# Patient Record
Sex: FEMALE | Race: WHITE | NOT HISPANIC OR LATINO | Employment: OTHER | URBAN - METROPOLITAN AREA
[De-identification: names, ages, dates, MRNs, and addresses within clinical notes are randomized per-mention and may not be internally consistent; named-entity substitution may affect disease eponyms.]

---

## 2018-10-05 ENCOUNTER — HOSPITAL ENCOUNTER (OUTPATIENT)
Dept: RADIOLOGY | Facility: HOSPITAL | Age: 48
Discharge: HOME | End: 2018-10-05
Attending: PSYCHIATRY & NEUROLOGY
Payer: COMMERCIAL

## 2018-10-05 ENCOUNTER — OFFICE VISIT (OUTPATIENT)
Dept: NEUROLOGY | Facility: CLINIC | Age: 48
End: 2018-10-05
Payer: COMMERCIAL

## 2018-10-05 VITALS
BODY MASS INDEX: 30.91 KG/M2 | HEIGHT: 68 IN | HEART RATE: 112 BPM | SYSTOLIC BLOOD PRESSURE: 148 MMHG | DIASTOLIC BLOOD PRESSURE: 86 MMHG | RESPIRATION RATE: 18 BRPM | WEIGHT: 203.94 LBS

## 2018-10-05 DIAGNOSIS — R51.9 CHRONIC INTRACTABLE HEADACHE, UNSPECIFIED HEADACHE TYPE: ICD-10-CM

## 2018-10-05 DIAGNOSIS — M95.2 SKULL DEFECT: ICD-10-CM

## 2018-10-05 DIAGNOSIS — H53.9 VISUAL CHANGES: ICD-10-CM

## 2018-10-05 DIAGNOSIS — G89.29 CHRONIC INTRACTABLE HEADACHE, UNSPECIFIED HEADACHE TYPE: ICD-10-CM

## 2018-10-05 DIAGNOSIS — G89.29 CHRONIC INTRACTABLE HEADACHE, UNSPECIFIED HEADACHE TYPE: Primary | ICD-10-CM

## 2018-10-05 DIAGNOSIS — R51.9 CHRONIC INTRACTABLE HEADACHE, UNSPECIFIED HEADACHE TYPE: Primary | ICD-10-CM

## 2018-10-05 PROCEDURE — 3008F BODY MASS INDEX DOCD: CPT | Mod: CPTII,S$GLB,, | Performed by: PSYCHIATRY & NEUROLOGY

## 2018-10-05 PROCEDURE — 70260 X-RAY EXAM OF SKULL: CPT | Mod: 26,,, | Performed by: RADIOLOGY

## 2018-10-05 PROCEDURE — 99999 PR PBB SHADOW E&M-NEW PATIENT-LVL IV: CPT | Mod: PBBFAC,,, | Performed by: PSYCHIATRY & NEUROLOGY

## 2018-10-05 PROCEDURE — 99204 OFFICE O/P NEW MOD 45 MIN: CPT | Mod: S$GLB,,, | Performed by: PSYCHIATRY & NEUROLOGY

## 2018-10-05 PROCEDURE — 70260 X-RAY EXAM OF SKULL: CPT | Mod: TC

## 2018-10-05 RX ORDER — CHOLECALCIFEROL (VITAMIN D3) 125 MCG
2 CAPSULE ORAL 2 TIMES DAILY
COMMUNITY
End: 2020-09-10

## 2018-10-05 RX ORDER — BUPRENORPHINE 15 UG/H
1 PATCH TRANSDERMAL
COMMUNITY
End: 2020-09-10

## 2018-10-05 RX ORDER — HYDROCODONE BITARTRATE AND ACETAMINOPHEN 10; 325 MG/1; MG/1
TABLET ORAL
Refills: 0 | COMMUNITY
Start: 2018-09-17 | End: 2019-12-26

## 2018-10-05 NOTE — LETTER
October 5, 2018      Shaan Langston, OD  [address] [place] [address]           [facility]  [address] [facility] [address]  Phone: [phone]  Fax: [phone]          Patient: Tee Aranda   MR Number: [id number]   YOB: 1970   Date of Visit: 10/5/2018       Dear Dr. Shaan Langston:    Thank you for referring Tee Aranda to me for evaluation. Attached you will find relevant portions of my assessment and plan of care.    If you have questions, please do not hesitate to call me. I look forward to following Tee Aranda along with you.    Sincerely,    Fernando Gray MD    Enclosure  CC:  No Recipients    If you would like to receive this communication electronically, please contact [email or web address] or [phone] to request more information on [employer] Link access.    For providers and/or their staff who would like to refer a patient to [facility], please contact us through our one-stop-shop provider referral line, [facility], at [phone].    If you feel you have received this communication in error or would no longer like to receive these types of communications, please e-mail [email or web address]

## 2018-10-05 NOTE — PROGRESS NOTES
"Consult from Dr Langston    HPI: Tee Aranda is a 48 y.o. female referred for visual changes from her OD.  She had noted visual changes and saw OD. She states she was told her vision in her right eye was worse since last year and she was given corrective lenses for this. She was advised to see a neurologist about this.   She reports she had seen this doc for years and he noted   She states she feels a "Crack" in her skull for the past year (felt by her an her beautician) and has had headaches in the bilateral frontal region for 6-7 months.   The patients headaches last hours and are frontal and are severe at times. She has no aura and has a history of migraine headaches many years ago.   No trauma history     Note she saw me in 2/2015 for 2 weeks of cervical radicular pain and EMG then showed Suggestion of Right C4 Radiculopathy and she was advised on seeing pain management at that time who stopped her symptoms with injections. States she saw Dr Rubi at [facility]  She takes Xanax, Hydrocodone, and Butrans per her PCP for hip pain chronically        Review of Systems   Constitutional: Negative for fever.   HENT: Negative for nosebleeds.    Eyes: Negative for blurred vision.   Respiratory: Negative for hemoptysis.    Gastrointestinal: Negative for blood in stool.   Genitourinary: Negative for hematuria.   Musculoskeletal: Negative for falls.   Skin: Negative for rash.   Neurological: Positive for headaches.   Endo/Heme/Allergies: Does not bruise/bleed easily.   Psychiatric/Behavioral: The patient has insomnia.          I have reviewed all of this patient's past medical and surgical histories as well as family and social histories and active allergies and medications as documented in the electronic medical record.        Exam:  Gen Appearance, well developed/nourished in no apparent distress  CV: 2+ distal pulses with no edema or swelling  Neuro:  MS: Awake, alert, oriented to place, person, time, situation. " "Sustains attention. Recent/remote memory intact, Language is full to spontaneous speech/repetition/naming/comprehension. Fund of Knowledge is full  CN: Optic discs are not well viewed due to miosis. There is NO APD,  PERRL, Extraoccular movements and visual fields are full. Normal facial sensation and strength, Hearing symmetric, Tongue and Palate are midline and strong. Shoulder Shrug symmetric and strong.  Motor: Normal bulk, tone, no abnormal movements. 5/5 strength bilateral upper/lower extremities with 2+ reflexes and bilateral plantar response  Sensory: symmetric to light touch, pain, temp, and vibration/proprioception. Romberg negative  Cerebellar: Finger-nose,Heal-shin, Rapid alternating movements intact  Gait: Normal stance, no ataxia  Right cranial depression noted in the skull which is parietal lesion which may follow a suture line.     Imaging: EMG 2015: Suggestion of Right C4 Radiculopathy     Assessment/Plan: Tee Aranda is a 48 y.o. female with several months of headaches, visual changes,  and sensation that she has a "crack in the skull" who was found to have decreased acuity in the right eye over the past year. She does have a right cranial depression noted in the skull on palpation today which is parietal in location  which may follow a suture line.   I recommend:   1. Skull xray  2. MRI brain given concern over changes in visual acuity with headaches increasing this year. 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 She has a card which does not state she can't have MRI. Will notify radiology  3. Note: She has a personal history of remote migraine without aura.   4. Obtain optometry notes about her exam findings which prompted this consult. Neuro-opathology consult ordered.   5. Note she saw me in 2/2015 for 2 weeks of cervical radicular pain and EMG then showed Suggestion of Right C4 Radiculopathy and she was advised on seeing pain " management to consider intervention at that time- she states this is resolved after interventions with pain management.   6. She uses chronic narcotics via PCP for chronic hip pain  RTC 6 weeks. She was asked to call for MRI results   CC: Dr Langston

## 2018-10-12 ENCOUNTER — HOSPITAL ENCOUNTER (OUTPATIENT)
Dept: RADIOLOGY | Facility: HOSPITAL | Age: 48
Discharge: HOME | End: 2018-10-12
Attending: PSYCHIATRY & NEUROLOGY
Payer: COMMERCIAL

## 2018-10-12 DIAGNOSIS — G89.29 CHRONIC INTRACTABLE HEADACHE, UNSPECIFIED HEADACHE TYPE: ICD-10-CM

## 2018-10-12 DIAGNOSIS — M95.2 SKULL DEFECT: ICD-10-CM

## 2018-10-12 DIAGNOSIS — H53.9 VISUAL CHANGES: ICD-10-CM

## 2018-10-12 DIAGNOSIS — R51.9 CHRONIC INTRACTABLE HEADACHE, UNSPECIFIED HEADACHE TYPE: ICD-10-CM

## 2018-10-12 DIAGNOSIS — M89.9 SKULL LESION: Primary | ICD-10-CM

## 2018-10-12 PROCEDURE — 70551 MRI BRAIN STEM W/O DYE: CPT | Mod: 26,,, | Performed by: RADIOLOGY

## 2018-10-12 PROCEDURE — 70551 MRI BRAIN STEM W/O DYE: CPT | Mod: TC

## 2018-10-19 ENCOUNTER — HOSPITAL ENCOUNTER (OUTPATIENT)
Dept: RADIOLOGY | Facility: HOSPITAL | Age: 48
Discharge: HOME | End: 2018-10-19
Attending: PSYCHIATRY & NEUROLOGY
Payer: COMMERCIAL

## 2018-10-19 ENCOUNTER — INITIAL CONSULT (OUTPATIENT)
Dept: OPHTHALMOLOGY | Facility: CLINIC | Age: 48
End: 2018-10-19
Payer: COMMERCIAL

## 2018-10-19 DIAGNOSIS — M89.9 SKULL LESION: ICD-10-CM

## 2018-10-19 DIAGNOSIS — H25.811 COMBINED FORMS OF AGE-RELATED CATARACT OF RIGHT EYE: ICD-10-CM

## 2018-10-19 PROCEDURE — 70450 CT HEAD/BRAIN W/O DYE: CPT | Mod: TC

## 2018-10-19 PROCEDURE — 99999 PR PBB SHADOW E&M-EST. PATIENT-LVL II: CPT | Mod: PBBFAC,,, | Performed by: OPHTHALMOLOGY

## 2018-10-19 PROCEDURE — 92004 COMPRE OPH EXAM NEW PT 1/>: CPT | Mod: S$GLB,,, | Performed by: OPHTHALMOLOGY

## 2018-10-19 PROCEDURE — 70450 CT HEAD/BRAIN W/O DYE: CPT | Mod: 26,,, | Performed by: RADIOLOGY

## 2018-10-19 RX ORDER — HYDROCODONE BITARTRATE AND ACETAMINOPHEN 7.5; 325 MG/1; MG/1
TABLET ORAL
COMMUNITY
End: 2018-10-19 | Stop reason: SDUPTHER

## 2018-10-19 NOTE — LETTER
October 19, 2018      Ludivina Lara MD  [address]  [facility] [address]           [facility] - Ophthalmology  [address]  [facility] [address]  Phone: [phone]  Fax: [phone]          Patient: Tee Aranda   MR Number: [id number]   YOB: 1970   Date of Visit: 10/19/2018       Dear Dr. Fernando Gray:    Thank you for referring Tee Aranda to me for evaluation. Attached you will find relevant portions of my assessment and plan of care.    If you have questions, please do not hesitate to call me. I look forward to following Tee Aranda along with you.    Sincerely,    Sameer Ruiz MD    Enclosure  CC:  MD James Dotson MD    If you would like to receive this communication electronically, please contact [email or web address] or [phone] to request more information on [employer] Link access.    For providers and/or their staff who would like to refer a patient to [facility], please contact us through our one-stop-shop provider referral line, [facility], at [phone].    If you feel you have received this communication in error or would no longer like to receive these types of communications, please e-mail [email or web address]

## 2018-10-19 NOTE — PROGRESS NOTES
"HPI     Referral by Dr. Gray     She had noted visual changes about a yr ago OD. She states she was told   her vision in her right eye was worse since last year and she was given   corrective lenses 3 weeks ago. She was advised to see a neurologist about   this. She states she feels a "Crack" in her skull for the past year.   Denies any pain.    I have personally interviewed the patient, reviewed the history and   examined the patient and agree with the technician's exam.    Last edited by Sameer Ruiz MD on 10/19/2018  8:29 AM. (History)            Assessment /Plan     For exam results, see Encounter Report.    Combined forms of age-related cataract of right eye      Ms. Aranda reported trauma to her right eye at age 3 years that could explain why her lens changes are asymmetrical. I found no other pathology that would contribute to her vision decrease in her right eye. We discussed the possibility of cataract surgery and she indicated that she has trouble seeing to drive, particularly at night. I will arrange an appointment with Dr. Lara for evaluation for cataract surgery and to follow her IOP. She will return to me as needed.                 "

## 2018-11-30 ENCOUNTER — INITIAL CONSULT (OUTPATIENT)
Dept: OPHTHALMOLOGY | Facility: CLINIC | Age: 48
End: 2018-11-30
Payer: COMMERCIAL

## 2018-11-30 DIAGNOSIS — H25.13 NUCLEAR SCLEROTIC CATARACT OF BOTH EYES: ICD-10-CM

## 2018-11-30 DIAGNOSIS — H53.71 GLARE SENSITIVITY: ICD-10-CM

## 2018-11-30 DIAGNOSIS — H25.811 COMBINED FORMS OF AGE-RELATED CATARACT OF RIGHT EYE: Primary | ICD-10-CM

## 2018-11-30 DIAGNOSIS — H47.333 CROWDED OPTIC DISC, BILATERAL: ICD-10-CM

## 2018-11-30 DIAGNOSIS — H53.451 PERIPHERAL VISUAL FIELD DEFECT OF RIGHT EYE: ICD-10-CM

## 2018-11-30 PROCEDURE — 92014 COMPRE OPH EXAM EST PT 1/>: CPT | Mod: S$GLB,,, | Performed by: OPHTHALMOLOGY

## 2018-11-30 PROCEDURE — 99999 PR PBB SHADOW E&M-EST. PATIENT-LVL II: CPT | Mod: PBBFAC,,, | Performed by: OPHTHALMOLOGY

## 2018-11-30 PROCEDURE — 92250 FUNDUS PHOTOGRAPHY W/I&R: CPT | Mod: S$GLB,,, | Performed by: OPHTHALMOLOGY

## 2018-11-30 NOTE — PROGRESS NOTES
HPI     DLS: 10/19/18 with Dr. Ruiz    Pt here for Cataract Consult per Dr. Ruiz;    Meds: Allergy prn ou    1. Combined forms of age-related cataract of right eye     Last edited by Linsey Ashley on 11/30/2018  1:30 PM. (History)            Assessment /Plan     For exam results, see Encounter Report.    Combined forms of age-related cataract of right eye    Nuclear sclerotic cataract of both eyes    Peripheral visual field defect of right eye  -     Color Fundus Photography - OU - Both Eyes  -     Posterior Segment OCT Optic Nerve- Both eyes; Future  -     Phelps Visual Field - OU - Extended - Both Eyes; Future    Glare sensitivity    Crowded optic disc, bilateral      48 year old with mild decrease in vision od   Likely associated with mild cataract od>os   Pt also feels her side vision is poor in the right eye   C/O glare - justin when driving motorcycle at night - this is one of her activities she does with her     S/P MRI - brain and CT head 10/2018 - no abnormality seen      Pt C/O intermittent eye pain od   Pt C/O int tearing od     PLAN  Monitor the cataract od - remove prn   Baseline HVF ou - as pt feels her side vision is decreased  Disc at risk - crowded ou   rec AT's prn for pain / tearing od     Photos today   F/U with HVF / OCT / AR/MR/BAT

## 2018-11-30 NOTE — LETTER
December 3, 2018      Sameer Ruiz MD  [address]  [facility] [address]           [facility] - Ophthalmology  [address]  [facility] [address]  Phone: [phone]  Fax: [phone]          Patient: Tee Aranda   MR Number: [id number]   YOB: 1970   Date of Visit: 11/30/2018       Dear Dr. Sameer Ruiz:    Thank you for referring Tee Aranda to me for evaluation. Attached you will find relevant portions of my assessment and plan of care.    If you have questions, please do not hesitate to call me. I look forward to following Tee Aranda along with you.    Sincerely,    Ludivina Lara MD    Enclosure  CC:  No Recipients    If you would like to receive this communication electronically, please contact [email or web address] or [phone] to request more information on [employer] Link access.    For providers and/or their staff who would like to refer a patient to [facility], please contact us through our one-stop-shop provider referral line, [facility], at [phone].    If you feel you have received this communication in error or would no longer like to receive these types of communications, please e-mail [email or web address]

## 2019-12-26 ENCOUNTER — HOSPITAL ENCOUNTER (EMERGENCY)
Facility: HOSPITAL | Age: 49
Discharge: HOME | End: 2019-12-26
Attending: SURGERY
Payer: COMMERCIAL

## 2019-12-26 VITALS
TEMPERATURE: 98 F | OXYGEN SATURATION: 99 % | HEART RATE: 98 BPM | RESPIRATION RATE: 18 BRPM | DIASTOLIC BLOOD PRESSURE: 88 MMHG | WEIGHT: 198.31 LBS | SYSTOLIC BLOOD PRESSURE: 180 MMHG | BODY MASS INDEX: 30.15 KG/M2

## 2019-12-26 DIAGNOSIS — M79.675 GREAT TOE PAIN, LEFT: ICD-10-CM

## 2019-12-26 DIAGNOSIS — M79.675 TOE PAIN, LEFT: Primary | ICD-10-CM

## 2019-12-26 LAB
ALBUMIN SERPL BCP-MCNC: 3.8 G/DL (ref 3.5–5.2)
ALP SERPL-CCNC: 189 U/L (ref 55–135)
ALT SERPL W/O P-5'-P-CCNC: 26 U/L (ref 10–44)
ANION GAP SERPL CALC-SCNC: 10 MMOL/L (ref 8–16)
AST SERPL-CCNC: 21 U/L (ref 10–40)
BASOPHILS # BLD AUTO: 0.02 K/UL (ref 0–0.2)
BASOPHILS NFR BLD: 0.4 % (ref 0–1.9)
BILIRUB SERPL-MCNC: 0.7 MG/DL (ref 0.1–1)
BUN SERPL-MCNC: 11 MG/DL (ref 6–20)
CALCIUM SERPL-MCNC: 8.9 MG/DL (ref 8.7–10.5)
CHLORIDE SERPL-SCNC: 108 MMOL/L (ref 95–110)
CO2 SERPL-SCNC: 24 MMOL/L (ref 23–29)
CREAT SERPL-MCNC: 0.9 MG/DL (ref 0.5–1.4)
DIFFERENTIAL METHOD: ABNORMAL
EOSINOPHIL # BLD AUTO: 0.1 K/UL (ref 0–0.5)
EOSINOPHIL NFR BLD: 2 % (ref 0–8)
ERYTHROCYTE [DISTWIDTH] IN BLOOD BY AUTOMATED COUNT: 13.1 % (ref 11.5–14.5)
EST. GFR  (AFRICAN AMERICAN): >60 ML/MIN/1.73 M^2
EST. GFR  (NON AFRICAN AMERICAN): >60 ML/MIN/1.73 M^2
GLUCOSE SERPL-MCNC: 172 MG/DL (ref 70–110)
HCT VFR BLD AUTO: 39.5 % (ref 37–48.5)
HGB BLD-MCNC: 12.9 G/DL (ref 12–16)
IMM GRANULOCYTES # BLD AUTO: 0.02 K/UL (ref 0–0.04)
IMM GRANULOCYTES NFR BLD AUTO: 0.4 % (ref 0–0.5)
LYMPHOCYTES # BLD AUTO: 1.6 K/UL (ref 1–4.8)
LYMPHOCYTES NFR BLD: 33.8 % (ref 18–48)
MCH RBC QN AUTO: 29.8 PG (ref 27–31)
MCHC RBC AUTO-ENTMCNC: 32.7 G/DL (ref 32–36)
MCV RBC AUTO: 91 FL (ref 82–98)
MONOCYTES # BLD AUTO: 0.4 K/UL (ref 0.3–1)
MONOCYTES NFR BLD: 8.7 % (ref 4–15)
NEUTROPHILS # BLD AUTO: 2.5 K/UL (ref 1.8–7.7)
NEUTROPHILS NFR BLD: 54.7 % (ref 38–73)
NRBC BLD-RTO: 0 /100 WBC
PLATELET # BLD AUTO: 91 K/UL (ref 150–350)
PMV BLD AUTO: 10.8 FL (ref 9.2–12.9)
POTASSIUM SERPL-SCNC: 3.4 MMOL/L (ref 3.5–5.1)
PROT SERPL-MCNC: 7 G/DL (ref 6–8.4)
RBC # BLD AUTO: 4.33 M/UL (ref 4–5.4)
SODIUM SERPL-SCNC: 142 MMOL/L (ref 136–145)
URATE SERPL-MCNC: 3.3 MG/DL (ref 2.4–5.7)
WBC # BLD AUTO: 4.59 K/UL (ref 3.9–12.7)

## 2019-12-26 PROCEDURE — 84550 ASSAY OF BLOOD/URIC ACID: CPT

## 2019-12-26 PROCEDURE — 63600175 PHARM REV CODE 636 W HCPCS: Performed by: SURGERY

## 2019-12-26 PROCEDURE — 99284 EMERGENCY DEPT VISIT MOD MDM: CPT | Mod: 25

## 2019-12-26 PROCEDURE — 85025 COMPLETE CBC W/AUTO DIFF WBC: CPT

## 2019-12-26 PROCEDURE — 96372 THER/PROPH/DIAG INJ SC/IM: CPT

## 2019-12-26 PROCEDURE — 36415 COLL VENOUS BLD VENIPUNCTURE: CPT

## 2019-12-26 PROCEDURE — 80053 COMPREHEN METABOLIC PANEL: CPT

## 2019-12-26 RX ORDER — CLINDAMYCIN HYDROCHLORIDE 300 MG/1
300 CAPSULE ORAL 4 TIMES DAILY
Qty: 28 CAPSULE | Refills: 0 | Status: SHIPPED | OUTPATIENT
Start: 2019-12-26 | End: 2020-01-02

## 2019-12-26 RX ORDER — MUPIROCIN 20 MG/G
OINTMENT TOPICAL 3 TIMES DAILY
Qty: 15 G | Refills: 0 | Status: SHIPPED | OUTPATIENT
Start: 2019-12-26 | End: 2020-01-05

## 2019-12-26 RX ORDER — MORPHINE SULFATE 2 MG/ML
2 INJECTION, SOLUTION INTRAMUSCULAR; INTRAVENOUS
Status: COMPLETED | OUTPATIENT
Start: 2019-12-26 | End: 2019-12-26

## 2019-12-26 RX ORDER — IBUPROFEN 800 MG/1
800 TABLET ORAL EVERY 6 HOURS PRN
Qty: 20 TABLET | Refills: 0 | Status: SHIPPED | OUTPATIENT
Start: 2019-12-26 | End: 2020-11-09

## 2019-12-26 RX ORDER — ONDANSETRON 2 MG/ML
4 INJECTION INTRAMUSCULAR; INTRAVENOUS
Status: COMPLETED | OUTPATIENT
Start: 2019-12-26 | End: 2019-12-26

## 2019-12-26 RX ADMIN — MORPHINE SULFATE 2 MG: 2 INJECTION, SOLUTION INTRAMUSCULAR; INTRAVENOUS at 02:12

## 2019-12-26 RX ADMIN — ONDANSETRON 4 MG: 2 INJECTION INTRAMUSCULAR; INTRAVENOUS at 02:12

## 2019-12-26 NOTE — ED TRIAGE NOTES
Patient reports left great toe pain and right hip pain. Patient reports she injured left great toe 3 months ago and 3 days ago discoloration occurred in this toe.

## 2019-12-26 NOTE — ED PROVIDER NOTES
[facility]                                                 Chief Complaint  49 y.o. female with Toe Pain (left great toe pain. )    History of Present Illness  Tee Aranda presents to the emergency room with left great toe pain for months  Patient injured her left great toe 2 months ago with chronic dull pain since that injury  Patient states the pain in the left great toe has escalated in the last 2 days at home  Patient on exam has a subungual hematoma, redness to the tip of the left great toe  Patient denies any previous history of left great toe injury, denies any diabetes history  Patient states any movement activity exacerbates the left great toe pain for last 2 days    The history is provided by the patient   device was not used during this ER visit  Medical history:  Anxiety, AVN, arthritis, kidney stone  Surgeries: Knee, lithotripsy, rhinoplasty, tonsillectomy, hip surgery  Allergies: Sulfa    I have reviewed all of this patient's past medical, surgical, family, and social   histories as well as active allergies and medications documented in the  electronic medical record    Review of Systems and Physical Exam      Review of Systems  -- Constitution - no fever, denies fatigue, no weakness, no chills  -- Eyes - no tearing or redness, no visual disturbance  -- Ear, Nose - no tinnitus or earache, no nasal congestion or discharge  -- Mouth,Throat - no sore throat, no toothache, normal voice, normal swallowing  -- Respiratory - denies cough and congestion, no shortness of breath, no BATES  -- Cardiovascular - denies chest pain, no palpitations, denies claudication  -- Gastrointestinal - denies abdominal pain, nausea, vomiting, or diarrhea  -- Genitourinary - no dysuria, denies flank pain, no hematuria, no STD risk  -- Musculoskeletal - left great toe pain for 2 months, worse last 2 days  -- Neurological - no headache, denies weakness or seizure; no LOC  -- Skin - denies  pallor, rash, or changes in skin. no hives or welts noted     Vital Signs  Her tympanic temperature is 97.8 °F (36.6 °C).   Her blood pressure is 180/88 (abnormal) and her pulse is 98.   Her respiration is 18 and oxygen saturation is 99%.     Physical Exam  -- Nursing note and vitals reviewed  -- Constitutional: Appears well-developed and well-nourished  -- Head: Atraumatic. Normocephalic. No obvious abnormality  -- Eyes: Pupils are equal and reactive to light. Normal conjunctiva and lids  -- Cardiac: Normal rate, regular rhythm and normal heart sounds  -- Pulmonary: Normal respiratory effort, breath sounds clear to auscultation  -- Abdominal: Soft, no tenderness. Normal bowel sounds. Normal liver edge  -- Musculoskeletal: Normal range of motion, no effusions. Joints stable   -- Neurological: No focal deficits. Showed good interaction with staff  -- Vascular: Posterior tibial, dorsalis pedis and radial pulses 2+ bilaterally    -- Lymphatics: No cervical or peripheral lymphadenopathy. No edema noted  -- Skin: Redness to the tip of the left great toe with subungual hematoma    Emergency Room Course          Treatment and Evaluation  -- The electrolytes drawn in the ER today were within normal limits  -- The CBC drawn in the ER today was within normal limits  -- uric acid was within normal limits  -- Preliminary ER x-ray readings showed no evidence of fracture or dislocation  -- All x-rays are reviewed with a final disposition given by the radiologist   -- IM 2 mg Morphine given in the ER  -- IM 4 mg Zofran given today in the ER     ED Physician Management  -- Diagnosis management comments: 49 y.o. female with left great toe pain  -- patient with redness and a subungual hematoma on ER evaluation today  -- no fever, normal white count, low risk for osteomyelitis on ER clinical exam  -- patient we placed on antibiotics tonight as well as nonnarcotic pain meds  -- patient was told that she needs to follow up with a  podiatrist and orthopedic  -- patient voiced understanding will do appropriate follow-up as outpatient  -- return to the ER with any concerning signs or symptoms after discharge    Diagnosis  -- The primary encounter diagnosis was Toe pain, left.   -- A diagnosis of Great toe pain, left was also pertinent to this visit.    Disposition and Plan  -- Disposition: home  -- Condition: stable  -- Follow-up: Patient to follow up with MD in 1-2 days.  -- I advised the patient that we have found no life threatening condition today  -- At this time, I believe the patient is clinically stable for discharge.   -- The patient acknowledges that close follow up with a MD is required   -- Patient agrees to comply with all instruction and direction given in the ER    This note is dictated on M*Modal word recognition program.  There are word recognition mistakes that are occasionally missed on review.         Alex Soliman MD  12/26/19 07

## 2019-12-26 NOTE — ED NOTES
Patient reports she was monitor her blood pressure at home. Normally her bp is in WNL. She reports her BP is elevated when she is in pain. Patient stable for discharge. Verbalized understanding of discharge instructions.

## 2020-05-07 ENCOUNTER — CLINICAL SUPPORT (OUTPATIENT)
Dept: OPHTHALMOLOGY | Facility: CLINIC | Age: 50
End: 2020-05-07
Payer: COMMERCIAL

## 2020-05-07 ENCOUNTER — OFFICE VISIT (OUTPATIENT)
Dept: OPHTHALMOLOGY | Facility: CLINIC | Age: 50
End: 2020-05-07
Payer: COMMERCIAL

## 2020-05-07 DIAGNOSIS — H25.811 COMBINED FORMS OF AGE-RELATED CATARACT OF RIGHT EYE: Primary | ICD-10-CM

## 2020-05-07 DIAGNOSIS — H25.13 NUCLEAR SCLEROTIC CATARACT OF BOTH EYES: ICD-10-CM

## 2020-05-07 DIAGNOSIS — H53.451 PERIPHERAL VISUAL FIELD DEFECT OF RIGHT EYE: ICD-10-CM

## 2020-05-07 DIAGNOSIS — H40.011 OPEN ANGLE WITH BORDERLINE FINDINGS AND LOW GLAUCOMA RISK IN RIGHT EYE: ICD-10-CM

## 2020-05-07 DIAGNOSIS — H25.013 CORTICAL AGE-RELATED CATARACT, BILATERAL: ICD-10-CM

## 2020-05-07 DIAGNOSIS — H53.71 GLARE SENSITIVITY: ICD-10-CM

## 2020-05-07 DIAGNOSIS — H47.333 CROWDED OPTIC DISC, BILATERAL: ICD-10-CM

## 2020-05-07 PROCEDURE — 92083 HUMPHREY VISUAL FIELD - OU - BOTH EYES: ICD-10-PCS | Mod: S$GLB,,, | Performed by: OPHTHALMOLOGY

## 2020-05-07 PROCEDURE — 92083 EXTENDED VISUAL FIELD XM: CPT | Mod: S$GLB,,, | Performed by: OPHTHALMOLOGY

## 2020-05-07 PROCEDURE — 92133 CPTRZD OPH DX IMG PST SGM ON: CPT | Mod: S$GLB,,, | Performed by: OPHTHALMOLOGY

## 2020-05-07 PROCEDURE — 92014 COMPRE OPH EXAM EST PT 1/>: CPT | Mod: S$GLB,,, | Performed by: OPHTHALMOLOGY

## 2020-05-07 PROCEDURE — 99999 PR PBB SHADOW E&M-EST. PATIENT-LVL II: ICD-10-PCS | Mod: PBBFAC,,, | Performed by: OPHTHALMOLOGY

## 2020-05-07 PROCEDURE — 99999 PR PBB SHADOW E&M-EST. PATIENT-LVL II: CPT | Mod: PBBFAC,,, | Performed by: OPHTHALMOLOGY

## 2020-05-07 PROCEDURE — 92133 POSTERIOR SEGMENT OCT OPTIC NERVE(OCULAR COHERENCE TOMOGRAPHY) - OU - BOTH EYES: ICD-10-PCS | Mod: S$GLB,,, | Performed by: OPHTHALMOLOGY

## 2020-05-07 PROCEDURE — 92014 PR EYE EXAM, EST PATIENT,COMPREHESV: ICD-10-PCS | Mod: S$GLB,,, | Performed by: OPHTHALMOLOGY

## 2020-05-07 NOTE — PROGRESS NOTES
HPI     DLS: 11/30/18    Pt here for HVF review;  Pt states she always had floaters but does notice gold flashes in her   peripheral vision.     Meds: Systane prn ou    1. Combined forms of age-related cataract of right eye   2. Crowded disc - disc at risk       Last edited by Ludivina Lara MD on 5/7/2020  3:52 PM. (History)            Assessment /Plan     For exam results, see Encounter Report.    Combined forms of age-related cataract of right eye    Nuclear sclerotic cataract of both eyes    Cortical age-related cataract, bilateral    Glare sensitivity    Peripheral visual field defect of right eye  -     Phelps Visual Field - OU - Extended - Both Eyes  -     Posterior Segment OCT Optic Nerve- Both eyes    Crowded optic disc, bilateral  -     Healy Retina Tomography (HRT) - OU - Both Eyes; Future    Open angle with borderline findings and low glaucoma risk in right eye  -     Healy Retina Tomography (HRT) - OU - Both Eyes; Future        48 year old with mild decrease in vision od   Likely associated with mild cataract od>os   Pt also feels her side vision is poor in the right eye   C/O glare - justin when driving motorcycle at night - this is one of her activities she does with her     S/P MRI - brain and CT head 10/2018 - no abnormality seen         Glaucoma (type and duration)    Suspect - low risk - pt C/O decrease in side vision od    First HVF   4/2020 - near full od // full os    First photos   12/2018    Treatment / Drops started   none           Family history    neg        Glaucoma meds    none        H/O adverse rxn to glaucoma drops    none        LASERS    none        GLAUCOMA SURGERIES    none        OTHER EYE SURGERIES    none        CDR    0/1 / 0.1 - disk at risk         Tbase    15-23 od // 15-23 os           Tmax    23/23            Ttarget    ?             HVF    1 test 2020 to  2020  - near full  od // near full  os        Gonio    ?        CCT    ?        OCT    1  test 2020 to 2020 - RNFL - nl od // nl os        HRT    ? test 20/ to 20/ - MR - ? od // ? os        Disc photos    12/2018     - Ttoday    15/15  - Test done today    HVF / DFE / OCT / AR/MR/BAT - cataract check       Pt C/O intermittent eye pain od   Pt C/O int tearing od     PLAN  Monitor the cataract od - remove prn     Monitor as a glaucomas suspect - low risk     Disc at risk - crowded ou     rec AT's prn for pain / tearing od     F/U 4 months with HRT / gonio and CCT    Cataract surgery od - prn pts wishes / needs

## 2020-07-22 ENCOUNTER — TELEPHONE (OUTPATIENT)
Dept: HEPATOLOGY | Facility: CLINIC | Age: 50
End: 2020-07-22

## 2020-07-22 ENCOUNTER — TELEPHONE (OUTPATIENT)
Dept: TRANSPLANT | Facility: CLINIC | Age: 50
End: 2020-07-22

## 2020-07-22 NOTE — TELEPHONE ENCOUNTER
Pt records reviewed.  Pt will be referred to Hepatology due to Cirrhosis with low MELD   Initial referral received  from Dr Kvng Pearce  Referral letter sent to provider and patient.      RECORDS SCANNED IN MEDIA UNDER HEPATOLOGY REFERRAL .

## 2020-07-22 NOTE — TELEPHONE ENCOUNTER
----- Message from Yaa Bangura sent at 7/21/2020  3:11 PM CDT -----  Regarding: Ext Referral  Good afternoon,    Current pt is being referred from Dr Kvng Pearce for cirrhosis. I have scanned the referral and records in to media mgr. Please contact pt to schedule and let me know if I can help any further.    Thank you,  Yaa Bangura  [facility]  Ext [phone]

## 2020-07-22 NOTE — LETTER
July 22, 2020    Tee Aranda  [address]  [place] [address]      Dear Tee Aranda:    Your doctor has referred you to the [facility]. We are sending this letter to remind you to make an appointment with us to complete the referral process.     Please call us at [phone] to schedule an appointment. We look forward to seeing you soon.     If you received a call and have been scheduled, please disregard this letter.       Sincerely,        [facility]   [facility] [place], [place] [id number] [phone]

## 2020-08-31 ENCOUNTER — LAB VISIT (OUTPATIENT)
Dept: LAB | Facility: HOSPITAL | Age: 50
End: 2020-08-31
Attending: INTERNAL MEDICINE
Payer: COMMERCIAL

## 2020-08-31 ENCOUNTER — OFFICE VISIT (OUTPATIENT)
Dept: HEPATOLOGY | Facility: CLINIC | Age: 50
End: 2020-08-31
Attending: INTERNAL MEDICINE
Payer: COMMERCIAL

## 2020-08-31 VITALS
HEART RATE: 86 BPM | SYSTOLIC BLOOD PRESSURE: 166 MMHG | RESPIRATION RATE: 16 BRPM | HEIGHT: 68 IN | WEIGHT: 175.69 LBS | BODY MASS INDEX: 26.63 KG/M2 | DIASTOLIC BLOOD PRESSURE: 73 MMHG

## 2020-08-31 DIAGNOSIS — K75.81 LIVER CIRRHOSIS SECONDARY TO NASH: Chronic | ICD-10-CM

## 2020-08-31 DIAGNOSIS — K74.60 LIVER CIRRHOSIS SECONDARY TO NASH: Chronic | ICD-10-CM

## 2020-08-31 DIAGNOSIS — I85.10 SECONDARY ESOPHAGEAL VARICES WITHOUT BLEEDING: Chronic | ICD-10-CM

## 2020-08-31 LAB
AFP SERPL-MCNC: 4.8 NG/ML (ref 0–8.4)
ALBUMIN SERPL BCP-MCNC: 4.4 G/DL (ref 3.5–5.2)
ALP SERPL-CCNC: 113 U/L (ref 55–135)
ALT SERPL W/O P-5'-P-CCNC: 47 U/L (ref 10–44)
ANION GAP SERPL CALC-SCNC: 6 MMOL/L (ref 8–16)
AST SERPL-CCNC: 35 U/L (ref 10–40)
BASOPHILS # BLD AUTO: 0.01 K/UL (ref 0–0.2)
BASOPHILS NFR BLD: 0.3 % (ref 0–1.9)
BILIRUB SERPL-MCNC: 0.7 MG/DL (ref 0.1–1)
BUN SERPL-MCNC: 13 MG/DL (ref 6–20)
CALCIUM SERPL-MCNC: 9.3 MG/DL (ref 8.7–10.5)
CHLORIDE SERPL-SCNC: 112 MMOL/L (ref 95–110)
CO2 SERPL-SCNC: 24 MMOL/L (ref 23–29)
CREAT SERPL-MCNC: 0.7 MG/DL (ref 0.5–1.4)
DIFFERENTIAL METHOD: ABNORMAL
EOSINOPHIL # BLD AUTO: 0.1 K/UL (ref 0–0.5)
EOSINOPHIL NFR BLD: 1.6 % (ref 0–8)
ERYTHROCYTE [DISTWIDTH] IN BLOOD BY AUTOMATED COUNT: 13.2 % (ref 11.5–14.5)
EST. GFR  (AFRICAN AMERICAN): >60 ML/MIN/1.73 M^2
EST. GFR  (NON AFRICAN AMERICAN): >60 ML/MIN/1.73 M^2
GLUCOSE SERPL-MCNC: 108 MG/DL (ref 70–110)
HBV SURFACE AB SER-ACNC: NEGATIVE M[IU]/ML
HBV SURFACE AG SERPL QL IA: NEGATIVE
HCT VFR BLD AUTO: 40.2 % (ref 37–48.5)
HCV AB SERPL QL IA: NEGATIVE
HEPATITIS A ANTIBODY, IGG: NEGATIVE
HGB BLD-MCNC: 12.9 G/DL (ref 12–16)
IMM GRANULOCYTES # BLD AUTO: 0.01 K/UL (ref 0–0.04)
IMM GRANULOCYTES NFR BLD AUTO: 0.3 % (ref 0–0.5)
INR PPP: 1 (ref 0.8–1.2)
LYMPHOCYTES # BLD AUTO: 1 K/UL (ref 1–4.8)
LYMPHOCYTES NFR BLD: 30.6 % (ref 18–48)
MCH RBC QN AUTO: 29.9 PG (ref 27–31)
MCHC RBC AUTO-ENTMCNC: 32.1 G/DL (ref 32–36)
MCV RBC AUTO: 93 FL (ref 82–98)
MONOCYTES # BLD AUTO: 0.2 K/UL (ref 0.3–1)
MONOCYTES NFR BLD: 6.5 % (ref 4–15)
NEUTROPHILS # BLD AUTO: 1.9 K/UL (ref 1.8–7.7)
NEUTROPHILS NFR BLD: 60.7 % (ref 38–73)
NRBC BLD-RTO: 0 /100 WBC
PLATELET # BLD AUTO: 78 K/UL (ref 150–350)
PMV BLD AUTO: 11.8 FL (ref 9.2–12.9)
POTASSIUM SERPL-SCNC: 4 MMOL/L (ref 3.5–5.1)
PROT SERPL-MCNC: 7.3 G/DL (ref 6–8.4)
PROTHROMBIN TIME: 11.4 SEC (ref 9–12.5)
RBC # BLD AUTO: 4.31 M/UL (ref 4–5.4)
SODIUM SERPL-SCNC: 142 MMOL/L (ref 136–145)
WBC # BLD AUTO: 3.1 K/UL (ref 3.9–12.7)

## 2020-08-31 PROCEDURE — 85610 PROTHROMBIN TIME: CPT

## 2020-08-31 PROCEDURE — 85025 COMPLETE CBC W/AUTO DIFF WBC: CPT

## 2020-08-31 PROCEDURE — 3008F BODY MASS INDEX DOCD: CPT | Mod: CPTII,S$GLB,, | Performed by: INTERNAL MEDICINE

## 2020-08-31 PROCEDURE — 3008F PR BODY MASS INDEX (BMI) DOCUMENTED: ICD-10-PCS | Mod: CPTII,S$GLB,, | Performed by: INTERNAL MEDICINE

## 2020-08-31 PROCEDURE — 99203 PR OFFICE/OUTPT VISIT, NEW, LEVL III, 30-44 MIN: ICD-10-PCS | Mod: S$GLB,,, | Performed by: INTERNAL MEDICINE

## 2020-08-31 PROCEDURE — 99999 PR PBB SHADOW E&M-EST. PATIENT-LVL III: ICD-10-PCS | Mod: PBBFAC,,, | Performed by: INTERNAL MEDICINE

## 2020-08-31 PROCEDURE — 86706 HEP B SURFACE ANTIBODY: CPT

## 2020-08-31 PROCEDURE — 99999 PR PBB SHADOW E&M-EST. PATIENT-LVL III: CPT | Mod: PBBFAC,,, | Performed by: INTERNAL MEDICINE

## 2020-08-31 PROCEDURE — 99203 OFFICE O/P NEW LOW 30 MIN: CPT | Mod: S$GLB,,, | Performed by: INTERNAL MEDICINE

## 2020-08-31 PROCEDURE — 36415 COLL VENOUS BLD VENIPUNCTURE: CPT

## 2020-08-31 PROCEDURE — 82105 ALPHA-FETOPROTEIN SERUM: CPT

## 2020-08-31 PROCEDURE — 87340 HEPATITIS B SURFACE AG IA: CPT

## 2020-08-31 PROCEDURE — 86790 VIRUS ANTIBODY NOS: CPT

## 2020-08-31 PROCEDURE — 86803 HEPATITIS C AB TEST: CPT

## 2020-08-31 PROCEDURE — 80053 COMPREHEN METABOLIC PANEL: CPT

## 2020-08-31 NOTE — PROGRESS NOTES
HEPATOLOGY CONSULTATION    Referring Physician: Dr. MARI Pearce  Current Corresponding Physician: Dr. MARI Pearce    Reason for Consultation: Consultation for evaluation of Cirrhosis and Fatty Liver    History of Present Illness: Tee Aranda is a 50 y.o. femalewho presents for evaluation of   Chief Complaint   Patient presents with    Cirrhosis    Fatty Liver    This 50-year-old woman was referred with a recent diagnosis of cirrhosis.  She noticed several months ago easy bruisability.  She was found have a low platelet count.  She was assessed by Hematology.  She had a bone marrow aspirate and biopsy which was normal.  She eventually had an upper endoscopy which showed features of portal hypertension and grade 1 varices.  A FibroScan confirmed significant fatty liver as well as advanced fibrosis and cirrhosis.  Her weight was as high as 199 lb.  She has lost 26 lb since diagnosis.  There is no family history of liver disease.  She says she drinks alcohol socially.  She has mild ankle edema.  She has no symptoms of hepatic encephalopathy.  She is has no symptoms of GI bleeding.    Past Medical History:   Diagnosis Date    Anxiety     Arthritis     AVN (avascular necrosis of bone)     Kidney stones      Outpatient Encounter Medications as of 8/31/2020   Medication Sig Dispense Refill    alprazolam (XANAX) 0.5 MG tablet Take 1 tablet by mouth 3 (three) times daily as needed.       ibuprofen (ADVIL,MOTRIN) 800 MG tablet Take 1 tablet (800 mg total) by mouth every 6 (six) hours as needed for Pain. 20 tablet 0    buprenorphine (BUTRANS) 15 mcg/hour PTWK Place 1 patch onto the skin every 7 days.      naproxen sodium (ALEVE) 220 mg Cap Take 2 tablets by mouth 2 (two) times daily.       No facility-administered encounter medications on file as of 8/31/2020.      Review of patient's allergies indicates:   Allergen Reactions    Sulfa (sulfonamide antibiotics) Hives     Family History   Problem Relation Age of Onset     Diabetes Mellitus Maternal Grandmother        Social History     Socioeconomic History    Marital status:      Spouse name: Not on file    Number of children: Not on file    Years of education: Not on file    Highest education level: Not on file   Occupational History    Not on file   Social Needs    Financial resource strain: Not on file    Food insecurity     Worry: Not on file     Inability: Not on file    Transportation needs     Medical: Not on file     Non-medical: Not on file   Tobacco Use    Smoking status: Former Smoker     Packs/day: 0.50     Types: Cigarettes     Quit date: 7/3/2019     Years since quittin.1    Smokeless tobacco: Never Used   Substance and Sexual Activity    Alcohol use: Yes     Comment: occasionally    Drug use: No    Sexual activity: Not on file   Lifestyle    Physical activity     Days per week: Not on file     Minutes per session: Not on file    Stress: Not on file   Relationships    Social connections     Talks on phone: Not on file     Gets together: Not on file     Attends Islam service: Not on file     Active member of club or organization: Not on file     Attends meetings of clubs or organizations: Not on file     Relationship status: Not on file   Other Topics Concern    Not on file   Social History Narrative    Not on file     Review of Systems   Constitutional: Negative for appetite change, fatigue and unexpected weight change.   HENT: Negative for ear pain, hearing loss, sore throat and trouble swallowing.    Eyes: Negative for visual disturbance.   Respiratory: Negative for cough and shortness of breath.    Cardiovascular: Positive for leg swelling. Negative for chest pain and palpitations.   Gastrointestinal: Positive for abdominal pain. Negative for nausea and vomiting.   Genitourinary: Negative for difficulty urinating and dysuria.   Musculoskeletal: Negative for arthralgias and back pain.   Skin: Negative for rash.   Neurological:  Negative for tremors, seizures and headaches.   Hematological: Bruises/bleeds easily.   Psychiatric/Behavioral: Negative for agitation and decreased concentration.     Vitals:    08/31/20 1003   BP: (!) 166/73   Pulse: 86   Resp: 16       Physical Exam  Constitutional:       Appearance: She is well-developed. She is not diaphoretic.   HENT:      Right Ear: External ear normal.      Left Ear: External ear normal.   Eyes:      General: No scleral icterus.  Cardiovascular:      Rate and Rhythm: Normal rate and regular rhythm.      Heart sounds: Normal heart sounds. No murmur. No friction rub. No gallop.    Pulmonary:      Effort: Pulmonary effort is normal. No respiratory distress.      Breath sounds: Normal breath sounds. No wheezing.   Abdominal:      General: Bowel sounds are normal. There is no distension.      Palpations: Abdomen is soft. There is no mass.      Tenderness: There is no abdominal tenderness.   Musculoskeletal: Normal range of motion.   Lymphadenopathy:      Cervical: No cervical adenopathy.   Skin:     General: Skin is warm and dry.      Coloration: Skin is not pale.   Neurological:      Mental Status: She is alert and oriented to person, place, and time.         Computed MELD-Na score unavailable. Necessary lab results were not found in the last year.  Computed MELD score unavailable. Necessary lab results were not found in the last year.    Lab Results   Component Value Date     (H) 08/12/2020    BUN 14 08/12/2020    CREATININE 0.70 08/12/2020    CALCIUM 9.6 08/12/2020     08/12/2020    K 3.7 08/12/2020     08/12/2020    PROT 7.4 08/12/2020    CO2 24 08/12/2020    ANIONGAP 10 12/26/2019    WBC 3.00 (L) 08/12/2020    RBC 4.34 08/12/2020    HGB 13.1 08/12/2020    HCT 38.6 08/12/2020    MCV 89 08/12/2020    MCH 30.1 08/12/2020    MCHC 33.8 08/12/2020     Lab Results   Component Value Date    RDW 13.6 08/12/2020    PLT 85 (L) 08/12/2020    MPV 9.5 08/12/2020    GRAN 1.7 (L)  08/12/2020    GRAN 56.3 08/12/2020    LYMPH 1.0 08/12/2020    LYMPH 33.6 08/12/2020    MONO 0.2 (L) 08/12/2020    MONO 7.8 08/12/2020    EOSINOPHIL 1.8 08/12/2020    BASOPHIL 0.5 08/12/2020    EOS 0.1 08/12/2020    BASO 0.00 08/12/2020    ALBUMIN 4.6 08/12/2020    AST 41 (H) 08/12/2020    ALT 41 08/12/2020    ALKPHOS 93 08/12/2020       Assessment and Plan:  Patient Active Problem List   Diagnosis    Combined forms of age-related cataract of right eye    Liver cirrhosis secondary to CARTAGENA    Secondary esophageal varices without bleeding     Tee Aranda is a 50 y.o. female withCirrhosis and Fatty Liver    I explained to the patient that the only treatment we have for fatty liver disease is weight loss and I recommend diet modification and exercise.  I have asked her to minimize alcohol consumption.  I will be entering her into a hepatoma surveillance program.  I have asked her to restrict salt in her diet.  I will be checking her blood work today.  She will return to clinic in 6 months time with continued clinical, biochemical and ultrasound surveillance.

## 2020-08-31 NOTE — LETTER
August 31, 2020      Kvng Pearce Jr., MD  [address] [facility]  [address] [place] [id number]           [address] [facility] Trans[facility] [address]  [address]  [facility] [address]  Phone: [phone]  Fax: [phone]          Patient: Tee Aranda   MR Number: [id number]   YOB: 1970   Date of Visit: 8/31/2020       Dear Dr. Kvng Pearce Jr.:    Thank you for referring Tee Aranda to me for evaluation. Attached you will find relevant portions of my assessment and plan of care.    If you have questions, please do not hesitate to call me. I look forward to following Tee Aranda along with you.    Sincerely,    Fritz White MD    Enclosure  CC:  No Recipients    If you would like to receive this communication electronically, please contact [email or web address] or [phone] to request more information on [employer] access.    For providers and/or their staff who would like to refer a patient to [facility], please contact us through our one-stop-shop provider referral line, [facility] [place], at [phone].    If you feel you have received this communication in error or would no longer like to receive these types of communications, please e-mail [email or web address]

## 2020-09-03 NOTE — PROGRESS NOTES
HPI     Glaucoma      Additional comments: HRT review today              Blurred Vision      Additional comments: Pt feels vision is getting worse              Comments     DLS: 5/7/20    1. Glaucoma Suspect  2. NS OU  3. Crowded Disc OU    MEDS:  Systane PRN OU          Last edited by Esperanza Martinez MA on 9/10/2020  1:25 PM. (History)            Assessment /Plan     For exam results, see Encounter Report.    Open angle with borderline findings and low glaucoma risk in right eye    Crowded optic disc, bilateral    Combined forms of age-related cataract of right eye    Nuclear sclerotic cataract of both eyes    Cortical age-related cataract, bilateral    Glare sensitivity    Peripheral visual field defect of right eye          48 year old with mild decrease in vision od   Likely associated with mild cataract od>os   Pt also feels her side vision is poor in the right eye   C/O glare - justin when driving motorcycle at night - this is one of her activities she does with her     S/P MRI - brain and CT head 10/2018 - no abnormality seen         Glaucoma (type and duration)    Suspect - low risk - pt C/O decrease in side vision od    First HVF   4/2020 - near full od // full os    First photos   12/2018    Treatment / Drops started   none           Family history    neg        Glaucoma meds    none        H/O adverse rxn to glaucoma drops    none        LASERS    none        GLAUCOMA SURGERIES    none        OTHER EYE SURGERIES    none        CDR    0/1 / 0.1 - disk at risk         Tbase    15-23 od // 15-23 os           Tmax    23/23            Ttarget    ?             HVF    1 test 2020 to 2020  - near full  od // near full  os        Gonio    +2-3 // +2 (slightly narrow - doubt occludable - monitor)         CCT    576/584        OCT    1 test 2020 to 2020 - RNFL - nl od // nl os        HRT    1 test 2020 to 2020  - MR -  nl od // nl os /// CDR 0.06 od // 0.18 os        Disc photos    12/2018     - Ttoday     16/18  - Test done today    HRT / CCT / gonio         Pt C/O intermittent eye pain od   Pt C/O int tearing od     PLAN  Vis sign cat od - remove prn - pt is NOT interested in CE at this time - no problems with activities of daily living - just needs glasses to read small print     Monitor as a glaucomas suspect - low risk     Slightly narrow angles - warned of signs of acute angle closure - pt told to call if has blurry vision / non reactive pupil / pain in and around the eye and nausea or vomiting    Disc at risk - crowded ou     rec AT's prn for pain / tearing od     F/U 6 months with  AR/MR/BAT ou - cataract check and repeat gonio - may benefit from PI in future

## 2020-09-10 ENCOUNTER — OFFICE VISIT (OUTPATIENT)
Dept: OPHTHALMOLOGY | Facility: CLINIC | Age: 50
End: 2020-09-10
Payer: COMMERCIAL

## 2020-09-10 ENCOUNTER — HOSPITAL ENCOUNTER (OUTPATIENT)
Dept: RADIOLOGY | Facility: HOSPITAL | Age: 50
Discharge: HOME | End: 2020-09-10
Attending: INTERNAL MEDICINE
Payer: COMMERCIAL

## 2020-09-10 DIAGNOSIS — H25.811 COMBINED FORMS OF AGE-RELATED CATARACT OF RIGHT EYE: ICD-10-CM

## 2020-09-10 DIAGNOSIS — H25.13 NUCLEAR SCLEROTIC CATARACT OF BOTH EYES: ICD-10-CM

## 2020-09-10 DIAGNOSIS — H53.451 PERIPHERAL VISUAL FIELD DEFECT OF RIGHT EYE: ICD-10-CM

## 2020-09-10 DIAGNOSIS — H25.013 CORTICAL AGE-RELATED CATARACT, BILATERAL: ICD-10-CM

## 2020-09-10 DIAGNOSIS — H47.333 CROWDED OPTIC DISC, BILATERAL: ICD-10-CM

## 2020-09-10 DIAGNOSIS — H53.71 GLARE SENSITIVITY: ICD-10-CM

## 2020-09-10 DIAGNOSIS — K74.60 LIVER CIRRHOSIS SECONDARY TO NASH: ICD-10-CM

## 2020-09-10 DIAGNOSIS — K75.81 LIVER CIRRHOSIS SECONDARY TO NASH: ICD-10-CM

## 2020-09-10 DIAGNOSIS — H40.011 OPEN ANGLE WITH BORDERLINE FINDINGS AND LOW GLAUCOMA RISK IN RIGHT EYE: Primary | ICD-10-CM

## 2020-09-10 PROCEDURE — 76700 US EXAM ABDOM COMPLETE: CPT | Mod: TC

## 2020-09-10 PROCEDURE — 76700 US EXAM ABDOM COMPLETE: CPT | Mod: 26,,, | Performed by: RADIOLOGY

## 2020-09-10 PROCEDURE — 92012 INTRM OPH EXAM EST PATIENT: CPT | Mod: S$GLB,,, | Performed by: OPHTHALMOLOGY

## 2020-09-10 PROCEDURE — 76700 US ABDOMEN COMPLETE: ICD-10-PCS | Mod: 26,,, | Performed by: RADIOLOGY

## 2020-09-10 PROCEDURE — 92012 PR EYE EXAM, EST PATIENT,INTERMED: ICD-10-PCS | Mod: S$GLB,,, | Performed by: OPHTHALMOLOGY

## 2020-09-10 PROCEDURE — 92020 GONIOSCOPY: CPT | Mod: S$GLB,,, | Performed by: OPHTHALMOLOGY

## 2020-09-10 PROCEDURE — 92020 PR SPECIAL EYE EVAL,GONIOSCOPY: ICD-10-PCS | Mod: S$GLB,,, | Performed by: OPHTHALMOLOGY

## 2020-09-10 PROCEDURE — 92133 HEIDELBERG RETINA TOMOGRAPHY (HRT) - OU - BOTH EYES: ICD-10-PCS | Mod: S$GLB,,, | Performed by: OPHTHALMOLOGY

## 2020-09-10 PROCEDURE — 99999 PR PBB SHADOW E&M-EST. PATIENT-LVL II: CPT | Mod: PBBFAC,,, | Performed by: OPHTHALMOLOGY

## 2020-09-10 PROCEDURE — 99999 PR PBB SHADOW E&M-EST. PATIENT-LVL II: ICD-10-PCS | Mod: PBBFAC,,, | Performed by: OPHTHALMOLOGY

## 2020-09-10 PROCEDURE — 92133 CPTRZD OPH DX IMG PST SGM ON: CPT | Mod: S$GLB,,, | Performed by: OPHTHALMOLOGY

## 2020-11-09 PROBLEM — K92.2 GI BLEED: Status: ACTIVE | Noted: 2020-11-09

## 2020-11-09 PROBLEM — K92.1 GASTROINTESTINAL HEMORRHAGE WITH MELENA: Status: ACTIVE | Noted: 2020-11-09

## 2020-11-10 PROBLEM — D64.9 ANEMIA: Status: ACTIVE | Noted: 2020-11-10

## 2020-11-12 ENCOUNTER — TELEPHONE (OUTPATIENT)
Dept: HEPATOLOGY | Facility: CLINIC | Age: 50
End: 2020-11-12

## 2020-11-12 NOTE — TELEPHONE ENCOUNTER
----- Message from Fritz White MD sent at 11/12/2020  8:19 AM CST -----  Regarding: RE: EGD Results  I did. And I spoke with the gastroenterologist who performed the procedure  ----- Message -----  From: Lima Nails MA  Sent: 11/11/2020   9:41 AM CST  To: Fritz White MD  Subject: EGD Results                                      Pt is requesting that yo review her EGD

## 2020-11-17 ENCOUNTER — TELEPHONE (OUTPATIENT)
Dept: HEPATOLOGY | Facility: CLINIC | Age: 50
End: 2020-11-17

## 2020-11-17 NOTE — TELEPHONE ENCOUNTER
I spoke with the pt. She said she's starting to feel sick again and is having black stools again. She mentioned having a blood transfusion recently so she really wanted to get in sooner. I scheduled her with a Hospital f/u with Dr. Bowers, but I will check to see if Dr. White can possibly squeeze her in sooner than the 30th so she can be seen by him instead.    ----- Message from Gaye Mccray sent at 11/17/2020 12:27 PM CST -----  Regarding: Sooner Appointment  Contact: Ms Pineda/  Dr Samano Office [phone]  Type:  Sooner Apoointment Request      Name of Caller:Ms Pineda  states Dr Joseph would like patient to be seen sooner than 11/30/2020 to address patient Esophageal Varices sooner  When is the first available appointment?  Symptoms:  Would the patient rather a call back or a response via [facility]? call  Best Call Back Number:[phone]  Additional Information:

## 2020-11-21 NOTE — PROGRESS NOTES
HEPATOLOGY FOLLOW UP    Referring Physician: James Samano MD  Current Corresponding Physician: James Samano MD, Gerard Salinas MD    Tee Aranda is here for follow up of decompensated cirrhosis    HPI   Tee Aranda was seen in consultation by my colleague Dr White . He noted:  This 50-year-old woman was referred with a recent diagnosis of cirrhosis.  She noticed several months ago easy bruisability.  She was found have a low platelet count.  She was assessed by Hematology.  She had a bone marrow aspirate and biopsy which was normal.  She eventually had an upper endoscopy which showed features of portal hypertension and grade 1 varices.  A FibroScan confirmed significant fatty liver as well as advanced fibrosis and cirrhosis.  Her weight was as high as 199 lb.  She has lost 26 lb since diagnosis.  There is no family history of liver disease.  She says she drinks alcohol socially.  She has mild ankle edema.  She has no symptoms of hepatic encephalopathy.  She is has no symptoms of GI bleeding.    Recently 20 she was admitted with melena and weakenss. Hemoglobin fell to 5.9 and she was transfused 2 units of PRBC. EGD showed PHG and small varices. The finding was similar to an EGD done earlier this year (). Small varices werenoted at that time as well. Coreg and protonix were prescribed but never started.    HE: She has not been diagnosed with HE but is c/o of some fatigue and fogginess.  Ascites/edema, ongoing: on spironolactone 25 mg daily. Recent imaging show mod fluid.  HCC screenin.8 cm cyst on abdomen US     She has now lost 37 lbs from dietary change    Outpatient Encounter Medications as of 2020   Medication Sig Dispense Refill    alprazolam (XANAX) 0.5 MG tablet Take 1 tablet by mouth 3 (three) times daily as needed.       carvediloL (COREG) 3.125 MG tablet Take 1 tablet (3.125 mg total) by mouth 2 (two) times daily. 60 tablet 11    pantoprazole (PROTONIX) 40 MG  tablet Take 1 tablet (40 mg total) by mouth once daily. 30 tablet 11     No facility-administered encounter medications on file as of 11/23/2020.      Review of patient's allergies indicates:   Allergen Reactions    Sulfa (sulfonamide antibiotics) Hives     Past Medical History:   Diagnosis Date    Anxiety     Arthritis     AVN (avascular necrosis of bone)     Cataract     Esophageal varices     Glaucoma     Hx of cirrhosis     non-alcoholic    Kidney stones        Review of Systems   Constitutional: Negative.    HENT: Negative.    Eyes: Negative.    Respiratory: Negative.    Cardiovascular: Negative.    Gastrointestinal: Negative.    Genitourinary: Negative.    Musculoskeletal: Negative.    Skin: Negative.    Neurological: Negative.    Psychiatric/Behavioral: Negative.      Vitals:    11/23/20 1000   BP: (!) 151/72   Pulse: 89       Physical Exam  Vitals signs reviewed.   Constitutional:       Appearance: She is well-developed.   HENT:      Head: Normocephalic and atraumatic.   Eyes:      General: No scleral icterus.     Conjunctiva/sclera: Conjunctivae normal.      Pupils: Pupils are equal, round, and reactive to light.   Neck:      Musculoskeletal: Normal range of motion and neck supple.      Thyroid: No thyromegaly.   Cardiovascular:      Rate and Rhythm: Normal rate and regular rhythm.      Heart sounds: Normal heart sounds.   Pulmonary:      Effort: Pulmonary effort is normal.      Breath sounds: Normal breath sounds. No rales.   Abdominal:      General: Bowel sounds are normal. There is no distension.      Palpations: Abdomen is soft. There is no mass.      Tenderness: There is no abdominal tenderness.   Musculoskeletal: Normal range of motion.   Skin:     General: Skin is warm and dry.      Findings: No rash.   Neurological:      Mental Status: She is alert and oriented to person, place, and time.         MELD-Na score: 6 at 8/31/2020 10:44 AM  MELD score: 6 at 8/31/2020 10:44 AM  Calculated  from:  Serum Creatinine: 0.7 mg/dL (Rounded to 1 mg/dL) at 8/31/2020 10:44 AM  Serum Sodium: 142 mmol/L (Rounded to 137 mmol/L) at 8/31/2020 10:44 AM  Total Bilirubin: 0.7 mg/dL (Rounded to 1 mg/dL) at 8/31/2020 10:44 AM  INR(ratio): 1.0 at 8/31/2020 10:44 AM  Age: 50 years 1 month    Lab Results   Component Value Date     (H) 11/10/2020    BUN 11 11/10/2020    CREATININE 0.90 11/10/2020    CALCIUM 7.6 (L) 11/10/2020     11/10/2020    K 3.9 11/10/2020     11/10/2020    PROT 6.0 (L) 11/09/2020    CO2 24 11/10/2020    ANIONGAP 6 (L) 08/31/2020    WBC 3.00 (L) 11/11/2020    RBC 2.80 (L) 11/11/2020    HGB 8.4 (L) 11/11/2020    HCT 24.7 (L) 11/11/2020    MCV 88 11/11/2020    MCH 29.9 11/11/2020    MCHC 33.9 11/11/2020     Lab Results   Component Value Date    RDW 15.1 (H) 11/11/2020    PLT 92 (L) 11/11/2020    MPV 10.4 11/11/2020    GRAN 2.9 11/10/2020    GRAN 66.8 11/10/2020    LYMPH 1.1 11/10/2020    LYMPH 24.5 11/10/2020    MONO 0.3 11/10/2020    MONO 7.3 11/10/2020    EOSINOPHIL 1.2 11/10/2020    BASOPHIL 0.2 11/10/2020    EOS 0.1 11/10/2020    BASO 0.00 11/10/2020    GROUPTRH O POS 11/10/2020    ALBUMIN 3.7 11/09/2020    AST 31 11/09/2020    ALT 26 11/09/2020    ALKPHOS 84 11/09/2020    LABPROT 11.4 08/31/2020    INR 1.0 08/31/2020       Assessment and Plan:  Patient Active Problem List   Diagnosis    Combined forms of age-related cataract of right eye    Liver cirrhosis secondary to CARTAGENA    Secondary esophageal varices without bleeding    GI bleed    Gastrointestinal hemorrhage with melena    Anemia     Tee Aranda is a 50 y.o. female with decompensated cirrhosis. Current recommendations:  1. Decompensated cirrhosis: presumed to be from CARTAGENA; full serologic w/u; check meld labs monthly; refer to liver tx when meld reaches 15; HCC screening every 6 months  2. GI bleeding: She has had a recent bleed that she is under the impression was variceal. I think is more likely that she bled from  PHG since varices were small on both EGDs and she had melena rather than spontaneous hematemasis; recommend CBC every 2 weeks for now to monitor for recurrent anemia; start ferrous sulfate 325 mg tid; start coreg and protonix  3. Ascites/edema, not well controlled: increase spironolactone to 50 mg daily and add lasix 20 mg daily; monitor weight  4. HE: I suspect some of her symptoms are due to HE. Since she has several BMs per day will prescribe rifaxinim 550 mg bid rather than lactulose    Return 2 months

## 2020-11-23 ENCOUNTER — LAB VISIT (OUTPATIENT)
Dept: LAB | Facility: HOSPITAL | Age: 50
End: 2020-11-23
Attending: INTERNAL MEDICINE
Payer: COMMERCIAL

## 2020-11-23 ENCOUNTER — OFFICE VISIT (OUTPATIENT)
Dept: HEPATOLOGY | Facility: CLINIC | Age: 50
End: 2020-11-23
Attending: INTERNAL MEDICINE
Payer: COMMERCIAL

## 2020-11-23 VITALS
WEIGHT: 171.5 LBS | DIASTOLIC BLOOD PRESSURE: 72 MMHG | SYSTOLIC BLOOD PRESSURE: 151 MMHG | HEIGHT: 68 IN | OXYGEN SATURATION: 100 % | HEART RATE: 89 BPM | BODY MASS INDEX: 25.99 KG/M2

## 2020-11-23 DIAGNOSIS — R60.9 EDEMA, UNSPECIFIED TYPE: ICD-10-CM

## 2020-11-23 DIAGNOSIS — K75.81 LIVER CIRRHOSIS SECONDARY TO NASH: Chronic | ICD-10-CM

## 2020-11-23 DIAGNOSIS — K74.60 LIVER CIRRHOSIS SECONDARY TO NASH: Primary | Chronic | ICD-10-CM

## 2020-11-23 DIAGNOSIS — K92.1 GASTROINTESTINAL HEMORRHAGE WITH MELENA: ICD-10-CM

## 2020-11-23 DIAGNOSIS — R18.8 OTHER ASCITES: ICD-10-CM

## 2020-11-23 DIAGNOSIS — K75.81 LIVER CIRRHOSIS SECONDARY TO NASH: Primary | Chronic | ICD-10-CM

## 2020-11-23 DIAGNOSIS — K92.2 GASTROINTESTINAL HEMORRHAGE, UNSPECIFIED GASTROINTESTINAL HEMORRHAGE TYPE: ICD-10-CM

## 2020-11-23 DIAGNOSIS — K76.82 HEPATIC ENCEPHALOPATHY: ICD-10-CM

## 2020-11-23 DIAGNOSIS — I85.10 SECONDARY ESOPHAGEAL VARICES WITHOUT BLEEDING: Chronic | ICD-10-CM

## 2020-11-23 DIAGNOSIS — K74.60 LIVER CIRRHOSIS SECONDARY TO NASH: Chronic | ICD-10-CM

## 2020-11-23 LAB
A1AT SERPL-MCNC: 189 MG/DL (ref 100–190)
ALBUMIN SERPL BCP-MCNC: 3.6 G/DL (ref 3.5–5.2)
ALP SERPL-CCNC: 104 U/L (ref 55–135)
ALT SERPL W/O P-5'-P-CCNC: 30 U/L (ref 10–44)
ANION GAP SERPL CALC-SCNC: 6 MMOL/L (ref 8–16)
AST SERPL-CCNC: 24 U/L (ref 10–40)
BASOPHILS # BLD AUTO: 0.01 K/UL (ref 0–0.2)
BASOPHILS NFR BLD: 0.3 % (ref 0–1.9)
BILIRUB DIRECT SERPL-MCNC: 0.3 MG/DL (ref 0.1–0.3)
BILIRUB SERPL-MCNC: 0.5 MG/DL (ref 0.1–1)
BUN SERPL-MCNC: 11 MG/DL (ref 6–20)
CALCIUM SERPL-MCNC: 8.7 MG/DL (ref 8.7–10.5)
CERULOPLASMIN SERPL-MCNC: 33 MG/DL (ref 15–45)
CHLORIDE SERPL-SCNC: 110 MMOL/L (ref 95–110)
CO2 SERPL-SCNC: 24 MMOL/L (ref 23–29)
CREAT SERPL-MCNC: 0.8 MG/DL (ref 0.5–1.4)
DIFFERENTIAL METHOD: ABNORMAL
EOSINOPHIL # BLD AUTO: 0.1 K/UL (ref 0–0.5)
EOSINOPHIL NFR BLD: 1.5 % (ref 0–8)
ERYTHROCYTE [DISTWIDTH] IN BLOOD BY AUTOMATED COUNT: 14.1 % (ref 11.5–14.5)
EST. GFR  (AFRICAN AMERICAN): >60 ML/MIN/1.73 M^2
EST. GFR  (NON AFRICAN AMERICAN): >60 ML/MIN/1.73 M^2
GLUCOSE SERPL-MCNC: 98 MG/DL (ref 70–110)
HCT VFR BLD AUTO: 30.1 % (ref 37–48.5)
HGB BLD-MCNC: 9.3 G/DL (ref 12–16)
IGG SERPL-MCNC: 860 MG/DL (ref 650–1600)
IMM GRANULOCYTES # BLD AUTO: 0.01 K/UL (ref 0–0.04)
IMM GRANULOCYTES NFR BLD AUTO: 0.3 % (ref 0–0.5)
INR PPP: 1.1 (ref 0.8–1.2)
LYMPHOCYTES # BLD AUTO: 0.8 K/UL (ref 1–4.8)
LYMPHOCYTES NFR BLD: 24.1 % (ref 18–48)
MCH RBC QN AUTO: 27.3 PG (ref 27–31)
MCHC RBC AUTO-ENTMCNC: 30.9 G/DL (ref 32–36)
MCV RBC AUTO: 88 FL (ref 82–98)
MONOCYTES # BLD AUTO: 0.3 K/UL (ref 0.3–1)
MONOCYTES NFR BLD: 9.6 % (ref 4–15)
NEUTROPHILS # BLD AUTO: 2.1 K/UL (ref 1.8–7.7)
NEUTROPHILS NFR BLD: 64.2 % (ref 38–73)
NRBC BLD-RTO: 0 /100 WBC
PLATELET # BLD AUTO: 113 K/UL (ref 150–350)
PMV BLD AUTO: 12 FL (ref 9.2–12.9)
POTASSIUM SERPL-SCNC: 3.8 MMOL/L (ref 3.5–5.1)
PROT SERPL-MCNC: 6.5 G/DL (ref 6–8.4)
PROTHROMBIN TIME: 11.6 SEC (ref 9–12.5)
RBC # BLD AUTO: 3.41 M/UL (ref 4–5.4)
SODIUM SERPL-SCNC: 140 MMOL/L (ref 136–145)
WBC # BLD AUTO: 3.32 K/UL (ref 3.9–12.7)

## 2020-11-23 PROCEDURE — 82103 ALPHA-1-ANTITRYPSIN TOTAL: CPT | Mod: 91

## 2020-11-23 PROCEDURE — 99215 OFFICE O/P EST HI 40 MIN: CPT | Mod: S$GLB,,, | Performed by: INTERNAL MEDICINE

## 2020-11-23 PROCEDURE — 86256 FLUORESCENT ANTIBODY TITER: CPT

## 2020-11-23 PROCEDURE — 86790 VIRUS ANTIBODY NOS: CPT

## 2020-11-23 PROCEDURE — 99999 PR PBB SHADOW E&M-EST. PATIENT-LVL III: CPT | Mod: PBBFAC,,, | Performed by: INTERNAL MEDICINE

## 2020-11-23 PROCEDURE — 82390 ASSAY OF CERULOPLASMIN: CPT

## 2020-11-23 PROCEDURE — 3008F PR BODY MASS INDEX (BMI) DOCUMENTED: ICD-10-PCS | Mod: CPTII,S$GLB,, | Performed by: INTERNAL MEDICINE

## 2020-11-23 PROCEDURE — 1126F AMNT PAIN NOTED NONE PRSNT: CPT | Mod: S$GLB,,, | Performed by: INTERNAL MEDICINE

## 2020-11-23 PROCEDURE — 82248 BILIRUBIN DIRECT: CPT

## 2020-11-23 PROCEDURE — 82103 ALPHA-1-ANTITRYPSIN TOTAL: CPT

## 2020-11-23 PROCEDURE — 99215 PR OFFICE/OUTPT VISIT, EST, LEVL V, 40-54 MIN: ICD-10-PCS | Mod: S$GLB,,, | Performed by: INTERNAL MEDICINE

## 2020-11-23 PROCEDURE — 80321 ALCOHOLS BIOMARKERS 1OR 2: CPT

## 2020-11-23 PROCEDURE — 3008F BODY MASS INDEX DOCD: CPT | Mod: CPTII,S$GLB,, | Performed by: INTERNAL MEDICINE

## 2020-11-23 PROCEDURE — 1126F PR PAIN SEVERITY QUANTIFIED, NO PAIN PRESENT: ICD-10-PCS | Mod: S$GLB,,, | Performed by: INTERNAL MEDICINE

## 2020-11-23 PROCEDURE — 86038 ANTINUCLEAR ANTIBODIES: CPT

## 2020-11-23 PROCEDURE — 85610 PROTHROMBIN TIME: CPT

## 2020-11-23 PROCEDURE — 85025 COMPLETE CBC W/AUTO DIFF WBC: CPT

## 2020-11-23 PROCEDURE — 86706 HEP B SURFACE ANTIBODY: CPT

## 2020-11-23 PROCEDURE — 80053 COMPREHEN METABOLIC PANEL: CPT

## 2020-11-23 PROCEDURE — 82784 ASSAY IGA/IGD/IGG/IGM EACH: CPT

## 2020-11-23 PROCEDURE — 99999 PR PBB SHADOW E&M-EST. PATIENT-LVL III: ICD-10-PCS | Mod: PBBFAC,,, | Performed by: INTERNAL MEDICINE

## 2020-11-23 PROCEDURE — 86235 NUCLEAR ANTIGEN ANTIBODY: CPT | Mod: 91

## 2020-11-23 RX ORDER — FUROSEMIDE 20 MG/1
20 TABLET ORAL DAILY
Qty: 30 TABLET | Refills: 11 | Status: ON HOLD | OUTPATIENT
Start: 2020-11-23 | End: 2022-09-04 | Stop reason: ALTCHOICE

## 2020-11-23 RX ORDER — SPIRONOLACTONE 25 MG/1
50 TABLET ORAL DAILY
Qty: 60 TABLET | Refills: 11 | Status: ON HOLD | OUTPATIENT
Start: 2020-11-23 | End: 2022-09-04 | Stop reason: ALTCHOICE

## 2020-11-23 RX ORDER — FERROUS SULFATE 325(65) MG
325 TABLET ORAL 2 TIMES DAILY
Qty: 60 TABLET | Refills: 11 | Status: SHIPPED | OUTPATIENT
Start: 2020-11-23 | End: 2022-02-15

## 2020-11-23 RX ORDER — SPIRONOLACTONE 25 MG/1
25 TABLET ORAL DAILY
COMMUNITY
Start: 2020-11-19 | End: 2020-11-23

## 2020-11-23 NOTE — PATIENT INSTRUCTIONS
1. Meld labs every 4 weeks  2. Continue water pill- increase to spironolactone 50 mg daily and add lasix 20 mg daily  3. F/u with local GI  4. Start coreg and protonix  5. Max 1000 mg daily of tylenol  6. Start iron supplement  7. Cbc every 2 weeks  8. Rifaximin twice daily for high ammonia level  Return 2 months

## 2020-11-24 LAB
ANA SER QL IF: NORMAL
HBV SURFACE AB SER-ACNC: NEGATIVE M[IU]/ML
HEPATITIS A ANTIBODY, IGG: NEGATIVE
MITOCHONDRIA AB TITR SER IF: NORMAL {TITER}
SMOOTH MUSCLE AB TITR SER IF: NORMAL {TITER}

## 2020-11-25 LAB
A1AT PHENOTYP SERPL-IMP: NORMAL BANDS
A1AT SERPL NEPH-MCNC: 188 MG/DL (ref 100–190)

## 2020-11-30 LAB — PHOSPHATIDYLETHANOL (PETH): NEGATIVE NG/ML

## 2020-12-07 ENCOUNTER — LAB VISIT (OUTPATIENT)
Dept: LAB | Facility: HOSPITAL | Age: 50
End: 2020-12-07
Attending: INTERNAL MEDICINE
Payer: COMMERCIAL

## 2020-12-07 DIAGNOSIS — K74.60 LIVER CIRRHOSIS SECONDARY TO NASH: Chronic | ICD-10-CM

## 2020-12-07 DIAGNOSIS — K75.81 LIVER CIRRHOSIS SECONDARY TO NASH: Chronic | ICD-10-CM

## 2020-12-07 LAB
BASOPHILS # BLD AUTO: 0.02 K/UL (ref 0–0.2)
BASOPHILS NFR BLD: 0.3 % (ref 0–1.9)
DIFFERENTIAL METHOD: ABNORMAL
EOSINOPHIL # BLD AUTO: 0.1 K/UL (ref 0–0.5)
EOSINOPHIL NFR BLD: 2.2 % (ref 0–8)
ERYTHROCYTE [DISTWIDTH] IN BLOOD BY AUTOMATED COUNT: 15 % (ref 11.5–14.5)
HCT VFR BLD AUTO: 36.3 % (ref 37–48.5)
HGB BLD-MCNC: 11.1 G/DL (ref 12–16)
IMM GRANULOCYTES # BLD AUTO: 0.03 K/UL (ref 0–0.04)
IMM GRANULOCYTES NFR BLD AUTO: 0.5 % (ref 0–0.5)
LYMPHOCYTES # BLD AUTO: 1 K/UL (ref 1–4.8)
LYMPHOCYTES NFR BLD: 17.4 % (ref 18–48)
MCH RBC QN AUTO: 26 PG (ref 27–31)
MCHC RBC AUTO-ENTMCNC: 30.6 G/DL (ref 32–36)
MCV RBC AUTO: 85 FL (ref 82–98)
MONOCYTES # BLD AUTO: 0.5 K/UL (ref 0.3–1)
MONOCYTES NFR BLD: 8.5 % (ref 4–15)
NEUTROPHILS # BLD AUTO: 4.2 K/UL (ref 1.8–7.7)
NEUTROPHILS NFR BLD: 71.1 % (ref 38–73)
NRBC BLD-RTO: 0 /100 WBC
PLATELET # BLD AUTO: 121 K/UL (ref 150–350)
PMV BLD AUTO: 12 FL (ref 9.2–12.9)
RBC # BLD AUTO: 4.27 M/UL (ref 4–5.4)
WBC # BLD AUTO: 5.87 K/UL (ref 3.9–12.7)

## 2020-12-07 PROCEDURE — 85025 COMPLETE CBC W/AUTO DIFF WBC: CPT

## 2020-12-07 PROCEDURE — 36415 COLL VENOUS BLD VENIPUNCTURE: CPT

## 2020-12-11 ENCOUNTER — TELEPHONE (OUTPATIENT)
Dept: HEPATOLOGY | Facility: CLINIC | Age: 50
End: 2020-12-11

## 2020-12-11 NOTE — TELEPHONE ENCOUNTER
Message from Dr Bowers;  I prescribed rifaximin. She can hold it. But I think she needs to go to the ER. She should not drive. If she is alone she needs to call [phone].      Patient called and message relayed from Dr Bowers,  Patient stated she has already taken all of her morning meds. Will take the rifximin/Xifaxin out of the evening medicines.  Have you gone to the ER yet? Dr Bowers does not want you to drive.  No, I am waiting on my son to come and pick me up.  Please keep us updated on how you are doing.

## 2020-12-11 NOTE — TELEPHONE ENCOUNTER
Call returned to the patient h/m [phone] to inquire how she was feeling?    The patient stated I have been extremely dizzy since about Tues or Wed of this week, and it seems a little worse today.    I think I am having an Adverse Reaction to the medication. Which medication do you feel you are having the reaction too? I take so many.   It is all of the medicine Dr Bowers put me on.    Tee you need to go to an Urgent Care or Emergency Room to get Examined and have Labs done. Your symptoms have gotten worse.  Just tell them the sx's you are having just like you told us. Which location will you be going too?  [facility] in [place].  Please keep us updated on how you are doing.    I will let Dr Bowers know how you are doing.

## 2020-12-11 NOTE — TELEPHONE ENCOUNTER
----- Message from Rafat Casillas sent at 12/11/2020  9:33 AM CST -----  Regarding: Speak to   Contact: Pt  Pt states she may be having an Adverse Event with medication.  Pt states she is very dizzy she states this has been ongoing for the past 3 days but today she is unable to walk     Pt # [phone]

## 2020-12-16 ENCOUNTER — TELEPHONE (OUTPATIENT)
Dept: HEPATOLOGY | Facility: CLINIC | Age: 50
End: 2020-12-16

## 2020-12-17 ENCOUNTER — TELEPHONE (OUTPATIENT)
Dept: HEPATOLOGY | Facility: CLINIC | Age: 50
End: 2020-12-17

## 2020-12-17 NOTE — TELEPHONE ENCOUNTER
----- Message from Jen Booker MA sent at 12/17/2020  4:09 PM CST -----  Regarding: FW: PT  Contact: PT's     ----- Message -----  From: Amber Waller  Sent: 12/17/2020   4:03 PM CST  To: [facility] Staff  Subject: PT                                               PT's  called to speak to someone about the rifAXIMin and coreg prescriptions. He's a bit confused bc they told her to get off of them and he just has some questions. Please call back     Callback: [phone]

## 2020-12-17 NOTE — TELEPHONE ENCOUNTER
Call placed to the  of the patient, Lavelle [phone].  Lavelle stated I pour up all of her medicines each week.  The medicine R something rifaximin (Xifaxan) we never got.  We don't even have a bottle for it here.    When she went to the ER at [facility] in [place] they took her off of the Coreg. They said that was probably making her dizzy.    Is your wife present so I can talk to her? The Rx for the Xifaxan was from Nov 2020.    Tee said, I am hear I can hear everything. I never got the medicine. What pill did you describe and told me about? I thought it was a rifaximin. The Dr told me it was a Coreg and not rifaximin.    I haven't been taking the Coreg and I was still dizzy today.  They did labs and told me they were ok but not up to normal.    Lavelle stated they never got the rifaximin and should she be on it.    I will notify the Provider.

## 2020-12-17 NOTE — TELEPHONE ENCOUNTER
Call returned to Lavelle  of the patient.  You went to the emergency with Tee. The Dr examined her and instructed her to stop the Coreg. If she continues with the dizziness she needs to f/u with her PCP or return to the ER.   given the telephone # [phone] to [facility]. They have made multiple attempts to reach the patient unsuccessfully to fill the Rx for rifaximin.  Lavelle voiced understanding.

## 2020-12-17 NOTE — TELEPHONE ENCOUNTER
Received message from Dr Bowers to contact the patient and have them to contact [facility].    Call placed to Lavelle,  of the patient. Informed the Pharmacist has made multiple attempts to contact Tee about the rifaximin. Unable to reach her.  Here is the telephone # to contact the Pharmacy [phone].  Please reach out to us if you have any problems.

## 2020-12-17 NOTE — TELEPHONE ENCOUNTER
Received message from the patient that she never received the Xifaxan prescription from November.  Dr Bowers messaged to contact [facility].

## 2020-12-17 NOTE — TELEPHONE ENCOUNTER
----- Message from Suki Zamora MA sent at 12/16/2020  4:18 PM CST -----  Contact: Lavelle Aranda    ----- Message -----  From: Suzi Thomas  Sent: 12/16/2020   3:40 PM CST  To: [facility] Staff    Calling to speak with nurse regarding medication           Call Back :  [phone]

## 2020-12-21 ENCOUNTER — LAB VISIT (OUTPATIENT)
Dept: LAB | Facility: HOSPITAL | Age: 50
End: 2020-12-21
Attending: INTERNAL MEDICINE
Payer: COMMERCIAL

## 2020-12-21 DIAGNOSIS — K74.60 LIVER CIRRHOSIS SECONDARY TO NASH: Chronic | ICD-10-CM

## 2020-12-21 DIAGNOSIS — K75.81 LIVER CIRRHOSIS SECONDARY TO NASH: Chronic | ICD-10-CM

## 2020-12-21 LAB
AFP SERPL-MCNC: 4.4 NG/ML (ref 0–8.4)
ALBUMIN SERPL BCP-MCNC: 4 G/DL (ref 3.5–5.2)
ALP SERPL-CCNC: 98 U/L (ref 55–135)
ALT SERPL W/O P-5'-P-CCNC: 26 U/L (ref 10–44)
ANION GAP SERPL CALC-SCNC: 10 MMOL/L (ref 8–16)
AST SERPL-CCNC: 23 U/L (ref 10–40)
BASOPHILS # BLD AUTO: 0.01 K/UL (ref 0–0.2)
BASOPHILS NFR BLD: 0.3 % (ref 0–1.9)
BILIRUB SERPL-MCNC: 0.5 MG/DL (ref 0.1–1)
BUN SERPL-MCNC: 15 MG/DL (ref 6–20)
CALCIUM SERPL-MCNC: 8.9 MG/DL (ref 8.7–10.5)
CHLORIDE SERPL-SCNC: 107 MMOL/L (ref 95–110)
CO2 SERPL-SCNC: 24 MMOL/L (ref 23–29)
CREAT SERPL-MCNC: 0.8 MG/DL (ref 0.5–1.4)
DIFFERENTIAL METHOD: ABNORMAL
EOSINOPHIL # BLD AUTO: 0.1 K/UL (ref 0–0.5)
EOSINOPHIL NFR BLD: 2.6 % (ref 0–8)
ERYTHROCYTE [DISTWIDTH] IN BLOOD BY AUTOMATED COUNT: 15.5 % (ref 11.5–14.5)
EST. GFR  (AFRICAN AMERICAN): >60 ML/MIN/1.73 M^2
EST. GFR  (NON AFRICAN AMERICAN): >60 ML/MIN/1.73 M^2
GLUCOSE SERPL-MCNC: 84 MG/DL (ref 70–110)
HCT VFR BLD AUTO: 34.5 % (ref 37–48.5)
HGB BLD-MCNC: 10.4 G/DL (ref 12–16)
IMM GRANULOCYTES # BLD AUTO: 0.01 K/UL (ref 0–0.04)
IMM GRANULOCYTES NFR BLD AUTO: 0.3 % (ref 0–0.5)
LYMPHOCYTES # BLD AUTO: 0.9 K/UL (ref 1–4.8)
LYMPHOCYTES NFR BLD: 29.4 % (ref 18–48)
MCH RBC QN AUTO: 25.4 PG (ref 27–31)
MCHC RBC AUTO-ENTMCNC: 30.1 G/DL (ref 32–36)
MCV RBC AUTO: 84 FL (ref 82–98)
MONOCYTES # BLD AUTO: 0.3 K/UL (ref 0.3–1)
MONOCYTES NFR BLD: 9.9 % (ref 4–15)
NEUTROPHILS # BLD AUTO: 1.7 K/UL (ref 1.8–7.7)
NEUTROPHILS NFR BLD: 57.5 % (ref 38–73)
NRBC BLD-RTO: 0 /100 WBC
PLATELET # BLD AUTO: 88 K/UL (ref 150–350)
PMV BLD AUTO: 11.9 FL (ref 9.2–12.9)
POTASSIUM SERPL-SCNC: 3.9 MMOL/L (ref 3.5–5.1)
PROT SERPL-MCNC: 7 G/DL (ref 6–8.4)
RBC # BLD AUTO: 4.1 M/UL (ref 4–5.4)
SODIUM SERPL-SCNC: 141 MMOL/L (ref 136–145)
WBC # BLD AUTO: 3.03 K/UL (ref 3.9–12.7)

## 2020-12-21 PROCEDURE — 85025 COMPLETE CBC W/AUTO DIFF WBC: CPT

## 2020-12-21 PROCEDURE — 80053 COMPREHEN METABOLIC PANEL: CPT

## 2020-12-21 PROCEDURE — 36415 COLL VENOUS BLD VENIPUNCTURE: CPT

## 2020-12-21 PROCEDURE — 82105 ALPHA-FETOPROTEIN SERUM: CPT

## 2020-12-27 ENCOUNTER — HOSPITAL ENCOUNTER (EMERGENCY)
Facility: HOSPITAL | Age: 50
Discharge: ANOTHER HOSPITAL | End: 2020-12-28
Attending: SURGERY
Payer: COMMERCIAL

## 2020-12-27 DIAGNOSIS — K92.2 GI BLEED: ICD-10-CM

## 2020-12-27 DIAGNOSIS — R11.10 EMESIS: ICD-10-CM

## 2020-12-27 LAB
ABO GROUP BLD: NORMAL
ALBUMIN SERPL BCP-MCNC: 3.4 G/DL (ref 3.5–5.2)
ALP SERPL-CCNC: 80 U/L (ref 55–135)
ALT SERPL W/O P-5'-P-CCNC: 22 U/L (ref 10–44)
AMMONIA PLAS-SCNC: 111 UMOL/L (ref 10–50)
ANION GAP SERPL CALC-SCNC: 12 MMOL/L (ref 8–16)
ANISOCYTOSIS BLD QL SMEAR: SLIGHT
AST SERPL-CCNC: 21 U/L (ref 10–40)
BASO STIPL BLD QL SMEAR: ABNORMAL
BASOPHILS NFR BLD: 0 % (ref 0–1.9)
BILIRUB SERPL-MCNC: 0.4 MG/DL (ref 0.1–1)
BLD GP AB SCN CELLS X3 SERPL QL: NORMAL
BNP SERPL-MCNC: 12 PG/ML (ref 0–99)
BUN SERPL-MCNC: 24 MG/DL (ref 6–20)
CALCIUM SERPL-MCNC: 8.6 MG/DL (ref 8.7–10.5)
CHLORIDE SERPL-SCNC: 107 MMOL/L (ref 95–110)
CK MB SERPL-MCNC: 0.8 NG/ML (ref 0.1–6.5)
CK MB SERPL-RTO: 1.1 % (ref 0–5)
CK SERPL-CCNC: 74 U/L (ref 20–180)
CK SERPL-CCNC: 74 U/L (ref 20–180)
CO2 SERPL-SCNC: 23 MMOL/L (ref 23–29)
CREAT SERPL-MCNC: 0.8 MG/DL (ref 0.5–1.4)
DACRYOCYTES BLD QL SMEAR: ABNORMAL
DIFFERENTIAL METHOD: ABNORMAL
EOSINOPHIL NFR BLD: 2 % (ref 0–8)
ERYTHROCYTE [DISTWIDTH] IN BLOOD BY AUTOMATED COUNT: 15.9 % (ref 11.5–14.5)
EST. GFR  (AFRICAN AMERICAN): >60 ML/MIN/1.73 M^2
EST. GFR  (NON AFRICAN AMERICAN): >60 ML/MIN/1.73 M^2
GLUCOSE SERPL-MCNC: 142 MG/DL (ref 70–110)
HCT VFR BLD AUTO: 27.7 % (ref 37–48.5)
HGB BLD-MCNC: 8.4 G/DL (ref 12–16)
IMM GRANULOCYTES # BLD AUTO: ABNORMAL K/UL (ref 0–0.04)
IMM GRANULOCYTES NFR BLD AUTO: ABNORMAL % (ref 0–0.5)
LIPASE SERPL-CCNC: 41 U/L (ref 4–60)
LYMPHOCYTES NFR BLD: 31 % (ref 18–48)
MAGNESIUM SERPL-MCNC: 1.8 MG/DL (ref 1.6–2.6)
MCH RBC QN AUTO: 25.5 PG (ref 27–31)
MCHC RBC AUTO-ENTMCNC: 30.3 G/DL (ref 32–36)
MCV RBC AUTO: 84 FL (ref 82–98)
MONOCYTES NFR BLD: 8 % (ref 4–15)
NEUTROPHILS NFR BLD: 59 % (ref 38–73)
NRBC BLD-RTO: 0 /100 WBC
PHOSPHATE SERPL-MCNC: 2.5 MG/DL (ref 2.7–4.5)
PLATELET # BLD AUTO: 111 K/UL (ref 150–350)
PLATELET BLD QL SMEAR: ABNORMAL
PMV BLD AUTO: 12.1 FL (ref 9.2–12.9)
POLYCHROMASIA BLD QL SMEAR: ABNORMAL
POTASSIUM SERPL-SCNC: 3.4 MMOL/L (ref 3.5–5.1)
PROCALCITONIN SERPL IA-MCNC: 0.04 NG/ML
PROT SERPL-MCNC: 6 G/DL (ref 6–8.4)
RBC # BLD AUTO: 3.29 M/UL (ref 4–5.4)
RETICS/RBC NFR AUTO: 1.9 % (ref 0.5–2.5)
RH BLD: NORMAL
SARS-COV-2 RDRP RESP QL NAA+PROBE: NEGATIVE
SCHISTOCYTES BLD QL SMEAR: ABNORMAL
SODIUM SERPL-SCNC: 142 MMOL/L (ref 136–145)
STOMATOCYTES BLD QL SMEAR: PRESENT
TROPONIN I SERPL DL<=0.01 NG/ML-MCNC: <0.006 NG/ML (ref 0–0.03)
TSH SERPL DL<=0.005 MIU/L-ACNC: 2.21 UIU/ML (ref 0.4–4)
WBC # BLD AUTO: 6.28 K/UL (ref 3.9–12.7)

## 2020-12-27 PROCEDURE — 83690 ASSAY OF LIPASE: CPT

## 2020-12-27 PROCEDURE — 82550 ASSAY OF CK (CPK): CPT

## 2020-12-27 PROCEDURE — 82553 CREATINE MB FRACTION: CPT

## 2020-12-27 PROCEDURE — 86920 COMPATIBILITY TEST SPIN: CPT

## 2020-12-27 PROCEDURE — 85007 BL SMEAR W/DIFF WBC COUNT: CPT

## 2020-12-27 PROCEDURE — 96376 TX/PRO/DX INJ SAME DRUG ADON: CPT

## 2020-12-27 PROCEDURE — 85027 COMPLETE CBC AUTOMATED: CPT

## 2020-12-27 PROCEDURE — 84443 ASSAY THYROID STIM HORMONE: CPT

## 2020-12-27 PROCEDURE — 80053 COMPREHEN METABOLIC PANEL: CPT

## 2020-12-27 PROCEDURE — 84145 PROCALCITONIN (PCT): CPT

## 2020-12-27 PROCEDURE — 99291 CRITICAL CARE FIRST HOUR: CPT | Mod: 25

## 2020-12-27 PROCEDURE — 96361 HYDRATE IV INFUSION ADD-ON: CPT

## 2020-12-27 PROCEDURE — C9113 INJ PANTOPRAZOLE SODIUM, VIA: HCPCS | Performed by: SURGERY

## 2020-12-27 PROCEDURE — 93010 EKG 12-LEAD: ICD-10-PCS | Mod: ,,, | Performed by: INTERNAL MEDICINE

## 2020-12-27 PROCEDURE — 81000 URINALYSIS NONAUTO W/SCOPE: CPT

## 2020-12-27 PROCEDURE — U0002 COVID-19 LAB TEST NON-CDC: HCPCS

## 2020-12-27 PROCEDURE — 84484 ASSAY OF TROPONIN QUANT: CPT

## 2020-12-27 PROCEDURE — 82140 ASSAY OF AMMONIA: CPT

## 2020-12-27 PROCEDURE — 93005 ELECTROCARDIOGRAM TRACING: CPT

## 2020-12-27 PROCEDURE — 85045 AUTOMATED RETICULOCYTE COUNT: CPT

## 2020-12-27 PROCEDURE — 36415 COLL VENOUS BLD VENIPUNCTURE: CPT

## 2020-12-27 PROCEDURE — 86901 BLOOD TYPING SEROLOGIC RH(D): CPT

## 2020-12-27 PROCEDURE — 93010 ELECTROCARDIOGRAM REPORT: CPT | Mod: ,,, | Performed by: INTERNAL MEDICINE

## 2020-12-27 PROCEDURE — 83880 ASSAY OF NATRIURETIC PEPTIDE: CPT

## 2020-12-27 PROCEDURE — 25000003 PHARM REV CODE 250: Performed by: SURGERY

## 2020-12-27 PROCEDURE — 83735 ASSAY OF MAGNESIUM: CPT

## 2020-12-27 PROCEDURE — 84100 ASSAY OF PHOSPHORUS: CPT

## 2020-12-27 PROCEDURE — 96365 THER/PROPH/DIAG IV INF INIT: CPT

## 2020-12-27 PROCEDURE — P9021 RED BLOOD CELLS UNIT: HCPCS

## 2020-12-27 PROCEDURE — 63600175 PHARM REV CODE 636 W HCPCS: Performed by: SURGERY

## 2020-12-27 RX ORDER — PANTOPRAZOLE SODIUM 40 MG/10ML
80 INJECTION, POWDER, LYOPHILIZED, FOR SOLUTION INTRAVENOUS
Status: COMPLETED | OUTPATIENT
Start: 2020-12-27 | End: 2020-12-27

## 2020-12-27 RX ORDER — HYDROCODONE BITARTRATE AND ACETAMINOPHEN 500; 5 MG/1; MG/1
TABLET ORAL
Status: DISCONTINUED | OUTPATIENT
Start: 2020-12-27 | End: 2020-12-28 | Stop reason: HOSPADM

## 2020-12-27 RX ADMIN — PANTOPRAZOLE SODIUM 80 MG: 40 INJECTION, POWDER, LYOPHILIZED, FOR SOLUTION INTRAVENOUS at 10:12

## 2020-12-27 RX ADMIN — SODIUM CHLORIDE 1000 ML: 0.9 INJECTION, SOLUTION INTRAVENOUS at 09:12

## 2020-12-27 RX ADMIN — SODIUM CHLORIDE 1000 ML: 0.9 INJECTION, SOLUTION INTRAVENOUS at 10:12

## 2020-12-27 RX ADMIN — DEXTROSE 8 MG/HR: 50 INJECTION, SOLUTION INTRAVENOUS at 10:12

## 2020-12-28 ENCOUNTER — ANESTHESIA EVENT (OUTPATIENT)
Dept: ENDOSCOPY | Facility: HOSPITAL | Age: 50
DRG: 441 | End: 2020-12-28
Payer: COMMERCIAL

## 2020-12-28 ENCOUNTER — HOSPITAL ENCOUNTER (INPATIENT)
Facility: HOSPITAL | Age: 50
LOS: 3 days | Discharge: HOME | DRG: 441 | End: 2020-12-31
Attending: HOSPITALIST | Admitting: INTERNAL MEDICINE
Payer: COMMERCIAL

## 2020-12-28 ENCOUNTER — ANESTHESIA (OUTPATIENT)
Dept: ENDOSCOPY | Facility: HOSPITAL | Age: 50
DRG: 441 | End: 2020-12-28
Payer: COMMERCIAL

## 2020-12-28 ENCOUNTER — TELEPHONE (OUTPATIENT)
Dept: HEPATOLOGY | Facility: CLINIC | Age: 50
End: 2020-12-28

## 2020-12-28 VITALS
DIASTOLIC BLOOD PRESSURE: 58 MMHG | BODY MASS INDEX: 25.54 KG/M2 | OXYGEN SATURATION: 100 % | WEIGHT: 168 LBS | TEMPERATURE: 98 F | HEART RATE: 91 BPM | RESPIRATION RATE: 18 BRPM | SYSTOLIC BLOOD PRESSURE: 119 MMHG

## 2020-12-28 DIAGNOSIS — K92.2 GI BLEED: ICD-10-CM

## 2020-12-28 DIAGNOSIS — K74.60 LIVER CIRRHOSIS SECONDARY TO NASH: Primary | Chronic | ICD-10-CM

## 2020-12-28 DIAGNOSIS — K75.81 LIVER CIRRHOSIS SECONDARY TO NASH: Primary | Chronic | ICD-10-CM

## 2020-12-28 LAB
ABO + RH BLD: NORMAL
ANION GAP SERPL CALC-SCNC: 8 MMOL/L (ref 8–16)
ANISOCYTOSIS BLD QL SMEAR: SLIGHT
APTT BLDCRRT: 22.8 SEC (ref 21–32)
BACTERIA #/AREA URNS HPF: ABNORMAL /HPF
BASOPHILS # BLD AUTO: 0.01 K/UL (ref 0–0.2)
BASOPHILS # BLD AUTO: 0.01 K/UL (ref 0–0.2)
BASOPHILS # BLD AUTO: 0.02 K/UL (ref 0–0.2)
BASOPHILS NFR BLD: 0.3 % (ref 0–1.9)
BILIRUB UR QL STRIP: NEGATIVE
BLD GP AB SCN CELLS X3 SERPL QL: NORMAL
BLOOD GROUP ANTIBODIES SERPL: NORMAL
BUN SERPL-MCNC: 24 MG/DL (ref 6–20)
BURR CELLS BLD QL SMEAR: ABNORMAL
CALCIUM SERPL-MCNC: 7.7 MG/DL (ref 8.7–10.5)
CHLORIDE SERPL-SCNC: 113 MMOL/L (ref 95–110)
CLARITY UR: CLEAR
CO2 SERPL-SCNC: 19 MMOL/L (ref 23–29)
COLOR UR: YELLOW
CREAT SERPL-MCNC: 0.6 MG/DL (ref 0.5–1.4)
DACRYOCYTES BLD QL SMEAR: ABNORMAL
DACRYOCYTES BLD QL SMEAR: ABNORMAL
DIFFERENTIAL METHOD: ABNORMAL
EOSINOPHIL # BLD AUTO: 0 K/UL (ref 0–0.5)
EOSINOPHIL # BLD AUTO: 0 K/UL (ref 0–0.5)
EOSINOPHIL # BLD AUTO: 0.1 K/UL (ref 0–0.5)
EOSINOPHIL NFR BLD: 0.5 % (ref 0–8)
EOSINOPHIL NFR BLD: 1 % (ref 0–8)
EOSINOPHIL NFR BLD: 1.5 % (ref 0–8)
ERYTHROCYTE [DISTWIDTH] IN BLOOD BY AUTOMATED COUNT: 15.6 % (ref 11.5–14.5)
ERYTHROCYTE [DISTWIDTH] IN BLOOD BY AUTOMATED COUNT: 15.7 % (ref 11.5–14.5)
ERYTHROCYTE [DISTWIDTH] IN BLOOD BY AUTOMATED COUNT: 17.7 % (ref 11.5–14.5)
EST. GFR  (AFRICAN AMERICAN): >60 ML/MIN/1.73 M^2
EST. GFR  (NON AFRICAN AMERICAN): >60 ML/MIN/1.73 M^2
GLUCOSE SERPL-MCNC: 125 MG/DL (ref 70–110)
GLUCOSE UR QL STRIP: NEGATIVE
HCT VFR BLD AUTO: 26.9 % (ref 37–48.5)
HCT VFR BLD AUTO: 27.7 % (ref 37–48.5)
HCT VFR BLD AUTO: 28.4 % (ref 37–48.5)
HGB BLD-MCNC: 7.9 G/DL (ref 12–16)
HGB BLD-MCNC: 8 G/DL (ref 12–16)
HGB BLD-MCNC: 8.2 G/DL (ref 12–16)
HGB UR QL STRIP: NEGATIVE
HYALINE CASTS #/AREA URNS LPF: 1 /LPF
HYPOCHROMIA BLD QL SMEAR: ABNORMAL
HYPOCHROMIA BLD QL SMEAR: ABNORMAL
IMM GRANULOCYTES # BLD AUTO: 0.01 K/UL (ref 0–0.04)
IMM GRANULOCYTES # BLD AUTO: 0.02 K/UL (ref 0–0.04)
IMM GRANULOCYTES # BLD AUTO: 0.03 K/UL (ref 0–0.04)
IMM GRANULOCYTES NFR BLD AUTO: 0.3 % (ref 0–0.5)
IMM GRANULOCYTES NFR BLD AUTO: 0.5 % (ref 0–0.5)
IMM GRANULOCYTES NFR BLD AUTO: 0.6 % (ref 0–0.5)
INR PPP: 1.2 (ref 0.8–1.2)
KETONES UR QL STRIP: NEGATIVE
LEUKOCYTE ESTERASE UR QL STRIP: ABNORMAL
LYMPHOCYTES # BLD AUTO: 0.8 K/UL (ref 1–4.8)
LYMPHOCYTES # BLD AUTO: 0.9 K/UL (ref 1–4.8)
LYMPHOCYTES # BLD AUTO: 1 K/UL (ref 1–4.8)
LYMPHOCYTES NFR BLD: 17.9 % (ref 18–48)
LYMPHOCYTES NFR BLD: 26.1 % (ref 18–48)
LYMPHOCYTES NFR BLD: 26.3 % (ref 18–48)
MAGNESIUM SERPL-MCNC: 1.7 MG/DL (ref 1.6–2.6)
MCH RBC QN AUTO: 25.9 PG (ref 27–31)
MCH RBC QN AUTO: 25.9 PG (ref 27–31)
MCH RBC QN AUTO: 26.2 PG (ref 27–31)
MCHC RBC AUTO-ENTMCNC: 28.9 G/DL (ref 32–36)
MCHC RBC AUTO-ENTMCNC: 28.9 G/DL (ref 32–36)
MCHC RBC AUTO-ENTMCNC: 29.4 G/DL (ref 32–36)
MCV RBC AUTO: 89 FL (ref 82–98)
MCV RBC AUTO: 90 FL (ref 82–98)
MCV RBC AUTO: 90 FL (ref 82–98)
MICROSCOPIC COMMENT: ABNORMAL
MONOCYTES # BLD AUTO: 0.3 K/UL (ref 0.3–1)
MONOCYTES # BLD AUTO: 0.3 K/UL (ref 0.3–1)
MONOCYTES # BLD AUTO: 0.5 K/UL (ref 0.3–1)
MONOCYTES NFR BLD: 8 % (ref 4–15)
MONOCYTES NFR BLD: 8 % (ref 4–15)
MONOCYTES NFR BLD: 9.4 % (ref 4–15)
NEUTROPHILS # BLD AUTO: 1.9 K/UL (ref 1.8–7.7)
NEUTROPHILS # BLD AUTO: 2.1 K/UL (ref 1.8–7.7)
NEUTROPHILS # BLD AUTO: 4.2 K/UL (ref 1.8–7.7)
NEUTROPHILS NFR BLD: 62.9 % (ref 38–73)
NEUTROPHILS NFR BLD: 63.3 % (ref 38–73)
NEUTROPHILS NFR BLD: 72.8 % (ref 38–73)
NITRITE UR QL STRIP: NEGATIVE
NRBC BLD-RTO: 0 /100 WBC
OVALOCYTES BLD QL SMEAR: ABNORMAL
PH UR STRIP: 7 [PH] (ref 5–8)
PHOSPHATE SERPL-MCNC: 2.8 MG/DL (ref 2.7–4.5)
PLATELET # BLD AUTO: 71 K/UL (ref 150–350)
PLATELET # BLD AUTO: 73 K/UL (ref 150–350)
PLATELET # BLD AUTO: 81 K/UL (ref 150–350)
PLATELET BLD QL SMEAR: ABNORMAL
PMV BLD AUTO: 12.8 FL (ref 9.2–12.9)
PMV BLD AUTO: 12.9 FL (ref 9.2–12.9)
PMV BLD AUTO: ABNORMAL FL (ref 9.2–12.9)
POIKILOCYTOSIS BLD QL SMEAR: SLIGHT
POLYCHROMASIA BLD QL SMEAR: ABNORMAL
POLYCHROMASIA BLD QL SMEAR: ABNORMAL
POTASSIUM SERPL-SCNC: 4.5 MMOL/L (ref 3.5–5.1)
PROT UR QL STRIP: NEGATIVE
PROTHROMBIN TIME: 12.5 SEC (ref 9–12.5)
RBC # BLD AUTO: 3.02 M/UL (ref 4–5.4)
RBC # BLD AUTO: 3.09 M/UL (ref 4–5.4)
RBC # BLD AUTO: 3.17 M/UL (ref 4–5.4)
RBC #/AREA URNS HPF: 2 /HPF (ref 0–4)
SODIUM SERPL-SCNC: 140 MMOL/L (ref 136–145)
SP GR UR STRIP: 1.02 (ref 1–1.03)
SQUAMOUS #/AREA URNS HPF: 1 /HPF
URN SPEC COLLECT METH UR: ABNORMAL
UROBILINOGEN UR STRIP-ACNC: 1 EU/DL
WBC # BLD AUTO: 3.07 K/UL (ref 3.9–12.7)
WBC # BLD AUTO: 3.27 K/UL (ref 3.9–12.7)
WBC # BLD AUTO: 5.75 K/UL (ref 3.9–12.7)
WBC #/AREA URNS HPF: 8 /HPF (ref 0–5)

## 2020-12-28 PROCEDURE — D9220A PRA ANESTHESIA: ICD-10-PCS | Mod: ,,, | Performed by: ANESTHESIOLOGY

## 2020-12-28 PROCEDURE — D9220A PRA ANESTHESIA: Mod: ,,, | Performed by: STUDENT IN AN ORGANIZED HEALTH CARE EDUCATION/TRAINING PROGRAM

## 2020-12-28 PROCEDURE — 25000003 PHARM REV CODE 250: Performed by: STUDENT IN AN ORGANIZED HEALTH CARE EDUCATION/TRAINING PROGRAM

## 2020-12-28 PROCEDURE — 86870 RBC ANTIBODY IDENTIFICATION: CPT

## 2020-12-28 PROCEDURE — 20600001 HC STEP DOWN PRIVATE ROOM

## 2020-12-28 PROCEDURE — 43244 PR EGD, FLEX, W/BAND LIGATION, ESOPH/GASTR VARICES: ICD-10-PCS | Mod: ,,, | Performed by: INTERNAL MEDICINE

## 2020-12-28 PROCEDURE — C9113 INJ PANTOPRAZOLE SODIUM, VIA: HCPCS | Performed by: INTERNAL MEDICINE

## 2020-12-28 PROCEDURE — 80048 BASIC METABOLIC PNL TOTAL CA: CPT

## 2020-12-28 PROCEDURE — 63600175 PHARM REV CODE 636 W HCPCS: Performed by: STUDENT IN AN ORGANIZED HEALTH CARE EDUCATION/TRAINING PROGRAM

## 2020-12-28 PROCEDURE — 83735 ASSAY OF MAGNESIUM: CPT

## 2020-12-28 PROCEDURE — 94761 N-INVAS EAR/PLS OXIMETRY MLT: CPT

## 2020-12-28 PROCEDURE — 43244 EGD VARICES LIGATION: CPT | Mod: ,,, | Performed by: INTERNAL MEDICINE

## 2020-12-28 PROCEDURE — 85730 THROMBOPLASTIN TIME PARTIAL: CPT

## 2020-12-28 PROCEDURE — 43244 EGD VARICES LIGATION: CPT | Performed by: INTERNAL MEDICINE

## 2020-12-28 PROCEDURE — 27201022: Performed by: INTERNAL MEDICINE

## 2020-12-28 PROCEDURE — 85025 COMPLETE CBC W/AUTO DIFF WBC: CPT | Mod: 91

## 2020-12-28 PROCEDURE — 99291 CRITICAL CARE FIRST HOUR: CPT | Mod: ,,, | Performed by: INTERNAL MEDICINE

## 2020-12-28 PROCEDURE — 37000008 HC ANESTHESIA 1ST 15 MINUTES: Performed by: INTERNAL MEDICINE

## 2020-12-28 PROCEDURE — 86900 BLOOD TYPING SEROLOGIC ABO: CPT

## 2020-12-28 PROCEDURE — 63600175 PHARM REV CODE 636 W HCPCS: Performed by: INTERNAL MEDICINE

## 2020-12-28 PROCEDURE — 99291 PR CRITICAL CARE, E/M 30-74 MINUTES: ICD-10-PCS | Mod: ,,, | Performed by: INTERNAL MEDICINE

## 2020-12-28 PROCEDURE — 36415 COLL VENOUS BLD VENIPUNCTURE: CPT

## 2020-12-28 PROCEDURE — 84100 ASSAY OF PHOSPHORUS: CPT

## 2020-12-28 PROCEDURE — 37000009 HC ANESTHESIA EA ADD 15 MINS: Performed by: INTERNAL MEDICINE

## 2020-12-28 PROCEDURE — D9220A PRA ANESTHESIA: ICD-10-PCS | Mod: ,,, | Performed by: STUDENT IN AN ORGANIZED HEALTH CARE EDUCATION/TRAINING PROGRAM

## 2020-12-28 PROCEDURE — 85610 PROTHROMBIN TIME: CPT

## 2020-12-28 PROCEDURE — D9220A PRA ANESTHESIA: Mod: ,,, | Performed by: ANESTHESIOLOGY

## 2020-12-28 RX ORDER — ACETAMINOPHEN 325 MG/1
650 TABLET ORAL EVERY 6 HOURS PRN
Status: DISCONTINUED | OUTPATIENT
Start: 2020-12-28 | End: 2020-12-29

## 2020-12-28 RX ORDER — LIDOCAINE HYDROCHLORIDE 20 MG/ML
INJECTION, SOLUTION EPIDURAL; INFILTRATION; INTRACAUDAL; PERINEURAL
Status: DISCONTINUED | OUTPATIENT
Start: 2020-12-28 | End: 2020-12-28

## 2020-12-28 RX ORDER — ONDANSETRON 4 MG/1
4 TABLET, FILM COATED ORAL EVERY 6 HOURS PRN
Status: DISCONTINUED | OUTPATIENT
Start: 2020-12-28 | End: 2020-12-31 | Stop reason: HOSPADM

## 2020-12-28 RX ORDER — PANTOPRAZOLE SODIUM 40 MG/10ML
40 INJECTION, POWDER, LYOPHILIZED, FOR SOLUTION INTRAVENOUS 2 TIMES DAILY
Status: DISCONTINUED | OUTPATIENT
Start: 2020-12-28 | End: 2020-12-29

## 2020-12-28 RX ORDER — ONDANSETRON 4 MG/1
4 TABLET, FILM COATED ORAL ONCE
Status: DISCONTINUED | OUTPATIENT
Start: 2020-12-28 | End: 2020-12-28

## 2020-12-28 RX ORDER — OXYCODONE HYDROCHLORIDE 5 MG/1
5 TABLET ORAL EVERY 8 HOURS PRN
Status: DISCONTINUED | OUTPATIENT
Start: 2020-12-28 | End: 2020-12-31 | Stop reason: HOSPADM

## 2020-12-28 RX ORDER — PROPOFOL 10 MG/ML
VIAL (ML) INTRAVENOUS
Status: DISCONTINUED | OUTPATIENT
Start: 2020-12-28 | End: 2020-12-28

## 2020-12-28 RX ORDER — SODIUM CHLORIDE 0.9 % (FLUSH) 0.9 %
10 SYRINGE (ML) INJECTION
Status: DISCONTINUED | OUTPATIENT
Start: 2020-12-28 | End: 2020-12-31 | Stop reason: HOSPADM

## 2020-12-28 RX ORDER — CEFTRIAXONE 1 G/1
1 INJECTION, POWDER, FOR SOLUTION INTRAMUSCULAR; INTRAVENOUS
Status: DISCONTINUED | OUTPATIENT
Start: 2020-12-28 | End: 2020-12-31 | Stop reason: HOSPADM

## 2020-12-28 RX ORDER — PANTOPRAZOLE SODIUM 40 MG/1
40 TABLET, DELAYED RELEASE ORAL
Status: DISCONTINUED | OUTPATIENT
Start: 2020-12-28 | End: 2020-12-28

## 2020-12-28 RX ORDER — PANTOPRAZOLE SODIUM 40 MG/1
80 TABLET, DELAYED RELEASE ORAL DAILY
Status: DISCONTINUED | OUTPATIENT
Start: 2020-12-28 | End: 2020-12-28

## 2020-12-28 RX ADMIN — PROPOFOL 40 MG: 10 INJECTION, EMULSION INTRAVENOUS at 10:12

## 2020-12-28 RX ADMIN — ACETAMINOPHEN 650 MG: 325 TABLET ORAL at 08:12

## 2020-12-28 RX ADMIN — PROPOFOL 60 MG: 10 INJECTION, EMULSION INTRAVENOUS at 10:12

## 2020-12-28 RX ADMIN — CEFTRIAXONE SODIUM 1 G: 1 INJECTION, POWDER, FOR SOLUTION INTRAMUSCULAR; INTRAVENOUS at 05:12

## 2020-12-28 RX ADMIN — OCTREOTIDE ACETATE 50 MCG/HR: 1000 INJECTION, SOLUTION INTRAVENOUS; SUBCUTANEOUS at 05:12

## 2020-12-28 RX ADMIN — PANTOPRAZOLE SODIUM 40 MG: 40 TABLET, DELAYED RELEASE ORAL at 07:12

## 2020-12-28 RX ADMIN — PANTOPRAZOLE SODIUM 40 MG: 40 INJECTION, POWDER, FOR SOLUTION INTRAVENOUS at 08:12

## 2020-12-28 RX ADMIN — RIFAXIMIN 550 MG: 550 TABLET ORAL at 08:12

## 2020-12-28 RX ADMIN — RIFAXIMIN 550 MG: 550 TABLET ORAL at 12:12

## 2020-12-28 RX ADMIN — SODIUM CHLORIDE 100 ML/HR: 9 INJECTION, SOLUTION INTRAVENOUS at 10:12

## 2020-12-28 RX ADMIN — LIDOCAINE HYDROCHLORIDE 80 MG: 20 INJECTION, SOLUTION EPIDURAL; INFILTRATION; INTRACAUDAL at 10:12

## 2020-12-28 RX ADMIN — PANTOPRAZOLE SODIUM 40 MG: 40 INJECTION, POWDER, FOR SOLUTION INTRAVENOUS at 12:12

## 2020-12-28 RX ADMIN — OCTREOTIDE ACETATE 50 MCG/HR: 1000 INJECTION, SOLUTION INTRAVENOUS; SUBCUTANEOUS at 12:12

## 2020-12-28 RX ADMIN — OXYCODONE HYDROCHLORIDE 5 MG: 5 TABLET ORAL at 11:12

## 2020-12-28 RX ADMIN — OCTREOTIDE ACETATE 50 MCG/HR: 1000 INJECTION, SOLUTION INTRAVENOUS; SUBCUTANEOUS at 11:12

## 2020-12-28 RX ADMIN — PROPOFOL 30 MG: 10 INJECTION, EMULSION INTRAVENOUS at 10:12

## 2020-12-28 NOTE — ANESTHESIA PREPROCEDURE EVALUATION
[facility]  Anesthesia Pre-Operative Evaluation         Patient Name: Tee Aranda  YOB: 1970  MRN: [id number]    SUBJECTIVE:     Pre-operative evaluation for Procedure(s) (LRB):  EGD (ESOPHAGOGASTRODUODENOSCOPY) (N/A)     12/28/2020    Tee Aranda is a 50 y.o. female w/ a significant PMHx of CARTAGENA cirrhosis, previous GI bleed in November (sp banding of esophageal varices) who presents as a transfer from an OSH for melanotic stools.  Patient admitted for acute blood loss anemia. GI plans for EGD.  Patient is HD stable and on RA.     Patient now presents for the above procedure(s).      LDA:   Site Assessment Clean;Dry;Intact 12/28/20 0720   Line Status Saline locked;Flushed 12/28/20 0720   Dressing Status Clean;Dry;Intact 12/28/20 0720   Dressing Intervention Integrity maintained 12/28/20 0720   Dressing Change Due 01/01/21 12/28/20 0720   Site Change Due 01/01/21 12/28/20 0720   Reason Not Rotated Not due 12/28/20 0720   Number of days: 0            Peripheral IV - Single Lumen 12/27/20 2301 18 G Right Forearm (Active)   Site Assessment Clean;Dry;Intact 12/28/20 0720   Line Status Flushed;Saline locked 12/28/20 0720   Dressing Status Clean;Dry;Intact 12/28/20 0720   Dressing Intervention Integrity maintained 12/28/20 0720   Dressing Change Due 01/01/21 12/28/20 0720   Site Change Due 01/01/21 12/28/20 0720   Reason Not Rotated Not due 12/28/20 0720   Number of days: 0       Prev airway: None documented.    Drips:    octreotide (SANDOSTATIN) infusion 50 mcg/hr (12/28/20 0800)       Patient Active Problem List   Diagnosis    Combined forms of age-related cataract of right eye    Liver cirrhosis secondary to CARTAGENA    Secondary esophageal varices without bleeding    GI bleed    Gastrointestinal hemorrhage with melena    Anemia    Edema    Ascites    Hepatic encephalopathy       Review of patient's allergies indicates:   Allergen Reactions    Sulfa (sulfonamide antibiotics)  Hives       Current Inpatient Medications:   cefTRIAXone (ROCEPHIN) IVPB  1 g Intravenous Q24H    pantoprazole  40 mg Intravenous BID    rifAXIMin  550 mg Oral BID       No current facility-administered medications on file prior to encounter.      Current Outpatient Medications on File Prior to Encounter   Medication Sig Dispense Refill    alprazolam (XANAX) 0.5 MG tablet Take 1 tablet by mouth 3 (three) times daily as needed.       carvediloL (COREG) 3.125 MG tablet Take 1 tablet (3.125 mg total) by mouth 2 (two) times daily. (Patient not taking: Reported on 11/23/2020) 60 tablet 11    ferrous sulfate (FEOSOL) 325 mg (65 mg iron) Tab tablet Take 1 tablet (325 mg total) by mouth 2 (two) times daily. 60 tablet 11    furosemide (LASIX) 20 MG tablet Take 1 tablet (20 mg total) by mouth once daily. Take in morning 30 tablet 11    pantoprazole (PROTONIX) 40 MG tablet Take 1 tablet (40 mg total) by mouth once daily. (Patient not taking: Reported on 11/23/2020) 30 tablet 11    rifAXIMin (XIFAXAN) 550 mg Tab Take 1 tablet (550 mg total) by mouth 2 (two) times daily. 60 tablet 11    spironolactone (ALDACTONE) 25 MG tablet Take 2 tablets (50 mg total) by mouth once daily. 60 tablet 11       Past Surgical History:   Procedure Laterality Date    ESOPHAGOGASTRODUODENOSCOPY N/A 11/10/2020    Procedure: EGD (ESOPHAGOGASTRODUODENOSCOPY);  Surgeon: Gerard Salinas MD;  Location: [facility];  Service: Endoscopy;  Laterality: N/A;    HYSTERECTOMY      KNEE SURGERY      LITHOTRIPSY      RHINOPLASTY TIP      TONSILLECTOMY      TOTAL HIP ARTHROPLASTY         Social History     Socioeconomic History    Marital status:      Spouse name: Not on file    Number of children: Not on file    Years of education: Not on file    Highest education level: Not on file   Occupational History    Not on file   Social Needs    Financial resource strain: Not on file    Food insecurity     Worry: Not on file     Inability:  Not on file    Transportation needs     Medical: Not on file     Non-medical: Not on file   Tobacco Use    Smoking status: Former Smoker     Packs/day: 0.50     Types: Cigarettes     Quit date: 7/3/2019     Years since quittin.4    Smokeless tobacco: Never Used   Substance and Sexual Activity    Alcohol use: Yes     Comment: occasionally    Drug use: No    Sexual activity: Not on file   Lifestyle    Physical activity     Days per week: Not on file     Minutes per session: Not on file    Stress: Not on file   Relationships    Social connections     Talks on phone: Not on file     Gets together: Not on file     Attends Judaism service: Not on file     Active member of club or organization: Not on file     Attends meetings of clubs or organizations: Not on file     Relationship status: Not on file   Other Topics Concern    Not on file   Social History Narrative    Not on file       OBJECTIVE:     Vital Signs Range (Last 24H):  Temp:  [35.7 °C (96.3 °F)-37 °C (98.6 °F)]   Pulse:  []   Resp:  [12-28]   BP: ()/(50-62)   SpO2:  [97 %-100 %]       Significant Labs:  Lab Results   Component Value Date    WBC 5.75 2020    HGB 8.2 (L) 2020    HCT 28.4 (L) 2020    PLT 73 (L) 2020    ALT 22 2020    AST 21 2020     2020    K 4.5 2020     (H) 2020    CREATININE 0.6 2020    BUN 24 (H) 2020    CO2 19 (L) 2020    TSH 2.212 2020    INR 1.2 2020       Diagnostic Studies: No relevant studies.    EKG: No results found for this or any previous visit.    ECHOCARDIOGRAM:  TTE:  No results found for this or any previous visit.      ASSESSMENT/PLAN:         Anesthesia Evaluation    I have reviewed the Patient Summary Reports.    I have reviewed the Nursing Notes. I have reviewed the NPO Status.   I have reviewed the Medications.     Review of Systems  Anesthesia Hx:  No problems with previous Anesthesia  History of  prior surgery of interest to airway management or planning:  Denies Personal Hx of Anesthesia complications.   Social:  Non-Smoker    Hematology/Oncology:         -- Anemia:   Cardiovascular:  Cardiovascular Normal     Pulmonary:  Pulmonary Normal    Renal/:   renal calculi  Kidney Function/Disease    Hepatic/GI:   Liver Disease, Hepatitis  Liver Disease, Hepatitis    Musculoskeletal:  Musculoskeletal Normal    Neurological:  Neurology Normal    Endocrine:  Endocrine Normal    Psych:  Psychiatric Normal           Physical Exam  General:  Well nourished    Airway/Jaw/Neck:  Airway Findings: Mouth Opening: Normal Tongue: Normal  General Airway Assessment: Adult  Mallampati: III  TM Distance: Normal, at least 6 cm        Eyes/Ears/Nose:  EYES/EARS/NOSE FINDINGS: Normal   Dental:  Dental Findings: In tact   Chest/Lungs:  Chest/Lungs Clear    Heart/Vascular:  Heart Findings: Normal Heart murmur: negative       Mental Status:  Mental Status Findings: Normal        Anesthesia Plan  Type of Anesthesia, risks & benefits discussed:  Anesthesia Type:  general, MAC  Patient's Preference:   Intra-op Monitoring Plan: standard ASA monitors  Intra-op Monitoring Plan Comments:   Post Op Pain Control Plan: per primary service following discharge from PACU, IV/PO Opioids PRN and multimodal analgesia  Post Op Pain Control Plan Comments:   Induction:   IV  Beta Blocker:  Patient is not currently on a Beta-Blocker (No further documentation required).       Informed Consent: Patient understands risks and agrees with Anesthesia plan.  Questions answered. Anesthesia consent signed with patient.  ASA Score: 3     Day of Surgery Review of History & Physical: I have interviewed and examined the patient. I have reviewed the patient's H&P dated:    H&P update referred to the provider.         Ready For Surgery From Anesthesia Perspective.

## 2020-12-28 NOTE — ED PROVIDER NOTES
[facility]                                                 I reviewed the ER triage nurse's note before evaluating the patient    Chief Complaint  50 y.o. female with Emesis   -- pt reports she went fishing today and felt ok but this evening   -- her stomach started hurt and she vomited up blood x 1    History of Present Illness  Tee Aranda presents to the emergency room with black emesis  Patient with black emesis today, patient with history of GI bleed noted  Patient with hypotension on arrival, black emesis covered clothes now  Patient was admitted for a GI bleed in November 2020, unknown source  Patient has no abdominal pain at this time but feels weak and nauseated    The history is provided by the patient   device was not used during this ER visit    Past Medical History   -- Anxiety    -- Arthritis    -- Ascites    -- AVN (avascular necrosis of bone)    -- Cataract    -- Edema    -- Esophageal varices    -- Glaucoma    -- Hepatic encephalopathy    -- Hx of cirrhosis    -- Kidney stones      Past Surgical History   -- ESOPHAGOGASTRODUODENOSCOPY     -- HYSTERECTOMY     -- KNEE SURGERY     -- LITHOTRIPSY     -- RHINOPLASTY TIP     -- TONSILLECTOMY     -- TOTAL HIP ARTHROPLASTY        Allergic to sulfa    I have reviewed all of this patient's past medical, surgical, family, and social   histories as well as active allergies and medications documented in the  electronic medical record    Review of Systems and Physical Exam      Review of Systems  -- Constitution - no fever, denies fatigue, no weakness, no chills  -- Eyes - no tearing or redness, no visual disturbance  -- Ear, Nose - no tinnitus or earache, no nasal congestion or discharge  -- Mouth,Throat - no sore throat, no toothache, normal voice, normal swallowing  -- Respiratory - denies cough and congestion, no shortness of breath, no BATES  -- Cardiovascular - denies chest pain, no palpitations, denies  claudication  -- Gastrointestinal - black emesis and black bowel movements today  -- Genitourinary - no dysuria, denies flank pain, no hematuria, no STD risk  -- Musculoskeletal - denies back pain, negative for trauma or injury  -- Neurological - no headache, denies weakness or seizure; no LOC  -- Skin - denies pallor, rash, or changes in skin. no hives or welts noted    Vital Signs  Her oral temperature is 96.3 °F (35.7 °C).   Her blood pressure is 92/53 and her pulse is 81.   Her respiration is 14 and oxygen saturation is 100%.     Physical Exam  -- Nursing note and vitals reviewed  -- Constitutional: Appears well-developed and well-nourished  -- Head: Atraumatic. Normocephalic. No obvious abnormality  -- Eyes: Pupils are equal and reactive to light. Normal conjunctiva and lids  -- Cardiac: Normal rate, regular rhythm and normal heart sounds  -- Pulmonary: Normal respiratory effort, breath sounds clear to auscultation  -- Abdominal: Soft, no tenderness. Normal bowel sounds. Normal liver edge  -- Musculoskeletal: Normal range of motion, no effusions. Joints stable   -- Neurological: No focal deficits. Showed good interaction with staff  -- Vascular: Posterior tibial, dorsalis pedis and radial pulses 2+ bilaterally      Emergency Room Course      Lab Results     K 3.4 (L)      CO2 23   BUN 24 (H)   CREATININE 0.8    (H)   ALKPHOS 80   AST 21   ALT 22   BILITOT 0.4   ALBUMIN 3.4 (L)   PROT 6.0   WBC 6.28   HGB 8.4 (L)   HCT 27.7 (L)    (L)   CPK 74   CPK 74   CPKMB 0.8   TROPONINI <0.006   INR 1.1   BNP 12   MG 1.8   TSH 2.212     EKG  -- The EKG findings today were without concerning findings from baseline     Radiology  -- Chest x-ray showed no infiltrate and showed no acute pathology   -- KUB x-ray performed in the ER today was within normal limits     Additional Work up  -- rapid Coronavirus PCR was negative  -- ammonia is 111  -- reticulocyte count pending    Medications Given  sodium  chloride 0.9% bolus 1,000 mL (has no administration in time range)   sodium chloride 0.9% bolus 1,000 mL (has no administration in time range)   sodium chloride 0.9% bolus 1,000 mL (has no administration in time range)   pantoprazole injection 80 mg (has no administration in time range)   pantoprazole 40 mg in dextrose 5 % 100 mL infusion (ready to mix system)    octreotide (SANDOSTATIN) 500 mcg in sodium chloride 0.9% 100 mL infusion    0.9%  NaCl infusion (for blood administration) (has no administration in time range)   sodium chloride 0.9% bolus 1,000 mL (1,000 mLs Intravenous New Bag 12/27/20 7068)     Critical Care ED Physician Time (minutes):  -- Performed by: Alex Soliman M.D.  -- Date/Time: 10:21 PM 12/27/2020   -- Direct Patient Care (Face Time): 5  -- Additional History from Records or Taking Additional History: 5  -- Ordering, Reviewing, and Interpreting Diagnostic Studies: 5  -- Total Time in Documentation: 11  -- Consultation with Other Physicians: 5  -- Consultation with Family Related to Condition: 0  -- Total Critical Care Time: 31     ED Physician Management  -- Diagnosis management comments: 50 y.o. female with GI bleed  -- black emesis with black bowel movements, cirrhotic disease HX  -- patient states she has never had a variceal bleed, HX of GI bleed  -- Protonix initiated, IV fluids, IV octreotide, transfusion ordered now  -- transfer to higher level care, patient sees [facility] hepatology    Diagnosis  [R11.10] Emesis  [K92.2] GI bleed    Disposition and Plan  -- Disposition: transfer  -- Condition: stable    This note is dictated on M*Modal word recognition program.  There are word recognition mistakes that are occasionally missed on review.         Alex Soliman MD  12/27/20 0879

## 2020-12-28 NOTE — TRANSFER OF CARE
"Anesthesia Transfer of Care Note    Patient: Tee Aranda    Procedure(s) Performed: Procedure(s) (LRB):  EGD (ESOPHAGOGASTRODUODENOSCOPY) (N/A)    Patient location: ICU    Anesthesia Type: general    Transport from OR: Continuous ECG monitoring in transport. Continuous SpO2 monitoring in transport. Transported from OR on 2-3 L/min O2 by NC with adequate spontaneous ventilation    Post pain: adequate analgesia    Post assessment: no apparent anesthetic complications and tolerated procedure well    Post vital signs: stable    Level of consciousness: awake    Nausea/Vomiting: no nausea/vomiting    Complications: none    Transfer of care protocol was followed      Last vitals:   Visit Vitals  BP (!) 124/59 (BP Location: Left arm, Patient Position: Lying)   Pulse 81   Temp 36.9 °C (98.5 °F) (Oral)   Resp 20   Ht 5' 8" (1.727 m)   Wt 76.2 kg (167 lb 15.9 oz)   SpO2 99%   BMI 25.54 kg/m²     "

## 2020-12-29 ENCOUNTER — TELEPHONE (OUTPATIENT)
Dept: GASTROENTEROLOGY | Facility: HOSPITAL | Age: 50
End: 2020-12-29

## 2020-12-29 DIAGNOSIS — K92.2 GASTROINTESTINAL HEMORRHAGE, UNSPECIFIED GASTROINTESTINAL HEMORRHAGE TYPE: Primary | ICD-10-CM

## 2020-12-29 PROBLEM — K76.6 PORTAL HYPERTENSIVE GASTROPATHY: Status: ACTIVE | Noted: 2020-12-29

## 2020-12-29 PROBLEM — I85.11 SECONDARY ESOPHAGEAL VARICES WITH BLEEDING: Status: ACTIVE | Noted: 2020-08-31

## 2020-12-29 PROBLEM — D62 ACUTE BLOOD LOSS ANEMIA: Status: ACTIVE | Noted: 2020-12-29

## 2020-12-29 PROBLEM — K31.89 PORTAL HYPERTENSIVE GASTROPATHY: Status: ACTIVE | Noted: 2020-12-29

## 2020-12-29 LAB
ALBUMIN SERPL BCP-MCNC: 3.2 G/DL (ref 3.5–5.2)
ALP SERPL-CCNC: 64 U/L (ref 55–135)
ALT SERPL W/O P-5'-P-CCNC: 20 U/L (ref 10–44)
ANION GAP SERPL CALC-SCNC: 8 MMOL/L (ref 8–16)
ANISOCYTOSIS BLD QL SMEAR: SLIGHT
AST SERPL-CCNC: 19 U/L (ref 10–40)
BASOPHILS # BLD AUTO: 0.01 K/UL (ref 0–0.2)
BASOPHILS # BLD AUTO: 0.01 K/UL (ref 0–0.2)
BASOPHILS NFR BLD: 0.3 % (ref 0–1.9)
BASOPHILS NFR BLD: 0.4 % (ref 0–1.9)
BILIRUB SERPL-MCNC: 0.6 MG/DL (ref 0.1–1)
BUN SERPL-MCNC: 16 MG/DL (ref 6–20)
CALCIUM SERPL-MCNC: 8 MG/DL (ref 8.7–10.5)
CHLORIDE SERPL-SCNC: 109 MMOL/L (ref 95–110)
CO2 SERPL-SCNC: 24 MMOL/L (ref 23–29)
CREAT SERPL-MCNC: 0.8 MG/DL (ref 0.5–1.4)
DIFFERENTIAL METHOD: ABNORMAL
DIFFERENTIAL METHOD: ABNORMAL
EOSINOPHIL # BLD AUTO: 0.1 K/UL (ref 0–0.5)
EOSINOPHIL # BLD AUTO: 0.1 K/UL (ref 0–0.5)
EOSINOPHIL NFR BLD: 2.1 % (ref 0–8)
EOSINOPHIL NFR BLD: 2.1 % (ref 0–8)
ERYTHROCYTE [DISTWIDTH] IN BLOOD BY AUTOMATED COUNT: 15.9 % (ref 11.5–14.5)
ERYTHROCYTE [DISTWIDTH] IN BLOOD BY AUTOMATED COUNT: 16.2 % (ref 11.5–14.5)
EST. GFR  (AFRICAN AMERICAN): >60 ML/MIN/1.73 M^2
EST. GFR  (NON AFRICAN AMERICAN): >60 ML/MIN/1.73 M^2
GLUCOSE SERPL-MCNC: 114 MG/DL (ref 70–110)
HCT VFR BLD AUTO: 26.6 % (ref 37–48.5)
HCT VFR BLD AUTO: 27.4 % (ref 37–48.5)
HGB BLD-MCNC: 8.1 G/DL (ref 12–16)
HGB BLD-MCNC: 8.1 G/DL (ref 12–16)
HYPOCHROMIA BLD QL SMEAR: ABNORMAL
IMM GRANULOCYTES # BLD AUTO: 0.02 K/UL (ref 0–0.04)
IMM GRANULOCYTES # BLD AUTO: 0.02 K/UL (ref 0–0.04)
IMM GRANULOCYTES NFR BLD AUTO: 0.6 % (ref 0–0.5)
IMM GRANULOCYTES NFR BLD AUTO: 0.7 % (ref 0–0.5)
LYMPHOCYTES # BLD AUTO: 0.9 K/UL (ref 1–4.8)
LYMPHOCYTES # BLD AUTO: 1.1 K/UL (ref 1–4.8)
LYMPHOCYTES NFR BLD: 31.6 % (ref 18–48)
LYMPHOCYTES NFR BLD: 33.7 % (ref 18–48)
MAGNESIUM SERPL-MCNC: 1.8 MG/DL (ref 1.6–2.6)
MCH RBC QN AUTO: 26 PG (ref 27–31)
MCH RBC QN AUTO: 26.4 PG (ref 27–31)
MCHC RBC AUTO-ENTMCNC: 29.6 G/DL (ref 32–36)
MCHC RBC AUTO-ENTMCNC: 30.5 G/DL (ref 32–36)
MCV RBC AUTO: 87 FL (ref 82–98)
MCV RBC AUTO: 88 FL (ref 82–98)
MONOCYTES # BLD AUTO: 0.2 K/UL (ref 0.3–1)
MONOCYTES # BLD AUTO: 0.3 K/UL (ref 0.3–1)
MONOCYTES NFR BLD: 7.1 % (ref 4–15)
MONOCYTES NFR BLD: 9.6 % (ref 4–15)
NEUTROPHILS # BLD AUTO: 1.6 K/UL (ref 1.8–7.7)
NEUTROPHILS # BLD AUTO: 1.8 K/UL (ref 1.8–7.7)
NEUTROPHILS NFR BLD: 53.7 % (ref 38–73)
NEUTROPHILS NFR BLD: 58.1 % (ref 38–73)
NRBC BLD-RTO: 0 /100 WBC
NRBC BLD-RTO: 0 /100 WBC
PHOSPHATE SERPL-MCNC: 3.5 MG/DL (ref 2.7–4.5)
PLATELET # BLD AUTO: 77 K/UL (ref 150–350)
PLATELET # BLD AUTO: 82 K/UL (ref 150–350)
PLATELET BLD QL SMEAR: ABNORMAL
PMV BLD AUTO: 12.1 FL (ref 9.2–12.9)
PMV BLD AUTO: 12.6 FL (ref 9.2–12.9)
POTASSIUM SERPL-SCNC: 3.6 MMOL/L (ref 3.5–5.1)
PROT SERPL-MCNC: 5.7 G/DL (ref 6–8.4)
RBC # BLD AUTO: 3.07 M/UL (ref 4–5.4)
RBC # BLD AUTO: 3.11 M/UL (ref 4–5.4)
SODIUM SERPL-SCNC: 141 MMOL/L (ref 136–145)
WBC # BLD AUTO: 2.82 K/UL (ref 3.9–12.7)
WBC # BLD AUTO: 3.35 K/UL (ref 3.9–12.7)

## 2020-12-29 PROCEDURE — 99232 SBSQ HOSP IP/OBS MODERATE 35: CPT | Mod: ,,, | Performed by: HOSPITALIST

## 2020-12-29 PROCEDURE — 80053 COMPREHEN METABOLIC PANEL: CPT

## 2020-12-29 PROCEDURE — 63600175 PHARM REV CODE 636 W HCPCS: Performed by: STUDENT IN AN ORGANIZED HEALTH CARE EDUCATION/TRAINING PROGRAM

## 2020-12-29 PROCEDURE — 83735 ASSAY OF MAGNESIUM: CPT

## 2020-12-29 PROCEDURE — 25000003 PHARM REV CODE 250: Performed by: HOSPITALIST

## 2020-12-29 PROCEDURE — 85025 COMPLETE CBC W/AUTO DIFF WBC: CPT

## 2020-12-29 PROCEDURE — 20600001 HC STEP DOWN PRIVATE ROOM

## 2020-12-29 PROCEDURE — 25000003 PHARM REV CODE 250: Performed by: STUDENT IN AN ORGANIZED HEALTH CARE EDUCATION/TRAINING PROGRAM

## 2020-12-29 PROCEDURE — 99232 PR SUBSEQUENT HOSPITAL CARE,LEVL II: ICD-10-PCS | Mod: ,,, | Performed by: HOSPITALIST

## 2020-12-29 PROCEDURE — 36415 COLL VENOUS BLD VENIPUNCTURE: CPT

## 2020-12-29 PROCEDURE — 84100 ASSAY OF PHOSPHORUS: CPT

## 2020-12-29 RX ORDER — ACETAMINOPHEN 500 MG
500 TABLET ORAL EVERY 6 HOURS PRN
Status: DISCONTINUED | OUTPATIENT
Start: 2020-12-29 | End: 2020-12-31 | Stop reason: HOSPADM

## 2020-12-29 RX ORDER — FUROSEMIDE 20 MG/1
20 TABLET ORAL DAILY
Status: DISCONTINUED | OUTPATIENT
Start: 2020-12-29 | End: 2020-12-31 | Stop reason: HOSPADM

## 2020-12-29 RX ORDER — SPIRONOLACTONE 25 MG/1
50 TABLET ORAL DAILY
Status: DISCONTINUED | OUTPATIENT
Start: 2020-12-29 | End: 2020-12-31 | Stop reason: HOSPADM

## 2020-12-29 RX ORDER — CARVEDILOL 3.12 MG/1
3.12 TABLET ORAL 2 TIMES DAILY
Status: DISCONTINUED | OUTPATIENT
Start: 2020-12-30 | End: 2020-12-31 | Stop reason: HOSPADM

## 2020-12-29 RX ORDER — PANTOPRAZOLE SODIUM 40 MG/1
40 TABLET, DELAYED RELEASE ORAL
Status: DISCONTINUED | OUTPATIENT
Start: 2020-12-29 | End: 2020-12-31 | Stop reason: HOSPADM

## 2020-12-29 RX ADMIN — RIFAXIMIN 550 MG: 550 TABLET ORAL at 09:12

## 2020-12-29 RX ADMIN — PANTOPRAZOLE SODIUM 40 MG: 40 TABLET, DELAYED RELEASE ORAL at 08:12

## 2020-12-29 RX ADMIN — OXYCODONE HYDROCHLORIDE 5 MG: 5 TABLET ORAL at 03:12

## 2020-12-29 RX ADMIN — PANTOPRAZOLE SODIUM 40 MG: 40 TABLET, DELAYED RELEASE ORAL at 03:12

## 2020-12-29 RX ADMIN — CEFTRIAXONE SODIUM 1 G: 1 INJECTION, POWDER, FOR SOLUTION INTRAMUSCULAR; INTRAVENOUS at 05:12

## 2020-12-29 RX ADMIN — FUROSEMIDE 20 MG: 20 TABLET ORAL at 08:12

## 2020-12-29 RX ADMIN — SPIRONOLACTONE 50 MG: 25 TABLET, FILM COATED ORAL at 08:12

## 2020-12-29 RX ADMIN — ACETAMINOPHEN 500 MG: 500 TABLET ORAL at 10:12

## 2020-12-30 LAB
ANION GAP SERPL CALC-SCNC: 7 MMOL/L (ref 8–16)
BASOPHILS # BLD AUTO: 0.01 K/UL (ref 0–0.2)
BASOPHILS NFR BLD: 0.3 % (ref 0–1.9)
BUN SERPL-MCNC: 11 MG/DL (ref 6–20)
CALCIUM SERPL-MCNC: 7.9 MG/DL (ref 8.7–10.5)
CHLORIDE SERPL-SCNC: 104 MMOL/L (ref 95–110)
CO2 SERPL-SCNC: 27 MMOL/L (ref 23–29)
CREAT SERPL-MCNC: 0.8 MG/DL (ref 0.5–1.4)
DIFFERENTIAL METHOD: ABNORMAL
EOSINOPHIL # BLD AUTO: 0.1 K/UL (ref 0–0.5)
EOSINOPHIL NFR BLD: 2.2 % (ref 0–8)
ERYTHROCYTE [DISTWIDTH] IN BLOOD BY AUTOMATED COUNT: 16.1 % (ref 11.5–14.5)
EST. GFR  (AFRICAN AMERICAN): >60 ML/MIN/1.73 M^2
EST. GFR  (NON AFRICAN AMERICAN): >60 ML/MIN/1.73 M^2
GLUCOSE SERPL-MCNC: 121 MG/DL (ref 70–110)
HCT VFR BLD AUTO: 26.8 % (ref 37–48.5)
HGB BLD-MCNC: 8.1 G/DL (ref 12–16)
IMM GRANULOCYTES # BLD AUTO: 0.01 K/UL (ref 0–0.04)
IMM GRANULOCYTES NFR BLD AUTO: 0.3 % (ref 0–0.5)
LYMPHOCYTES # BLD AUTO: 0.9 K/UL (ref 1–4.8)
LYMPHOCYTES NFR BLD: 27.6 % (ref 18–48)
MCH RBC QN AUTO: 26.2 PG (ref 27–31)
MCHC RBC AUTO-ENTMCNC: 30.2 G/DL (ref 32–36)
MCV RBC AUTO: 87 FL (ref 82–98)
MONOCYTES # BLD AUTO: 0.3 K/UL (ref 0.3–1)
MONOCYTES NFR BLD: 9.9 % (ref 4–15)
NEUTROPHILS # BLD AUTO: 1.9 K/UL (ref 1.8–7.7)
NEUTROPHILS NFR BLD: 59.7 % (ref 38–73)
NRBC BLD-RTO: 0 /100 WBC
PLATELET # BLD AUTO: 77 K/UL (ref 150–350)
PMV BLD AUTO: 11.9 FL (ref 9.2–12.9)
POTASSIUM SERPL-SCNC: 3.3 MMOL/L (ref 3.5–5.1)
RBC # BLD AUTO: 3.09 M/UL (ref 4–5.4)
SODIUM SERPL-SCNC: 138 MMOL/L (ref 136–145)
WBC # BLD AUTO: 3.12 K/UL (ref 3.9–12.7)

## 2020-12-30 PROCEDURE — 25000003 PHARM REV CODE 250: Performed by: HOSPITALIST

## 2020-12-30 PROCEDURE — 36415 COLL VENOUS BLD VENIPUNCTURE: CPT

## 2020-12-30 PROCEDURE — 80048 BASIC METABOLIC PNL TOTAL CA: CPT

## 2020-12-30 PROCEDURE — 99232 PR SUBSEQUENT HOSPITAL CARE,LEVL II: ICD-10-PCS | Mod: ,,, | Performed by: HOSPITALIST

## 2020-12-30 PROCEDURE — 25000003 PHARM REV CODE 250: Performed by: STUDENT IN AN ORGANIZED HEALTH CARE EDUCATION/TRAINING PROGRAM

## 2020-12-30 PROCEDURE — 63600175 PHARM REV CODE 636 W HCPCS: Performed by: HOSPITALIST

## 2020-12-30 PROCEDURE — 63600175 PHARM REV CODE 636 W HCPCS: Performed by: STUDENT IN AN ORGANIZED HEALTH CARE EDUCATION/TRAINING PROGRAM

## 2020-12-30 PROCEDURE — 25500020 PHARM REV CODE 255: Performed by: STUDENT IN AN ORGANIZED HEALTH CARE EDUCATION/TRAINING PROGRAM

## 2020-12-30 PROCEDURE — 99232 SBSQ HOSP IP/OBS MODERATE 35: CPT | Mod: ,,, | Performed by: HOSPITALIST

## 2020-12-30 PROCEDURE — 85025 COMPLETE CBC W/AUTO DIFF WBC: CPT

## 2020-12-30 PROCEDURE — 20600001 HC STEP DOWN PRIVATE ROOM

## 2020-12-30 PROCEDURE — 25500020 PHARM REV CODE 255: Performed by: HOSPITALIST

## 2020-12-30 RX ORDER — POTASSIUM CHLORIDE 20 MEQ/1
40 TABLET, EXTENDED RELEASE ORAL ONCE
Status: COMPLETED | OUTPATIENT
Start: 2020-12-30 | End: 2020-12-30

## 2020-12-30 RX ORDER — MORPHINE SULFATE 2 MG/ML
2 INJECTION, SOLUTION INTRAMUSCULAR; INTRAVENOUS EVERY 4 HOURS PRN
Status: DISCONTINUED | OUTPATIENT
Start: 2020-12-30 | End: 2020-12-31 | Stop reason: HOSPADM

## 2020-12-30 RX ADMIN — OCTREOTIDE ACETATE 50 MCG/HR: 1000 INJECTION, SOLUTION INTRAVENOUS; SUBCUTANEOUS at 06:12

## 2020-12-30 RX ADMIN — PANTOPRAZOLE SODIUM 40 MG: 40 TABLET, DELAYED RELEASE ORAL at 04:12

## 2020-12-30 RX ADMIN — MORPHINE SULFATE 2 MG: 2 INJECTION, SOLUTION INTRAMUSCULAR; INTRAVENOUS at 06:12

## 2020-12-30 RX ADMIN — PANTOPRAZOLE SODIUM 40 MG: 40 TABLET, DELAYED RELEASE ORAL at 06:12

## 2020-12-30 RX ADMIN — OXYCODONE HYDROCHLORIDE 5 MG: 5 TABLET ORAL at 08:12

## 2020-12-30 RX ADMIN — CARVEDILOL 3.12 MG: 3.12 TABLET, FILM COATED ORAL at 08:12

## 2020-12-30 RX ADMIN — OXYCODONE HYDROCHLORIDE 5 MG: 5 TABLET ORAL at 12:12

## 2020-12-30 RX ADMIN — IOHEXOL 75 ML: 350 INJECTION, SOLUTION INTRAVENOUS at 04:12

## 2020-12-30 RX ADMIN — SPIRONOLACTONE 50 MG: 25 TABLET, FILM COATED ORAL at 08:12

## 2020-12-30 RX ADMIN — FUROSEMIDE 20 MG: 20 TABLET ORAL at 08:12

## 2020-12-30 RX ADMIN — OCTREOTIDE ACETATE 50 MCG/HR: 1000 INJECTION, SOLUTION INTRAVENOUS; SUBCUTANEOUS at 07:12

## 2020-12-30 RX ADMIN — IOHEXOL 15 ML: 350 INJECTION, SOLUTION INTRAVENOUS at 01:12

## 2020-12-30 RX ADMIN — POTASSIUM CHLORIDE 40 MEQ: 1500 TABLET, EXTENDED RELEASE ORAL at 12:12

## 2020-12-30 RX ADMIN — CEFTRIAXONE SODIUM 1 G: 1 INJECTION, POWDER, FOR SOLUTION INTRAMUSCULAR; INTRAVENOUS at 04:12

## 2020-12-31 VITALS
BODY MASS INDEX: 25.83 KG/M2 | WEIGHT: 170.44 LBS | HEART RATE: 63 BPM | SYSTOLIC BLOOD PRESSURE: 111 MMHG | RESPIRATION RATE: 16 BRPM | TEMPERATURE: 98 F | DIASTOLIC BLOOD PRESSURE: 61 MMHG | HEIGHT: 68 IN | OXYGEN SATURATION: 99 %

## 2020-12-31 LAB
ANION GAP SERPL CALC-SCNC: 9 MMOL/L (ref 8–16)
BASOPHILS # BLD AUTO: 0.01 K/UL (ref 0–0.2)
BASOPHILS NFR BLD: 0.3 % (ref 0–1.9)
BLD PROD TYP BPU: NORMAL
BLD PROD TYP BPU: NORMAL
BLOOD UNIT EXPIRATION DATE: NORMAL
BLOOD UNIT EXPIRATION DATE: NORMAL
BLOOD UNIT TYPE CODE: 5100
BLOOD UNIT TYPE CODE: 5100
BLOOD UNIT TYPE: NORMAL
BLOOD UNIT TYPE: NORMAL
BUN SERPL-MCNC: 10 MG/DL (ref 6–20)
CALCIUM SERPL-MCNC: 8.3 MG/DL (ref 8.7–10.5)
CHLORIDE SERPL-SCNC: 105 MMOL/L (ref 95–110)
CO2 SERPL-SCNC: 26 MMOL/L (ref 23–29)
CODING SYSTEM: NORMAL
CODING SYSTEM: NORMAL
CREAT SERPL-MCNC: 0.8 MG/DL (ref 0.5–1.4)
DIFFERENTIAL METHOD: ABNORMAL
DISPENSE STATUS: NORMAL
DISPENSE STATUS: NORMAL
EOSINOPHIL # BLD AUTO: 0.1 K/UL (ref 0–0.5)
EOSINOPHIL NFR BLD: 2.1 % (ref 0–8)
ERYTHROCYTE [DISTWIDTH] IN BLOOD BY AUTOMATED COUNT: 16.6 % (ref 11.5–14.5)
EST. GFR  (AFRICAN AMERICAN): >60 ML/MIN/1.73 M^2
EST. GFR  (NON AFRICAN AMERICAN): >60 ML/MIN/1.73 M^2
GLUCOSE SERPL-MCNC: 134 MG/DL (ref 70–110)
HCT VFR BLD AUTO: 28.1 % (ref 37–48.5)
HGB BLD-MCNC: 8.4 G/DL (ref 12–16)
IMM GRANULOCYTES # BLD AUTO: 0.01 K/UL (ref 0–0.04)
IMM GRANULOCYTES NFR BLD AUTO: 0.3 % (ref 0–0.5)
LYMPHOCYTES # BLD AUTO: 1.1 K/UL (ref 1–4.8)
LYMPHOCYTES NFR BLD: 31.5 % (ref 18–48)
MCH RBC QN AUTO: 26.4 PG (ref 27–31)
MCHC RBC AUTO-ENTMCNC: 29.9 G/DL (ref 32–36)
MCV RBC AUTO: 88 FL (ref 82–98)
MONOCYTES # BLD AUTO: 0.3 K/UL (ref 0.3–1)
MONOCYTES NFR BLD: 8.9 % (ref 4–15)
NEUTROPHILS # BLD AUTO: 1.9 K/UL (ref 1.8–7.7)
NEUTROPHILS NFR BLD: 56.9 % (ref 38–73)
NRBC BLD-RTO: 0 /100 WBC
PLATELET # BLD AUTO: 80 K/UL (ref 150–350)
PMV BLD AUTO: 12.3 FL (ref 9.2–12.9)
POTASSIUM SERPL-SCNC: 3.8 MMOL/L (ref 3.5–5.1)
RBC # BLD AUTO: 3.18 M/UL (ref 4–5.4)
SODIUM SERPL-SCNC: 140 MMOL/L (ref 136–145)
TRANS ERYTHROCYTES VOL PATIENT: NORMAL ML
TRANS ERYTHROCYTES VOL PATIENT: NORMAL ML
WBC # BLD AUTO: 3.37 K/UL (ref 3.9–12.7)

## 2020-12-31 PROCEDURE — 25000003 PHARM REV CODE 250: Performed by: HOSPITALIST

## 2020-12-31 PROCEDURE — 80048 BASIC METABOLIC PNL TOTAL CA: CPT

## 2020-12-31 PROCEDURE — 63600175 PHARM REV CODE 636 W HCPCS: Performed by: HOSPITALIST

## 2020-12-31 PROCEDURE — 36415 COLL VENOUS BLD VENIPUNCTURE: CPT

## 2020-12-31 PROCEDURE — 85025 COMPLETE CBC W/AUTO DIFF WBC: CPT

## 2020-12-31 PROCEDURE — 99239 HOSP IP/OBS DSCHRG MGMT >30: CPT | Mod: ,,, | Performed by: HOSPITALIST

## 2020-12-31 PROCEDURE — 99239 PR HOSPITAL DISCHARGE DAY,>30 MIN: ICD-10-PCS | Mod: ,,, | Performed by: HOSPITALIST

## 2020-12-31 RX ORDER — CIPROFLOXACIN 500 MG/1
500 TABLET ORAL EVERY 12 HOURS
Qty: 2 TABLET | Refills: 0 | Status: SHIPPED | OUTPATIENT
Start: 2020-12-31 | End: 2021-11-09

## 2020-12-31 RX ORDER — LACTULOSE 10 G/15ML
5 SOLUTION ORAL; RECTAL DAILY
Qty: 250 ML | Refills: 0 | Status: SHIPPED | OUTPATIENT
Start: 2020-12-31 | End: 2021-01-30

## 2020-12-31 RX ORDER — ALPRAZOLAM 0.5 MG/1
0.5 TABLET ORAL NIGHTLY PRN
Start: 2020-12-31 | End: 2021-04-21

## 2020-12-31 RX ADMIN — FUROSEMIDE 20 MG: 20 TABLET ORAL at 08:12

## 2020-12-31 RX ADMIN — PANTOPRAZOLE SODIUM 40 MG: 40 TABLET, DELAYED RELEASE ORAL at 06:12

## 2020-12-31 RX ADMIN — SPIRONOLACTONE 50 MG: 25 TABLET, FILM COATED ORAL at 08:12

## 2020-12-31 RX ADMIN — CARVEDILOL 3.12 MG: 3.12 TABLET, FILM COATED ORAL at 08:12

## 2020-12-31 RX ADMIN — CEFTRIAXONE SODIUM 1 G: 1 INJECTION, POWDER, FOR SOLUTION INTRAMUSCULAR; INTRAVENOUS at 06:12

## 2020-12-31 RX ADMIN — MORPHINE SULFATE 2 MG: 2 INJECTION, SOLUTION INTRAMUSCULAR; INTRAVENOUS at 08:12

## 2021-01-04 ENCOUNTER — LAB VISIT (OUTPATIENT)
Dept: LAB | Facility: HOSPITAL | Age: 51
End: 2021-01-04
Attending: INTERNAL MEDICINE
Payer: COMMERCIAL

## 2021-01-04 DIAGNOSIS — K74.60 LIVER CIRRHOSIS SECONDARY TO NASH: Chronic | ICD-10-CM

## 2021-01-04 DIAGNOSIS — K75.81 LIVER CIRRHOSIS SECONDARY TO NASH: Chronic | ICD-10-CM

## 2021-01-04 LAB
BASOPHILS # BLD AUTO: 0.02 K/UL (ref 0–0.2)
BASOPHILS NFR BLD: 0.5 % (ref 0–1.9)
DIFFERENTIAL METHOD: ABNORMAL
EOSINOPHIL # BLD AUTO: 0.1 K/UL (ref 0–0.5)
EOSINOPHIL NFR BLD: 1.7 % (ref 0–8)
ERYTHROCYTE [DISTWIDTH] IN BLOOD BY AUTOMATED COUNT: 16.4 % (ref 11.5–14.5)
HCT VFR BLD AUTO: 33.4 % (ref 37–48.5)
HGB BLD-MCNC: 10.2 G/DL (ref 12–16)
IMM GRANULOCYTES # BLD AUTO: 0.04 K/UL (ref 0–0.04)
IMM GRANULOCYTES NFR BLD AUTO: 1 % (ref 0–0.5)
LYMPHOCYTES # BLD AUTO: 1.2 K/UL (ref 1–4.8)
LYMPHOCYTES NFR BLD: 27.8 % (ref 18–48)
MCH RBC QN AUTO: 26 PG (ref 27–31)
MCHC RBC AUTO-ENTMCNC: 30.5 G/DL (ref 32–36)
MCV RBC AUTO: 85 FL (ref 82–98)
MONOCYTES # BLD AUTO: 0.4 K/UL (ref 0.3–1)
MONOCYTES NFR BLD: 9.4 % (ref 4–15)
NEUTROPHILS # BLD AUTO: 2.5 K/UL (ref 1.8–7.7)
NEUTROPHILS NFR BLD: 59.6 % (ref 38–73)
NRBC BLD-RTO: 0 /100 WBC
PLATELET # BLD AUTO: 123 K/UL (ref 150–350)
PMV BLD AUTO: 12.3 FL (ref 9.2–12.9)
RBC # BLD AUTO: 3.93 M/UL (ref 4–5.4)
WBC # BLD AUTO: 4.17 K/UL (ref 3.9–12.7)

## 2021-01-04 PROCEDURE — 36415 COLL VENOUS BLD VENIPUNCTURE: CPT

## 2021-01-04 PROCEDURE — 85025 COMPLETE CBC W/AUTO DIFF WBC: CPT

## 2021-01-14 ENCOUNTER — LAB VISIT (OUTPATIENT)
Dept: LAB | Facility: HOSPITAL | Age: 51
End: 2021-01-14
Attending: INTERNAL MEDICINE
Payer: COMMERCIAL

## 2021-01-14 DIAGNOSIS — K74.60 LIVER CIRRHOSIS SECONDARY TO NASH: Chronic | ICD-10-CM

## 2021-01-14 DIAGNOSIS — K75.81 LIVER CIRRHOSIS SECONDARY TO NASH: Chronic | ICD-10-CM

## 2021-01-14 LAB
ALBUMIN SERPL BCP-MCNC: 4.2 G/DL (ref 3.5–5.2)
ALP SERPL-CCNC: 106 U/L (ref 55–135)
ALT SERPL W/O P-5'-P-CCNC: 26 U/L (ref 10–44)
ANION GAP SERPL CALC-SCNC: 10 MMOL/L (ref 8–16)
AST SERPL-CCNC: 24 U/L (ref 10–40)
BASOPHILS # BLD AUTO: 0.01 K/UL (ref 0–0.2)
BASOPHILS NFR BLD: 0.2 % (ref 0–1.9)
BILIRUB SERPL-MCNC: 0.9 MG/DL (ref 0.1–1)
BUN SERPL-MCNC: 9 MG/DL (ref 6–20)
CALCIUM SERPL-MCNC: 9.1 MG/DL (ref 8.7–10.5)
CHLORIDE SERPL-SCNC: 101 MMOL/L (ref 95–110)
CO2 SERPL-SCNC: 26 MMOL/L (ref 23–29)
CREAT SERPL-MCNC: 1 MG/DL (ref 0.5–1.4)
DIFFERENTIAL METHOD: ABNORMAL
EOSINOPHIL # BLD AUTO: 0.1 K/UL (ref 0–0.5)
EOSINOPHIL NFR BLD: 2.5 % (ref 0–8)
ERYTHROCYTE [DISTWIDTH] IN BLOOD BY AUTOMATED COUNT: 17.2 % (ref 11.5–14.5)
EST. GFR  (AFRICAN AMERICAN): >60 ML/MIN/1.73 M^2
EST. GFR  (NON AFRICAN AMERICAN): >60 ML/MIN/1.73 M^2
GLUCOSE SERPL-MCNC: 149 MG/DL (ref 70–110)
HCT VFR BLD AUTO: 38.3 % (ref 37–48.5)
HGB BLD-MCNC: 11.5 G/DL (ref 12–16)
IMM GRANULOCYTES # BLD AUTO: 0.02 K/UL (ref 0–0.04)
IMM GRANULOCYTES NFR BLD AUTO: 0.5 % (ref 0–0.5)
INR PPP: 1.1 (ref 0.8–1.2)
LYMPHOCYTES # BLD AUTO: 0.8 K/UL (ref 1–4.8)
LYMPHOCYTES NFR BLD: 17 % (ref 18–48)
MCH RBC QN AUTO: 26.2 PG (ref 27–31)
MCHC RBC AUTO-ENTMCNC: 30 G/DL (ref 32–36)
MCV RBC AUTO: 87 FL (ref 82–98)
MONOCYTES # BLD AUTO: 0.3 K/UL (ref 0.3–1)
MONOCYTES NFR BLD: 6.6 % (ref 4–15)
NEUTROPHILS # BLD AUTO: 3.2 K/UL (ref 1.8–7.7)
NEUTROPHILS NFR BLD: 73.2 % (ref 38–73)
NRBC BLD-RTO: 0 /100 WBC
PLATELET # BLD AUTO: 113 K/UL (ref 150–350)
PMV BLD AUTO: 11.2 FL (ref 9.2–12.9)
POTASSIUM SERPL-SCNC: 4 MMOL/L (ref 3.5–5.1)
PROT SERPL-MCNC: 7.6 G/DL (ref 6–8.4)
PROTHROMBIN TIME: 11.4 SEC (ref 9–12.5)
RBC # BLD AUTO: 4.39 M/UL (ref 4–5.4)
SODIUM SERPL-SCNC: 137 MMOL/L (ref 136–145)
WBC # BLD AUTO: 4.4 K/UL (ref 3.9–12.7)

## 2021-01-14 PROCEDURE — 85025 COMPLETE CBC W/AUTO DIFF WBC: CPT

## 2021-01-14 PROCEDURE — 36415 COLL VENOUS BLD VENIPUNCTURE: CPT

## 2021-01-14 PROCEDURE — 85610 PROTHROMBIN TIME: CPT

## 2021-01-14 PROCEDURE — 80053 COMPREHEN METABOLIC PANEL: CPT

## 2021-02-01 ENCOUNTER — PATIENT MESSAGE (OUTPATIENT)
Dept: HEPATOLOGY | Facility: CLINIC | Age: 51
End: 2021-02-01

## 2021-02-01 DIAGNOSIS — I85.00 ESOPHAGEAL VARICES WITHOUT BLEEDING, UNSPECIFIED ESOPHAGEAL VARICES TYPE: Primary | ICD-10-CM

## 2021-02-01 DIAGNOSIS — Z01.818 PRE-OP TESTING: ICD-10-CM

## 2021-02-12 ENCOUNTER — HOSPITAL ENCOUNTER (OUTPATIENT)
Dept: PREADMISSION TESTING | Facility: HOSPITAL | Age: 51
Discharge: HOME | End: 2021-02-12
Attending: INTERNAL MEDICINE
Payer: COMMERCIAL

## 2021-02-12 DIAGNOSIS — Z01.818 PRE-OP TESTING: ICD-10-CM

## 2021-02-12 LAB — SARS-COV-2 RNA RESP QL NAA+PROBE: NOT DETECTED

## 2021-02-12 PROCEDURE — U0005 INFEC AGEN DETEC AMPLI PROBE: HCPCS

## 2021-02-12 PROCEDURE — U0003 INFECTIOUS AGENT DETECTION BY NUCLEIC ACID (DNA OR RNA); SEVERE ACUTE RESPIRATORY SYNDROME CORONAVIRUS 2 (SARS-COV-2) (CORONAVIRUS DISEASE [COVID-19]), AMPLIFIED PROBE TECHNIQUE, MAKING USE OF HIGH THROUGHPUT TECHNOLOGIES AS DESCRIBED BY CMS-2020-01-R: HCPCS

## 2021-02-15 ENCOUNTER — ANESTHESIA EVENT (OUTPATIENT)
Dept: ENDOSCOPY | Facility: HOSPITAL | Age: 51
End: 2021-02-15
Payer: COMMERCIAL

## 2021-02-15 ENCOUNTER — ANESTHESIA (OUTPATIENT)
Dept: ENDOSCOPY | Facility: HOSPITAL | Age: 51
End: 2021-02-15
Payer: COMMERCIAL

## 2021-02-15 ENCOUNTER — HOSPITAL ENCOUNTER (OUTPATIENT)
Facility: HOSPITAL | Age: 51
Discharge: HOME | End: 2021-02-15
Attending: INTERNAL MEDICINE | Admitting: INTERNAL MEDICINE
Payer: COMMERCIAL

## 2021-02-15 VITALS
SYSTOLIC BLOOD PRESSURE: 121 MMHG | HEIGHT: 68 IN | RESPIRATION RATE: 16 BRPM | BODY MASS INDEX: 24.86 KG/M2 | TEMPERATURE: 98 F | DIASTOLIC BLOOD PRESSURE: 62 MMHG | OXYGEN SATURATION: 99 % | HEART RATE: 72 BPM | WEIGHT: 164 LBS

## 2021-02-15 DIAGNOSIS — I85.00 ESOPHAGEAL VARICES: ICD-10-CM

## 2021-02-15 PROCEDURE — E9220 PRA ENDO ANESTHESIA: ICD-10-PCS | Mod: ,,, | Performed by: NURSE ANESTHETIST, CERTIFIED REGISTERED

## 2021-02-15 PROCEDURE — E9220 PRA ENDO ANESTHESIA: HCPCS | Mod: ,,, | Performed by: NURSE ANESTHETIST, CERTIFIED REGISTERED

## 2021-02-15 PROCEDURE — 43235 EGD DIAGNOSTIC BRUSH WASH: CPT | Performed by: INTERNAL MEDICINE

## 2021-02-15 PROCEDURE — 43235 PR EGD, FLEX, DIAGNOSTIC: ICD-10-PCS | Mod: ,,, | Performed by: INTERNAL MEDICINE

## 2021-02-15 PROCEDURE — 63600175 PHARM REV CODE 636 W HCPCS: Performed by: NURSE ANESTHETIST, CERTIFIED REGISTERED

## 2021-02-15 PROCEDURE — 43235 EGD DIAGNOSTIC BRUSH WASH: CPT | Mod: ,,, | Performed by: INTERNAL MEDICINE

## 2021-02-15 PROCEDURE — 25000003 PHARM REV CODE 250: Performed by: NURSE ANESTHETIST, CERTIFIED REGISTERED

## 2021-02-15 PROCEDURE — 37000009 HC ANESTHESIA EA ADD 15 MINS: Performed by: INTERNAL MEDICINE

## 2021-02-15 PROCEDURE — 37000008 HC ANESTHESIA 1ST 15 MINUTES: Performed by: INTERNAL MEDICINE

## 2021-02-15 RX ORDER — PROPOFOL 10 MG/ML
VIAL (ML) INTRAVENOUS
Status: DISCONTINUED | OUTPATIENT
Start: 2021-02-15 | End: 2021-02-15

## 2021-02-15 RX ORDER — SODIUM CHLORIDE 9 MG/ML
INJECTION, SOLUTION INTRAVENOUS CONTINUOUS
Status: DISCONTINUED | OUTPATIENT
Start: 2021-02-15 | End: 2021-02-15 | Stop reason: HOSPADM

## 2021-02-15 RX ORDER — PROPOFOL 10 MG/ML
VIAL (ML) INTRAVENOUS CONTINUOUS PRN
Status: DISCONTINUED | OUTPATIENT
Start: 2021-02-15 | End: 2021-02-15

## 2021-02-15 RX ORDER — LIDOCAINE HYDROCHLORIDE 20 MG/ML
INJECTION INTRAVENOUS
Status: DISCONTINUED | OUTPATIENT
Start: 2021-02-15 | End: 2021-02-15

## 2021-02-15 RX ADMIN — GLYCOPYRROLATE 0.2 MG: 0.2 INJECTION, SOLUTION INTRAMUSCULAR; INTRAVITREAL at 12:02

## 2021-02-15 RX ADMIN — LIDOCAINE HYDROCHLORIDE 50 MG: 20 INJECTION, SOLUTION INTRAVENOUS at 12:02

## 2021-02-15 RX ADMIN — PROPOFOL 100 MG: 10 INJECTION, EMULSION INTRAVENOUS at 12:02

## 2021-02-15 RX ADMIN — PROPOFOL 200 MCG/KG/MIN: 10 INJECTION, EMULSION INTRAVENOUS at 12:02

## 2021-02-15 RX ADMIN — SODIUM CHLORIDE: 0.9 INJECTION, SOLUTION INTRAVENOUS at 12:02

## 2021-03-05 ENCOUNTER — PATIENT MESSAGE (OUTPATIENT)
Dept: HEPATOLOGY | Facility: CLINIC | Age: 51
End: 2021-03-05

## 2021-03-07 ENCOUNTER — HOSPITAL ENCOUNTER (OUTPATIENT)
Dept: RADIOLOGY | Facility: HOSPITAL | Age: 51
Discharge: HOME | End: 2021-03-07
Attending: FAMILY MEDICINE
Payer: COMMERCIAL

## 2021-03-07 DIAGNOSIS — M25.519 PAIN IN JOINT, SHOULDER REGION: ICD-10-CM

## 2021-03-07 DIAGNOSIS — R60.9 EDEMA: ICD-10-CM

## 2021-03-07 DIAGNOSIS — M53.3 DISORDER OF SACRUM: ICD-10-CM

## 2021-03-07 DIAGNOSIS — M25.551 RIGHT HIP PAIN: ICD-10-CM

## 2021-03-07 PROCEDURE — 73610 X-RAY EXAM OF ANKLE: CPT | Mod: 26,RT,, | Performed by: RADIOLOGY

## 2021-03-07 PROCEDURE — 73610 X-RAY EXAM OF ANKLE: CPT | Mod: TC,RT

## 2021-03-07 PROCEDURE — 73030 X-RAY EXAM OF SHOULDER: CPT | Mod: 26,RT,, | Performed by: RADIOLOGY

## 2021-03-07 PROCEDURE — 73502 X-RAY EXAM HIP UNI 2-3 VIEWS: CPT | Mod: TC,RT

## 2021-03-07 PROCEDURE — 73610 XR ANKLE COMPLETE 3 VIEW RIGHT: ICD-10-PCS | Mod: 26,RT,, | Performed by: RADIOLOGY

## 2021-03-07 PROCEDURE — 73030 XR SHOULDER COMPLETE 2 OR MORE VIEWS RIGHT: ICD-10-PCS | Mod: 26,RT,, | Performed by: RADIOLOGY

## 2021-03-07 PROCEDURE — 73030 X-RAY EXAM OF SHOULDER: CPT | Mod: TC,RT

## 2021-03-07 PROCEDURE — 72200 X-RAY EXAM SI JOINTS: CPT | Mod: 26,,, | Performed by: RADIOLOGY

## 2021-03-07 PROCEDURE — 73502 XR HIP 2 VIEW RIGHT: ICD-10-PCS | Mod: 26,RT,, | Performed by: RADIOLOGY

## 2021-03-07 PROCEDURE — 72200 X-RAY EXAM SI JOINTS: CPT | Mod: TC

## 2021-03-07 PROCEDURE — 72200 XR SACROILIAC JOINTS 3 VIEWS: ICD-10-PCS | Mod: 26,,, | Performed by: RADIOLOGY

## 2021-03-07 PROCEDURE — 73502 X-RAY EXAM HIP UNI 2-3 VIEWS: CPT | Mod: 26,RT,, | Performed by: RADIOLOGY

## 2021-03-12 ENCOUNTER — LAB VISIT (OUTPATIENT)
Dept: LAB | Facility: HOSPITAL | Age: 51
End: 2021-03-12
Attending: FAMILY MEDICINE
Payer: COMMERCIAL

## 2021-03-12 DIAGNOSIS — M25.50 MULTIPLE JOINT PAIN: Primary | ICD-10-CM

## 2021-03-12 LAB — ERYTHROCYTE [SEDIMENTATION RATE] IN BLOOD BY WESTERGREN METHOD: 7 MM/HR (ref 0–20)

## 2021-03-12 PROCEDURE — 86431 RHEUMATOID FACTOR QUANT: CPT | Performed by: FAMILY MEDICINE

## 2021-03-12 PROCEDURE — 36415 COLL VENOUS BLD VENIPUNCTURE: CPT | Performed by: FAMILY MEDICINE

## 2021-03-12 PROCEDURE — 86140 C-REACTIVE PROTEIN: CPT | Performed by: FAMILY MEDICINE

## 2021-03-12 PROCEDURE — 86038 ANTINUCLEAR ANTIBODIES: CPT | Performed by: FAMILY MEDICINE

## 2021-03-12 PROCEDURE — 86200 CCP ANTIBODY: CPT | Performed by: FAMILY MEDICINE

## 2021-03-12 PROCEDURE — 85651 RBC SED RATE NONAUTOMATED: CPT | Performed by: FAMILY MEDICINE

## 2021-03-13 LAB
CCP AB SER IA-ACNC: <0.5 U/ML
CRP SERPL-MCNC: 0.7 MG/L (ref 0–8.2)
RHEUMATOID FACT SERPL-ACNC: <10 IU/ML (ref 0–15)

## 2021-03-16 ENCOUNTER — TELEPHONE (OUTPATIENT)
Dept: HEPATOLOGY | Facility: CLINIC | Age: 51
End: 2021-03-16

## 2021-03-16 ENCOUNTER — PATIENT MESSAGE (OUTPATIENT)
Dept: HEPATOLOGY | Facility: CLINIC | Age: 51
End: 2021-03-16

## 2021-03-16 LAB — ANA SER QL IF: NORMAL

## 2021-03-19 ENCOUNTER — LAB VISIT (OUTPATIENT)
Dept: LAB | Facility: HOSPITAL | Age: 51
End: 2021-03-19
Attending: INTERNAL MEDICINE
Payer: COMMERCIAL

## 2021-03-19 DIAGNOSIS — K74.60 LIVER CIRRHOSIS SECONDARY TO NASH: Chronic | ICD-10-CM

## 2021-03-19 DIAGNOSIS — K75.81 LIVER CIRRHOSIS SECONDARY TO NASH: Chronic | ICD-10-CM

## 2021-03-19 LAB
ALBUMIN SERPL BCP-MCNC: 4.5 G/DL (ref 3.5–5.2)
ALP SERPL-CCNC: 102 U/L (ref 55–135)
ALT SERPL W/O P-5'-P-CCNC: 22 U/L (ref 10–44)
ANION GAP SERPL CALC-SCNC: 7 MMOL/L (ref 8–16)
AST SERPL-CCNC: 23 U/L (ref 10–40)
BASOPHILS # BLD AUTO: 0.02 K/UL (ref 0–0.2)
BASOPHILS NFR BLD: 0.4 % (ref 0–1.9)
BILIRUB SERPL-MCNC: 0.6 MG/DL (ref 0.1–1)
BUN SERPL-MCNC: 16 MG/DL (ref 6–20)
CALCIUM SERPL-MCNC: 9.4 MG/DL (ref 8.7–10.5)
CHLORIDE SERPL-SCNC: 105 MMOL/L (ref 95–110)
CO2 SERPL-SCNC: 28 MMOL/L (ref 23–29)
CREAT SERPL-MCNC: 0.9 MG/DL (ref 0.5–1.4)
DIFFERENTIAL METHOD: ABNORMAL
EOSINOPHIL # BLD AUTO: 0.1 K/UL (ref 0–0.5)
EOSINOPHIL NFR BLD: 1.6 % (ref 0–8)
ERYTHROCYTE [DISTWIDTH] IN BLOOD BY AUTOMATED COUNT: 14.2 % (ref 11.5–14.5)
EST. GFR  (AFRICAN AMERICAN): >60 ML/MIN/1.73 M^2
EST. GFR  (NON AFRICAN AMERICAN): >60 ML/MIN/1.73 M^2
GLUCOSE SERPL-MCNC: 89 MG/DL (ref 70–110)
HCT VFR BLD AUTO: 44.3 % (ref 37–48.5)
HGB BLD-MCNC: 14.1 G/DL (ref 12–16)
IMM GRANULOCYTES # BLD AUTO: 0.01 K/UL (ref 0–0.04)
IMM GRANULOCYTES NFR BLD AUTO: 0.2 % (ref 0–0.5)
INR PPP: 1.1 (ref 0.8–1.2)
LYMPHOCYTES # BLD AUTO: 1.5 K/UL (ref 1–4.8)
LYMPHOCYTES NFR BLD: 30.3 % (ref 18–48)
MCH RBC QN AUTO: 28.8 PG (ref 27–31)
MCHC RBC AUTO-ENTMCNC: 31.8 G/DL (ref 32–36)
MCV RBC AUTO: 91 FL (ref 82–98)
MONOCYTES # BLD AUTO: 0.4 K/UL (ref 0.3–1)
MONOCYTES NFR BLD: 8.1 % (ref 4–15)
NEUTROPHILS # BLD AUTO: 2.9 K/UL (ref 1.8–7.7)
NEUTROPHILS NFR BLD: 59.4 % (ref 38–73)
NRBC BLD-RTO: 0 /100 WBC
PLATELET # BLD AUTO: 102 K/UL (ref 150–350)
PMV BLD AUTO: 11.8 FL (ref 9.2–12.9)
POTASSIUM SERPL-SCNC: 3.9 MMOL/L (ref 3.5–5.1)
PROT SERPL-MCNC: 7.7 G/DL (ref 6–8.4)
PROTHROMBIN TIME: 11.9 SEC (ref 9–12.5)
RBC # BLD AUTO: 4.89 M/UL (ref 4–5.4)
SODIUM SERPL-SCNC: 140 MMOL/L (ref 136–145)
WBC # BLD AUTO: 4.92 K/UL (ref 3.9–12.7)

## 2021-03-19 PROCEDURE — 80053 COMPREHEN METABOLIC PANEL: CPT | Performed by: INTERNAL MEDICINE

## 2021-03-19 PROCEDURE — 80321 ALCOHOLS BIOMARKERS 1OR 2: CPT | Performed by: INTERNAL MEDICINE

## 2021-03-19 PROCEDURE — 85610 PROTHROMBIN TIME: CPT | Performed by: INTERNAL MEDICINE

## 2021-03-19 PROCEDURE — 85025 COMPLETE CBC W/AUTO DIFF WBC: CPT | Performed by: INTERNAL MEDICINE

## 2021-03-19 PROCEDURE — 36415 COLL VENOUS BLD VENIPUNCTURE: CPT | Performed by: INTERNAL MEDICINE

## 2021-03-22 ENCOUNTER — OFFICE VISIT (OUTPATIENT)
Dept: HEPATOLOGY | Facility: CLINIC | Age: 51
End: 2021-03-22
Payer: COMMERCIAL

## 2021-03-22 VITALS
HEART RATE: 69 BPM | OXYGEN SATURATION: 100 % | WEIGHT: 166 LBS | BODY MASS INDEX: 25.16 KG/M2 | RESPIRATION RATE: 17 BRPM | SYSTOLIC BLOOD PRESSURE: 127 MMHG | HEIGHT: 68 IN | TEMPERATURE: 98 F | DIASTOLIC BLOOD PRESSURE: 75 MMHG

## 2021-03-22 DIAGNOSIS — K76.82 HEPATIC ENCEPHALOPATHY: ICD-10-CM

## 2021-03-22 DIAGNOSIS — R60.9 EDEMA, UNSPECIFIED TYPE: ICD-10-CM

## 2021-03-22 DIAGNOSIS — K74.60 LIVER CIRRHOSIS SECONDARY TO NASH: Chronic | ICD-10-CM

## 2021-03-22 DIAGNOSIS — I85.00 ESOPHAGEAL VARICES WITHOUT BLEEDING, UNSPECIFIED ESOPHAGEAL VARICES TYPE: ICD-10-CM

## 2021-03-22 DIAGNOSIS — R18.8 OTHER ASCITES: Primary | ICD-10-CM

## 2021-03-22 DIAGNOSIS — K75.81 LIVER CIRRHOSIS SECONDARY TO NASH: Chronic | ICD-10-CM

## 2021-03-22 DIAGNOSIS — M25.519 SHOULDER PAIN, UNSPECIFIED CHRONICITY, UNSPECIFIED LATERALITY: ICD-10-CM

## 2021-03-22 DIAGNOSIS — K92.1 GASTROINTESTINAL HEMORRHAGE WITH MELENA: ICD-10-CM

## 2021-03-22 PROCEDURE — 3008F PR BODY MASS INDEX (BMI) DOCUMENTED: ICD-10-PCS | Mod: CPTII,S$GLB,, | Performed by: INTERNAL MEDICINE

## 2021-03-22 PROCEDURE — 1125F PR PAIN SEVERITY QUANTIFIED, PAIN PRESENT: ICD-10-PCS | Mod: S$GLB,,, | Performed by: INTERNAL MEDICINE

## 2021-03-22 PROCEDURE — 1125F AMNT PAIN NOTED PAIN PRSNT: CPT | Mod: S$GLB,,, | Performed by: INTERNAL MEDICINE

## 2021-03-22 PROCEDURE — 99999 PR PBB SHADOW E&M-EST. PATIENT-LVL V: ICD-10-PCS | Mod: PBBFAC,,, | Performed by: INTERNAL MEDICINE

## 2021-03-22 PROCEDURE — 3008F BODY MASS INDEX DOCD: CPT | Mod: CPTII,S$GLB,, | Performed by: INTERNAL MEDICINE

## 2021-03-22 PROCEDURE — 99215 PR OFFICE/OUTPT VISIT, EST, LEVL V, 40-54 MIN: ICD-10-PCS | Mod: S$GLB,,, | Performed by: INTERNAL MEDICINE

## 2021-03-22 PROCEDURE — 99999 PR PBB SHADOW E&M-EST. PATIENT-LVL V: CPT | Mod: PBBFAC,,, | Performed by: INTERNAL MEDICINE

## 2021-03-22 PROCEDURE — 99215 OFFICE O/P EST HI 40 MIN: CPT | Mod: S$GLB,,, | Performed by: INTERNAL MEDICINE

## 2021-03-22 RX ORDER — LACTULOSE 10 G/10G
10 SOLUTION ORAL 3 TIMES DAILY PRN
Status: ON HOLD | COMMUNITY
End: 2022-09-04 | Stop reason: ALTCHOICE

## 2021-03-22 RX ORDER — PANTOPRAZOLE SODIUM 40 MG/1
40 TABLET, DELAYED RELEASE ORAL 2 TIMES DAILY
Qty: 60 TABLET | Refills: 11 | Status: SHIPPED | OUTPATIENT
Start: 2021-03-22 | End: 2021-05-11

## 2021-03-22 RX ORDER — TRAZODONE HYDROCHLORIDE 50 MG/1
50 TABLET ORAL NIGHTLY PRN
Qty: 60 TABLET | Refills: 11 | Status: SHIPPED | OUTPATIENT
Start: 2021-03-22 | End: 2021-04-21

## 2021-03-29 LAB — PHOSPHATIDYLETHANOL (PETH): NEGATIVE NG/ML

## 2021-04-07 ENCOUNTER — TELEPHONE (OUTPATIENT)
Dept: SPORTS MEDICINE | Facility: CLINIC | Age: 51
End: 2021-04-07

## 2021-04-07 ENCOUNTER — OFFICE VISIT (OUTPATIENT)
Dept: SPORTS MEDICINE | Facility: CLINIC | Age: 51
End: 2021-04-07
Attending: INTERNAL MEDICINE
Payer: COMMERCIAL

## 2021-04-07 VITALS
SYSTOLIC BLOOD PRESSURE: 134 MMHG | WEIGHT: 166 LBS | DIASTOLIC BLOOD PRESSURE: 84 MMHG | BODY MASS INDEX: 25.16 KG/M2 | HEIGHT: 68 IN

## 2021-04-07 DIAGNOSIS — M99.07 SOMATIC DYSFUNCTION OF UPPER EXTREMITY: ICD-10-CM

## 2021-04-07 DIAGNOSIS — M99.08 SOMATIC DYSFUNCTION OF RIB CAGE REGION: ICD-10-CM

## 2021-04-07 DIAGNOSIS — M99.02 SOMATIC DYSFUNCTION OF THORACIC REGION: ICD-10-CM

## 2021-04-07 DIAGNOSIS — G89.29 CHRONIC RIGHT SHOULDER PAIN: Primary | ICD-10-CM

## 2021-04-07 DIAGNOSIS — M99.01 SOMATIC DYSFUNCTION OF CERVICAL REGION: ICD-10-CM

## 2021-04-07 DIAGNOSIS — M25.511 CHRONIC RIGHT SHOULDER PAIN: Primary | ICD-10-CM

## 2021-04-07 PROCEDURE — 98926 PR OSTEOPATHIC MANIP,3-4 BODY REGN: ICD-10-PCS | Mod: S$GLB,,, | Performed by: ORTHOPAEDIC SURGERY

## 2021-04-07 PROCEDURE — 3008F PR BODY MASS INDEX (BMI) DOCUMENTED: ICD-10-PCS | Mod: CPTII,S$GLB,, | Performed by: ORTHOPAEDIC SURGERY

## 2021-04-07 PROCEDURE — 98926 OSTEOPATH MANJ 3-4 REGIONS: CPT | Mod: S$GLB,,, | Performed by: ORTHOPAEDIC SURGERY

## 2021-04-07 PROCEDURE — 99999 PR PBB SHADOW E&M-EST. PATIENT-LVL III: ICD-10-PCS | Mod: PBBFAC,,, | Performed by: ORTHOPAEDIC SURGERY

## 2021-04-07 PROCEDURE — 99204 PR OFFICE/OUTPT VISIT, NEW, LEVL IV, 45-59 MIN: ICD-10-PCS | Mod: 25,S$GLB,, | Performed by: ORTHOPAEDIC SURGERY

## 2021-04-07 PROCEDURE — 1125F PR PAIN SEVERITY QUANTIFIED, PAIN PRESENT: ICD-10-PCS | Mod: S$GLB,,, | Performed by: ORTHOPAEDIC SURGERY

## 2021-04-07 PROCEDURE — 1125F AMNT PAIN NOTED PAIN PRSNT: CPT | Mod: S$GLB,,, | Performed by: ORTHOPAEDIC SURGERY

## 2021-04-07 PROCEDURE — 99204 OFFICE O/P NEW MOD 45 MIN: CPT | Mod: 25,S$GLB,, | Performed by: ORTHOPAEDIC SURGERY

## 2021-04-07 PROCEDURE — 99999 PR PBB SHADOW E&M-EST. PATIENT-LVL III: CPT | Mod: PBBFAC,,, | Performed by: ORTHOPAEDIC SURGERY

## 2021-04-07 PROCEDURE — 3008F BODY MASS INDEX DOCD: CPT | Mod: CPTII,S$GLB,, | Performed by: ORTHOPAEDIC SURGERY

## 2021-04-08 ENCOUNTER — IMMUNIZATION (OUTPATIENT)
Dept: FAMILY MEDICINE | Facility: CLINIC | Age: 51
End: 2021-04-08
Payer: MEDICARE

## 2021-04-08 DIAGNOSIS — Z23 NEED FOR VACCINATION: Primary | ICD-10-CM

## 2021-04-08 PROCEDURE — 91300 COVID-19, MRNA, LNP-S, PF, 30 MCG/0.3 ML DOSE VACCINE: CPT | Mod: PBBFAC | Performed by: FAMILY MEDICINE

## 2021-04-12 ENCOUNTER — HOSPITAL ENCOUNTER (OUTPATIENT)
Dept: RADIOLOGY | Facility: HOSPITAL | Age: 51
Discharge: HOME | End: 2021-04-12
Attending: ORTHOPAEDIC SURGERY
Payer: COMMERCIAL

## 2021-04-12 DIAGNOSIS — M25.511 CHRONIC RIGHT SHOULDER PAIN: ICD-10-CM

## 2021-04-12 DIAGNOSIS — G89.29 CHRONIC RIGHT SHOULDER PAIN: ICD-10-CM

## 2021-04-12 PROCEDURE — 73221 MRI SHOULDER WITHOUT CONTRAST RIGHT: ICD-10-PCS | Mod: 26,RT,, | Performed by: RADIOLOGY

## 2021-04-12 PROCEDURE — 73221 MRI JOINT UPR EXTREM W/O DYE: CPT | Mod: TC,RT

## 2021-04-12 PROCEDURE — 73221 MRI JOINT UPR EXTREM W/O DYE: CPT | Mod: 26,RT,, | Performed by: RADIOLOGY

## 2021-04-14 ENCOUNTER — PATIENT MESSAGE (OUTPATIENT)
Dept: SPORTS MEDICINE | Facility: CLINIC | Age: 51
End: 2021-04-14

## 2021-04-20 ENCOUNTER — TELEPHONE (OUTPATIENT)
Dept: ORTHOPEDICS | Facility: CLINIC | Age: 51
End: 2021-04-20

## 2021-04-20 DIAGNOSIS — M25.511 ACUTE PAIN OF RIGHT SHOULDER: Primary | ICD-10-CM

## 2021-04-21 ENCOUNTER — HOSPITAL ENCOUNTER (OUTPATIENT)
Dept: RADIOLOGY | Facility: HOSPITAL | Age: 51
Discharge: HOME | End: 2021-04-21
Attending: ORTHOPAEDIC SURGERY
Payer: COMMERCIAL

## 2021-04-21 ENCOUNTER — OFFICE VISIT (OUTPATIENT)
Dept: ORTHOPEDICS | Facility: CLINIC | Age: 51
End: 2021-04-21
Payer: COMMERCIAL

## 2021-04-21 ENCOUNTER — PATIENT MESSAGE (OUTPATIENT)
Dept: ORTHOPEDICS | Facility: CLINIC | Age: 51
End: 2021-04-21

## 2021-04-21 VITALS
BODY MASS INDEX: 24.17 KG/M2 | HEART RATE: 87 BPM | HEIGHT: 68 IN | DIASTOLIC BLOOD PRESSURE: 85 MMHG | SYSTOLIC BLOOD PRESSURE: 153 MMHG | WEIGHT: 159.5 LBS

## 2021-04-21 DIAGNOSIS — M19.011 ARTHRITIS OF RIGHT ACROMIOCLAVICULAR JOINT: Primary | ICD-10-CM

## 2021-04-21 DIAGNOSIS — S43.431A SUPERIOR GLENOID LABRUM LESION OF RIGHT SHOULDER, INITIAL ENCOUNTER: ICD-10-CM

## 2021-04-21 DIAGNOSIS — M25.511 ACUTE PAIN OF RIGHT SHOULDER: ICD-10-CM

## 2021-04-21 PROCEDURE — 99204 OFFICE O/P NEW MOD 45 MIN: CPT | Mod: 25,S$GLB,, | Performed by: ORTHOPAEDIC SURGERY

## 2021-04-21 PROCEDURE — 99999 PR PBB SHADOW E&M-EST. PATIENT-LVL III: CPT | Mod: PBBFAC,,, | Performed by: ORTHOPAEDIC SURGERY

## 2021-04-21 PROCEDURE — 1125F AMNT PAIN NOTED PAIN PRSNT: CPT | Mod: S$GLB,,, | Performed by: ORTHOPAEDIC SURGERY

## 2021-04-21 PROCEDURE — 3008F PR BODY MASS INDEX (BMI) DOCUMENTED: ICD-10-PCS | Mod: CPTII,S$GLB,, | Performed by: ORTHOPAEDIC SURGERY

## 2021-04-21 PROCEDURE — 73030 X-RAY EXAM OF SHOULDER: CPT | Mod: TC,FY,RT

## 2021-04-21 PROCEDURE — 73030 X-RAY EXAM OF SHOULDER: CPT | Mod: 26,RT,, | Performed by: RADIOLOGY

## 2021-04-21 PROCEDURE — 1125F PR PAIN SEVERITY QUANTIFIED, PAIN PRESENT: ICD-10-PCS | Mod: S$GLB,,, | Performed by: ORTHOPAEDIC SURGERY

## 2021-04-21 PROCEDURE — 99999 PR PBB SHADOW E&M-EST. PATIENT-LVL III: ICD-10-PCS | Mod: PBBFAC,,, | Performed by: ORTHOPAEDIC SURGERY

## 2021-04-21 PROCEDURE — 20605 DRAIN/INJ JOINT/BURSA W/O US: CPT | Mod: RT,S$GLB,, | Performed by: ORTHOPAEDIC SURGERY

## 2021-04-21 PROCEDURE — 73030 XR SHOULDER COMPLETE 2 OR MORE VIEWS RIGHT: ICD-10-PCS | Mod: 26,RT,, | Performed by: RADIOLOGY

## 2021-04-21 PROCEDURE — 20605 INTERMEDIATE JOINT ASPIRATION/INJECTION: R ACROMIOCLAVICULAR: ICD-10-PCS | Mod: RT,S$GLB,, | Performed by: ORTHOPAEDIC SURGERY

## 2021-04-21 PROCEDURE — 3008F BODY MASS INDEX DOCD: CPT | Mod: CPTII,S$GLB,, | Performed by: ORTHOPAEDIC SURGERY

## 2021-04-21 PROCEDURE — 99204 PR OFFICE/OUTPT VISIT, NEW, LEVL IV, 45-59 MIN: ICD-10-PCS | Mod: 25,S$GLB,, | Performed by: ORTHOPAEDIC SURGERY

## 2021-04-21 RX ORDER — TRIAMCINOLONE ACETONIDE 40 MG/ML
40 INJECTION, SUSPENSION INTRA-ARTICULAR; INTRAMUSCULAR
Status: DISCONTINUED | OUTPATIENT
Start: 2021-04-21 | End: 2021-04-21 | Stop reason: HOSPADM

## 2021-04-21 RX ADMIN — TRIAMCINOLONE ACETONIDE 40 MG: 40 INJECTION, SUSPENSION INTRA-ARTICULAR; INTRAMUSCULAR at 02:04

## 2021-04-23 ENCOUNTER — PATIENT MESSAGE (OUTPATIENT)
Dept: HEPATOLOGY | Facility: CLINIC | Age: 51
End: 2021-04-23

## 2021-04-27 ENCOUNTER — PATIENT MESSAGE (OUTPATIENT)
Dept: ORTHOPEDICS | Facility: CLINIC | Age: 51
End: 2021-04-27

## 2021-04-30 ENCOUNTER — IMMUNIZATION (OUTPATIENT)
Dept: FAMILY MEDICINE | Facility: CLINIC | Age: 51
End: 2021-04-30
Payer: COMMERCIAL

## 2021-04-30 DIAGNOSIS — Z23 NEED FOR VACCINATION: Primary | ICD-10-CM

## 2021-04-30 PROCEDURE — 91300 COVID-19, MRNA, LNP-S, PF, 30 MCG/0.3 ML DOSE VACCINE: CPT | Mod: PBBFAC | Performed by: FAMILY MEDICINE

## 2021-04-30 PROCEDURE — 0002A COVID-19, MRNA, LNP-S, PF, 30 MCG/0.3 ML DOSE VACCINE: CPT | Mod: PBBFAC | Performed by: FAMILY MEDICINE

## 2021-05-05 ENCOUNTER — HOSPITAL ENCOUNTER (EMERGENCY)
Facility: HOSPITAL | Age: 51
Discharge: HOME | End: 2021-05-05
Attending: EMERGENCY MEDICINE
Payer: COMMERCIAL

## 2021-05-05 VITALS
WEIGHT: 165.56 LBS | HEART RATE: 81 BPM | OXYGEN SATURATION: 100 % | DIASTOLIC BLOOD PRESSURE: 87 MMHG | RESPIRATION RATE: 18 BRPM | TEMPERATURE: 98 F | BODY MASS INDEX: 25.17 KG/M2 | SYSTOLIC BLOOD PRESSURE: 133 MMHG

## 2021-05-05 DIAGNOSIS — S23.41XA SPRAIN OF COSTAL CARTILAGE, INITIAL ENCOUNTER: Primary | ICD-10-CM

## 2021-05-05 PROCEDURE — 99284 EMERGENCY DEPT VISIT MOD MDM: CPT | Mod: 25

## 2021-05-05 PROCEDURE — 96372 THER/PROPH/DIAG INJ SC/IM: CPT | Mod: 59

## 2021-05-05 PROCEDURE — 63600175 PHARM REV CODE 636 W HCPCS: Performed by: NURSE PRACTITIONER

## 2021-05-05 RX ORDER — KETOROLAC TROMETHAMINE 30 MG/ML
60 INJECTION, SOLUTION INTRAMUSCULAR; INTRAVENOUS
Status: COMPLETED | OUTPATIENT
Start: 2021-05-05 | End: 2021-05-05

## 2021-05-05 RX ORDER — ORPHENADRINE CITRATE 30 MG/ML
60 INJECTION INTRAMUSCULAR; INTRAVENOUS
Status: COMPLETED | OUTPATIENT
Start: 2021-05-05 | End: 2021-05-05

## 2021-05-05 RX ORDER — CYCLOBENZAPRINE HCL 10 MG
10 TABLET ORAL EVERY 8 HOURS PRN
Qty: 12 TABLET | Refills: 0 | Status: SHIPPED | OUTPATIENT
Start: 2021-05-05 | End: 2021-11-09

## 2021-05-05 RX ORDER — HYDROCODONE BITARTRATE AND ACETAMINOPHEN 5; 325 MG/1; MG/1
1 TABLET ORAL EVERY 6 HOURS PRN
Qty: 8 TABLET | Refills: 0 | Status: SHIPPED | OUTPATIENT
Start: 2021-05-05 | End: 2021-11-01

## 2021-05-05 RX ADMIN — ORPHENADRINE CITRATE 60 MG: 60 INJECTION INTRAMUSCULAR; INTRAVENOUS at 12:05

## 2021-05-05 RX ADMIN — KETOROLAC TROMETHAMINE 60 MG: 30 INJECTION, SOLUTION INTRAMUSCULAR at 12:05

## 2021-05-16 ENCOUNTER — PATIENT MESSAGE (OUTPATIENT)
Dept: HEPATOLOGY | Facility: CLINIC | Age: 51
End: 2021-05-16

## 2021-05-20 ENCOUNTER — LAB VISIT (OUTPATIENT)
Dept: LAB | Facility: HOSPITAL | Age: 51
End: 2021-05-20
Attending: INTERNAL MEDICINE
Payer: COMMERCIAL

## 2021-05-20 DIAGNOSIS — K75.81 LIVER CIRRHOSIS SECONDARY TO NASH: Chronic | ICD-10-CM

## 2021-05-20 DIAGNOSIS — K74.60 LIVER CIRRHOSIS SECONDARY TO NASH: Chronic | ICD-10-CM

## 2021-05-20 LAB
ALBUMIN SERPL BCP-MCNC: 4 G/DL (ref 3.5–5.2)
ALP SERPL-CCNC: 115 U/L (ref 55–135)
ALT SERPL W/O P-5'-P-CCNC: 30 U/L (ref 10–44)
ANION GAP SERPL CALC-SCNC: 10 MMOL/L (ref 8–16)
AST SERPL-CCNC: 20 U/L (ref 10–40)
BASOPHILS # BLD AUTO: 0.01 K/UL (ref 0–0.2)
BASOPHILS NFR BLD: 0.3 % (ref 0–1.9)
BILIRUB SERPL-MCNC: 1.3 MG/DL (ref 0.1–1)
BUN SERPL-MCNC: 13 MG/DL (ref 6–20)
CALCIUM SERPL-MCNC: 9 MG/DL (ref 8.7–10.5)
CHLORIDE SERPL-SCNC: 106 MMOL/L (ref 95–110)
CO2 SERPL-SCNC: 22 MMOL/L (ref 23–29)
CREAT SERPL-MCNC: 0.9 MG/DL (ref 0.5–1.4)
DIFFERENTIAL METHOD: ABNORMAL
EOSINOPHIL # BLD AUTO: 0.1 K/UL (ref 0–0.5)
EOSINOPHIL NFR BLD: 1.8 % (ref 0–8)
ERYTHROCYTE [DISTWIDTH] IN BLOOD BY AUTOMATED COUNT: 12.8 % (ref 11.5–14.5)
EST. GFR  (AFRICAN AMERICAN): >60 ML/MIN/1.73 M^2
EST. GFR  (NON AFRICAN AMERICAN): >60 ML/MIN/1.73 M^2
GLUCOSE SERPL-MCNC: 106 MG/DL (ref 70–110)
HCT VFR BLD AUTO: 39.4 % (ref 37–48.5)
HGB BLD-MCNC: 13.2 G/DL (ref 12–16)
IMM GRANULOCYTES # BLD AUTO: 0.01 K/UL (ref 0–0.04)
IMM GRANULOCYTES NFR BLD AUTO: 0.3 % (ref 0–0.5)
INR PPP: 1.1 (ref 0.8–1.2)
LYMPHOCYTES # BLD AUTO: 0.8 K/UL (ref 1–4.8)
LYMPHOCYTES NFR BLD: 23.1 % (ref 18–48)
MCH RBC QN AUTO: 30.8 PG (ref 27–31)
MCHC RBC AUTO-ENTMCNC: 33.5 G/DL (ref 32–36)
MCV RBC AUTO: 92 FL (ref 82–98)
MONOCYTES # BLD AUTO: 0.3 K/UL (ref 0.3–1)
MONOCYTES NFR BLD: 8 % (ref 4–15)
NEUTROPHILS # BLD AUTO: 2.2 K/UL (ref 1.8–7.7)
NEUTROPHILS NFR BLD: 66.5 % (ref 38–73)
NRBC BLD-RTO: 0 /100 WBC
PLATELET # BLD AUTO: 71 K/UL (ref 150–450)
PMV BLD AUTO: 11.3 FL (ref 9.2–12.9)
POTASSIUM SERPL-SCNC: 3.9 MMOL/L (ref 3.5–5.1)
PROT SERPL-MCNC: 6.8 G/DL (ref 6–8.4)
PROTHROMBIN TIME: 12.1 SEC (ref 9–12.5)
RBC # BLD AUTO: 4.28 M/UL (ref 4–5.4)
SODIUM SERPL-SCNC: 138 MMOL/L (ref 136–145)
WBC # BLD AUTO: 3.25 K/UL (ref 3.9–12.7)

## 2021-05-20 PROCEDURE — 85025 COMPLETE CBC W/AUTO DIFF WBC: CPT | Performed by: INTERNAL MEDICINE

## 2021-05-20 PROCEDURE — 85610 PROTHROMBIN TIME: CPT | Performed by: INTERNAL MEDICINE

## 2021-05-20 PROCEDURE — 80053 COMPREHEN METABOLIC PANEL: CPT | Performed by: INTERNAL MEDICINE

## 2021-05-20 PROCEDURE — 36415 COLL VENOUS BLD VENIPUNCTURE: CPT | Performed by: INTERNAL MEDICINE

## 2021-05-21 ENCOUNTER — TELEPHONE (OUTPATIENT)
Dept: HEPATOLOGY | Facility: CLINIC | Age: 51
End: 2021-05-21

## 2021-05-21 ENCOUNTER — LAB VISIT (OUTPATIENT)
Dept: LAB | Facility: HOSPITAL | Age: 51
End: 2021-05-21
Attending: INTERNAL MEDICINE
Payer: COMMERCIAL

## 2021-05-21 ENCOUNTER — PATIENT MESSAGE (OUTPATIENT)
Dept: HEPATOLOGY | Facility: CLINIC | Age: 51
End: 2021-05-21

## 2021-05-21 DIAGNOSIS — E80.6 HYPERBILIRUBINEMIA: Primary | ICD-10-CM

## 2021-05-21 DIAGNOSIS — E80.6 HYPERBILIRUBINEMIA: ICD-10-CM

## 2021-05-21 LAB
BILIRUB DIRECT SERPL-MCNC: 0.5 MG/DL (ref 0.1–0.3)
BILIRUB SERPL-MCNC: 1.5 MG/DL (ref 0.1–1)

## 2021-05-21 PROCEDURE — 82247 BILIRUBIN TOTAL: CPT | Performed by: INTERNAL MEDICINE

## 2021-05-21 PROCEDURE — 36415 COLL VENOUS BLD VENIPUNCTURE: CPT | Performed by: INTERNAL MEDICINE

## 2021-05-21 PROCEDURE — 82248 BILIRUBIN DIRECT: CPT | Performed by: INTERNAL MEDICINE

## 2021-06-18 ENCOUNTER — PATIENT MESSAGE (OUTPATIENT)
Dept: HEPATOLOGY | Facility: CLINIC | Age: 51
End: 2021-06-18

## 2021-06-21 ENCOUNTER — LAB VISIT (OUTPATIENT)
Dept: LAB | Facility: HOSPITAL | Age: 51
End: 2021-06-21
Attending: INTERNAL MEDICINE
Payer: COMMERCIAL

## 2021-06-21 DIAGNOSIS — K74.60 LIVER CIRRHOSIS SECONDARY TO NASH: Chronic | ICD-10-CM

## 2021-06-21 DIAGNOSIS — K75.81 LIVER CIRRHOSIS SECONDARY TO NASH: Chronic | ICD-10-CM

## 2021-06-21 LAB
ALBUMIN SERPL BCP-MCNC: 4.1 G/DL (ref 3.5–5.2)
ALP SERPL-CCNC: 96 U/L (ref 55–135)
ALT SERPL W/O P-5'-P-CCNC: 31 U/L (ref 10–44)
ANION GAP SERPL CALC-SCNC: 9 MMOL/L (ref 8–16)
AST SERPL-CCNC: 21 U/L (ref 10–40)
BASOPHILS # BLD AUTO: 0.02 K/UL (ref 0–0.2)
BASOPHILS NFR BLD: 0.5 % (ref 0–1.9)
BILIRUB SERPL-MCNC: 1.4 MG/DL (ref 0.1–1)
BUN SERPL-MCNC: 13 MG/DL (ref 6–20)
CALCIUM SERPL-MCNC: 9.6 MG/DL (ref 8.7–10.5)
CHLORIDE SERPL-SCNC: 106 MMOL/L (ref 95–110)
CO2 SERPL-SCNC: 24 MMOL/L (ref 23–29)
CREAT SERPL-MCNC: 0.9 MG/DL (ref 0.5–1.4)
DIFFERENTIAL METHOD: ABNORMAL
EOSINOPHIL # BLD AUTO: 0.1 K/UL (ref 0–0.5)
EOSINOPHIL NFR BLD: 1.6 % (ref 0–8)
ERYTHROCYTE [DISTWIDTH] IN BLOOD BY AUTOMATED COUNT: 12.8 % (ref 11.5–14.5)
EST. GFR  (AFRICAN AMERICAN): >60 ML/MIN/1.73 M^2
EST. GFR  (NON AFRICAN AMERICAN): >60 ML/MIN/1.73 M^2
GLUCOSE SERPL-MCNC: 161 MG/DL (ref 70–110)
HCT VFR BLD AUTO: 41.7 % (ref 37–48.5)
HGB BLD-MCNC: 13.9 G/DL (ref 12–16)
IMM GRANULOCYTES # BLD AUTO: 0.02 K/UL (ref 0–0.04)
IMM GRANULOCYTES NFR BLD AUTO: 0.5 % (ref 0–0.5)
INR PPP: 1.1 (ref 0.8–1.2)
LYMPHOCYTES # BLD AUTO: 1 K/UL (ref 1–4.8)
LYMPHOCYTES NFR BLD: 26.8 % (ref 18–48)
MCH RBC QN AUTO: 31 PG (ref 27–31)
MCHC RBC AUTO-ENTMCNC: 33.3 G/DL (ref 32–36)
MCV RBC AUTO: 93 FL (ref 82–98)
MONOCYTES # BLD AUTO: 0.2 K/UL (ref 0.3–1)
MONOCYTES NFR BLD: 4.6 % (ref 4–15)
NEUTROPHILS # BLD AUTO: 2.4 K/UL (ref 1.8–7.7)
NEUTROPHILS NFR BLD: 66 % (ref 38–73)
NRBC BLD-RTO: 0 /100 WBC
PLATELET # BLD AUTO: 81 K/UL (ref 150–450)
PMV BLD AUTO: 11.3 FL (ref 9.2–12.9)
POTASSIUM SERPL-SCNC: 3.7 MMOL/L (ref 3.5–5.1)
PROT SERPL-MCNC: 7.2 G/DL (ref 6–8.4)
PROTHROMBIN TIME: 12.1 SEC (ref 9–12.5)
RBC # BLD AUTO: 4.48 M/UL (ref 4–5.4)
SODIUM SERPL-SCNC: 139 MMOL/L (ref 136–145)
WBC # BLD AUTO: 3.66 K/UL (ref 3.9–12.7)

## 2021-06-21 PROCEDURE — 85025 COMPLETE CBC W/AUTO DIFF WBC: CPT | Performed by: INTERNAL MEDICINE

## 2021-06-21 PROCEDURE — 36415 COLL VENOUS BLD VENIPUNCTURE: CPT | Performed by: INTERNAL MEDICINE

## 2021-06-21 PROCEDURE — 85610 PROTHROMBIN TIME: CPT | Performed by: INTERNAL MEDICINE

## 2021-06-21 PROCEDURE — 80053 COMPREHEN METABOLIC PANEL: CPT | Performed by: INTERNAL MEDICINE

## 2021-07-19 ENCOUNTER — HOSPITAL ENCOUNTER (EMERGENCY)
Facility: HOSPITAL | Age: 51
Discharge: LEFT BEFORE DISCHARGE | End: 2021-07-20
Attending: SURGERY
Payer: COMMERCIAL

## 2021-07-19 DIAGNOSIS — K92.0 HEMATEMESIS, PRESENCE OF NAUSEA NOT SPECIFIED: Primary | ICD-10-CM

## 2021-07-19 DIAGNOSIS — Z87.19 HISTORY OF ESOPHAGEAL VARICES: ICD-10-CM

## 2021-07-19 DIAGNOSIS — Z53.29 LEFT AGAINST MEDICAL ADVICE: ICD-10-CM

## 2021-07-19 DIAGNOSIS — R53.83 FATIGUE: ICD-10-CM

## 2021-07-19 DIAGNOSIS — Z87.19 HISTORY OF CIRRHOSIS: ICD-10-CM

## 2021-07-19 LAB
ALBUMIN SERPL BCP-MCNC: 4 G/DL (ref 3.5–5.2)
ALP SERPL-CCNC: 101 U/L (ref 55–135)
ALT SERPL W/O P-5'-P-CCNC: 23 U/L (ref 10–44)
AMMONIA PLAS-SCNC: 39 UMOL/L (ref 10–50)
ANION GAP SERPL CALC-SCNC: 10 MMOL/L (ref 8–16)
APTT BLDCRRT: 26.1 SEC (ref 21–32)
AST SERPL-CCNC: 23 U/L (ref 10–40)
BASOPHILS # BLD AUTO: 0.01 K/UL (ref 0–0.2)
BASOPHILS NFR BLD: 0.3 % (ref 0–1.9)
BILIRUB SERPL-MCNC: 1 MG/DL (ref 0.1–1)
BILIRUB UR QL STRIP: NEGATIVE
BNP SERPL-MCNC: 49 PG/ML (ref 0–99)
BUN SERPL-MCNC: 11 MG/DL (ref 6–20)
CALCIUM SERPL-MCNC: 9.6 MG/DL (ref 8.7–10.5)
CHLORIDE SERPL-SCNC: 107 MMOL/L (ref 95–110)
CK MB SERPL-MCNC: 0.9 NG/ML (ref 0.1–6.5)
CK MB SERPL-RTO: 1.4 % (ref 0–5)
CK SERPL-CCNC: 64 U/L (ref 20–180)
CK SERPL-CCNC: 64 U/L (ref 20–180)
CLARITY UR: CLEAR
CO2 SERPL-SCNC: 23 MMOL/L (ref 23–29)
COLOR UR: YELLOW
CREAT SERPL-MCNC: 0.9 MG/DL (ref 0.5–1.4)
DIFFERENTIAL METHOD: ABNORMAL
EOSINOPHIL # BLD AUTO: 0.1 K/UL (ref 0–0.5)
EOSINOPHIL NFR BLD: 1.3 % (ref 0–8)
ERYTHROCYTE [DISTWIDTH] IN BLOOD BY AUTOMATED COUNT: 12.8 % (ref 11.5–14.5)
EST. GFR  (AFRICAN AMERICAN): >60 ML/MIN/1.73 M^2
EST. GFR  (NON AFRICAN AMERICAN): >60 ML/MIN/1.73 M^2
GLUCOSE SERPL-MCNC: 103 MG/DL (ref 70–110)
GLUCOSE UR QL STRIP: NEGATIVE
HCT VFR BLD AUTO: 38 % (ref 37–48.5)
HGB BLD-MCNC: 13.2 G/DL (ref 12–16)
HGB UR QL STRIP: NEGATIVE
IMM GRANULOCYTES # BLD AUTO: 0.02 K/UL (ref 0–0.04)
IMM GRANULOCYTES NFR BLD AUTO: 0.5 % (ref 0–0.5)
INR PPP: 1.1 (ref 0.8–1.2)
KETONES UR QL STRIP: NEGATIVE
LEUKOCYTE ESTERASE UR QL STRIP: ABNORMAL
LYMPHOCYTES # BLD AUTO: 0.9 K/UL (ref 1–4.8)
LYMPHOCYTES NFR BLD: 21.8 % (ref 18–48)
MCH RBC QN AUTO: 31.5 PG (ref 27–31)
MCHC RBC AUTO-ENTMCNC: 34.7 G/DL (ref 32–36)
MCV RBC AUTO: 91 FL (ref 82–98)
MICROSCOPIC COMMENT: NORMAL
MONOCYTES # BLD AUTO: 0.3 K/UL (ref 0.3–1)
MONOCYTES NFR BLD: 7 % (ref 4–15)
NEUTROPHILS # BLD AUTO: 2.8 K/UL (ref 1.8–7.7)
NEUTROPHILS NFR BLD: 69.1 % (ref 38–73)
NITRITE UR QL STRIP: NEGATIVE
NRBC BLD-RTO: 0 /100 WBC
PH UR STRIP: 7 [PH] (ref 5–8)
PLATELET # BLD AUTO: 68 K/UL (ref 150–450)
PMV BLD AUTO: 11.7 FL (ref 9.2–12.9)
POTASSIUM SERPL-SCNC: 3.8 MMOL/L (ref 3.5–5.1)
PROT SERPL-MCNC: 6.9 G/DL (ref 6–8.4)
PROT UR QL STRIP: NEGATIVE
PROTHROMBIN TIME: 11.9 SEC (ref 9–12.5)
RBC # BLD AUTO: 4.19 M/UL (ref 4–5.4)
SARS-COV-2 RDRP RESP QL NAA+PROBE: NEGATIVE
SODIUM SERPL-SCNC: 140 MMOL/L (ref 136–145)
SP GR UR STRIP: 1.01 (ref 1–1.03)
TROPONIN I SERPL DL<=0.01 NG/ML-MCNC: <0.006 NG/ML (ref 0–0.03)
URN SPEC COLLECT METH UR: ABNORMAL
UROBILINOGEN UR STRIP-ACNC: NEGATIVE EU/DL
WBC # BLD AUTO: 3.99 K/UL (ref 3.9–12.7)
WBC #/AREA URNS HPF: 5 /HPF (ref 0–5)

## 2021-07-19 PROCEDURE — 93010 ELECTROCARDIOGRAM REPORT: CPT | Mod: ,,, | Performed by: INTERNAL MEDICINE

## 2021-07-19 PROCEDURE — U0002 COVID-19 LAB TEST NON-CDC: HCPCS | Performed by: SURGERY

## 2021-07-19 PROCEDURE — 85730 THROMBOPLASTIN TIME PARTIAL: CPT | Performed by: SURGERY

## 2021-07-19 PROCEDURE — 93010 EKG 12-LEAD: ICD-10-PCS | Mod: ,,, | Performed by: INTERNAL MEDICINE

## 2021-07-19 PROCEDURE — 99291 CRITICAL CARE FIRST HOUR: CPT | Mod: 25

## 2021-07-19 PROCEDURE — 82140 ASSAY OF AMMONIA: CPT | Performed by: SURGERY

## 2021-07-19 PROCEDURE — 81000 URINALYSIS NONAUTO W/SCOPE: CPT | Performed by: SURGERY

## 2021-07-19 PROCEDURE — 25000003 PHARM REV CODE 250: Performed by: SURGERY

## 2021-07-19 PROCEDURE — 85025 COMPLETE CBC W/AUTO DIFF WBC: CPT | Performed by: SURGERY

## 2021-07-19 PROCEDURE — 80053 COMPREHEN METABOLIC PANEL: CPT | Performed by: SURGERY

## 2021-07-19 PROCEDURE — 96375 TX/PRO/DX INJ NEW DRUG ADDON: CPT

## 2021-07-19 PROCEDURE — 93005 ELECTROCARDIOGRAM TRACING: CPT

## 2021-07-19 PROCEDURE — 63600175 PHARM REV CODE 636 W HCPCS: Mod: JA | Performed by: SURGERY

## 2021-07-19 PROCEDURE — 96367 TX/PROPH/DG ADDL SEQ IV INF: CPT

## 2021-07-19 PROCEDURE — 84484 ASSAY OF TROPONIN QUANT: CPT | Performed by: SURGERY

## 2021-07-19 PROCEDURE — 96361 HYDRATE IV INFUSION ADD-ON: CPT

## 2021-07-19 PROCEDURE — 85610 PROTHROMBIN TIME: CPT | Performed by: SURGERY

## 2021-07-19 PROCEDURE — 83880 ASSAY OF NATRIURETIC PEPTIDE: CPT | Performed by: SURGERY

## 2021-07-19 PROCEDURE — 82550 ASSAY OF CK (CPK): CPT | Performed by: SURGERY

## 2021-07-19 PROCEDURE — 96366 THER/PROPH/DIAG IV INF ADDON: CPT

## 2021-07-19 PROCEDURE — C9113 INJ PANTOPRAZOLE SODIUM, VIA: HCPCS | Performed by: SURGERY

## 2021-07-19 PROCEDURE — 96365 THER/PROPH/DIAG IV INF INIT: CPT

## 2021-07-19 RX ORDER — PANTOPRAZOLE SODIUM 40 MG/10ML
80 INJECTION, POWDER, LYOPHILIZED, FOR SOLUTION INTRAVENOUS
Status: COMPLETED | OUTPATIENT
Start: 2021-07-19 | End: 2021-07-19

## 2021-07-19 RX ORDER — SODIUM CHLORIDE 9 MG/ML
INJECTION, SOLUTION INTRAVENOUS
Status: COMPLETED | OUTPATIENT
Start: 2021-07-19 | End: 2021-07-19

## 2021-07-19 RX ADMIN — CEFTRIAXONE 2 G: 2 INJECTION, SOLUTION INTRAVENOUS at 10:07

## 2021-07-19 RX ADMIN — SODIUM CHLORIDE: 0.9 INJECTION, SOLUTION INTRAVENOUS at 07:07

## 2021-07-19 RX ADMIN — PANTOPRAZOLE SODIUM 80 MG: 40 INJECTION, POWDER, LYOPHILIZED, FOR SOLUTION INTRAVENOUS at 10:07

## 2021-07-19 RX ADMIN — OCTREOTIDE ACETATE 50 MCG/HR: 500 INJECTION, SOLUTION INTRAVENOUS; SUBCUTANEOUS at 09:07

## 2021-07-20 ENCOUNTER — TELEPHONE (OUTPATIENT)
Dept: HEPATOLOGY | Facility: CLINIC | Age: 51
End: 2021-07-20

## 2021-07-20 VITALS
SYSTOLIC BLOOD PRESSURE: 115 MMHG | RESPIRATION RATE: 17 BRPM | BODY MASS INDEX: 24.17 KG/M2 | OXYGEN SATURATION: 99 % | WEIGHT: 158.94 LBS | DIASTOLIC BLOOD PRESSURE: 53 MMHG | TEMPERATURE: 99 F | HEART RATE: 57 BPM

## 2021-07-20 LAB
ALBUMIN SERPL BCP-MCNC: 3.5 G/DL (ref 3.5–5.2)
ALP SERPL-CCNC: 80 U/L (ref 55–135)
ALT SERPL W/O P-5'-P-CCNC: 21 U/L (ref 10–44)
ANION GAP SERPL CALC-SCNC: 8 MMOL/L (ref 8–16)
AST SERPL-CCNC: 18 U/L (ref 10–40)
BASOPHILS # BLD AUTO: 0.01 K/UL (ref 0–0.2)
BASOPHILS NFR BLD: 0.4 % (ref 0–1.9)
BILIRUB SERPL-MCNC: 1.2 MG/DL (ref 0.1–1)
BUN SERPL-MCNC: 11 MG/DL (ref 6–20)
CALCIUM SERPL-MCNC: 9.1 MG/DL (ref 8.7–10.5)
CHLORIDE SERPL-SCNC: 108 MMOL/L (ref 95–110)
CO2 SERPL-SCNC: 25 MMOL/L (ref 23–29)
CREAT SERPL-MCNC: 0.8 MG/DL (ref 0.5–1.4)
DIFFERENTIAL METHOD: ABNORMAL
EOSINOPHIL # BLD AUTO: 0 K/UL (ref 0–0.5)
EOSINOPHIL NFR BLD: 1.1 % (ref 0–8)
ERYTHROCYTE [DISTWIDTH] IN BLOOD BY AUTOMATED COUNT: 12.7 % (ref 11.5–14.5)
EST. GFR  (AFRICAN AMERICAN): >60 ML/MIN/1.73 M^2
EST. GFR  (NON AFRICAN AMERICAN): >60 ML/MIN/1.73 M^2
GLUCOSE SERPL-MCNC: 146 MG/DL (ref 70–110)
HCT VFR BLD AUTO: 37 % (ref 37–48.5)
HGB BLD-MCNC: 12.5 G/DL (ref 12–16)
IMM GRANULOCYTES # BLD AUTO: 0.01 K/UL (ref 0–0.04)
IMM GRANULOCYTES NFR BLD AUTO: 0.4 % (ref 0–0.5)
LYMPHOCYTES # BLD AUTO: 0.7 K/UL (ref 1–4.8)
LYMPHOCYTES NFR BLD: 25.5 % (ref 18–48)
MCH RBC QN AUTO: 30.9 PG (ref 27–31)
MCHC RBC AUTO-ENTMCNC: 33.8 G/DL (ref 32–36)
MCV RBC AUTO: 91 FL (ref 82–98)
MONOCYTES # BLD AUTO: 0.2 K/UL (ref 0.3–1)
MONOCYTES NFR BLD: 8 % (ref 4–15)
NEUTROPHILS # BLD AUTO: 1.7 K/UL (ref 1.8–7.7)
NEUTROPHILS NFR BLD: 64.6 % (ref 38–73)
NRBC BLD-RTO: 0 /100 WBC
PLATELET # BLD AUTO: 60 K/UL (ref 150–450)
PMV BLD AUTO: 11.5 FL (ref 9.2–12.9)
POTASSIUM SERPL-SCNC: 3.8 MMOL/L (ref 3.5–5.1)
PROT SERPL-MCNC: 6.1 G/DL (ref 6–8.4)
RBC # BLD AUTO: 4.05 M/UL (ref 4–5.4)
SODIUM SERPL-SCNC: 141 MMOL/L (ref 136–145)
WBC # BLD AUTO: 2.63 K/UL (ref 3.9–12.7)

## 2021-07-20 PROCEDURE — 25000003 PHARM REV CODE 250: Performed by: SURGERY

## 2021-07-20 PROCEDURE — 85025 COMPLETE CBC W/AUTO DIFF WBC: CPT | Performed by: SURGERY

## 2021-07-20 PROCEDURE — 96366 THER/PROPH/DIAG IV INF ADDON: CPT

## 2021-07-20 PROCEDURE — 36415 COLL VENOUS BLD VENIPUNCTURE: CPT | Performed by: SURGERY

## 2021-07-20 PROCEDURE — 63600175 PHARM REV CODE 636 W HCPCS: Performed by: SURGERY

## 2021-07-20 PROCEDURE — 80053 COMPREHEN METABOLIC PANEL: CPT | Performed by: SURGERY

## 2021-07-20 PROCEDURE — 96361 HYDRATE IV INFUSION ADD-ON: CPT | Mod: 59

## 2021-07-20 PROCEDURE — 96365 THER/PROPH/DIAG IV INF INIT: CPT | Mod: 59

## 2021-07-20 RX ORDER — SODIUM CHLORIDE 9 MG/ML
INJECTION, SOLUTION INTRAVENOUS CONTINUOUS
Status: DISCONTINUED | OUTPATIENT
Start: 2021-07-20 | End: 2021-07-20 | Stop reason: HOSPADM

## 2021-07-20 RX ADMIN — SODIUM CHLORIDE: 0.9 INJECTION, SOLUTION INTRAVENOUS at 07:07

## 2021-07-20 RX ADMIN — OCTREOTIDE ACETATE 50 MCG/HR: 500 INJECTION, SOLUTION INTRAVENOUS; SUBCUTANEOUS at 07:07

## 2021-07-20 RX ADMIN — PROMETHAZINE HYDROCHLORIDE 25 MG: 25 INJECTION INTRAMUSCULAR; INTRAVENOUS at 07:07

## 2021-07-22 ENCOUNTER — TELEPHONE (OUTPATIENT)
Dept: HEPATOLOGY | Facility: CLINIC | Age: 51
End: 2021-07-22

## 2021-07-22 ENCOUNTER — PATIENT MESSAGE (OUTPATIENT)
Dept: HEPATOLOGY | Facility: CLINIC | Age: 51
End: 2021-07-22

## 2021-07-26 ENCOUNTER — OFFICE VISIT (OUTPATIENT)
Dept: HEPATOLOGY | Facility: CLINIC | Age: 51
End: 2021-07-26
Payer: COMMERCIAL

## 2021-07-26 ENCOUNTER — PATIENT MESSAGE (OUTPATIENT)
Dept: ENDOSCOPY | Facility: HOSPITAL | Age: 51
End: 2021-07-26

## 2021-07-26 ENCOUNTER — TELEPHONE (OUTPATIENT)
Dept: ENDOSCOPY | Facility: HOSPITAL | Age: 51
End: 2021-07-26

## 2021-07-26 VITALS
BODY MASS INDEX: 24.35 KG/M2 | TEMPERATURE: 98 F | DIASTOLIC BLOOD PRESSURE: 65 MMHG | HEART RATE: 74 BPM | WEIGHT: 160.69 LBS | HEIGHT: 68 IN | RESPIRATION RATE: 17 BRPM | SYSTOLIC BLOOD PRESSURE: 133 MMHG | OXYGEN SATURATION: 99 %

## 2021-07-26 DIAGNOSIS — K74.60 LIVER CIRRHOSIS SECONDARY TO NASH: Chronic | ICD-10-CM

## 2021-07-26 DIAGNOSIS — I85.00 ESOPHAGEAL VARICES WITHOUT BLEEDING, UNSPECIFIED ESOPHAGEAL VARICES TYPE: ICD-10-CM

## 2021-07-26 DIAGNOSIS — K74.60 HEPATIC CIRRHOSIS, UNSPECIFIED HEPATIC CIRRHOSIS TYPE, UNSPECIFIED WHETHER ASCITES PRESENT: Primary | ICD-10-CM

## 2021-07-26 DIAGNOSIS — K76.82 HEPATIC ENCEPHALOPATHY: ICD-10-CM

## 2021-07-26 DIAGNOSIS — K75.81 LIVER CIRRHOSIS SECONDARY TO NASH: Chronic | ICD-10-CM

## 2021-07-26 DIAGNOSIS — R19.7 DIARRHEA, UNSPECIFIED TYPE: ICD-10-CM

## 2021-07-26 DIAGNOSIS — R18.8 OTHER ASCITES: ICD-10-CM

## 2021-07-26 DIAGNOSIS — K92.2 GASTROINTESTINAL HEMORRHAGE, UNSPECIFIED GASTROINTESTINAL HEMORRHAGE TYPE: ICD-10-CM

## 2021-07-26 DIAGNOSIS — I85.11 SECONDARY ESOPHAGEAL VARICES WITH BLEEDING: ICD-10-CM

## 2021-07-26 PROCEDURE — 99999 PR PBB SHADOW E&M-EST. PATIENT-LVL IV: CPT | Mod: PBBFAC,,, | Performed by: INTERNAL MEDICINE

## 2021-07-26 PROCEDURE — 99215 OFFICE O/P EST HI 40 MIN: CPT | Mod: S$GLB,,, | Performed by: INTERNAL MEDICINE

## 2021-07-26 PROCEDURE — 1159F MED LIST DOCD IN RCRD: CPT | Mod: CPTII,S$GLB,, | Performed by: INTERNAL MEDICINE

## 2021-07-26 PROCEDURE — 1159F PR MEDICATION LIST DOCUMENTED IN MEDICAL RECORD: ICD-10-PCS | Mod: CPTII,S$GLB,, | Performed by: INTERNAL MEDICINE

## 2021-07-26 PROCEDURE — 1126F AMNT PAIN NOTED NONE PRSNT: CPT | Mod: CPTII,S$GLB,, | Performed by: INTERNAL MEDICINE

## 2021-07-26 PROCEDURE — 1160F RVW MEDS BY RX/DR IN RCRD: CPT | Mod: CPTII,S$GLB,, | Performed by: INTERNAL MEDICINE

## 2021-07-26 PROCEDURE — 1160F PR REVIEW ALL MEDS BY PRESCRIBER/CLIN PHARMACIST DOCUMENTED: ICD-10-PCS | Mod: CPTII,S$GLB,, | Performed by: INTERNAL MEDICINE

## 2021-07-26 PROCEDURE — 3008F PR BODY MASS INDEX (BMI) DOCUMENTED: ICD-10-PCS | Mod: CPTII,S$GLB,, | Performed by: INTERNAL MEDICINE

## 2021-07-26 PROCEDURE — 1126F PR PAIN SEVERITY QUANTIFIED, NO PAIN PRESENT: ICD-10-PCS | Mod: CPTII,S$GLB,, | Performed by: INTERNAL MEDICINE

## 2021-07-26 PROCEDURE — 99215 PR OFFICE/OUTPT VISIT, EST, LEVL V, 40-54 MIN: ICD-10-PCS | Mod: S$GLB,,, | Performed by: INTERNAL MEDICINE

## 2021-07-26 PROCEDURE — 3008F BODY MASS INDEX DOCD: CPT | Mod: CPTII,S$GLB,, | Performed by: INTERNAL MEDICINE

## 2021-07-26 PROCEDURE — 99999 PR PBB SHADOW E&M-EST. PATIENT-LVL IV: ICD-10-PCS | Mod: PBBFAC,,, | Performed by: INTERNAL MEDICINE

## 2021-07-26 RX ORDER — PANTOPRAZOLE SODIUM 40 MG/1
40 TABLET, DELAYED RELEASE ORAL DAILY
Qty: 180 TABLET | Refills: 4 | Status: SHIPPED | OUTPATIENT
Start: 2021-07-26 | End: 2022-11-29

## 2021-07-30 ENCOUNTER — HOSPITAL ENCOUNTER (OUTPATIENT)
Dept: RADIOLOGY | Facility: HOSPITAL | Age: 51
Discharge: HOME | End: 2021-07-30
Attending: INTERNAL MEDICINE
Payer: COMMERCIAL

## 2021-07-30 DIAGNOSIS — K74.60 LIVER CIRRHOSIS SECONDARY TO NASH: ICD-10-CM

## 2021-07-30 DIAGNOSIS — K75.81 LIVER CIRRHOSIS SECONDARY TO NASH: ICD-10-CM

## 2021-07-30 DIAGNOSIS — Z01.818 PRE-OP TESTING: Primary | ICD-10-CM

## 2021-07-30 DIAGNOSIS — R19.7 DIARRHEA, UNSPECIFIED TYPE: Primary | ICD-10-CM

## 2021-07-30 PROCEDURE — 76705 ECHO EXAM OF ABDOMEN: CPT | Mod: 26,,, | Performed by: RADIOLOGY

## 2021-07-30 PROCEDURE — 76705 US ABDOMEN LIMITED: ICD-10-PCS | Mod: 26,,, | Performed by: RADIOLOGY

## 2021-07-30 PROCEDURE — 76705 ECHO EXAM OF ABDOMEN: CPT | Mod: TC

## 2021-07-31 ENCOUNTER — HOSPITAL ENCOUNTER (OUTPATIENT)
Dept: PREADMISSION TESTING | Facility: HOSPITAL | Age: 51
Discharge: HOME | End: 2021-07-31
Attending: INTERNAL MEDICINE
Payer: COMMERCIAL

## 2021-07-31 DIAGNOSIS — Z01.818 PRE-OP TESTING: ICD-10-CM

## 2021-07-31 PROCEDURE — U0003 INFECTIOUS AGENT DETECTION BY NUCLEIC ACID (DNA OR RNA); SEVERE ACUTE RESPIRATORY SYNDROME CORONAVIRUS 2 (SARS-COV-2) (CORONAVIRUS DISEASE [COVID-19]), AMPLIFIED PROBE TECHNIQUE, MAKING USE OF HIGH THROUGHPUT TECHNOLOGIES AS DESCRIBED BY CMS-2020-01-R: HCPCS | Performed by: FAMILY MEDICINE

## 2021-07-31 PROCEDURE — U0005 INFEC AGEN DETEC AMPLI PROBE: HCPCS | Performed by: FAMILY MEDICINE

## 2021-08-02 LAB
SARS-COV-2 RNA RESP QL NAA+PROBE: NOT DETECTED
SARS-COV-2- CYCLE NUMBER: -1

## 2021-08-03 ENCOUNTER — ANESTHESIA (OUTPATIENT)
Dept: ENDOSCOPY | Facility: HOSPITAL | Age: 51
End: 2021-08-03
Payer: COMMERCIAL

## 2021-08-03 ENCOUNTER — HOSPITAL ENCOUNTER (OUTPATIENT)
Facility: HOSPITAL | Age: 51
Discharge: HOME | End: 2021-08-03
Attending: INTERNAL MEDICINE | Admitting: INTERNAL MEDICINE
Payer: COMMERCIAL

## 2021-08-03 ENCOUNTER — ANESTHESIA EVENT (OUTPATIENT)
Dept: ENDOSCOPY | Facility: HOSPITAL | Age: 51
End: 2021-08-03
Payer: COMMERCIAL

## 2021-08-03 VITALS
TEMPERATURE: 98 F | WEIGHT: 158 LBS | OXYGEN SATURATION: 100 % | DIASTOLIC BLOOD PRESSURE: 71 MMHG | SYSTOLIC BLOOD PRESSURE: 128 MMHG | HEART RATE: 72 BPM | BODY MASS INDEX: 23.95 KG/M2 | RESPIRATION RATE: 17 BRPM | HEIGHT: 68 IN

## 2021-08-03 DIAGNOSIS — I85.00 ESOPHAGEAL VARICES WITHOUT BLEEDING, UNSPECIFIED ESOPHAGEAL VARICES TYPE: Primary | ICD-10-CM

## 2021-08-03 PROCEDURE — 25000003 PHARM REV CODE 250: Performed by: NURSE ANESTHETIST, CERTIFIED REGISTERED

## 2021-08-03 PROCEDURE — 63600175 PHARM REV CODE 636 W HCPCS: Performed by: NURSE ANESTHETIST, CERTIFIED REGISTERED

## 2021-08-03 PROCEDURE — 43235 PR EGD, FLEX, DIAGNOSTIC: ICD-10-PCS | Mod: ,,, | Performed by: INTERNAL MEDICINE

## 2021-08-03 PROCEDURE — 43235 EGD DIAGNOSTIC BRUSH WASH: CPT | Mod: ,,, | Performed by: INTERNAL MEDICINE

## 2021-08-03 PROCEDURE — 43235 EGD DIAGNOSTIC BRUSH WASH: CPT | Performed by: INTERNAL MEDICINE

## 2021-08-03 PROCEDURE — 25000003 PHARM REV CODE 250: Performed by: INTERNAL MEDICINE

## 2021-08-03 PROCEDURE — 37000008 HC ANESTHESIA 1ST 15 MINUTES: Performed by: INTERNAL MEDICINE

## 2021-08-03 PROCEDURE — E9220 PRA ENDO ANESTHESIA: ICD-10-PCS | Mod: ,,, | Performed by: NURSE ANESTHETIST, CERTIFIED REGISTERED

## 2021-08-03 PROCEDURE — E9220 PRA ENDO ANESTHESIA: HCPCS | Mod: ,,, | Performed by: NURSE ANESTHETIST, CERTIFIED REGISTERED

## 2021-08-03 RX ORDER — LIDOCAINE HYDROCHLORIDE 20 MG/ML
INJECTION INTRAVENOUS
Status: DISCONTINUED | OUTPATIENT
Start: 2021-08-03 | End: 2021-08-03

## 2021-08-03 RX ORDER — SODIUM CHLORIDE 9 MG/ML
INJECTION, SOLUTION INTRAVENOUS CONTINUOUS PRN
Status: DISCONTINUED | OUTPATIENT
Start: 2021-08-03 | End: 2021-08-03

## 2021-08-03 RX ORDER — PROPOFOL 10 MG/ML
VIAL (ML) INTRAVENOUS CONTINUOUS PRN
Status: DISCONTINUED | OUTPATIENT
Start: 2021-08-03 | End: 2021-08-03

## 2021-08-03 RX ORDER — PROPOFOL 10 MG/ML
VIAL (ML) INTRAVENOUS
Status: DISCONTINUED | OUTPATIENT
Start: 2021-08-03 | End: 2021-08-03

## 2021-08-03 RX ORDER — SODIUM CHLORIDE 9 MG/ML
INJECTION, SOLUTION INTRAVENOUS CONTINUOUS
Status: DISCONTINUED | OUTPATIENT
Start: 2021-08-03 | End: 2021-08-03 | Stop reason: HOSPADM

## 2021-08-03 RX ADMIN — SODIUM CHLORIDE: 0.9 INJECTION, SOLUTION INTRAVENOUS at 04:08

## 2021-08-03 RX ADMIN — PROPOFOL 60 MG: 10 INJECTION, EMULSION INTRAVENOUS at 04:08

## 2021-08-03 RX ADMIN — PROPOFOL 150 MCG/KG/MIN: 10 INJECTION, EMULSION INTRAVENOUS at 04:08

## 2021-08-03 RX ADMIN — SODIUM CHLORIDE: 0.9 INJECTION, SOLUTION INTRAVENOUS at 02:08

## 2021-08-03 RX ADMIN — GLYCOPYRROLATE 0.2 MG: 0.2 INJECTION, SOLUTION INTRAMUSCULAR; INTRAVITREAL at 04:08

## 2021-08-03 RX ADMIN — LIDOCAINE HYDROCHLORIDE 100 MG: 20 INJECTION, SOLUTION INTRAVENOUS at 04:08

## 2021-08-16 ENCOUNTER — PATIENT MESSAGE (OUTPATIENT)
Dept: HEPATOLOGY | Facility: CLINIC | Age: 51
End: 2021-08-16

## 2021-10-21 ENCOUNTER — PATIENT MESSAGE (OUTPATIENT)
Dept: ORTHOPEDICS | Facility: CLINIC | Age: 51
End: 2021-10-21
Payer: COMMERCIAL

## 2021-10-25 ENCOUNTER — TELEPHONE (OUTPATIENT)
Dept: HEPATOLOGY | Facility: CLINIC | Age: 51
End: 2021-10-25
Payer: COMMERCIAL

## 2021-10-26 ENCOUNTER — PATIENT MESSAGE (OUTPATIENT)
Dept: HEPATOLOGY | Facility: CLINIC | Age: 51
End: 2021-10-26
Payer: COMMERCIAL

## 2021-10-28 ENCOUNTER — PATIENT MESSAGE (OUTPATIENT)
Dept: ORTHOPEDICS | Facility: CLINIC | Age: 51
End: 2021-10-28
Payer: COMMERCIAL

## 2021-10-28 RX ORDER — TRAMADOL HYDROCHLORIDE 50 MG/1
50 TABLET ORAL EVERY 6 HOURS PRN
Qty: 28 TABLET | Refills: 0 | Status: CANCELLED | OUTPATIENT
Start: 2021-10-28

## 2021-11-01 ENCOUNTER — TELEPHONE (OUTPATIENT)
Dept: ORTHOPEDICS | Facility: CLINIC | Age: 51
End: 2021-11-01
Payer: COMMERCIAL

## 2021-11-01 RX ORDER — TRAMADOL HYDROCHLORIDE 50 MG/1
50 TABLET ORAL EVERY 6 HOURS PRN
Qty: 45 TABLET | Refills: 0 | OUTPATIENT
Start: 2021-11-01 | End: 2021-11-18

## 2021-11-09 ENCOUNTER — OFFICE VISIT (OUTPATIENT)
Dept: ORTHOPEDICS | Facility: CLINIC | Age: 51
End: 2021-11-09
Payer: COMMERCIAL

## 2021-11-09 ENCOUNTER — LAB VISIT (OUTPATIENT)
Dept: LAB | Facility: HOSPITAL | Age: 51
End: 2021-11-09
Attending: INTERNAL MEDICINE
Payer: COMMERCIAL

## 2021-11-09 ENCOUNTER — PATIENT MESSAGE (OUTPATIENT)
Dept: GASTROENTEROLOGY | Facility: CLINIC | Age: 51
End: 2021-11-09

## 2021-11-09 ENCOUNTER — OFFICE VISIT (OUTPATIENT)
Dept: GASTROENTEROLOGY | Facility: CLINIC | Age: 51
End: 2021-11-09
Payer: COMMERCIAL

## 2021-11-09 ENCOUNTER — CLINICAL SUPPORT (OUTPATIENT)
Dept: LAB | Facility: HOSPITAL | Age: 51
End: 2021-11-09
Attending: ORTHOPAEDIC SURGERY
Payer: COMMERCIAL

## 2021-11-09 VITALS
HEART RATE: 69 BPM | HEIGHT: 68 IN | WEIGHT: 148 LBS | DIASTOLIC BLOOD PRESSURE: 78 MMHG | BODY MASS INDEX: 22.43 KG/M2 | SYSTOLIC BLOOD PRESSURE: 126 MMHG

## 2021-11-09 VITALS — OXYGEN SATURATION: 98 % | HEART RATE: 76 BPM | HEIGHT: 68 IN | BODY MASS INDEX: 23.01 KG/M2 | WEIGHT: 151.81 LBS

## 2021-11-09 DIAGNOSIS — Z01.818 PRE-OP TESTING: ICD-10-CM

## 2021-11-09 DIAGNOSIS — K52.9 CHRONIC DIARRHEA: Primary | ICD-10-CM

## 2021-11-09 DIAGNOSIS — M19.011 ARTHRITIS OF RIGHT ACROMIOCLAVICULAR JOINT: ICD-10-CM

## 2021-11-09 DIAGNOSIS — K52.9 CHRONIC DIARRHEA: ICD-10-CM

## 2021-11-09 DIAGNOSIS — M25.511 ARTHRALGIA OF RIGHT ACROMIOCLAVICULAR JOINT: ICD-10-CM

## 2021-11-09 DIAGNOSIS — M19.011 ARTHRITIS OF RIGHT ACROMIOCLAVICULAR JOINT: Primary | ICD-10-CM

## 2021-11-09 LAB — IGA SERPL-MCNC: 96 MG/DL (ref 40–350)

## 2021-11-09 PROCEDURE — 99999 PR PBB SHADOW E&M-EST. PATIENT-LVL IV: CPT | Mod: PBBFAC,,, | Performed by: ORTHOPAEDIC SURGERY

## 2021-11-09 PROCEDURE — 1159F MED LIST DOCD IN RCRD: CPT | Mod: CPTII,S$GLB,, | Performed by: ORTHOPAEDIC SURGERY

## 2021-11-09 PROCEDURE — 93005 ELECTROCARDIOGRAM TRACING: CPT

## 2021-11-09 PROCEDURE — 3074F PR MOST RECENT SYSTOLIC BLOOD PRESSURE < 130 MM HG: ICD-10-PCS | Mod: CPTII,S$GLB,, | Performed by: ORTHOPAEDIC SURGERY

## 2021-11-09 PROCEDURE — 1160F RVW MEDS BY RX/DR IN RCRD: CPT | Mod: CPTII,S$GLB,, | Performed by: INTERNAL MEDICINE

## 2021-11-09 PROCEDURE — 99999 PR PBB SHADOW E&M-EST. PATIENT-LVL III: CPT | Mod: PBBFAC,,, | Performed by: INTERNAL MEDICINE

## 2021-11-09 PROCEDURE — 99214 OFFICE O/P EST MOD 30 MIN: CPT | Mod: S$GLB,,, | Performed by: ORTHOPAEDIC SURGERY

## 2021-11-09 PROCEDURE — 1159F MED LIST DOCD IN RCRD: CPT | Mod: CPTII,S$GLB,, | Performed by: INTERNAL MEDICINE

## 2021-11-09 PROCEDURE — 83516 IMMUNOASSAY NONANTIBODY: CPT | Performed by: INTERNAL MEDICINE

## 2021-11-09 PROCEDURE — 82784 ASSAY IGA/IGD/IGG/IGM EACH: CPT | Performed by: INTERNAL MEDICINE

## 2021-11-09 PROCEDURE — 99999 PR PBB SHADOW E&M-EST. PATIENT-LVL III: ICD-10-PCS | Mod: PBBFAC,,, | Performed by: INTERNAL MEDICINE

## 2021-11-09 PROCEDURE — 3008F PR BODY MASS INDEX (BMI) DOCUMENTED: ICD-10-PCS | Mod: CPTII,S$GLB,, | Performed by: INTERNAL MEDICINE

## 2021-11-09 PROCEDURE — 1159F PR MEDICATION LIST DOCUMENTED IN MEDICAL RECORD: ICD-10-PCS | Mod: CPTII,S$GLB,, | Performed by: INTERNAL MEDICINE

## 2021-11-09 PROCEDURE — 3008F BODY MASS INDEX DOCD: CPT | Mod: CPTII,S$GLB,, | Performed by: ORTHOPAEDIC SURGERY

## 2021-11-09 PROCEDURE — 99999 PR PBB SHADOW E&M-EST. PATIENT-LVL IV: ICD-10-PCS | Mod: PBBFAC,,, | Performed by: ORTHOPAEDIC SURGERY

## 2021-11-09 PROCEDURE — 3008F BODY MASS INDEX DOCD: CPT | Mod: CPTII,S$GLB,, | Performed by: INTERNAL MEDICINE

## 2021-11-09 PROCEDURE — 99214 OFFICE O/P EST MOD 30 MIN: CPT | Mod: S$GLB,,, | Performed by: INTERNAL MEDICINE

## 2021-11-09 PROCEDURE — 99214 PR OFFICE/OUTPT VISIT, EST, LEVL IV, 30-39 MIN: ICD-10-PCS | Mod: S$GLB,,, | Performed by: INTERNAL MEDICINE

## 2021-11-09 PROCEDURE — 3078F PR MOST RECENT DIASTOLIC BLOOD PRESSURE < 80 MM HG: ICD-10-PCS | Mod: CPTII,S$GLB,, | Performed by: ORTHOPAEDIC SURGERY

## 2021-11-09 PROCEDURE — 3074F SYST BP LT 130 MM HG: CPT | Mod: CPTII,S$GLB,, | Performed by: ORTHOPAEDIC SURGERY

## 2021-11-09 PROCEDURE — 1160F RVW MEDS BY RX/DR IN RCRD: CPT | Mod: CPTII,S$GLB,, | Performed by: ORTHOPAEDIC SURGERY

## 2021-11-09 PROCEDURE — 1160F PR REVIEW ALL MEDS BY PRESCRIBER/CLIN PHARMACIST DOCUMENTED: ICD-10-PCS | Mod: CPTII,S$GLB,, | Performed by: ORTHOPAEDIC SURGERY

## 2021-11-09 PROCEDURE — 1159F PR MEDICATION LIST DOCUMENTED IN MEDICAL RECORD: ICD-10-PCS | Mod: CPTII,S$GLB,, | Performed by: ORTHOPAEDIC SURGERY

## 2021-11-09 PROCEDURE — 99214 PR OFFICE/OUTPT VISIT, EST, LEVL IV, 30-39 MIN: ICD-10-PCS | Mod: S$GLB,,, | Performed by: ORTHOPAEDIC SURGERY

## 2021-11-09 PROCEDURE — 3078F DIAST BP <80 MM HG: CPT | Mod: CPTII,S$GLB,, | Performed by: ORTHOPAEDIC SURGERY

## 2021-11-09 PROCEDURE — 93010 ELECTROCARDIOGRAM REPORT: CPT | Mod: ,,, | Performed by: INTERNAL MEDICINE

## 2021-11-09 PROCEDURE — 93010 EKG 12-LEAD: ICD-10-PCS | Mod: ,,, | Performed by: INTERNAL MEDICINE

## 2021-11-09 PROCEDURE — 3008F PR BODY MASS INDEX (BMI) DOCUMENTED: ICD-10-PCS | Mod: CPTII,S$GLB,, | Performed by: ORTHOPAEDIC SURGERY

## 2021-11-09 PROCEDURE — 1160F PR REVIEW ALL MEDS BY PRESCRIBER/CLIN PHARMACIST DOCUMENTED: ICD-10-PCS | Mod: CPTII,S$GLB,, | Performed by: INTERNAL MEDICINE

## 2021-11-09 RX ORDER — SODIUM CHLORIDE 9 MG/ML
INJECTION, SOLUTION INTRAVENOUS CONTINUOUS
Status: CANCELLED | OUTPATIENT
Start: 2021-11-09

## 2021-11-09 RX ORDER — MUPIROCIN 20 MG/G
OINTMENT TOPICAL
Status: CANCELLED | OUTPATIENT
Start: 2021-11-09

## 2021-11-11 ENCOUNTER — PATIENT MESSAGE (OUTPATIENT)
Dept: GASTROENTEROLOGY | Facility: CLINIC | Age: 51
End: 2021-11-11
Payer: COMMERCIAL

## 2021-11-11 LAB — TTG IGA SER-ACNC: 5 UNITS

## 2021-11-17 ENCOUNTER — ANESTHESIA EVENT (OUTPATIENT)
Dept: SURGERY | Facility: HOSPITAL | Age: 51
End: 2021-11-17
Payer: COMMERCIAL

## 2021-11-18 ENCOUNTER — ANESTHESIA (OUTPATIENT)
Dept: SURGERY | Facility: HOSPITAL | Age: 51
End: 2021-11-18
Payer: COMMERCIAL

## 2021-11-18 ENCOUNTER — HOSPITAL ENCOUNTER (OUTPATIENT)
Facility: HOSPITAL | Age: 51
Discharge: HOME | End: 2021-11-18
Attending: ORTHOPAEDIC SURGERY | Admitting: ORTHOPAEDIC SURGERY
Payer: COMMERCIAL

## 2021-11-18 VITALS
HEIGHT: 68 IN | TEMPERATURE: 98 F | RESPIRATION RATE: 18 BRPM | HEART RATE: 74 BPM | OXYGEN SATURATION: 98 % | DIASTOLIC BLOOD PRESSURE: 62 MMHG | SYSTOLIC BLOOD PRESSURE: 124 MMHG | BODY MASS INDEX: 22.58 KG/M2 | WEIGHT: 149 LBS

## 2021-11-18 DIAGNOSIS — M25.511 ARTHRALGIA OF RIGHT ACROMIOCLAVICULAR JOINT: ICD-10-CM

## 2021-11-18 DIAGNOSIS — M75.111 NONTRAUMATIC INCOMPLETE TEAR OF RIGHT ROTATOR CUFF: ICD-10-CM

## 2021-11-18 DIAGNOSIS — M19.011 ARTHRITIS OF RIGHT ACROMIOCLAVICULAR JOINT: ICD-10-CM

## 2021-11-18 DIAGNOSIS — S43.431A TEAR OF RIGHT GLENOID LABRUM, INITIAL ENCOUNTER: ICD-10-CM

## 2021-11-18 LAB
BASOPHILS # BLD AUTO: 0.01 K/UL (ref 0–0.2)
BASOPHILS NFR BLD: 0.2 % (ref 0–1.9)
DIFFERENTIAL METHOD: ABNORMAL
EOSINOPHIL # BLD AUTO: 0.1 K/UL (ref 0–0.5)
EOSINOPHIL NFR BLD: 1.6 % (ref 0–8)
ERYTHROCYTE [DISTWIDTH] IN BLOOD BY AUTOMATED COUNT: 12.7 % (ref 11.5–14.5)
HCT VFR BLD AUTO: 38.6 % (ref 37–48.5)
HGB BLD-MCNC: 13.2 G/DL (ref 12–16)
IMM GRANULOCYTES # BLD AUTO: 0.01 K/UL (ref 0–0.04)
IMM GRANULOCYTES NFR BLD AUTO: 0.2 % (ref 0–0.5)
LYMPHOCYTES # BLD AUTO: 0.7 K/UL (ref 1–4.8)
LYMPHOCYTES NFR BLD: 15.5 % (ref 18–48)
MCH RBC QN AUTO: 31 PG (ref 27–31)
MCHC RBC AUTO-ENTMCNC: 34.2 G/DL (ref 32–36)
MCV RBC AUTO: 91 FL (ref 82–98)
MONOCYTES # BLD AUTO: 0.4 K/UL (ref 0.3–1)
MONOCYTES NFR BLD: 8.2 % (ref 4–15)
NEUTROPHILS # BLD AUTO: 3.3 K/UL (ref 1.8–7.7)
NEUTROPHILS NFR BLD: 74.3 % (ref 38–73)
NRBC BLD-RTO: 0 /100 WBC
PLATELET # BLD AUTO: 61 K/UL (ref 150–450)
PMV BLD AUTO: 12 FL (ref 9.2–12.9)
RBC # BLD AUTO: 4.26 M/UL (ref 4–5.4)
WBC # BLD AUTO: 4.39 K/UL (ref 3.9–12.7)

## 2021-11-18 PROCEDURE — 25000003 PHARM REV CODE 250: Performed by: ANESTHESIOLOGY

## 2021-11-18 PROCEDURE — 71000039 HC RECOVERY, EACH ADD'L HOUR: Performed by: ORTHOPAEDIC SURGERY

## 2021-11-18 PROCEDURE — 63600175 PHARM REV CODE 636 W HCPCS: Performed by: ORTHOPAEDIC SURGERY

## 2021-11-18 PROCEDURE — 23120 PR PARTIAL REMOVAL, CLAVICLE: ICD-10-PCS | Mod: 51,RT,, | Performed by: ORTHOPAEDIC SURGERY

## 2021-11-18 PROCEDURE — 71000016 HC POSTOP RECOV ADDL HR: Performed by: ORTHOPAEDIC SURGERY

## 2021-11-18 PROCEDURE — 76942 ECHO GUIDE FOR BIOPSY: CPT | Performed by: STUDENT IN AN ORGANIZED HEALTH CARE EDUCATION/TRAINING PROGRAM

## 2021-11-18 PROCEDURE — 37000008 HC ANESTHESIA 1ST 15 MINUTES: Performed by: ORTHOPAEDIC SURGERY

## 2021-11-18 PROCEDURE — 36000707: Performed by: ORTHOPAEDIC SURGERY

## 2021-11-18 PROCEDURE — 27201423 OPTIME MED/SURG SUP & DEVICES STERILE SUPPLY: Performed by: ORTHOPAEDIC SURGERY

## 2021-11-18 PROCEDURE — 63600175 PHARM REV CODE 636 W HCPCS: Performed by: STUDENT IN AN ORGANIZED HEALTH CARE EDUCATION/TRAINING PROGRAM

## 2021-11-18 PROCEDURE — 36415 COLL VENOUS BLD VENIPUNCTURE: CPT | Performed by: ORTHOPAEDIC SURGERY

## 2021-11-18 PROCEDURE — 27200665 HC NERVE BLOCK NEEDLE/ CATHETER: Performed by: STUDENT IN AN ORGANIZED HEALTH CARE EDUCATION/TRAINING PROGRAM

## 2021-11-18 PROCEDURE — 25000003 PHARM REV CODE 250: Performed by: STUDENT IN AN ORGANIZED HEALTH CARE EDUCATION/TRAINING PROGRAM

## 2021-11-18 PROCEDURE — 25000003 PHARM REV CODE 250: Performed by: ORTHOPAEDIC SURGERY

## 2021-11-18 PROCEDURE — 71000033 HC RECOVERY, INTIAL HOUR: Performed by: ORTHOPAEDIC SURGERY

## 2021-11-18 PROCEDURE — 29823 SHO ARTHRS SRG XTNSV DBRDMT: CPT | Mod: RT,,, | Performed by: ORTHOPAEDIC SURGERY

## 2021-11-18 PROCEDURE — 37000009 HC ANESTHESIA EA ADD 15 MINS: Performed by: ORTHOPAEDIC SURGERY

## 2021-11-18 PROCEDURE — 71000015 HC POSTOP RECOV 1ST HR: Performed by: ORTHOPAEDIC SURGERY

## 2021-11-18 PROCEDURE — 85025 COMPLETE CBC W/AUTO DIFF WBC: CPT | Performed by: ORTHOPAEDIC SURGERY

## 2021-11-18 PROCEDURE — 36000706: Performed by: ORTHOPAEDIC SURGERY

## 2021-11-18 PROCEDURE — 23120 CLAVICULECTOMY PARTIAL: CPT | Mod: 51,RT,, | Performed by: ORTHOPAEDIC SURGERY

## 2021-11-18 PROCEDURE — 29823 PR SHLDR ARTHROSCOP,EXTEN DEBRIDE: ICD-10-PCS | Mod: RT,,, | Performed by: ORTHOPAEDIC SURGERY

## 2021-11-18 RX ORDER — SODIUM CHLORIDE 0.9 % (FLUSH) 0.9 %
10 SYRINGE (ML) INJECTION
Status: DISCONTINUED | OUTPATIENT
Start: 2021-11-18 | End: 2021-11-18 | Stop reason: HOSPADM

## 2021-11-18 RX ORDER — NEOSTIGMINE METHYLSULFATE 1 MG/ML
INJECTION, SOLUTION INTRAVENOUS
Status: DISCONTINUED | OUTPATIENT
Start: 2021-11-18 | End: 2021-11-18

## 2021-11-18 RX ORDER — ROCURONIUM BROMIDE 10 MG/ML
INJECTION, SOLUTION INTRAVENOUS
Status: DISCONTINUED | OUTPATIENT
Start: 2021-11-18 | End: 2021-11-18

## 2021-11-18 RX ORDER — FENTANYL CITRATE 50 UG/ML
INJECTION, SOLUTION INTRAMUSCULAR; INTRAVENOUS
Status: DISCONTINUED | OUTPATIENT
Start: 2021-11-18 | End: 2021-11-18

## 2021-11-18 RX ORDER — BUPIVACAINE HYDROCHLORIDE 2.5 MG/ML
INJECTION, SOLUTION EPIDURAL; INFILTRATION; INTRACAUDAL
Status: COMPLETED | OUTPATIENT
Start: 2021-11-18 | End: 2021-11-18

## 2021-11-18 RX ORDER — OXYCODONE HYDROCHLORIDE 5 MG/1
5 TABLET ORAL EVERY 6 HOURS PRN
Qty: 28 TABLET | Refills: 0 | Status: SHIPPED | OUTPATIENT
Start: 2021-11-18 | End: 2021-11-25

## 2021-11-18 RX ORDER — MUPIROCIN 20 MG/G
OINTMENT TOPICAL
Status: DISCONTINUED | OUTPATIENT
Start: 2021-11-18 | End: 2021-11-18 | Stop reason: HOSPADM

## 2021-11-18 RX ORDER — SODIUM CHLORIDE 9 MG/ML
INJECTION, SOLUTION INTRAVENOUS CONTINUOUS
Status: DISCONTINUED | OUTPATIENT
Start: 2021-11-18 | End: 2021-11-18 | Stop reason: HOSPADM

## 2021-11-18 RX ORDER — ONDANSETRON 4 MG/1
4 TABLET, FILM COATED ORAL EVERY 6 HOURS PRN
Qty: 20 TABLET | Refills: 0 | Status: SHIPPED | OUTPATIENT
Start: 2021-11-18 | End: 2021-11-23

## 2021-11-18 RX ORDER — ACETAMINOPHEN 325 MG/1
650 TABLET ORAL EVERY 4 HOURS PRN
Status: DISCONTINUED | OUTPATIENT
Start: 2021-11-18 | End: 2021-11-18 | Stop reason: HOSPADM

## 2021-11-18 RX ORDER — ONDANSETRON 2 MG/ML
INJECTION INTRAMUSCULAR; INTRAVENOUS
Status: DISCONTINUED | OUTPATIENT
Start: 2021-11-18 | End: 2021-11-18

## 2021-11-18 RX ORDER — DEXAMETHASONE SODIUM PHOSPHATE 4 MG/ML
INJECTION, SOLUTION INTRA-ARTICULAR; INTRALESIONAL; INTRAMUSCULAR; INTRAVENOUS; SOFT TISSUE
Status: DISCONTINUED | OUTPATIENT
Start: 2021-11-18 | End: 2021-11-18

## 2021-11-18 RX ORDER — CEFAZOLIN SODIUM 2 G/50ML
2 SOLUTION INTRAVENOUS
Status: COMPLETED | OUTPATIENT
Start: 2021-11-18 | End: 2021-11-18

## 2021-11-18 RX ORDER — LIDOCAINE HYDROCHLORIDE 20 MG/ML
INJECTION, SOLUTION EPIDURAL; INFILTRATION; INTRACAUDAL; PERINEURAL
Status: DISCONTINUED | OUTPATIENT
Start: 2021-11-18 | End: 2021-11-18

## 2021-11-18 RX ORDER — SUCCINYLCHOLINE CHLORIDE 20 MG/ML
INJECTION INTRAMUSCULAR; INTRAVENOUS
Status: DISCONTINUED | OUTPATIENT
Start: 2021-11-18 | End: 2021-11-18

## 2021-11-18 RX ORDER — OXYCODONE HYDROCHLORIDE 5 MG/1
10 TABLET ORAL EVERY 4 HOURS PRN
Status: DISCONTINUED | OUTPATIENT
Start: 2021-11-18 | End: 2021-11-18 | Stop reason: HOSPADM

## 2021-11-18 RX ORDER — ONDANSETRON 8 MG/1
8 TABLET, ORALLY DISINTEGRATING ORAL EVERY 8 HOURS PRN
Status: DISCONTINUED | OUTPATIENT
Start: 2021-11-18 | End: 2021-11-18 | Stop reason: HOSPADM

## 2021-11-18 RX ORDER — OXYCODONE HYDROCHLORIDE 5 MG/1
5 TABLET ORAL
Status: DISCONTINUED | OUTPATIENT
Start: 2021-11-18 | End: 2021-11-18 | Stop reason: HOSPADM

## 2021-11-18 RX ORDER — MIDAZOLAM HYDROCHLORIDE 1 MG/ML
INJECTION, SOLUTION INTRAMUSCULAR; INTRAVENOUS
Status: DISCONTINUED | OUTPATIENT
Start: 2021-11-18 | End: 2021-11-18

## 2021-11-18 RX ORDER — HYDROMORPHONE HYDROCHLORIDE 2 MG/ML
0.5 INJECTION, SOLUTION INTRAMUSCULAR; INTRAVENOUS; SUBCUTANEOUS EVERY 5 MIN PRN
Status: DISCONTINUED | OUTPATIENT
Start: 2021-11-18 | End: 2021-11-18 | Stop reason: HOSPADM

## 2021-11-18 RX ORDER — PROPOFOL 10 MG/ML
VIAL (ML) INTRAVENOUS
Status: DISCONTINUED | OUTPATIENT
Start: 2021-11-18 | End: 2021-11-18

## 2021-11-18 RX ORDER — OXYCODONE HYDROCHLORIDE 5 MG/1
5 TABLET ORAL EVERY 4 HOURS PRN
Status: DISCONTINUED | OUTPATIENT
Start: 2021-11-18 | End: 2021-11-18 | Stop reason: HOSPADM

## 2021-11-18 RX ORDER — EPINEPHRINE 1 MG/ML
INJECTION INTRAMUSCULAR; INTRAVENOUS; SUBCUTANEOUS
Status: DISCONTINUED | OUTPATIENT
Start: 2021-11-18 | End: 2021-11-18 | Stop reason: HOSPADM

## 2021-11-18 RX ORDER — PROCHLORPERAZINE EDISYLATE 5 MG/ML
5 INJECTION INTRAMUSCULAR; INTRAVENOUS EVERY 30 MIN PRN
Status: DISCONTINUED | OUTPATIENT
Start: 2021-11-18 | End: 2021-11-18 | Stop reason: HOSPADM

## 2021-11-18 RX ADMIN — SODIUM CHLORIDE, SODIUM LACTATE, POTASSIUM CHLORIDE, AND CALCIUM CHLORIDE: .6; .31; .03; .02 INJECTION, SOLUTION INTRAVENOUS at 11:11

## 2021-11-18 RX ADMIN — MUPIROCIN: 20 OINTMENT TOPICAL at 09:11

## 2021-11-18 RX ADMIN — ROCURONIUM BROMIDE 5 MG: 10 INJECTION, SOLUTION INTRAVENOUS at 11:11

## 2021-11-18 RX ADMIN — HYDROMORPHONE HYDROCHLORIDE 0.5 MG: 2 INJECTION, SOLUTION INTRAMUSCULAR; INTRAVENOUS; SUBCUTANEOUS at 02:11

## 2021-11-18 RX ADMIN — OXYCODONE 10 MG: 5 TABLET ORAL at 03:11

## 2021-11-18 RX ADMIN — PROPOFOL 200 MG: 10 INJECTION, EMULSION INTRAVENOUS at 11:11

## 2021-11-18 RX ADMIN — PROPOFOL 50 MG: 10 INJECTION, EMULSION INTRAVENOUS at 12:11

## 2021-11-18 RX ADMIN — ROCURONIUM BROMIDE 10 MG: 10 INJECTION, SOLUTION INTRAVENOUS at 12:11

## 2021-11-18 RX ADMIN — FENTANYL CITRATE 25 MCG: 50 INJECTION, SOLUTION INTRAMUSCULAR; INTRAVENOUS at 01:11

## 2021-11-18 RX ADMIN — LIDOCAINE HYDROCHLORIDE 100 MG: 20 INJECTION, SOLUTION EPIDURAL; INFILTRATION; INTRACAUDAL; PERINEURAL at 11:11

## 2021-11-18 RX ADMIN — FENTANYL CITRATE 100 MCG: 50 INJECTION, SOLUTION INTRAMUSCULAR; INTRAVENOUS at 11:11

## 2021-11-18 RX ADMIN — CEFAZOLIN SODIUM 2 G: 2 SOLUTION INTRAVENOUS at 11:11

## 2021-11-18 RX ADMIN — ONDANSETRON 4 MG: 2 INJECTION, SOLUTION INTRAMUSCULAR; INTRAVENOUS at 01:11

## 2021-11-18 RX ADMIN — SUCCINYLCHOLINE CHLORIDE 160 MG: 20 INJECTION, SOLUTION INTRAMUSCULAR; INTRAVENOUS at 11:11

## 2021-11-18 RX ADMIN — DEXAMETHASONE SODIUM PHOSPHATE 8 MG: 4 INJECTION, SOLUTION INTRA-ARTICULAR; INTRALESIONAL; INTRAMUSCULAR; INTRAVENOUS; SOFT TISSUE at 11:11

## 2021-11-18 RX ADMIN — MIDAZOLAM 3 MG: 1 INJECTION INTRAMUSCULAR; INTRAVENOUS at 10:11

## 2021-11-18 RX ADMIN — FENTANYL CITRATE 25 MCG: 50 INJECTION, SOLUTION INTRAMUSCULAR; INTRAVENOUS at 12:11

## 2021-11-18 RX ADMIN — GLYCOPYRROLATE 0.8 MG: 0.2 INJECTION, SOLUTION INTRAMUSCULAR; INTRAVITREAL at 01:11

## 2021-11-18 RX ADMIN — NEOSTIGMINE METHYLSULFATE 4.5 MG: 1 INJECTION INTRAVENOUS at 01:11

## 2021-11-18 RX ADMIN — BUPIVACAINE HYDROCHLORIDE 20 ML: 2.5 INJECTION, SOLUTION EPIDURAL; INFILTRATION; INTRACAUDAL; PERINEURAL at 10:11

## 2021-12-03 ENCOUNTER — OFFICE VISIT (OUTPATIENT)
Dept: ORTHOPEDICS | Facility: CLINIC | Age: 51
End: 2021-12-03
Payer: COMMERCIAL

## 2021-12-03 VITALS
WEIGHT: 153.75 LBS | HEIGHT: 68 IN | HEART RATE: 65 BPM | DIASTOLIC BLOOD PRESSURE: 81 MMHG | BODY MASS INDEX: 23.3 KG/M2 | SYSTOLIC BLOOD PRESSURE: 124 MMHG

## 2021-12-03 DIAGNOSIS — M19.011 ARTHRITIS OF RIGHT ACROMIOCLAVICULAR JOINT: Primary | ICD-10-CM

## 2021-12-03 DIAGNOSIS — S43.431A SUPERIOR GLENOID LABRUM LESION OF RIGHT SHOULDER, INITIAL ENCOUNTER: ICD-10-CM

## 2021-12-03 PROCEDURE — 99999 PR PBB SHADOW E&M-EST. PATIENT-LVL III: CPT | Mod: PBBFAC,,, | Performed by: ORTHOPAEDIC SURGERY

## 2021-12-03 PROCEDURE — 99999 PR PBB SHADOW E&M-EST. PATIENT-LVL III: ICD-10-PCS | Mod: PBBFAC,,, | Performed by: ORTHOPAEDIC SURGERY

## 2021-12-03 PROCEDURE — 99024 PR POST-OP FOLLOW-UP VISIT: ICD-10-PCS | Mod: S$GLB,,, | Performed by: ORTHOPAEDIC SURGERY

## 2021-12-03 PROCEDURE — 99024 POSTOP FOLLOW-UP VISIT: CPT | Mod: S$GLB,,, | Performed by: ORTHOPAEDIC SURGERY

## 2021-12-03 RX ORDER — ONDANSETRON HYDROCHLORIDE 8 MG/1
8 TABLET, FILM COATED ORAL EVERY 8 HOURS PRN
Qty: 15 TABLET | Refills: 0 | Status: SHIPPED | OUTPATIENT
Start: 2021-12-03 | End: 2023-03-29

## 2021-12-06 ENCOUNTER — PATIENT MESSAGE (OUTPATIENT)
Dept: ORTHOPEDICS | Facility: CLINIC | Age: 51
End: 2021-12-06
Payer: COMMERCIAL

## 2021-12-12 DIAGNOSIS — K75.81 LIVER CIRRHOSIS SECONDARY TO NASH: Chronic | ICD-10-CM

## 2021-12-12 DIAGNOSIS — K74.60 LIVER CIRRHOSIS SECONDARY TO NASH: Chronic | ICD-10-CM

## 2022-01-04 ENCOUNTER — OFFICE VISIT (OUTPATIENT)
Dept: ORTHOPEDICS | Facility: CLINIC | Age: 52
End: 2022-01-04
Payer: COMMERCIAL

## 2022-01-04 VITALS
HEART RATE: 70 BPM | SYSTOLIC BLOOD PRESSURE: 141 MMHG | HEIGHT: 68 IN | DIASTOLIC BLOOD PRESSURE: 85 MMHG | BODY MASS INDEX: 22.6 KG/M2 | WEIGHT: 149.13 LBS

## 2022-01-04 DIAGNOSIS — M19.011 ARTHRITIS OF RIGHT ACROMIOCLAVICULAR JOINT: Primary | ICD-10-CM

## 2022-01-04 PROCEDURE — 3077F SYST BP >= 140 MM HG: CPT | Mod: CPTII,S$GLB,, | Performed by: ORTHOPAEDIC SURGERY

## 2022-01-04 PROCEDURE — 3077F PR MOST RECENT SYSTOLIC BLOOD PRESSURE >= 140 MM HG: ICD-10-PCS | Mod: CPTII,S$GLB,, | Performed by: ORTHOPAEDIC SURGERY

## 2022-01-04 PROCEDURE — 1160F PR REVIEW ALL MEDS BY PRESCRIBER/CLIN PHARMACIST DOCUMENTED: ICD-10-PCS | Mod: CPTII,S$GLB,, | Performed by: ORTHOPAEDIC SURGERY

## 2022-01-04 PROCEDURE — 3008F PR BODY MASS INDEX (BMI) DOCUMENTED: ICD-10-PCS | Mod: CPTII,S$GLB,, | Performed by: ORTHOPAEDIC SURGERY

## 2022-01-04 PROCEDURE — 1159F PR MEDICATION LIST DOCUMENTED IN MEDICAL RECORD: ICD-10-PCS | Mod: CPTII,S$GLB,, | Performed by: ORTHOPAEDIC SURGERY

## 2022-01-04 PROCEDURE — 99999 PR PBB SHADOW E&M-EST. PATIENT-LVL III: ICD-10-PCS | Mod: PBBFAC,,, | Performed by: ORTHOPAEDIC SURGERY

## 2022-01-04 PROCEDURE — 3079F PR MOST RECENT DIASTOLIC BLOOD PRESSURE 80-89 MM HG: ICD-10-PCS | Mod: CPTII,S$GLB,, | Performed by: ORTHOPAEDIC SURGERY

## 2022-01-04 PROCEDURE — 99999 PR PBB SHADOW E&M-EST. PATIENT-LVL III: CPT | Mod: PBBFAC,,, | Performed by: ORTHOPAEDIC SURGERY

## 2022-01-04 PROCEDURE — 3079F DIAST BP 80-89 MM HG: CPT | Mod: CPTII,S$GLB,, | Performed by: ORTHOPAEDIC SURGERY

## 2022-01-04 PROCEDURE — 3008F BODY MASS INDEX DOCD: CPT | Mod: CPTII,S$GLB,, | Performed by: ORTHOPAEDIC SURGERY

## 2022-01-04 PROCEDURE — 1159F MED LIST DOCD IN RCRD: CPT | Mod: CPTII,S$GLB,, | Performed by: ORTHOPAEDIC SURGERY

## 2022-01-04 PROCEDURE — 1160F RVW MEDS BY RX/DR IN RCRD: CPT | Mod: CPTII,S$GLB,, | Performed by: ORTHOPAEDIC SURGERY

## 2022-01-04 PROCEDURE — 99024 POSTOP FOLLOW-UP VISIT: CPT | Mod: S$GLB,,, | Performed by: ORTHOPAEDIC SURGERY

## 2022-01-04 PROCEDURE — 99024 PR POST-OP FOLLOW-UP VISIT: ICD-10-PCS | Mod: S$GLB,,, | Performed by: ORTHOPAEDIC SURGERY

## 2022-01-04 NOTE — PROGRESS NOTES
"Patient ID:   Tee Aranda is a 51 y.o. female.    Chief Complaint:   Six weeks status post right shoulder arthroscopy and open distal clavicle excision    HPI:   Patient is returning today for evaluation of her right shoulder.  She denies any pain. She is anxious to start resuming her personal training.    Medications:    Current Outpatient Medications:     ferrous sulfate (FEOSOL) 325 mg (65 mg iron) Tab tablet, Take 1 tablet (325 mg total) by mouth 2 (two) times daily., Disp: 60 tablet, Rfl: 11    furosemide (LASIX) 20 MG tablet, Take 1 tablet (20 mg total) by mouth once daily. Take in morning, Disp: 30 tablet, Rfl: 11    lactulose (CEPHULAC) 10 gram packet, Take 10 g by mouth 3 (three) times daily., Disp: , Rfl:     ondansetron (ZOFRAN) 8 MG tablet, Take 1 tablet (8 mg total) by mouth every 8 (eight) hours as needed for Nausea., Disp: 15 tablet, Rfl: 0    pantoprazole (PROTONIX) 40 MG tablet, Take 1 tablet (40 mg total) by mouth once daily., Disp: 180 tablet, Rfl: 4    rifAXIMin (XIFAXAN) 550 mg Tab, Take 1 tablet (550 mg total) by mouth 2 (two) times daily., Disp: 60 tablet, Rfl: 11    spironolactone (ALDACTONE) 25 MG tablet, Take 2 tablets (50 mg total) by mouth once daily., Disp: 60 tablet, Rfl: 11    carvediloL (COREG) 3.125 MG tablet, Take 1 tablet (3.125 mg total) by mouth 2 (two) times daily., Disp: 60 tablet, Rfl: 11    Allergies:  Review of patient's allergies indicates:   Allergen Reactions    Sulfa (sulfonamide antibiotics) Hives       Vitals:  BP (!) 141/85   Pulse 70   Ht 5' 8" (1.727 m)   Wt 67.7 kg (149 lb 2.3 oz)   BMI 22.68 kg/m²     Physical Examination:  Ortho Exam   Right shoulder exam:  Negative cross body adduction test  Full ROM  5/5 cuff strength    Assessment:  1. Arthritis of right acromioclavicular joint s/p DCE      Plan:  The patient may resume personal training. She will return as needed.        No follow-ups on file.         "

## 2022-01-07 ENCOUNTER — PATIENT MESSAGE (OUTPATIENT)
Dept: HEPATOLOGY | Facility: CLINIC | Age: 52
End: 2022-01-07
Payer: COMMERCIAL

## 2022-01-26 ENCOUNTER — LAB VISIT (OUTPATIENT)
Dept: LAB | Facility: HOSPITAL | Age: 52
End: 2022-01-26
Attending: FAMILY MEDICINE
Payer: COMMERCIAL

## 2022-01-26 DIAGNOSIS — K75.81 NONALCOHOLIC STEATOHEPATITIS: Primary | ICD-10-CM

## 2022-01-26 LAB
BASOPHILS # BLD AUTO: 0.01 K/UL (ref 0–0.2)
BASOPHILS NFR BLD: 0.3 % (ref 0–1.9)
DIFFERENTIAL METHOD: ABNORMAL
EOSINOPHIL # BLD AUTO: 0 K/UL (ref 0–0.5)
EOSINOPHIL NFR BLD: 1.1 % (ref 0–8)
ERYTHROCYTE [DISTWIDTH] IN BLOOD BY AUTOMATED COUNT: 13.1 % (ref 11.5–14.5)
HCT VFR BLD AUTO: 41.3 % (ref 37–48.5)
HGB BLD-MCNC: 14 G/DL (ref 12–16)
IMM GRANULOCYTES # BLD AUTO: 0.01 K/UL (ref 0–0.04)
IMM GRANULOCYTES NFR BLD AUTO: 0.3 % (ref 0–0.5)
INR PPP: 1.1 (ref 0.8–1.2)
LYMPHOCYTES # BLD AUTO: 0.7 K/UL (ref 1–4.8)
LYMPHOCYTES NFR BLD: 18.1 % (ref 18–48)
MCH RBC QN AUTO: 31.4 PG (ref 27–31)
MCHC RBC AUTO-ENTMCNC: 33.9 G/DL (ref 32–36)
MCV RBC AUTO: 93 FL (ref 82–98)
MONOCYTES # BLD AUTO: 0.3 K/UL (ref 0.3–1)
MONOCYTES NFR BLD: 7.6 % (ref 4–15)
NEUTROPHILS # BLD AUTO: 2.7 K/UL (ref 1.8–7.7)
NEUTROPHILS NFR BLD: 72.6 % (ref 38–73)
NRBC BLD-RTO: 0 /100 WBC
PLATELET # BLD AUTO: 47 K/UL (ref 150–450)
PMV BLD AUTO: 11.9 FL (ref 9.2–12.9)
PROTHROMBIN TIME: 11.8 SEC (ref 9–12.5)
RBC # BLD AUTO: 4.46 M/UL (ref 4–5.4)
WBC # BLD AUTO: 3.7 K/UL (ref 3.9–12.7)

## 2022-01-26 PROCEDURE — 36415 COLL VENOUS BLD VENIPUNCTURE: CPT | Performed by: FAMILY MEDICINE

## 2022-01-26 PROCEDURE — 85610 PROTHROMBIN TIME: CPT | Performed by: FAMILY MEDICINE

## 2022-01-26 PROCEDURE — 85025 COMPLETE CBC W/AUTO DIFF WBC: CPT | Performed by: FAMILY MEDICINE

## 2022-01-27 ENCOUNTER — PATIENT MESSAGE (OUTPATIENT)
Dept: HEPATOLOGY | Facility: CLINIC | Age: 52
End: 2022-01-27
Payer: COMMERCIAL

## 2022-02-11 ENCOUNTER — HOSPITAL ENCOUNTER (OUTPATIENT)
Dept: RADIOLOGY | Facility: HOSPITAL | Age: 52
Discharge: HOME | End: 2022-02-11
Attending: FAMILY MEDICINE
Payer: COMMERCIAL

## 2022-02-11 DIAGNOSIS — M21.371 FOOT DROP, RIGHT: ICD-10-CM

## 2022-02-11 PROCEDURE — 72148 MRI LUMBAR SPINE W/O DYE: CPT | Mod: 26,,, | Performed by: RADIOLOGY

## 2022-02-11 PROCEDURE — 72148 MRI LUMBAR SPINE WITHOUT CONTRAST: ICD-10-PCS | Mod: 26,,, | Performed by: RADIOLOGY

## 2022-02-11 PROCEDURE — 72148 MRI LUMBAR SPINE W/O DYE: CPT | Mod: TC

## 2022-02-15 ENCOUNTER — HOSPITAL ENCOUNTER (OUTPATIENT)
Dept: PULMONOLOGY | Facility: HOSPITAL | Age: 52
Discharge: HOME | End: 2022-02-15
Attending: PSYCHIATRY & NEUROLOGY
Payer: COMMERCIAL

## 2022-02-15 ENCOUNTER — OFFICE VISIT (OUTPATIENT)
Dept: NEUROLOGY | Facility: CLINIC | Age: 52
End: 2022-02-15
Payer: COMMERCIAL

## 2022-02-15 VITALS
RESPIRATION RATE: 16 BRPM | HEART RATE: 90 BPM | WEIGHT: 142.19 LBS | DIASTOLIC BLOOD PRESSURE: 80 MMHG | SYSTOLIC BLOOD PRESSURE: 112 MMHG | HEIGHT: 68 IN | BODY MASS INDEX: 21.55 KG/M2

## 2022-02-15 DIAGNOSIS — M21.371 RIGHT FOOT DROP: Primary | ICD-10-CM

## 2022-02-15 DIAGNOSIS — R20.9 COLD RIGHT FOOT: ICD-10-CM

## 2022-02-15 DIAGNOSIS — F17.200 SMOKER: ICD-10-CM

## 2022-02-15 LAB
LEFT ABI: 1.15
LEFT ARM BP: 117 MMHG
LEFT DORSALIS PEDIS: 142 MMHG
LEFT POSTERIOR TIBIAL: 131 MMHG
LEFT TBI: 0.39
LEFT TOE PRESSURE: 48 MMHG
RIGHT ABI: 1.1
RIGHT ARM BP: 124 MMHG
RIGHT DORSALIS PEDIS: 136 MMHG
RIGHT POSTERIOR TIBIAL: 124 MMHG
RIGHT TBI: 0.5
RIGHT TOE PRESSURE: 62 MMHG

## 2022-02-15 PROCEDURE — 93922 UPR/L XTREMITY ART 2 LEVELS: CPT

## 2022-02-15 PROCEDURE — 99204 PR OFFICE/OUTPT VISIT, NEW, LEVL IV, 45-59 MIN: ICD-10-PCS | Mod: S$GLB,,, | Performed by: PSYCHIATRY & NEUROLOGY

## 2022-02-15 PROCEDURE — 3079F DIAST BP 80-89 MM HG: CPT | Mod: CPTII,S$GLB,, | Performed by: PSYCHIATRY & NEUROLOGY

## 2022-02-15 PROCEDURE — 99999 PR PBB SHADOW E&M-EST. PATIENT-LVL IV: ICD-10-PCS | Mod: PBBFAC,,, | Performed by: PSYCHIATRY & NEUROLOGY

## 2022-02-15 PROCEDURE — 3074F SYST BP LT 130 MM HG: CPT | Mod: CPTII,S$GLB,, | Performed by: PSYCHIATRY & NEUROLOGY

## 2022-02-15 PROCEDURE — 3008F BODY MASS INDEX DOCD: CPT | Mod: CPTII,S$GLB,, | Performed by: PSYCHIATRY & NEUROLOGY

## 2022-02-15 PROCEDURE — 3074F PR MOST RECENT SYSTOLIC BLOOD PRESSURE < 130 MM HG: ICD-10-PCS | Mod: CPTII,S$GLB,, | Performed by: PSYCHIATRY & NEUROLOGY

## 2022-02-15 PROCEDURE — 3079F PR MOST RECENT DIASTOLIC BLOOD PRESSURE 80-89 MM HG: ICD-10-PCS | Mod: CPTII,S$GLB,, | Performed by: PSYCHIATRY & NEUROLOGY

## 2022-02-15 PROCEDURE — 99204 OFFICE O/P NEW MOD 45 MIN: CPT | Mod: S$GLB,,, | Performed by: PSYCHIATRY & NEUROLOGY

## 2022-02-15 PROCEDURE — 1159F MED LIST DOCD IN RCRD: CPT | Mod: CPTII,S$GLB,, | Performed by: PSYCHIATRY & NEUROLOGY

## 2022-02-15 PROCEDURE — 99999 PR PBB SHADOW E&M-EST. PATIENT-LVL IV: CPT | Mod: PBBFAC,,, | Performed by: PSYCHIATRY & NEUROLOGY

## 2022-02-15 PROCEDURE — 3008F PR BODY MASS INDEX (BMI) DOCUMENTED: ICD-10-PCS | Mod: CPTII,S$GLB,, | Performed by: PSYCHIATRY & NEUROLOGY

## 2022-02-15 PROCEDURE — 1159F PR MEDICATION LIST DOCUMENTED IN MEDICAL RECORD: ICD-10-PCS | Mod: CPTII,S$GLB,, | Performed by: PSYCHIATRY & NEUROLOGY

## 2022-02-15 RX ORDER — AMOXICILLIN 500 MG
1 CAPSULE ORAL DAILY
COMMUNITY

## 2022-02-15 RX ORDER — MULTIVITAMIN
1 TABLET ORAL DAILY
COMMUNITY

## 2022-02-15 NOTE — PROGRESS NOTES
HPI: Tee Aranda is a 51 y.o. female referred for visual changes from her OD prior      She has not seen me in follow up since 10/2018    Represents today to re-establish care for Foot drop    2 weeks ago, she was just walking in the gym (for exercising with walking for 0.5miles- she usually walks 2 miles) and noticed her right foot pulled left and gradually daily over the next few days this worsened to dragging of the right foot. She also has numbness on the top of the feet  She can't dorsi flex the foot but can plantar flex  No history of prolonged sitting or laying prior to this. Does cross her legs.  She is not diabetic.   PCP ordered MRI  L spine (see below)  No weakness or numbness in the arm or face.     Right eye visual changes persist  Not seeing pain management     + smoker. Right foot has been cold with this    Lost weight over the past year intentionally. Through diet and exercise.     No longer on opiates- treated for nonalcoholic steatohepatoitis    Review of Systems   Constitutional: Negative for fever.   HENT: Negative for nosebleeds.    Eyes: Negative for blurred vision.   Respiratory: Negative for hemoptysis.    Gastrointestinal: Negative for blood in stool.   Genitourinary: Negative for hematuria.   Musculoskeletal: Negative for back pain and falls.   Skin: Negative for rash.   Neurological: Negative for headaches.   Endo/Heme/Allergies: Does not bruise/bleed easily.         I have reviewed all of this patient's past medical and surgical histories as well as family and social histories and active allergies and medications as documented in the electronic medical record.        Exam:  Gen Appearance, well developed/nourished in no apparent distress  CV: 1+ distal pulses with no edema or swelling  Neuro:  MS: Awake, alert, oriented to place, person, time, situation. Sustains attention. Recent/remote memory intact, Language is full to spontaneous speech/repetition/naming/comprehension. Fund of  Knowledge is full  CN: Optic discs are not well viewed due to miosis. There is NO APD,  PERRL, Extraoccular movements and visual fields are full. Normal facial sensation and strength, Hearing symmetric, Tongue and Palate are midline and strong. Shoulder Shrug symmetric and strong.  Motor: Normal bulk, tone, no abnormal movements. 5/5 strength bilateral upper/lower extremities except 3/5 right foot dorsiflexion and 4-/5 right foot eversion with 2+ reflexes and no clonus  Sensory: symmetric to light touch, pain, temp, and vibration except reduced to temp, vibration and pin on the right dorsal foot/ lower ankle anteriorly.  Romberg negative  Cerebellar: Finger-nose,Heal-shin, Rapid alternating movements intact  Gait: Normal stance, no ataxia but mild right foot drop noted.     Imaging: EMG 2015: Suggestion of Right C4 Radiculopathy     2/2022 MRI L spine:  T12-L1: No significant spinal canal stenosis or neural foraminal narrowing.     L1-L2:  Focal central disc protrusion.  No central canal stenosis or neural foraminal narrowing.     L2-L3: Broad-based posterior disc bulge.  No central canal stenosis or neural foraminal narrowing.     L3-L4: Schmorl's node of the superior endplate of L3. Minimal broad-based posterior disc bulge.  No central canal stenosis or neural foraminal narrowing.     L4-L5: Broad-based posterior disc bulge extending into both neural foramina with facet arthropathy contributing to mild bilateral neural foraminal narrowing.     L5-S1: Broad-based posterior disc bulge with facet arthropathy contributing to mild left neural foraminal narrowing.    10/2018 Xray Skull: No abnormal lytic or sclerotic lesion is identified.  No calvarial fracture is detected.  If there is high clinical concern for underlying lesion, consider further evaluation with a CT scan    10/2018 MRI brain: No MRI evidence of an acute intracranial abnormality.    10/2018 CT head: Unremarkable noncontrast CT head specifically without  "evidence for acute intracranial hemorrhage.  Clinical correlation and further evaluation as warranted.     No skull lesion is identified.       Assessment/Plan: Tee Aranda is a 51 y.o. female with several months of headaches, visual changes,  and sensation that she has a "crack in the skull" who was found to have decreased acuity in the right eye over the past year. She does have a right cranial depression noted in the skull on palpation today which is parietal in location  which may follow a suture line.     I recommend:   1. Skull xray, MRI brain and CT head in 2018 was normal- no explanation for change in visual acuity by those exams  -Followed up with neuro-ophthalmology who noted "cataract of right eye and trauma to her right eye at age 3 years that could explain why her lens changes are asymmetrical. I found no other pathology that would contribute to her vision decrease in her right eye." She was told to remove cataract PRN  - Note: She has a personal history of remote migraine without aura.     2. Note she saw me in 2/2015 for 2 weeks of cervical radicular pain and EMG then showed Suggestion of Right C4 Radiculopathy and she was advised on seeing pain management to consider intervention at that time- she stated this is resolved after interventions with pain management.       3. Returns today with complaint of right foot drop for 2 weeks. Does walk regularly but no clear precipitating  factors otherwise  -MRI L spine does some lumbar disc disease and NF narrowing  - Exam is most consistently with Right Peroneal neuropathy  -Nothing to suggest CVA on exam/ by  History. This was of gradual onset and may have been related to ?overuse  -Note she is a smoker and has cold sensation in the foot: Check HALLIE  -Smoker Urged to quit  -EMG/NCS of the legs  -Lower platelets noted (related liver disease)  -Refer to PT for weakness and consider AFO if worse/ currently appears well compensated    4. Note: No longer on " opiates- treated for nonalcoholic liver disease    RTC for EMG/NC

## 2022-02-17 ENCOUNTER — TELEPHONE (OUTPATIENT)
Dept: NEUROLOGY | Facility: CLINIC | Age: 52
End: 2022-02-17
Payer: COMMERCIAL

## 2022-02-17 DIAGNOSIS — I73.9 PAD (PERIPHERAL ARTERY DISEASE): Primary | ICD-10-CM

## 2022-02-27 ENCOUNTER — PATIENT MESSAGE (OUTPATIENT)
Dept: NEUROLOGY | Facility: CLINIC | Age: 52
End: 2022-02-27
Payer: COMMERCIAL

## 2022-03-17 ENCOUNTER — PROCEDURE VISIT (OUTPATIENT)
Dept: NEUROLOGY | Facility: CLINIC | Age: 52
End: 2022-03-17
Payer: COMMERCIAL

## 2022-03-17 DIAGNOSIS — M21.371 RIGHT FOOT DROP: ICD-10-CM

## 2022-03-17 DIAGNOSIS — G57.31 RIGHT PERONEAL NERVE PALSY: Primary | ICD-10-CM

## 2022-03-17 PROCEDURE — 95886 MUSC TEST DONE W/N TEST COMP: CPT | Mod: S$GLB,,, | Performed by: PSYCHIATRY & NEUROLOGY

## 2022-03-17 PROCEDURE — 95911 NRV CNDJ TEST 9-10 STUDIES: CPT | Mod: S$GLB,,, | Performed by: PSYCHIATRY & NEUROLOGY

## 2022-03-17 PROCEDURE — 95886 PR EMG COMPLETE, W/ NERVE CONDUCTION STUDIES, 5+ MUSCLES: ICD-10-PCS | Mod: S$GLB,,, | Performed by: PSYCHIATRY & NEUROLOGY

## 2022-03-17 PROCEDURE — 95911 PR NERVE CONDUCTION STUDY; 9-10 STUDIES: ICD-10-PCS | Mod: S$GLB,,, | Performed by: PSYCHIATRY & NEUROLOGY

## 2022-03-17 NOTE — PROCEDURES
EMG W/ ULTRASOUND AND NERVE CONDUCTION TEST 2 Extremities    Date/Time: 3/17/2022 2:00 PM  Performed by: Fernando Gray MD  Authorized by: Fernando Gray MD       REPORT OF EMG and NERVE CONDUCTION STUDY    Name: Tee Aranda  Date of Study:  3/17/2022  Referring Physician:  Isaac  Test Performed by:  MD Isaac  Full Values Attached  Informed Consent Scanned.   No anesthesia used.   Amount of Blood Loss: none. The patient tolerated this procedure well.       Informed consent was obtained prior to performing this study. Two patient identifiers were confirmed with the patient prior to performing this study. A time out to determine correct patient and and agreement on procedure performed was conducted prior to the concentric needle examination.    Reason for the study:  Right foot drop      Findings:   Nerve conduction studies of the  bilateral peroneal motor, bilateral tibial motor, bilateral sural nerves, right peroneal sensory nerve,  and bilateral H-reflexes were performed. For the peroneal nerve: There was decreased amplitude of the peroneal nerve at the fibular head and absent CMAP above the fibular head. Right superficial peroneal sensory nerve was absent  Otherwise, amplitudes, distal latencies, conduction velocities, and F-waves were normal. H reflexes were symetric  EMG of selected muscles of the bilateral arms, bilateral legs, bilateral cervical paraspinal muscles and bilateral lumbar and sacral paraspinal muscles were performed as indicated on the attached sheets. There was decreased recruitment in the right Posterior tibial muscle.   Insertional activity was normal without fasciculation or fibrillation, normal sized and phasia of motor units.      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 [facility].     Recommendations (Discussed at this visit):  -she reports symptoms are  50% better with PT and she will continue PT hopefully for further improvement.  -See cardiology urged re: abnormal findings on ABIs    RTC given

## 2022-03-23 ENCOUNTER — LAB VISIT (OUTPATIENT)
Dept: LAB | Facility: HOSPITAL | Age: 52
End: 2022-03-23
Attending: FAMILY MEDICINE
Payer: COMMERCIAL

## 2022-03-23 DIAGNOSIS — R53.83 OTHER FATIGUE: Primary | ICD-10-CM

## 2022-03-23 LAB
ALBUMIN SERPL BCP-MCNC: 4.2 G/DL (ref 3.5–5.2)
ALP SERPL-CCNC: 92 U/L (ref 55–135)
ALT SERPL W/O P-5'-P-CCNC: 29 U/L (ref 10–44)
ANION GAP SERPL CALC-SCNC: 11 MMOL/L (ref 8–16)
AST SERPL-CCNC: 24 U/L (ref 10–40)
BASOPHILS # BLD AUTO: 0.01 K/UL (ref 0–0.2)
BASOPHILS NFR BLD: 0.2 % (ref 0–1.9)
BILIRUB SERPL-MCNC: 1.1 MG/DL (ref 0.1–1)
BUN SERPL-MCNC: 10 MG/DL (ref 6–20)
CALCIUM SERPL-MCNC: 9.6 MG/DL (ref 8.7–10.5)
CHLORIDE SERPL-SCNC: 104 MMOL/L (ref 95–110)
CO2 SERPL-SCNC: 25 MMOL/L (ref 23–29)
CREAT SERPL-MCNC: 0.8 MG/DL (ref 0.5–1.4)
DIFFERENTIAL METHOD: ABNORMAL
EOSINOPHIL # BLD AUTO: 0 K/UL (ref 0–0.5)
EOSINOPHIL NFR BLD: 1 % (ref 0–8)
ERYTHROCYTE [DISTWIDTH] IN BLOOD BY AUTOMATED COUNT: 12.4 % (ref 11.5–14.5)
EST. GFR  (AFRICAN AMERICAN): >60 ML/MIN/1.73 M^2
EST. GFR  (NON AFRICAN AMERICAN): >60 ML/MIN/1.73 M^2
GLUCOSE SERPL-MCNC: 116 MG/DL (ref 70–110)
HCT VFR BLD AUTO: 42.2 % (ref 37–48.5)
HGB BLD-MCNC: 13.9 G/DL (ref 12–16)
IMM GRANULOCYTES # BLD AUTO: 0.02 K/UL (ref 0–0.04)
IMM GRANULOCYTES NFR BLD AUTO: 0.5 % (ref 0–0.5)
LYMPHOCYTES # BLD AUTO: 0.7 K/UL (ref 1–4.8)
LYMPHOCYTES NFR BLD: 16.7 % (ref 18–48)
MCH RBC QN AUTO: 30.4 PG (ref 27–31)
MCHC RBC AUTO-ENTMCNC: 32.9 G/DL (ref 32–36)
MCV RBC AUTO: 92 FL (ref 82–98)
MONOCYTES # BLD AUTO: 0.3 K/UL (ref 0.3–1)
MONOCYTES NFR BLD: 6.9 % (ref 4–15)
NEUTROPHILS # BLD AUTO: 3.1 K/UL (ref 1.8–7.7)
NEUTROPHILS NFR BLD: 74.7 % (ref 38–73)
NRBC BLD-RTO: 0 /100 WBC
PLATELET # BLD AUTO: 71 K/UL (ref 150–450)
PMV BLD AUTO: 11.2 FL (ref 9.2–12.9)
POTASSIUM SERPL-SCNC: 3.7 MMOL/L (ref 3.5–5.1)
PROT SERPL-MCNC: 7.1 G/DL (ref 6–8.4)
RBC # BLD AUTO: 4.57 M/UL (ref 4–5.4)
SODIUM SERPL-SCNC: 140 MMOL/L (ref 136–145)
WBC # BLD AUTO: 4.19 K/UL (ref 3.9–12.7)

## 2022-03-23 PROCEDURE — 85025 COMPLETE CBC W/AUTO DIFF WBC: CPT | Performed by: FAMILY MEDICINE

## 2022-03-23 PROCEDURE — 80053 COMPREHEN METABOLIC PANEL: CPT | Performed by: FAMILY MEDICINE

## 2022-03-23 PROCEDURE — 36415 COLL VENOUS BLD VENIPUNCTURE: CPT | Performed by: FAMILY MEDICINE

## 2022-05-20 ENCOUNTER — TELEPHONE (OUTPATIENT)
Dept: HEPATOLOGY | Facility: CLINIC | Age: 52
End: 2022-05-20
Payer: COMMERCIAL

## 2022-06-27 ENCOUNTER — OFFICE VISIT (OUTPATIENT)
Dept: HEPATOLOGY | Facility: CLINIC | Age: 52
End: 2022-06-27
Payer: COMMERCIAL

## 2022-06-27 ENCOUNTER — LAB VISIT (OUTPATIENT)
Dept: LAB | Facility: HOSPITAL | Age: 52
End: 2022-06-27
Attending: INTERNAL MEDICINE
Payer: COMMERCIAL

## 2022-06-27 VITALS
BODY MASS INDEX: 22.29 KG/M2 | SYSTOLIC BLOOD PRESSURE: 115 MMHG | OXYGEN SATURATION: 99 % | TEMPERATURE: 98 F | WEIGHT: 147.06 LBS | HEIGHT: 68 IN | DIASTOLIC BLOOD PRESSURE: 56 MMHG | RESPIRATION RATE: 17 BRPM | HEART RATE: 60 BPM

## 2022-06-27 DIAGNOSIS — K75.81 LIVER CIRRHOSIS SECONDARY TO NASH: Chronic | ICD-10-CM

## 2022-06-27 DIAGNOSIS — I85.00 ESOPHAGEAL VARICES WITHOUT BLEEDING, UNSPECIFIED ESOPHAGEAL VARICES TYPE: ICD-10-CM

## 2022-06-27 DIAGNOSIS — K74.60 HEPATIC CIRRHOSIS, UNSPECIFIED HEPATIC CIRRHOSIS TYPE, UNSPECIFIED WHETHER ASCITES PRESENT: Primary | ICD-10-CM

## 2022-06-27 DIAGNOSIS — K74.60 LIVER CIRRHOSIS SECONDARY TO NASH: Chronic | ICD-10-CM

## 2022-06-27 DIAGNOSIS — I85.11 SECONDARY ESOPHAGEAL VARICES WITH BLEEDING: ICD-10-CM

## 2022-06-27 DIAGNOSIS — K76.82 HEPATIC ENCEPHALOPATHY: ICD-10-CM

## 2022-06-27 DIAGNOSIS — R18.8 OTHER ASCITES: Primary | ICD-10-CM

## 2022-06-27 DIAGNOSIS — R60.9 EDEMA, UNSPECIFIED TYPE: ICD-10-CM

## 2022-06-27 LAB
AFP SERPL-MCNC: 3.4 NG/ML (ref 0–8.4)
ALBUMIN SERPL BCP-MCNC: 3.9 G/DL (ref 3.5–5.2)
ALP SERPL-CCNC: 123 U/L (ref 55–135)
ALT SERPL W/O P-5'-P-CCNC: 26 U/L (ref 10–44)
ANION GAP SERPL CALC-SCNC: 8 MMOL/L (ref 8–16)
AST SERPL-CCNC: 30 U/L (ref 10–40)
BASOPHILS # BLD AUTO: 0.02 K/UL (ref 0–0.2)
BASOPHILS NFR BLD: 0.4 % (ref 0–1.9)
BILIRUB SERPL-MCNC: 0.5 MG/DL (ref 0.1–1)
BUN SERPL-MCNC: 10 MG/DL (ref 6–20)
CALCIUM SERPL-MCNC: 9.6 MG/DL (ref 8.7–10.5)
CHLORIDE SERPL-SCNC: 109 MMOL/L (ref 95–110)
CO2 SERPL-SCNC: 24 MMOL/L (ref 23–29)
CREAT SERPL-MCNC: 0.7 MG/DL (ref 0.5–1.4)
DIFFERENTIAL METHOD: ABNORMAL
EOSINOPHIL # BLD AUTO: 0.1 K/UL (ref 0–0.5)
EOSINOPHIL NFR BLD: 1.5 % (ref 0–8)
ERYTHROCYTE [DISTWIDTH] IN BLOOD BY AUTOMATED COUNT: 12.5 % (ref 11.5–14.5)
EST. GFR  (AFRICAN AMERICAN): >60 ML/MIN/1.73 M^2
EST. GFR  (NON AFRICAN AMERICAN): >60 ML/MIN/1.73 M^2
GLUCOSE SERPL-MCNC: 113 MG/DL (ref 70–110)
HCT VFR BLD AUTO: 39.2 % (ref 37–48.5)
HGB BLD-MCNC: 12.8 G/DL (ref 12–16)
IMM GRANULOCYTES # BLD AUTO: 0.02 K/UL (ref 0–0.04)
IMM GRANULOCYTES NFR BLD AUTO: 0.4 % (ref 0–0.5)
INR PPP: 1.1 (ref 0.8–1.2)
LYMPHOCYTES # BLD AUTO: 0.7 K/UL (ref 1–4.8)
LYMPHOCYTES NFR BLD: 15 % (ref 18–48)
MCH RBC QN AUTO: 30.6 PG (ref 27–31)
MCHC RBC AUTO-ENTMCNC: 32.7 G/DL (ref 32–36)
MCV RBC AUTO: 94 FL (ref 82–98)
MONOCYTES # BLD AUTO: 0.3 K/UL (ref 0.3–1)
MONOCYTES NFR BLD: 6.2 % (ref 4–15)
NEUTROPHILS # BLD AUTO: 3.6 K/UL (ref 1.8–7.7)
NEUTROPHILS NFR BLD: 76.5 % (ref 38–73)
NRBC BLD-RTO: 0 /100 WBC
PLATELET # BLD AUTO: 66 K/UL (ref 150–450)
PMV BLD AUTO: 12 FL (ref 9.2–12.9)
POTASSIUM SERPL-SCNC: 4 MMOL/L (ref 3.5–5.1)
PROT SERPL-MCNC: 6.9 G/DL (ref 6–8.4)
PROTHROMBIN TIME: 11.5 SEC (ref 9–12.5)
RBC # BLD AUTO: 4.18 M/UL (ref 4–5.4)
SODIUM SERPL-SCNC: 141 MMOL/L (ref 136–145)
WBC # BLD AUTO: 4.67 K/UL (ref 3.9–12.7)

## 2022-06-27 PROCEDURE — 3074F SYST BP LT 130 MM HG: CPT | Mod: CPTII,S$GLB,, | Performed by: INTERNAL MEDICINE

## 2022-06-27 PROCEDURE — 82105 ALPHA-FETOPROTEIN SERUM: CPT | Performed by: INTERNAL MEDICINE

## 2022-06-27 PROCEDURE — 1159F PR MEDICATION LIST DOCUMENTED IN MEDICAL RECORD: ICD-10-PCS | Mod: CPTII,S$GLB,, | Performed by: INTERNAL MEDICINE

## 2022-06-27 PROCEDURE — 3078F PR MOST RECENT DIASTOLIC BLOOD PRESSURE < 80 MM HG: ICD-10-PCS | Mod: CPTII,S$GLB,, | Performed by: INTERNAL MEDICINE

## 2022-06-27 PROCEDURE — 99999 PR PBB SHADOW E&M-EST. PATIENT-LVL V: ICD-10-PCS | Mod: PBBFAC,,, | Performed by: INTERNAL MEDICINE

## 2022-06-27 PROCEDURE — 3008F PR BODY MASS INDEX (BMI) DOCUMENTED: ICD-10-PCS | Mod: CPTII,S$GLB,, | Performed by: INTERNAL MEDICINE

## 2022-06-27 PROCEDURE — 1160F RVW MEDS BY RX/DR IN RCRD: CPT | Mod: CPTII,S$GLB,, | Performed by: INTERNAL MEDICINE

## 2022-06-27 PROCEDURE — 85025 COMPLETE CBC W/AUTO DIFF WBC: CPT | Performed by: INTERNAL MEDICINE

## 2022-06-27 PROCEDURE — 1159F MED LIST DOCD IN RCRD: CPT | Mod: CPTII,S$GLB,, | Performed by: INTERNAL MEDICINE

## 2022-06-27 PROCEDURE — 80053 COMPREHEN METABOLIC PANEL: CPT | Performed by: INTERNAL MEDICINE

## 2022-06-27 PROCEDURE — 3078F DIAST BP <80 MM HG: CPT | Mod: CPTII,S$GLB,, | Performed by: INTERNAL MEDICINE

## 2022-06-27 PROCEDURE — 99999 PR PBB SHADOW E&M-EST. PATIENT-LVL V: CPT | Mod: PBBFAC,,, | Performed by: INTERNAL MEDICINE

## 2022-06-27 PROCEDURE — 1160F PR REVIEW ALL MEDS BY PRESCRIBER/CLIN PHARMACIST DOCUMENTED: ICD-10-PCS | Mod: CPTII,S$GLB,, | Performed by: INTERNAL MEDICINE

## 2022-06-27 PROCEDURE — 85610 PROTHROMBIN TIME: CPT | Performed by: INTERNAL MEDICINE

## 2022-06-27 PROCEDURE — 3074F PR MOST RECENT SYSTOLIC BLOOD PRESSURE < 130 MM HG: ICD-10-PCS | Mod: CPTII,S$GLB,, | Performed by: INTERNAL MEDICINE

## 2022-06-27 PROCEDURE — 99214 OFFICE O/P EST MOD 30 MIN: CPT | Mod: S$GLB,,, | Performed by: INTERNAL MEDICINE

## 2022-06-27 PROCEDURE — 80321 ALCOHOLS BIOMARKERS 1OR 2: CPT | Performed by: INTERNAL MEDICINE

## 2022-06-27 PROCEDURE — 99214 PR OFFICE/OUTPT VISIT, EST, LEVL IV, 30-39 MIN: ICD-10-PCS | Mod: S$GLB,,, | Performed by: INTERNAL MEDICINE

## 2022-06-27 PROCEDURE — 36415 COLL VENOUS BLD VENIPUNCTURE: CPT | Mod: PN | Performed by: INTERNAL MEDICINE

## 2022-06-27 PROCEDURE — 3008F BODY MASS INDEX DOCD: CPT | Mod: CPTII,S$GLB,, | Performed by: INTERNAL MEDICINE

## 2022-06-27 NOTE — PROGRESS NOTES
HEPATOLOGY FOLLOW UP    Referring Physician: James Samano MD  Current Corresponding Physician: James Samano MD, Gerard Salinas MD    Tee Aranda is here for follow up of decompensated cirrhosis    HPI   Tee Aranda was seen in consultation by my colleague Dr White 08/20. He noted:  This 51-year-old woman was referred for evaluation of cirrhosis.  She noticed easy bruisability.  She was found have a low platelet count.  She was assessed by Hematology.  She had a bone marrow aspirate and biopsy which was normal.  She eventually had an upper endoscopy which showed features of portal hypertension and grade 1 varices.  A FibroScan confirmed significant fatty liver as well as advanced fibrosis and cirrhosis.  Her weight was as high as 199 lb.  She has lost 26 lb after the diagnosis.  There is no family history of liver disease. She drinks alcohol socially.  She has mild ankle edema.  She has no symptoms of hepatic encephalopathy.  She initially had no symptoms of GI bleeding.    Admission 11/9/20: melena and weakenss. Hemoglobin fell to 5.9 and she was transfused 2 units of PRBC. EGD showed PHG and small varices. The finding was similar to an EGD done earlier this year (03/20). Small varices werenoted at that time as well. Coreg and protonix were prescribed but never started.  Admission 12/28/20: gastrintestinal hemorrhage and melena; EGD: esophageal varices banded  EGD 2/15/21: EV not banded; repeat 6 months recommended  ER visit 7/19/21 for hematemasis; hemoglobin stable at 12.5; LEFT AGAINST MEDICAL ADVICE    Interval History  Has not been seen since 07/21. Since her last visit, she has been able to stop lactulose and her diuretics. She also has had no bleeding issues. She is feeling well with no N/V, abdominal pain or diarrhea. She did have right shoulder surgery and it went uncomplicated.    HE: no longer taking lactulose  Ascites/edema, resolved- off lasix and aldactone  HCC screening: overdue for HCC  screening    Her BMI is now 22. Serologic w/u for CLD-negative.     Labs 3/23/22: Tbil 1.1, ALT 29, AST 24, ALKP 92  MELD-Na score: 7 at 7/26/2021 12:12 PM  MELD score: 7 at 7/26/2021 12:12 PM  Calculated from:  Serum Creatinine: 0.9 mg/dL (Using min of 1 mg/dL) at 7/26/2021 12:12 PM  Serum Sodium: 141 mmol/L (Using max of 137 mmol/L) at 7/26/2021 12:12 PM  Total Bilirubin: 0.7 mg/dL (Using min of 1 mg/dL) at 7/26/2021 12:12 PM  INR(ratio): 1.1 at 7/26/2021 12:12 PM  Age: 51 years    Outpatient Encounter Medications as of 6/27/2022   Medication Sig Dispense Refill    carvediloL (COREG) 3.125 MG tablet Take 1 tablet (3.125 mg total) by mouth 2 (two) times daily. 60 tablet 11    multivitamin (THERAGRAN) per tablet Take 1 tablet by mouth once daily.      omega-3 fatty acids/fish oil (FISH OIL-OMEGA-3 FATTY ACIDS) 300-1,000 mg capsule Take 1 capsule by mouth once daily.      pantoprazole (PROTONIX) 40 MG tablet Take 1 tablet (40 mg total) by mouth once daily. 180 tablet 4    rifAXIMin (XIFAXAN) 550 mg Tab Take 1 tablet (550 mg total) by mouth 2 (two) times daily. 60 tablet 11    furosemide (LASIX) 20 MG tablet Take 1 tablet (20 mg total) by mouth once daily. Take in morning (Patient taking differently: Take 20 mg by mouth daily as needed.) 30 tablet 11    lactulose (CEPHULAC) 10 gram packet Take 10 g by mouth 3 (three) times daily as needed.      ondansetron (ZOFRAN) 8 MG tablet Take 1 tablet (8 mg total) by mouth every 8 (eight) hours as needed for Nausea. 15 tablet 0    spironolactone (ALDACTONE) 25 MG tablet Take 2 tablets (50 mg total) by mouth once daily. (Patient taking differently: Take 50 mg by mouth daily as needed.) 60 tablet 11     No facility-administered encounter medications on file as of 6/27/2022.     Review of patient's allergies indicates:   Allergen Reactions    Sulfa (sulfonamide antibiotics) Hives     Past Medical History:   Diagnosis Date    Anxiety     Arthritis     Ascites 11/23/2020     AVN (avascular necrosis of bone)     Cataract     Edema 11/23/2020    Esophageal varices     Glaucoma     Hepatic encephalopathy 11/23/2020    Hx of cirrhosis     non-alcoholic    Kidney stones        Review of Systems   Constitutional: Negative.    HENT: Negative.    Eyes: Negative.    Respiratory: Negative.    Cardiovascular: Negative.    Gastrointestinal: Negative.    Genitourinary: Negative.    Musculoskeletal: Negative.    Skin: Negative.    Neurological: Negative.    Psychiatric/Behavioral: Negative.      Vitals:    06/27/22 0930   BP: (!) 115/56   Pulse: 60   Resp: 17   Temp: 97.8 °F (36.6 °C)       Physical Exam  Vitals reviewed.   Constitutional:       Appearance: She is well-developed.   HENT:      Head: Normocephalic and atraumatic.   Eyes:      General: No scleral icterus.     Conjunctiva/sclera: Conjunctivae normal.      Pupils: Pupils are equal, round, and reactive to light.   Neck:      Thyroid: No thyromegaly.   Cardiovascular:      Rate and Rhythm: Normal rate and regular rhythm.      Heart sounds: Normal heart sounds.   Pulmonary:      Effort: Pulmonary effort is normal.      Breath sounds: Normal breath sounds. No rales.   Abdominal:      General: Bowel sounds are normal. There is no distension.      Palpations: Abdomen is soft. There is no mass.      Tenderness: There is no abdominal tenderness.   Musculoskeletal:         General: Normal range of motion.      Cervical back: Normal range of motion and neck supple.   Skin:     General: Skin is warm and dry.      Findings: No rash.   Neurological:      Mental Status: She is alert and oriented to person, place, and time.         MELD-Na score: 7 at 7/26/2021 12:12 PM  MELD score: 7 at 7/26/2021 12:12 PM  Calculated from:  Serum Creatinine: 0.9 mg/dL (Using min of 1 mg/dL) at 7/26/2021 12:12 PM  Serum Sodium: 141 mmol/L (Using max of 137 mmol/L) at 7/26/2021 12:12 PM  Total Bilirubin: 0.7 mg/dL (Using min of 1 mg/dL) at 7/26/2021 12:12  PM  INR(ratio): 1.1 at 7/26/2021 12:12 PM  Age: 51 years    Lab Results   Component Value Date     (H) 03/23/2022    BUN 10 03/23/2022    CREATININE 0.8 03/23/2022    CALCIUM 9.6 03/23/2022     03/23/2022    K 3.7 03/23/2022     03/23/2022    PROT 7.1 03/23/2022    CO2 25 03/23/2022    ANIONGAP 11 03/23/2022    WBC 4.19 03/23/2022    RBC 4.57 03/23/2022    HGB 13.9 03/23/2022    HCT 42.2 03/23/2022    MCV 92 03/23/2022    MCH 30.4 03/23/2022    MCHC 32.9 03/23/2022     Lab Results   Component Value Date    RDW 12.4 03/23/2022    PLT 71 (L) 03/23/2022    MPV 11.2 03/23/2022    GRAN 3.1 03/23/2022    GRAN 74.7 (H) 03/23/2022    LYMPH 0.7 (L) 03/23/2022    LYMPH 16.7 (L) 03/23/2022    MONO 0.3 03/23/2022    MONO 6.9 03/23/2022    EOSINOPHIL 1.0 03/23/2022    BASOPHIL 0.2 03/23/2022    EOS 0.0 03/23/2022    BASO 0.01 03/23/2022    APTT 26.1 07/19/2021    GROUPTRH O POS 12/28/2020    BNP 49 07/19/2021    ALBUMIN 4.2 03/23/2022    BILIDIR 0.5 (H) 05/21/2021    AST 24 03/23/2022    ALT 29 03/23/2022    ALKPHOS 92 03/23/2022    MG 1.8 12/29/2020    LABPROT 11.8 01/26/2022    INR 1.1 01/26/2022       Assessment and Plan:  Tee Aranda is a 51 y.o. female with decompensated cirrhosis. Current recommendations:  1. Decompensated cirrhosis-has now improved- no longer decompensated: presumed to be from CARTAGENA; full serologic w/u negative; check meld labs every 6 months; HCC screening every 6 months (overdue)  2. Hx GI bleeding: repeat EGD 08/22  3. Ascites/edema, resolved: continue off diuretics  4. HE: resolved: continue off rifaximin and lactulose   5. Insomnia: trazadone for sleep    Return 6 months

## 2022-06-29 ENCOUNTER — HOSPITAL ENCOUNTER (OUTPATIENT)
Dept: RADIOLOGY | Facility: HOSPITAL | Age: 52
Discharge: HOME | End: 2022-06-29
Attending: INTERNAL MEDICINE
Payer: COMMERCIAL

## 2022-06-29 DIAGNOSIS — K75.81 LIVER CIRRHOSIS SECONDARY TO NASH: ICD-10-CM

## 2022-06-29 DIAGNOSIS — K74.60 LIVER CIRRHOSIS SECONDARY TO NASH: ICD-10-CM

## 2022-06-29 LAB
PETH 16:0/18.1 (POPETH): <10 NG/ML
PETH 16:0/18.2 (PLPETH): <10 NG/ML

## 2022-06-29 PROCEDURE — 76700 US ABDOMEN COMPLETE: ICD-10-PCS | Mod: 26,,, | Performed by: RADIOLOGY

## 2022-06-29 PROCEDURE — 76700 US EXAM ABDOM COMPLETE: CPT | Mod: 26,,, | Performed by: RADIOLOGY

## 2022-06-29 PROCEDURE — 76700 US EXAM ABDOM COMPLETE: CPT | Mod: TC

## 2022-07-11 ENCOUNTER — TELEPHONE (OUTPATIENT)
Dept: ENDOSCOPY | Facility: HOSPITAL | Age: 52
End: 2022-07-11
Payer: COMMERCIAL

## 2022-07-11 ENCOUNTER — PATIENT MESSAGE (OUTPATIENT)
Dept: HEPATOLOGY | Facility: CLINIC | Age: 52
End: 2022-07-11
Payer: COMMERCIAL

## 2022-07-11 DIAGNOSIS — I85.00 ESOPHAGEAL VARICES WITHOUT BLEEDING, UNSPECIFIED ESOPHAGEAL VARICES TYPE: Primary | ICD-10-CM

## 2022-07-14 ENCOUNTER — PATIENT MESSAGE (OUTPATIENT)
Dept: HEPATOLOGY | Facility: CLINIC | Age: 52
End: 2022-07-14
Payer: COMMERCIAL

## 2022-07-15 ENCOUNTER — TELEPHONE (OUTPATIENT)
Dept: HEPATOLOGY | Facility: CLINIC | Age: 52
End: 2022-07-15
Payer: COMMERCIAL

## 2022-07-15 ENCOUNTER — PATIENT MESSAGE (OUTPATIENT)
Dept: HEPATOLOGY | Facility: CLINIC | Age: 52
End: 2022-07-15
Payer: COMMERCIAL

## 2022-07-15 RX ORDER — CARVEDILOL 3.12 MG/1
3.12 TABLET ORAL 2 TIMES DAILY
Qty: 60 TABLET | Refills: 11 | OUTPATIENT
Start: 2022-07-15 | End: 2022-07-15 | Stop reason: SDUPTHER

## 2022-07-19 RX ORDER — CARVEDILOL 3.12 MG/1
3.12 TABLET ORAL 2 TIMES DAILY
Qty: 60 TABLET | Refills: 11 | Status: SHIPPED | OUTPATIENT
Start: 2022-07-19 | End: 2022-07-20 | Stop reason: SDUPTHER

## 2022-07-20 DIAGNOSIS — K70.30 ALCOHOLIC CIRRHOSIS OF LIVER WITHOUT ASCITES: Primary | ICD-10-CM

## 2022-07-20 RX ORDER — CARVEDILOL 3.12 MG/1
3.12 TABLET ORAL 2 TIMES DAILY
Qty: 60 TABLET | Refills: 11 | Status: SHIPPED | OUTPATIENT
Start: 2022-07-20 | End: 2023-07-26

## 2022-07-26 ENCOUNTER — HOSPITAL ENCOUNTER (EMERGENCY)
Facility: HOSPITAL | Age: 52
Discharge: ANOTHER HOSPITAL | End: 2022-07-26
Attending: SURGERY
Payer: COMMERCIAL

## 2022-07-26 ENCOUNTER — HOSPITAL ENCOUNTER (INPATIENT)
Facility: HOSPITAL | Age: 52
LOS: 1 days | Discharge: HOME | DRG: 442 | End: 2022-07-27
Attending: FAMILY MEDICINE | Admitting: FAMILY MEDICINE
Payer: COMMERCIAL

## 2022-07-26 VITALS
HEART RATE: 64 BPM | WEIGHT: 144.63 LBS | OXYGEN SATURATION: 100 % | DIASTOLIC BLOOD PRESSURE: 67 MMHG | SYSTOLIC BLOOD PRESSURE: 145 MMHG | BODY MASS INDEX: 21.99 KG/M2 | RESPIRATION RATE: 18 BRPM | TEMPERATURE: 97 F

## 2022-07-26 DIAGNOSIS — K92.2 GI BLEED: ICD-10-CM

## 2022-07-26 DIAGNOSIS — I85.00 ESOPHAGEAL VARICES WITHOUT BLEEDING, UNSPECIFIED ESOPHAGEAL VARICES TYPE: ICD-10-CM

## 2022-07-26 DIAGNOSIS — K75.81 LIVER CIRRHOSIS SECONDARY TO NASH: Chronic | ICD-10-CM

## 2022-07-26 DIAGNOSIS — K74.60 LIVER CIRRHOSIS SECONDARY TO NASH: Chronic | ICD-10-CM

## 2022-07-26 DIAGNOSIS — K92.1 GASTROINTESTINAL HEMORRHAGE WITH MELENA: Primary | ICD-10-CM

## 2022-07-26 LAB
ABO + RH BLD: NORMAL
ALBUMIN SERPL BCP-MCNC: 4.2 G/DL (ref 3.5–5.2)
ALP SERPL-CCNC: 98 U/L (ref 55–135)
ALT SERPL W/O P-5'-P-CCNC: 34 U/L (ref 10–44)
ANION GAP SERPL CALC-SCNC: 10 MMOL/L (ref 8–16)
APTT BLDCRRT: 27.3 SEC (ref 21–32)
AST SERPL-CCNC: 28 U/L (ref 10–40)
BACTERIA #/AREA URNS HPF: ABNORMAL /HPF
BASOPHILS # BLD AUTO: 0.01 K/UL (ref 0–0.2)
BASOPHILS # BLD AUTO: 0.02 K/UL (ref 0–0.2)
BASOPHILS NFR BLD: 0.3 % (ref 0–1.9)
BASOPHILS NFR BLD: 0.7 % (ref 0–1.9)
BILIRUB SERPL-MCNC: 0.8 MG/DL (ref 0.1–1)
BILIRUB UR QL STRIP: NEGATIVE
BLD GP AB SCN CELLS X3 SERPL QL: NORMAL
BUN SERPL-MCNC: 17 MG/DL (ref 6–20)
CALCIUM SERPL-MCNC: 9.3 MG/DL (ref 8.7–10.5)
CHLORIDE SERPL-SCNC: 108 MMOL/L (ref 95–110)
CK MB SERPL-MCNC: 1.4 NG/ML (ref 0.1–6.5)
CK MB SERPL-RTO: 1.6 % (ref 0–5)
CK SERPL-CCNC: 88 U/L (ref 20–180)
CK SERPL-CCNC: 88 U/L (ref 20–180)
CLARITY UR: CLEAR
CO2 SERPL-SCNC: 24 MMOL/L (ref 23–29)
COLOR UR: YELLOW
CREAT SERPL-MCNC: 0.8 MG/DL (ref 0.5–1.4)
DIFFERENTIAL METHOD: ABNORMAL
DIFFERENTIAL METHOD: ABNORMAL
EOSINOPHIL # BLD AUTO: 0.1 K/UL (ref 0–0.5)
EOSINOPHIL # BLD AUTO: 0.1 K/UL (ref 0–0.5)
EOSINOPHIL NFR BLD: 2.3 % (ref 0–8)
EOSINOPHIL NFR BLD: 3.6 % (ref 0–8)
ERYTHROCYTE [DISTWIDTH] IN BLOOD BY AUTOMATED COUNT: 13 % (ref 11.5–14.5)
ERYTHROCYTE [DISTWIDTH] IN BLOOD BY AUTOMATED COUNT: 13 % (ref 11.5–14.5)
EST. GFR  (AFRICAN AMERICAN): >60 ML/MIN/1.73 M^2
EST. GFR  (NON AFRICAN AMERICAN): >60 ML/MIN/1.73 M^2
ESTIMATED AVG GLUCOSE: 111 MG/DL (ref 68–131)
GLUCOSE SERPL-MCNC: 142 MG/DL (ref 70–110)
GLUCOSE UR QL STRIP: NEGATIVE
HBA1C MFR BLD: 5.5 % (ref 4–5.6)
HCT VFR BLD AUTO: 37.2 % (ref 37–48.5)
HCT VFR BLD AUTO: 39.8 % (ref 37–48.5)
HGB BLD-MCNC: 12.6 G/DL (ref 12–16)
HGB BLD-MCNC: 13.2 G/DL (ref 12–16)
HGB UR QL STRIP: ABNORMAL
IMM GRANULOCYTES # BLD AUTO: 0.01 K/UL (ref 0–0.04)
IMM GRANULOCYTES # BLD AUTO: 0.01 K/UL (ref 0–0.04)
IMM GRANULOCYTES NFR BLD AUTO: 0.3 % (ref 0–0.5)
IMM GRANULOCYTES NFR BLD AUTO: 0.3 % (ref 0–0.5)
INR PPP: 1.2 (ref 0.8–1.2)
KETONES UR QL STRIP: NEGATIVE
LEUKOCYTE ESTERASE UR QL STRIP: NEGATIVE
LYMPHOCYTES # BLD AUTO: 0.6 K/UL (ref 1–4.8)
LYMPHOCYTES # BLD AUTO: 0.7 K/UL (ref 1–4.8)
LYMPHOCYTES NFR BLD: 19.2 % (ref 18–48)
LYMPHOCYTES NFR BLD: 19.4 % (ref 18–48)
MCH RBC QN AUTO: 30.1 PG (ref 27–31)
MCH RBC QN AUTO: 30.5 PG (ref 27–31)
MCHC RBC AUTO-ENTMCNC: 33.2 G/DL (ref 32–36)
MCHC RBC AUTO-ENTMCNC: 33.9 G/DL (ref 32–36)
MCV RBC AUTO: 90 FL (ref 82–98)
MCV RBC AUTO: 91 FL (ref 82–98)
MICROSCOPIC COMMENT: ABNORMAL
MONOCYTES # BLD AUTO: 0.2 K/UL (ref 0.3–1)
MONOCYTES # BLD AUTO: 0.3 K/UL (ref 0.3–1)
MONOCYTES NFR BLD: 7.2 % (ref 4–15)
MONOCYTES NFR BLD: 8.8 % (ref 4–15)
NEUTROPHILS # BLD AUTO: 2.1 K/UL (ref 1.8–7.7)
NEUTROPHILS # BLD AUTO: 2.5 K/UL (ref 1.8–7.7)
NEUTROPHILS NFR BLD: 68.8 % (ref 38–73)
NEUTROPHILS NFR BLD: 69.1 % (ref 38–73)
NITRITE UR QL STRIP: NEGATIVE
NRBC BLD-RTO: 0 /100 WBC
NRBC BLD-RTO: 0 /100 WBC
PH UR STRIP: 7 [PH] (ref 5–8)
PLATELET # BLD AUTO: 67 K/UL (ref 150–450)
PLATELET # BLD AUTO: 68 K/UL (ref 150–450)
PMV BLD AUTO: 11.2 FL (ref 9.2–12.9)
PMV BLD AUTO: 11.6 FL (ref 9.2–12.9)
POCT GLUCOSE: 145 MG/DL (ref 70–110)
POTASSIUM SERPL-SCNC: 3.7 MMOL/L (ref 3.5–5.1)
PROT SERPL-MCNC: 7.1 G/DL (ref 6–8.4)
PROT UR QL STRIP: NEGATIVE
PROTHROMBIN TIME: 12.3 SEC (ref 9–12.5)
RBC # BLD AUTO: 4.13 M/UL (ref 4–5.4)
RBC # BLD AUTO: 4.39 M/UL (ref 4–5.4)
RBC #/AREA URNS HPF: 20 /HPF (ref 0–4)
SARS-COV-2 RDRP RESP QL NAA+PROBE: NEGATIVE
SODIUM SERPL-SCNC: 142 MMOL/L (ref 136–145)
SP GR UR STRIP: 1.01 (ref 1–1.03)
TROPONIN I SERPL DL<=0.01 NG/ML-MCNC: <0.006 NG/ML (ref 0–0.03)
URN SPEC COLLECT METH UR: ABNORMAL
UROBILINOGEN UR STRIP-ACNC: NEGATIVE EU/DL
WBC # BLD AUTO: 3.04 K/UL (ref 3.9–12.7)
WBC # BLD AUTO: 3.54 K/UL (ref 3.9–12.7)
WBC #/AREA URNS HPF: 2 /HPF (ref 0–5)

## 2022-07-26 PROCEDURE — 99285 EMERGENCY DEPT VISIT HI MDM: CPT | Mod: 25

## 2022-07-26 PROCEDURE — 25000003 PHARM REV CODE 250: Performed by: SURGERY

## 2022-07-26 PROCEDURE — 85610 PROTHROMBIN TIME: CPT | Performed by: SURGERY

## 2022-07-26 PROCEDURE — 99291 CRITICAL CARE FIRST HOUR: CPT | Mod: 25

## 2022-07-26 PROCEDURE — 96365 THER/PROPH/DIAG IV INF INIT: CPT | Mod: 59

## 2022-07-26 PROCEDURE — 25000003 PHARM REV CODE 250: Performed by: STUDENT IN AN ORGANIZED HEALTH CARE EDUCATION/TRAINING PROGRAM

## 2022-07-26 PROCEDURE — 96374 THER/PROPH/DIAG INJ IV PUSH: CPT

## 2022-07-26 PROCEDURE — 85025 COMPLETE CBC W/AUTO DIFF WBC: CPT | Mod: 91 | Performed by: STUDENT IN AN ORGANIZED HEALTH CARE EDUCATION/TRAINING PROGRAM

## 2022-07-26 PROCEDURE — 96361 HYDRATE IV INFUSION ADD-ON: CPT

## 2022-07-26 PROCEDURE — 85730 THROMBOPLASTIN TIME PARTIAL: CPT | Performed by: SURGERY

## 2022-07-26 PROCEDURE — C9113 INJ PANTOPRAZOLE SODIUM, VIA: HCPCS

## 2022-07-26 PROCEDURE — 63600175 PHARM REV CODE 636 W HCPCS: Mod: JA | Performed by: STUDENT IN AN ORGANIZED HEALTH CARE EDUCATION/TRAINING PROGRAM

## 2022-07-26 PROCEDURE — 83036 HEMOGLOBIN GLYCOSYLATED A1C: CPT

## 2022-07-26 PROCEDURE — 81000 URINALYSIS NONAUTO W/SCOPE: CPT | Performed by: SURGERY

## 2022-07-26 PROCEDURE — 93005 ELECTROCARDIOGRAM TRACING: CPT

## 2022-07-26 PROCEDURE — U0002 COVID-19 LAB TEST NON-CDC: HCPCS | Performed by: SURGERY

## 2022-07-26 PROCEDURE — 96376 TX/PRO/DX INJ SAME DRUG ADON: CPT

## 2022-07-26 PROCEDURE — 93010 ELECTROCARDIOGRAM REPORT: CPT | Mod: ,,, | Performed by: INTERNAL MEDICINE

## 2022-07-26 PROCEDURE — 86901 BLOOD TYPING SEROLOGIC RH(D): CPT | Performed by: STUDENT IN AN ORGANIZED HEALTH CARE EDUCATION/TRAINING PROGRAM

## 2022-07-26 PROCEDURE — 80053 COMPREHEN METABOLIC PANEL: CPT | Performed by: SURGERY

## 2022-07-26 PROCEDURE — 82553 CREATINE MB FRACTION: CPT | Performed by: SURGERY

## 2022-07-26 PROCEDURE — 96375 TX/PRO/DX INJ NEW DRUG ADDON: CPT

## 2022-07-26 PROCEDURE — 63600175 PHARM REV CODE 636 W HCPCS: Mod: JA | Performed by: SURGERY

## 2022-07-26 PROCEDURE — 11000001 HC ACUTE MED/SURG PRIVATE ROOM

## 2022-07-26 PROCEDURE — 84484 ASSAY OF TROPONIN QUANT: CPT | Performed by: SURGERY

## 2022-07-26 PROCEDURE — 36415 COLL VENOUS BLD VENIPUNCTURE: CPT | Performed by: SURGERY

## 2022-07-26 PROCEDURE — C9113 INJ PANTOPRAZOLE SODIUM, VIA: HCPCS | Performed by: SURGERY

## 2022-07-26 PROCEDURE — 25000003 PHARM REV CODE 250: Performed by: FAMILY MEDICINE

## 2022-07-26 PROCEDURE — 93010 EKG 12-LEAD: ICD-10-PCS | Mod: ,,, | Performed by: INTERNAL MEDICINE

## 2022-07-26 PROCEDURE — 63600175 PHARM REV CODE 636 W HCPCS

## 2022-07-26 PROCEDURE — 25000003 PHARM REV CODE 250

## 2022-07-26 PROCEDURE — 85025 COMPLETE CBC W/AUTO DIFF WBC: CPT | Performed by: SURGERY

## 2022-07-26 PROCEDURE — 36415 COLL VENOUS BLD VENIPUNCTURE: CPT

## 2022-07-26 RX ORDER — TALC
9 POWDER (GRAM) TOPICAL NIGHTLY PRN
Status: DISCONTINUED | OUTPATIENT
Start: 2022-07-26 | End: 2022-07-27 | Stop reason: HOSPADM

## 2022-07-26 RX ORDER — IBUPROFEN 400 MG/1
400 TABLET ORAL EVERY 6 HOURS PRN
Status: DISCONTINUED | OUTPATIENT
Start: 2022-07-26 | End: 2022-07-27 | Stop reason: HOSPADM

## 2022-07-26 RX ORDER — INSULIN ASPART 100 [IU]/ML
1-10 INJECTION, SOLUTION INTRAVENOUS; SUBCUTANEOUS
Status: DISCONTINUED | OUTPATIENT
Start: 2022-07-26 | End: 2022-07-27 | Stop reason: HOSPADM

## 2022-07-26 RX ORDER — NALOXONE HCL 0.4 MG/ML
0.02 VIAL (ML) INJECTION
Status: DISCONTINUED | OUTPATIENT
Start: 2022-07-26 | End: 2022-07-27 | Stop reason: HOSPADM

## 2022-07-26 RX ORDER — SODIUM CHLORIDE 0.9 % (FLUSH) 0.9 %
5 SYRINGE (ML) INJECTION
Status: DISCONTINUED | OUTPATIENT
Start: 2022-07-26 | End: 2022-07-27 | Stop reason: HOSPADM

## 2022-07-26 RX ORDER — GLUCAGON 1 MG
1 KIT INJECTION
Status: DISCONTINUED | OUTPATIENT
Start: 2022-07-26 | End: 2022-07-27 | Stop reason: HOSPADM

## 2022-07-26 RX ORDER — IBUPROFEN 200 MG
16 TABLET ORAL
Status: DISCONTINUED | OUTPATIENT
Start: 2022-07-26 | End: 2022-07-27 | Stop reason: HOSPADM

## 2022-07-26 RX ORDER — MORPHINE SULFATE 4 MG/ML
4 INJECTION, SOLUTION INTRAMUSCULAR; INTRAVENOUS EVERY 4 HOURS PRN
Status: DISCONTINUED | OUTPATIENT
Start: 2022-07-26 | End: 2022-07-26

## 2022-07-26 RX ORDER — AMOXICILLIN 250 MG
1 CAPSULE ORAL 2 TIMES DAILY
Status: DISCONTINUED | OUTPATIENT
Start: 2022-07-26 | End: 2022-07-26

## 2022-07-26 RX ORDER — PANTOPRAZOLE SODIUM 40 MG/10ML
40 INJECTION, POWDER, LYOPHILIZED, FOR SOLUTION INTRAVENOUS 2 TIMES DAILY
Status: DISCONTINUED | OUTPATIENT
Start: 2022-07-26 | End: 2022-07-27 | Stop reason: HOSPADM

## 2022-07-26 RX ORDER — CARVEDILOL 3.12 MG/1
3.12 TABLET ORAL 2 TIMES DAILY
Status: DISCONTINUED | OUTPATIENT
Start: 2022-07-26 | End: 2022-07-27 | Stop reason: HOSPADM

## 2022-07-26 RX ORDER — SODIUM CHLORIDE 9 MG/ML
INJECTION, SOLUTION INTRAVENOUS
Status: COMPLETED | OUTPATIENT
Start: 2022-07-26 | End: 2022-07-26

## 2022-07-26 RX ORDER — ACETAMINOPHEN 325 MG/1
650 TABLET ORAL EVERY 6 HOURS PRN
Status: DISCONTINUED | OUTPATIENT
Start: 2022-07-26 | End: 2022-07-27 | Stop reason: HOSPADM

## 2022-07-26 RX ORDER — PANTOPRAZOLE SODIUM 40 MG/10ML
80 INJECTION, POWDER, LYOPHILIZED, FOR SOLUTION INTRAVENOUS
Status: COMPLETED | OUTPATIENT
Start: 2022-07-26 | End: 2022-07-26

## 2022-07-26 RX ORDER — IBUPROFEN 200 MG
24 TABLET ORAL
Status: DISCONTINUED | OUTPATIENT
Start: 2022-07-26 | End: 2022-07-27 | Stop reason: HOSPADM

## 2022-07-26 RX ADMIN — ACETAMINOPHEN 650 MG: 325 TABLET ORAL at 09:07

## 2022-07-26 RX ADMIN — OCTREOTIDE ACETATE 50 MCG/HR: 500 INJECTION, SOLUTION INTRAVENOUS; SUBCUTANEOUS at 08:07

## 2022-07-26 RX ADMIN — CEFTRIAXONE 1 G: 1 INJECTION, SOLUTION INTRAVENOUS at 09:07

## 2022-07-26 RX ADMIN — PANTOPRAZOLE SODIUM 80 MG: 40 INJECTION, POWDER, FOR SOLUTION INTRAVENOUS at 08:07

## 2022-07-26 RX ADMIN — PANTOPRAZOLE SODIUM 8 MG/HR: 40 INJECTION, POWDER, FOR SOLUTION INTRAVENOUS at 08:07

## 2022-07-26 RX ADMIN — SODIUM CHLORIDE: 0.9 INJECTION, SOLUTION INTRAVENOUS at 08:07

## 2022-07-26 RX ADMIN — CARVEDILOL 3.12 MG: 3.12 TABLET, FILM COATED ORAL at 08:07

## 2022-07-26 RX ADMIN — Medication 9 MG: at 08:07

## 2022-07-26 RX ADMIN — CEFTRIAXONE 2 G: 2 INJECTION, SOLUTION INTRAVENOUS at 10:07

## 2022-07-26 RX ADMIN — PANTOPRAZOLE SODIUM 40 MG: 40 INJECTION, POWDER, LYOPHILIZED, FOR SOLUTION INTRAVENOUS at 08:07

## 2022-07-26 RX ADMIN — TRAZODONE HYDROCHLORIDE 25 MG: 50 TABLET ORAL at 08:07

## 2022-07-26 NOTE — ASSESSMENT & PLAN NOTE
Black tarry stool on 7/26 AM, positive occult blood  R-sided and epigastric abdominal pain  H/H was 13/39    Plan:  - GI consult; appreciate recs  - NPO at midnight for EGD on 7/27  - IV Protonix BID  - Octreotide  - Rocephin  - Trend H&H q6h  - Anticoagulation / Lovenox withheld

## 2022-07-26 NOTE — PLAN OF CARE
Problem: Adult Inpatient Plan of Care  Goal: Plan of Care Review  Outcome: Ongoing, Progressing      07/26/22 1729   Admission   Initial VN Admission Questions Complete  (Patient arrived to unit, AAOx4 from [facility].  at bedside. Admission questions completed. Allowed time for questions. Instructed pt to call for assistance.)   Communication Issues? None   Shift   Virtual Nurse - Rounding Complete   Safety/Activity   Safety Promotion/Fall Prevention instructed to call staff for mobility;family to remain at bedside   Positioning   Body Position position changed independently   Pain/Comfort/Sleep   Sleep/Rest/Relaxation awake

## 2022-07-26 NOTE — PROVIDER TRANSFER
Outside Transfer Acceptance Note / Regional Referral Center    Referring facility: [facility]  Referring provider: FRANCISCO J ACUNA  Accepting facility: [facility]  Accepting provider: ESAU AKHTAR  Admitting provider: OMAR CEDILLO  Reason for transfer:  Need Gastroenterology  Transfer diagnosis: GI bleed  Transfer specialty requested: Gastroenterology  Transfer specialty notified: yes  Transfer level: NUMBER 1-5: 2  Bed type requested: tele  Isolation status: No active isolations   Admission class or status: IP- Inpatient    Narrative     52-year-old female with history of thrombocytopenia, cirrhosis with esophageal varices (prior banding), and ascites presented to the [facility] on July 26 with black stools and black emesis.  No bright red blood noted.  No hematemesis thus far in the emergency department.  Hemoglobin is stable compared with previous values. Hemodynamically stable. In the emergency department she received normal saline and was started on Protonix/octreotide infusions.  They will give a dose of Rocephin in the emergency department.  Transfer center contacted Gastroenterology at [facility].  Requesting transfer to Hospital Medicine at [facility] for further treatment of GI bleed.    COVID negative  Urinalysis had 2+ blood, 20 RBC, 2 WBC  Troponin less than 0.006, CPK 88, sodium 142, potassium 3.7, chloride 108, CO2 24, BUN 17, creatinine 0.8, glucose 142, AST 28, ALT 34, INR 1.2, white blood cells 3.04, hemoglobin 13.2, hematocrit 39.8, platelets 68    Abdominal x-ray noted no evidence of bowel obstruction or perforation.    EKG showed sinus bradycardia (ventricular rate 59), otherwise normal EKG    Objective     Vitals: Temp: 96.7 °F (35.9 °C) (07/26/22 0732)  Pulse: 62 (07/26/22 0732)  Resp: 20 (07/26/22 0732)  BP: 132/82 (07/26/22 0732)  SpO2: 99 % (07/26/22 0732)  Recent Labs:   CBC:   Recent Labs   Lab 07/26/22 0748   WBC 3.04*    HGB 13.2   HCT 39.8   PLT 68*     CMP:   Recent Labs   Lab 07/26/22  0747      K 3.7      CO2 24   *   BUN 17   CREATININE 0.8   CALCIUM 9.3   PROT 7.1   ALBUMIN 4.2   BILITOT 0.8   ALKPHOS 98   AST 28   ALT 34   ANIONGAP 10   EGFRNONAA >60       IV access:        Peripheral IV - Single Lumen 07/26/22 0801 20 G Right Hand (Active)   Site Assessment Clean;Dry;Intact;No redness;No swelling 07/26/22 0801   Line Status Blood return noted;Flushed 07/26/22 0801     Infusions: Octreotide, protonix  Allergies:   Review of patient's allergies indicates:   Allergen Reactions    Sulfa (sulfonamide antibiotics) Hives      Anticoagulation:   Anticoagulants     None           Instructions    1. Admit to Hospital Medicine  2. Consult Gastroenterology    Upon patient arrival to floor, please contact Hospital Medicine on call.     BAUDILIO Iverson MD  Hospital Medicine Staff  Cell: [phone]

## 2022-07-26 NOTE — PLAN OF CARE
Pt. AAOx4. Oriented to call light and room. Family at bedside. Regular diet placed. Gastro to bedside to speak with Pt. Rafy infusing per MAR. Bed alarm on and call light in reach. PT. Instructed to call for assistance.  Problem: Adult Inpatient Plan of Care  Goal: Plan of Care Review  Outcome: Ongoing, Progressing     Problem: Adult Inpatient Plan of Care  Goal: Patient-Specific Goal (Individualized)  Outcome: Ongoing, Progressing     Problem: Adult Inpatient Plan of Care  Goal: Absence of Hospital-Acquired Illness or Injury  Outcome: Ongoing, Progressing     Problem: Adult Inpatient Plan of Care  Goal: Optimal Comfort and Wellbeing  Outcome: Ongoing, Progressing     Problem: Adult Inpatient Plan of Care  Goal: Readiness for Transition of Care  Outcome: Ongoing, Progressing

## 2022-07-26 NOTE — CONSULTS
"[facility]    CC: Melena    HPI 52 y.o. female with CARTAGENA cirrhosis with history of grade II varices and thrombocytopenia presenting from outside hospital due to 1 episode of melena associated with generalized weakness, fatigue, and mild mid-epigastric pain. She reports that for the past week she has not "felt herself" and felt as if she was out of energy. Yesterday she had one dark, sticky bowel movement followed by a brown movement that was brown in color. She tested the brown BM with a hemoccult test that read positive and this prompted her to go to the ED given her history of variceal bleeding. She denies any hematemesis or nausea. She reports one time use of Aleeve for shoulder pain but otherwise denies any NSAID use. She is not on any blood thinners. She does not drink alcohol or smoke cigarettes.     Of note, patients last EGD was 8/2021 and it showed small varices without bleeding in the lower third of the esophagus. She had a patent Schatzki ring at the GE junction and a 3 cm sliding hiatal hernia. Additionally mild portal hypertensive gastropathy was noted.     Chart reviewed and summarized here.    Past Medical History  CARTAGENA cirrhosis with Varices  Glaucoma  Anxiety    Medications  Coreg 3.125 BID  Rifaximin  Fish Oil  Protonix    Past Surgical History  Total Hip Arthroplasty  Tonsillectomy  Rhinoplasty  Hysterectomy  Knee Surgery    Social History  Former smoker  Denies use of ETOH or drugs    Family History  No family history of colon cancer or liver disease    Review of Systems  General ROS: negative for chills, fever or weight loss; + fatigue  Psychological ROS: negative for hallucination, depression or suicidal ideation  Ophthalmic ROS: negative for blurry vision, photophobia or eye pain  ENT ROS: negative for epistaxis, sore throat or rhinorrhea  Respiratory ROS: no cough, shortness of breath, or wheezing  Cardiovascular ROS: no chest pain or dyspnea on exertion  Gastrointestinal ROS: + " epigastric abdominal pain, change in bowel habits, & black stools; denies emesis, nausea, or hematemesis  Genito-Urinary ROS: no dysuria, trouble voiding, or hematuria  Musculoskeletal ROS: negative for gait disturbance or muscular weakness  Neurological ROS: no syncope or seizures; no ataxia  Dermatological ROS: negative for pruritis, rash and jaundice; + bruising    Physical Examination  /60   Pulse 60   Resp 18   SpO2 96%   General appearance: alert, cooperative, no distress  HENT: Normocephalic, atraumatic, neck symmetrical, no nasal discharge   Eyes: conjunctivae/corneas clear,  EOM's intact  Lungs: clear to auscultation bilaterally, on room air without difficulty breathing  Heart: regular rate and rhythm without rub  Abdomen: soft, non-tender; bowel sounds normoactive; no organomegaly  Extremities: extremities symmetric; no clubbing, cyanosis, or edema  Integument: Bruising noted on abdomen; no jaundice appreciated  Neurologic: Alert and oriented X 3, normal strength  Psychiatric: no pressured speech; normal affect; no evidence of impaired cognition     Labs:  Hb = 12.6 I MCV = 90  Platelets = 67    Imaging:  Abdominal X -Ray: no free air under the diaphragm  I have personally reviewed and interpreted these images.    Assessment:   Mrs. Tee Aranda is a 52 y.o. female with CARTAGENA cirrhosis with history of grade II varices and thrombocytopenia presenting from outside hospital due to 1 episode of melena associated with generalized weakness, fatigue, and mild mid-epigastric pain. MELD = 8. Etiology of bleed includes variceal bleeding, portal gastropathy, gastric ulcer, PUD, or malignancy    Plan:   - NPO at MN for EGD tomorrow  - Discussed CLD until midnight with patient  - IV PPI BID  - Continue octreotide  - Start SBP prophylaxis given known cirrhosis with potential for variceal bleed  - Maintain active Type and Screen, ensure up to date coags  - Two large bore Ivs  - Trend Hgb, transfuse for goal  Hgb > 7 (needed for safe endoscopy), or > 8 if indicated by comorbidities  - Check iron studies, ferritin if not recently obtained  - Hold AC, SCDs for DVT ppx only    Contact on call GI with further questions or concerns    Jie Aguirre MD  [facility]

## 2022-07-26 NOTE — HPI
52 y.o. female with PMHx of thrombocytopenia, cirrhosis with esophageal varices (prior banding), previous GI bleeds and ascites presented to the [facility] ED on 7/26/22 with black stools first noticed this AM. No bright red blood or hematemesis. Hemodynamically stable.    In the ED, pt received IV normal saline, Protonix and octreotide infusions, and Rocephin. Pt COVID-19 negative. UA showed 2+ blood, 20 RBC, 2 WBC. Troponin negative, CPK 88. Sodium 142, potassium 3.7, chloride 108, CO2 24, BUN 17, creatinine 0.8, glucose 142. AST 28, ALT 34. INR 1.2, WBC 3.04, hemoglobin 13.2, hematocrit 39.8, platelets 68. Abdominal x-ray noted no evidence of bowel obstruction or perforation. EKG showed sinus bradycardia (ventricular rate 59), otherwise normal EKG. Transfer center contacted Gastroenterology at [facility] for further tx of GI bleed. [facility] consulted for hospital admission and management of GI bleed.

## 2022-07-26 NOTE — ED PROVIDER NOTES
Encounter Date: 7/26/2022       History     Chief Complaint   Patient presents with    Rectal Bleeding     52-year-old female presents with black tarry stools on ER evaluation today  Patient with no signs of acute abdomen or peritonitis on ER evaluation  Patient has had longstanding issues with non alcoholic cirrhosis for years  Patient did a home Hemoccult that is positive, she brought the results  Pt has had variceal banding twice before with scheduled scope in 7 days        Review of patient's allergies indicates:   Allergen Reactions    Sulfa (sulfonamide antibiotics) Hives     Past Medical History:   Diagnosis Date    Anxiety     Arthritis     Ascites 11/23/2020    AVN (avascular necrosis of bone)     Cataract     Edema 11/23/2020    Esophageal varices     Glaucoma     Hepatic encephalopathy 11/23/2020    Hx of cirrhosis     non-alcoholic    Kidney stones      Past Surgical History:   Procedure Laterality Date    DISTAL CLAVICLE EXCISION Right 11/18/2021    Procedure: RIGHT SHOULDER ARTHROSCOPY, EXCISION, CLAVICLE, DISTAL; EXTENSIVE DEBRIDEMENT;  Surgeon: Isaiah Joyner MD;  Location: [facility];  Service: Orthopedics;  Laterality: Right;    ESOPHAGOGASTRODUODENOSCOPY N/A 11/10/2020    Procedure: EGD (ESOPHAGOGASTRODUODENOSCOPY);  Surgeon: Gerard Salinas MD;  Location: [facility];  Service: Endoscopy;  Laterality: N/A;    ESOPHAGOGASTRODUODENOSCOPY N/A 12/28/2020    Procedure: EGD (ESOPHAGOGASTRODUODENOSCOPY);  Surgeon: Adryan Park MD;  Location: [facility] (2ND FLR);  Service: Endoscopy;  Laterality: N/A;    ESOPHAGOGASTRODUODENOSCOPY N/A 2/15/2021    Procedure: EGD (ESOPHAGOGASTRODUODENOSCOPY);  Surgeon: Hari Greene MD;  Location: [facility] (4TH FLR);  Service: Endoscopy;  Laterality: N/A;  6 week follow-up variceal banding  Hx varices - Labs - ERW  prep ins. emialed - COVID screening on 2/12/21 [place] - ERW    ESOPHAGOGASTRODUODENOSCOPY Left 8/3/2021    Procedure: EGD  (ESOPHAGOGASTRODUODENOSCOPY);  Surgeon: Fabricio Corado MD;  Location: [facility] ([address] [place] [address]);  Service: Gastroenterology;  Laterality: Left;  recent hematemasis. varices-labs on 7/26    COVID test at [facility] on 7/31-GT  requesting sooner    HYSTERECTOMY      KNEE SURGERY      LITHOTRIPSY      RHINOPLASTY TIP      TONSILLECTOMY      TOTAL HIP ARTHROPLASTY       Family History   Problem Relation Age of Onset    Diabetes Mellitus Maternal Grandmother     Amblyopia Neg Hx     Blindness Neg Hx     Cataracts Neg Hx     Glaucoma Neg Hx     Macular degeneration Neg Hx     Retinal detachment Neg Hx     Strabismus Neg Hx     Colon cancer Neg Hx     Esophageal cancer Neg Hx      Social History     Tobacco Use    Smoking status: Current Some Day Smoker     Packs/day: 0.25     Types: Cigarettes     Last attempt to quit: 7/3/2019     Years since quitting: 3.0    Smokeless tobacco: Never Used   Substance Use Topics    Alcohol use: Not Currently    Drug use: No     Review of Systems   Constitutional: Negative.    HENT: Negative.    Eyes: Negative.    Respiratory: Negative.    Cardiovascular: Negative.    Gastrointestinal:        Black tarry stools, upper GI bleed symptoms   Genitourinary: Negative.    Musculoskeletal: Negative.    Skin: Negative.    Neurological: Negative.    Psychiatric/Behavioral: Negative.        Physical Exam     Initial Vitals [07/26/22 0732]   BP Pulse Resp Temp SpO2   132/82 62 20 96.7 °F (35.9 °C) 99 %      MAP       --         Physical Exam    Constitutional: Vital signs are normal. She appears well-developed and well-nourished. She is cooperative.   HENT:   Head: Normocephalic and atraumatic.   Right Ear: External ear normal.   Left Ear: External ear normal.   Nose: Nose normal.   Mouth/Throat: Oropharynx is clear and moist.   Eyes: Conjunctivae, EOM and lids are normal. Pupils are equal, round, and reactive to light.   Neck: Trachea normal and phonation normal. Neck supple. No JVD  present.   Normal range of motion.   Full passive range of motion without pain.     Cardiovascular: Normal rate, regular rhythm, S1 normal, S2 normal, normal heart sounds, intact distal pulses and normal pulses.   Pulmonary/Chest: Effort normal and breath sounds normal.   Abdominal: Abdomen is soft and flat. Bowel sounds are normal.   Musculoskeletal:         General: Normal range of motion.      Cervical back: Full passive range of motion without pain, normal range of motion and neck supple.     Neurological: She is alert and oriented to person, place, and time. She has normal strength.   Skin: Skin is warm, dry and intact. Capillary refill takes less than 2 seconds.         ED Course   Procedures  Labs Reviewed   CBC W/ AUTO DIFFERENTIAL - Abnormal; Notable for the following components:       Result Value    WBC 3.04 (*)     Platelets 68 (*)     Lymph # 0.6 (*)     Mono # 0.2 (*)     All other components within normal limits   COMPREHENSIVE METABOLIC PANEL - Abnormal; Notable for the following components:    Glucose 142 (*)     All other components within normal limits   URINALYSIS, REFLEX TO URINE CULTURE - Abnormal; Notable for the following components:    Occult Blood UA 2+ (*)     All other components within normal limits    Narrative:     Specimen Source->Urine   URINALYSIS MICROSCOPIC - Abnormal; Notable for the following components:    RBC, UA 20 (*)     All other components within normal limits    Narrative:     Specimen Source->Urine   PROTIME-INR   APTT   TROPONIN I   CK-MB   CK   SARS-COV-2 RNA AMPLIFICATION, QUAL          Imaging Results          X-Ray Abdomen Flat And Erect (Final result)  Result time 07/26/22 09:25:34    Final result by Berna Anna MD (07/26/22 09:25:34)                 Impression:      No evidence of bowel obstruction or perforation.      Electronically signed by: Berna Anna MD  Date:    07/26/2022  Time:    09:25             Narrative:    EXAMINATION:  XR ABDOMEN FLAT AND  ERECT    CLINICAL HISTORY:  Nausea and vomitting (787.01);    TECHNIQUE:  Flat and erect AP views of the abdomen were preformed.    COMPARISON:  12/27/2020    FINDINGS:  The bowel gas pattern is non-obstructive.  No findings to suggest free air.No abnormal soft tissue masses noted.    No airspace consolidation at the lung bases.    Right hip prosthesis.  Skeletal structures are otherwise intact.                                 Medications   octreotide (SANDOSTATIN) 500 mcg in sodium chloride 0.9% 100 mL infusion (50 mcg/hr Intravenous New Bag 7/26/22 0848)   cefTRIAXone (ROCEPHIN) 2 g/50 mL D5W IVPB (2 g Intravenous New Bag 7/26/22 1039)   0.9%  NaCl infusion ( Intravenous New Bag 7/26/22 0829)   pantoprazole injection 80 mg (80 mg Intravenous Given 7/26/22 0847)   pantoprazole 40 mg in  mL infusion (ready to mix system) (8 mg/hr Intravenous New Bag 7/26/22 0848)     Medical Decision Making:   Initial Assessment:   History of upper GI bleed with associated variceal bleeding  Black stools last couple days, did home Hemoccult test today  Hemodynamically stable with feels that she is having active bleeding     Differential Diagnosis:   GI bleed, gastritis, gastroenteritis, peptic ulcer disease, variceal bleeding, anxiety NOS    Clinical Tests:   Lab Tests: Ordered and Reviewed  Radiological Study: Ordered and Reviewed    ED Management:  History of variceal bleeding associated with non alcoholic cirrhosis reported today  Hemoccult-positive home with stable lab work in the emergency room today  Octreotide drip initiated, IV Protonix initiated with transfer for hepatology consult    Critical Care ED Physician Time (minutes):  -- Performed by: Alex Soliman M.D.  -- Date/Time: 11:16 AM 7/26/2022   -- Direct Patient Care (Face Time): 5  -- Additional History from Records or Taking Additional History: 5  -- Ordering, Reviewing, and Interpreting Diagnostic Studies: 5  -- Total Time in Documentation: 11  -- Consultation  with Other Physicians: 5  -- Consultation with Family Related to Condition: 0  -- Total Critical Care Time: 31  -- Critical care was necessary to treat GI bleed  -- Critical care was time spent personally by me on the following activities:   -- blood draw for specimens discussions with consultants,   -- development of treatment plan with patient or surrogate,   -- examination of patient, ordering and performing treatments   -- review of radiographic studies, re-evaluation of pt's condition  -- review of labs and evaluation of response to treatment                       Clinical Impression:   Final diagnoses:  [K92.2] GI bleed          ED Disposition Condition    Transfer to Another Facility Stable              Alex Soliman MD  07/26/22 7383

## 2022-07-27 ENCOUNTER — ANESTHESIA (OUTPATIENT)
Dept: ENDOSCOPY | Facility: HOSPITAL | Age: 52
DRG: 442 | End: 2022-07-27
Payer: COMMERCIAL

## 2022-07-27 ENCOUNTER — ANESTHESIA EVENT (OUTPATIENT)
Dept: ENDOSCOPY | Facility: HOSPITAL | Age: 52
DRG: 442 | End: 2022-07-27
Payer: COMMERCIAL

## 2022-07-27 VITALS
OXYGEN SATURATION: 98 % | RESPIRATION RATE: 20 BRPM | SYSTOLIC BLOOD PRESSURE: 143 MMHG | DIASTOLIC BLOOD PRESSURE: 68 MMHG | HEART RATE: 64 BPM | TEMPERATURE: 97 F

## 2022-07-27 LAB
ALBUMIN SERPL BCP-MCNC: 3.8 G/DL (ref 3.5–5.2)
ALP SERPL-CCNC: 77 U/L (ref 55–135)
ALT SERPL W/O P-5'-P-CCNC: 26 U/L (ref 10–44)
ANION GAP SERPL CALC-SCNC: 9 MMOL/L (ref 8–16)
AST SERPL-CCNC: 23 U/L (ref 10–40)
BASOPHILS # BLD AUTO: 0.02 K/UL (ref 0–0.2)
BASOPHILS NFR BLD: 0.7 % (ref 0–1.9)
BILIRUB SERPL-MCNC: 1.1 MG/DL (ref 0.1–1)
BUN SERPL-MCNC: 15 MG/DL (ref 6–20)
CALCIUM SERPL-MCNC: 9.3 MG/DL (ref 8.7–10.5)
CHLORIDE SERPL-SCNC: 106 MMOL/L (ref 95–110)
CO2 SERPL-SCNC: 28 MMOL/L (ref 23–29)
CREAT SERPL-MCNC: 0.8 MG/DL (ref 0.5–1.4)
DIFFERENTIAL METHOD: ABNORMAL
EOSINOPHIL # BLD AUTO: 0.1 K/UL (ref 0–0.5)
EOSINOPHIL NFR BLD: 3.3 % (ref 0–8)
ERYTHROCYTE [DISTWIDTH] IN BLOOD BY AUTOMATED COUNT: 12.9 % (ref 11.5–14.5)
EST. GFR  (AFRICAN AMERICAN): >60 ML/MIN/1.73 M^2
EST. GFR  (NON AFRICAN AMERICAN): >60 ML/MIN/1.73 M^2
FERRITIN SERPL-MCNC: 50 NG/ML (ref 20–300)
GLUCOSE SERPL-MCNC: 104 MG/DL (ref 70–110)
HCT VFR BLD AUTO: 33.7 % (ref 37–48.5)
HCT VFR BLD AUTO: 36.9 % (ref 37–48.5)
HCT VFR BLD AUTO: 36.9 % (ref 37–48.5)
HGB BLD-MCNC: 11.4 G/DL (ref 12–16)
HGB BLD-MCNC: 12.5 G/DL (ref 12–16)
HGB BLD-MCNC: 12.5 G/DL (ref 12–16)
IMM GRANULOCYTES # BLD AUTO: 0.01 K/UL (ref 0–0.04)
IMM GRANULOCYTES NFR BLD AUTO: 0.3 % (ref 0–0.5)
IRON SERPL-MCNC: 150 UG/DL (ref 30–160)
LYMPHOCYTES # BLD AUTO: 1 K/UL (ref 1–4.8)
LYMPHOCYTES NFR BLD: 31.3 % (ref 18–48)
MAGNESIUM SERPL-MCNC: 2 MG/DL (ref 1.6–2.6)
MCH RBC QN AUTO: 30.9 PG (ref 27–31)
MCHC RBC AUTO-ENTMCNC: 33.9 G/DL (ref 32–36)
MCV RBC AUTO: 91 FL (ref 82–98)
MONOCYTES # BLD AUTO: 0.2 K/UL (ref 0.3–1)
MONOCYTES NFR BLD: 7.8 % (ref 4–15)
NEUTROPHILS # BLD AUTO: 1.7 K/UL (ref 1.8–7.7)
NEUTROPHILS NFR BLD: 56.6 % (ref 38–73)
NRBC BLD-RTO: 0 /100 WBC
PHOSPHATE SERPL-MCNC: 4 MG/DL (ref 2.7–4.5)
PLATELET # BLD AUTO: 73 K/UL (ref 150–450)
PMV BLD AUTO: 12.1 FL (ref 9.2–12.9)
POCT GLUCOSE: 93 MG/DL (ref 70–110)
POTASSIUM SERPL-SCNC: 4.6 MMOL/L (ref 3.5–5.1)
PROT SERPL-MCNC: 6.7 G/DL (ref 6–8.4)
RBC # BLD AUTO: 4.04 M/UL (ref 4–5.4)
SATURATED IRON: 40 % (ref 20–50)
SODIUM SERPL-SCNC: 143 MMOL/L (ref 136–145)
TOTAL IRON BINDING CAPACITY: 371 UG/DL (ref 250–450)
TRANSFERRIN SERPL-MCNC: 251 MG/DL (ref 200–375)
WBC # BLD AUTO: 3.07 K/UL (ref 3.9–12.7)

## 2022-07-27 PROCEDURE — C9113 INJ PANTOPRAZOLE SODIUM, VIA: HCPCS

## 2022-07-27 PROCEDURE — 43244 EGD VARICES LIGATION: CPT | Performed by: INTERNAL MEDICINE

## 2022-07-27 PROCEDURE — 43239 PR EGD, FLEX, W/BIOPSY, SGL/MULTI: ICD-10-PCS | Mod: 51,,, | Performed by: INTERNAL MEDICINE

## 2022-07-27 PROCEDURE — 63600175 PHARM REV CODE 636 W HCPCS: Performed by: NURSE ANESTHETIST, CERTIFIED REGISTERED

## 2022-07-27 PROCEDURE — 84466 ASSAY OF TRANSFERRIN: CPT | Performed by: STUDENT IN AN ORGANIZED HEALTH CARE EDUCATION/TRAINING PROGRAM

## 2022-07-27 PROCEDURE — 63600175 PHARM REV CODE 636 W HCPCS: Mod: JA | Performed by: STUDENT IN AN ORGANIZED HEALTH CARE EDUCATION/TRAINING PROGRAM

## 2022-07-27 PROCEDURE — 43239 EGD BIOPSY SINGLE/MULTIPLE: CPT | Performed by: INTERNAL MEDICINE

## 2022-07-27 PROCEDURE — 37000008 HC ANESTHESIA 1ST 15 MINUTES: Performed by: INTERNAL MEDICINE

## 2022-07-27 PROCEDURE — 88305 TISSUE EXAM BY PATHOLOGIST: CPT | Performed by: PATHOLOGY

## 2022-07-27 PROCEDURE — 25000003 PHARM REV CODE 250: Performed by: STUDENT IN AN ORGANIZED HEALTH CARE EDUCATION/TRAINING PROGRAM

## 2022-07-27 PROCEDURE — 27201012 HC FORCEPS, HOT/COLD, DISP: Performed by: INTERNAL MEDICINE

## 2022-07-27 PROCEDURE — 43244 PR EGD, FLEX, W/BAND LIGATION, ESOPH/GASTR VARICES: ICD-10-PCS | Mod: ,,, | Performed by: INTERNAL MEDICINE

## 2022-07-27 PROCEDURE — 63600175 PHARM REV CODE 636 W HCPCS: Performed by: STUDENT IN AN ORGANIZED HEALTH CARE EDUCATION/TRAINING PROGRAM

## 2022-07-27 PROCEDURE — 43239 EGD BIOPSY SINGLE/MULTIPLE: CPT | Mod: 51,,, | Performed by: INTERNAL MEDICINE

## 2022-07-27 PROCEDURE — 84100 ASSAY OF PHOSPHORUS: CPT

## 2022-07-27 PROCEDURE — 80053 COMPREHEN METABOLIC PANEL: CPT

## 2022-07-27 PROCEDURE — 82728 ASSAY OF FERRITIN: CPT | Performed by: STUDENT IN AN ORGANIZED HEALTH CARE EDUCATION/TRAINING PROGRAM

## 2022-07-27 PROCEDURE — 25000003 PHARM REV CODE 250: Performed by: NURSE ANESTHETIST, CERTIFIED REGISTERED

## 2022-07-27 PROCEDURE — 43244 EGD VARICES LIGATION: CPT | Mod: ,,, | Performed by: INTERNAL MEDICINE

## 2022-07-27 PROCEDURE — 36415 COLL VENOUS BLD VENIPUNCTURE: CPT | Performed by: STUDENT IN AN ORGANIZED HEALTH CARE EDUCATION/TRAINING PROGRAM

## 2022-07-27 PROCEDURE — 85018 HEMOGLOBIN: CPT | Performed by: STUDENT IN AN ORGANIZED HEALTH CARE EDUCATION/TRAINING PROGRAM

## 2022-07-27 PROCEDURE — 27201022: Performed by: INTERNAL MEDICINE

## 2022-07-27 PROCEDURE — 83735 ASSAY OF MAGNESIUM: CPT

## 2022-07-27 PROCEDURE — 85014 HEMATOCRIT: CPT | Performed by: STUDENT IN AN ORGANIZED HEALTH CARE EDUCATION/TRAINING PROGRAM

## 2022-07-27 PROCEDURE — 88305 TISSUE EXAM BY PATHOLOGIST: ICD-10-PCS | Mod: 26,,, | Performed by: PATHOLOGY

## 2022-07-27 PROCEDURE — 85025 COMPLETE CBC W/AUTO DIFF WBC: CPT

## 2022-07-27 PROCEDURE — 63600175 PHARM REV CODE 636 W HCPCS

## 2022-07-27 PROCEDURE — 25000003 PHARM REV CODE 250

## 2022-07-27 PROCEDURE — 36415 COLL VENOUS BLD VENIPUNCTURE: CPT

## 2022-07-27 PROCEDURE — 37000009 HC ANESTHESIA EA ADD 15 MINS: Performed by: INTERNAL MEDICINE

## 2022-07-27 PROCEDURE — 88305 TISSUE EXAM BY PATHOLOGIST: CPT | Mod: 26,,, | Performed by: PATHOLOGY

## 2022-07-27 RX ORDER — HYDROMORPHONE HYDROCHLORIDE 2 MG/ML
0.5 INJECTION, SOLUTION INTRAMUSCULAR; INTRAVENOUS; SUBCUTANEOUS EVERY 5 MIN PRN
Status: DISCONTINUED | OUTPATIENT
Start: 2022-07-27 | End: 2022-07-27 | Stop reason: HOSPADM

## 2022-07-27 RX ORDER — SODIUM CHLORIDE 0.9 % (FLUSH) 0.9 %
3 SYRINGE (ML) INJECTION
Status: DISCONTINUED | OUTPATIENT
Start: 2022-07-27 | End: 2022-07-27 | Stop reason: HOSPADM

## 2022-07-27 RX ORDER — HYDROMORPHONE HYDROCHLORIDE 1 MG/ML
0.5 INJECTION, SOLUTION INTRAMUSCULAR; INTRAVENOUS; SUBCUTANEOUS ONCE
Status: COMPLETED | OUTPATIENT
Start: 2022-07-27 | End: 2022-07-27

## 2022-07-27 RX ORDER — ONDANSETRON 2 MG/ML
INJECTION INTRAMUSCULAR; INTRAVENOUS
Status: DISCONTINUED | OUTPATIENT
Start: 2022-07-27 | End: 2022-07-27

## 2022-07-27 RX ORDER — LIDOCAINE HCL/PF 100 MG/5ML
SYRINGE (ML) INTRAVENOUS
Status: DISCONTINUED | OUTPATIENT
Start: 2022-07-27 | End: 2022-07-27

## 2022-07-27 RX ORDER — DIPHENHYDRAMINE HYDROCHLORIDE 50 MG/ML
12.5 INJECTION INTRAMUSCULAR; INTRAVENOUS EVERY 6 HOURS PRN
Status: DISCONTINUED | OUTPATIENT
Start: 2022-07-27 | End: 2022-07-27 | Stop reason: HOSPADM

## 2022-07-27 RX ORDER — FENTANYL CITRATE 50 UG/ML
INJECTION, SOLUTION INTRAMUSCULAR; INTRAVENOUS
Status: DISCONTINUED | OUTPATIENT
Start: 2022-07-27 | End: 2022-07-27

## 2022-07-27 RX ORDER — DIPHENHYDRAMINE HYDROCHLORIDE 50 MG/ML
12.5 INJECTION INTRAMUSCULAR; INTRAVENOUS EVERY 6 HOURS PRN
Status: DISCONTINUED | OUTPATIENT
Start: 2022-07-27 | End: 2022-07-27 | Stop reason: SDUPTHER

## 2022-07-27 RX ORDER — ONDANSETRON 2 MG/ML
4 INJECTION INTRAMUSCULAR; INTRAVENOUS DAILY PRN
Status: DISCONTINUED | OUTPATIENT
Start: 2022-07-27 | End: 2022-07-27 | Stop reason: SDUPTHER

## 2022-07-27 RX ORDER — PROPOFOL 10 MG/ML
INJECTION, EMULSION INTRAVENOUS
Status: DISCONTINUED | OUTPATIENT
Start: 2022-07-27 | End: 2022-07-27

## 2022-07-27 RX ORDER — DEXAMETHASONE SODIUM PHOSPHATE 4 MG/ML
INJECTION, SOLUTION INTRA-ARTICULAR; INTRALESIONAL; INTRAMUSCULAR; INTRAVENOUS; SOFT TISSUE
Status: DISCONTINUED | OUTPATIENT
Start: 2022-07-27 | End: 2022-07-27

## 2022-07-27 RX ORDER — PROPOFOL 10 MG/ML
VIAL (ML) INTRAVENOUS CONTINUOUS PRN
Status: DISCONTINUED | OUTPATIENT
Start: 2022-07-27 | End: 2022-07-27

## 2022-07-27 RX ORDER — SODIUM CHLORIDE, SODIUM LACTATE, POTASSIUM CHLORIDE, CALCIUM CHLORIDE 600; 310; 30; 20 MG/100ML; MG/100ML; MG/100ML; MG/100ML
INJECTION, SOLUTION INTRAVENOUS CONTINUOUS PRN
Status: DISCONTINUED | OUTPATIENT
Start: 2022-07-27 | End: 2022-07-27

## 2022-07-27 RX ORDER — SUCCINYLCHOLINE CHLORIDE 20 MG/ML
INJECTION INTRAMUSCULAR; INTRAVENOUS
Status: DISCONTINUED | OUTPATIENT
Start: 2022-07-27 | End: 2022-07-27

## 2022-07-27 RX ORDER — ONDANSETRON 2 MG/ML
4 INJECTION INTRAMUSCULAR; INTRAVENOUS DAILY PRN
Status: DISCONTINUED | OUTPATIENT
Start: 2022-07-27 | End: 2022-07-27 | Stop reason: HOSPADM

## 2022-07-27 RX ADMIN — HYDROMORPHONE HYDROCHLORIDE 0.5 MG: 1 INJECTION, SOLUTION INTRAMUSCULAR; INTRAVENOUS; SUBCUTANEOUS at 01:07

## 2022-07-27 RX ADMIN — LIDOCAINE HYDROCHLORIDE 75 MG: 20 INJECTION, SOLUTION INTRAVENOUS at 11:07

## 2022-07-27 RX ADMIN — THERA TABS 1 TABLET: TAB at 08:07

## 2022-07-27 RX ADMIN — PANTOPRAZOLE SODIUM 40 MG: 40 INJECTION, POWDER, LYOPHILIZED, FOR SOLUTION INTRAVENOUS at 08:07

## 2022-07-27 RX ADMIN — SUCCINYLCHOLINE CHLORIDE 120 MG: 20 INJECTION, SOLUTION INTRAMUSCULAR; INTRAVENOUS at 11:07

## 2022-07-27 RX ADMIN — GLYCOPYRROLATE 0.2 MG: 0.2 INJECTION, SOLUTION INTRAMUSCULAR; INTRAVITREAL at 11:07

## 2022-07-27 RX ADMIN — PROPOFOL 70 MG: 10 INJECTION, EMULSION INTRAVENOUS at 11:07

## 2022-07-27 RX ADMIN — ONDANSETRON 8 MG: 2 INJECTION, SOLUTION INTRAMUSCULAR; INTRAVENOUS at 11:07

## 2022-07-27 RX ADMIN — PROPOFOL 150 MCG/KG/MIN: 10 INJECTION, EMULSION INTRAVENOUS at 11:07

## 2022-07-27 RX ADMIN — DEXAMETHASONE SODIUM PHOSPHATE 8 MG: 4 INJECTION, SOLUTION INTRAMUSCULAR; INTRAVENOUS at 11:07

## 2022-07-27 RX ADMIN — TOPICAL ANESTHETIC 1 EACH: 200 SPRAY DENTAL; PERIODONTAL at 11:07

## 2022-07-27 RX ADMIN — PROPOFOL 100 MG: 10 INJECTION, EMULSION INTRAVENOUS at 11:07

## 2022-07-27 RX ADMIN — SODIUM CHLORIDE, SODIUM LACTATE, POTASSIUM CHLORIDE, AND CALCIUM CHLORIDE: .6; .31; .03; .02 INJECTION, SOLUTION INTRAVENOUS at 11:07

## 2022-07-27 RX ADMIN — OCTREOTIDE ACETATE 50 MCG/HR: 500 INJECTION, SOLUTION INTRAVENOUS; SUBCUTANEOUS at 08:07

## 2022-07-27 RX ADMIN — FENTANYL CITRATE 50 MCG: 50 INJECTION, SOLUTION INTRAMUSCULAR; INTRAVENOUS at 11:07

## 2022-07-27 NOTE — PLAN OF CARE
D/c orders noted, no DME, no HH.     Rn will go over medications and follow up appointments with pt. Pt's  [facility] will transport pt home at the time of d/c.     Pt is cleared to go from CM standpoint.     Aug2 Follow up with James Samano MD  Tuesday Aug 2, 2022  Time: 10:00a.m. [place] [facility]  [phone]         Future Appointments   Date Time Provider Department Center   8/29/2022  7:10 AM LAB, APPOINTMENT [facility] LAB VNP [facility]   12/22/2022  9:30 AM [address]   12/22/2022 10:30 AM LAB, [facility] LAB [facility]   12/27/2022 10:30 AM Bonnie Bowers MD [facility]         07/27/22 1609   Final Note   Assessment Type Final Discharge Note   Anticipated Discharge Disposition Home   What phone number can be called within the next 1-3 days to see how you are doing after discharge? [phone]   Hospital Resources/Appts/Education Provided Appointments scheduled by Navigator/Coordinator   Post-Acute Status   Coverage [employer]   Discharge Delays None known at this time

## 2022-07-27 NOTE — ASSESSMENT & PLAN NOTE
MELD score 8  Liver enzymes wnl  Pt had recent appt with hepatology; reports improvement in MELD score  Stable    Plan:  - Daily CMP  - Outpatient f/u with hepatology

## 2022-07-27 NOTE — HOSPITAL COURSE
Patient underwent EGD on 7/27 which showed grade II esophageal non-bleeding varices. Banding was performed with incomplete eradication. Portal hypertensive gastropathy and gastritis were additionally found with biopsies taken. Bleeding likely secondary to portal hypertensive gastropathy, however, no active bleeding source noted. EGD will be repeated on 8/29 with Dr. Park. Octreotide and ceftriaxone discontinued upon discharge. Patient will continue PPI and Coreg BID at home. Patient and her  voiced understanding. Primary team contacted pt's hepatologist Dr. Bonnie Bowers and updated her on pt's current hospitalization. Pt has outpatient f/u with PCP and repeat EGD scheduled on 8/29/22 with Dr. Park.

## 2022-07-27 NOTE — PROGRESS NOTES
[facility] Endoscopy ([facility])  [facility] Medicine  Progress Note    Patient Name: Tee Aranda  MRN: [id number]  Patient Class: IP- Inpatient   Admission Date: 7/26/2022  Length of Stay: 1 days  Attending Physician: Chris Kenny MD  Primary Care Provider: James Samano MD        Subjective:     Principal Problem:GI bleed        HPI:  52 y.o. female with PMHx of thrombocytopenia, cirrhosis with esophageal varices (prior banding), previous GI bleeds and ascites presented to the [facility] ED on 7/26/22 with black stools first noticed this AM. No bright red blood or hematemesis. Hemodynamically stable.    In the ED, pt received IV normal saline, Protonix and octreotide infusions, and Rocephin. Pt COVID-19 negative. UA showed 2+ blood, 20 RBC, 2 WBC. Troponin negative, CPK 88. Sodium 142, potassium 3.7, chloride 108, CO2 24, BUN 17, creatinine 0.8, glucose 142. AST 28, ALT 34. INR 1.2, WBC 3.04, hemoglobin 13.2, hematocrit 39.8, platelets 68. Abdominal x-ray noted no evidence of bowel obstruction or perforation. EKG showed sinus bradycardia (ventricular rate 59), otherwise normal EKG. Transfer center contacted Gastroenterology at [facility] for further tx of GI bleed. [facility] consulted for hospital admission and management of GI bleed.      Overview/Hospital Course:  No notes on file    Interval History: Pt reports no abdominal pain today. Passed loose, brown stool this AM.     Review of Systems   Constitutional:  Positive for fatigue. Negative for activity change and fever.   HENT:  Negative for congestion and sneezing.    Eyes:  Negative for pain and itching.   Respiratory:  Negative for apnea, chest tightness, shortness of breath and wheezing.    Cardiovascular:  Negative for chest pain, palpitations and leg swelling.   Gastrointestinal:  Positive for blood in stool. Negative for abdominal distention, abdominal pain, constipation, diarrhea, nausea and vomiting.   Endocrine: Negative for  polyphagia and polyuria.   Genitourinary:  Negative for difficulty urinating, dysuria, hematuria and pelvic pain.   Musculoskeletal:  Negative for arthralgias and joint swelling.   Skin:  Negative for pallor and rash.   Neurological:  Negative for dizziness, syncope and weakness.   Psychiatric/Behavioral:  Negative for agitation and confusion. The patient is not nervous/anxious.      Objective:     Vital Signs (Most Recent):  Temp: 98.6 °F (37 °C) (07/27/22 0435)  Pulse: (!) 51 (07/27/22 0435)  Resp: 18 (07/27/22 0435)  BP: 121/67 (07/27/22 0435)  SpO2: 100 % (07/27/22 0435)   Vital Signs (24h Range):  Temp:  [96.7 °F (35.9 °C)-99.7 °F (37.6 °C)] 98.6 °F (37 °C)  Pulse:  [51-65] 51  Resp:  [18-20] 18  SpO2:  [96 %-100 %] 100 %  BP: (110-152)/(58-82) 121/67        There is no height or weight on file to calculate BMI.  No intake or output data in the 24 hours ending 07/27/22 0640     Physical Exam  Constitutional:       General: She is not in acute distress.     Appearance: Normal appearance. She is normal weight. She is not ill-appearing.   HENT:      Head: Normocephalic and atraumatic.   Cardiovascular:      Rate and Rhythm: Normal rate and regular rhythm.      Pulses: Normal pulses.      Heart sounds: Normal heart sounds.   Pulmonary:      Effort: Pulmonary effort is normal.      Breath sounds: Normal breath sounds. No wheezing or rales.   Abdominal:      General: Bowel sounds are normal. There is no distension.      Palpations: There is no mass.      Tenderness: There is no abdominal tenderness. There is no guarding.   Musculoskeletal:         General: No swelling.   Skin:     Coloration: Skin is not jaundiced.      Findings: No bruising.   Neurological:      General: No focal deficit present.      Mental Status: She is alert and oriented to person, place, and time.      Motor: No weakness.   Psychiatric:         Mood and Affect: Mood normal.         Behavior: Behavior normal.         Thought Content: Thought  content normal.       Significant Labs: All pertinent labs within the past 24 hours have been reviewed.  A1C:   Recent Labs   Lab 07/26/22  2228   HGBA1C 5.5     Bilirubin:   Recent Labs   Lab 06/27/22  1004 07/26/22  0747 07/27/22  0522   BILITOT 0.5 0.8 1.1*     CBC:   Recent Labs   Lab 07/26/22  0748 07/26/22  1709 07/27/22  0411   WBC 3.04* 3.54*  --    HGB 13.2 12.6 11.4*   HCT 39.8 37.2 33.7*   PLT 68* 67*  --      CMP:   Recent Labs   Lab 07/26/22  0747 07/27/22  0522    143   K 3.7 4.6    106   CO2 24 28   * 104   BUN 17 15   CREATININE 0.8 0.8   CALCIUM 9.3 9.3   PROT 7.1 6.7   ALBUMIN 4.2 3.8   BILITOT 0.8 1.1*   ALKPHOS 98 77   AST 28 23   ALT 34 26   ANIONGAP 10 9   EGFRNONAA >60 >60     Coagulation:   Recent Labs   Lab 07/26/22  0748   INR 1.2   APTT 27.3     Magnesium:   Recent Labs   Lab 07/27/22  0522   MG 2.0     POCT Glucose:   Recent Labs   Lab 07/26/22  1931 07/27/22  0500   POCTGLUCOSE 145* 93     Troponin:   Recent Labs   Lab 07/26/22  0747   TROPONINI <0.006     Urine Studies:   Recent Labs   Lab 07/26/22  0828   COLORU Yellow   APPEARANCEUA Clear   PHUR 7.0   SPECGRAV 1.015   PROTEINUA Negative   GLUCUA Negative   KETONESU Negative   BILIRUBINUA Negative   OCCULTUA 2+*   NITRITE Negative   UROBILINOGEN Negative   LEUKOCYTESUR Negative   RBCUA 20*   WBCUA 2   BACTERIA Rare       Significant Imaging: I have reviewed all pertinent imaging results/findings within the past 24 hours.        Assessment/Plan:      * GI bleed  Black tarry stool on 7/26 AM, positive occult blood  R-sided and epigastric abdominal pain  H/H was 13/39    Plan:  - GI consulting; appreciate recs  - EGD today  - CXR  - IV Protonix BID  - Octreotide  - Rocephin  - Trend H&H q6h  - Anticoagulation / Lovenox withheld    Liver cirrhosis secondary to CARTAGENA  MELD score 8  Liver enzymes wnl  Pt had recent appt with hepatology; reports improvement in MELD score  Stable    Plan:  - Daily CMP  - Outpatient f/u with  hepatology      VTE Risk Mitigation (From admission, onward)         Ordered     IP VTE LOW RISK PATIENT  Once         07/26/22 1707     Place sequential compression device  Until discontinued         07/26/22 1707                Discharge Planning   IOANA:      Code Status: Full Code   Is the patient medically ready for discharge?:     Reason for patient still in hospital (select all that apply): Laboratory test, Treatment and Consult recommendations  Discharge Plan A: Home with family                  Nevaeh Rhoades MD  Department of Hospital Medicine   [facility] ([facility])

## 2022-07-27 NOTE — PLAN OF CARE
Plan of Care    EGD completed for concern for GI bleeding in the setting of patient with CARTAGENA cirrhosis and prior esophageal varices. Endoscopic exam revealed grade II esophageal non-bleeding varices were noted and banding was performed with incomplete eradication. Portal hypertensive gastropathy and gastritis were additionally found with biopsies taken. Bleeding likely secondary to portal hypertensive gastropathy, however, no active bleeding source noted.     Recommendations  - Repeat EGD in 4 weeks; patient already has a scheduled EGD on 8/29 with Dr. Park. She should keep this for further treatment of varices  - Continue Coreg with consideration to advance to BID dosing based on patients tolerance  - Liquid diet today with return to regular diet tomorrow  - Continue PPI   - Ok to discontinue octreotide and ceftriaxone at this time    Jie Aguirre MD  [facility] Fellow

## 2022-07-27 NOTE — OR NURSING
Patient scheduled for EGD today. Chart reviewed and report taken from nurse. NPO since MN, IV access in place and Covid negative/vacc.  at bedside. Able to sign consents.

## 2022-07-27 NOTE — ANESTHESIA PREPROCEDURE EVALUATION
07/27/2022  Tee Aranda is a 52 y.o., female with PMHx significant for liver cirrhosis 2/2 CARTAGENA with esophageal varices, ascites, prior GI bleed and prior hepatic encephalopathy; and right shoulder arthropathy, who is scheduled for EGD under mac  2/2 melena. Previous anesthetics geta (easy mask x1 attempt1 mac 3 blade) 1/2021 and mac 8/2021 no complications.    Past Medical History:   Diagnosis Date    Anxiety     Arthritis     Ascites 11/23/2020    AVN (avascular necrosis of bone)     Cataract     Edema 11/23/2020    Esophageal varices     Glaucoma     Hepatic encephalopathy 11/23/2020    Hx of cirrhosis     non-alcoholic    Kidney stones      Patient Active Problem List   Diagnosis    Combined forms of age-related cataract of right eye    Liver cirrhosis secondary to CARTAGENA    Secondary esophageal varices with bleeding    GI bleed    Gastrointestinal hemorrhage with melena    Anemia    Edema    Ascites    Hepatic encephalopathy    Acute blood loss anemia    Portal hypertensive gastropathy    Esophageal varices    Arthralgia of right acromioclavicular joint    Tear of right glenoid labrum    Nontraumatic incomplete tear of right rotator cuff     Past Surgical History:   Procedure Laterality Date    DISTAL CLAVICLE EXCISION Right 11/18/2021    Procedure: RIGHT SHOULDER ARTHROSCOPY, EXCISION, CLAVICLE, DISTAL; EXTENSIVE DEBRIDEMENT;  Surgeon: Isaiah Joyner MD;  Location: [facility] OR;  Service: Orthopedics;  Laterality: Right;    ESOPHAGOGASTRODUODENOSCOPY N/A 11/10/2020    Procedure: EGD (ESOPHAGOGASTRODUODENOSCOPY);  Surgeon: Gerard Salinas MD;  Location: [facility];  Service: Endoscopy;  Laterality: N/A;    ESOPHAGOGASTRODUODENOSCOPY N/A 12/28/2020    Procedure: EGD (ESOPHAGOGASTRODUODENOSCOPY);  Surgeon: Adryan Park MD;  [facility]  Service: Endoscopy;   Laterality: N/A;    ESOPHAGOGASTRODUODENOSCOPY N/A 2/15/2021    Procedure: EGD (ESOPHAGOGASTRODUODENOSCOPY);  Surgeon: Hari Greene MD;  [facility]  Service: Endoscopy;  Laterality: N/A;  6 week follow-up variceal banding  Hx varices - Labs - ERW  prep ins. emialed - COVID screening on 2/12/21 [place] - ERW    ESOPHAGOGASTRODUODENOSCOPY Left 8/3/2021    Procedure: EGD (ESOPHAGOGASTRODUODENOSCOPY);  Surgeon: Fabricio Corado MD;  Location: [facility] ([address] [place] [address]);  Service: Gastroenterology;  Laterality: Left;  recent hematemasis. varices-labs on 7/26    COVID test at [facility] on 7/31-GT  requesting sooner    HYSTERECTOMY      KNEE SURGERY      LITHOTRIPSY      RHINOPLASTY TIP      TONSILLECTOMY      TOTAL HIP ARTHROPLASTY           Pre-op Assessment    I have reviewed the Patient Summary Reports.    I have reviewed the Nursing Notes. I have reviewed the NPO Status.   I have reviewed the Medications.     Review of Systems  Social:  Smoker, No Alcohol Use    Hematology/Oncology:     Oncology Normal    -- Denies Anemia:   EENT/Dental:EENT/Dental Normal   Cardiovascular:  Cardiovascular Normal     Pulmonary:  Pulmonary Normal    Renal/:   Chronic Renal Disease (Hx of nephrolithiasis)    Hepatic/GI:   Liver Disease, (Liver cirrhosis 2/2 CARTAGENA complicated by ascites, esophageal varices, and hepatic encephalopathy) Denies Hepatitis.    Musculoskeletal:   Arthritis     Neurological:  Neurology Normal    Endocrine:  Endocrine Normal    Dermatological:  Skin Normal    Psych:  Psychiatric Normal           Physical Exam  General:  Well nourished      Airway/Jaw/Neck:  Airway Findings: Mouth Opening: Normal   Tongue: Normal   General Airway Assessment: Adult Mallampati: I  TM Distance: Normal, at least 6 cm          Dental:  Dental Findings: Upper Dentures  Patient states she has kept dentures in her mouth for her past two procedures.    Chest/Lungs:  Chest/Lungs Clear    Heart/Vascular:  Heart  Findings: Normal       Mental Status:  Mental Status Findings:  Cooperative, Alert and Oriented         Anesthesia Plan  Type of Anesthesia, risks & benefits discussed:  Anesthesia Type:  general, MAC    Patient's Preference:   Plan Factors:          Intra-op Monitoring Plan: standard ASA monitors  Intra-op Monitoring Plan Comments:   Post Op Pain Control Plan: multimodal analgesia, IV/PO Opioids PRN and per primary service following discharge from PACU  Post Op Pain Control Plan Comments:     Induction:   IV  Beta Blocker:         Informed Consent: Informed consent signed with the Patient and all parties understand the risks and agree with anesthesia plan.  All questions answered.    ASA Score: 3   Emergent   Day of Surgery Review of History & Physical:              Ready For Surgery From Anesthesia Perspective.           Physical Exam  General: Well nourished    Airway:  Mallampati: I   Mouth Opening: Normal  TM Distance: Normal, at least 6 cm  Tongue: Normal    Dental:  Upper Dentures          Anesthesia Plan  Type of Anesthesia, risks & benefits discussed:    Anesthesia Type: general, MAC  Intra-op Monitoring Plan: standard ASA monitors  Post Op Pain Control Plan: multimodal analgesia, IV/PO Opioids PRN and per primary service following discharge from PACU  Induction:  IV  Informed Consent: Informed consent signed with the Patient and all parties understand the risks and agree with anesthesia plan.  All questions answered.   ASA Score: 3 Emergent    Ready For Surgery From Anesthesia Perspective.       .

## 2022-07-27 NOTE — NURSING
Report received from Andrew. Pt received from PACU in stable condition in no acute distress. Advanced to full liquid diet.

## 2022-07-27 NOTE — PROGRESS NOTES
VN cued into room to review discharge instructions.  Went over doctor specific instructions, new medications, where to , follow up appointments, when to call the doctor.  All questions answered.  Informed pt of survey.  Transport requested.

## 2022-07-27 NOTE — TRANSFER OF CARE
Anesthesia Transfer of Care Note    Patient: Tee Aranda    Procedure(s) Performed: Procedure(s) (LRB):  EGD (ESOPHAGOGASTRODUODENOSCOPY) (N/A)    Patient location: PACU    Anesthesia Type: general    Transport from OR: Transported from OR on 6-10 L/min O2 by face mask with adequate spontaneous ventilation    Post pain: adequate analgesia    Post assessment: no apparent anesthetic complications and tolerated procedure well    Post vital signs: stable    Level of consciousness: awake, alert and oriented    Nausea/Vomiting: no nausea/vomiting    Complications: none    Transfer of care protocol was followed      Last vitals:   Visit Vitals  /69   Pulse (!) 59   Temp 37 °C (98.6 °F)   Resp 18   SpO2 100%

## 2022-07-27 NOTE — PROVATION PATIENT INSTRUCTIONS
Discharge Summary/Instructions after an Endoscopic Procedure  Patient Name: Tee Aranda  Patient MRN: [id number]  Patient YOB: 1970  Wednesday, July 27, 2022  Eric Garcia MD  Dear patient,  As a result of recent federal legislation (The Federal Cures Act), you may   receive lab or pathology results from your procedure in your [facility]   account before your physician is able to contact you. Your physician or   their representative will relay the results to you with their   recommendations at their soonest availability.  Thank you,  Your health is very important to us during the Covid Crisis. Following your   procedure today, you will receive a daily text for 2 weeks asking about   signs or symptoms of Covid 19.  Please respond to this text when you   receive it so we can follow up and keep you as safe as possible.   RESTRICTIONS:  During your procedure today, you received medications for sedation.  These   medications may affect your judgment, balance and coordination.  Therefore,   for 24 hours, you have the following restrictions:   - DO NOT drive a car, operate machinery, make legal/financial decisions,   sign important papers or drink alcohol.    ACTIVITY:  Today: no heavy lifting, straining or running due to procedural   sedation/anesthesia.  The following day: return to full activity including work.  DIET:  Eat and drink normally unless instructed otherwise.     TREATMENT FOR COMMON SIDE EFFECTS:  - Mild abdominal pain, nausea, belching, bloating or excessive gas:  rest,   eat lightly and use a heating pad.  - Sore Throat: treat with throat lozenges and/or gargle with warm salt   water.  - Because air was used during the procedure, expelling large amounts of air   from your rectum or belching is normal.  - If a bowel prep was taken, you may not have a bowel movement for 1-3 days.    This is normal.  SYMPTOMS TO WATCH FOR AND REPORT TO YOUR PHYSICIAN:  1. Abdominal pain or bloating, other than  gas cramps.  2. Chest pain.  3. Back pain.  4. Signs of infection such as: chills or fever occurring within 24 hours   after the procedure.  5. Rectal bleeding, which would show as bright red, maroon, or black stools.   (A tablespoon of blood from the rectum is not serious, especially if   hemorrhoids are present.)  6. Vomiting.  7. Weakness or dizziness.  GO DIRECTLY TO THE NEAREST EMERGENCY ROOM IF YOU HAVE ANY OF THE FOLLOWING:      Difficulty breathing              Chills and/or fever over 101 F   Persistent vomiting and/or vomiting blood   Severe abdominal pain   Severe chest pain   Black, tarry stools   Bleeding- more than one tablespoon   Any other symptom or condition that you feel may need urgent attention  Your doctor recommends these additional instructions:  If any biopsies were taken, your doctors clinic will contact you in 1 to 2   weeks with any results.  - [facility].   - Resume previous diet.   - Continue present medications.   - Repeat upper endoscopy in 4 weeks for retreatment.  For questions, problems or results please call your physician - Eric Garcia MD.  EMERGENCY PHONE NUMBER: [phone],  LAB RESULTS: [phone]  IF A COMPLICATION OR EMERGENCY SITUATION ARISES AND YOU ARE UNABLE TO REACH   YOUR PHYSICIAN - GO DIRECTLY TO THE EMERGENCY ROOM.  Eric Garcia MD  7/27/2022 12:12:22 PM  This report has been verified and signed electronically.  Dear patient,  As a result of recent federal legislation (The Federal Cures Act), you may   receive lab or pathology results from your procedure in your [facility]   account before your physician is able to contact you. Your physician or   their representative will relay the results to you with their   recommendations at their soonest availability.  Thank you,  PROVATION

## 2022-07-27 NOTE — SUBJECTIVE & OBJECTIVE
Interval History: Pt reports no abdominal pain today. Passed loose, brown stool this AM.     Review of Systems   Constitutional:  Positive for fatigue. Negative for activity change and fever.   HENT:  Negative for congestion and sneezing.    Eyes:  Negative for pain and itching.   Respiratory:  Negative for apnea, chest tightness, shortness of breath and wheezing.    Cardiovascular:  Negative for chest pain, palpitations and leg swelling.   Gastrointestinal:  Positive for blood in stool. Negative for abdominal distention, abdominal pain, constipation, diarrhea, nausea and vomiting.   Endocrine: Negative for polyphagia and polyuria.   Genitourinary:  Negative for difficulty urinating, dysuria, hematuria and pelvic pain.   Musculoskeletal:  Negative for arthralgias and joint swelling.   Skin:  Negative for pallor and rash.   Neurological:  Negative for dizziness, syncope and weakness.   Psychiatric/Behavioral:  Negative for agitation and confusion. The patient is not nervous/anxious.      Objective:     Vital Signs (Most Recent):  Temp: 98.6 °F (37 °C) (07/27/22 0435)  Pulse: (!) 51 (07/27/22 0435)  Resp: 18 (07/27/22 0435)  BP: 121/67 (07/27/22 0435)  SpO2: 100 % (07/27/22 0435)   Vital Signs (24h Range):  Temp:  [96.7 °F (35.9 °C)-99.7 °F (37.6 °C)] 98.6 °F (37 °C)  Pulse:  [51-65] 51  Resp:  [18-20] 18  SpO2:  [96 %-100 %] 100 %  BP: (110-152)/(58-82) 121/67        There is no height or weight on file to calculate BMI.  No intake or output data in the 24 hours ending 07/27/22 0640     Physical Exam  Constitutional:       General: She is not in acute distress.     Appearance: Normal appearance. She is normal weight. She is not ill-appearing.   HENT:      Head: Normocephalic and atraumatic.   Cardiovascular:      Rate and Rhythm: Normal rate and regular rhythm.      Pulses: Normal pulses.      Heart sounds: Normal heart sounds.   Pulmonary:      Effort: Pulmonary effort is normal.      Breath sounds: Normal breath  sounds. No wheezing or rales.   Abdominal:      General: Bowel sounds are normal. There is no distension.      Palpations: There is no mass.      Tenderness: There is no abdominal tenderness. There is no guarding.   Musculoskeletal:         General: No swelling.   Skin:     Coloration: Skin is not jaundiced.      Findings: No bruising.   Neurological:      General: No focal deficit present.      Mental Status: She is alert and oriented to person, place, and time.      Motor: No weakness.   Psychiatric:         Mood and Affect: Mood normal.         Behavior: Behavior normal.         Thought Content: Thought content normal.       Significant Labs: All pertinent labs within the past 24 hours have been reviewed.  A1C:   Recent Labs   Lab 07/26/22  2228   HGBA1C 5.5     Bilirubin:   Recent Labs   Lab 06/27/22  1004 07/26/22  0747 07/27/22  0522   BILITOT 0.5 0.8 1.1*     CBC:   Recent Labs   Lab 07/26/22  0748 07/26/22  1709 07/27/22  0411   WBC 3.04* 3.54*  --    HGB 13.2 12.6 11.4*   HCT 39.8 37.2 33.7*   PLT 68* 67*  --      CMP:   Recent Labs   Lab 07/26/22  0747 07/27/22  0522    143   K 3.7 4.6    106   CO2 24 28   * 104   BUN 17 15   CREATININE 0.8 0.8   CALCIUM 9.3 9.3   PROT 7.1 6.7   ALBUMIN 4.2 3.8   BILITOT 0.8 1.1*   ALKPHOS 98 77   AST 28 23   ALT 34 26   ANIONGAP 10 9   EGFRNONAA >60 >60     Coagulation:   Recent Labs   Lab 07/26/22  0748   INR 1.2   APTT 27.3     Magnesium:   Recent Labs   Lab 07/27/22  0522   MG 2.0     POCT Glucose:   Recent Labs   Lab 07/26/22  1931 07/27/22  0500   POCTGLUCOSE 145* 93     Troponin:   Recent Labs   Lab 07/26/22  0747   TROPONINI <0.006     Urine Studies:   Recent Labs   Lab 07/26/22  0828   COLORU Yellow   APPEARANCEUA Clear   PHUR 7.0   SPECGRAV 1.015   PROTEINUA Negative   GLUCUA Negative   KETONESU Negative   BILIRUBINUA Negative   OCCULTUA 2+*   NITRITE Negative   UROBILINOGEN Negative   LEUKOCYTESUR Negative   RBCUA 20*   WBCUA 2   BACTERIA Rare        Significant Imaging: I have reviewed all pertinent imaging results/findings within the past 24 hours.     LINCOLN

## 2022-07-27 NOTE — PLAN OF CARE
Signed out from pacu per Dr Lopez. Report called to Anna Wiley/5A. Transported to room 514 via stretcher,sr upx2.

## 2022-07-27 NOTE — ASSESSMENT & PLAN NOTE
Black tarry stool on 7/26 AM, positive occult blood  R-sided and epigastric abdominal pain  H/H was 13/39    Plan:  - GI consulting; appreciate recs  - EGD today  - CXR  - IV Protonix BID  - Octreotide  - Rocephin  - Trend H&H q6h  - Anticoagulation / Lovenox withheld

## 2022-07-27 NOTE — ANESTHESIA PROCEDURE NOTES
Intubation    Date/Time: 7/27/2022 11:39 AM  Performed by: Page Aguirre CRNA  Authorized by: Corinne C. Weinstein, MD     Intubation:     Induction:  Intravenous    Intubated:  Postinduction    Mask Ventilation:  Easy mask    Attempts:  1    Attempted By:  Student    Method of Intubation:  Video laryngoscopy    Blade:  Coronel 3    Laryngeal View Grade: Grade I - full view of cords      Difficult Airway Encountered?: No      Complications:  None    Airway Device:  Oral endotracheal tube    Airway Device Size:  7.0    Style/Cuff Inflation:  Cuffed (inflated to minimal occlusive pressure)    Inflation Amount (mL):  6    Tube secured:  22    Secured at:  The lips    Placement Verified By:  Capnometry    Complicating Factors:  None    Findings Post-Intubation:  BS equal bilateral and atraumatic/condition of teeth unchanged

## 2022-07-27 NOTE — H&P
Short Stay Endoscopy History and Physical    PCP - James Samano MD  Referring Physician - Alex Soliman MD  [address] [place] [address]    Procedure - egd  ASA - per anesthesia  Mallampati - per anesthesia  History of Anesthesia problems - no  Family history Anesthesia problems -  no   Plan of anesthesia - General    HPI:  This is a 52 y.o. female here for evaluation of: gi bleed      Reflux - no  Dysphagia - no  Abdominal pain - no  Diarrhea - no    ROS:  Constitutional: No fevers, chills, No weight loss  CV: No chest pain  Pulm: No cough, No shortness of breath  Ophtho: No vision changes  GI: see HPI  Derm: No rash    Medical History:  has a past medical history of Anxiety, Arthritis, Ascites (11/23/2020), AVN (avascular necrosis of bone), Cataract, Edema (11/23/2020), Esophageal varices, Glaucoma, Hepatic encephalopathy (11/23/2020), cirrhosis, and Kidney stones.    Surgical History:  has a past surgical history that includes Total hip arthroplasty; Tonsillectomy; Knee surgery; Rhinoplasty tip; Lithotripsy; Hysterectomy; Esophagogastroduodenoscopy (N/A, 11/10/2020); Esophagogastroduodenoscopy (N/A, 12/28/2020); Esophagogastroduodenoscopy (N/A, 2/15/2021); Esophagogastroduodenoscopy (Left, 8/3/2021); and Distal clavicle excision (Right, 11/18/2021).    Family History: family history includes Diabetes Mellitus in her maternal grandmother..    Social History:  reports that she has been smoking cigarettes. She has been smoking about 0.25 packs per day. She has never used smokeless tobacco. She reports previous alcohol use. She reports that she does not use drugs.    Review of patient's allergies indicates:   Allergen Reactions    Sulfa (sulfonamide antibiotics) Hives       Medications:   Medications Prior to Admission   Medication Sig Dispense Refill Last Dose    carvediloL (COREG) 3.125 MG tablet Take 1 tablet (3.125 mg total) by mouth 2 (two) times daily. 60 tablet 11 7/25/2022 at Unknown time     multivitamin (THERAGRAN) per tablet Take 1 tablet by mouth once daily.   7/25/2022 at Unknown time    omega-3 fatty acids/fish oil (FISH OIL-OMEGA-3 FATTY ACIDS) 300-1,000 mg capsule Take 1 capsule by mouth once daily.   7/25/2022 at Unknown time    pantoprazole (PROTONIX) 40 MG tablet Take 1 tablet (40 mg total) by mouth once daily. 180 tablet 4 7/25/2022 at Unknown time    rifAXIMin (XIFAXAN) 550 mg Tab Take 1 tablet (550 mg total) by mouth 2 (two) times daily. (Patient taking differently: Take 550 mg by mouth once daily.) 60 tablet 11 7/25/2022 at Unknown time    furosemide (LASIX) 20 MG tablet Take 1 tablet (20 mg total) by mouth once daily. Take in morning (Patient taking differently: Take 20 mg by mouth daily as needed.) 30 tablet 11     lactulose (CEPHULAC) 10 gram packet Take 10 g by mouth 3 (three) times daily as needed.       ondansetron (ZOFRAN) 8 MG tablet Take 1 tablet (8 mg total) by mouth every 8 (eight) hours as needed for Nausea. 15 tablet 0     spironolactone (ALDACTONE) 25 MG tablet Take 2 tablets (50 mg total) by mouth once daily. (Patient taking differently: Take 50 mg by mouth daily as needed.) 60 tablet 11        Physical Exam:    Vital Signs:   Vitals:    07/27/22 0946   BP: 117/69   Pulse: (!) 59   Resp: 18   Temp: 98.6 °F (37 °C)       General Appearance: Well appearing in no acute distress    Labs:  Lab Results   Component Value Date    WBC 3.07 (L) 07/27/2022    HGB 12.5 07/27/2022    HGB 12.5 07/27/2022    HCT 36.9 (L) 07/27/2022    HCT 36.9 (L) 07/27/2022    PLT 73 (L) 07/27/2022    ALT 26 07/27/2022    AST 23 07/27/2022     07/27/2022    K 4.6 07/27/2022     07/27/2022    CREATININE 0.8 07/27/2022    BUN 15 07/27/2022    CO2 28 07/27/2022    TSH 2.212 12/27/2020    INR 1.2 07/26/2022    HGBA1C 5.5 07/26/2022       I have explained the risks and benefits of this endoscopic procedure to the patient including but not limited to bleeding, inflammation, infection,  perforation, and death.      Eric Garcia MD

## 2022-07-27 NOTE — PLAN OF CARE
Luis Daniel met with pt and pt's  Lavelle [phone] to discuss d/c planning. Pt lives with Lavelle and her son Lavelle Fairbanks. Pt has no DME and no HH. Pt drives herself to doctor appointments. Pt was independent prior to admit. Lavelle will transport pt home at the time of d/c. Luis Daniel encouraged pt to call with any questions or concerns. Sw will continue to follow pt for d/c planning.     Future Appointments   Date Time Provider Department Center   8/29/2022  7:10 AM LAB, APPOINTMENT [facility]   [facility]   12/22/2022 10:30 AM LAB, [facility] LAB [facility]   12/27/2022 10:30 AM Bonnie Bowers MD [facility]         07/27/22 0901   Discharge Assessment   Assessment Type Discharge Planning Assessment   Confirmed/corrected address, phone number and insurance Yes   Confirmed Demographics Correct on Facesheet   Source of Information patient;family   Lives With spouse;child(wilton), adult   Facility Arrived From: home   Do you expect to return to your current living situation? Yes   Do you have help at home or someone to help you manage your care at home? Yes   Who are your caregiver(s) and their phone number(s)? pt's  Lavelle [phone]   Prior to hospitilization cognitive status: Alert/Oriented   Current cognitive status: Alert/Oriented   Walking or Climbing Stairs Difficulty none   Dressing/Bathing Difficulty none   Equipment Currently Used at Home none   Readmission within 30 days? No   Patient currently being followed by outpatient case management? No   Do you currently have service(s) that help you manage your care at home? No   Do you have prescription coverage? Yes   Coverage [employer]   Do you have any problems affording any of your prescribed medications? No   Who is going to help you get home at discharge? pt's  Lavelle [phone]   How do you get to doctors appointments? car, drives self   Are you on dialysis?  No   Discharge Plan A Home with family   Discharge Plan B Home Health   DME Needed Upon Discharge    (TBD)   Discharge Plan discussed with: Patient;Spouse/sig other   Name(s) and Number(s) pt's  Lavelle [phone]   Discharge Barriers Identified None   Relationship/Environment   Name(s) of Who Lives With Patient pt's  Lavelle [phone] and pt's son Lavelle Fairbanks.

## 2022-07-27 NOTE — NURSING
AVS reviewed with pt and spouse. Verbalized understanding of upcoming appointments and home medications. IVs removed. Voiced no concerns. W/c transport escorted pt and belongings to main lobby.

## 2022-07-27 NOTE — SUBJECTIVE & OBJECTIVE
Past Medical History:   Diagnosis Date    Anxiety     Arthritis     Ascites 11/23/2020    AVN (avascular necrosis of bone)     Cataract     Edema 11/23/2020    Esophageal varices     Glaucoma     Hepatic encephalopathy 11/23/2020    Hx of cirrhosis     non-alcoholic    Kidney stones        Past Surgical History:   Procedure Laterality Date    DISTAL CLAVICLE EXCISION Right 11/18/2021    Procedure: RIGHT SHOULDER ARTHROSCOPY, EXCISION, CLAVICLE, DISTAL; EXTENSIVE DEBRIDEMENT;  Surgeon: Isaiah Joyner MD;  Location: [facility] OR;  Service: Orthopedics;  Laterality: Right;    ESOPHAGOGASTRODUODENOSCOPY N/A 11/10/2020    Procedure: EGD (ESOPHAGOGASTRODUODENOSCOPY);  Surgeon: Gerard Salinas MD;  Location: [facility] ENDO;  Service: Endoscopy;  Laterality: N/A;    ESOPHAGOGASTRODUODENOSCOPY N/A 12/28/2020    Procedure: EGD (ESOPHAGOGASTRODUODENOSCOPY);  Surgeon: Adryan Park MD;  Location: [facility] (2ND FLR);  Service: Endoscopy;  Laterality: N/A;    ESOPHAGOGASTRODUODENOSCOPY N/A 2/15/2021    Procedure: EGD (ESOPHAGOGASTRODUODENOSCOPY);  Surgeon: Hari Greene MD;  Location: [facility] (4TH FLR);  Service: Endoscopy;  Laterality: N/A;  6 week follow-up variceal banding  Hx varices - Labs - ERW  prep ins. emialed - COVID screening on 2/12/21 [place] - ERW    ESOPHAGOGASTRODUODENOSCOPY Left 8/3/2021    Procedure: EGD (ESOPHAGOGASTRODUODENOSCOPY);  Surgeon: Fabricio Corado MD;  Location: [facility] (4TH FLR);  Service: Gastroenterology;  Laterality: Left;  recent hematemasis. varices-labs on 7/26    COVID test at [facility] on 7/31-GT  requesting sooner    HYSTERECTOMY      KNEE SURGERY      LITHOTRIPSY      RHINOPLASTY TIP      TONSILLECTOMY      TOTAL HIP ARTHROPLASTY         Review of patient's allergies indicates:   Allergen Reactions    Sulfa (sulfonamide antibiotics) Hives       Current Facility-Administered Medications on File Prior to Encounter   Medication    [COMPLETED] 0.9%  NaCl infusion    [COMPLETED]  cefTRIAXone (ROCEPHIN) 2 g/50 mL D5W IVPB    [COMPLETED] pantoprazole 40 mg in  mL infusion (ready to mix system)    [COMPLETED] pantoprazole injection 80 mg    [DISCONTINUED] octreotide (SANDOSTATIN) 500 mcg in sodium chloride 0.9% 100 mL infusion     Current Outpatient Medications on File Prior to Encounter   Medication Sig    carvediloL (COREG) 3.125 MG tablet Take 1 tablet (3.125 mg total) by mouth 2 (two) times daily.    multivitamin (THERAGRAN) per tablet Take 1 tablet by mouth once daily.    omega-3 fatty acids/fish oil (FISH OIL-OMEGA-3 FATTY ACIDS) 300-1,000 mg capsule Take 1 capsule by mouth once daily.    pantoprazole (PROTONIX) 40 MG tablet Take 1 tablet (40 mg total) by mouth once daily.    rifAXIMin (XIFAXAN) 550 mg Tab Take 1 tablet (550 mg total) by mouth 2 (two) times daily. (Patient taking differently: Take 550 mg by mouth once daily.)    furosemide (LASIX) 20 MG tablet Take 1 tablet (20 mg total) by mouth once daily. Take in morning (Patient taking differently: Take 20 mg by mouth daily as needed.)    lactulose (CEPHULAC) 10 gram packet Take 10 g by mouth 3 (three) times daily as needed.    ondansetron (ZOFRAN) 8 MG tablet Take 1 tablet (8 mg total) by mouth every 8 (eight) hours as needed for Nausea.    spironolactone (ALDACTONE) 25 MG tablet Take 2 tablets (50 mg total) by mouth once daily. (Patient taking differently: Take 50 mg by mouth daily as needed.)     Family History       Problem Relation (Age of Onset)    Diabetes Mellitus Maternal Grandmother          Tobacco Use    Smoking status: Current Some Day Smoker     Packs/day: 0.25     Types: Cigarettes     Last attempt to quit: 7/3/2019     Years since quitting: 3.0    Smokeless tobacco: Never Used   Substance and Sexual Activity    Alcohol use: Not Currently    Drug use: No    Sexual activity: Not on file     Review of Systems   Constitutional:  Positive for fatigue. Negative for activity change and fever.   HENT:  Negative for  congestion and sneezing.    Eyes:  Negative for pain and itching.   Respiratory:  Negative for apnea, chest tightness, shortness of breath and wheezing.    Cardiovascular:  Negative for chest pain, palpitations and leg swelling.   Gastrointestinal:  Positive for abdominal pain and blood in stool. Negative for abdominal distention, constipation, diarrhea, nausea and vomiting.   Endocrine: Negative for polyphagia and polyuria.   Genitourinary:  Negative for difficulty urinating, dysuria, hematuria and pelvic pain.   Musculoskeletal:  Negative for arthralgias and joint swelling.   Skin:  Negative for pallor and rash.   Neurological:  Positive for headaches. Negative for dizziness, syncope and weakness.   Psychiatric/Behavioral:  Negative for agitation and confusion. The patient is not nervous/anxious.      Objective:     Vital Signs (Most Recent):  Pulse: 65 (07/26/22 1931)  Resp: 18 (07/26/22 1629)  BP: 131/63 (07/26/22 1931)  SpO2: 97 % (07/26/22 1931) Vital Signs (24h Range):  Temp:  [96.7 °F (35.9 °C)] 96.7 °F (35.9 °C)  Pulse:  [60-65] 65  Resp:  [18-20] 18  SpO2:  [96 %-100 %] 97 %  BP: (131-152)/(60-82) 131/63        There is no height or weight on file to calculate BMI.    Physical Exam  Constitutional:       General: She is not in acute distress.     Appearance: Normal appearance. She is normal weight. She is not ill-appearing.   HENT:      Head: Normocephalic and atraumatic.   Cardiovascular:      Rate and Rhythm: Normal rate and regular rhythm.      Pulses: Normal pulses.      Heart sounds: Normal heart sounds.   Pulmonary:      Effort: Pulmonary effort is normal.      Breath sounds: Normal breath sounds. No wheezing or rales.   Abdominal:      General: Bowel sounds are normal. There is no distension.      Palpations: There is no mass.      Tenderness: There is abdominal tenderness. There is no guarding.      Comments: Epigastric and RUQ tenderness to palpation   Musculoskeletal:         General: No  swelling.   Skin:     Coloration: Skin is not jaundiced.      Findings: No bruising.   Neurological:      General: No focal deficit present.      Mental Status: She is alert and oriented to person, place, and time.      Motor: No weakness.   Psychiatric:         Mood and Affect: Mood normal.         Behavior: Behavior normal.         Thought Content: Thought content normal.           Significant Labs: All pertinent labs within the past 24 hours have been reviewed.    Bilirubin:   Recent Labs   Lab 06/27/22  1004 07/26/22  0747   BILITOT 0.5 0.8     CBC:   Recent Labs   Lab 07/26/22  0748 07/26/22  1709   WBC 3.04* 3.54*   HGB 13.2 12.6   HCT 39.8 37.2   PLT 68* 67*     CMP:   Recent Labs   Lab 07/26/22  0747      K 3.7      CO2 24   *   BUN 17   CREATININE 0.8   CALCIUM 9.3   PROT 7.1   ALBUMIN 4.2   BILITOT 0.8   ALKPHOS 98   AST 28   ALT 34   ANIONGAP 10   EGFRNONAA >60     Coagulation:   Recent Labs   Lab 07/26/22  0748   INR 1.2   APTT 27.3     POCT Glucose:   Recent Labs   Lab 07/26/22  1931   POCTGLUCOSE 145*     Troponin:   Recent Labs   Lab 07/26/22  0747   TROPONINI <0.006     Urine Studies:   Recent Labs   Lab 07/26/22  0828   COLORU Yellow   APPEARANCEUA Clear   PHUR 7.0   SPECGRAV 1.015   PROTEINUA Negative   GLUCUA Negative   KETONESU Negative   BILIRUBINUA Negative   OCCULTUA 2+*   NITRITE Negative   UROBILINOGEN Negative   LEUKOCYTESUR Negative   RBCUA 20*   WBCUA 2   BACTERIA Rare       Significant Imaging: I have reviewed all pertinent imaging results/findings within the past 24 hours.

## 2022-07-28 ENCOUNTER — HOSPITAL ENCOUNTER (EMERGENCY)
Facility: HOSPITAL | Age: 52
Discharge: HOME | End: 2022-07-28
Attending: STUDENT IN AN ORGANIZED HEALTH CARE EDUCATION/TRAINING PROGRAM
Payer: COMMERCIAL

## 2022-07-28 VITALS
OXYGEN SATURATION: 98 % | BODY MASS INDEX: 21.76 KG/M2 | HEART RATE: 65 BPM | RESPIRATION RATE: 16 BRPM | DIASTOLIC BLOOD PRESSURE: 65 MMHG | TEMPERATURE: 97 F | SYSTOLIC BLOOD PRESSURE: 135 MMHG | WEIGHT: 143.06 LBS

## 2022-07-28 DIAGNOSIS — M79.10 MYALGIA: Primary | ICD-10-CM

## 2022-07-28 DIAGNOSIS — R10.31 RIGHT LOWER QUADRANT ABDOMINAL PAIN: ICD-10-CM

## 2022-07-28 DIAGNOSIS — R07.9 CHEST PAIN: ICD-10-CM

## 2022-07-28 DIAGNOSIS — K92.0 COFFEE GROUND EMESIS: Primary | ICD-10-CM

## 2022-07-28 LAB
ALBUMIN SERPL BCP-MCNC: 3.8 G/DL (ref 3.5–5.2)
ALP SERPL-CCNC: 78 U/L (ref 55–135)
ALT SERPL W/O P-5'-P-CCNC: 24 U/L (ref 10–44)
ANION GAP SERPL CALC-SCNC: 9 MMOL/L (ref 8–16)
AST SERPL-CCNC: 20 U/L (ref 10–40)
BASOPHILS # BLD AUTO: 0.01 K/UL (ref 0–0.2)
BASOPHILS NFR BLD: 0.2 % (ref 0–1.9)
BILIRUB SERPL-MCNC: 0.7 MG/DL (ref 0.1–1)
BUN SERPL-MCNC: 16 MG/DL (ref 6–20)
CALCIUM SERPL-MCNC: 9 MG/DL (ref 8.7–10.5)
CHLORIDE SERPL-SCNC: 109 MMOL/L (ref 95–110)
CK SERPL-CCNC: 61 U/L (ref 20–180)
CO2 SERPL-SCNC: 24 MMOL/L (ref 23–29)
CREAT SERPL-MCNC: 0.8 MG/DL (ref 0.5–1.4)
DIFFERENTIAL METHOD: ABNORMAL
EOSINOPHIL # BLD AUTO: 0 K/UL (ref 0–0.5)
EOSINOPHIL NFR BLD: 0.9 % (ref 0–8)
ERYTHROCYTE [DISTWIDTH] IN BLOOD BY AUTOMATED COUNT: 12.8 % (ref 11.5–14.5)
EST. GFR  (AFRICAN AMERICAN): >60 ML/MIN/1.73 M^2
EST. GFR  (NON AFRICAN AMERICAN): >60 ML/MIN/1.73 M^2
GLUCOSE SERPL-MCNC: 103 MG/DL (ref 70–110)
HCT VFR BLD AUTO: 36.3 % (ref 37–48.5)
HGB BLD-MCNC: 12.3 G/DL (ref 12–16)
IMM GRANULOCYTES # BLD AUTO: 0.01 K/UL (ref 0–0.04)
IMM GRANULOCYTES NFR BLD AUTO: 0.2 % (ref 0–0.5)
LYMPHOCYTES # BLD AUTO: 0.8 K/UL (ref 1–4.8)
LYMPHOCYTES NFR BLD: 17.5 % (ref 18–48)
MCH RBC QN AUTO: 30.4 PG (ref 27–31)
MCHC RBC AUTO-ENTMCNC: 33.9 G/DL (ref 32–36)
MCV RBC AUTO: 90 FL (ref 82–98)
MONOCYTES # BLD AUTO: 0.3 K/UL (ref 0.3–1)
MONOCYTES NFR BLD: 7.5 % (ref 4–15)
NEUTROPHILS # BLD AUTO: 3.2 K/UL (ref 1.8–7.7)
NEUTROPHILS NFR BLD: 73.7 % (ref 38–73)
NRBC BLD-RTO: 0 /100 WBC
PLATELET # BLD AUTO: 66 K/UL (ref 150–450)
PMV BLD AUTO: 11.4 FL (ref 9.2–12.9)
POTASSIUM SERPL-SCNC: 3.6 MMOL/L (ref 3.5–5.1)
PROT SERPL-MCNC: 6.3 G/DL (ref 6–8.4)
RBC # BLD AUTO: 4.04 M/UL (ref 4–5.4)
SODIUM SERPL-SCNC: 142 MMOL/L (ref 136–145)
TROPONIN I SERPL DL<=0.01 NG/ML-MCNC: <0.006 NG/ML (ref 0–0.03)
WBC # BLD AUTO: 4.4 K/UL (ref 3.9–12.7)

## 2022-07-28 PROCEDURE — 96361 HYDRATE IV INFUSION ADD-ON: CPT

## 2022-07-28 PROCEDURE — 96374 THER/PROPH/DIAG INJ IV PUSH: CPT

## 2022-07-28 PROCEDURE — 80053 COMPREHEN METABOLIC PANEL: CPT

## 2022-07-28 PROCEDURE — 99285 EMERGENCY DEPT VISIT HI MDM: CPT | Mod: 25

## 2022-07-28 PROCEDURE — 82550 ASSAY OF CK (CPK): CPT

## 2022-07-28 PROCEDURE — 36415 COLL VENOUS BLD VENIPUNCTURE: CPT

## 2022-07-28 PROCEDURE — 63600175 PHARM REV CODE 636 W HCPCS

## 2022-07-28 PROCEDURE — 25000003 PHARM REV CODE 250

## 2022-07-28 PROCEDURE — 96376 TX/PRO/DX INJ SAME DRUG ADON: CPT

## 2022-07-28 PROCEDURE — 85025 COMPLETE CBC W/AUTO DIFF WBC: CPT

## 2022-07-28 PROCEDURE — 84484 ASSAY OF TROPONIN QUANT: CPT

## 2022-07-28 RX ORDER — SODIUM CHLORIDE 9 MG/ML
1000 INJECTION, SOLUTION INTRAVENOUS
Status: COMPLETED | OUTPATIENT
Start: 2022-07-28 | End: 2022-07-28

## 2022-07-28 RX ORDER — CYCLOBENZAPRINE HCL 10 MG
10 TABLET ORAL
Status: COMPLETED | OUTPATIENT
Start: 2022-07-28 | End: 2022-07-28

## 2022-07-28 RX ORDER — MORPHINE SULFATE 2 MG/ML
2 INJECTION, SOLUTION INTRAMUSCULAR; INTRAVENOUS
Status: COMPLETED | OUTPATIENT
Start: 2022-07-28 | End: 2022-07-28

## 2022-07-28 RX ORDER — CYCLOBENZAPRINE HCL 10 MG
10 TABLET ORAL 3 TIMES DAILY PRN
Qty: 15 TABLET | Refills: 0 | Status: SHIPPED | OUTPATIENT
Start: 2022-07-28 | End: 2022-08-02

## 2022-07-28 RX ADMIN — MORPHINE SULFATE 2 MG: 2 INJECTION, SOLUTION INTRAMUSCULAR; INTRAVENOUS at 05:07

## 2022-07-28 RX ADMIN — CYCLOBENZAPRINE HYDROCHLORIDE 10 MG: 10 TABLET, FILM COATED ORAL at 06:07

## 2022-07-28 RX ADMIN — SODIUM CHLORIDE 1000 ML: 0.9 INJECTION, SOLUTION INTRAVENOUS at 04:07

## 2022-07-28 NOTE — ED PROVIDER NOTES
Encounter Date: 7/28/2022  This note is dictated on M*Modal word recognition program.  There are word recognition mistakes and grammatical errors that are occasionally missed on review.    [facility]                                                  Chief Complaint  52 y.o. female with Abdominal Pain (Patient to ER with right lower abd pain and back pain, she states that she has a surgical procedure done at [facility] yesterday)    History of Present Illness  Tee Aranda presents to the emergency room with right lower quadrant abdominal pain, back pain,  and torso pain.  Patient reports recent at the EGD done yesterday for a procedure to band her esophageal varices.  Patient reports she was discharged, and this morning she started to have body aches.  Especially in her back and torso.  Patient reports that hurts to take a deep breath and hurts to cough for lab.  Patient also reports right lower quadrant tenderness.  She rates the pain at 6/10 currently.  Patient reports palpation to right lower quadrant causes the pain to become worse.  Patient denies chest pain or shortness of breath    Past Medical History:   Diagnosis Date    Anxiety     Arthritis     Ascites 11/23/2020    AVN (avascular necrosis of bone)     Cataract     Edema 11/23/2020    Esophageal varices     Glaucoma     Hepatic encephalopathy 11/23/2020    Hx of cirrhosis     non-alcoholic    Kidney stones      Past Surgical History:   Procedure Laterality Date    DISTAL CLAVICLE EXCISION Right 11/18/2021    Procedure: RIGHT SHOULDER ARTHROSCOPY, EXCISION, CLAVICLE, DISTAL; EXTENSIVE DEBRIDEMENT;  Surgeon: Isaiah Joyner MD;  Location: [facility];  Service: Orthopedics;  Laterality: Right;    ESOPHAGOGASTRODUODENOSCOPY N/A 11/10/2020    Procedure: EGD (ESOPHAGOGASTRODUODENOSCOPY);  Surgeon: Gerard Salinas MD;  Location: [facility] ENDO;  Service: Endoscopy;  Laterality: N/A;    ESOPHAGOGASTRODUODENOSCOPY N/A  12/28/2020    Procedure: EGD (ESOPHAGOGASTRODUODENOSCOPY);  Surgeon: Adryan Park MD;  Location: [facility] (2ND FLR);  Service: Endoscopy;  Laterality: N/A;    ESOPHAGOGASTRODUODENOSCOPY N/A 2/15/2021    Procedure: EGD (ESOPHAGOGASTRODUODENOSCOPY);  Surgeon: Hari Greene MD;  Location: [facility] (4TH FLR);  Service: Endoscopy;  Laterality: N/A;  6 week follow-up variceal banding  Hx varices - Labs - ERW  prep ins. emialed - COVID screening on 2/12/21 [place] - ERW    ESOPHAGOGASTRODUODENOSCOPY Left 8/3/2021    Procedure: EGD (ESOPHAGOGASTRODUODENOSCOPY);  Surgeon: Fabricio Corado MD;  Location: [facility] (4TH FLR);  Service: Gastroenterology;  Laterality: Left;  recent hematemasis. varices-labs on 7/26    COVID test at [facility] on 7/31-GT  requesting sooner    ESOPHAGOGASTRODUODENOSCOPY N/A 7/27/2022    Procedure: EGD (ESOPHAGOGASTRODUODENOSCOPY);  Surgeon: Eric Garcia MD;  Location: [facility];  Service: Endoscopy;  Laterality: N/A;    HYSTERECTOMY      KNEE SURGERY      LITHOTRIPSY      RHINOPLASTY TIP      TONSILLECTOMY      TOTAL HIP ARTHROPLASTY        Review of patient's allergies indicates:   Allergen Reactions    Sulfa (sulfonamide antibiotics) Hives        Review of Systems and Physical Exam     Review of Systems  -- Constitution - no fever, no weight loss, no loss of consciousness  -- Eyes - no changes in vision, no redness, no swelling  -- Ear, Nose - no  earache, denies congestion  -- Mouth,Throat - no sore throat, no toothache, normal voice, normal swallowing  -- Respiratory - denies cough and congestion, no shortness of breath, no wheezing  -- Cardiovascular - denies chest pain, no palpitations,   -- Gastrointestinal - reports right lower quadrant pain denies nausea, vomiting, and diarrhea  -- Genitourinary - no dysuria, no denies flank pain, no hematuria or frequency   -- Musculoskeletal -patient reports myalgias and arthralgias   -- Neurological - no headache, no neurologic changes,  no loss of bladder or bowel function no seizure like activity, no changes in hearing or vision  -- Skin - denies skin changes, no rash, no hives, no suspected skin infection    Vital Signs   weight is 64.9 kg (143 lb 1.3 oz). Her oral temperature is 97.1 °F (36.2 °C). Her blood pressure is 135/65 and her pulse is 65. Her respiration is 16 and oxygen saturation is 98%.      Physical Exam  -- Nursing note and vitals reviewed  -- Constitutional:  Awake alert and oriented, GCS 15, no acute distress.  Appears well.  -- Head: Atraumatic. Normocephalic. No obvious abnormality  -- Eyes: Pupils are equal and reactive to light. Extraocular movements intact. No nystagmus.  No periorbital swelling. Normal conjunctiva.  -- Nose: Nose grossly normal in appearance, nares grossly normal. No rhinorrhea.  -- Throat: Mucous membranes moist, pharynx normal, normal tonsils.  Airway patent.  -- Ears: External ears and TM normal bilaterally. Normal hearing.   -- Neck: Normal range of motion. Neck supple. No meningismus. No adenopathy  -- Cardiac: Normal rate, regular rhythm and normal heart sounds. No carotid bruit. No lower extremity edema.  -- Pulmonary: Normal respiratory effort, breath sounds equal bilaterally. Adequate flow.  No wheezing.  No crackles.  -- Abdominal: Soft, patient has right lower quadrant point tenderness on examination.  Upon light palpation patient jumps up with increased pain, no guarding, no rebound. Normal bowel sounds.   -- Musculoskeletal: Normal range of motion, all 4 extremities 5/5 strength.  Neurovascularly intact. Atraumatic. No deformities.  -- Neurological:  Cranial nerves 2-12 grossly intact. No focal deficits.   -- Vascular: Posterior tibial, dorsalis pedis and radial pulses 2+ bilaterally    -- Lymphatics: No cervical or peripheral lymphadenopathy.   -- Skin: Warm and dry. No evidence of rash or cellulitis  -- Psychiatric: Normal mood and affect. Bedside behavior is appropriate.  Patient is  cooperative.  Denies suicidal homicidal ideation.    Emergency Room Course     Treatment Course, Evaluation, and Medical Decision Making    Abnormal lab values  Labs Reviewed   CBC W/ AUTO DIFFERENTIAL - Abnormal; Notable for the following components:       Result Value    Hematocrit 36.3 (*)     Platelets 66 (*)     Lymph # 0.8 (*)     Gran % 73.7 (*)     Lymph % 17.5 (*)     All other components within normal limits   COMPREHENSIVE METABOLIC PANEL   CK   TROPONIN I       Medications Given  Medications   0.9%  NaCl infusion (1,000 mLs Intravenous New Bag 7/28/22 1615)   morphine injection 2 mg (2 mg Intravenous Given 7/28/22 1701)   morphine injection 2 mg (2 mg Intravenous Given 7/28/22 1751)   cyclobenzaprine tablet 10 mg (10 mg Oral Given 7/28/22 1800)         Diagnosis  -- The primary encounter diagnosis was Myalgia. Diagnoses of Chest pain and Right lower quadrant abdominal pain were also pertinent to this visit.   Patient appears to be having a reaction to succinylcholine administration yesterday.  She is having myalgias to a diet body with the worse myalgias being in her back and torso.  Ruled out appendicitis with CT abdomen pelvis due to right lower quadrant pain and tenderness to palpation.  ER return precautions discussed with patient.  Patient stable at time of discharge    Disposition and Plan  -- Disposition: home  -- Condition: stable  -- Follow-up: Patient to follow up with James Samano MD in 1-2 days, and any specialists noted on discharge paperwork  -- I advised the patient that we have found no life threatening condition today  -- At this time, I believe the patient is clinically stable for discharge.   -- The patient acknowledges that close follow up with a MD is required   -- Patient agrees to comply with all instruction and direction given in the ER  -- Patient counseled on strict return precautions as discussed                        Clinical Impression:   Final diagnoses:  [R07.9] Chest  pain  [M79.10] Myalgia (Primary)  [R10.31] Right lower quadrant abdominal pain          ED Disposition Condition    Discharge Stable        ED Prescriptions     Medication Sig Dispense Start Date End Date Auth. Provider    cyclobenzaprine (FLEXERIL) 10 MG tablet Take 1 tablet (10 mg total) by mouth 3 (three) times daily as needed for Muscle spasms. 15 tablet 7/28/2022 8/2/2022 Alex Bowman NP        Follow-up Information     Follow up With Specialties Details Why Contact Info    James Samano MD Family Medicine Schedule an appointment as soon as possible for a visit in 3 days  [address]  [place]  [place] [address]  [phone]             Alex Bowman NP  07/28/22 9328

## 2022-07-28 NOTE — ANESTHESIA POSTPROCEDURE EVALUATION
Anesthesia Post Evaluation    Patient: Tee Aranda    Procedure(s) Performed: Procedure(s) (LRB):  EGD (ESOPHAGOGASTRODUODENOSCOPY) (N/A)    Final Anesthesia Type: general      Patient location during evaluation: PACU  Patient participation: Yes- Able to Participate  Level of consciousness: awake and alert  Post-procedure vital signs: reviewed and stable  Pain management: adequate  Airway patency: patent    PONV status at discharge: No PONV  Anesthetic complications: no      Cardiovascular status: blood pressure returned to baseline  Respiratory status: unassisted  Hydration status: euvolemic  Follow-up not needed.          Vitals Value Taken Time   /68 07/27/22 1424   Temp 36.3 °C (97.4 °F) 07/27/22 1424   Pulse 64 07/27/22 1424   Resp 20 07/27/22 1424   SpO2 98 % 07/27/22 1539         Event Time   Out of Recovery 07/27/2022 12:55:00         Pain/Dio Score: Pain Rating Prior to Med Admin: 10 (7/27/2022  1:00 AM)  Dio Score: 10 (7/27/2022 12:55 PM)

## 2022-08-01 LAB
FINAL PATHOLOGIC DIAGNOSIS: NORMAL
GROSS: NORMAL
Lab: NORMAL

## 2022-08-04 ENCOUNTER — PATIENT MESSAGE (OUTPATIENT)
Dept: ORTHOPEDICS | Facility: CLINIC | Age: 52
End: 2022-08-04
Payer: COMMERCIAL

## 2022-08-05 RX ORDER — MELOXICAM 7.5 MG/1
7.5 TABLET ORAL DAILY
Qty: 28 TABLET | Refills: 0 | Status: ON HOLD | OUTPATIENT
Start: 2022-08-05 | End: 2022-09-04 | Stop reason: ALTCHOICE

## 2022-08-24 ENCOUNTER — OFFICE VISIT (OUTPATIENT)
Dept: ORTHOPEDICS | Facility: CLINIC | Age: 52
End: 2022-08-24
Payer: COMMERCIAL

## 2022-08-24 VITALS
WEIGHT: 146.06 LBS | SYSTOLIC BLOOD PRESSURE: 145 MMHG | BODY MASS INDEX: 22.14 KG/M2 | DIASTOLIC BLOOD PRESSURE: 80 MMHG | HEIGHT: 68 IN | HEART RATE: 96 BPM

## 2022-08-24 DIAGNOSIS — M75.51 SUBACROMIAL BURSITIS OF RIGHT SHOULDER JOINT: Primary | ICD-10-CM

## 2022-08-24 PROCEDURE — 99214 PR OFFICE/OUTPT VISIT, EST, LEVL IV, 30-39 MIN: ICD-10-PCS | Mod: 25,S$GLB,, | Performed by: ORTHOPAEDIC SURGERY

## 2022-08-24 PROCEDURE — 3077F SYST BP >= 140 MM HG: CPT | Mod: CPTII,S$GLB,, | Performed by: ORTHOPAEDIC SURGERY

## 2022-08-24 PROCEDURE — 1159F PR MEDICATION LIST DOCUMENTED IN MEDICAL RECORD: ICD-10-PCS | Mod: CPTII,S$GLB,, | Performed by: ORTHOPAEDIC SURGERY

## 2022-08-24 PROCEDURE — 1111F PR DISCHARGE MEDS RECONCILED W/ CURRENT OUTPATIENT MED LIST: ICD-10-PCS | Mod: CPTII,S$GLB,, | Performed by: ORTHOPAEDIC SURGERY

## 2022-08-24 PROCEDURE — 3008F BODY MASS INDEX DOCD: CPT | Mod: CPTII,S$GLB,, | Performed by: ORTHOPAEDIC SURGERY

## 2022-08-24 PROCEDURE — 1160F RVW MEDS BY RX/DR IN RCRD: CPT | Mod: CPTII,S$GLB,, | Performed by: ORTHOPAEDIC SURGERY

## 2022-08-24 PROCEDURE — 20610 DRAIN/INJ JOINT/BURSA W/O US: CPT | Mod: RT,S$GLB,, | Performed by: ORTHOPAEDIC SURGERY

## 2022-08-24 PROCEDURE — 3079F PR MOST RECENT DIASTOLIC BLOOD PRESSURE 80-89 MM HG: ICD-10-PCS | Mod: CPTII,S$GLB,, | Performed by: ORTHOPAEDIC SURGERY

## 2022-08-24 PROCEDURE — 1160F PR REVIEW ALL MEDS BY PRESCRIBER/CLIN PHARMACIST DOCUMENTED: ICD-10-PCS | Mod: CPTII,S$GLB,, | Performed by: ORTHOPAEDIC SURGERY

## 2022-08-24 PROCEDURE — 1159F MED LIST DOCD IN RCRD: CPT | Mod: CPTII,S$GLB,, | Performed by: ORTHOPAEDIC SURGERY

## 2022-08-24 PROCEDURE — 3044F HG A1C LEVEL LT 7.0%: CPT | Mod: CPTII,S$GLB,, | Performed by: ORTHOPAEDIC SURGERY

## 2022-08-24 PROCEDURE — 99999 PR PBB SHADOW E&M-EST. PATIENT-LVL IV: CPT | Mod: PBBFAC,,, | Performed by: ORTHOPAEDIC SURGERY

## 2022-08-24 PROCEDURE — 1111F DSCHRG MED/CURRENT MED MERGE: CPT | Mod: CPTII,S$GLB,, | Performed by: ORTHOPAEDIC SURGERY

## 2022-08-24 PROCEDURE — 99999 PR PBB SHADOW E&M-EST. PATIENT-LVL IV: ICD-10-PCS | Mod: PBBFAC,,, | Performed by: ORTHOPAEDIC SURGERY

## 2022-08-24 PROCEDURE — 3079F DIAST BP 80-89 MM HG: CPT | Mod: CPTII,S$GLB,, | Performed by: ORTHOPAEDIC SURGERY

## 2022-08-24 PROCEDURE — 20610 LARGE JOINT ASPIRATION/INJECTION: R SUBACROMIAL BURSA: ICD-10-PCS | Mod: RT,S$GLB,, | Performed by: ORTHOPAEDIC SURGERY

## 2022-08-24 PROCEDURE — 3008F PR BODY MASS INDEX (BMI) DOCUMENTED: ICD-10-PCS | Mod: CPTII,S$GLB,, | Performed by: ORTHOPAEDIC SURGERY

## 2022-08-24 PROCEDURE — 3044F PR MOST RECENT HEMOGLOBIN A1C LEVEL <7.0%: ICD-10-PCS | Mod: CPTII,S$GLB,, | Performed by: ORTHOPAEDIC SURGERY

## 2022-08-24 PROCEDURE — 99214 OFFICE O/P EST MOD 30 MIN: CPT | Mod: 25,S$GLB,, | Performed by: ORTHOPAEDIC SURGERY

## 2022-08-24 PROCEDURE — 3077F PR MOST RECENT SYSTOLIC BLOOD PRESSURE >= 140 MM HG: ICD-10-PCS | Mod: CPTII,S$GLB,, | Performed by: ORTHOPAEDIC SURGERY

## 2022-08-24 RX ORDER — CYCLOBENZAPRINE HCL 10 MG
TABLET ORAL
Status: ON HOLD | COMMUNITY
End: 2022-09-04

## 2022-08-24 RX ORDER — TRAMADOL HYDROCHLORIDE 50 MG/1
TABLET ORAL
COMMUNITY
End: 2023-07-27

## 2022-08-24 RX ORDER — PROMETHAZINE HYDROCHLORIDE AND DEXTROMETHORPHAN HYDROBROMIDE 6.25; 15 MG/5ML; MG/5ML
SYRUP ORAL
Status: ON HOLD | COMMUNITY
End: 2022-09-04 | Stop reason: ALTCHOICE

## 2022-08-24 RX ORDER — DOXYCYCLINE 100 MG/1
CAPSULE ORAL
Status: ON HOLD | COMMUNITY
End: 2022-09-04

## 2022-08-24 RX ADMIN — TRIAMCINOLONE ACETONIDE 40 MG: 40 INJECTION, SUSPENSION INTRA-ARTICULAR; INTRAMUSCULAR at 10:08

## 2022-08-25 RX ORDER — TRIAMCINOLONE ACETONIDE 40 MG/ML
40 INJECTION, SUSPENSION INTRA-ARTICULAR; INTRAMUSCULAR
Status: DISCONTINUED | OUTPATIENT
Start: 2022-08-24 | End: 2022-08-25 | Stop reason: HOSPADM

## 2022-08-25 NOTE — PROCEDURES
Large Joint Aspiration/Injection: R subacromial bursa    Date/Time: 8/24/2022 10:15 AM  Performed by: Isaiah Joyner MD  Authorized by: Isaiah Joyner MD     Consent Done?:  Yes (Verbal)  Indications:  Pain  Site marked: the procedure site was marked    Timeout: prior to procedure the correct patient, procedure, and site was verified    Prep: patient was prepped and draped in usual sterile fashion      Local anesthesia used?: Yes    Local anesthetic:  Topical anesthetic and lidocaine 1% without epinephrine    Details:  Needle Size:  22 G  Ultrasonic Guidance for needle placement?: No    Approach:  Posterior  Location:  Shoulder  Site:  R subacromial bursa  Medications:  40 mg triamcinolone acetonide 40 mg/mL  Patient tolerance:  Patient tolerated the procedure well with no immediate complications

## 2022-08-25 NOTE — PROGRESS NOTES
Patient ID:   Tee Aranda is a 52 y.o. female.    Chief Complaint:   Right shoulder pain    HPI:   Mrs. Aranda is returning today with new onset of right shoulder pain. She underwent surgery in November 2021. She had an open distal clavicle excision due to symptomatic arthritis. She did really well after surgery with complete pain relief. At the time of arthroscopy, she had bursal fraying of the supraspinatus. She recently went fishing and reports having significant lateral shoulder pain. She denies pain at the AC joint. The pain is worse with abduction and elevation. She denies relieving factors. She denies any injury.     Medications:    Current Outpatient Medications:     carvediloL (COREG) 3.125 MG tablet, Take 1 tablet (3.125 mg total) by mouth 2 (two) times daily., Disp: 60 tablet, Rfl: 11    cyclobenzaprine (FLEXERIL) 10 MG tablet, cyclobenzaprine 10 mg tablet  TAKE 1 TABLET BY MOUTH THREE TIMES A DAY AS NEEDED FOR MUSCLE SPASMS, Disp: , Rfl:     doxycycline (VIBRAMYCIN) 100 MG Cap, doxycycline hyclate 100 mg capsule  TAKE 1 CAPSULE BY MOUTH TWICE A DAY FOR 10 DAYS, Disp: , Rfl:     furosemide (LASIX) 20 MG tablet, Take 1 tablet (20 mg total) by mouth once daily. Take in morning (Patient taking differently: Take 20 mg by mouth daily as needed.), Disp: 30 tablet, Rfl: 11    lactulose (CEPHULAC) 10 gram packet, Take 10 g by mouth 3 (three) times daily as needed., Disp: , Rfl:     meloxicam (MOBIC) 7.5 MG tablet, Take 1 tablet (7.5 mg total) by mouth once daily., Disp: 28 tablet, Rfl: 0    multivitamin (THERAGRAN) per tablet, Take 1 tablet by mouth once daily., Disp: , Rfl:     omega-3 fatty acids/fish oil (FISH OIL-OMEGA-3 FATTY ACIDS) 300-1,000 mg capsule, Take 1 capsule by mouth once daily., Disp: , Rfl:     ondansetron (ZOFRAN) 8 MG tablet, Take 1 tablet (8 mg total) by mouth every 8 (eight) hours as needed for Nausea., Disp: 15 tablet, Rfl: 0    pantoprazole (PROTONIX) 40 MG tablet, Take 1  tablet (40 mg total) by mouth once daily., Disp: 180 tablet, Rfl: 4    promethazine-dextromethorphan (PROMETHAZINE-DM) 6.25-15 mg/5 mL Syrp, promethazine-DM 6.25 mg-15 mg/5 mL oral syrup  TAKE 5ML BY MOUTH EVERY 4 HOURS AS NEEDED, Disp: , Rfl:     rifAXIMin (XIFAXAN) 550 mg Tab, Take 1 tablet (550 mg total) by mouth 2 (two) times daily. (Patient taking differently: Take 550 mg by mouth once daily.), Disp: 60 tablet, Rfl: 11    spironolactone (ALDACTONE) 25 MG tablet, Take 2 tablets (50 mg total) by mouth once daily. (Patient taking differently: Take 50 mg by mouth daily as needed.), Disp: 60 tablet, Rfl: 11    traMADoL (ULTRAM) 50 mg tablet, tramadol 50 mg tablet  TAKE 1 TO 2 TABLETS BY MOUTH 3 TIMES A DAY AS NEEDED FOR PAIN, Disp: , Rfl:     Allergies:  Review of patient's allergies indicates:   Allergen Reactions    Sulfa (sulfonamide antibiotics) Hives       Past Medical History:  Past Medical History:   Diagnosis Date    Anxiety     Arthritis     Ascites 11/23/2020    AVN (avascular necrosis of bone)     Cataract     Edema 11/23/2020    Esophageal varices     Glaucoma     Hepatic encephalopathy 11/23/2020    Hx of cirrhosis     non-alcoholic    Kidney stones         Past Surgical History:  Past Surgical History:   Procedure Laterality Date    DISTAL CLAVICLE EXCISION Right 11/18/2021    Procedure: RIGHT SHOULDER ARTHROSCOPY, EXCISION, CLAVICLE, DISTAL; EXTENSIVE DEBRIDEMENT;  Surgeon: Isaiah Joyner MD;  Location: [facility] OR;  Service: Orthopedics;  Laterality: Right;    ESOPHAGOGASTRODUODENOSCOPY N/A 11/10/2020    Procedure: EGD (ESOPHAGOGASTRODUODENOSCOPY);  Surgeon: Gerard Salinas MD;  Location: [facility] ENDO;  Service: Endoscopy;  Laterality: N/A;    ESOPHAGOGASTRODUODENOSCOPY N/A 12/28/2020    Procedure: EGD (ESOPHAGOGASTRODUODENOSCOPY);  Surgeon: Adryan Park MD;  Location: [facility] ([address] [place] [address]);  Service: Endoscopy;  Laterality: N/A;    ESOPHAGOGASTRODUODENOSCOPY N/A 2/15/2021     "Procedure: EGD (ESOPHAGOGASTRODUODENOSCOPY);  Surgeon: Hari Greene MD;  Location: [facility] (4TH FLR);  Service: Endoscopy;  Laterality: N/A;  6 week follow-up variceal banding  Hx varices - Labs - ERW  prep ins. emialed - COVID screening on 2/12/21 [place] - ERW    ESOPHAGOGASTRODUODENOSCOPY Left 8/3/2021    Procedure: EGD (ESOPHAGOGASTRODUODENOSCOPY);  Surgeon: Fabricio Corado MD;  Location: [facility] (4TH FLR);  Service: Gastroenterology;  Laterality: Left;  recent hematemasis. varices-labs on 7/26    COVID test at [facility] on 7/31-GT  requesting sooner    ESOPHAGOGASTRODUODENOSCOPY N/A 7/27/2022    Procedure: EGD (ESOPHAGOGASTRODUODENOSCOPY);  Surgeon: Eric Garcia MD;  Location: [facility];  Service: Endoscopy;  Laterality: N/A;    HYSTERECTOMY      KNEE SURGERY      LITHOTRIPSY      RHINOPLASTY TIP      TONSILLECTOMY      TOTAL HIP ARTHROPLASTY         Social History:  Social History     Occupational History    Not on file   Tobacco Use    Smoking status: Current Some Day Smoker     Packs/day: 0.25     Types: Cigarettes     Last attempt to quit: 7/3/2019     Years since quitting: 3.1    Smokeless tobacco: Never Used   Substance and Sexual Activity    Alcohol use: Not Currently    Drug use: No    Sexual activity: Not on file       Family History:  Family History   Problem Relation Age of Onset    Diabetes Mellitus Maternal Grandmother     Amblyopia Neg Hx     Blindness Neg Hx     Cataracts Neg Hx     Glaucoma Neg Hx     Macular degeneration Neg Hx     Retinal detachment Neg Hx     Strabismus Neg Hx     Colon cancer Neg Hx     Esophageal cancer Neg Hx       ROS:  Review of Systems   Musculoskeletal: Positive for joint pain and myalgias. Negative for neck pain.   Neurological: Negative for numbness and paresthesias.     Vitals:  BP (!) 145/80 (BP Location: Left arm, Patient Position: Sitting, BP Method: Small (Automatic))   Pulse 96   Ht 5' 8" (1.727 m)   Wt 66.3 kg (146 lb " 0.9 oz)   BMI 22.21 kg/m²     Physical Examination:  Comprehensive Orthopaedic Musculoskeletal Exam    General        Constitutional: appears stated age, well-developed and well-nourished    Scleral icterus: no    Labored breathing: no    Psychiatric: normal mood and affect and no acute distress    Neurological: alert and oriented x3    Skin: intact    Lymphadenopathy: none     Ortho Exam   Right shoulder exam:    Healed surgery scars. No cellulitis.   ROM: active elevation 170, ER 40, IR T10  RTC strength 5/5 in elevation, ER, IR  Positive Neer. Positive Jobes. Positive Mayo.   Negative cross body adduction test.   Negative Speeds.     Assessment:  1. Subacromial bursitis of right shoulder joint      Plan:  The patient's symptoms are consistent with subacromial bursitis. Without any injury, I don't that she has had any tear progression. We are going to provide her with a subacromial cortisone injection today and try to give her pan relief. If no improvement, she will return.      No follow-ups on file.

## 2022-08-29 ENCOUNTER — HOSPITAL ENCOUNTER (OUTPATIENT)
Facility: HOSPITAL | Age: 52
Discharge: HOME | End: 2022-08-29
Attending: INTERNAL MEDICINE | Admitting: INTERNAL MEDICINE
Payer: COMMERCIAL

## 2022-08-29 ENCOUNTER — ANESTHESIA EVENT (OUTPATIENT)
Dept: ENDOSCOPY | Facility: HOSPITAL | Age: 52
End: 2022-08-29
Payer: COMMERCIAL

## 2022-08-29 ENCOUNTER — ANESTHESIA (OUTPATIENT)
Dept: ENDOSCOPY | Facility: HOSPITAL | Age: 52
End: 2022-08-29
Payer: COMMERCIAL

## 2022-08-29 VITALS
SYSTOLIC BLOOD PRESSURE: 121 MMHG | WEIGHT: 142 LBS | OXYGEN SATURATION: 100 % | HEART RATE: 62 BPM | RESPIRATION RATE: 22 BRPM | DIASTOLIC BLOOD PRESSURE: 60 MMHG | HEIGHT: 68 IN | TEMPERATURE: 98 F | BODY MASS INDEX: 21.52 KG/M2

## 2022-08-29 DIAGNOSIS — K75.81 LIVER CIRRHOSIS SECONDARY TO NASH: Primary | Chronic | ICD-10-CM

## 2022-08-29 DIAGNOSIS — K74.60 LIVER CIRRHOSIS SECONDARY TO NASH: Primary | Chronic | ICD-10-CM

## 2022-08-29 DIAGNOSIS — I85.00 ESOPHAGEAL VARICES: ICD-10-CM

## 2022-08-29 PROCEDURE — 37000008 HC ANESTHESIA 1ST 15 MINUTES: Performed by: STUDENT IN AN ORGANIZED HEALTH CARE EDUCATION/TRAINING PROGRAM

## 2022-08-29 PROCEDURE — D9220A PRA ANESTHESIA: ICD-10-PCS | Mod: ANES,,, | Performed by: STUDENT IN AN ORGANIZED HEALTH CARE EDUCATION/TRAINING PROGRAM

## 2022-08-29 PROCEDURE — D9220A PRA ANESTHESIA: ICD-10-PCS | Mod: CRNA,,, | Performed by: NURSE ANESTHETIST, CERTIFIED REGISTERED

## 2022-08-29 PROCEDURE — 43244 EGD VARICES LIGATION: CPT | Mod: ,,, | Performed by: STUDENT IN AN ORGANIZED HEALTH CARE EDUCATION/TRAINING PROGRAM

## 2022-08-29 PROCEDURE — D9220A PRA ANESTHESIA: Mod: ANES,,, | Performed by: STUDENT IN AN ORGANIZED HEALTH CARE EDUCATION/TRAINING PROGRAM

## 2022-08-29 PROCEDURE — 43244 PR EGD, FLEX, W/BAND LIGATION, ESOPH/GASTR VARICES: ICD-10-PCS | Mod: ,,, | Performed by: STUDENT IN AN ORGANIZED HEALTH CARE EDUCATION/TRAINING PROGRAM

## 2022-08-29 PROCEDURE — 25000003 PHARM REV CODE 250: Performed by: NURSE ANESTHETIST, CERTIFIED REGISTERED

## 2022-08-29 PROCEDURE — D9220A PRA ANESTHESIA: Mod: CRNA,,, | Performed by: NURSE ANESTHETIST, CERTIFIED REGISTERED

## 2022-08-29 PROCEDURE — 63600175 PHARM REV CODE 636 W HCPCS: Performed by: NURSE ANESTHETIST, CERTIFIED REGISTERED

## 2022-08-29 PROCEDURE — 43244 EGD VARICES LIGATION: CPT | Performed by: STUDENT IN AN ORGANIZED HEALTH CARE EDUCATION/TRAINING PROGRAM

## 2022-08-29 PROCEDURE — 37000009 HC ANESTHESIA EA ADD 15 MINS: Performed by: STUDENT IN AN ORGANIZED HEALTH CARE EDUCATION/TRAINING PROGRAM

## 2022-08-29 PROCEDURE — 27201022: Performed by: STUDENT IN AN ORGANIZED HEALTH CARE EDUCATION/TRAINING PROGRAM

## 2022-08-29 RX ORDER — PROPOFOL 10 MG/ML
VIAL (ML) INTRAVENOUS
Status: DISCONTINUED | OUTPATIENT
Start: 2022-08-29 | End: 2022-08-29

## 2022-08-29 RX ORDER — SODIUM CHLORIDE 9 MG/ML
INJECTION, SOLUTION INTRAVENOUS CONTINUOUS
Status: DISCONTINUED | OUTPATIENT
Start: 2022-08-29 | End: 2022-08-29 | Stop reason: HOSPADM

## 2022-08-29 RX ORDER — FENTANYL CITRATE 50 UG/ML
INJECTION, SOLUTION INTRAMUSCULAR; INTRAVENOUS
Status: DISCONTINUED | OUTPATIENT
Start: 2022-08-29 | End: 2022-08-29

## 2022-08-29 RX ORDER — FENTANYL CITRATE 50 UG/ML
25 INJECTION, SOLUTION INTRAMUSCULAR; INTRAVENOUS EVERY 5 MIN PRN
Status: DISCONTINUED | OUTPATIENT
Start: 2022-08-29 | End: 2022-08-29 | Stop reason: HOSPADM

## 2022-08-29 RX ORDER — LIDOCAINE HYDROCHLORIDE 20 MG/ML
INJECTION INTRAVENOUS
Status: DISCONTINUED | OUTPATIENT
Start: 2022-08-29 | End: 2022-08-29

## 2022-08-29 RX ORDER — SODIUM CHLORIDE 0.9 % (FLUSH) 0.9 %
10 SYRINGE (ML) INJECTION
Status: DISCONTINUED | OUTPATIENT
Start: 2022-08-29 | End: 2022-08-29 | Stop reason: HOSPADM

## 2022-08-29 RX ADMIN — LIDOCAINE HYDROCHLORIDE 100 MG: 20 INJECTION, SOLUTION INTRAVENOUS at 08:08

## 2022-08-29 RX ADMIN — FENTANYL CITRATE 25 MCG: 50 INJECTION, SOLUTION INTRAMUSCULAR; INTRAVENOUS at 09:08

## 2022-08-29 RX ADMIN — FENTANYL CITRATE 25 MCG: 50 INJECTION, SOLUTION INTRAMUSCULAR; INTRAVENOUS at 08:08

## 2022-08-29 RX ADMIN — PROPOFOL 10 MG: 10 INJECTION, EMULSION INTRAVENOUS at 09:08

## 2022-08-29 RX ADMIN — GLYCOPYRROLATE 0.2 MG: 0.2 INJECTION, SOLUTION INTRAMUSCULAR; INTRAVITREAL at 08:08

## 2022-08-29 RX ADMIN — PROPOFOL 50 MG: 10 INJECTION, EMULSION INTRAVENOUS at 08:08

## 2022-08-29 RX ADMIN — SODIUM CHLORIDE: 9 INJECTION, SOLUTION INTRAVENOUS at 08:08

## 2022-08-29 RX ADMIN — PROPOFOL 30 MG: 10 INJECTION, EMULSION INTRAVENOUS at 09:08

## 2022-08-29 NOTE — ANESTHESIA PREPROCEDURE EVALUATION
08/29/2022  Tee Aranda is a 52 y.o., female.  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 female who  has a past medical history of Anxiety, Arthritis, Ascites (11/23/2020), AVN (avascular necrosis of bone), Cataract, Edema (11/23/2020), Esophageal varices, Glaucoma, Hepatic encephalopathy (11/23/2020), cirrhosis, and Kidney stones.     Hx of prior GI bleed and prior hepatic encephalopathy; and right shoulder arthropathy, who is scheduled for EGD under mac  2/2 melena. Previous anesthetics geta (easy mask x1 attempt1 mac 3 blade) 1/2021 and mac 8/2021,  GETA conversion on 7/27/22 after aspiration.      she has a current medication list which includes the following long-term medication(s): carvedilol, fish oil-omega-3 fatty acids, pantoprazole, furosemide, and spironolactone.   ALLERGIES:     Review of patient's allergies indicates:   Allergen Reactions    Sulfa (sulfonamide antibiotics) Hives     LDA:      Lines/Drains/Airways     Peripheral Intravenous Line  Duration                Peripheral IV - Single Lumen 08/29/22 0730 22 G Anterior;Right Forearm <1 day              MEDICATIONS:     Antibiotics (From admission, onward)    None        VTE Risk Mitigation (From admission, onward)    None        Current Facility-Administered Medications   Medication Dose Route Frequency  Provider Last Rate Last Admin    0.9%  NaCl infusion   Intravenous Continuous Adryan Park MD              History:   There are no hospital problems to display for this patient.    Surgical History:    has a past surgical history that includes Total hip arthroplasty; Tonsillectomy; Knee surgery; Rhinoplasty tip; Lithotripsy; Hysterectomy; Esophagogastroduodenoscopy (N/A, 11/10/2020); Esophagogastroduodenoscopy (N/A, 12/28/2020); Esophagogastroduodenoscopy (N/A, 2/15/2021); Esophagogastroduodenoscopy (Left, 8/3/2021); Distal clavicle excision (Right, 11/18/2021); and Esophagogastroduodenoscopy (N/A, 7/27/2022).   Social History:   .  reports that she has been smoking cigarettes. She has been smoking an average of .25 packs per day. She has never used smokeless tobacco. She reports that she does not currently use alcohol. She reports that she does not use drugs.     OBJECTIVE:     Vital Signs (Most Recent):  Temp: 36.5 °C (97.7 °F) (08/29/22 0728)  Pulse: 69 (08/29/22 0728)  Resp: 15 (08/29/22 0728)  BP: 128/65 (08/29/22 0728)  SpO2: 100 % (08/29/22 0728) Vital Signs Range (Last 24H):  Temp:  [36.5 °C (97.7 °F)]   Pulse:  [69]   Resp:  [15]   BP: (128)/(65)   SpO2:  [100 %]        Body mass index is 21.59 kg/m².   Wt Readings from Last 4 Encounters:   08/29/22 64.4 kg (142 lb)   08/24/22 66.3 kg (146 lb 0.9 oz)   07/28/22 64.9 kg (143 lb 1.3 oz)   07/26/22 65.6 kg (144 lb 10 oz)     Significant Labs:  Lab Results   Component Value Date    WBC 4.40 07/28/2022    HGB 12.3 07/28/2022    HCT 36.3 (L) 07/28/2022    PLT 66 (L) 07/28/2022     07/28/2022    K 3.6 07/28/2022     07/28/2022    CREATININE 0.8 07/28/2022    BUN 16 07/28/2022    CO2 24 07/28/2022     07/28/2022    CALCIUM 9.0 07/28/2022    MG 2.0 07/27/2022    PHOS 4.0 07/27/2022    ALKPHOS 78 07/28/2022    ALT 24 07/28/2022    AST 20 07/28/2022    ALBUMIN 3.8 07/28/2022    INR 1.2 07/26/2022    APTT 27.3 07/26/2022    HGBA1C 5.5 07/26/2022     CPK 61 07/28/2022    CPKMB 1.4 07/26/2022    TROPONINI <0.006 07/28/2022    MB 1.6 07/26/2022    BNP 49 07/19/2021    NTPROBNP 52.0 11/17/2017     No LMP recorded. Patient has had a hysterectomy.  No results found for this or any previous visit (from the past 72 hour(s)).    EKG:   Results for orders placed or performed during the hospital encounter of 07/26/22   EKG 12-lead    Collection Time: 07/26/22  7:57 AM    Narrative    Test Reason : K92.2,    Vent. Rate : 059 BPM     Atrial Rate : 059 BPM     P-R Int : 138 ms          QRS Dur : 074 ms      QT Int : 406 ms       P-R-T Axes : 064 084 074 degrees     QTc Int : 401 ms    Sinus bradycardia  Otherwise normal ECG  When compared with ECG of 09-NOV-2021 15:34,  No significant change was found  Confirmed by CATHY JOHN MD ([id number]) on 7/26/2022 2:14:58 PM    Referred By: [email or web address]   SELF           Confirmed By:CATHY JOHN MD       ASSESSMENT/PLAN:         Pre-op Assessment    I have reviewed the Patient Summary Reports.     I have reviewed the Nursing Notes. I have reviewed the NPO Status.   I have reviewed the Medications.     Review of Systems         Anesthesia Plan  Type of Anesthesia, risks & benefits discussed:    Anesthesia Type: Gen Natural Airway, MAC  Intra-op Monitoring Plan: Standard ASA Monitors  Post Op Pain Control Plan: IV/PO Opioids PRN  Induction:  IV  Informed Consent: Informed consent signed with the Patient and all parties understand the risks and agree with anesthesia plan.  All questions answered.   ASA Score: 3  Day of Surgery Review of History & Physical: H&P Update referred to the surgeon/provider.  Anesthesia Plan Notes: Patient with hx of myalgias after succinylcholine and intubation during conversion to GETA for last EGD.  Will move to 2nd floor GI given history of conversion to GETA on month ago.      Ready For Surgery From Anesthesia Perspective.     .

## 2022-08-29 NOTE — ANESTHESIA POSTPROCEDURE EVALUATION
Anesthesia Post Evaluation    Patient: Tee Aranda    Procedure(s) Performed: Procedure(s) (LRB):  EGD (ESOPHAGOGASTRODUODENOSCOPY) (Left)    Final Anesthesia Type: general      Patient location during evaluation: PACU  Patient participation: Yes- Able to Participate  Level of consciousness: awake and alert  Post-procedure vital signs: reviewed and stable  Pain management: adequate  Airway patency: patent    PONV status at discharge: No PONV  Anesthetic complications: no      Cardiovascular status: stable  Respiratory status: spontaneous ventilation and face mask  Hydration status: euvolemic  Follow-up not needed.          Vitals Value Taken Time   /60 08/29/22 0945   Temp 36.7 °C (98 °F) 08/29/22 0916   Pulse 62 08/29/22 0945   Resp 22 08/29/22 0945   SpO2 100 % 08/29/22 0945         No case tracking events are documented in the log.      Pain/Dio Score: Dio Score: 10 (8/29/2022  9:30 AM)

## 2022-08-29 NOTE — PROVATION PATIENT INSTRUCTIONS
Discharge Summary/Instructions after an Endoscopic Procedure  Patient Name: Tee Aranda  Patient MRN: [id number]  Patient YOB: 1970  Monday, August 29, 2022  Kacy Douglass MD  Dear patient,  As a result of recent federal legislation (The Federal Cures Act), you may   receive lab or pathology results from your procedure in your [facility]   account before your physician is able to contact you. Your physician or   their representative will relay the results to you with their   recommendations at their soonest availability.  Thank you,  RESTRICTIONS:  During your procedure today, you received medications for sedation.  These   medications may affect your judgment, balance and coordination.  Therefore,   for 24 hours, you have the following restrictions:   - DO NOT drive a car, operate machinery, make legal/financial decisions,   sign important papers or drink alcohol.    ACTIVITY:  Today: no heavy lifting, straining or running due to procedural   sedation/anesthesia.  The following day: return to full activity including work.  DIET:  Eat and drink normally unless instructed otherwise.     TREATMENT FOR COMMON SIDE EFFECTS:  - Mild abdominal pain, nausea, belching, bloating or excessive gas:  rest,   eat lightly and use a heating pad.  - Sore Throat: treat with throat lozenges and/or gargle with warm salt   water.  - Because air was used during the procedure, expelling large amounts of air   from your rectum or belching is normal.  - If a bowel prep was taken, you may not have a bowel movement for 1-3 days.    This is normal.  SYMPTOMS TO WATCH FOR AND REPORT TO YOUR PHYSICIAN:  1. Abdominal pain or bloating, other than gas cramps.  2. Chest pain.  3. Back pain.  4. Signs of infection such as: chills or fever occurring within 24 hours   after the procedure.  5. Rectal bleeding, which would show as bright red, maroon, or black stools.   (A tablespoon of blood from the rectum is not serious, especially if    hemorrhoids are present.)  6. Vomiting.  7. Weakness or dizziness.  GO DIRECTLY TO THE NEAREST EMERGENCY ROOM IF YOU HAVE ANY OF THE FOLLOWING:      Difficulty breathing              Chills and/or fever over 101 F   Persistent vomiting and/or vomiting blood   Severe abdominal pain   Severe chest pain   Black, tarry stools   Bleeding- more than one tablespoon   Any other symptom or condition that you feel may need urgent attention  Your doctor recommends these additional instructions:  If any biopsies were taken, your doctors clinic will contact you in 1 to 2   weeks with any results.  - Discharge patient to home (with escort).   - Full liquid diet today. Avoid very hot or very cold liquids today. Okay to   resume solid foods beginning tomorrow.   - Repeat upper endoscopy in 1 month for retreatment.  For questions, problems or results please call your physician - Kacy Douglass MD at Work:  [phone].  [facility], EMERGENCY ROOM PHONE NUMBER: [phone]  IF A COMPLICATION OR EMERGENCY SITUATION ARISES AND YOU ARE UNABLE TO REACH   YOUR PHYSICIAN - GO DIRECTLY TO THE EMERGENCY ROOM.  Kacy Douglass MD  8/29/2022 9:22:39 AM  This report has been verified and signed electronically.  Dear patient,  As a result of recent federal legislation (The Federal Cures Act), you may   receive lab or pathology results from your procedure in your [facility]   account before your physician is able to contact you. Your physician or   their representative will relay the results to you with their   recommendations at their soonest availability.  Thank you,  PROVATION

## 2022-08-29 NOTE — PLAN OF CARE
Discharge instructions given, patient and family member, verbalized understanding. Consents verified. Vitals back to baseline. Pt tolerating po liquids

## 2022-08-29 NOTE — TRANSFER OF CARE
"Anesthesia Transfer of Care Note    Patient: Tee Aranda    Procedure(s) Performed: Procedure(s) (LRB):  EGD (ESOPHAGOGASTRODUODENOSCOPY) (Left)    Patient location: [facility]    Anesthesia Type: general and MAC    Transport from OR: Transported from OR on room air with adequate spontaneous ventilation    Post pain: adequate analgesia    Post assessment: no apparent anesthetic complications and tolerated procedure well    Post vital signs: stable    Level of consciousness: awake and alert    Nausea/Vomiting: no nausea/vomiting    Complications: none    Transfer of care protocol was followed      Last vitals:   Visit Vitals  /75   Pulse 98   Temp 36.5 °C (97.7 °F)   Resp 18   Ht 5' 8" (1.727 m)   Wt 64.4 kg (142 lb)   SpO2 99%   Breastfeeding No   BMI 21.59 kg/m²     "

## 2022-08-29 NOTE — H&P
Short Stay Endoscopy History and Physical    PCP - James Samano MD     Procedure - EGD  ASA - per anesthesia  Mallampati - per anesthesia  History of Anesthesia problems - no  Family history Anesthesia problems -  no   Plan of anesthesia - General    HPI:  This is a 52 y.o. female here for evaluation of :     varices      ROS:  Constitutional: No fevers, chills, No weight loss  CV: No chest pain  Pulm: No cough, No shortness of breath  Ophtho: No vision changes  GI: see HPI  Derm: No rash    Medical History:  has a past medical history of Anxiety, Arthritis, Ascites (11/23/2020), AVN (avascular necrosis of bone), Cataract, Edema (11/23/2020), Esophageal varices, Glaucoma, Hepatic encephalopathy (11/23/2020), cirrhosis, and Kidney stones.    Surgical History:  has a past surgical history that includes Total hip arthroplasty; Tonsillectomy; Knee surgery; Rhinoplasty tip; Lithotripsy; Hysterectomy; Esophagogastroduodenoscopy (N/A, 11/10/2020); Esophagogastroduodenoscopy (N/A, 12/28/2020); Esophagogastroduodenoscopy (N/A, 2/15/2021); Esophagogastroduodenoscopy (Left, 8/3/2021); Distal clavicle excision (Right, 11/18/2021); and Esophagogastroduodenoscopy (N/A, 7/27/2022).    Family History: family history includes Diabetes Mellitus in her maternal grandmother.. Otherwise no colon cancer, inflammatory bowel disease, or GI malignancies.    Social History:  reports that she has been smoking cigarettes. She has been smoking an average of .25 packs per day. She has never used smokeless tobacco. She reports that she does not currently use alcohol. She reports that she does not use drugs.    Review of patient's allergies indicates:   Allergen Reactions    Sulfa (sulfonamide antibiotics) Hives       Medications:   Medications Prior to Admission   Medication Sig Dispense Refill Last Dose    carvediloL (COREG) 3.125 MG tablet Take 1 tablet (3.125 mg total) by mouth 2 (two) times daily. 60 tablet 11 8/28/2022    cyclobenzaprine  (FLEXERIL) 10 MG tablet cyclobenzaprine 10 mg tablet   TAKE 1 TABLET BY MOUTH THREE TIMES A DAY AS NEEDED FOR MUSCLE SPASMS   Past Week    doxycycline (VIBRAMYCIN) 100 MG Cap doxycycline hyclate 100 mg capsule   TAKE 1 CAPSULE BY MOUTH TWICE A DAY FOR 10 DAYS   8/28/2022    multivitamin (THERAGRAN) per tablet Take 1 tablet by mouth once daily.   8/28/2022    omega-3 fatty acids/fish oil (FISH OIL-OMEGA-3 FATTY ACIDS) 300-1,000 mg capsule Take 1 capsule by mouth once daily.   8/28/2022    ondansetron (ZOFRAN) 8 MG tablet Take 1 tablet (8 mg total) by mouth every 8 (eight) hours as needed for Nausea. 15 tablet 0 Past Week    pantoprazole (PROTONIX) 40 MG tablet Take 1 tablet (40 mg total) by mouth once daily. 180 tablet 4 8/28/2022    promethazine-dextromethorphan (PROMETHAZINE-DM) 6.25-15 mg/5 mL Syrp promethazine-DM 6.25 mg-15 mg/5 mL oral syrup   TAKE 5ML BY MOUTH EVERY 4 HOURS AS NEEDED   Past Week    traMADoL (ULTRAM) 50 mg tablet tramadol 50 mg tablet   TAKE 1 TO 2 TABLETS BY MOUTH 3 TIMES A DAY AS NEEDED FOR PAIN   8/28/2022    furosemide (LASIX) 20 MG tablet Take 1 tablet (20 mg total) by mouth once daily. Take in morning (Patient taking differently: Take 20 mg by mouth daily as needed.) 30 tablet 11 More than a month    lactulose (CEPHULAC) 10 gram packet Take 10 g by mouth 3 (three) times daily as needed.   More than a month    meloxicam (MOBIC) 7.5 MG tablet Take 1 tablet (7.5 mg total) by mouth once daily. 28 tablet 0     rifAXIMin (XIFAXAN) 550 mg Tab Take 1 tablet (550 mg total) by mouth 2 (two) times daily. (Patient taking differently: Take 550 mg by mouth once daily.) 60 tablet 11 More than a month    spironolactone (ALDACTONE) 25 MG tablet Take 2 tablets (50 mg total) by mouth once daily. (Patient taking differently: Take 50 mg by mouth daily as needed.) 60 tablet 11 More than a month       Physical Exam:    Vital Signs:   Vitals:    08/29/22 0728   BP: 128/65   Pulse: 69   Resp: 15   Temp: 97.7 °F  (36.5 °C)       General Appearance: Well appearing in no acute distress  Eyes:    No scleral icterus  ENT: Neck supple, Lips, mucosa, and tongue normal; teeth and gums normal  Abdomen: Soft, non tender, non distended with normal bowel sounds. No hepatosplenomegaly, ascites, or mass.  Extremities: No edema  Skin: No rash    Labs:  Lab Results   Component Value Date    WBC 4.50 08/29/2022    HGB 13.1 08/29/2022    HCT 38.8 08/29/2022    PLT 85 (L) 08/29/2022    ALT 24 07/28/2022    AST 20 07/28/2022     07/28/2022    K 3.6 07/28/2022     07/28/2022    CREATININE 0.8 07/28/2022    BUN 16 07/28/2022    CO2 24 07/28/2022    TSH 2.212 12/27/2020    INR 1.1 08/29/2022    HGBA1C 5.5 07/26/2022       I have explained the risks and benefits of endoscopy procedures to the patient including but not limited to bleeding, perforation, infection, and death.  The patient was asked if they understand and allowed to ask any further questions to their satisfaction.      Adryan Park MD

## 2022-08-31 ENCOUNTER — TELEPHONE (OUTPATIENT)
Dept: ENDOSCOPY | Facility: HOSPITAL | Age: 52
End: 2022-08-31
Payer: COMMERCIAL

## 2022-08-31 ENCOUNTER — PATIENT MESSAGE (OUTPATIENT)
Dept: ENDOSCOPY | Facility: HOSPITAL | Age: 52
End: 2022-08-31
Payer: COMMERCIAL

## 2022-08-31 DIAGNOSIS — I85.00 ESOPHAGEAL VARICES WITHOUT BLEEDING, UNSPECIFIED ESOPHAGEAL VARICES TYPE: Primary | ICD-10-CM

## 2022-09-02 NOTE — DISCHARGE SUMMARY
[facility] Medicine  Discharge Summary      Patient Name: Tee Aranda  MRN: [id number]  Patient Class: IP- Inpatient  Admission Date: 7/26/2022  Hospital Length of Stay: 1 days  Discharge Date and Time: 7/27/2022  5:18 PM  Attending Physician: No att. providers found   Discharging Provider: Nevaeh Rhoades MD  Primary Care Provider: James Samano MD      HPI:   52 y.o. female with PMHx of thrombocytopenia, cirrhosis with esophageal varices (prior banding), previous GI bleeds and ascites presented to the [facility] ED on 7/26/22 with black stools first noticed this AM. No bright red blood or hematemesis. Hemodynamically stable.    In the ED, pt received IV normal saline, Protonix and octreotide infusions, and Rocephin. Pt COVID-19 negative. UA showed 2+ blood, 20 RBC, 2 WBC. Troponin negative, CPK 88. Sodium 142, potassium 3.7, chloride 108, CO2 24, BUN 17, creatinine 0.8, glucose 142. AST 28, ALT 34. INR 1.2, WBC 3.04, hemoglobin 13.2, hematocrit 39.8, platelets 68. Abdominal x-ray noted no evidence of bowel obstruction or perforation. EKG showed sinus bradycardia (ventricular rate 59), otherwise normal EKG. Transfer center contacted Gastroenterology at [facility] for further tx of GI bleed. [facility] consulted for hospital admission and management of GI bleed.      Procedure(s) (LRB):  EGD (ESOPHAGOGASTRODUODENOSCOPY) (N/A)      Hospital Course:   Patient underwent EGD on 7/27 which showed grade II esophageal non-bleeding varices. Banding was performed with incomplete eradication. Portal hypertensive gastropathy and gastritis were additionally found with biopsies taken. Bleeding likely secondary to portal hypertensive gastropathy, however, no active bleeding source noted. EGD will be repeated on 8/29 with Dr. Park. Octreotide and ceftriaxone discontinued upon discharge. Patient will continue PPI and Coreg BID at home. Patient and her  voiced understanding. Primary team  contacted pt's hepatologist Dr. Bonnie Bowers and updated her on pt's current hospitalization. Pt has outpatient f/u with PCP and repeat EGD scheduled on 8/29/22 with Dr. Park.       Goals of Care Treatment Preferences:  Code Status: Full Code      Consults:   Consults (From admission, onward)        Status Ordering Provider     Inpatient consult to Gastroenterology-[facility]  Once        Provider:  (Not yet assigned)    Completed SINGH BREWSTER          No new Assessment & Plan notes have been filed under this hospital service since the last note was generated.  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    Specialty: Family Medicine  Why: Time: 10:00a.m.  Contact information:  [address]  [place]  [place] [address]  [phone]             Adryan Park MD Follow up on 8/29/2022.    Specialty: Gastroenterology  Why: Repeat EGD appt scheduled  Contact information:  [address] [address]  [phone]             [facility] Follow up.    Specialty: Emergency Medicine  Why: If symptoms worsen  Contact information:  [address] [facility] [address] [phone]                     Patient Instructions:      Notify your health care provider if you experience any of the following:  temperature >100.4     Notify your health care provider if you experience any of the following:  persistent nausea and vomiting or diarrhea     Notify your health care provider if you experience any of the following:  severe uncontrolled pain     Notify your health care provider if you experience any of the following:   difficulty breathing or increased cough     Notify your health care provider if you experience any of the following:  severe persistent headache     Notify your health care provider if you experience any of the following:  worsening rash     Notify your health care provider if you experience any of the following:  increased confusion or weakness     Notify your health care provider if you experience any of the following:  persistent dizziness, light-headedness, or visual disturbances       Significant Diagnostic Studies: Labs:     Lab Results   Component Value Date    INR 1.1 08/29/2022    INR 1.2 07/26/2022    INR 1.1 06/27/2022       Recent Labs   Lab 07/26/22 2228   HGBA1C 5.5       Pending Diagnostic Studies:     None         Medications:  Reconciled Home Medications:      Medication List      CONTINUE taking these medications    carvediloL 3.125 MG tablet  Commonly known as: COREG  Take 1 tablet (3.125 mg total) by mouth 2 (two) times daily.     pantoprazole 40 MG tablet  Commonly known as: PROTONIX  Take 1 tablet (40 mg total) by mouth once daily.        ASK your doctor about these medications    fish oil-omega-3 fatty acids 300-1,000 mg capsule  Take 1 capsule by mouth once daily.     furosemide 20 MG tablet  Commonly known as: LASIX  Take 1 tablet (20 mg total) by mouth once daily. Take in morning     lactulose 10 gram packet  Commonly known as: CEPHULAC  Take 10 g by mouth 3 (three) times daily as needed.     multivitamin per tablet  Commonly known as: THERAGRAN  Take 1 tablet by mouth once daily.     ondansetron 8 MG tablet  Commonly known as: ZOFRAN  Take 1 tablet (8 mg total) by mouth every 8 (eight) hours as needed for Nausea.     spironolactone 25 MG tablet  Commonly known as: ALDACTONE  Take 2 tablets (50 mg total) by mouth once daily.     XIFAXAN 550 mg Tab  Generic drug: rifAXIMin  Take 1 tablet (550 mg total) by mouth 2 (two) times daily.            Indwelling Lines/Drains at time of discharge:    Lines/Drains/Airways     None                 Time spent on the discharge of patient: 30 minutes         Nevaeh Rhoades MD  Department of Hospital Medicine  [facility]

## 2022-09-04 ENCOUNTER — HOSPITAL ENCOUNTER (EMERGENCY)
Facility: HOSPITAL | Age: 52
Discharge: ANOTHER HOSPITAL | End: 2022-09-04
Attending: STUDENT IN AN ORGANIZED HEALTH CARE EDUCATION/TRAINING PROGRAM
Payer: COMMERCIAL

## 2022-09-04 ENCOUNTER — HOSPITAL ENCOUNTER (OUTPATIENT)
Facility: HOSPITAL | Age: 52
Discharge: HOME | End: 2022-09-05
Attending: INTERNAL MEDICINE | Admitting: INTERNAL MEDICINE
Payer: COMMERCIAL

## 2022-09-04 VITALS
OXYGEN SATURATION: 99 % | WEIGHT: 143.63 LBS | RESPIRATION RATE: 21 BRPM | SYSTOLIC BLOOD PRESSURE: 110 MMHG | TEMPERATURE: 97 F | HEART RATE: 71 BPM | BODY MASS INDEX: 21.84 KG/M2 | DIASTOLIC BLOOD PRESSURE: 57 MMHG

## 2022-09-04 DIAGNOSIS — K92.2 GI BLEED: ICD-10-CM

## 2022-09-04 DIAGNOSIS — K92.2 GI (GASTROINTESTINAL BLEED): ICD-10-CM

## 2022-09-04 PROBLEM — R60.9 EDEMA: Status: RESOLVED | Noted: 2020-11-23 | Resolved: 2022-09-04

## 2022-09-04 PROBLEM — R18.8 ASCITES: Status: RESOLVED | Noted: 2020-11-23 | Resolved: 2022-09-04

## 2022-09-04 PROBLEM — D62 ACUTE BLOOD LOSS ANEMIA: Status: RESOLVED | Noted: 2020-12-29 | Resolved: 2022-09-04

## 2022-09-04 PROBLEM — K76.82 HEPATIC ENCEPHALOPATHY: Status: RESOLVED | Noted: 2020-11-23 | Resolved: 2022-09-04

## 2022-09-04 PROBLEM — D64.9 ANEMIA: Status: RESOLVED | Noted: 2020-11-10 | Resolved: 2022-09-04

## 2022-09-04 LAB
ABO + RH BLD: NORMAL
ABO + RH BLD: NORMAL
ALBUMIN SERPL BCP-MCNC: 4.1 G/DL (ref 3.5–5.2)
ALP SERPL-CCNC: 120 U/L (ref 55–135)
ALT SERPL W/O P-5'-P-CCNC: 49 U/L (ref 10–44)
ANION GAP SERPL CALC-SCNC: 9 MMOL/L (ref 8–16)
APTT BLDCRRT: 27.3 SEC (ref 21–32)
AST SERPL-CCNC: 41 U/L (ref 10–40)
BASOPHILS # BLD AUTO: 0.01 K/UL (ref 0–0.2)
BASOPHILS # BLD AUTO: 0.02 K/UL (ref 0–0.2)
BASOPHILS NFR BLD: 0.3 % (ref 0–1.9)
BASOPHILS NFR BLD: 0.4 % (ref 0–1.9)
BILIRUB SERPL-MCNC: 0.8 MG/DL (ref 0.1–1)
BLD GP AB SCN CELLS X3 SERPL QL: NORMAL
BLD GP AB SCN CELLS X3 SERPL QL: NORMAL
BUN SERPL-MCNC: 18 MG/DL (ref 6–20)
CALCIUM SERPL-MCNC: 9.5 MG/DL (ref 8.7–10.5)
CHLORIDE SERPL-SCNC: 106 MMOL/L (ref 95–110)
CO2 SERPL-SCNC: 23 MMOL/L (ref 23–29)
CREAT SERPL-MCNC: 0.8 MG/DL (ref 0.5–1.4)
DIFFERENTIAL METHOD: ABNORMAL
DIFFERENTIAL METHOD: ABNORMAL
EOSINOPHIL # BLD AUTO: 0.1 K/UL (ref 0–0.5)
EOSINOPHIL # BLD AUTO: 0.1 K/UL (ref 0–0.5)
EOSINOPHIL NFR BLD: 1.3 % (ref 0–8)
EOSINOPHIL NFR BLD: 1.8 % (ref 0–8)
ERYTHROCYTE [DISTWIDTH] IN BLOOD BY AUTOMATED COUNT: 12.8 % (ref 11.5–14.5)
ERYTHROCYTE [DISTWIDTH] IN BLOOD BY AUTOMATED COUNT: 13 % (ref 11.5–14.5)
EST. GFR  (NO RACE VARIABLE): >60 ML/MIN/1.73 M^2
GLUCOSE SERPL-MCNC: 130 MG/DL (ref 70–110)
HCT VFR BLD AUTO: 37.3 % (ref 37–48.5)
HCT VFR BLD AUTO: 40.5 % (ref 37–48.5)
HGB BLD-MCNC: 12.7 G/DL (ref 12–16)
HGB BLD-MCNC: 13.6 G/DL (ref 12–16)
IMM GRANULOCYTES # BLD AUTO: 0.01 K/UL (ref 0–0.04)
IMM GRANULOCYTES # BLD AUTO: 0.03 K/UL (ref 0–0.04)
IMM GRANULOCYTES NFR BLD AUTO: 0.3 % (ref 0–0.5)
IMM GRANULOCYTES NFR BLD AUTO: 0.7 % (ref 0–0.5)
INR PPP: 1.2 (ref 0.8–1.2)
LYMPHOCYTES # BLD AUTO: 0.8 K/UL (ref 1–4.8)
LYMPHOCYTES # BLD AUTO: 0.9 K/UL (ref 1–4.8)
LYMPHOCYTES NFR BLD: 17.3 % (ref 18–48)
LYMPHOCYTES NFR BLD: 24.4 % (ref 18–48)
MCH RBC QN AUTO: 30.4 PG (ref 27–31)
MCH RBC QN AUTO: 30.4 PG (ref 27–31)
MCHC RBC AUTO-ENTMCNC: 33.6 G/DL (ref 32–36)
MCHC RBC AUTO-ENTMCNC: 34 G/DL (ref 32–36)
MCV RBC AUTO: 89 FL (ref 82–98)
MCV RBC AUTO: 91 FL (ref 82–98)
MONOCYTES # BLD AUTO: 0.3 K/UL (ref 0.3–1)
MONOCYTES # BLD AUTO: 0.4 K/UL (ref 0.3–1)
MONOCYTES NFR BLD: 10.1 % (ref 4–15)
MONOCYTES NFR BLD: 6.6 % (ref 4–15)
NEUTROPHILS # BLD AUTO: 2.5 K/UL (ref 1.8–7.7)
NEUTROPHILS # BLD AUTO: 3.4 K/UL (ref 1.8–7.7)
NEUTROPHILS NFR BLD: 63.6 % (ref 38–73)
NEUTROPHILS NFR BLD: 73.2 % (ref 38–73)
NRBC BLD-RTO: 0 /100 WBC
NRBC BLD-RTO: 0 /100 WBC
PLATELET # BLD AUTO: 81 K/UL (ref 150–450)
PLATELET # BLD AUTO: 85 K/UL (ref 150–450)
PMV BLD AUTO: 11.1 FL (ref 9.2–12.9)
PMV BLD AUTO: 11.2 FL (ref 9.2–12.9)
POTASSIUM SERPL-SCNC: 4.5 MMOL/L (ref 3.5–5.1)
PROT SERPL-MCNC: 7 G/DL (ref 6–8.4)
PROTHROMBIN TIME: 12.1 SEC (ref 9–12.5)
RBC # BLD AUTO: 4.18 M/UL (ref 4–5.4)
RBC # BLD AUTO: 4.47 M/UL (ref 4–5.4)
SARS-COV-2 RDRP RESP QL NAA+PROBE: NEGATIVE
SODIUM SERPL-SCNC: 138 MMOL/L (ref 136–145)
WBC # BLD AUTO: 3.86 K/UL (ref 3.9–12.7)
WBC # BLD AUTO: 4.57 K/UL (ref 3.9–12.7)

## 2022-09-04 PROCEDURE — 93010 EKG 12-LEAD: ICD-10-PCS | Mod: ,,, | Performed by: INTERNAL MEDICINE

## 2022-09-04 PROCEDURE — 85025 COMPLETE CBC W/AUTO DIFF WBC: CPT | Mod: 91 | Performed by: HOSPITALIST

## 2022-09-04 PROCEDURE — 25000003 PHARM REV CODE 250: Performed by: STUDENT IN AN ORGANIZED HEALTH CARE EDUCATION/TRAINING PROGRAM

## 2022-09-04 PROCEDURE — 63600175 PHARM REV CODE 636 W HCPCS: Performed by: STUDENT IN AN ORGANIZED HEALTH CARE EDUCATION/TRAINING PROGRAM

## 2022-09-04 PROCEDURE — 63600175 PHARM REV CODE 636 W HCPCS: Mod: JA | Performed by: HOSPITALIST

## 2022-09-04 PROCEDURE — C9113 INJ PANTOPRAZOLE SODIUM, VIA: HCPCS | Performed by: STUDENT IN AN ORGANIZED HEALTH CARE EDUCATION/TRAINING PROGRAM

## 2022-09-04 PROCEDURE — 93005 ELECTROCARDIOGRAM TRACING: CPT

## 2022-09-04 PROCEDURE — 80053 COMPREHEN METABOLIC PANEL: CPT | Performed by: STUDENT IN AN ORGANIZED HEALTH CARE EDUCATION/TRAINING PROGRAM

## 2022-09-04 PROCEDURE — 25000003 PHARM REV CODE 250: Performed by: HOSPITALIST

## 2022-09-04 PROCEDURE — 99285 EMERGENCY DEPT VISIT HI MDM: CPT | Mod: 25

## 2022-09-04 PROCEDURE — 96366 THER/PROPH/DIAG IV INF ADDON: CPT

## 2022-09-04 PROCEDURE — 36415 COLL VENOUS BLD VENIPUNCTURE: CPT | Performed by: HOSPITALIST

## 2022-09-04 PROCEDURE — 96367 TX/PROPH/DG ADDL SEQ IV INF: CPT

## 2022-09-04 PROCEDURE — 85025 COMPLETE CBC W/AUTO DIFF WBC: CPT | Performed by: STUDENT IN AN ORGANIZED HEALTH CARE EDUCATION/TRAINING PROGRAM

## 2022-09-04 PROCEDURE — 86850 RBC ANTIBODY SCREEN: CPT | Mod: 91 | Performed by: HOSPITALIST

## 2022-09-04 PROCEDURE — 93010 ELECTROCARDIOGRAM REPORT: CPT | Mod: ,,, | Performed by: INTERNAL MEDICINE

## 2022-09-04 PROCEDURE — U0002 COVID-19 LAB TEST NON-CDC: HCPCS | Performed by: STUDENT IN AN ORGANIZED HEALTH CARE EDUCATION/TRAINING PROGRAM

## 2022-09-04 PROCEDURE — G0378 HOSPITAL OBSERVATION PER HR: HCPCS

## 2022-09-04 PROCEDURE — 86850 RBC ANTIBODY SCREEN: CPT | Performed by: STUDENT IN AN ORGANIZED HEALTH CARE EDUCATION/TRAINING PROGRAM

## 2022-09-04 PROCEDURE — 96365 THER/PROPH/DIAG IV INF INIT: CPT

## 2022-09-04 PROCEDURE — 85610 PROTHROMBIN TIME: CPT | Performed by: STUDENT IN AN ORGANIZED HEALTH CARE EDUCATION/TRAINING PROGRAM

## 2022-09-04 PROCEDURE — 85730 THROMBOPLASTIN TIME PARTIAL: CPT | Performed by: STUDENT IN AN ORGANIZED HEALTH CARE EDUCATION/TRAINING PROGRAM

## 2022-09-04 PROCEDURE — 96375 TX/PRO/DX INJ NEW DRUG ADDON: CPT

## 2022-09-04 PROCEDURE — G0379 DIRECT REFER HOSPITAL OBSERV: HCPCS

## 2022-09-04 RX ORDER — ONDANSETRON 2 MG/ML
4 INJECTION INTRAMUSCULAR; INTRAVENOUS
Status: COMPLETED | OUTPATIENT
Start: 2022-09-04 | End: 2022-09-04

## 2022-09-04 RX ORDER — LIDOCAINE 50 MG/G
1 PATCH TOPICAL
Status: DISCONTINUED | OUTPATIENT
Start: 2022-09-04 | End: 2022-09-05 | Stop reason: HOSPADM

## 2022-09-04 RX ORDER — TRAMADOL HYDROCHLORIDE 50 MG/1
100 TABLET ORAL 2 TIMES DAILY PRN
Status: DISCONTINUED | OUTPATIENT
Start: 2022-09-04 | End: 2022-09-05 | Stop reason: HOSPADM

## 2022-09-04 RX ORDER — OCTREOTIDE ACETATE 50 UG/ML
50 INJECTION, SOLUTION INTRAVENOUS; SUBCUTANEOUS ONCE
Status: DISCONTINUED | OUTPATIENT
Start: 2022-09-04 | End: 2022-09-05 | Stop reason: HOSPADM

## 2022-09-04 RX ORDER — TRAMADOL HYDROCHLORIDE 50 MG/1
50 TABLET ORAL ONCE
Status: COMPLETED | OUTPATIENT
Start: 2022-09-04 | End: 2022-09-04

## 2022-09-04 RX ORDER — ONDANSETRON 2 MG/ML
4 INJECTION INTRAMUSCULAR; INTRAVENOUS EVERY 6 HOURS PRN
Status: DISCONTINUED | OUTPATIENT
Start: 2022-09-04 | End: 2022-09-05 | Stop reason: HOSPADM

## 2022-09-04 RX ORDER — PANTOPRAZOLE SODIUM 40 MG/10ML
40 INJECTION, POWDER, LYOPHILIZED, FOR SOLUTION INTRAVENOUS 2 TIMES DAILY
Status: DISCONTINUED | OUTPATIENT
Start: 2022-09-04 | End: 2022-09-05 | Stop reason: HOSPADM

## 2022-09-04 RX ORDER — PANTOPRAZOLE SODIUM 40 MG/10ML
80 INJECTION, POWDER, LYOPHILIZED, FOR SOLUTION INTRAVENOUS
Status: DISCONTINUED | OUTPATIENT
Start: 2022-09-04 | End: 2022-09-04

## 2022-09-04 RX ORDER — TALC
6 POWDER (GRAM) TOPICAL NIGHTLY PRN
Status: DISCONTINUED | OUTPATIENT
Start: 2022-09-04 | End: 2022-09-05 | Stop reason: HOSPADM

## 2022-09-04 RX ORDER — TRAMADOL HYDROCHLORIDE 50 MG/1
50 TABLET ORAL 2 TIMES DAILY PRN
Status: DISCONTINUED | OUTPATIENT
Start: 2022-09-04 | End: 2022-09-04

## 2022-09-04 RX ORDER — ACETAMINOPHEN 325 MG/1
650 TABLET ORAL EVERY 6 HOURS PRN
Status: DISCONTINUED | OUTPATIENT
Start: 2022-09-04 | End: 2022-09-05 | Stop reason: HOSPADM

## 2022-09-04 RX ORDER — OCTREOTIDE ACETATE 100 UG/ML
50 INJECTION, SOLUTION INTRAVENOUS; SUBCUTANEOUS
Status: COMPLETED | OUTPATIENT
Start: 2022-09-04 | End: 2022-09-04

## 2022-09-04 RX ADMIN — OCTREOTIDE ACETATE 50 MCG/HR: 500 INJECTION, SOLUTION INTRAVENOUS; SUBCUTANEOUS at 09:09

## 2022-09-04 RX ADMIN — ONDANSETRON HYDROCHLORIDE 4 MG: 2 SOLUTION INTRAMUSCULAR; INTRAVENOUS at 09:09

## 2022-09-04 RX ADMIN — OCTREOTIDE ACETATE 50 MCG: 100 INJECTION, SOLUTION INTRAVENOUS; SUBCUTANEOUS at 09:09

## 2022-09-04 RX ADMIN — TRAMADOL HYDROCHLORIDE 50 MG: 50 TABLET, COATED ORAL at 11:09

## 2022-09-04 RX ADMIN — PANTOPRAZOLE SODIUM 8 MG/HR: 40 INJECTION, POWDER, FOR SOLUTION INTRAVENOUS at 01:09

## 2022-09-04 RX ADMIN — OCTREOTIDE ACETATE 50 MCG/HR: 500 INJECTION, SOLUTION INTRAVENOUS; SUBCUTANEOUS at 05:09

## 2022-09-04 RX ADMIN — TRAMADOL HYDROCHLORIDE 50 MG: 50 TABLET, COATED ORAL at 10:09

## 2022-09-04 RX ADMIN — LIDOCAINE 1 PATCH: 50 PATCH TOPICAL at 10:09

## 2022-09-04 RX ADMIN — CEFTRIAXONE 1 G: 1 INJECTION, SOLUTION INTRAVENOUS at 09:09

## 2022-09-04 RX ADMIN — PANTOPRAZOLE SODIUM 8 MG/HR: 40 INJECTION, POWDER, FOR SOLUTION INTRAVENOUS at 09:09

## 2022-09-04 NOTE — HPI
52 y.o. 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  Complains of black stool, acute onset, duration 2 days, associated with lightheadedness, no known exacerbating or alleviating factors.  She had an EGD 08/29/2022 which showed large esophageal varices which according to records were completely eradicated and banded.  Denies fever, chills, cough, SOB, chest pain, syncope, hematemesis, coffee-ground emesis, abdominal pain.  In the ED, H/H within normal limits.  Only minimal elevation of AST/ALT consistent with chronic baseline.  No evidence of acute abnormality.  She was transferred to [facility] from [facility] for Gastroenterology consultation.

## 2022-09-04 NOTE — SUBJECTIVE & OBJECTIVE
Past Medical History:   Diagnosis Date    Anxiety     Arthritis     Ascites 11/23/2020    AVN (avascular necrosis of bone)     Cataract     Edema 11/23/2020    Esophageal varices     Glaucoma     Hepatic encephalopathy 11/23/2020    Hx of cirrhosis     non-alcoholic    Kidney stones        Past Surgical History:   Procedure Laterality Date    DISTAL CLAVICLE EXCISION Right 11/18/2021    Procedure: RIGHT SHOULDER ARTHROSCOPY, EXCISION, CLAVICLE, DISTAL; EXTENSIVE DEBRIDEMENT;  Surgeon: Isaiah Joyner MD;  Location: [facility] OR;  Service: Orthopedics;  Laterality: Right;    ESOPHAGOGASTRODUODENOSCOPY N/A 11/10/2020    Procedure: EGD (ESOPHAGOGASTRODUODENOSCOPY);  Surgeon: Gerard Salinas MD;  Location: [facility] ENDO;  Service: Endoscopy;  Laterality: N/A;    ESOPHAGOGASTRODUODENOSCOPY N/A 12/28/2020    Procedure: EGD (ESOPHAGOGASTRODUODENOSCOPY);  Surgeon: Adryan Park MD;  Location: [facility] (2ND FLR);  Service: Endoscopy;  Laterality: N/A;    ESOPHAGOGASTRODUODENOSCOPY N/A 2/15/2021    Procedure: EGD (ESOPHAGOGASTRODUODENOSCOPY);  Surgeon: Hari Greene MD;  Location: [facility] (4TH FLR);  Service: Endoscopy;  Laterality: N/A;  6 week follow-up variceal banding  Hx varices - Labs - ERW  prep ins. emialed - COVID screening on 2/12/21 [employer] - [facility]W    ESOPHAGOGASTRODUODENOSCOPY Left 8/3/2021    Procedure: EGD (ESOPHAGOGASTRODUODENOSCOPY);  Surgeon: Fabricio Corado MD;  Location: [facility] (4TH FLR);  Service: Gastroenterology;  Laterality: Left;  recent hematemasis. varices-labs on 7/26    COVID test at [facility] on 7/31-GT  requesting sooner    ESOPHAGOGASTRODUODENOSCOPY N/A 7/27/2022    Procedure: EGD (ESOPHAGOGASTRODUODENOSCOPY);  Surgeon: Eric Garcia MD;  Location: [facility] ENDO;  Service: Endoscopy;  Laterality: N/A;    ESOPHAGOGASTRODUODENOSCOPY Left 8/29/2022    Procedure: EGD (ESOPHAGOGASTRODUODENOSCOPY);  Surgeon: Kacy Douglass MD;  Location: [facility] (2ND FLR);  Service: Endoscopy;  Laterality:  Left;  Fully vaccinated/ clear liquids up to 4 hrs prior-RB  varices labs ordered-RB    HYSTERECTOMY      KNEE SURGERY      LITHOTRIPSY      RHINOPLASTY TIP      TONSILLECTOMY      TOTAL HIP ARTHROPLASTY         Review of patient's allergies indicates:   Allergen Reactions    Sulfa (sulfonamide antibiotics) Hives       Current Facility-Administered Medications on File Prior to Encounter   Medication    [COMPLETED] cefTRIAXone (ROCEPHIN) 1 g/50 mL D5W IVPB    [COMPLETED] octreotide injection 50 mcg    [COMPLETED] ondansetron injection 4 mg    [DISCONTINUED] octreotide (SANDOSTATIN) 500 mcg in sodium chloride 0.9% 100 mL infusion    [DISCONTINUED] pantoprazole 40 mg in  mL infusion (ready to mix system)    [DISCONTINUED] pantoprazole injection 80 mg     Current Outpatient Medications on File Prior to Encounter   Medication Sig    carvediloL (COREG) 3.125 MG tablet Take 1 tablet (3.125 mg total) by mouth 2 (two) times daily.    doxycycline (VIBRAMYCIN) 100 MG Cap doxycycline hyclate 100 mg capsule   TAKE 1 CAPSULE BY MOUTH TWICE A DAY FOR 10 DAYS    furosemide (LASIX) 20 MG tablet Take 1 tablet (20 mg total) by mouth once daily. Take in morning (Patient not taking: Reported on 8/31/2022)    lactulose (CEPHULAC) 10 gram packet Take 10 g by mouth 3 (three) times daily as needed.    meloxicam (MOBIC) 7.5 MG tablet Take 1 tablet (7.5 mg total) by mouth once daily.    multivitamin (THERAGRAN) per tablet Take 1 tablet by mouth once daily.    omega-3 fatty acids/fish oil (FISH OIL-OMEGA-3 FATTY ACIDS) 300-1,000 mg capsule Take 1 capsule by mouth once daily.    ondansetron (ZOFRAN) 8 MG tablet Take 1 tablet (8 mg total) by mouth every 8 (eight) hours as needed for Nausea.    pantoprazole (PROTONIX) 40 MG tablet Take 1 tablet (40 mg total) by mouth once daily.    promethazine-dextromethorphan (PROMETHAZINE-DM) 6.25-15 mg/5 mL Syrp promethazine-DM 6.25 mg-15 mg/5 mL oral syrup   TAKE 5ML BY MOUTH EVERY 4 HOURS AS NEEDED     rifAXIMin (XIFAXAN) 550 mg Tab Take 1 tablet (550 mg total) by mouth 2 (two) times daily. (Patient taking differently: Take 550 mg by mouth once daily.)    spironolactone (ALDACTONE) 25 MG tablet Take 2 tablets (50 mg total) by mouth once daily. (Patient not taking: Reported on 8/31/2022)    traMADoL (ULTRAM) 50 mg tablet tramadol 50 mg tablet   TAKE 1 TO 2 TABLETS BY MOUTH 3 TIMES A DAY AS NEEDED FOR PAIN    [DISCONTINUED] cyclobenzaprine (FLEXERIL) 10 MG tablet cyclobenzaprine 10 mg tablet   TAKE 1 TABLET BY MOUTH THREE TIMES A DAY AS NEEDED FOR MUSCLE SPASMS     Family History       Problem Relation (Age of Onset)    Diabetes Mellitus Maternal Grandmother          Tobacco Use    Smoking status: Some Days     Packs/day: 0.25     Types: Cigarettes     Last attempt to quit: 7/3/2019     Years since quitting: 3.1    Smokeless tobacco: Never   Substance and Sexual Activity    Alcohol use: Not Currently    Drug use: No    Sexual activity: Not on file     Review of Systems   Constitutional:  Negative for chills, fatigue and fever.   Eyes:  Negative for photophobia and visual disturbance.   Respiratory:  Negative for cough and shortness of breath.    Cardiovascular:  Negative for chest pain, palpitations and leg swelling.   Gastrointestinal:  Positive for blood in stool. Negative for abdominal pain, diarrhea, nausea and vomiting.   Genitourinary:  Negative for dysuria, frequency and urgency.   Skin:  Negative for pallor, rash and wound.   Neurological:  Positive for light-headedness. Negative for headaches.   Psychiatric/Behavioral:  Negative for confusion and decreased concentration.    Objective:     Vital Signs (Most Recent):    Vital Signs (24h Range):  Temp:  [96.6 °F (35.9 °C)] 96.6 °F (35.9 °C)  Pulse:  [62-81] 71  Resp:  [13-21] 21  SpO2:  [98 %-100 %] 99 %  BP: (109-134)/(57-72) 110/57        There is no height or weight on file to calculate BMI.    Physical Exam  Vitals and nursing note reviewed.    Constitutional:       General: She is not in acute distress.     Appearance: She is well-developed.   HENT:      Head: Normocephalic and atraumatic.      Right Ear: External ear normal.      Left Ear: External ear normal.      Nose: Nose normal.   Eyes:      Conjunctiva/sclera: Conjunctivae normal.      Pupils: Pupils are equal, round, and reactive to light.   Cardiovascular:      Rate and Rhythm: Normal rate and regular rhythm.   Pulmonary:      Effort: Pulmonary effort is normal. No respiratory distress.      Breath sounds: Normal breath sounds. No wheezing.   Abdominal:      General: Bowel sounds are normal. There is no distension.      Palpations: Abdomen is soft.      Tenderness: There is no abdominal tenderness.      Comments: No palpable hepatomegaly or splenomegaly    Musculoskeletal:         General: No tenderness. Normal range of motion.      Cervical back: Normal range of motion and neck supple.   Skin:     General: Skin is warm and dry.   Neurological:      Mental Status: She is alert and oriented to person, place, and time.   Psychiatric:         Thought Content: Thought content normal.         CRANIAL NERVES     CN III, IV, VI   Pupils are equal, round, and reactive to light.     Significant Labs: All pertinent labs within the past 24 hours have been reviewed.    Significant Imaging: I have reviewed all pertinent imaging results/findings within the past 24 hours.

## 2022-09-04 NOTE — PROVIDER TRANSFER
Outside Transfer Acceptance Note / [facility]    Referring facility: [facility]  Referring provider: JAZMIN GOMEZ  Accepting facility: [facility]  Accepting provider: ESAU AKHTAR  Admitting provider: NEFTALI TOLLIVER  Reason for transfer:  Need Gastrotenterology  Transfer diagnosis: Gi bleed  Transfer specialty requested: Gastroenterology  Transfer specialty notified: yes  Transfer level: NUMBER 1-5: 2  Bed type requested: med-tele  Isolation status: No active isolations   Admission class or status: IOP- Observation    Narrative     52-year-old female with a history of thrombocytopenia, cirrhosis secondary to CARTAGENA, esophageal varices with banding (most recently August 28), GI bleed, and ascites presented [facility] with black stool and lightheadedness for 2 days.  No hematemesis noted.  No chest pain or dyspnea.  Nausea with no vomiting or diarrhea.  Mentation and neurologic status are at baseline. In the emergency department she received octreotide bolus and infusion, Protonix infusion, Zofran, and Rocephin.  Referring provider spoke with Gastroenterology.  Requesting transfer to Hospital Medicine at [facility] for observation to monitor trend in hemoglobin and to determine if further endoscopy is indicated.  Patient is hemodynamically stable.    COVID vaccinated  Sodium 138, potassium 4.5, chloride 106, CO2 23, BUN 18, creatinine 0.8, glucose 130, total bilirubin 0.8, AST 41, ALT 49, INR 1.2, white blood cells 4.57, hemoglobin 13.6, hematocrit 40.5, platelets 85    EKG showed normal sinus rhythm appeared to be a normal EKG.    August 29, 2022: EGD had large esophageal varices.  Completely eradicated.  Banded.  Portal hypertensive gastropathy.  Normal examined duodenum.    Objective     Vitals: Temp: 96.6 °F (35.9 °C) (09/04/22 0848)  Pulse: 64 (09/04/22 1047)  Resp: 16 (09/04/22 1047)  BP: 109/62 (09/04/22 1047)  SpO2: 99 %  (09/04/22 1047)  Recent Labs: CBC:   Recent Labs   Lab 09/04/22 0901   WBC 4.57   HGB 13.6   HCT 40.5   PLT 85*     CMP:   Recent Labs   Lab 09/04/22 0901      K 4.5      CO2 23   *   BUN 18   CREATININE 0.8   CALCIUM 9.5   PROT 7.0   ALBUMIN 4.1   BILITOT 0.8   ALKPHOS 120   AST 41*   ALT 49*   ANIONGAP 9     IV access:        Peripheral IV - Single Lumen 09/04/22 0906 20 G Posterior;Right Hand (Active)   Site Assessment Clean;Dry;Intact 09/04/22 0906   Line Status Blood return noted;Flushed 09/04/22 0906            Peripheral IV - Single Lumen 09/04/22 0906 20 G Anterior;Proximal;Right Antecubital (Active)   Site Assessment Clean;Dry;Intact 09/04/22 0907   Line Status Blood return noted;Flushed 09/04/22 0907     Infusions:  Protonix, octreotide  Allergies:   Review of patient's allergies indicates:   Allergen Reactions    Sulfa (sulfonamide antibiotics) Hives      NPO: No         Instructions    Admit to Hospital Medicine    Upon patient arrival to floor, please send SecureChat to [facility] or call extension [phone] (if no answer, do NOT leave a callback number after the beep, rather please send a SecureChat to [facility]), for Hospital Medicine admit team assignment and for additional admit orders for the patient.  Do not page the attending physician associated with the patient on arrival (this physician may not be on duty at the time of arrival).  Rather, always send a SecureChat to [facility] or call [phone] to reach the triage physician for orders and team assignment.    BAUDILIO Iverson MD  Hospital Medicine Staff  Cell: [phone]

## 2022-09-04 NOTE — ED NOTES
ER MD reports OK to D/C 80mg of protonix IVP dose due to pt taking her dose at home PTA.. OK to start Protonix infusion.

## 2022-09-04 NOTE — ED PROVIDER NOTES
Encounter Date: 9/4/2022       History     Chief Complaint   Patient presents with    Rectal Bleeding     52-year-old female with history of thrombocytopenia, cirrhosis 2/2 CARTAGENA with esophageal varices with recent banding (8/28), previous GI bleeds and ascites, presenting with black stool and lightheadedness for two days, and concern for GI bleed.  Denies any her a blood or hematemesis.  No LOC.  No shortness of breath or chest pain.  Reports nausea, but no vomiting or diarrhea.  No other complaints.  Reporting mild lower abdominal pain.    Review of patient's allergies indicates:   Allergen Reactions    Sulfa (sulfonamide antibiotics) Hives     Past Medical History:   Diagnosis Date    Anxiety     Arthritis     Ascites 11/23/2020    AVN (avascular necrosis of bone)     Cataract     Edema 11/23/2020    Esophageal varices     Glaucoma     Hepatic encephalopathy 11/23/2020    Hx of cirrhosis     non-alcoholic    Kidney stones      Past Surgical History:   Procedure Laterality Date    DISTAL CLAVICLE EXCISION Right 11/18/2021    Procedure: RIGHT SHOULDER ARTHROSCOPY, EXCISION, CLAVICLE, DISTAL; EXTENSIVE DEBRIDEMENT;  Surgeon: Isaiah Joyner MD;  Location: [facility] OR;  Service: Orthopedics;  Laterality: Right;    ESOPHAGOGASTRODUODENOSCOPY N/A 11/10/2020    Procedure: EGD (ESOPHAGOGASTRODUODENOSCOPY);  Surgeon: Gerard Salinas MD;  Location: [facility];  Service: Endoscopy;  Laterality: N/A;    ESOPHAGOGASTRODUODENOSCOPY N/A 12/28/2020    Procedure: EGD (ESOPHAGOGASTRODUODENOSCOPY);  Surgeon: Adryan Park MD;  [facility]  Service: Endoscopy;  Laterality: N/A;    ESOPHAGOGASTRODUODENOSCOPY N/A 2/15/2021    Procedure: EGD (ESOPHAGOGASTRODUODENOSCOPY);  Surgeon: Hari Greene MD;  [facility]);  Service: Endoscopy;  Laterality: N/A;  6 week follow-up variceal banding  Hx varices - Labs - ERW  prep ins. emialed - COVID screening on 2/12/21 [facility]     ESOPHAGOGASTRODUODENOSCOPY Left 8/3/2021    Procedure: EGD (ESOPHAGOGASTRODUODENOSCOPY);  Surgeon: Fabricio Corado MD;  Location: [facility] (4TH FLR);  Service: Gastroenterology;  Laterality: Left;  recent hematemasis. varices-labs on 7/26    COVID test at [facility] on 7/31-GT  requesting sooner    ESOPHAGOGASTRODUODENOSCOPY N/A 7/27/2022    Procedure: EGD (ESOPHAGOGASTRODUODENOSCOPY);  Surgeon: Eric Garcia MD;  Location: [facility];  Service: Endoscopy;  Laterality: N/A;    ESOPHAGOGASTRODUODENOSCOPY Left 8/29/2022    Procedure: EGD (ESOPHAGOGASTRODUODENOSCOPY);  Surgeon: Kacy Douglass MD;  Location: [facility] (2ND FLR);  Service: Endoscopy;  Laterality: Left;  Fully vaccinated/ clear liquids up to 4 hrs prior-RB  varices labs ordered-RB    HYSTERECTOMY      KNEE SURGERY      LITHOTRIPSY      RHINOPLASTY TIP      TONSILLECTOMY      TOTAL HIP ARTHROPLASTY       Family History   Problem Relation Age of Onset    Diabetes Mellitus Maternal Grandmother     Amblyopia Neg Hx     Blindness Neg Hx     Cataracts Neg Hx     Glaucoma Neg Hx     Macular degeneration Neg Hx     Retinal detachment Neg Hx     Strabismus Neg Hx     Colon cancer Neg Hx     Esophageal cancer Neg Hx      Social History     Tobacco Use    Smoking status: Some Days     Packs/day: 0.25     Types: Cigarettes     Last attempt to quit: 7/3/2019     Years since quitting: 3.1    Smokeless tobacco: Never   Substance Use Topics    Alcohol use: Not Currently    Drug use: No     Review of Systems   Constitutional:  Negative for fever.   HENT:  Negative for sore throat.    Respiratory:  Negative for shortness of breath.    Cardiovascular:  Negative for chest pain.   Gastrointestinal:  Positive for abdominal pain, blood in stool and nausea. Negative for diarrhea and vomiting.   Genitourinary:  Negative for dysuria.   Musculoskeletal:  Negative for back pain.   Skin:  Negative for rash.   Neurological:  Positive for light-headedness. Negative for weakness.    Hematological:  Does not bruise/bleed easily.     Physical Exam     Initial Vitals [09/04/22 0848]   BP Pulse Resp Temp SpO2   134/72 75 20 96.6 °F (35.9 °C) 99 %      MAP       --         Physical Exam    Nursing note and vitals reviewed.  Constitutional: She appears well-developed.   HENT:   Head: Normocephalic.   Eyes: Pupils are equal, round, and reactive to light.   Neck:   Normal range of motion.  Cardiovascular:            No murmur heard.  Pulmonary/Chest: No respiratory distress.   Abdominal: Abdomen is soft.   No TTP diffusely. No guarding, rebound, or masses. No CVAT bilaterally.     Genitourinary:    Genitourinary Comments: Patient refusing rectal exam.  Patient has a photo of a Hemoccult test that she did home that is positive.     Musculoskeletal:         General: No edema.      Cervical back: Normal range of motion.     Neurological: She is alert.   Skin: Skin is warm.   Psychiatric: She has a normal mood and affect.       ED Course   Procedures  Labs Reviewed   CBC W/ AUTO DIFFERENTIAL   COMPREHENSIVE METABOLIC PANEL   PROTIME-INR   APTT   TYPE & SCREEN     EKG Readings: (Independently Interpreted)   Initial Reading: No STEMI. Rhythm: Normal Sinus Rhythm. Heart Rate: 67. Ectopy: No Ectopy. Conduction: Normal.     Imaging Results    None          Medications   octreotide injection 50 mcg (has no administration in time range)   octreotide (SANDOSTATIN) 500 mcg in sodium chloride 0.9% 100 mL infusion (has no administration in time range)   pantoprazole injection 80 mg (has no administration in time range)   pantoprazole 40 mg in  mL infusion (ready to mix system) (has no administration in time range)   cefTRIAXone (ROCEPHIN) 1 g/50 mL D5W IVPB (has no administration in time range)     Medical Decision Making:   Differential Diagnosis:   DDX:  Significant history of GI bleed, liver failure and cirrhosis.  Patient refusing rectal exam, however given significant history will treat, check  hemoglobin.  Given cirrhosis, will treat with ceftriaxone also.  DX:  CBC, CMP, coags, type and screen  TX:  Protonix, octreotide, ceftriaxone, blood as needed  Dispo:  Transfer for GI                        Clinical Impression:   Final diagnoses:  [K92.2] GI (gastrointestinal bleed)               Andrea Poe MD  09/04/22 0855       Andrea Poe MD  09/04/22 0856       Andrea Poe MD  09/04/22 0912

## 2022-09-04 NOTE — ED TRIAGE NOTES
52 y.o. female presents to ER Room/bed info not found   Chief Complaint   Patient presents with    Rectal Bleeding   .   C/o abd pain, dark stools, and dizziness, had a banding done a week ago for GI bleeding

## 2022-09-04 NOTE — ASSESSMENT & PLAN NOTE
Complaint of melena with known esophageal varices though they were recently banded.  H/H within normal limits.  Hemodynamically stable.  Continue IV ppi, octreotide, and ceftriaxone.  NPO.  GI consult, appreciate recs.

## 2022-09-05 VITALS
OXYGEN SATURATION: 97 % | HEIGHT: 68 IN | DIASTOLIC BLOOD PRESSURE: 70 MMHG | WEIGHT: 143.75 LBS | TEMPERATURE: 98 F | BODY MASS INDEX: 21.78 KG/M2 | HEART RATE: 59 BPM | RESPIRATION RATE: 20 BRPM | SYSTOLIC BLOOD PRESSURE: 106 MMHG

## 2022-09-05 PROBLEM — K92.1 GASTROINTESTINAL HEMORRHAGE WITH MELENA: Status: RESOLVED | Noted: 2020-11-09 | Resolved: 2022-09-05

## 2022-09-05 LAB
BASOPHILS # BLD AUTO: 0.02 K/UL (ref 0–0.2)
BASOPHILS NFR BLD: 0.4 % (ref 0–1.9)
DIFFERENTIAL METHOD: ABNORMAL
EOSINOPHIL # BLD AUTO: 0.1 K/UL (ref 0–0.5)
EOSINOPHIL NFR BLD: 1.7 % (ref 0–8)
ERYTHROCYTE [DISTWIDTH] IN BLOOD BY AUTOMATED COUNT: 12.9 % (ref 11.5–14.5)
HCT VFR BLD AUTO: 34.3 % (ref 37–48.5)
HGB BLD-MCNC: 11.7 G/DL (ref 12–16)
IMM GRANULOCYTES # BLD AUTO: 0.02 K/UL (ref 0–0.04)
IMM GRANULOCYTES NFR BLD AUTO: 0.4 % (ref 0–0.5)
LYMPHOCYTES # BLD AUTO: 1.2 K/UL (ref 1–4.8)
LYMPHOCYTES NFR BLD: 24.1 % (ref 18–48)
MCH RBC QN AUTO: 30.6 PG (ref 27–31)
MCHC RBC AUTO-ENTMCNC: 34.1 G/DL (ref 32–36)
MCV RBC AUTO: 90 FL (ref 82–98)
MONOCYTES # BLD AUTO: 0.5 K/UL (ref 0.3–1)
MONOCYTES NFR BLD: 10.2 % (ref 4–15)
NEUTROPHILS # BLD AUTO: 3.1 K/UL (ref 1.8–7.7)
NEUTROPHILS NFR BLD: 63.2 % (ref 38–73)
NRBC BLD-RTO: 0 /100 WBC
PLATELET # BLD AUTO: 80 K/UL (ref 150–450)
PMV BLD AUTO: 11 FL (ref 9.2–12.9)
RBC # BLD AUTO: 3.82 M/UL (ref 4–5.4)
WBC # BLD AUTO: 4.82 K/UL (ref 3.9–12.7)

## 2022-09-05 PROCEDURE — 96365 THER/PROPH/DIAG IV INF INIT: CPT

## 2022-09-05 PROCEDURE — 99214 OFFICE O/P EST MOD 30 MIN: CPT | Mod: ,,, | Performed by: INTERNAL MEDICINE

## 2022-09-05 PROCEDURE — 25000003 PHARM REV CODE 250: Performed by: HOSPITALIST

## 2022-09-05 PROCEDURE — G0378 HOSPITAL OBSERVATION PER HR: HCPCS

## 2022-09-05 PROCEDURE — 85025 COMPLETE CBC W/AUTO DIFF WBC: CPT | Performed by: HOSPITALIST

## 2022-09-05 PROCEDURE — 99214 PR OFFICE/OUTPT VISIT, EST, LEVL IV, 30-39 MIN: ICD-10-PCS | Mod: ,,, | Performed by: INTERNAL MEDICINE

## 2022-09-05 PROCEDURE — C9113 INJ PANTOPRAZOLE SODIUM, VIA: HCPCS | Performed by: HOSPITALIST

## 2022-09-05 PROCEDURE — 96375 TX/PRO/DX INJ NEW DRUG ADDON: CPT

## 2022-09-05 PROCEDURE — 63600175 PHARM REV CODE 636 W HCPCS: Performed by: HOSPITALIST

## 2022-09-05 PROCEDURE — 96366 THER/PROPH/DIAG IV INF ADDON: CPT

## 2022-09-05 PROCEDURE — 36415 COLL VENOUS BLD VENIPUNCTURE: CPT | Performed by: HOSPITALIST

## 2022-09-05 RX ORDER — SUCRALFATE 1 G/10ML
1 SUSPENSION ORAL 4 TIMES DAILY
Qty: 560 ML | Refills: 0 | Status: SHIPPED | OUTPATIENT
Start: 2022-09-05 | End: 2022-09-06

## 2022-09-05 RX ADMIN — PANTOPRAZOLE SODIUM 40 MG: 40 INJECTION, POWDER, FOR SOLUTION INTRAVENOUS at 08:09

## 2022-09-05 RX ADMIN — ACETAMINOPHEN 650 MG: 325 TABLET ORAL at 08:09

## 2022-09-05 RX ADMIN — CEFTRIAXONE 1 G: 1 INJECTION, SOLUTION INTRAVENOUS at 08:09

## 2022-09-05 RX ADMIN — ONDANSETRON 4 MG: 2 INJECTION INTRAMUSCULAR; INTRAVENOUS at 08:09

## 2022-09-05 RX ADMIN — OCTREOTIDE ACETATE 50 MCG/HR: 500 INJECTION, SOLUTION INTRAVENOUS; SUBCUTANEOUS at 04:09

## 2022-09-05 RX ADMIN — TRAMADOL HYDROCHLORIDE 100 MG: 50 TABLET, COATED ORAL at 06:09

## 2022-09-05 NOTE — ASSESSMENT & PLAN NOTE
Complaint of melena with known esophageal varices though they were recently banded.  H/H within normal limits.  Hemodynamically stable.  Continue IV ppi, octreotide, and ceftriaxone.  NPO.  GI consult, appreciate recs.    H/H stable

## 2022-09-05 NOTE — PLAN OF CARE
Problem: Adult Inpatient Plan of Care  Goal: Plan of Care Review  Outcome: Ongoing, Progressing  Goal: Patient-Specific Goal (Individualized)  Outcome: Ongoing, Progressing  Goal: Absence of Hospital-Acquired Illness or Injury  Outcome: Ongoing, Progressing  Goal: Optimal Comfort and Wellbeing  Outcome: Ongoing, Progressing  Goal: Readiness for Transition of Care  Outcome: Ongoing, Progressing     Problem: Adjustment to Illness (Gastrointestinal Bleeding)  Goal: Optimal Coping with Acute Illness  Outcome: Ongoing, Progressing     Problem: Bleeding (Gastrointestinal Bleeding)  Goal: Hemostasis  Outcome: Ongoing, Progressing     Problem: Skin Injury Risk Increased  Goal: Skin Health and Integrity  Outcome: Ongoing, Progressing    Remain SR on telemetry monitor. PRN medication effective. Explained plan of care, verbalized understanding. Educated pt .on new medicine . No injury during shift, Side rails up x 2, call light by bedside.

## 2022-09-05 NOTE — CONSULTS
[facility]-  Gastroenterology  Consult Note    Patient Name: Tee Aranda  MRN: [id number]  Admission Date: 9/4/2022  Hospital Length of Stay: 0 days  Code Status: Full Code   Attending Provider: Luis Bonner MD   Consulting Provider: Buddy Ho MD  Primary Care Physician: James Samano MD  Principal Problem:Gastrointestinal hemorrhage with melena    Consults  Subjective:     HPI: Tee Aranda is a 52 y.o. female with history of thrombocytopenia, cirrhosis secondary to CARTAGENA, esophageal varices with banding (most recently August 28), h/o GI bleed, and ascites presented to [facility] yesterday with black stool and lightheadedness for 2 days.  No hematemesis noted.  No chest pain or dyspnea, nausea, vomiting or diarrhea.       In the emergency department she received octreotide bolus and infusion, Protonix infusion, Zofran, and Rocephin.  Admitted to Hospital Medicine at [facility] for observation to monitor trend in hemoglobin and to determine if further endoscopy is indicated.  Patient is currently hemodynamically stable.     Latest Reference Range & Units 08/29/22 07:00 09/04/22 09:01 09/04/22 17:44 09/05/22 01:26   Hemoglobin 12.0 - 16.0 g/dL 13.1 13.6 12.7 11.7 (L)   Hematocrit 37.0 - 48.5 % 38.8 40.5 37.3 34.3 (L)   MCV 82 - 98 fL 90 91 89 90   MCH 27.0 - 31.0 pg 30.3 30.4 30.4 30.6   MCHC 32.0 - 36.0 g/dL 33.8 33.6 34.0 34.1   RDW 11.5 - 14.5 % 12.9 12.8 13.0 12.9   Platelets 150 - 450 K/uL 85 (L) 85 (L) 81 (L) 80 (L)           Past Medical History:   Diagnosis Date    Anxiety     Arthritis     Ascites 11/23/2020    AVN (avascular necrosis of bone)     Cataract     Edema 11/23/2020    Esophageal varices     Glaucoma     Hepatic encephalopathy 11/23/2020    Hx of cirrhosis     non-alcoholic    Kidney stones        Past Surgical History:   Procedure Laterality Date    DISTAL CLAVICLE EXCISION Right 11/18/2021    Procedure: RIGHT SHOULDER ARTHROSCOPY,  EXCISION, CLAVICLE, DISTAL; EXTENSIVE DEBRIDEMENT;  Surgeon: Isaiah Joyner MD;  Location: [facility] OR;  Service: Orthopedics;  Laterality: Right;    ESOPHAGOGASTRODUODENOSCOPY N/A 11/10/2020    Procedure: EGD (ESOPHAGOGASTRODUODENOSCOPY);  Surgeon: Gerard Salinas MD;  Location: [facility] ENDO;  Service: Endoscopy;  Laterality: N/A;    ESOPHAGOGASTRODUODENOSCOPY N/A 12/28/2020    Procedure: EGD (ESOPHAGOGASTRODUODENOSCOPY);  Surgeon: Adryan Park MD;  Location: [facility] (2ND FLR);  Service: Endoscopy;  Laterality: N/A;    ESOPHAGOGASTRODUODENOSCOPY N/A 2/15/2021    Procedure: EGD (ESOPHAGOGASTRODUODENOSCOPY);  Surgeon: Hari Greene MD;  Location: [facility] (4TH FLR);  Service: Endoscopy;  Laterality: N/A;  6 week follow-up variceal banding  Hx varices - Labs - ERW  prep ins. emialed - COVID screening on 2/12/21 [place] - ERW    ESOPHAGOGASTRODUODENOSCOPY Left 8/3/2021    Procedure: EGD (ESOPHAGOGASTRODUODENOSCOPY);  Surgeon: Fabricio Corado MD;  Location: [facility] (4TH FLR);  Service: Gastroenterology;  Laterality: Left;  recent hematemasis. varices-labs on 7/26    COVID test at [facility] on 7/31-GT  requesting sooner    ESOPHAGOGASTRODUODENOSCOPY N/A 7/27/2022    Procedure: EGD (ESOPHAGOGASTRODUODENOSCOPY);  Surgeon: Eric Garcia MD;  Location: [facility] ENDO;  Service: Endoscopy;  Laterality: N/A;    ESOPHAGOGASTRODUODENOSCOPY Left 8/29/2022    Procedure: EGD (ESOPHAGOGASTRODUODENOSCOPY);  Surgeon: Kacy Douglass MD;  Location: [facility] (2ND FLR);  Service: Endoscopy;  Laterality: Left;  Fully vaccinated/ clear liquids up to 4 hrs prior-RB  varices labs ordered-RB    HYSTERECTOMY      KNEE SURGERY      LITHOTRIPSY      RHINOPLASTY TIP      TONSILLECTOMY      TOTAL HIP ARTHROPLASTY         Family History   Problem Relation Age of Onset    Diabetes Mellitus Maternal Grandmother     Amblyopia Neg Hx     Blindness Neg Hx     Cataracts Neg Hx     Glaucoma Neg Hx     Macular degeneration Neg Hx     Retinal  detachment Neg Hx     Strabismus Neg Hx     Colon cancer Neg Hx     Esophageal cancer Neg Hx        Social History     Socioeconomic History    Marital status:    Tobacco Use    Smoking status: Some Days     Packs/day: 0.50     Types: Cigarettes     Last attempt to quit: 7/3/2019     Years since quitting: 3.1    Smokeless tobacco: Never   Substance and Sexual Activity    Alcohol use: Not Currently    Drug use: No       Current Facility-Administered Medications on File Prior to Encounter   Medication Dose Route Frequency Provider Last Rate Last Admin    [COMPLETED] cefTRIAXone (ROCEPHIN) 1 g/50 mL D5W IVPB  1 g Intravenous ED 1 Time Andrea Poe MD   Stopped at 09/04/22 0939    [COMPLETED] octreotide injection 50 mcg  50 mcg Intravenous ED 1 Time Andrea Poe MD   50 mcg at 09/04/22 0920    [COMPLETED] ondansetron injection 4 mg  4 mg Intravenous ED 1 Time Andrea Poe MD   4 mg at 09/04/22 0920    [DISCONTINUED] octreotide (SANDOSTATIN) 500 mcg in sodium chloride 0.9% 100 mL infusion  50 mcg/hr Intravenous Continuous Andrea Poe MD 10 mL/hr at 09/04/22 0925 50 mcg/hr at 09/04/22 0925    [DISCONTINUED] pantoprazole 40 mg in  mL infusion (ready to mix system)  8 mg/hr Intravenous Continuous Andrea Poe MD 20 mL/hr at 09/04/22 1358 8 mg/hr at 09/04/22 1358    [DISCONTINUED] pantoprazole injection 80 mg  80 mg Intravenous ED 1 Time Andrea Poe MD         Current Outpatient Medications on File Prior to Encounter   Medication Sig Dispense Refill    carvediloL (COREG) 3.125 MG tablet Take 1 tablet (3.125 mg total) by mouth 2 (two) times daily. 60 tablet 11    multivitamin (THERAGRAN) per tablet Take 1 tablet by mouth once daily.      omega-3 fatty acids/fish oil (FISH OIL-OMEGA-3 FATTY ACIDS) 300-1,000 mg capsule Take 1 capsule by mouth once daily.      pantoprazole (PROTONIX) 40 MG tablet Take 1 tablet (40 mg total) by mouth once daily. 180 tablet 4    rifAXIMin  (XIFAXAN) 550 mg Tab Take 1 tablet (550 mg total) by mouth 2 (two) times daily. (Patient taking differently: Take 550 mg by mouth once daily.) 60 tablet 11    traMADoL (ULTRAM) 50 mg tablet tramadol 50 mg tablet   TAKE 1 TO 2 TABLETS BY MOUTH 3 TIMES A DAY AS NEEDED FOR PAIN      ondansetron (ZOFRAN) 8 MG tablet Take 1 tablet (8 mg total) by mouth every 8 (eight) hours as needed for Nausea. 15 tablet 0       Review of patient's allergies indicates:   Allergen Reactions    Sulfa (sulfonamide antibiotics) Hives       ROS   Constitutional:  Negative for chills, fatigue and fever.   Eyes:  Negative for photophobia and visual disturbance.   Respiratory:  Negative for cough and shortness of breath.    Cardiovascular:  Negative for chest pain, palpitations and leg swelling.   Gastrointestinal:  Positive for melena Negative for abdominal pain, diarrhea, nausea and vomiting.   Genitourinary:  Negative for dysuria, frequency and urgency.   Skin:  Negative for pallor, rash and wound.   Neurological:  No tremors, negative for headaches.   Psychiatric/Behavioral:  Negative for confusion and decreased concentration.    Objective:     Vitals:    09/05/22 0803   BP: 106/70   Pulse: (!) 59   Resp: 20   Temp: 97.5 °F (36.4 °C)         Physical Exam   Vitals and nursing note reviewed.   Constitutional:       General: She is not in acute distress.     Appearance: She is well-developed.   HENT:      Head: Normocephalic and atraumatic.      Eyes:      Conjunctiva/sclera: Conjunctivae normal.     Neurological:      Mental Status: She is alert and oriented to person, place, and time.   Psychiatric:         Thought Content: Thought content normal.         Significant Labs:  Recent Labs   Lab 09/04/22  0901 09/04/22  1744 09/05/22  0126   HGB 13.6 12.7 11.7*       Lab Results   Component Value Date    WBC 4.82 09/05/2022    HGB 11.7 (L) 09/05/2022    HCT 34.3 (L) 09/05/2022    MCV 90 09/05/2022    PLT 80 (L) 09/05/2022       Lab Results    Component Value Date     09/04/2022    K 4.5 09/04/2022     09/04/2022    CO2 23 09/04/2022    BUN 18 09/04/2022    CREATININE 0.8 09/04/2022    CALCIUM 9.5 09/04/2022    ANIONGAP 9 09/04/2022    ESTGFRAFRICA >60 07/28/2022    EGFRNONAA >60 07/28/2022       Lab Results   Component Value Date    ALT 49 (H) 09/04/2022    AST 41 (H) 09/04/2022    ALKPHOS 120 09/04/2022    BILITOT 0.8 09/04/2022       Lab Results   Component Value Date    INR 1.2 09/04/2022    INR 1.1 08/29/2022    INR 1.2 07/26/2022       Significant Imaging:  Reviewed pertinent radiology findings.     Previous endoscopy report and images reviewed personally.    Care plan discussed with the primary team.      Assessment/Plan:     Tee Aranda is a 52 y.o. female with recent bout of melena with no evidence of further GI bleed. Most likely band ulceration from recent esophageal variceal banding.    Plan:  Okay for discharge today.  Carafate 1 g suspension po QID for 2 weeks  PPI bid for 2 weeks.    Thank you for involving us in the care of Tee Aranda. Please call with any additional questions, concerns or changes in the patient's clinical status.    Buddy Ho MD  [facility]

## 2022-09-05 NOTE — PLAN OF CARE
09/05/22 1121   Discharge Planning   Assessment Type Discharge Planning Brief Assessment   Resource/Environmental Concerns none   Support Systems Spouse/significant other   Equipment Currently Used at Home none   Current Living Arrangements home/apartment/condo   Patient/Family Anticipates Transition to home with family   Patient/Family Anticipated Services at Transition none   DME Needed Upon Discharge  none   Discharge Plan A Home with family  (with instructions to follow up)     [facility]  [address]  [facility] [address]  Phone: [phone] Fax: [phone]    [id number] - [place] - [address]  [address]  [place] [address]  Phone: [phone] Fax: [phone]    [facility]  [address] [address]  Phone: [phone] Fax: [phone]

## 2022-09-05 NOTE — HOSPITAL COURSE
Patient placed in observation as an outside transfer for GI evaluation of dark stools. H/H remained stable. GI consulted and rec: discharge to home with close GI follow up. Hgb on discharge of 11.7 (patient requested blood and angry that she did not get it). Activity as tolerated. Diet- regular

## 2022-09-05 NOTE — NURSING
Low Na diet order rec'd from GI on call. Patient states will see GI tomorrow then home for OP GI scope.   CBC resulted with H&H at 12.7/37.3, WBC 3.86. Patient aware of results.  Recheck CBC with AM labs.

## 2022-09-05 NOTE — PLAN OF CARE
Problem: Adult Inpatient Plan of Care  Goal: Plan of Care Review  Outcome: Met  Goal: Patient-Specific Goal (Individualized)  Outcome: Met  Goal: Absence of Hospital-Acquired Illness or Injury  Outcome: Met  Goal: Optimal Comfort and Wellbeing  Outcome: Met  Goal: Readiness for Transition of Care  Outcome: Met     Problem: Adjustment to Illness (Gastrointestinal Bleeding)  Goal: Optimal Coping with Acute Illness  Outcome: Met     Problem: Adjustment to Illness (Gastrointestinal Bleeding)  Goal: Optimal Coping with Acute Illness  Outcome: Met     Problem: Bleeding (Gastrointestinal Bleeding)  Goal: Hemostasis  Outcome: Met     Problem: Skin Injury Risk Increased  Goal: Skin Health and Integrity  Outcome: Met

## 2022-09-05 NOTE — PLAN OF CARE
09/05/22 1123   Final Note   Assessment Type Final Discharge Note   Anticipated Discharge Disposition Home   Post-Acute Status   Post-Acute Authorization Other   Other Status No Post-Acute Service Needs

## 2022-09-05 NOTE — H&P
[facility] Medicine  History & Physical    Patient Name: Tee Aranda  MRN: [id number]  Patient Class: OP- Observation  Admission Date: 9/4/2022  Attending Physician: Luis Bonner MD   Primary Care Provider: James Samano MD         Patient information was obtained from patient, spouse/SO, past medical records and ER records.     Subjective:     Principal Problem:Gastrointestinal hemorrhage with melena    Chief Complaint:   Chief Complaint   Patient presents with    Rectal Bleeding        HPI: 52 y.o. 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  Complains of black stool, acute onset, duration 2 days, associated with lightheadedness, no known exacerbating or alleviating factors.  She had an EGD 08/29/2022 which showed large esophageal varices which according to records were completely eradicated and banded.  Denies fever, chills, cough, SOB, chest pain, syncope, hematemesis, coffee-ground emesis, abdominal pain.  In the ED, H/H within normal limits.  Only minimal elevation of AST/ALT consistent with chronic baseline.  No evidence of acute abnormality.  She was transferred to [facility] from [facility] for Gastroenterology consultation.      Past Medical History:   Diagnosis Date    Anxiety     Arthritis     Ascites 11/23/2020    AVN (avascular necrosis of bone)     Cataract     Edema 11/23/2020    Esophageal varices     Glaucoma     Hepatic encephalopathy 11/23/2020    Hx of cirrhosis     non-alcoholic    Kidney stones        Past Surgical History:   Procedure Laterality Date    DISTAL CLAVICLE EXCISION Right 11/18/2021    Procedure: RIGHT SHOULDER ARTHROSCOPY, EXCISION, CLAVICLE, DISTAL; EXTENSIVE DEBRIDEMENT;  Surgeon: Isaiah Joyner MD;  Location: [facility];  Service: Orthopedics;  Laterality: Right;    ESOPHAGOGASTRODUODENOSCOPY N/A 11/10/2020    Procedure: EGD  (ESOPHAGOGASTRODUODENOSCOPY);  Surgeon: Gerard Salinas MD;  Location: [facility];  Service: Endoscopy;  Laterality: N/A;    ESOPHAGOGASTRODUODENOSCOPY N/A 12/28/2020    Procedure: EGD (ESOPHAGOGASTRODUODENOSCOPY);  Surgeon: Adryan Park MD;  Location: [facility] (2ND FLR);  Service: Endoscopy;  Laterality: N/A;    ESOPHAGOGASTRODUODENOSCOPY N/A 2/15/2021    Procedure: EGD (ESOPHAGOGASTRODUODENOSCOPY);  Surgeon: Hari Greene MD;  Location: [facility] (4TH FLR);  Service: Endoscopy;  Laterality: N/A;  6 week follow-up variceal banding  Hx varices - Labs - ERW  prep ins. emialed - COVID screening on 2/12/21 [facility] - ERW    ESOPHAGOGASTRODUODENOSCOPY Left 8/3/2021    Procedure: EGD (ESOPHAGOGASTRODUODENOSCOPY);  Surgeon: Fabricio Corado MD;  Location: [facility] (4TH FLR);  Service: Gastroenterology;  Laterality: Left;  recent hematemasis. varices-labs on 7/26    COVID test at [facility] on 7/31-GT  requesting sooner    ESOPHAGOGASTRODUODENOSCOPY N/A 7/27/2022    Procedure: EGD (ESOPHAGOGASTRODUODENOSCOPY);  Surgeon: Eric Garcia MD;  Location: [facility];  Service: Endoscopy;  Laterality: N/A;    ESOPHAGOGASTRODUODENOSCOPY Left 8/29/2022    Procedure: EGD (ESOPHAGOGASTRODUODENOSCOPY);  Surgeon: Kacy Douglass MD;  Location: [facility] (2ND FLR);  Service: Endoscopy;  Laterality: Left;  Fully vaccinated/ clear liquids up to 4 hrs prior-RB  varices labs ordered-RB    HYSTERECTOMY      KNEE SURGERY      LITHOTRIPSY      RHINOPLASTY TIP      TONSILLECTOMY      TOTAL HIP ARTHROPLASTY         Review of patient's allergies indicates:   Allergen Reactions    Sulfa (sulfonamide antibiotics) Hives       Current Facility-Administered Medications on File Prior to Encounter   Medication    [COMPLETED] cefTRIAXone (ROCEPHIN) 1 g/50 mL D5W IVPB    [COMPLETED] octreotide injection 50 mcg    [COMPLETED] ondansetron injection 4 mg    [DISCONTINUED] octreotide (SANDOSTATIN) 500 mcg in sodium chloride 0.9% 100 mL  infusion    [DISCONTINUED] pantoprazole 40 mg in  mL infusion (ready to mix system)    [DISCONTINUED] pantoprazole injection 80 mg     Current Outpatient Medications on File Prior to Encounter   Medication Sig    carvediloL (COREG) 3.125 MG tablet Take 1 tablet (3.125 mg total) by mouth 2 (two) times daily.    doxycycline (VIBRAMYCIN) 100 MG Cap doxycycline hyclate 100 mg capsule   TAKE 1 CAPSULE BY MOUTH TWICE A DAY FOR 10 DAYS    furosemide (LASIX) 20 MG tablet Take 1 tablet (20 mg total) by mouth once daily. Take in morning (Patient not taking: Reported on 8/31/2022)    lactulose (CEPHULAC) 10 gram packet Take 10 g by mouth 3 (three) times daily as needed.    meloxicam (MOBIC) 7.5 MG tablet Take 1 tablet (7.5 mg total) by mouth once daily.    multivitamin (THERAGRAN) per tablet Take 1 tablet by mouth once daily.    omega-3 fatty acids/fish oil (FISH OIL-OMEGA-3 FATTY ACIDS) 300-1,000 mg capsule Take 1 capsule by mouth once daily.    ondansetron (ZOFRAN) 8 MG tablet Take 1 tablet (8 mg total) by mouth every 8 (eight) hours as needed for Nausea.    pantoprazole (PROTONIX) 40 MG tablet Take 1 tablet (40 mg total) by mouth once daily.    promethazine-dextromethorphan (PROMETHAZINE-DM) 6.25-15 mg/5 mL Syrp promethazine-DM 6.25 mg-15 mg/5 mL oral syrup   TAKE 5ML BY MOUTH EVERY 4 HOURS AS NEEDED    rifAXIMin (XIFAXAN) 550 mg Tab Take 1 tablet (550 mg total) by mouth 2 (two) times daily. (Patient taking differently: Take 550 mg by mouth once daily.)    spironolactone (ALDACTONE) 25 MG tablet Take 2 tablets (50 mg total) by mouth once daily. (Patient not taking: Reported on 8/31/2022)    traMADoL (ULTRAM) 50 mg tablet tramadol 50 mg tablet   TAKE 1 TO 2 TABLETS BY MOUTH 3 TIMES A DAY AS NEEDED FOR PAIN    [DISCONTINUED] cyclobenzaprine (FLEXERIL) 10 MG tablet cyclobenzaprine 10 mg tablet   TAKE 1 TABLET BY MOUTH THREE TIMES A DAY AS NEEDED FOR MUSCLE SPASMS     Family History       Problem Relation  (Age of Onset)    Diabetes Mellitus Maternal Grandmother          Tobacco Use    Smoking status: Some Days     Packs/day: 0.25     Types: Cigarettes     Last attempt to quit: 7/3/2019     Years since quitting: 3.1    Smokeless tobacco: Never   Substance and Sexual Activity    Alcohol use: Not Currently    Drug use: No    Sexual activity: Not on file     Review of Systems   Constitutional:  Negative for chills, fatigue and fever.   Eyes:  Negative for photophobia and visual disturbance.   Respiratory:  Negative for cough and shortness of breath.    Cardiovascular:  Negative for chest pain, palpitations and leg swelling.   Gastrointestinal:  Positive for blood in stool. Negative for abdominal pain, diarrhea, nausea and vomiting.   Genitourinary:  Negative for dysuria, frequency and urgency.   Skin:  Negative for pallor, rash and wound.   Neurological:  Positive for light-headedness. Negative for headaches.   Psychiatric/Behavioral:  Negative for confusion and decreased concentration.    Objective:     Vital Signs (Most Recent):    Vital Signs (24h Range):  Temp:  [96.6 °F (35.9 °C)] 96.6 °F (35.9 °C)  Pulse:  [62-81] 71  Resp:  [13-21] 21  SpO2:  [98 %-100 %] 99 %  BP: (109-134)/(57-72) 110/57        There is no height or weight on file to calculate BMI.    Physical Exam  Vitals and nursing note reviewed.   Constitutional:       General: She is not in acute distress.     Appearance: She is well-developed.   HENT:      Head: Normocephalic and atraumatic.      Right Ear: External ear normal.      Left Ear: External ear normal.      Nose: Nose normal.   Eyes:      Conjunctiva/sclera: Conjunctivae normal.      Pupils: Pupils are equal, round, and reactive to light.   Cardiovascular:      Rate and Rhythm: Normal rate and regular rhythm.   Pulmonary:      Effort: Pulmonary effort is normal. No respiratory distress.      Breath sounds: Normal breath sounds. No wheezing.   Abdominal:      General: Bowel sounds are  normal. There is no distension.      Palpations: Abdomen is soft.      Tenderness: There is no abdominal tenderness.      Comments: No palpable hepatomegaly or splenomegaly    Musculoskeletal:         General: No tenderness. Normal range of motion.      Cervical back: Normal range of motion and neck supple.   Skin:     General: Skin is warm and dry.   Neurological:      Mental Status: She is alert and oriented to person, place, and time.   Psychiatric:         Thought Content: Thought content normal.         CRANIAL NERVES     CN III, IV, VI   Pupils are equal, round, and reactive to light.     Significant Labs: All pertinent labs within the past 24 hours have been reviewed.    Significant Imaging: I have reviewed all pertinent imaging results/findings within the past 24 hours.    Assessment/Plan:     * Gastrointestinal hemorrhage with melena  Complaint of melena with known esophageal varices though they were recently banded.  H/H within normal limits.  Hemodynamically stable.  Continue IV ppi, octreotide, and ceftriaxone.  NPO.  GI consult, appreciate recs.    Secondary esophageal varices with bleeding  As above    Portal hypertensive gastropathy  As above    Liver cirrhosis secondary to CARTAGENA  Only minimal elevation of AST/ALT.  Continue home regimen.      VTE Risk Mitigation (From admission, onward)         Ordered     IP VTE HIGH RISK PATIENT  Once         09/04/22 1723     Place sequential compression device  Until discontinued         09/04/22 1723               As clarification, on 09/04/2022, patient should be placed in hospital observation services under my care in collaboration with MD Ravi Carroll Jr., APRN, AGACN-BC  Hospitalist - Department of Hospital Medicine  [facility] [address] [place] [id number]  Office #: [phone]; Pager #: [phone]

## 2022-09-05 NOTE — NURSING
Patient discharged to home today. Patient given discharge instructions and medication   Record educated on all contents. Ambulated off unit per choice discharged to home.

## 2022-09-05 NOTE — DISCHARGE SUMMARY
[facility] Medicine  Discharge Summary      Patient Name: Tee Aranda  MRN: [id number]  Patient Class: OP- Observation  Admission Date: 9/4/2022  Hospital Length of Stay: 0 days  Discharge Date and Time:  09/05/2022 12:59 PM  Attending Physician: No att. providers found   Discharging Provider: Luis Gruber MD  Primary Care Provider: James Samano MD      HPI:   52 y.o. 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  Complains of black stool, acute onset, duration 2 days, associated with lightheadedness, no known exacerbating or alleviating factors.  She had an EGD 08/29/2022 which showed large esophageal varices which according to records were completely eradicated and banded.  Denies fever, chills, cough, SOB, chest pain, syncope, hematemesis, coffee-ground emesis, abdominal pain.  In the ED, H/H within normal limits.  Only minimal elevation of AST/ALT consistent with chronic baseline.  No evidence of acute abnormality.  She was transferred to [facility] from [facility] for Gastroenterology consultation.      * No surgery found *      Hospital Course:   Patient placed in observation as an outside transfer for GI evaluation of dark stools. H/H remained stable. GI consulted and rec: discharge to home with close GI follow up. Hgb on discharge of 11.7 (patient requested blood and angry that she did not get it). Activity as tolerated. Diet- regular       Goals of Care Treatment Preferences:  Code Status: Full Code      Consults:   Consults (From admission, onward)        Status Ordering Provider     Inpatient consult to Gastroenterology  Once        Provider:  Buddy Ho MD    Acknowledged MARISSA REESE JR          No new Assessment & Plan notes have been filed under this hospital service since the last note was generated.  Service: Hospital Medicine    Final Active Diagnoses:    Diagnosis  Date Noted POA    Portal hypertensive gastropathy [K76.6, K31.89] 12/29/2020 Yes    Liver cirrhosis secondary to CARTAGENA [K75.81, K74.60] 08/31/2020 Yes     Chronic    Secondary esophageal varices with bleeding [I85.11] 08/31/2020 Yes      Problems Resolved During this Admission:    Diagnosis Date Noted Date Resolved POA    PRINCIPAL PROBLEM:  Gastrointestinal hemorrhage with melena [K92.1] 11/09/2020 09/05/2022 Yes       Discharged Condition: good    Disposition: Home or Self Care    Follow Up:   Follow-up Information     Buddy Ho MD Follow up.    Specialty: Gastroenterology  Contact information:  [address]  [facility] [address]  [phone]                       Patient Instructions:   No discharge procedures on file.        Pending Diagnostic Studies:     None         Medications:  Reconciled Home Medications:      Medication List      CHANGE how you take these medications    XIFAXAN 550 mg Tab  Generic drug: rifAXIMin  Take 1 tablet (550 mg total) by mouth 2 (two) times daily.  What changed: when to take this        CONTINUE taking these medications    carvediloL 3.125 MG tablet  Commonly known as: COREG  Take 1 tablet (3.125 mg total) by mouth 2 (two) times daily.     fish oil-omega-3 fatty acids 300-1,000 mg capsule  Take 1 capsule by mouth once daily.     multivitamin per tablet  Commonly known as: THERAGRAN  Take 1 tablet by mouth once daily.     ondansetron 8 MG tablet  Commonly known as: ZOFRAN  Take 1 tablet (8 mg total) by mouth every 8 (eight) hours as needed for Nausea.     pantoprazole 40 MG tablet  Commonly known as: PROTONIX  Take 1 tablet (40 mg total) by mouth once daily.     traMADoL 50 mg tablet  Commonly known as: ULTRAM  tramadol 50 mg tablet   TAKE 1 TO 2 TABLETS BY MOUTH 3 TIMES A DAY AS NEEDED FOR PAIN        ASK your doctor about these medications    sucralfate 100 mg/mL suspension  Commonly known as: CARAFATE  Take 10 mLs (1 g total) by mouth 4 (four) times daily. for  14 days            Indwelling Lines/Drains at time of discharge:   Lines/Drains/Airways     None                 Time spent on the discharge of patient: > 35  minutes         Luis Gruber MD  Department of Hospital Medicine  [facility]

## 2022-09-05 NOTE — NURSING
"Patient c/o "not getting her CBC every 8 hrs as any person would with a GI bleed". Her H&H from today was 13.6/40.5. Patient is on sandostatin and protonix. Patient threatening AMA. Nurse called lab and JEANETTE and was able to get an add-on order for CBC with her type and screen sample drawn earlier today, awaiting results.  Patient also refusing her diet orders, eating at this time.   Patient removed tele.  "

## 2022-09-05 NOTE — PROGRESS NOTES
[facility] Medicine  Progress Note    Patient Name: Tee Aranda  MRN: [id number]  Patient Class: OP- Observation   Admission Date: 9/4/2022  Length of Stay: 0 days  Attending Physician: Luis Bonner MD  Primary Care Provider: James Samano MD        Subjective:     Principal Problem:Gastrointestinal hemorrhage with melena        HPI:  52 y.o. 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  Complains of black stool, acute onset, duration 2 days, associated with lightheadedness, no known exacerbating or alleviating factors.  She had an EGD 08/29/2022 which showed large esophageal varices which according to records were completely eradicated and banded.  Denies fever, chills, cough, SOB, chest pain, syncope, hematemesis, coffee-ground emesis, abdominal pain.  In the ED, H/H within normal limits.  Only minimal elevation of AST/ALT consistent with chronic baseline.  No evidence of acute abnormality.  She was transferred to [facility] from [facility] for Gastroenterology consultation.      Overview/Hospital Course:  Patient placed in observation as an outside transfer for GI evaluation of dark stools. H/H remained stable       Interval History: No new issues     Review of Systems   Constitutional:  Negative for activity change, appetite change and chills.   HENT:  Negative for congestion and dental problem.    Eyes:  Negative for discharge and itching.   Respiratory:  Negative for apnea and chest tightness.    Cardiovascular:  Negative for chest pain and leg swelling.   Gastrointestinal:  Negative for abdominal distention and abdominal pain.   Endocrine: Negative for cold intolerance.   Genitourinary:  Negative for difficulty urinating.   Musculoskeletal:  Negative for arthralgias.   Neurological:  Negative for dizziness and facial asymmetry.   Psychiatric/Behavioral:  Negative for agitation.     Objective:     Vital Signs (Most Recent):  Temp: 97.5 °F (36.4 °C) (09/05/22 0803)  Pulse: (!) 59 (09/05/22 0803)  Resp: 20 (09/05/22 0803)  BP: 106/70 (09/05/22 0803)  SpO2: 97 % (09/05/22 0803)   Vital Signs (24h Range):  Temp:  [97.5 °F (36.4 °C)-98.9 °F (37.2 °C)] 97.5 °F (36.4 °C)  Pulse:  [56-81] 59  Resp:  [13-21] 20  SpO2:  [95 %-100 %] 97 %  BP: (106-123)/(54-75) 106/70     Weight: 65.2 kg (143 lb 11.8 oz)  Body mass index is 21.86 kg/m².    Intake/Output Summary (Last 24 hours) at 9/5/2022 1001  Last data filed at 9/5/2022 0948  Gross per 24 hour   Intake 360 ml   Output 1 ml   Net 359 ml      Physical Exam  Vitals and nursing note reviewed.   Constitutional:       General: She is not in acute distress.     Appearance: Normal appearance. She is normal weight. She is not ill-appearing, toxic-appearing or diaphoretic.   HENT:      Head: Normocephalic and atraumatic.   Eyes:      Conjunctiva/sclera: Conjunctivae normal.   Cardiovascular:      Rate and Rhythm: Normal rate and regular rhythm.   Pulmonary:      Effort: Pulmonary effort is normal. No respiratory distress.   Skin:     General: Skin is warm and dry.   Neurological:      Mental Status: She is alert and oriented to person, place, and time.   Psychiatric:         Mood and Affect: Mood normal.         Behavior: Behavior normal.         Thought Content: Thought content normal.       Significant Labs: All pertinent labs within the past 24 hours have been reviewed.  BMP:   Recent Labs   Lab 09/04/22  0901   *      K 4.5      CO2 23   BUN 18   CREATININE 0.8   CALCIUM 9.5     CBC:   Recent Labs   Lab 09/04/22  0901 09/04/22  1744 09/05/22  0126   WBC 4.57 3.86* 4.82   HGB 13.6 12.7 11.7*   HCT 40.5 37.3 34.3*   PLT 85* 81* 80*       Significant Imaging:       Assessment/Plan:      * Gastrointestinal hemorrhage with melena  Complaint of melena with known esophageal varices though they were recently banded.  H/H within normal limits.   Hemodynamically stable.  Continue IV ppi, octreotide, and ceftriaxone.  NPO.  GI consult, appreciate recs.    H/H stable    Portal hypertensive gastropathy  As above    Secondary esophageal varices with bleeding  As above    Liver cirrhosis secondary to CARTAGENA  Only minimal elevation of AST/ALT.  Continue home regimen.      VTE Risk Mitigation (From admission, onward)         Ordered     IP VTE HIGH RISK PATIENT  Once         09/04/22 1723     Place sequential compression device  Until discontinued         09/04/22 1723                Discharge Planning   IOANA:      Code Status: Full Code   Is the patient medically ready for discharge?:     Reason for patient still in hospital (select all that apply): Patient unstable                     Luis Gruber MD  Department of Hospital Medicine   [facility] [place] [place] [facility]

## 2022-09-05 NOTE — SUBJECTIVE & OBJECTIVE
Interval History: No new issues     Review of Systems   Constitutional:  Negative for activity change, appetite change and chills.   HENT:  Negative for congestion and dental problem.    Eyes:  Negative for discharge and itching.   Respiratory:  Negative for apnea and chest tightness.    Cardiovascular:  Negative for chest pain and leg swelling.   Gastrointestinal:  Negative for abdominal distention and abdominal pain.   Endocrine: Negative for cold intolerance.   Genitourinary:  Negative for difficulty urinating.   Musculoskeletal:  Negative for arthralgias.   Neurological:  Negative for dizziness and facial asymmetry.   Psychiatric/Behavioral:  Negative for agitation.    Objective:     Vital Signs (Most Recent):  Temp: 97.5 °F (36.4 °C) (09/05/22 0803)  Pulse: (!) 59 (09/05/22 0803)  Resp: 20 (09/05/22 0803)  BP: 106/70 (09/05/22 0803)  SpO2: 97 % (09/05/22 0803)   Vital Signs (24h Range):  Temp:  [97.5 °F (36.4 °C)-98.9 °F (37.2 °C)] 97.5 °F (36.4 °C)  Pulse:  [56-81] 59  Resp:  [13-21] 20  SpO2:  [95 %-100 %] 97 %  BP: (106-123)/(54-75) 106/70     Weight: 65.2 kg (143 lb 11.8 oz)  Body mass index is 21.86 kg/m².    Intake/Output Summary (Last 24 hours) at 9/5/2022 1001  Last data filed at 9/5/2022 0948  Gross per 24 hour   Intake 360 ml   Output 1 ml   Net 359 ml      Physical Exam  Vitals and nursing note reviewed.   Constitutional:       General: She is not in acute distress.     Appearance: Normal appearance. She is normal weight. She is not ill-appearing, toxic-appearing or diaphoretic.   HENT:      Head: Normocephalic and atraumatic.   Eyes:      Conjunctiva/sclera: Conjunctivae normal.   Cardiovascular:      Rate and Rhythm: Normal rate and regular rhythm.   Pulmonary:      Effort: Pulmonary effort is normal. No respiratory distress.   Skin:     General: Skin is warm and dry.   Neurological:      Mental Status: She is alert and oriented to person, place, and time.   Psychiatric:         Mood and Affect:  Mood normal.         Behavior: Behavior normal.         Thought Content: Thought content normal.       Significant Labs: All pertinent labs within the past 24 hours have been reviewed.  BMP:   Recent Labs   Lab 09/04/22  0901   *      K 4.5      CO2 23   BUN 18   CREATININE 0.8   CALCIUM 9.5     CBC:   Recent Labs   Lab 09/04/22  0901 09/04/22  1744 09/05/22  0126   WBC 4.57 3.86* 4.82   HGB 13.6 12.7 11.7*   HCT 40.5 37.3 34.3*   PLT 85* 81* 80*       Significant Imaging:

## 2022-09-06 ENCOUNTER — LAB VISIT (OUTPATIENT)
Dept: LAB | Facility: HOSPITAL | Age: 52
End: 2022-09-06
Attending: INTERNAL MEDICINE
Payer: COMMERCIAL

## 2022-09-06 ENCOUNTER — TELEPHONE (OUTPATIENT)
Dept: HEPATOLOGY | Facility: CLINIC | Age: 52
End: 2022-09-06
Payer: COMMERCIAL

## 2022-09-06 DIAGNOSIS — K75.81 LIVER CIRRHOSIS SECONDARY TO NASH: Chronic | ICD-10-CM

## 2022-09-06 DIAGNOSIS — K74.60 LIVER CIRRHOSIS SECONDARY TO NASH: Chronic | ICD-10-CM

## 2022-09-06 LAB
BASOPHILS # BLD AUTO: 0.01 K/UL (ref 0–0.2)
BASOPHILS NFR BLD: 0.2 % (ref 0–1.9)
DIFFERENTIAL METHOD: ABNORMAL
EOSINOPHIL # BLD AUTO: 0.1 K/UL (ref 0–0.5)
EOSINOPHIL NFR BLD: 1 % (ref 0–8)
ERYTHROCYTE [DISTWIDTH] IN BLOOD BY AUTOMATED COUNT: 12.8 % (ref 11.5–14.5)
HCT VFR BLD AUTO: 38.6 % (ref 37–48.5)
HGB BLD-MCNC: 13.1 G/DL (ref 12–16)
IMM GRANULOCYTES # BLD AUTO: 0.03 K/UL (ref 0–0.04)
IMM GRANULOCYTES NFR BLD AUTO: 0.5 % (ref 0–0.5)
LYMPHOCYTES # BLD AUTO: 1.1 K/UL (ref 1–4.8)
LYMPHOCYTES NFR BLD: 16.6 % (ref 18–48)
MCH RBC QN AUTO: 30.7 PG (ref 27–31)
MCHC RBC AUTO-ENTMCNC: 33.9 G/DL (ref 32–36)
MCV RBC AUTO: 90 FL (ref 82–98)
MONOCYTES # BLD AUTO: 0.5 K/UL (ref 0.3–1)
MONOCYTES NFR BLD: 7.4 % (ref 4–15)
NEUTROPHILS # BLD AUTO: 4.7 K/UL (ref 1.8–7.7)
NEUTROPHILS NFR BLD: 74.3 % (ref 38–73)
NRBC BLD-RTO: 0 /100 WBC
PLATELET # BLD AUTO: 82 K/UL (ref 150–450)
PMV BLD AUTO: 11.1 FL (ref 9.2–12.9)
RBC # BLD AUTO: 4.27 M/UL (ref 4–5.4)
WBC # BLD AUTO: 6.31 K/UL (ref 3.9–12.7)

## 2022-09-06 PROCEDURE — 85025 COMPLETE CBC W/AUTO DIFF WBC: CPT | Performed by: INTERNAL MEDICINE

## 2022-09-06 PROCEDURE — 36415 COLL VENOUS BLD VENIPUNCTURE: CPT | Performed by: INTERNAL MEDICINE

## 2022-09-06 RX ORDER — SUCRALFATE 1 G/1
1 TABLET ORAL 4 TIMES DAILY
Qty: 56 TABLET | Refills: 0 | Status: SHIPPED | OUTPATIENT
Start: 2022-09-06 | End: 2022-09-20

## 2022-09-06 NOTE — TELEPHONE ENCOUNTER
Billie Larry sent to [facility]  Caller: Unspecified (Today, 10:12 AM)  Name of Who is Calling:ARJUN RANDI [[id number]]     What is the request in detail: Patient is requesting a call back in reference to orders being sent out   Can the clinic reply by [facility]:yes   What [facility][phone]     Spoke with  and he sais she was probably calling due to bad hospital experiences. She went to [place] last week with blood in stool.  She had 1 varices banded last week    Scheduled for the next scope in October.  Was transferred to [place], it took a nurse 2 hours to come back into her room.   [facility]

## 2022-09-13 ENCOUNTER — PATIENT MESSAGE (OUTPATIENT)
Dept: ORTHOPEDICS | Facility: CLINIC | Age: 52
End: 2022-09-13
Payer: COMMERCIAL

## 2022-09-13 DIAGNOSIS — M25.511 CHRONIC RIGHT SHOULDER PAIN: Primary | ICD-10-CM

## 2022-09-13 DIAGNOSIS — G89.29 CHRONIC RIGHT SHOULDER PAIN: Primary | ICD-10-CM

## 2022-09-14 ENCOUNTER — PATIENT MESSAGE (OUTPATIENT)
Dept: ENDOSCOPY | Facility: HOSPITAL | Age: 52
End: 2022-09-14
Payer: COMMERCIAL

## 2022-12-05 PROBLEM — I85.11 SECONDARY ESOPHAGEAL VARICES WITH BLEEDING: Status: RESOLVED | Noted: 2020-08-31 | Resolved: 2022-12-05

## 2022-12-22 ENCOUNTER — HOSPITAL ENCOUNTER (OUTPATIENT)
Dept: RADIOLOGY | Facility: HOSPITAL | Age: 52
Discharge: HOME | End: 2022-12-22
Attending: INTERNAL MEDICINE
Payer: COMMERCIAL

## 2022-12-22 DIAGNOSIS — K74.60 HEPATIC CIRRHOSIS, UNSPECIFIED HEPATIC CIRRHOSIS TYPE, UNSPECIFIED WHETHER ASCITES PRESENT: ICD-10-CM

## 2022-12-22 PROCEDURE — 76700 US EXAM ABDOM COMPLETE: CPT | Mod: TC

## 2022-12-22 PROCEDURE — 76700 US EXAM ABDOM COMPLETE: CPT | Mod: 26,,, | Performed by: RADIOLOGY

## 2022-12-22 PROCEDURE — 76700 US ABDOMEN COMPLETE: ICD-10-PCS | Mod: 26,,, | Performed by: RADIOLOGY

## 2022-12-27 ENCOUNTER — OFFICE VISIT (OUTPATIENT)
Dept: HEPATOLOGY | Facility: CLINIC | Age: 52
End: 2022-12-27
Payer: COMMERCIAL

## 2022-12-27 VITALS
TEMPERATURE: 99 F | HEIGHT: 68 IN | DIASTOLIC BLOOD PRESSURE: 70 MMHG | RESPIRATION RATE: 18 BRPM | OXYGEN SATURATION: 99 % | BODY MASS INDEX: 22.79 KG/M2 | HEART RATE: 64 BPM | SYSTOLIC BLOOD PRESSURE: 112 MMHG | WEIGHT: 150.38 LBS

## 2022-12-27 DIAGNOSIS — K31.89 PORTAL HYPERTENSIVE GASTROPATHY: Primary | ICD-10-CM

## 2022-12-27 DIAGNOSIS — I85.10 SECONDARY ESOPHAGEAL VARICES WITHOUT BLEEDING: ICD-10-CM

## 2022-12-27 DIAGNOSIS — K76.6 PORTAL HYPERTENSIVE GASTROPATHY: Primary | ICD-10-CM

## 2022-12-27 DIAGNOSIS — K74.60 LIVER CIRRHOSIS SECONDARY TO NASH: Chronic | ICD-10-CM

## 2022-12-27 DIAGNOSIS — K75.81 LIVER CIRRHOSIS SECONDARY TO NASH: Chronic | ICD-10-CM

## 2022-12-27 PROCEDURE — 3044F PR MOST RECENT HEMOGLOBIN A1C LEVEL <7.0%: ICD-10-PCS | Mod: CPTII,S$GLB,, | Performed by: INTERNAL MEDICINE

## 2022-12-27 PROCEDURE — 3044F HG A1C LEVEL LT 7.0%: CPT | Mod: CPTII,S$GLB,, | Performed by: INTERNAL MEDICINE

## 2022-12-27 PROCEDURE — 3008F BODY MASS INDEX DOCD: CPT | Mod: CPTII,S$GLB,, | Performed by: INTERNAL MEDICINE

## 2022-12-27 PROCEDURE — 99214 OFFICE O/P EST MOD 30 MIN: CPT | Mod: S$GLB,,, | Performed by: INTERNAL MEDICINE

## 2022-12-27 PROCEDURE — 99999 PR PBB SHADOW E&M-EST. PATIENT-LVL V: ICD-10-PCS | Mod: PBBFAC,,, | Performed by: INTERNAL MEDICINE

## 2022-12-27 PROCEDURE — 1159F MED LIST DOCD IN RCRD: CPT | Mod: CPTII,S$GLB,, | Performed by: INTERNAL MEDICINE

## 2022-12-27 PROCEDURE — 1160F RVW MEDS BY RX/DR IN RCRD: CPT | Mod: CPTII,S$GLB,, | Performed by: INTERNAL MEDICINE

## 2022-12-27 PROCEDURE — 3074F SYST BP LT 130 MM HG: CPT | Mod: CPTII,S$GLB,, | Performed by: INTERNAL MEDICINE

## 2022-12-27 PROCEDURE — 3008F PR BODY MASS INDEX (BMI) DOCUMENTED: ICD-10-PCS | Mod: CPTII,S$GLB,, | Performed by: INTERNAL MEDICINE

## 2022-12-27 PROCEDURE — 99214 PR OFFICE/OUTPT VISIT, EST, LEVL IV, 30-39 MIN: ICD-10-PCS | Mod: S$GLB,,, | Performed by: INTERNAL MEDICINE

## 2022-12-27 PROCEDURE — 3074F PR MOST RECENT SYSTOLIC BLOOD PRESSURE < 130 MM HG: ICD-10-PCS | Mod: CPTII,S$GLB,, | Performed by: INTERNAL MEDICINE

## 2022-12-27 PROCEDURE — 3078F DIAST BP <80 MM HG: CPT | Mod: CPTII,S$GLB,, | Performed by: INTERNAL MEDICINE

## 2022-12-27 PROCEDURE — 99999 PR PBB SHADOW E&M-EST. PATIENT-LVL V: CPT | Mod: PBBFAC,,, | Performed by: INTERNAL MEDICINE

## 2022-12-27 PROCEDURE — 1160F PR REVIEW ALL MEDS BY PRESCRIBER/CLIN PHARMACIST DOCUMENTED: ICD-10-PCS | Mod: CPTII,S$GLB,, | Performed by: INTERNAL MEDICINE

## 2022-12-27 PROCEDURE — 3078F PR MOST RECENT DIASTOLIC BLOOD PRESSURE < 80 MM HG: ICD-10-PCS | Mod: CPTII,S$GLB,, | Performed by: INTERNAL MEDICINE

## 2022-12-27 PROCEDURE — 1159F PR MEDICATION LIST DOCUMENTED IN MEDICAL RECORD: ICD-10-PCS | Mod: CPTII,S$GLB,, | Performed by: INTERNAL MEDICINE

## 2022-12-27 NOTE — PROGRESS NOTES
HEPATOLOGY FOLLOW UP    Referring Physician: James Samano MD  Current Corresponding Physician: James Samano MD, Gerard Salinas MD    Tee Aranda is here for follow up of decompensated cirrhosis    HPI   Tee Aranda was seen in consultation by my colleague Dr White 08/20. He noted:  This 51-year-old woman was referred for evaluation of cirrhosis.  She noticed easy bruisability.  She was found have a low platelet count.  She was assessed by Hematology.  She had a bone marrow aspirate and biopsy which was normal.  She eventually had an upper endoscopy which showed features of portal hypertension and grade 1 varices.  A FibroScan confirmed significant fatty liver as well as advanced fibrosis and cirrhosis.  Her weight was as high as 199 lb.  She has lost 26 lb after the diagnosis.  There is no family history of liver disease. She drinks alcohol socially.  She has mild ankle edema.  She has no symptoms of hepatic encephalopathy.  She initially had no symptoms of GI bleeding.    Admission 11/9/20: melena and weakenss. Hemoglobin fell to 5.9 and she was transfused 2 units of PRBC. EGD showed PHG and small varices. The finding was similar to an EGD done earlier this year (03/20). Small varices werenoted at that time as well. Coreg and protonix were prescribed but never started.  Admission 12/28/20: gastrintestinal hemorrhage and melena; EGD: esophageal varices banded  EGD 2/15/21: EV not banded; repeat 6 months recommended  ER visit 7/19/21 for hematemasis; hemoglobin stable at 12.5; LEFT AGAINST MEDICAL ADVICE    Interval History  Last seen 6/22. She remains off lactulose and diuretics, but is back on rifaximin. She states she had become confused. She continues to have not no bleeding issues. She is feeling well with no N/V, abdominal pain or diarrhea.     HE: no longer taking lactulose but is on rifaximin  Ascites/edema, resolved- off lasix and aldactone  HCC screening abdo US 12/22/22: small liver cyst;  no solid lesions  EGD 10/5/22: small varices; repeat EGD 10/23  EGD 8/29/22: varices banded  EGD 7/27/22: EV banded    Her BMI is 22.9. Serologic w/u for CLD-negative.     Labs 12/22/22: Tbil 0.7, ALT 32, AST 28, ALKP 91  MELD-Na score: 7 at 12/22/2022  9:45 AM  MELD score: 7 at 12/22/2022  9:45 AM  Calculated from:  Serum Creatinine: 0.8 mg/dL (Using min of 1 mg/dL) at 12/22/2022  9:45 AM  Serum Sodium: 139 mmol/L (Using max of 137 mmol/L) at 12/22/2022  9:45 AM  Total Bilirubin: 0.7 mg/dL (Using min of 1 mg/dL) at 12/22/2022  9:45 AM  INR(ratio): 1.1 at 12/22/2022  9:45 AM  Age: 52 years    Outpatient Encounter Medications as of 12/27/2022   Medication Sig Dispense Refill    carvediloL (COREG) 3.125 MG tablet Take 1 tablet (3.125 mg total) by mouth 2 (two) times daily. 60 tablet 11    multivitamin (THERAGRAN) per tablet Take 1 tablet by mouth once daily.      omega-3 fatty acids/fish oil (FISH OIL-OMEGA-3 FATTY ACIDS) 300-1,000 mg capsule Take 1 capsule by mouth once daily.      pantoprazole (PROTONIX) 40 MG tablet TAKE 1 TABLET BY MOUTH TWICE A  tablet 4    rifAXIMin (XIFAXAN) 550 mg Tab Take 1 tablet (550 mg total) by mouth 2 (two) times daily. 60 tablet 11    ondansetron (ZOFRAN) 8 MG tablet Take 1 tablet (8 mg total) by mouth every 8 (eight) hours as needed for Nausea. (Patient not taking: Reported on 12/27/2022) 15 tablet 0    traMADoL (ULTRAM) 50 mg tablet tramadol 50 mg tablet   TAKE 1 TO 2 TABLETS BY MOUTH 3 TIMES A DAY AS NEEDED FOR PAIN       No facility-administered encounter medications on file as of 12/27/2022.     Review of patient's allergies indicates:   Allergen Reactions    Sulfa (sulfonamide antibiotics) Hives     Past Medical History:   Diagnosis Date    Anemia, unspecified     Anxiety     Arthritis     Ascites 11/23/2020    AVN (avascular necrosis of bone)     Cataract     Edema 11/23/2020    Esophageal varices     Glaucoma     Hepatic encephalopathy 11/23/2020    Hx of cirrhosis      non-alcoholic    Kidney stones     Portal hypertensive gastropathy     Unspecified cirrhosis of liver        Review of Systems   Constitutional: Negative.    HENT: Negative.     Eyes: Negative.    Respiratory: Negative.     Cardiovascular: Negative.    Gastrointestinal: Negative.    Genitourinary: Negative.    Musculoskeletal: Negative.    Skin: Negative.    Neurological: Negative.    Psychiatric/Behavioral: Negative.     Vitals:    12/27/22 1034   BP: 112/70   Pulse: 64   Resp: 18   Temp: 98.5 °F (36.9 °C)       Physical Exam  Vitals reviewed.   Constitutional:       Appearance: She is well-developed.   HENT:      Head: Normocephalic and atraumatic.   Eyes:      General: No scleral icterus.     Conjunctiva/sclera: Conjunctivae normal.      Pupils: Pupils are equal, round, and reactive to light.   Neck:      Thyroid: No thyromegaly.   Cardiovascular:      Rate and Rhythm: Normal rate and regular rhythm.      Heart sounds: Normal heart sounds.   Pulmonary:      Effort: Pulmonary effort is normal.      Breath sounds: Normal breath sounds. No rales.   Abdominal:      General: Bowel sounds are normal. There is no distension.      Palpations: Abdomen is soft. There is no mass.      Tenderness: There is no abdominal tenderness.   Musculoskeletal:         General: Normal range of motion.      Cervical back: Normal range of motion and neck supple.   Skin:     General: Skin is warm and dry.      Findings: No rash.   Neurological:      Mental Status: She is alert and oriented to person, place, and time.       MELD-Na score: 7 at 12/22/2022  9:45 AM  MELD score: 7 at 12/22/2022  9:45 AM  Calculated from:  Serum Creatinine: 0.8 mg/dL (Using min of 1 mg/dL) at 12/22/2022  9:45 AM  Serum Sodium: 139 mmol/L (Using max of 137 mmol/L) at 12/22/2022  9:45 AM  Total Bilirubin: 0.7 mg/dL (Using min of 1 mg/dL) at 12/22/2022  9:45 AM  INR(ratio): 1.1 at 12/22/2022  9:45 AM  Age: 52 years    Lab Results   Component Value Date      (H) 12/22/2022    BUN 18 12/22/2022    CREATININE 0.8 12/22/2022    CALCIUM 10.0 12/22/2022     12/22/2022    K 3.9 12/22/2022     12/22/2022    PROT 7.6 12/22/2022    CO2 23 12/22/2022    ANIONGAP 9 12/22/2022    WBC 3.01 (L) 12/22/2022    RBC 4.49 12/22/2022    HGB 12.9 12/22/2022    HCT 39.8 12/22/2022    MCV 89 12/22/2022    MCH 28.7 12/22/2022    MCHC 32.4 12/22/2022     Lab Results   Component Value Date    RDW 14.3 12/22/2022    PLT 76 (L) 12/22/2022    MPV 10.6 12/22/2022    GRAN 2.1 12/22/2022    GRAN 68.4 12/22/2022    LYMPH 0.7 (L) 12/22/2022    LYMPH 22.3 12/22/2022    MONO 0.2 (L) 12/22/2022    MONO 7.0 12/22/2022    EOSINOPHIL 1.7 12/22/2022    BASOPHIL 0.3 12/22/2022    EOS 0.1 12/22/2022    BASO 0.01 12/22/2022    APTT 27.3 09/04/2022    GROUPTRH O POS 09/04/2022    BNP 49 07/19/2021    ALBUMIN 4.3 12/22/2022    BILIDIR 0.5 (H) 05/21/2021    AST 28 12/22/2022    ALT 32 12/22/2022    ALKPHOS 91 12/22/2022    MG 2.0 07/27/2022    LABPROT 11.8 12/22/2022    INR 1.1 12/22/2022       Assessment and Plan:  Tee Aranda is a 52 y.o. female with decompensated cirrhosis. Current recommendations:  1. Decompensated cirrhosis-has now improved- no longer decompensated: presumed to be from CARTAGENA; full serologic w/u negative; check meld labs every 12 months; HCC screening every 6 months (due next 06/23)  2. Hx GI bleeding and EV: repeat EGD 10/23  3. Ascites/edema, resolved: continue off diuretics  4. HE: continue rifaximin and off lactulose     Return 6 months

## 2022-12-27 NOTE — PATIENT INSTRUCTIONS
Check meld labs every 12 weeks  Abdo US in 6 months (06/23)  EGD 10/23  Continue rifaximin  Return 6 months

## 2023-02-28 ENCOUNTER — PATIENT MESSAGE (OUTPATIENT)
Dept: HEPATOLOGY | Facility: CLINIC | Age: 53
End: 2023-02-28
Payer: COMMERCIAL

## 2023-03-02 DIAGNOSIS — K75.81 LIVER CIRRHOSIS SECONDARY TO NASH: Chronic | ICD-10-CM

## 2023-03-02 DIAGNOSIS — K74.60 LIVER CIRRHOSIS SECONDARY TO NASH: Chronic | ICD-10-CM

## 2023-03-27 ENCOUNTER — LAB VISIT (OUTPATIENT)
Dept: LAB | Facility: HOSPITAL | Age: 53
End: 2023-03-27
Attending: INTERNAL MEDICINE
Payer: COMMERCIAL

## 2023-03-27 DIAGNOSIS — K75.81 LIVER CIRRHOSIS SECONDARY TO NASH: Chronic | ICD-10-CM

## 2023-03-27 DIAGNOSIS — K74.60 LIVER CIRRHOSIS SECONDARY TO NASH: Chronic | ICD-10-CM

## 2023-03-27 LAB
AFP SERPL-MCNC: 3.6 NG/ML (ref 0–8.4)
ALBUMIN SERPL BCP-MCNC: 4.2 G/DL (ref 3.5–5.2)
ALP SERPL-CCNC: 93 U/L (ref 55–135)
ALT SERPL W/O P-5'-P-CCNC: 23 U/L (ref 10–44)
ANION GAP SERPL CALC-SCNC: 11 MMOL/L (ref 8–16)
AST SERPL-CCNC: 24 U/L (ref 10–40)
BASOPHILS # BLD AUTO: 0.02 K/UL (ref 0–0.2)
BASOPHILS NFR BLD: 0.4 % (ref 0–1.9)
BILIRUB DIRECT SERPL-MCNC: 0.5 MG/DL (ref 0.1–0.3)
BILIRUB SERPL-MCNC: 1.2 MG/DL (ref 0.1–1)
BUN SERPL-MCNC: 14 MG/DL (ref 6–20)
CALCIUM SERPL-MCNC: 9.5 MG/DL (ref 8.7–10.5)
CHLORIDE SERPL-SCNC: 103 MMOL/L (ref 95–110)
CO2 SERPL-SCNC: 24 MMOL/L (ref 23–29)
CREAT SERPL-MCNC: 0.8 MG/DL (ref 0.5–1.4)
DIFFERENTIAL METHOD: ABNORMAL
EOSINOPHIL # BLD AUTO: 0.1 K/UL (ref 0–0.5)
EOSINOPHIL NFR BLD: 1.3 % (ref 0–8)
ERYTHROCYTE [DISTWIDTH] IN BLOOD BY AUTOMATED COUNT: 13.3 % (ref 11.5–14.5)
EST. GFR  (NO RACE VARIABLE): >60 ML/MIN/1.73 M^2
GLUCOSE SERPL-MCNC: 119 MG/DL (ref 70–110)
HCT VFR BLD AUTO: 41 % (ref 37–48.5)
HGB BLD-MCNC: 13.6 G/DL (ref 12–16)
IMM GRANULOCYTES # BLD AUTO: 0.01 K/UL (ref 0–0.04)
IMM GRANULOCYTES NFR BLD AUTO: 0.2 % (ref 0–0.5)
INR PPP: 1.2 (ref 0.8–1.2)
LYMPHOCYTES # BLD AUTO: 1 K/UL (ref 1–4.8)
LYMPHOCYTES NFR BLD: 21.8 % (ref 18–48)
MCH RBC QN AUTO: 29.9 PG (ref 27–31)
MCHC RBC AUTO-ENTMCNC: 33.2 G/DL (ref 32–36)
MCV RBC AUTO: 90 FL (ref 82–98)
MONOCYTES # BLD AUTO: 0.3 K/UL (ref 0.3–1)
MONOCYTES NFR BLD: 7.3 % (ref 4–15)
NEUTROPHILS # BLD AUTO: 3.2 K/UL (ref 1.8–7.7)
NEUTROPHILS NFR BLD: 69 % (ref 38–73)
NRBC BLD-RTO: 0 /100 WBC
PLATELET # BLD AUTO: 87 K/UL (ref 150–450)
PMV BLD AUTO: 10.8 FL (ref 9.2–12.9)
POTASSIUM SERPL-SCNC: 3.8 MMOL/L (ref 3.5–5.1)
PROT SERPL-MCNC: 7.1 G/DL (ref 6–8.4)
PROTHROMBIN TIME: 12.2 SEC (ref 9–12.5)
RBC # BLD AUTO: 4.55 M/UL (ref 4–5.4)
SODIUM SERPL-SCNC: 138 MMOL/L (ref 136–145)
WBC # BLD AUTO: 4.68 K/UL (ref 3.9–12.7)

## 2023-03-27 PROCEDURE — 82105 ALPHA-FETOPROTEIN SERUM: CPT | Performed by: INTERNAL MEDICINE

## 2023-03-27 PROCEDURE — 85610 PROTHROMBIN TIME: CPT | Performed by: INTERNAL MEDICINE

## 2023-03-27 PROCEDURE — 82248 BILIRUBIN DIRECT: CPT | Performed by: INTERNAL MEDICINE

## 2023-03-27 PROCEDURE — 36415 COLL VENOUS BLD VENIPUNCTURE: CPT | Performed by: INTERNAL MEDICINE

## 2023-03-27 PROCEDURE — 85025 COMPLETE CBC W/AUTO DIFF WBC: CPT | Performed by: INTERNAL MEDICINE

## 2023-03-27 PROCEDURE — 80053 COMPREHEN METABOLIC PANEL: CPT | Performed by: INTERNAL MEDICINE

## 2023-03-27 PROCEDURE — 80321 ALCOHOLS BIOMARKERS 1OR 2: CPT | Performed by: INTERNAL MEDICINE

## 2023-03-29 LAB
CLINICAL BIOCHEMIST REVIEW: NORMAL
PLPETH BLD-MCNC: <10 NG/ML
POPETH BLD-MCNC: <10 NG/ML

## 2023-04-06 ENCOUNTER — PATIENT MESSAGE (OUTPATIENT)
Dept: HEPATOLOGY | Facility: CLINIC | Age: 53
End: 2023-04-06
Payer: COMMERCIAL

## 2023-04-24 ENCOUNTER — LAB VISIT (OUTPATIENT)
Dept: LAB | Facility: HOSPITAL | Age: 53
End: 2023-04-24
Attending: INTERNAL MEDICINE
Payer: COMMERCIAL

## 2023-04-24 DIAGNOSIS — K75.81 LIVER CIRRHOSIS SECONDARY TO NASH: Chronic | ICD-10-CM

## 2023-04-24 DIAGNOSIS — K74.60 LIVER CIRRHOSIS SECONDARY TO NASH: Chronic | ICD-10-CM

## 2023-04-24 LAB
AFP SERPL-MCNC: 3.4 NG/ML (ref 0–8.4)
ALBUMIN SERPL BCP-MCNC: 4.4 G/DL (ref 3.5–5.2)
ALP SERPL-CCNC: 90 U/L (ref 55–135)
ALT SERPL W/O P-5'-P-CCNC: 23 U/L (ref 10–44)
ANION GAP SERPL CALC-SCNC: 8 MMOL/L (ref 8–16)
AST SERPL-CCNC: 24 U/L (ref 10–40)
BASOPHILS # BLD AUTO: 0.02 K/UL (ref 0–0.2)
BASOPHILS NFR BLD: 0.5 % (ref 0–1.9)
BILIRUB DIRECT SERPL-MCNC: 0.5 MG/DL (ref 0.1–0.3)
BILIRUB SERPL-MCNC: 1.1 MG/DL (ref 0.1–1)
BUN SERPL-MCNC: 15 MG/DL (ref 6–20)
CALCIUM SERPL-MCNC: 9.9 MG/DL (ref 8.7–10.5)
CHLORIDE SERPL-SCNC: 107 MMOL/L (ref 95–110)
CO2 SERPL-SCNC: 27 MMOL/L (ref 23–29)
CREAT SERPL-MCNC: 0.9 MG/DL (ref 0.5–1.4)
DIFFERENTIAL METHOD: ABNORMAL
EOSINOPHIL # BLD AUTO: 0.1 K/UL (ref 0–0.5)
EOSINOPHIL NFR BLD: 2.1 % (ref 0–8)
ERYTHROCYTE [DISTWIDTH] IN BLOOD BY AUTOMATED COUNT: 13.1 % (ref 11.5–14.5)
EST. GFR  (NO RACE VARIABLE): >60 ML/MIN/1.73 M^2
GLUCOSE SERPL-MCNC: 89 MG/DL (ref 70–110)
HCT VFR BLD AUTO: 41.9 % (ref 37–48.5)
HGB BLD-MCNC: 14 G/DL (ref 12–16)
IMM GRANULOCYTES # BLD AUTO: 0.02 K/UL (ref 0–0.04)
IMM GRANULOCYTES NFR BLD AUTO: 0.5 % (ref 0–0.5)
INR PPP: 1.2 (ref 0.8–1.2)
LYMPHOCYTES # BLD AUTO: 1.3 K/UL (ref 1–4.8)
LYMPHOCYTES NFR BLD: 29.7 % (ref 18–48)
MCH RBC QN AUTO: 29.7 PG (ref 27–31)
MCHC RBC AUTO-ENTMCNC: 33.4 G/DL (ref 32–36)
MCV RBC AUTO: 89 FL (ref 82–98)
MONOCYTES # BLD AUTO: 0.3 K/UL (ref 0.3–1)
MONOCYTES NFR BLD: 7.8 % (ref 4–15)
NEUTROPHILS # BLD AUTO: 2.6 K/UL (ref 1.8–7.7)
NEUTROPHILS NFR BLD: 59.4 % (ref 38–73)
NRBC BLD-RTO: 0 /100 WBC
PLATELET # BLD AUTO: 101 K/UL (ref 150–450)
PMV BLD AUTO: 11.3 FL (ref 9.2–12.9)
POTASSIUM SERPL-SCNC: 3.7 MMOL/L (ref 3.5–5.1)
PROT SERPL-MCNC: 7.6 G/DL (ref 6–8.4)
PROTHROMBIN TIME: 12.2 SEC (ref 9–12.5)
RBC # BLD AUTO: 4.72 M/UL (ref 4–5.4)
SODIUM SERPL-SCNC: 142 MMOL/L (ref 136–145)
WBC # BLD AUTO: 4.34 K/UL (ref 3.9–12.7)

## 2023-04-24 PROCEDURE — 80321 ALCOHOLS BIOMARKERS 1OR 2: CPT | Performed by: INTERNAL MEDICINE

## 2023-04-24 PROCEDURE — 80053 COMPREHEN METABOLIC PANEL: CPT | Performed by: INTERNAL MEDICINE

## 2023-04-24 PROCEDURE — 85025 COMPLETE CBC W/AUTO DIFF WBC: CPT | Performed by: INTERNAL MEDICINE

## 2023-04-24 PROCEDURE — 36415 COLL VENOUS BLD VENIPUNCTURE: CPT | Performed by: INTERNAL MEDICINE

## 2023-04-24 PROCEDURE — 82105 ALPHA-FETOPROTEIN SERUM: CPT | Performed by: INTERNAL MEDICINE

## 2023-04-24 PROCEDURE — 85610 PROTHROMBIN TIME: CPT | Performed by: INTERNAL MEDICINE

## 2023-04-24 PROCEDURE — 82248 BILIRUBIN DIRECT: CPT | Performed by: INTERNAL MEDICINE

## 2023-04-28 LAB
CLINICAL BIOCHEMIST REVIEW: NORMAL
PLPETH BLD-MCNC: <10 NG/ML
POPETH BLD-MCNC: <10 NG/ML

## 2023-05-04 ENCOUNTER — PATIENT MESSAGE (OUTPATIENT)
Dept: HEPATOLOGY | Facility: CLINIC | Age: 53
End: 2023-05-04
Payer: COMMERCIAL

## 2023-05-04 ENCOUNTER — LAB VISIT (OUTPATIENT)
Dept: LAB | Facility: HOSPITAL | Age: 53
End: 2023-05-04
Attending: INTERNAL MEDICINE
Payer: COMMERCIAL

## 2023-05-04 DIAGNOSIS — K74.69 OTHER CIRRHOSIS OF LIVER: Primary | ICD-10-CM

## 2023-05-04 DIAGNOSIS — K74.69 OTHER CIRRHOSIS OF LIVER: ICD-10-CM

## 2023-05-04 LAB
ALBUMIN SERPL BCP-MCNC: 4.3 G/DL (ref 3.5–5.2)
ALP SERPL-CCNC: 88 U/L (ref 55–135)
ALT SERPL W/O P-5'-P-CCNC: 16 U/L (ref 10–44)
ANION GAP SERPL CALC-SCNC: 10 MMOL/L (ref 8–16)
AST SERPL-CCNC: 20 U/L (ref 10–40)
BASOPHILS # BLD AUTO: 0 K/UL (ref 0–0.2)
BASOPHILS NFR BLD: 0 % (ref 0–1.9)
BILIRUB DIRECT SERPL-MCNC: 0.4 MG/DL (ref 0.1–0.3)
BILIRUB SERPL-MCNC: 1.2 MG/DL (ref 0.1–1)
BUN SERPL-MCNC: 11 MG/DL (ref 6–20)
CALCIUM SERPL-MCNC: 9.5 MG/DL (ref 8.7–10.5)
CHLORIDE SERPL-SCNC: 105 MMOL/L (ref 95–110)
CO2 SERPL-SCNC: 26 MMOL/L (ref 23–29)
CREAT SERPL-MCNC: 0.8 MG/DL (ref 0.5–1.4)
DIFFERENTIAL METHOD: ABNORMAL
EOSINOPHIL # BLD AUTO: 0.1 K/UL (ref 0–0.5)
EOSINOPHIL NFR BLD: 2.2 % (ref 0–8)
ERYTHROCYTE [DISTWIDTH] IN BLOOD BY AUTOMATED COUNT: 13.3 % (ref 11.5–14.5)
EST. GFR  (NO RACE VARIABLE): >60 ML/MIN/1.73 M^2
GLUCOSE SERPL-MCNC: 94 MG/DL (ref 70–110)
HCT VFR BLD AUTO: 39 % (ref 37–48.5)
HGB BLD-MCNC: 13 G/DL (ref 12–16)
IMM GRANULOCYTES # BLD AUTO: 0.01 K/UL (ref 0–0.04)
IMM GRANULOCYTES NFR BLD AUTO: 0.3 % (ref 0–0.5)
INR PPP: 1.1 (ref 0.8–1.2)
LYMPHOCYTES # BLD AUTO: 0.9 K/UL (ref 1–4.8)
LYMPHOCYTES NFR BLD: 27.9 % (ref 18–48)
MCH RBC QN AUTO: 29.7 PG (ref 27–31)
MCHC RBC AUTO-ENTMCNC: 33.3 G/DL (ref 32–36)
MCV RBC AUTO: 89 FL (ref 82–98)
MONOCYTES # BLD AUTO: 0.3 K/UL (ref 0.3–1)
MONOCYTES NFR BLD: 8 % (ref 4–15)
NEUTROPHILS # BLD AUTO: 1.9 K/UL (ref 1.8–7.7)
NEUTROPHILS NFR BLD: 61.6 % (ref 38–73)
NRBC BLD-RTO: 0 /100 WBC
PLATELET # BLD AUTO: 65 K/UL (ref 150–450)
PMV BLD AUTO: 11.3 FL (ref 9.2–12.9)
POTASSIUM SERPL-SCNC: 3.6 MMOL/L (ref 3.5–5.1)
PROT SERPL-MCNC: 7 G/DL (ref 6–8.4)
PROTHROMBIN TIME: 11.9 SEC (ref 9–12.5)
RBC # BLD AUTO: 4.37 M/UL (ref 4–5.4)
SODIUM SERPL-SCNC: 141 MMOL/L (ref 136–145)
WBC # BLD AUTO: 3.12 K/UL (ref 3.9–12.7)

## 2023-05-04 PROCEDURE — 80053 COMPREHEN METABOLIC PANEL: CPT | Performed by: INTERNAL MEDICINE

## 2023-05-04 PROCEDURE — 85025 COMPLETE CBC W/AUTO DIFF WBC: CPT | Performed by: INTERNAL MEDICINE

## 2023-05-04 PROCEDURE — 36415 COLL VENOUS BLD VENIPUNCTURE: CPT | Performed by: INTERNAL MEDICINE

## 2023-05-04 PROCEDURE — 85610 PROTHROMBIN TIME: CPT | Performed by: INTERNAL MEDICINE

## 2023-05-04 PROCEDURE — 82248 BILIRUBIN DIRECT: CPT | Performed by: INTERNAL MEDICINE

## 2023-06-08 ENCOUNTER — LAB VISIT (OUTPATIENT)
Dept: LAB | Facility: HOSPITAL | Age: 53
End: 2023-06-08
Attending: INTERNAL MEDICINE
Payer: COMMERCIAL

## 2023-06-08 ENCOUNTER — PATIENT MESSAGE (OUTPATIENT)
Dept: HEPATOLOGY | Facility: CLINIC | Age: 53
End: 2023-06-08
Payer: COMMERCIAL

## 2023-06-08 DIAGNOSIS — K74.60 LIVER CIRRHOSIS SECONDARY TO NASH: Chronic | ICD-10-CM

## 2023-06-08 DIAGNOSIS — K75.81 LIVER CIRRHOSIS SECONDARY TO NASH: Chronic | ICD-10-CM

## 2023-06-08 LAB
ALBUMIN SERPL BCP-MCNC: 4.4 G/DL (ref 3.5–5.2)
ALP SERPL-CCNC: 86 U/L (ref 55–135)
ALT SERPL W/O P-5'-P-CCNC: 39 U/L (ref 10–44)
ANION GAP SERPL CALC-SCNC: 13 MMOL/L (ref 8–16)
AST SERPL-CCNC: 44 U/L (ref 10–40)
BASOPHILS # BLD AUTO: 0.02 K/UL (ref 0–0.2)
BASOPHILS NFR BLD: 0.5 % (ref 0–1.9)
BILIRUB DIRECT SERPL-MCNC: 0.4 MG/DL (ref 0.1–0.3)
BILIRUB SERPL-MCNC: 1.1 MG/DL (ref 0.1–1)
BUN SERPL-MCNC: 10 MG/DL (ref 6–20)
CALCIUM SERPL-MCNC: 9.7 MG/DL (ref 8.7–10.5)
CHLORIDE SERPL-SCNC: 106 MMOL/L (ref 95–110)
CO2 SERPL-SCNC: 21 MMOL/L (ref 23–29)
CREAT SERPL-MCNC: 0.8 MG/DL (ref 0.5–1.4)
DIFFERENTIAL METHOD: ABNORMAL
EOSINOPHIL # BLD AUTO: 0.1 K/UL (ref 0–0.5)
EOSINOPHIL NFR BLD: 1.9 % (ref 0–8)
ERYTHROCYTE [DISTWIDTH] IN BLOOD BY AUTOMATED COUNT: 13.5 % (ref 11.5–14.5)
EST. GFR  (NO RACE VARIABLE): >60 ML/MIN/1.73 M^2
GLUCOSE SERPL-MCNC: 117 MG/DL (ref 70–110)
HCT VFR BLD AUTO: 40.9 % (ref 37–48.5)
HGB BLD-MCNC: 13.9 G/DL (ref 12–16)
IMM GRANULOCYTES # BLD AUTO: 0.01 K/UL (ref 0–0.04)
IMM GRANULOCYTES NFR BLD AUTO: 0.3 % (ref 0–0.5)
INR PPP: 1.1 (ref 0.8–1.2)
LYMPHOCYTES # BLD AUTO: 0.8 K/UL (ref 1–4.8)
LYMPHOCYTES NFR BLD: 20.7 % (ref 18–48)
MCH RBC QN AUTO: 30.2 PG (ref 27–31)
MCHC RBC AUTO-ENTMCNC: 34 G/DL (ref 32–36)
MCV RBC AUTO: 89 FL (ref 82–98)
MONOCYTES # BLD AUTO: 0.3 K/UL (ref 0.3–1)
MONOCYTES NFR BLD: 7.1 % (ref 4–15)
NEUTROPHILS # BLD AUTO: 2.6 K/UL (ref 1.8–7.7)
NEUTROPHILS NFR BLD: 69.5 % (ref 38–73)
NRBC BLD-RTO: 0 /100 WBC
PLATELET # BLD AUTO: 79 K/UL (ref 150–450)
PMV BLD AUTO: 11.4 FL (ref 9.2–12.9)
POTASSIUM SERPL-SCNC: 3.9 MMOL/L (ref 3.5–5.1)
PROT SERPL-MCNC: 7.3 G/DL (ref 6–8.4)
PROTHROMBIN TIME: 11.8 SEC (ref 9–12.5)
RBC # BLD AUTO: 4.6 M/UL (ref 4–5.4)
SODIUM SERPL-SCNC: 140 MMOL/L (ref 136–145)
WBC # BLD AUTO: 3.67 K/UL (ref 3.9–12.7)

## 2023-06-08 PROCEDURE — 85025 COMPLETE CBC W/AUTO DIFF WBC: CPT | Performed by: INTERNAL MEDICINE

## 2023-06-08 PROCEDURE — 36415 COLL VENOUS BLD VENIPUNCTURE: CPT | Performed by: INTERNAL MEDICINE

## 2023-06-08 PROCEDURE — 82248 BILIRUBIN DIRECT: CPT | Performed by: INTERNAL MEDICINE

## 2023-06-08 PROCEDURE — 80053 COMPREHEN METABOLIC PANEL: CPT | Performed by: INTERNAL MEDICINE

## 2023-06-08 PROCEDURE — 85610 PROTHROMBIN TIME: CPT | Performed by: INTERNAL MEDICINE

## 2023-07-10 ENCOUNTER — HOSPITAL ENCOUNTER (EMERGENCY)
Facility: HOSPITAL | Age: 53
Discharge: HOME | End: 2023-07-10
Attending: STUDENT IN AN ORGANIZED HEALTH CARE EDUCATION/TRAINING PROGRAM
Payer: COMMERCIAL

## 2023-07-10 ENCOUNTER — TELEPHONE (OUTPATIENT)
Dept: HEPATOLOGY | Facility: CLINIC | Age: 53
End: 2023-07-10
Payer: COMMERCIAL

## 2023-07-10 VITALS
RESPIRATION RATE: 20 BRPM | WEIGHT: 132.25 LBS | BODY MASS INDEX: 20.11 KG/M2 | OXYGEN SATURATION: 100 % | DIASTOLIC BLOOD PRESSURE: 68 MMHG | TEMPERATURE: 99 F | SYSTOLIC BLOOD PRESSURE: 150 MMHG | HEART RATE: 59 BPM

## 2023-07-10 DIAGNOSIS — R11.0 NAUSEA: Primary | ICD-10-CM

## 2023-07-10 LAB
ALBUMIN SERPL BCP-MCNC: 4.3 G/DL (ref 3.5–5.2)
ALP SERPL-CCNC: 90 U/L (ref 55–135)
ALT SERPL W/O P-5'-P-CCNC: 24 U/L (ref 10–44)
AMMONIA PLAS-SCNC: 44 UMOL/L (ref 10–50)
ANION GAP SERPL CALC-SCNC: 10 MMOL/L (ref 8–16)
APTT PPP: 25.9 SEC (ref 21–32)
AST SERPL-CCNC: 24 U/L (ref 10–40)
BASOPHILS # BLD AUTO: 0.02 K/UL (ref 0–0.2)
BASOPHILS NFR BLD: 0.4 % (ref 0–1.9)
BILIRUB SERPL-MCNC: 0.9 MG/DL (ref 0.1–1)
BILIRUB UR QL STRIP: NEGATIVE
BNP SERPL-MCNC: 69 PG/ML (ref 0–99)
BUN SERPL-MCNC: 14 MG/DL (ref 6–20)
CALCIUM SERPL-MCNC: 9.6 MG/DL (ref 8.7–10.5)
CHLORIDE SERPL-SCNC: 107 MMOL/L (ref 95–110)
CLARITY UR: CLEAR
CO2 SERPL-SCNC: 23 MMOL/L (ref 23–29)
COLOR UR: YELLOW
CREAT SERPL-MCNC: 0.8 MG/DL (ref 0.5–1.4)
D DIMER PPP IA.FEU-MCNC: <0.19 MG/L FEU
DIFFERENTIAL METHOD: ABNORMAL
EOSINOPHIL # BLD AUTO: 0.1 K/UL (ref 0–0.5)
EOSINOPHIL NFR BLD: 1.7 % (ref 0–8)
ERYTHROCYTE [DISTWIDTH] IN BLOOD BY AUTOMATED COUNT: 13.2 % (ref 11.5–14.5)
EST. GFR  (NO RACE VARIABLE): >60 ML/MIN/1.73 M^2
GLUCOSE SERPL-MCNC: 113 MG/DL (ref 70–110)
GLUCOSE UR QL STRIP: NEGATIVE
HCT VFR BLD AUTO: 41.5 % (ref 37–48.5)
HGB BLD-MCNC: 14 G/DL (ref 12–16)
HGB UR QL STRIP: ABNORMAL
IMM GRANULOCYTES # BLD AUTO: 0.02 K/UL (ref 0–0.04)
IMM GRANULOCYTES NFR BLD AUTO: 0.4 % (ref 0–0.5)
INR PPP: 1.1 (ref 0.8–1.2)
KETONES UR QL STRIP: NEGATIVE
LEUKOCYTE ESTERASE UR QL STRIP: NEGATIVE
LIPASE SERPL-CCNC: 45 U/L (ref 4–60)
LYMPHOCYTES # BLD AUTO: 1 K/UL (ref 1–4.8)
LYMPHOCYTES NFR BLD: 19.1 % (ref 18–48)
MCH RBC QN AUTO: 30.5 PG (ref 27–31)
MCHC RBC AUTO-ENTMCNC: 33.7 G/DL (ref 32–36)
MCV RBC AUTO: 90 FL (ref 82–98)
MONOCYTES # BLD AUTO: 0.4 K/UL (ref 0.3–1)
MONOCYTES NFR BLD: 7.2 % (ref 4–15)
NEUTROPHILS # BLD AUTO: 3.8 K/UL (ref 1.8–7.7)
NEUTROPHILS NFR BLD: 71.2 % (ref 38–73)
NITRITE UR QL STRIP: NEGATIVE
NRBC BLD-RTO: 0 /100 WBC
PH UR STRIP: 7 [PH] (ref 5–8)
PLATELET # BLD AUTO: 85 K/UL (ref 150–450)
PMV BLD AUTO: 10.9 FL (ref 9.2–12.9)
POTASSIUM SERPL-SCNC: 3.9 MMOL/L (ref 3.5–5.1)
PROT SERPL-MCNC: 7.3 G/DL (ref 6–8.4)
PROT UR QL STRIP: NEGATIVE
PROTHROMBIN TIME: 12 SEC (ref 9–12.5)
RBC # BLD AUTO: 4.59 M/UL (ref 4–5.4)
SODIUM SERPL-SCNC: 140 MMOL/L (ref 136–145)
SP GR UR STRIP: 1.01 (ref 1–1.03)
TROPONIN I SERPL DL<=0.01 NG/ML-MCNC: <0.006 NG/ML (ref 0–0.03)
URN SPEC COLLECT METH UR: ABNORMAL
UROBILINOGEN UR STRIP-ACNC: NEGATIVE EU/DL
WBC # BLD AUTO: 5.28 K/UL (ref 3.9–12.7)

## 2023-07-10 PROCEDURE — 80053 COMPREHEN METABOLIC PANEL: CPT | Performed by: NURSE PRACTITIONER

## 2023-07-10 PROCEDURE — 82140 ASSAY OF AMMONIA: CPT | Performed by: NURSE PRACTITIONER

## 2023-07-10 PROCEDURE — 85379 FIBRIN DEGRADATION QUANT: CPT | Performed by: NURSE PRACTITIONER

## 2023-07-10 PROCEDURE — 96361 HYDRATE IV INFUSION ADD-ON: CPT

## 2023-07-10 PROCEDURE — 63600175 PHARM REV CODE 636 W HCPCS: Performed by: NURSE PRACTITIONER

## 2023-07-10 PROCEDURE — 25500020 PHARM REV CODE 255: Performed by: STUDENT IN AN ORGANIZED HEALTH CARE EDUCATION/TRAINING PROGRAM

## 2023-07-10 PROCEDURE — 85730 THROMBOPLASTIN TIME PARTIAL: CPT | Performed by: NURSE PRACTITIONER

## 2023-07-10 PROCEDURE — 93010 EKG 12-LEAD: ICD-10-PCS | Mod: ,,, | Performed by: INTERNAL MEDICINE

## 2023-07-10 PROCEDURE — 83690 ASSAY OF LIPASE: CPT | Performed by: NURSE PRACTITIONER

## 2023-07-10 PROCEDURE — 84484 ASSAY OF TROPONIN QUANT: CPT | Performed by: NURSE PRACTITIONER

## 2023-07-10 PROCEDURE — 96374 THER/PROPH/DIAG INJ IV PUSH: CPT

## 2023-07-10 PROCEDURE — 83880 ASSAY OF NATRIURETIC PEPTIDE: CPT | Performed by: NURSE PRACTITIONER

## 2023-07-10 PROCEDURE — 85025 COMPLETE CBC W/AUTO DIFF WBC: CPT | Performed by: NURSE PRACTITIONER

## 2023-07-10 PROCEDURE — 93005 ELECTROCARDIOGRAM TRACING: CPT

## 2023-07-10 PROCEDURE — 99285 EMERGENCY DEPT VISIT HI MDM: CPT | Mod: 25

## 2023-07-10 PROCEDURE — 85610 PROTHROMBIN TIME: CPT | Performed by: NURSE PRACTITIONER

## 2023-07-10 PROCEDURE — 25000003 PHARM REV CODE 250: Performed by: NURSE PRACTITIONER

## 2023-07-10 PROCEDURE — 81003 URINALYSIS AUTO W/O SCOPE: CPT | Performed by: STUDENT IN AN ORGANIZED HEALTH CARE EDUCATION/TRAINING PROGRAM

## 2023-07-10 PROCEDURE — 93010 ELECTROCARDIOGRAM REPORT: CPT | Mod: ,,, | Performed by: INTERNAL MEDICINE

## 2023-07-10 RX ORDER — ONDANSETRON 4 MG/1
4 TABLET, ORALLY DISINTEGRATING ORAL EVERY 8 HOURS PRN
Qty: 9 TABLET | Refills: 0 | Status: SHIPPED | OUTPATIENT
Start: 2023-07-10 | End: 2023-07-13

## 2023-07-10 RX ORDER — ONDANSETRON 2 MG/ML
4 INJECTION INTRAMUSCULAR; INTRAVENOUS
Status: COMPLETED | OUTPATIENT
Start: 2023-07-10 | End: 2023-07-10

## 2023-07-10 RX ORDER — SODIUM CHLORIDE 9 MG/ML
500 INJECTION, SOLUTION INTRAVENOUS
Status: COMPLETED | OUTPATIENT
Start: 2023-07-10 | End: 2023-07-10

## 2023-07-10 RX ADMIN — IOHEXOL 75 ML: 350 INJECTION, SOLUTION INTRAVENOUS at 12:07

## 2023-07-10 RX ADMIN — ONDANSETRON HYDROCHLORIDE 4 MG: 2 INJECTION, SOLUTION INTRAMUSCULAR; INTRAVENOUS at 11:07

## 2023-07-10 RX ADMIN — SODIUM CHLORIDE 500 ML: 9 INJECTION, SOLUTION INTRAVENOUS at 11:07

## 2023-07-10 NOTE — ED NOTES
Patient wheeled to restroom to urinate with c/o lower abdominal pain that is causing nausea. Gladys BRITO aware.

## 2023-07-10 NOTE — ED PROVIDER NOTES
Encounter Date: 7/10/2023       History     Chief Complaint   Patient presents with    Shortness of Breath     Patient to ER CC of SOB since this morning, states she went to the gym and could nt do her work outs, also reports swelling in her legs      Tee Aranda is a 53 y.o. female with liver cirrhosis secondary to CARTAGENA, portal hypertensive gastropathy, and esophageal varices presents with generalized weakness, sob, and confusion. Symptoms started Sat. (2 days ago) and have progressively worsened. Typically very active, walks 3-4 miles daily, but felt too weak and SOB to walk today. She has also been feeling foggy headed that is worse this AM. She currently takes rifaximin for HE. Continuous nausea and decreased appetite with wt. loss.   No associated cp, dizziness, facial tingling/numbness, abdominal pain, vomiting, hematemesis, or melena. No abdominal swelling.   She feels like she is jaundice and also notes that her legs were very swollen yesterday (showed pictures on phone), R always greater than L. Swelling has significantly decreased today.       The history is provided by the patient.   Review of patient's allergies indicates:   Allergen Reactions    Sulfa (sulfonamide antibiotics) Hives     Past Medical History:   Diagnosis Date    Anemia, unspecified     Anxiety     Arthritis     Ascites 11/23/2020    AVN (avascular necrosis of bone)     Cataract     COVID-19 vaccine administered     04/08/21, 04/30/21    Edema 11/23/2020    Esophageal varices     Glaucoma     Hepatic encephalopathy 11/23/2020    History of colon polyps     Hx of cirrhosis     non-alcoholic    Iron deficiency anemia secondary to blood loss (chronic)     Kidney stones     Portal hypertensive gastropathy     Unspecified cirrhosis of liver      Past Surgical History:   Procedure Laterality Date    APPENDECTOMY      COLONOSCOPY W/ POLYPECTOMY      DISTAL CLAVICLE EXCISION Right 11/18/2021    Procedure: RIGHT SHOULDER ARTHROSCOPY,  EXCISION, CLAVICLE, DISTAL; EXTENSIVE DEBRIDEMENT;  Surgeon: Isaiah Joyner MD;  Location: [facility] OR;  Service: Orthopedics;  Laterality: Right;    ESOPHAGEAL VARICE LIGATION      ESOPHAGOGASTRODUODENOSCOPY N/A 11/10/2020    Procedure: EGD (ESOPHAGOGASTRODUODENOSCOPY);  Surgeon: Gerard Salinas MD;  Location: [facility] ENDO;  Service: Endoscopy;  Laterality: N/A;    ESOPHAGOGASTRODUODENOSCOPY N/A 12/28/2020    Procedure: EGD (ESOPHAGOGASTRODUODENOSCOPY);  Surgeon: Adryan Park MD;  Location: [facility] (2ND FLR);  Service: Endoscopy;  Laterality: N/A;    ESOPHAGOGASTRODUODENOSCOPY N/A 02/15/2021    Procedure: EGD (ESOPHAGOGASTRODUODENOSCOPY);  Surgeon: Hari Gerene MD;  Location: [facility] (4TH FLR);  Service: Endoscopy;  Laterality: N/A;  6 week follow-up variceal banding  Hx varices - Labs - ERW  prep ins. emialed - COVID screening on 2/12/21 [place] - ERW    ESOPHAGOGASTRODUODENOSCOPY Left 08/03/2021    Procedure: EGD (ESOPHAGOGASTRODUODENOSCOPY);  Surgeon: Fabricio Corado MD;  Location: [facility] (4TH FLR);  Service: Gastroenterology;  Laterality: Left;  recent hematemasis. varices-labs on 7/26    COVID test at [facility] [place] on 7/31-GT  requesting sooner    ESOPHAGOGASTRODUODENOSCOPY N/A 07/27/2022    Procedure: EGD (ESOPHAGOGASTRODUODENOSCOPY);  Surgeon: Eric Garcia MD;  Location: [facility] ENDO;  Service: Endoscopy;  Laterality: N/A;    ESOPHAGOGASTRODUODENOSCOPY Left 08/29/2022    Procedure: EGD (ESOPHAGOGASTRODUODENOSCOPY);  Surgeon: Kacy Douglass MD;  Location: [facility] (2ND FLR);  Service: Endoscopy;  Laterality: Left;  Fully vaccinated/ clear liquids up to 4 hrs prior-RB  varices labs ordered-RB    ESOPHAGOGASTRODUODENOSCOPY N/A 10/05/2022    Procedure: EGD (ESOPHAGOGASTRODUODENOSCOPY);  Surgeon: Gerard Salinas MD;  Location: [facility];  Service: Endoscopy;  Laterality: N/A;    ESOPHAGOGASTRODUODENOSCOPY N/A 3/30/2023    Procedure: EGD (ESOPHAGOGASTRODUODENOSCOPY);  Surgeon: Gerard Salinas,  MD;  Location: [facility];  Service: Endoscopy;  Laterality: N/A;    HYSTERECTOMY      KNEE SURGERY Bilateral     LITHOTRIPSY      RHINOPLASTY TIP      SHOULDER SURGERY Right     TONSILLECTOMY      TOTAL HIP ARTHROPLASTY Right      Family History   Problem Relation Age of Onset    Diabetes Mellitus Maternal Grandmother     Amblyopia Neg Hx     Blindness Neg Hx     Cataracts Neg Hx     Glaucoma Neg Hx     Macular degeneration Neg Hx     Retinal detachment Neg Hx     Strabismus Neg Hx     Colon cancer Neg Hx     Esophageal cancer Neg Hx      Social History     Tobacco Use    Smoking status: Some Days     Packs/day: 0.50     Types: Cigarettes     Last attempt to quit: 7/3/2019     Years since quittin.0    Smokeless tobacco: Never   Substance Use Topics    Alcohol use: Not Currently    Drug use: No     Review of Systems   Constitutional:  Positive for activity change, appetite change and fatigue. Negative for fever.   HENT:  Negative for sore throat.    Respiratory:  Positive for shortness of breath. Negative for chest tightness.    Cardiovascular:  Positive for leg swelling. Negative for chest pain.   Gastrointestinal:  Negative for abdominal pain and nausea.   Genitourinary:  Negative for dysuria, frequency and urgency.   Musculoskeletal:  Negative for back pain.   Skin:  Negative for rash.   Neurological:  Negative for dizziness, weakness, light-headedness and numbness.   Hematological:  Does not bruise/bleed easily.     Physical Exam     Initial Vitals [07/10/23 0958]   BP Pulse Resp Temp SpO2   (!) 160/68 65 20 98.6 °F (37 °C) 100 %      MAP       --         Physical Exam    Nursing note and vitals reviewed.  Constitutional: She appears well-developed and well-nourished.   HENT:   Head: Normocephalic and atraumatic.   Eyes: Conjunctivae and EOM are normal. Pupils are equal, round, and reactive to light.   Neck: Neck supple.   Cardiovascular:  Normal rate, regular rhythm, normal heart sounds and intact distal  pulses.           Pulmonary/Chest: Breath sounds normal.   Abdominal: Abdomen is soft. Bowel sounds are normal.   Musculoskeletal:         General: Normal range of motion.      Cervical back: Neck supple.     Neurological: She is alert and oriented to person, place, and time. She has normal strength. GCS score is 15. GCS eye subscore is 4. GCS verbal subscore is 5. GCS motor subscore is 6.   Skin: Skin is warm and dry. Capillary refill takes less than 2 seconds.   Psychiatric: She has a normal mood and affect. Her behavior is normal. Judgment and thought content normal.       ED Course   Procedures  Labs Reviewed   URINALYSIS, REFLEX TO URINE CULTURE - Abnormal; Notable for the following components:       Result Value    Occult Blood UA Trace (*)     All other components within normal limits    Narrative:     Specimen Source->Urine   CBC W/ AUTO DIFFERENTIAL - Abnormal; Notable for the following components:    Platelets 85 (*)     All other components within normal limits   COMPREHENSIVE METABOLIC PANEL - Abnormal; Notable for the following components:    Glucose 113 (*)     All other components within normal limits   LIPASE   APTT   PROTIME-INR   B-TYPE NATRIURETIC PEPTIDE   D DIMER, QUANTITATIVE   TROPONIN I   AMMONIA        ECG Results              EKG 12-lead (Final result)  Result time 07/10/23 12:29:47      Final result by Interface, Lab In [facility] (07/10/23 12:29:47)                   Narrative:    Test Reason : R06.02    Vent. Rate : 061 BPM     Atrial Rate : 061 BPM     P-R Int : 132 ms          QRS Dur : 078 ms      QT Int : 384 ms       P-R-T Axes : 050 076 058 degrees     QTc Int : 386 ms    Normal sinus rhythm  Normal ECG  When compared with ECG of 04-SEP-2022 09:06,  No significant change was found  Confirmed by Ji Martinez MD ([id number]) on 7/10/2023 12:29:42 PM    Referred By: [email or web address]   SELF           Confirmed By:Ji Martinez MD                                  Imaging Results              CT  Abdomen Pelvis With Contrast (Final result)  Result time 07/10/23 12:13:38      Final result by Berna Anna MD (07/10/23 12:13:38)                   Impression:      Imaging findings suggestive for intrinsic hepatic disease/cirrhosis with a heterogenous appearance of the liver with regions of focal fatty infiltration as well as splenomegaly.    Mild thickening of the walls of the stomach which could reflect a portal gastropathy.    Liquid stool in the right colon with surrounding pericolonic inflammation.  This was seen on prior studies is also likely related to underlying liver disease.  Component of colitis not excluded.    Nonobstructing right-sided nephrolithiasis.      Electronically signed by: Berna Anna MD  Date:    07/10/2023  Time:    12:13               Narrative:    EXAMINATION:  CT ABDOMEN PELVIS WITH CONTRAST    CLINICAL HISTORY:  Nausea/vomiting;Epigastric pain;hx cirrhosis;    TECHNIQUE:  Low dose axial images, sagittal and coronal reformations were obtained from the lung bases to the pubic symphysis following the IV administration of 75 mL of Omnipaque 350 .  Oral contrast was not given.    COMPARISON:  07/28/2022    FINDINGS:  The lung bases are clear.  Base of heart appears normal.  Calcified atheromatous disease affects the aorta and its major branch vessels.    No radiopaque gallstones are seen.  No intrahepatic or extrahepatic biliary ductal dilatation is identified.  The liver is slightly heterogenous in appearance with regions of focal fatty infiltration noted.  There is a cyst of the right hepatic lobe that measures 1 cm.  The spleen is enlarged.  The pancreas and adrenal glands are unremarkable.  Both kidneys are normal in size.  There are multiple nonobstructing stones of the right kidney, the largest at the midpole measuring 1 cm.  No hydronephrosis or hydroureter is seen no left-sided nephrolithiasis.  There is beam hardening artifact related to the patient's right hip  prosthesis which partially limits evaluation of the bladder however at has a relatively normal appearance.  The uterus has been removed.  Adnexal regions appear normal.    Mild stomach wall thickening.  Moderate colonic stool retention.  Liquid stool in the right colon with some surrounding pericolonic inflammation.  This appears similar to prior examination of 07/28/2022.  The appendix is normal.  No free air, free fluid or obstruction.  No pathologically enlarged abdominal or pelvic lymph nodes are seen.    Right hip prosthesis.  Remote compression fracture of the superior endplate of L3, chronic.  Mild scoliotic curvature of the thoracolumbar spine.                                       X-Ray Chest AP Portable (Final result)  Result time 07/10/23 10:33:01      Final result by Berna Anna MD (07/10/23 10:33:01)                   Impression:      No acute abnormality.      Electronically signed by: Berna Anna MD  Date:    07/10/2023  Time:    10:33               Narrative:    EXAMINATION:  XR CHEST AP PORTABLE    CLINICAL HISTORY:  Shortness of breath    TECHNIQUE:  Single frontal view of the chest was performed.    COMPARISON:  07/28/2022    FINDINGS:  The lungs are clear with normal appearance of pulmonary vasculature. No pleural effusion. No evident pneumothorax.    The cardiac silhouette is normal in size. The hilar and mediastinal contours are unremarkable.    Bones are intact.                                       Medications   0.9%  NaCl infusion (0 mLs Intravenous Stopped 7/10/23 1300)   ondansetron injection 4 mg (4 mg Intravenous Given 7/10/23 1115)   iohexoL (OMNIPAQUE 350) injection 75 mL (75 mLs Intravenous Given 7/10/23 1204)     Medical Decision Making:   Differential Diagnosis:   Anemia, GI Bleed, PE, CHF, pneumonia  Independently Interpreted Test(s):   I have ordered and independently interpreted EKG Reading(s) - see summary below       <> Summary of EKG Reading(s): NSR, rate 61. Normal  PA and QRS intervals. No STEMI.   No significant change when compared to EKG of 09/22'  Clinical Tests:   Lab Tests: Ordered and Reviewed  Radiological Study: Ordered and Reviewed  ED Management:  Evaluation of a 53-year-old female with generalized weakness, nausea, and SOB. Feeling weak for the past few days, worse today.  She presents with stable vital signs.  Physical exam is grossly normal. No abdominal ascites or peripheral edema on exam.   Abdomen is soft and nontender.   Lab workup is grossly normal. CXR is clear.   IV fluids given in addition to zofran for nausea. Patient concerned due to weakness with continuous nausea and loose stool> requesting imaging. CT abd/pelvis completed that shows Imaging findings suggestive for intrinsic hepatic disease/cirrhosis with a heterogenous appearance of the liver with regions of focal fatty infiltration as well as splenomegaly.     Mild thickening of the walls of the stomach which could reflect a portal gastropathy.     Liquid stool in the right colon with surrounding pericolonic inflammation.  This was seen on prior studies is also likely related to underlying liver disease.  Component of colitis not excluded.     Nonobstructing right-sided nephrolithiasis.    She is feeling better after IV fluids. She will follow-up with GI. CT showed remote compression fx of L3> patient will follow-up with ortho. Patient/caregiver voices understanding and feels comfortable with discharge plan.      The patient acknowledges that close follow up with medical provider is required. Instructed to follow up with PCP within 2 days. Patient was given specific return precautions. The patient agrees to comply with all instruction and directions given in the ER.                          Clinical Impression:   Final diagnoses:  [R11.0] Nausea (Primary)        ED Disposition Condition    Discharge Stable          ED Prescriptions       Medication Sig Dispense Start Date End Date Auth. Provider     ondansetron (ZOFRAN-ODT) 4 MG TbDL Take 1 tablet (4 mg total) by mouth every 8 (eight) hours as needed (nausea). 9 tablet 7/10/2023 7/13/2023 Gladys Ji NP          Follow-up Information       Follow up With Specialties Details Why Contact Info    James Samano MD Family Medicine Schedule an appointment as soon as possible for a visit in 2 days  [address]  [facility] [place]  [place] [address]  [phone]               Gladys Ji NP  07/10/23 1616

## 2023-07-10 NOTE — TELEPHONE ENCOUNTER
Call to pt  In ER at [facility] right now, they are doing a urine test now. She states her legs are swollen and she does not feel good. I assured her I would let Dr. Bowers know and will watch her chart----- Message from Aditya Rodriguez sent at 7/10/2023  9:18 AM CDT -----  Name of Who is Calling:RANDI DÍAZ [[id number]]            What is the request in detail: Pt is calling because she states that she has the shakes, her urin is bright yellow and states she going to go an do some blood work but wanted you to know. Pt states she is also down 130 pounds.. Please call back to further assist.      Can the clinic reply by [facility]: No     What Number to Call Back [facility] not [facility]: [phone]

## 2023-07-12 ENCOUNTER — TELEPHONE (OUTPATIENT)
Dept: HEPATOLOGY | Facility: CLINIC | Age: 53
End: 2023-07-12
Payer: COMMERCIAL

## 2023-07-12 NOTE — TELEPHONE ENCOUNTER
Call to mrs. Oliveira, she said she feels weak. Losing weight. Eats bland foods. Going straight through her. Going to try more proteins to build strength.   Asking her MELD score. I assured I would ask and let her know.

## 2023-07-25 ENCOUNTER — PATIENT MESSAGE (OUTPATIENT)
Dept: HEPATOLOGY | Facility: CLINIC | Age: 53
End: 2023-07-25
Payer: COMMERCIAL

## 2023-07-25 ENCOUNTER — TELEPHONE (OUTPATIENT)
Dept: HEPATOLOGY | Facility: CLINIC | Age: 53
End: 2023-07-25
Payer: COMMERCIAL

## 2023-07-25 ENCOUNTER — HOSPITAL ENCOUNTER (OUTPATIENT)
Dept: RADIOLOGY | Facility: HOSPITAL | Age: 53
Discharge: HOME | End: 2023-07-25
Attending: INTERNAL MEDICINE
Payer: COMMERCIAL

## 2023-07-25 DIAGNOSIS — K74.60 LIVER CIRRHOSIS SECONDARY TO NASH: ICD-10-CM

## 2023-07-25 DIAGNOSIS — K75.81 LIVER CIRRHOSIS SECONDARY TO NASH: ICD-10-CM

## 2023-07-25 DIAGNOSIS — R10.11 RUQ PAIN: Primary | ICD-10-CM

## 2023-07-25 PROCEDURE — 76700 US ABDOMEN COMPLETE: ICD-10-PCS | Mod: 26,,, | Performed by: RADIOLOGY

## 2023-07-25 PROCEDURE — 76700 US EXAM ABDOM COMPLETE: CPT | Mod: TC

## 2023-07-25 PROCEDURE — 76700 US EXAM ABDOM COMPLETE: CPT | Mod: 26,,, | Performed by: RADIOLOGY

## 2023-07-25 NOTE — TELEPHONE ENCOUNTER
"Called back spoke with her  and he reached out to Mrs. Oliveira to see when she wanted to go get labs done. -she wants to have labs tomorrow at [place]---- Message from Alonzo Raza sent at 7/25/2023 11:41 AM CDT -----  Consult/Advisory:          Name Of Caller: Lavelle Aranda (Spouse)       Contact Preference?: [phone]       Provider Name: Bzowej      What is the nature of the call?: Inquiring if pt needed blood work done before 7/31 appt          Additional Notes:  "Thank you for all that you do for our patients"      "

## 2023-07-25 NOTE — TELEPHONE ENCOUNTER
Called pt re abdo US and Impacted stone noted within the gallbladder lumen  --pt having pain and losing weight- can't keep weight down.    Plan: urgent HIDA scan and appt to see Dr Frank for possible cholecystectomy

## 2023-07-25 NOTE — TELEPHONE ENCOUNTER
"----- Message from Alonzo Raza sent at 7/25/2023 11:41 AM CDT -----  Consult/Advisory:          Name Of Caller: Lavelle Aranda (Spouse)       Contact Preference?: [phone]       Provider Name: Bzowej      What is the nature of the call?: Inquiring if pt needed blood work done before 7/31 appt          Additional Notes:  "Thank you for all that you do for our patients"      "

## 2023-07-26 ENCOUNTER — PATIENT MESSAGE (OUTPATIENT)
Dept: TRANSPLANT | Facility: CLINIC | Age: 53
End: 2023-07-26
Payer: COMMERCIAL

## 2023-07-26 ENCOUNTER — LAB VISIT (OUTPATIENT)
Dept: LAB | Facility: HOSPITAL | Age: 53
End: 2023-07-26
Attending: INTERNAL MEDICINE
Payer: COMMERCIAL

## 2023-07-26 DIAGNOSIS — K75.81 LIVER CIRRHOSIS SECONDARY TO NASH: Chronic | ICD-10-CM

## 2023-07-26 DIAGNOSIS — K70.30 ALCOHOLIC CIRRHOSIS OF LIVER WITHOUT ASCITES: ICD-10-CM

## 2023-07-26 DIAGNOSIS — K74.60 LIVER CIRRHOSIS SECONDARY TO NASH: Chronic | ICD-10-CM

## 2023-07-26 LAB
AFP SERPL-MCNC: 3 NG/ML (ref 0–8.4)
ALBUMIN SERPL BCP-MCNC: 4.3 G/DL (ref 3.5–5.2)
ALP SERPL-CCNC: 86 U/L (ref 55–135)
ALT SERPL W/O P-5'-P-CCNC: 23 U/L (ref 10–44)
ANION GAP SERPL CALC-SCNC: 8 MMOL/L (ref 8–16)
AST SERPL-CCNC: 24 U/L (ref 10–40)
BASOPHILS # BLD AUTO: 0.02 K/UL (ref 0–0.2)
BASOPHILS NFR BLD: 0.4 % (ref 0–1.9)
BILIRUB DIRECT SERPL-MCNC: 0.4 MG/DL (ref 0.1–0.3)
BILIRUB SERPL-MCNC: 1.2 MG/DL (ref 0.1–1)
BUN SERPL-MCNC: 18 MG/DL (ref 6–20)
CALCIUM SERPL-MCNC: 10.2 MG/DL (ref 8.7–10.5)
CHLORIDE SERPL-SCNC: 107 MMOL/L (ref 95–110)
CO2 SERPL-SCNC: 26 MMOL/L (ref 23–29)
CREAT SERPL-MCNC: 0.8 MG/DL (ref 0.5–1.4)
DIFFERENTIAL METHOD: ABNORMAL
EOSINOPHIL # BLD AUTO: 0.1 K/UL (ref 0–0.5)
EOSINOPHIL NFR BLD: 1.1 % (ref 0–8)
ERYTHROCYTE [DISTWIDTH] IN BLOOD BY AUTOMATED COUNT: 12.9 % (ref 11.5–14.5)
EST. GFR  (NO RACE VARIABLE): >60 ML/MIN/1.73 M^2
GLUCOSE SERPL-MCNC: 104 MG/DL (ref 70–110)
HCT VFR BLD AUTO: 39.6 % (ref 37–48.5)
HGB BLD-MCNC: 13.6 G/DL (ref 12–16)
IMM GRANULOCYTES # BLD AUTO: 0.03 K/UL (ref 0–0.04)
IMM GRANULOCYTES NFR BLD AUTO: 0.6 % (ref 0–0.5)
INR PPP: 1.1 (ref 0.8–1.2)
LYMPHOCYTES # BLD AUTO: 0.8 K/UL (ref 1–4.8)
LYMPHOCYTES NFR BLD: 16.4 % (ref 18–48)
MCH RBC QN AUTO: 30.8 PG (ref 27–31)
MCHC RBC AUTO-ENTMCNC: 34.3 G/DL (ref 32–36)
MCV RBC AUTO: 90 FL (ref 82–98)
MONOCYTES # BLD AUTO: 0.3 K/UL (ref 0.3–1)
MONOCYTES NFR BLD: 7.2 % (ref 4–15)
NEUTROPHILS # BLD AUTO: 3.5 K/UL (ref 1.8–7.7)
NEUTROPHILS NFR BLD: 74.3 % (ref 38–73)
NRBC BLD-RTO: 0 /100 WBC
PLATELET # BLD AUTO: 72 K/UL (ref 150–450)
PMV BLD AUTO: 11.6 FL (ref 9.2–12.9)
POTASSIUM SERPL-SCNC: 3.7 MMOL/L (ref 3.5–5.1)
PROT SERPL-MCNC: 7 G/DL (ref 6–8.4)
PROTHROMBIN TIME: 12.1 SEC (ref 9–12.5)
RBC # BLD AUTO: 4.42 M/UL (ref 4–5.4)
SODIUM SERPL-SCNC: 141 MMOL/L (ref 136–145)
WBC # BLD AUTO: 4.75 K/UL (ref 3.9–12.7)

## 2023-07-26 PROCEDURE — 82105 ALPHA-FETOPROTEIN SERUM: CPT | Performed by: INTERNAL MEDICINE

## 2023-07-26 PROCEDURE — 85025 COMPLETE CBC W/AUTO DIFF WBC: CPT | Performed by: INTERNAL MEDICINE

## 2023-07-26 PROCEDURE — 82248 BILIRUBIN DIRECT: CPT | Performed by: INTERNAL MEDICINE

## 2023-07-26 PROCEDURE — 80321 ALCOHOLS BIOMARKERS 1OR 2: CPT | Performed by: INTERNAL MEDICINE

## 2023-07-26 PROCEDURE — 36415 COLL VENOUS BLD VENIPUNCTURE: CPT | Performed by: INTERNAL MEDICINE

## 2023-07-26 PROCEDURE — 85610 PROTHROMBIN TIME: CPT | Performed by: INTERNAL MEDICINE

## 2023-07-26 PROCEDURE — 80053 COMPREHEN METABOLIC PANEL: CPT | Performed by: INTERNAL MEDICINE

## 2023-07-26 RX ORDER — CARVEDILOL 3.12 MG/1
TABLET ORAL
Qty: 180 TABLET | Refills: 3 | Status: SHIPPED | OUTPATIENT
Start: 2023-07-26

## 2023-07-27 ENCOUNTER — EVALUATION (OUTPATIENT)
Dept: TRANSPLANT | Facility: CLINIC | Age: 53
End: 2023-07-27
Payer: COMMERCIAL

## 2023-07-27 ENCOUNTER — HOSPITAL ENCOUNTER (OUTPATIENT)
Dept: RADIOLOGY | Facility: HOSPITAL | Age: 53
Discharge: HOME | End: 2023-07-27
Attending: INTERNAL MEDICINE
Payer: COMMERCIAL

## 2023-07-27 VITALS
WEIGHT: 129.63 LBS | SYSTOLIC BLOOD PRESSURE: 136 MMHG | TEMPERATURE: 97 F | BODY MASS INDEX: 19.65 KG/M2 | HEIGHT: 68 IN | DIASTOLIC BLOOD PRESSURE: 71 MMHG | OXYGEN SATURATION: 98 % | HEART RATE: 79 BPM | RESPIRATION RATE: 16 BRPM

## 2023-07-27 DIAGNOSIS — R10.11 RUQ PAIN: ICD-10-CM

## 2023-07-27 DIAGNOSIS — K74.60 LIVER CIRRHOSIS SECONDARY TO NASH: Primary | Chronic | ICD-10-CM

## 2023-07-27 DIAGNOSIS — K75.81 LIVER CIRRHOSIS SECONDARY TO NASH: Primary | Chronic | ICD-10-CM

## 2023-07-27 PROCEDURE — 78226 HEPATOBILIARY SYSTEM IMAGING: CPT | Mod: TC

## 2023-07-27 PROCEDURE — 99999 PR PBB SHADOW E&M-EST. PATIENT-LVL III: CPT | Mod: PBBFAC,,,

## 2023-07-27 PROCEDURE — 78226 NM HEPATOBILIARY IMAGING INC GB (HIDA): ICD-10-PCS | Mod: 26,,, | Performed by: RADIOLOGY

## 2023-07-27 PROCEDURE — 99205 PR OFFICE/OUTPT VISIT, NEW, LEVL V, 60-74 MIN: ICD-10-PCS | Mod: S$GLB,,, | Performed by: TRANSPLANT SURGERY

## 2023-07-27 PROCEDURE — 99999 PR PBB SHADOW E&M-EST. PATIENT-LVL III: ICD-10-PCS | Mod: PBBFAC,,,

## 2023-07-27 PROCEDURE — 99205 OFFICE O/P NEW HI 60 MIN: CPT | Mod: S$GLB,,, | Performed by: TRANSPLANT SURGERY

## 2023-07-27 PROCEDURE — 78226 HEPATOBILIARY SYSTEM IMAGING: CPT | Mod: 26,,, | Performed by: RADIOLOGY

## 2023-07-27 NOTE — PROGRESS NOTES
Liver Transplant / Hepatobiliary Surgery  Clinic Note    Referring Provider: Bonnie Bowers MD     Subjective:     Reason for Visit: Colic abdominal pain    History of Present Illness: Tee Aranda is a 53 y.o. female referred for consultation regarding colicky abdominal pain. she initially presented with CARTAGENA cirrhosis with decompensation last year. She has lost a substantial amount of weight and now has compensated cirrhosis of the liver with portal hypertension. Over the past month she has developed severe intermittent abdominal pain, nausea and vomiting, PO intolerance, diarrhea and weight loss. An abdominal US did not show signs of acute cholecystitis, but did demontrate a gallstone with concern for impaction. Subsequent HIDA scan today demonstrated normal uptake and excretion without any signs of biliary obstruction or stones. She reports severe fatigue and muscle wasting despite continued efforts at nutrition supplementation and exercise.    Review of Systems   Constitutional:  Negative for chills and fever.   HENT:  Negative for congestion and tinnitus.    Eyes:  Negative for discharge and redness.   Respiratory:  Negative for cough and shortness of breath.    Cardiovascular:  Negative for chest pain and palpitations.   Gastrointestinal:  Negative for diarrhea and vomiting.   Genitourinary:  Negative for frequency and urgency.   Musculoskeletal:  Negative for joint pain and myalgias.   Neurological:  Negative for tingling and weakness.   Endo/Heme/Allergies:  Does not bruise/bleed easily.   Psychiatric/Behavioral:  Negative for depression and suicidal ideas.       Objective:     Physical Exam  HENT:      Head: Normocephalic and atraumatic.   Eyes:      Pupils: Pupils are equal, round, and reactive to light.   Cardiovascular:      Rate and Rhythm: Normal rate and regular rhythm.   Pulmonary:      Effort: Pulmonary effort is normal. No respiratory distress.   Abdominal:      Palpations: Abdomen is  soft.      Tenderness: There is no guarding.   Musculoskeletal:         General: No tenderness. Normal range of motion.      Cervical back: Normal range of motion.   Lymphadenopathy:      Cervical: No cervical adenopathy.   Skin:     General: Skin is warm and dry.   Neurological:      Mental Status: She is alert and oriented to person, place, and time.      Cranial Nerves: No cranial nerve deficit.   Psychiatric:         Mood and Affect: Affect normal.         Judgment: Judgment normal.          MELD-Na score: MELD 3.0: 9 at 7/26/2023 11:05 AM  MELD-Na: 8 at 7/26/2023 11:05 AM  Calculated from:  Serum Creatinine: 0.8 mg/dL (Using min of 1 mg/dL) at 7/26/2023 11:05 AM  Serum Sodium: 141 mmol/L (Using max of 137 mmol/L) at 7/26/2023 11:05 AM  Total Bilirubin: 1.2 mg/dL at 7/26/2023 11:05 AM  Serum Albumin: 4.3 g/dL (Using max of 3.5 g/dL) at 7/26/2023 11:05 AM  INR(ratio): 1.1 at 7/26/2023 11:05 AM  Age at listing (hypothetical): 53 years  Sex: Female at 7/26/2023 11:05 AM       Sodium   Date Value Ref Range Status   07/26/2023 141 136 - 145 mmol/L Final     Potassium   Date Value Ref Range Status   07/26/2023 3.7 3.5 - 5.1 mmol/L Final     Chloride   Date Value Ref Range Status   07/26/2023 107 95 - 110 mmol/L Final     CO2   Date Value Ref Range Status   07/26/2023 26 23 - 29 mmol/L Final     Glucose   Date Value Ref Range Status   07/26/2023 104 70 - 110 mg/dL Final     BUN   Date Value Ref Range Status   07/26/2023 18 6 - 20 mg/dL Final     Creatinine   Date Value Ref Range Status   07/26/2023 0.8 0.5 - 1.4 mg/dL Final     Calcium   Date Value Ref Range Status   07/26/2023 10.2 8.7 - 10.5 mg/dL Final     Total Protein   Date Value Ref Range Status   07/26/2023 7.0 6.0 - 8.4 g/dL Final     Albumin   Date Value Ref Range Status   07/26/2023 4.3 3.5 - 5.2 g/dL Final     Total Bilirubin   Date Value Ref Range Status   07/26/2023 1.2 (H) 0.1 - 1.0 mg/dL Final     Comment:     For infants and newborns, interpretation of  results should be based  on gestational age, weight and in agreement with clinical  observations.    Premature Infant recommended reference ranges:  Up to 24 hours.............<8.0 mg/dL  Up to 48 hours............<12.0 mg/dL  3-5 days..................<15.0 mg/dL  6-29 days.................<15.0 mg/dL       Alkaline Phosphatase   Date Value Ref Range Status   07/26/2023 86 55 - 135 U/L Final     AST   Date Value Ref Range Status   07/26/2023 24 10 - 40 U/L Final     ALT   Date Value Ref Range Status   07/26/2023 23 10 - 44 U/L Final     Anion Gap   Date Value Ref Range Status   07/26/2023 8 8 - 16 mmol/L Final     eGFR if    Date Value Ref Range Status   07/28/2022 >60 >60 mL/min/1.73 m^2 Final     eGFR if non    Date Value Ref Range Status   07/28/2022 >60 >60 mL/min/1.73 m^2 Final     Comment:     Calculation used to obtain the estimated glomerular filtration  rate (eGFR) is the CKD-EPI equation.        Lab Results   Component Value Date    WBC 4.75 07/26/2023    HGB 13.6 07/26/2023    HCT 39.6 07/26/2023    MCV 90 07/26/2023    PLT 72 (L) 07/26/2023       Lab Results   Component Value Date    INR 1.1 07/26/2023    INR 1.1 07/10/2023    INR 1.1 06/08/2023       Oncology History    No history exists.           Diagnoses:  Problem List Items Addressed This Visit    None       Assessment/Plan:     I reviewed available imaging with the patient and her family in clinic today and discussed in detail the diagnosis of biliary colick. Although there was some evidence of cholelithiasis on US, the HIDA scan did not demonstrate any bile duct occlusion or abnormal GB function. Although her abdominal pain is colicky in nature, the remainder of her symptoms are not necessarily explained by biliary colick in the absence of obstruction. Given the marked increase risk of surgery with her cirrhosis and portal hypertension, I do not believe cholecystectomy is indicated at this time. She will be seeing  Dr Bowers on Monday who may consider a more extensive GI work up. I also reviewed her recent CT scan that did not demonstrate any obvious cause for her pain. Of note her pain in also in the context of severe worsening fatigue, weight loss and muscle wasting. Despite her low MELD score, progression of her liver disease could be a significant contributing factor. I discussed the potential role for liver transplant in the future.       Eric Frank MD  Liver Transplant / Hepatobiliary Surgery  [facility]

## 2023-07-28 LAB
CLINICAL BIOCHEMIST REVIEW: NORMAL
PLPETH BLD-MCNC: NORMAL NG/ML
POPETH BLD-MCNC: <10 NG/ML

## 2023-07-31 ENCOUNTER — OFFICE VISIT (OUTPATIENT)
Dept: HEPATOLOGY | Facility: CLINIC | Age: 53
End: 2023-07-31
Payer: COMMERCIAL

## 2023-07-31 VITALS
TEMPERATURE: 99 F | DIASTOLIC BLOOD PRESSURE: 74 MMHG | WEIGHT: 130 LBS | HEART RATE: 78 BPM | OXYGEN SATURATION: 97 % | HEIGHT: 68 IN | BODY MASS INDEX: 19.7 KG/M2 | SYSTOLIC BLOOD PRESSURE: 128 MMHG

## 2023-07-31 DIAGNOSIS — I85.10 SECONDARY ESOPHAGEAL VARICES WITHOUT BLEEDING: ICD-10-CM

## 2023-07-31 DIAGNOSIS — K31.89 PORTAL HYPERTENSIVE GASTROPATHY: ICD-10-CM

## 2023-07-31 DIAGNOSIS — R10.13 EPIGASTRIC PAIN: ICD-10-CM

## 2023-07-31 DIAGNOSIS — K76.6 PORTAL HYPERTENSIVE GASTROPATHY: ICD-10-CM

## 2023-07-31 DIAGNOSIS — R19.7 DIARRHEA OF PRESUMED INFECTIOUS ORIGIN: ICD-10-CM

## 2023-07-31 DIAGNOSIS — K75.81 LIVER CIRRHOSIS SECONDARY TO NASH: Primary | Chronic | ICD-10-CM

## 2023-07-31 DIAGNOSIS — R19.7 DIARRHEA, UNSPECIFIED TYPE: ICD-10-CM

## 2023-07-31 DIAGNOSIS — K74.60 LIVER CIRRHOSIS SECONDARY TO NASH: Primary | Chronic | ICD-10-CM

## 2023-07-31 PROCEDURE — 3008F BODY MASS INDEX DOCD: CPT | Mod: CPTII,S$GLB,, | Performed by: INTERNAL MEDICINE

## 2023-07-31 PROCEDURE — 1159F PR MEDICATION LIST DOCUMENTED IN MEDICAL RECORD: ICD-10-PCS | Mod: CPTII,S$GLB,, | Performed by: INTERNAL MEDICINE

## 2023-07-31 PROCEDURE — 99215 OFFICE O/P EST HI 40 MIN: CPT | Mod: S$GLB,,, | Performed by: INTERNAL MEDICINE

## 2023-07-31 PROCEDURE — 3078F DIAST BP <80 MM HG: CPT | Mod: CPTII,S$GLB,, | Performed by: INTERNAL MEDICINE

## 2023-07-31 PROCEDURE — 3074F PR MOST RECENT SYSTOLIC BLOOD PRESSURE < 130 MM HG: ICD-10-PCS | Mod: CPTII,S$GLB,, | Performed by: INTERNAL MEDICINE

## 2023-07-31 PROCEDURE — 3078F PR MOST RECENT DIASTOLIC BLOOD PRESSURE < 80 MM HG: ICD-10-PCS | Mod: CPTII,S$GLB,, | Performed by: INTERNAL MEDICINE

## 2023-07-31 PROCEDURE — 99999 PR PBB SHADOW E&M-EST. PATIENT-LVL IV: CPT | Mod: PBBFAC,,, | Performed by: INTERNAL MEDICINE

## 2023-07-31 PROCEDURE — 99999 PR PBB SHADOW E&M-EST. PATIENT-LVL IV: ICD-10-PCS | Mod: PBBFAC,,, | Performed by: INTERNAL MEDICINE

## 2023-07-31 PROCEDURE — 3074F SYST BP LT 130 MM HG: CPT | Mod: CPTII,S$GLB,, | Performed by: INTERNAL MEDICINE

## 2023-07-31 PROCEDURE — 99215 PR OFFICE/OUTPT VISIT, EST, LEVL V, 40-54 MIN: ICD-10-PCS | Mod: S$GLB,,, | Performed by: INTERNAL MEDICINE

## 2023-07-31 PROCEDURE — 3008F PR BODY MASS INDEX (BMI) DOCUMENTED: ICD-10-PCS | Mod: CPTII,S$GLB,, | Performed by: INTERNAL MEDICINE

## 2023-07-31 PROCEDURE — 1159F MED LIST DOCD IN RCRD: CPT | Mod: CPTII,S$GLB,, | Performed by: INTERNAL MEDICINE

## 2023-07-31 RX ORDER — VANCOMYCIN HYDROCHLORIDE 125 MG/1
125 CAPSULE ORAL EVERY 6 HOURS
Qty: 56 CAPSULE | Refills: 0 | Status: ON HOLD | OUTPATIENT
Start: 2023-07-31 | End: 2023-08-03 | Stop reason: HOSPADM

## 2023-07-31 RX ORDER — ESOMEPRAZOLE MAGNESIUM 40 MG/1
40 CAPSULE, DELAYED RELEASE ORAL
Qty: 60 CAPSULE | Refills: 11 | Status: SHIPPED | OUTPATIENT
Start: 2023-07-31 | End: 2024-07-30

## 2023-07-31 NOTE — PATIENT INSTRUCTIONS
Change pantoprazole to nexium  Call Dr Salinas for urgent EGD  Stool check for infections  Start vancomycin  [phone]

## 2023-07-31 NOTE — PROGRESS NOTES
HEPATOLOGY FOLLOW UP    Referring Physician: James Samano MD  Current Corresponding Physician: James Samano MD, Gerard Salinas MD    Tee Aranda is here for follow up of decompensated cirrhosis    HPI   Tee Aranda was seen in consultation by my colleague Dr White 08/20. He noted:  This 53-year-old woman was referred for evaluation of cirrhosis.  She noticed easy bruisability.  She was found have a low platelet count.  She was assessed by Hematology.  She had a bone marrow aspirate and biopsy which was normal.  She eventually had an upper endoscopy which showed features of portal hypertension and grade 1 varices.  A FibroScan confirmed significant fatty liver as well as advanced fibrosis and cirrhosis.  Her weight was as high as 199 lb.  She has lost 26 lb after the diagnosis.  There is no family history of liver disease. She drinks alcohol socially.  She has mild ankle edema.  She has no symptoms of hepatic encephalopathy.  She initially had no symptoms of GI bleeding.    Admission 11/9/20: melena and weakenss. Hemoglobin fell to 5.9 and she was transfused 2 units of PRBC. EGD showed PHG and small varices. The finding was similar to an EGD done earlier this year (03/20). Small varices werenoted at that time as well. Coreg and protonix were prescribed but never started.  Admission 12/28/20: gastrintestinal hemorrhage and melena; EGD: esophageal varices banded  EGD 2/15/21: EV not banded; repeat 6 months recommended  ER visit 7/19/21 for hematemasis; hemoglobin stable at 12.5; LEFT AGAINST MEDICAL ADVICE    Interval History  Main complaint today is abdominal pain/epigastric pain. She also has diarrhea (watery and multiple stools per day) and is losing weight. Her stools are foul-smelling. She has lost about 20 lbs in the last 4-6 weeks. An abdo US 7/25/23 suggested a stone impacted in the GB lumen. CT abdo 7/10/23: No radiopaque gallstones are seen.  No intrahepatic or extrahepatic biliary ductal  dilatation is identified. HIDA scan 23: Normal HIDA scan.  No evidence of acute cholecystitis, cystic duct obstruction, or common bile duct obstruction.    She remains off lactulose and diuretics, but is back on rifaximin. She states she had become confused. She continues to have not no bleeding issues.     HE: no longer taking lactulose but is on rifaximin  Ascites/edema, resolved- off lasix and aldactone  HCC screening abdo US 22: small liver cyst; no solid lesions  EGD 10/5/22: small varices; repeat EGD 10/23  EGD 22: varices banded  EGD 22: EV banded    Her BMI was 22.9. Now her BMI is 19.77 (weight 130 lbs -was 150 lbs  Serologic w/u for CLD-negative.     Labs 23: Tbil 1.2, ALT 23, AST 24, ALKP 86  MELD 3.0: 9 at 2023 11:05 AM  MELD-Na: 8 at 2023 11:05 AM  Calculated from:  Serum Creatinine: 0.8 mg/dL (Using min of 1 mg/dL) at 2023 11:05 AM  Serum Sodium: 141 mmol/L (Using max of 137 mmol/L) at 2023 11:05 AM  Total Bilirubin: 1.2 mg/dL at 2023 11:05 AM  Serum Albumin: 4.3 g/dL (Using max of 3.5 g/dL) at 2023 11:05 AM  INR(ratio): 1.1 at 2023 11:05 AM  Age at listing (hypothetical): 53 years  Sex: Female at 2023 11:05 AM      Outpatient Encounter Medications as of 2023   Medication Sig Dispense Refill    carvediloL (COREG) 3.125 MG tablet TAKE 1 TABLET BY MOUTH 2 TIMES DAILY. 180 tablet 3    multivitamin (THERAGRAN) per tablet Take 1 tablet by mouth once daily.      omega-3 fatty acids/fish oil (FISH OIL-OMEGA-3 FATTY ACIDS) 300-1,000 mg capsule Take 1 capsule by mouth once daily.      pantoprazole (PROTONIX) 40 MG tablet TAKE 1 TABLET BY MOUTH TWICE A  tablet 4    rifAXIMin (XIFAXAN) 550 mg Tab Take 1 tablet (550 mg total) by mouth 2 (two) times daily. 60 tablet 11    UNABLE TO FIND 4 (four) times daily as needed. Medible 20 mg blue raspberry 1/4 to 1/2 up to 4 times daily as needed.      [] ondansetron (ZOFRAN-ODT) 4 MG TbDL  Take 1 tablet (4 mg total) by mouth every 8 (eight) hours as needed (nausea). 9 tablet 0    [DISCONTINUED] carvediloL (COREG) 3.125 MG tablet Take 1 tablet (3.125 mg total) by mouth 2 (two) times daily. 60 tablet 11    [DISCONTINUED] dicyclomine (BENTYL) 20 mg tablet Take 20 mg by mouth 2 (two) times daily. Take 1 tablet by mouth twice a day prior to breakfast and supper.      [DISCONTINUED] traMADoL (ULTRAM) 50 mg tablet tramadol 50 mg tablet   TAKE 1 TO 2 TABLETS BY MOUTH 3 TIMES A DAY AS NEEDED FOR PAIN       Facility-Administered Encounter Medications as of 7/31/2023   Medication Dose Route Frequency Provider Last Rate Last Admin    0.9%  NaCl infusion   Intravenous Continuous Gerard Salinas MD   Stopped at 03/30/23 0922     Review of patient's allergies indicates:   Allergen Reactions    Sulfa (sulfonamide antibiotics) Hives     Past Medical History:   Diagnosis Date    Anemia, unspecified     Anxiety     Arthritis     Ascites 11/23/2020    AVN (avascular necrosis of bone)     Cataract     COVID-19 vaccine administered     04/08/21, 04/30/21    Edema 11/23/2020    Esophageal varices     Glaucoma     Hepatic encephalopathy 11/23/2020    History of colon polyps     Hx of cirrhosis     non-alcoholic    Iron deficiency anemia secondary to blood loss (chronic)     Kidney stones     Portal hypertensive gastropathy     Unspecified cirrhosis of liver        Review of Systems   Constitutional: Negative.    HENT: Negative.     Eyes: Negative.    Respiratory: Negative.     Cardiovascular: Negative.    Gastrointestinal: Negative.    Genitourinary: Negative.    Musculoskeletal: Negative.    Skin: Negative.    Neurological: Negative.    Psychiatric/Behavioral: Negative.       Vitals:    07/31/23 1115   BP: 128/74   Pulse: 78   Temp: 99.2 °F (37.3 °C)       Physical Exam  Vitals reviewed.   Constitutional:       Appearance: She is well-developed.   HENT:      Head: Normocephalic and atraumatic.   Eyes:      General: No  scleral icterus.     Conjunctiva/sclera: Conjunctivae normal.      Pupils: Pupils are equal, round, and reactive to light.   Neck:      Thyroid: No thyromegaly.   Cardiovascular:      Rate and Rhythm: Normal rate and regular rhythm.      Heart sounds: Normal heart sounds.   Pulmonary:      Effort: Pulmonary effort is normal.      Breath sounds: Normal breath sounds. No rales.   Abdominal:      General: Bowel sounds are normal. There is no distension.      Palpations: Abdomen is soft. There is no mass.      Tenderness: There is abdominal tenderness (low abdomen).   Musculoskeletal:         General: Normal range of motion.      Cervical back: Normal range of motion and neck supple.   Skin:     General: Skin is warm and dry.      Findings: No rash.   Neurological:      Mental Status: She is alert and oriented to person, place, and time.         MELD 3.0: 9 at 7/26/2023 11:05 AM  MELD-Na: 8 at 7/26/2023 11:05 AM  Calculated from:  Serum Creatinine: 0.8 mg/dL (Using min of 1 mg/dL) at 7/26/2023 11:05 AM  Serum Sodium: 141 mmol/L (Using max of 137 mmol/L) at 7/26/2023 11:05 AM  Total Bilirubin: 1.2 mg/dL at 7/26/2023 11:05 AM  Serum Albumin: 4.3 g/dL (Using max of 3.5 g/dL) at 7/26/2023 11:05 AM  INR(ratio): 1.1 at 7/26/2023 11:05 AM  Age at listing (hypothetical): 53 years  Sex: Female at 7/26/2023 11:05 AM      Lab Results   Component Value Date     07/26/2023    BUN 18 07/26/2023    CREATININE 0.8 07/26/2023    CALCIUM 10.2 07/26/2023     07/26/2023    K 3.7 07/26/2023     07/26/2023    PROT 7.0 07/26/2023    CO2 26 07/26/2023    ANIONGAP 8 07/26/2023    WBC 4.75 07/26/2023    RBC 4.42 07/26/2023    HGB 13.6 07/26/2023    HCT 39.6 07/26/2023    MCV 90 07/26/2023    MCH 30.8 07/26/2023    MCHC 34.3 07/26/2023     Lab Results   Component Value Date    RDW 12.9 07/26/2023    PLT 72 (L) 07/26/2023    MPV 11.6 07/26/2023    GRAN 3.5 07/26/2023    GRAN 74.3 (H) 07/26/2023    LYMPH 0.8 (L) 07/26/2023     LYMPH 16.4 (L) 07/26/2023    MONO 0.3 07/26/2023    MONO 7.2 07/26/2023    EOSINOPHIL 1.1 07/26/2023    BASOPHIL 0.4 07/26/2023    EOS 0.1 07/26/2023    BASO 0.02 07/26/2023    APTT 25.9 07/10/2023    GROUPTRH O POS 09/04/2022    BNP 69 07/10/2023    ALBUMIN 4.3 07/26/2023    BILIDIR 0.4 (H) 07/26/2023    AST 24 07/26/2023    ALT 23 07/26/2023    ALKPHOS 86 07/26/2023    MG 2.0 07/27/2022    LABPROT 12.1 07/26/2023    INR 1.1 07/26/2023       Assessment and Plan:  Tee Aranda is a 53 y.o. female with decompensated cirrhosis. She now has abdominal pain and diarrhea. She may have C diff colitis. I am recommending an infectious w/u for diarrhea and will start vancomycin for presumed C Diff. I am also recommending an urgent EGD for further evaluation of of epigastric pain and have switched pantoprazole to nexium 40 mg bid. Other recommendations:  1. Decompensated cirrhosis-has now improved- no longer decompensated: presumed to be from CARTAGENA; full serologic w/u negative; check meld labs every 12 weeks; HCC screening every 6 months (due next 01/24)  2. Hx GI bleeding and EV: repeat EGD 10/23  3. Ascites/edema, resolved: continue off diuretics  4. HE: continue rifaximin and off lactulose     Return 2 months

## 2023-08-01 ENCOUNTER — TELEPHONE (OUTPATIENT)
Dept: HEPATOLOGY | Facility: CLINIC | Age: 53
End: 2023-08-01
Payer: COMMERCIAL

## 2023-08-01 ENCOUNTER — PATIENT MESSAGE (OUTPATIENT)
Dept: HEPATOLOGY | Facility: CLINIC | Age: 53
End: 2023-08-01
Payer: COMMERCIAL

## 2023-08-03 ENCOUNTER — PATIENT MESSAGE (OUTPATIENT)
Dept: HEPATOLOGY | Facility: CLINIC | Age: 53
End: 2023-08-03
Payer: COMMERCIAL

## 2023-08-03 DIAGNOSIS — R10.13 EPIGASTRIC PAIN: Primary | ICD-10-CM

## 2023-08-04 ENCOUNTER — TELEPHONE (OUTPATIENT)
Dept: HEPATOLOGY | Facility: CLINIC | Age: 53
End: 2023-08-04
Payer: COMMERCIAL

## 2023-08-04 ENCOUNTER — PATIENT MESSAGE (OUTPATIENT)
Dept: HEPATOLOGY | Facility: CLINIC | Age: 53
End: 2023-08-04
Payer: COMMERCIAL

## 2023-08-04 DIAGNOSIS — R19.7 DIARRHEA, UNSPECIFIED TYPE: Primary | ICD-10-CM

## 2023-08-07 ENCOUNTER — LAB VISIT (OUTPATIENT)
Dept: LAB | Facility: HOSPITAL | Age: 53
End: 2023-08-07
Attending: STUDENT IN AN ORGANIZED HEALTH CARE EDUCATION/TRAINING PROGRAM
Payer: COMMERCIAL

## 2023-08-07 DIAGNOSIS — R93.3 ABNORMAL CT SCAN, GASTROINTESTINAL TRACT: ICD-10-CM

## 2023-08-07 DIAGNOSIS — R19.7 DIARRHEA: ICD-10-CM

## 2023-08-07 DIAGNOSIS — Z79.899 ENCOUNTER FOR LONG-TERM (CURRENT) USE OF OTHER MEDICATIONS: ICD-10-CM

## 2023-08-07 DIAGNOSIS — R19.7 DIARRHEA, UNSPECIFIED TYPE: ICD-10-CM

## 2023-08-07 DIAGNOSIS — R63.4 ABNORMAL WEIGHT LOSS: ICD-10-CM

## 2023-08-07 PROCEDURE — 87507 IADNA-DNA/RNA PROBE TQ 12-25: CPT | Performed by: INTERNAL MEDICINE

## 2023-08-07 PROCEDURE — 87209 SMEAR COMPLEX STAIN: CPT | Performed by: INTERNAL MEDICINE

## 2023-08-10 ENCOUNTER — PATIENT MESSAGE (OUTPATIENT)
Dept: HEPATOLOGY | Facility: CLINIC | Age: 53
End: 2023-08-10
Payer: COMMERCIAL

## 2023-08-10 LAB
CAMPY SP DNA.DIARRHEA STL QL NAA+PROBE: NOT DETECTED
CRYPTOSP DNA SPEC QL NAA+PROBE: NOT DETECTED
E COLI O157H7 DNA SPEC QL NAA+PROBE: NOT DETECTED
E HISTOLYT DNA SPEC QL NAA+PROBE: NOT DETECTED
G LAMBLIA DNA SPEC QL NAA+PROBE: NOT DETECTED
GPP - ADENOVIRUS 40/41: NOT DETECTED
GPP - ENTEROTOXIGENIC E COLI (ETEC): NOT DETECTED
GPP - SHIGELLA: NOT DETECTED
LACTATE PLASV-SCNC: NOT DETECTED MMOL/L
NOROVIRUS RNA STL QL NAA+PROBE: NOT DETECTED
RV DSRNA STL QL NAA+PROBE: NOT DETECTED
SALMONELLA DNA SPEC QL NAA+PROBE: NOT DETECTED
V CHOLERAE DNA SPEC QL NAA+PROBE: NOT DETECTED
Y ENTERO RECN STL QL NAA+PROBE: NOT DETECTED

## 2023-08-11 ENCOUNTER — TELEPHONE (OUTPATIENT)
Dept: GASTROENTEROLOGY | Facility: CLINIC | Age: 53
End: 2023-08-11
Payer: COMMERCIAL

## 2023-08-11 NOTE — TELEPHONE ENCOUNTER
"Called Patient P/ "please schedule an appointment with me for next week".    Offered appointment for 8/14 with  for 1:00PM. Pt confirmed and verbalized understanding.   "

## 2023-08-12 LAB
O+P STL MICRO: NORMAL
O+P STL MICRO: NORMAL

## 2023-08-14 ENCOUNTER — OFFICE VISIT (OUTPATIENT)
Dept: GASTROENTEROLOGY | Facility: CLINIC | Age: 53
End: 2023-08-14
Payer: COMMERCIAL

## 2023-08-14 VITALS
SYSTOLIC BLOOD PRESSURE: 118 MMHG | DIASTOLIC BLOOD PRESSURE: 77 MMHG | HEART RATE: 87 BPM | BODY MASS INDEX: 19.71 KG/M2 | WEIGHT: 130.06 LBS | HEIGHT: 68 IN

## 2023-08-14 DIAGNOSIS — R19.7 DIARRHEA, UNSPECIFIED TYPE: Primary | ICD-10-CM

## 2023-08-14 PROCEDURE — 3074F PR MOST RECENT SYSTOLIC BLOOD PRESSURE < 130 MM HG: ICD-10-PCS | Mod: CPTII,S$GLB,, | Performed by: STUDENT IN AN ORGANIZED HEALTH CARE EDUCATION/TRAINING PROGRAM

## 2023-08-14 PROCEDURE — 99214 OFFICE O/P EST MOD 30 MIN: CPT | Mod: S$GLB,,, | Performed by: STUDENT IN AN ORGANIZED HEALTH CARE EDUCATION/TRAINING PROGRAM

## 2023-08-14 PROCEDURE — 3008F BODY MASS INDEX DOCD: CPT | Mod: CPTII,S$GLB,, | Performed by: STUDENT IN AN ORGANIZED HEALTH CARE EDUCATION/TRAINING PROGRAM

## 2023-08-14 PROCEDURE — 3008F PR BODY MASS INDEX (BMI) DOCUMENTED: ICD-10-PCS | Mod: CPTII,S$GLB,, | Performed by: STUDENT IN AN ORGANIZED HEALTH CARE EDUCATION/TRAINING PROGRAM

## 2023-08-14 PROCEDURE — 99999 PR PBB SHADOW E&M-EST. PATIENT-LVL III: ICD-10-PCS | Mod: PBBFAC,,, | Performed by: STUDENT IN AN ORGANIZED HEALTH CARE EDUCATION/TRAINING PROGRAM

## 2023-08-14 PROCEDURE — 3078F DIAST BP <80 MM HG: CPT | Mod: CPTII,S$GLB,, | Performed by: STUDENT IN AN ORGANIZED HEALTH CARE EDUCATION/TRAINING PROGRAM

## 2023-08-14 PROCEDURE — 99999 PR PBB SHADOW E&M-EST. PATIENT-LVL III: CPT | Mod: PBBFAC,,, | Performed by: STUDENT IN AN ORGANIZED HEALTH CARE EDUCATION/TRAINING PROGRAM

## 2023-08-14 PROCEDURE — 99214 PR OFFICE/OUTPT VISIT, EST, LEVL IV, 30-39 MIN: ICD-10-PCS | Mod: S$GLB,,, | Performed by: STUDENT IN AN ORGANIZED HEALTH CARE EDUCATION/TRAINING PROGRAM

## 2023-08-14 PROCEDURE — 3074F SYST BP LT 130 MM HG: CPT | Mod: CPTII,S$GLB,, | Performed by: STUDENT IN AN ORGANIZED HEALTH CARE EDUCATION/TRAINING PROGRAM

## 2023-08-14 PROCEDURE — 1159F MED LIST DOCD IN RCRD: CPT | Mod: CPTII,S$GLB,, | Performed by: STUDENT IN AN ORGANIZED HEALTH CARE EDUCATION/TRAINING PROGRAM

## 2023-08-14 PROCEDURE — 3078F PR MOST RECENT DIASTOLIC BLOOD PRESSURE < 80 MM HG: ICD-10-PCS | Mod: CPTII,S$GLB,, | Performed by: STUDENT IN AN ORGANIZED HEALTH CARE EDUCATION/TRAINING PROGRAM

## 2023-08-14 PROCEDURE — 1159F PR MEDICATION LIST DOCUMENTED IN MEDICAL RECORD: ICD-10-PCS | Mod: CPTII,S$GLB,, | Performed by: STUDENT IN AN ORGANIZED HEALTH CARE EDUCATION/TRAINING PROGRAM

## 2023-08-14 NOTE — PROGRESS NOTES
[facility] Follow-UP Note    Reason for Follow-Up:  Diarrhea, weight loss, abdominal pain     PCP:   James Samano       HPI:  This is a 53 y.o. female previously seen by Dr Leach in 2021 who is presenting for diarrhea, abdominal pain, weight loss. She has a issues with diarrhea from chart review. Her last clinic visit with Dr Leach in 2021 mentions loose to watery stools 4 times per day. Also mentions an associated weight loss at that time. She had tried probiotics. She avoids daily, sorbitol, eggs. Eats low salt and sugar. She had negative celiac serologies. Diarrhea at that time felt different than what is going on currently for patient however. Now she has issues with post prandial diarrhea with urgency for the past 2 months. Stools are watery and occur 5-10 mins after eating. No blood. Associated with belching and gas. Stools are foul smelling which is also different compared to her prior diarrhea complaint in 2021. Stool studies have been unrevealing. CT 7/2023 showed cirrhosis and thickening of stomach and some pericolonic inflammation. The colonic inflammation was noted on prior CT scans and possibly related to liver disease per radiology report. HIDA scan recently negative. She has also been seen recently by surgery for abdominal pain which is in the RUQ. She then had EGD and cscope in 8/2023 which showed normal colon endoscopically and EGD showed small EV and PHTG. Colon biopsies were negative for microscopic colitis. Terminal ileum was not intubated. She was put on bentyl which she took for a few days but caused blurry vision. She also has lost 20 lbs in the past 6 months. She does exercise and watch her diet.     Also has history of CARTAGENA cirrhosis. Has had EV on EGD which have bled and required banding in the past.  Takes xifaxin for HE.         Objective Findings:    Vital Signs:  /77 (BP Location: Left arm, Patient Position: Sitting, BP Method: Small (Automatic))   Pulse  "87   Ht 5' 8" (1.727 m)   Wt 59 kg (130 lb 1.1 oz)   BMI 19.78 kg/m²   Body mass index is 19.78 kg/m².    Physical Exam:  General Appearance: Well appearing in no acute distress  Head:   Normocephalic, without obvious abnormality  Eyes:    No scleral icterus    Lungs: CTA bilaterally in anterior and posterior fields   Heart:  Regular rate and rhythm, no murmurs heard  Abdomen: Soft, non tender, non distended with positive bowel sounds in all four quadrants.     Imaging:    HIDA 7/2023   Impression:     Normal HIDA scan.  No evidence of acute cholecystitis, cystic duct obstruction, or common bile duct obstruction.     CT 7/2023  Impression:     Imaging findings suggestive for intrinsic hepatic disease/cirrhosis with a heterogenous appearance of the liver with regions of focal fatty infiltration as well as splenomegaly.     Mild thickening of the walls of the stomach which could reflect a portal gastropathy.     Liquid stool in the right colon with surrounding pericolonic inflammation.  This was seen on prior studies is also likely related to underlying liver disease.  Component of colitis not excluded.     Nonobstructing right-sided nephrolithiasis.    Endoscopy:      Cscope 8/2023   Findings:        The perianal and digital rectal examinations were normal.        The entire examined colon appeared normal on direct and retroflexion        views.        Biopsies were taken with a cold forceps in the ascending colon for        histology.     DIAGNOSIS:   Mucosal biopsy, ascending colon: No pathologic changes; no evidence of   lymphocytic or collagenous colitis.     EGD 8/2023   Findings:        Grade I varices were found in the lower third of the esophagus.        Moderate portal hypertensive gastropathy was found in the entire        examined stomach.        The examined duodenum was normal.     Assessment:    Abdominal pain   Diarrhea   Weight loss   CARTAGENA Cirrhosis      Patient with 2 months of change in bowel " habits with post prandial diarrhea with belching, bloating. EGD and cscope done this month were unrevealing but cscope did not intubate the TI. Also no small bowel biopsies obtained on EGD. Recent blood work and stool tests have been unrevealing. Will check fecal elastase to look for EPI and also calprotectin. If calpro is elevated, would consider CT enterography to look at small bowel and may need to consider repeat cscope to examine TI. There is no NSAID use.     Recommendations:    - Calpro   - Fecal elastase   - If calpro elevated, obtain CTE    - She did not tolerate bentyl   - She is on PPI daily, can continue. May cause mild elevation in calpro and will keep this in mind when interpreting result  - If work up is negative would consider breath testing for SIBO  - Also there were no small bowel biopsies obtained on recent EGD and could be considered in future pending results of above work up      Follow up in about 3 months (around 11/14/2023).      Order summary:  Orders Placed This Encounter    Pancreatic elastase, fecal    Calprotectin, Stool         Thank you so much for allowing me to participate in the care of Tee Douglass MD  Gastroenterology   [facility]

## 2023-08-15 ENCOUNTER — LAB VISIT (OUTPATIENT)
Dept: LAB | Facility: HOSPITAL | Age: 53
End: 2023-08-15
Attending: STUDENT IN AN ORGANIZED HEALTH CARE EDUCATION/TRAINING PROGRAM
Payer: COMMERCIAL

## 2023-08-15 DIAGNOSIS — R19.7 DIARRHEA, UNSPECIFIED TYPE: ICD-10-CM

## 2023-08-15 PROCEDURE — 82653 EL-1 FECAL QUANTITATIVE: CPT | Performed by: STUDENT IN AN ORGANIZED HEALTH CARE EDUCATION/TRAINING PROGRAM

## 2023-08-15 PROCEDURE — 83993 ASSAY FOR CALPROTECTIN FECAL: CPT | Performed by: STUDENT IN AN ORGANIZED HEALTH CARE EDUCATION/TRAINING PROGRAM

## 2023-08-17 LAB — ELASTASE 1, FECAL: >500 MCG/G

## 2023-08-21 ENCOUNTER — PATIENT MESSAGE (OUTPATIENT)
Dept: GASTROENTEROLOGY | Facility: CLINIC | Age: 53
End: 2023-08-21
Payer: COMMERCIAL

## 2023-08-22 ENCOUNTER — PATIENT MESSAGE (OUTPATIENT)
Dept: GASTROENTEROLOGY | Facility: CLINIC | Age: 53
End: 2023-08-22
Payer: COMMERCIAL

## 2023-08-22 DIAGNOSIS — K58.0 IRRITABLE BOWEL SYNDROME WITH DIARRHEA: Primary | ICD-10-CM

## 2023-08-22 LAB — CALPROTECTIN STL-MCNT: <27.1 MCG/G

## 2023-08-22 RX ORDER — AMITRIPTYLINE HYDROCHLORIDE 10 MG/1
10 TABLET, FILM COATED ORAL NIGHTLY
Qty: 30 TABLET | Refills: 11 | Status: SHIPPED | OUTPATIENT
Start: 2023-08-22 | End: 2024-03-24

## 2023-08-23 ENCOUNTER — TELEPHONE (OUTPATIENT)
Dept: GASTROENTEROLOGY | Facility: CLINIC | Age: 53
End: 2023-08-23
Payer: COMMERCIAL

## 2023-08-23 NOTE — TELEPHONE ENCOUNTER
"Called Pt P/ "please schedule appointment with Nutrition for IBS/bloating". Offered appointment on 8/28/23 for 11:30AM. Pt confirmed and verbalized understanding.   "

## 2023-09-11 ENCOUNTER — PATIENT MESSAGE (OUTPATIENT)
Dept: GASTROENTEROLOGY | Facility: CLINIC | Age: 53
End: 2023-09-11
Payer: COMMERCIAL

## 2023-09-11 ENCOUNTER — TELEPHONE (OUTPATIENT)
Dept: GASTROENTEROLOGY | Facility: CLINIC | Age: 53
End: 2023-09-11
Payer: COMMERCIAL

## 2023-09-11 NOTE — TELEPHONE ENCOUNTER
Called Pt to reschedule in clinic F/U with  and Appointment with GI Nutritionist for IBS/ abdominal pain. No answer unable to LVM with call back number

## 2023-09-13 ENCOUNTER — TELEPHONE (OUTPATIENT)
Dept: GASTROENTEROLOGY | Facility: CLINIC | Age: 53
End: 2023-09-13
Payer: COMMERCIAL

## 2023-09-13 ENCOUNTER — PATIENT MESSAGE (OUTPATIENT)
Dept: GASTROENTEROLOGY | Facility: CLINIC | Age: 53
End: 2023-09-13
Payer: COMMERCIAL

## 2023-09-13 NOTE — TELEPHONE ENCOUNTER
Called Pt regarding rescheduling appointment with the GI Nutritionist. Offered appointment for 9/18/23 for 9:30AM. Pt confirmed and verbalized understanding.

## 2023-09-15 ENCOUNTER — PATIENT MESSAGE (OUTPATIENT)
Dept: GASTROENTEROLOGY | Facility: CLINIC | Age: 53
End: 2023-09-15
Payer: COMMERCIAL

## 2023-09-16 ENCOUNTER — HOSPITAL ENCOUNTER (EMERGENCY)
Facility: HOSPITAL | Age: 53
Discharge: ANOTHER HOSPITAL | End: 2023-09-17
Attending: STUDENT IN AN ORGANIZED HEALTH CARE EDUCATION/TRAINING PROGRAM
Payer: COMMERCIAL

## 2023-09-16 ENCOUNTER — NURSE TRIAGE (OUTPATIENT)
Dept: ADMINISTRATIVE | Facility: CLINIC | Age: 53
End: 2023-09-16
Payer: COMMERCIAL

## 2023-09-16 DIAGNOSIS — U07.1 COVID: Primary | ICD-10-CM

## 2023-09-16 DIAGNOSIS — D69.6 THROMBOCYTOPENIA: ICD-10-CM

## 2023-09-16 DIAGNOSIS — R53.1 WEAKNESS: ICD-10-CM

## 2023-09-16 LAB
ALBUMIN SERPL BCP-MCNC: 4 G/DL (ref 3.5–5.2)
ALP SERPL-CCNC: 96 U/L (ref 55–135)
ALT SERPL W/O P-5'-P-CCNC: 25 U/L (ref 10–44)
ANION GAP SERPL CALC-SCNC: 8 MMOL/L (ref 8–16)
AST SERPL-CCNC: 25 U/L (ref 10–40)
BASOPHILS NFR BLD: 0 % (ref 0–1.9)
BILIRUB SERPL-MCNC: 0.8 MG/DL (ref 0.1–1)
BILIRUB UR QL STRIP: NEGATIVE
BUN SERPL-MCNC: 10 MG/DL (ref 6–20)
CALCIUM SERPL-MCNC: 9 MG/DL (ref 8.7–10.5)
CHLORIDE SERPL-SCNC: 106 MMOL/L (ref 95–110)
CLARITY UR: CLEAR
CO2 SERPL-SCNC: 27 MMOL/L (ref 23–29)
COLOR UR: YELLOW
CREAT SERPL-MCNC: 0.8 MG/DL (ref 0.5–1.4)
DIFFERENTIAL METHOD: ABNORMAL
EOSINOPHIL NFR BLD: 0 % (ref 0–8)
ERYTHROCYTE [DISTWIDTH] IN BLOOD BY AUTOMATED COUNT: 12.8 % (ref 11.5–14.5)
EST. GFR  (NO RACE VARIABLE): >60 ML/MIN/1.73 M^2
GLUCOSE SERPL-MCNC: 101 MG/DL (ref 70–110)
GLUCOSE UR QL STRIP: NEGATIVE
HCT VFR BLD AUTO: 35.8 % (ref 37–48.5)
HGB BLD-MCNC: 12.2 G/DL (ref 12–16)
HGB UR QL STRIP: ABNORMAL
IMM GRANULOCYTES # BLD AUTO: ABNORMAL K/UL (ref 0–0.04)
IMM GRANULOCYTES NFR BLD AUTO: ABNORMAL % (ref 0–0.5)
INR PPP: 1.2 (ref 0.8–1.2)
KETONES UR QL STRIP: NEGATIVE
LACTATE SERPL-SCNC: 0.6 MMOL/L (ref 0.5–2.2)
LEUKOCYTE ESTERASE UR QL STRIP: NEGATIVE
LYMPHOCYTES NFR BLD: 15 % (ref 18–48)
MCH RBC QN AUTO: 31.4 PG (ref 27–31)
MCHC RBC AUTO-ENTMCNC: 34.1 G/DL (ref 32–36)
MCV RBC AUTO: 92 FL (ref 82–98)
MONOCYTES NFR BLD: 5 % (ref 4–15)
NEUTROPHILS NFR BLD: 69 % (ref 38–73)
NEUTS BAND NFR BLD MANUAL: 11 %
NITRITE UR QL STRIP: NEGATIVE
NRBC BLD-RTO: 0 /100 WBC
PH UR STRIP: 7 [PH] (ref 5–8)
PLATELET # BLD AUTO: 42 K/UL (ref 150–450)
PLATELET BLD QL SMEAR: ABNORMAL
PMV BLD AUTO: 11.6 FL (ref 9.2–12.9)
POTASSIUM SERPL-SCNC: 4 MMOL/L (ref 3.5–5.1)
PROT SERPL-MCNC: 6.6 G/DL (ref 6–8.4)
PROT UR QL STRIP: NEGATIVE
PROTHROMBIN TIME: 12.3 SEC (ref 9–12.5)
RBC # BLD AUTO: 3.88 M/UL (ref 4–5.4)
SODIUM SERPL-SCNC: 141 MMOL/L (ref 136–145)
SP GR UR STRIP: 1.02 (ref 1–1.03)
SPHEROCYTES BLD QL SMEAR: ABNORMAL
URN SPEC COLLECT METH UR: ABNORMAL
UROBILINOGEN UR STRIP-ACNC: ABNORMAL EU/DL
WBC # BLD AUTO: 1.59 K/UL (ref 3.9–12.7)

## 2023-09-16 PROCEDURE — 96375 TX/PRO/DX INJ NEW DRUG ADDON: CPT

## 2023-09-16 PROCEDURE — 80053 COMPREHEN METABOLIC PANEL: CPT | Performed by: STUDENT IN AN ORGANIZED HEALTH CARE EDUCATION/TRAINING PROGRAM

## 2023-09-16 PROCEDURE — 25000003 PHARM REV CODE 250: Performed by: STUDENT IN AN ORGANIZED HEALTH CARE EDUCATION/TRAINING PROGRAM

## 2023-09-16 PROCEDURE — 87040 BLOOD CULTURE FOR BACTERIA: CPT | Performed by: EMERGENCY MEDICINE

## 2023-09-16 PROCEDURE — 36415 COLL VENOUS BLD VENIPUNCTURE: CPT | Performed by: STUDENT IN AN ORGANIZED HEALTH CARE EDUCATION/TRAINING PROGRAM

## 2023-09-16 PROCEDURE — 25000003 PHARM REV CODE 250: Performed by: EMERGENCY MEDICINE

## 2023-09-16 PROCEDURE — 85610 PROTHROMBIN TIME: CPT | Performed by: STUDENT IN AN ORGANIZED HEALTH CARE EDUCATION/TRAINING PROGRAM

## 2023-09-16 PROCEDURE — 36415 COLL VENOUS BLD VENIPUNCTURE: CPT | Performed by: EMERGENCY MEDICINE

## 2023-09-16 PROCEDURE — 85027 COMPLETE CBC AUTOMATED: CPT | Performed by: STUDENT IN AN ORGANIZED HEALTH CARE EDUCATION/TRAINING PROGRAM

## 2023-09-16 PROCEDURE — 96365 THER/PROPH/DIAG IV INF INIT: CPT

## 2023-09-16 PROCEDURE — 96361 HYDRATE IV INFUSION ADD-ON: CPT

## 2023-09-16 PROCEDURE — 99285 EMERGENCY DEPT VISIT HI MDM: CPT | Mod: 25

## 2023-09-16 PROCEDURE — 85007 BL SMEAR W/DIFF WBC COUNT: CPT | Performed by: STUDENT IN AN ORGANIZED HEALTH CARE EDUCATION/TRAINING PROGRAM

## 2023-09-16 PROCEDURE — 81003 URINALYSIS AUTO W/O SCOPE: CPT | Performed by: EMERGENCY MEDICINE

## 2023-09-16 PROCEDURE — 63600175 PHARM REV CODE 636 W HCPCS: Performed by: EMERGENCY MEDICINE

## 2023-09-16 PROCEDURE — 96376 TX/PRO/DX INJ SAME DRUG ADON: CPT

## 2023-09-16 PROCEDURE — 83605 ASSAY OF LACTIC ACID: CPT | Performed by: EMERGENCY MEDICINE

## 2023-09-16 RX ORDER — ACETAMINOPHEN 500 MG
500 TABLET ORAL
Status: COMPLETED | OUTPATIENT
Start: 2023-09-16 | End: 2023-09-16

## 2023-09-16 RX ORDER — PANTOPRAZOLE SODIUM 40 MG/1
40 TABLET, DELAYED RELEASE ORAL
Status: COMPLETED | OUTPATIENT
Start: 2023-09-16 | End: 2023-09-16

## 2023-09-16 RX ADMIN — CEFTRIAXONE SODIUM 1 G: 1 INJECTION, POWDER, FOR SOLUTION INTRAMUSCULAR; INTRAVENOUS at 07:09

## 2023-09-16 RX ADMIN — RIFAXIMIN 550 MG: 550 TABLET ORAL at 10:09

## 2023-09-16 RX ADMIN — PANTOPRAZOLE SODIUM 40 MG: 40 TABLET, DELAYED RELEASE ORAL at 10:09

## 2023-09-16 RX ADMIN — ACETAMINOPHEN 500 MG: 500 TABLET ORAL at 06:09

## 2023-09-16 NOTE — ED PROVIDER NOTES
Encounter Date: 9/16/2023       History     Chief Complaint   Patient presents with    COVID-19 Concerns     53-year-old female with history of nonalcoholic cirrhosis, varices, prior GI bleeds, presenting with COVID.  Patient was diagnosed with COVID three weeks ago, six days later tested negative for COVID with only URI symptoms.  Patient reports she began having body aches and fever of 101 at home, and tested positive for COVID yesterday.  No shortness of breath or chest pain, however does report diffuse body aches.  Patient is unable to take NSAIDs because of her varices, and is limited to 1000 mg of Tylenol every 24 hours.      Review of patient's allergies indicates:   Allergen Reactions    Sulfa (sulfonamide antibiotics) Hives     Past Medical History:   Diagnosis Date    Anemia, unspecified     Anxiety     Arthritis     Ascites 11/23/2020    AVN (avascular necrosis of bone)     Cataract     COVID-19 vaccine administered     04/08/21, 04/30/21    Diarrhea of presumed infectious origin 7/31/2023    Edema 11/23/2020    Epigastric pain 7/31/2023    Esophageal varices     Glaucoma     Hepatic encephalopathy 11/23/2020    History of colon polyps     Hx of cirrhosis     non-alcoholic    Iron deficiency anemia secondary to blood loss (chronic)     Kidney stones     Portal hypertensive gastropathy     Unspecified cirrhosis of liver      Past Surgical History:   Procedure Laterality Date    APPENDECTOMY      COLONOSCOPY N/A 8/3/2023    Procedure: COLONOSCOPY;  Surgeon: Gerard Salinas MD;  Location: [facility];  Service: Endoscopy;  Laterality: N/A;    COLONOSCOPY W/ POLYPECTOMY      DISTAL CLAVICLE EXCISION Right 11/18/2021    Procedure: RIGHT SHOULDER ARTHROSCOPY, EXCISION, CLAVICLE, DISTAL; EXTENSIVE DEBRIDEMENT;  Surgeon: Isaiah Joyner MD;  Location: [facility] OR;  Service: Orthopedics;  Laterality: Right;    ESOPHAGEAL VARICE LIGATION      ESOPHAGOGASTRODUODENOSCOPY N/A 11/10/2020    Procedure: EGD  (ESOPHAGOGASTRODUODENOSCOPY);  Surgeon: Gerard Salinas MD;  Location: [facility];  Service: Endoscopy;  Laterality: N/A;    ESOPHAGOGASTRODUODENOSCOPY N/A 12/28/2020    Procedure: EGD (ESOPHAGOGASTRODUODENOSCOPY);  Surgeon: Adrayn Park MD;  Location: [facility] (2ND FLR);  Service: Endoscopy;  Laterality: N/A;    ESOPHAGOGASTRODUODENOSCOPY N/A 02/15/2021    Procedure: EGD (ESOPHAGOGASTRODUODENOSCOPY);  Surgeon: Hari Greene MD;  Location: [facility] (4TH FLR);  Service: Endoscopy;  Laterality: N/A;  6 week follow-up variceal banding  Hx varices - Labs - ERW  prep ins. emialed - COVID screening on 2/12/21 [place] - ERW    ESOPHAGOGASTRODUODENOSCOPY Left 08/03/2021    Procedure: EGD (ESOPHAGOGASTRODUODENOSCOPY);  Surgeon: Fabricio Corado MD;  Location: [facility] (4TH FLR);  Service: Gastroenterology;  Laterality: Left;  recent hematemasis. varices-labs on 7/26    COVID test at [facility] on 7/31-GT  requesting sooner    ESOPHAGOGASTRODUODENOSCOPY N/A 07/27/2022    Procedure: EGD (ESOPHAGOGASTRODUODENOSCOPY);  Surgeon: Eric Garcia MD;  Location: [facility];  Service: Endoscopy;  Laterality: N/A;    ESOPHAGOGASTRODUODENOSCOPY Left 08/29/2022    Procedure: EGD (ESOPHAGOGASTRODUODENOSCOPY);  Surgeon: Kacy Douglass MD;  Location: [facility] (2ND FLR);  Service: Endoscopy;  Laterality: Left;  Fully vaccinated/ clear liquids up to 4 hrs prior-RB  varices labs ordered-RB    ESOPHAGOGASTRODUODENOSCOPY N/A 10/05/2022    Procedure: EGD (ESOPHAGOGASTRODUODENOSCOPY);  Surgeon: Gerard Salinas MD;  Location: [facility];  Service: Endoscopy;  Laterality: N/A;    ESOPHAGOGASTRODUODENOSCOPY N/A 3/30/2023    Procedure: EGD (ESOPHAGOGASTRODUODENOSCOPY);  Surgeon: Gerard Salinas MD;  Location: [facility];  Service: Endoscopy;  Laterality: N/A;    ESOPHAGOGASTRODUODENOSCOPY N/A 8/3/2023    Procedure: EGD (ESOPHAGOGASTRODUODENOSCOPY);  Surgeon: Gerard Salinas MD;  Location: [facility];  Service: Endoscopy;   Laterality: N/A;    HYSTERECTOMY      KNEE SURGERY Bilateral     LITHOTRIPSY      RHINOPLASTY TIP      SHOULDER SURGERY Right     TONSILLECTOMY      TOTAL HIP ARTHROPLASTY Right      Family History   Problem Relation Age of Onset    No Known Problems Mother     Diabetes Mellitus Maternal Grandmother     Amblyopia Neg Hx     Blindness Neg Hx     Cataracts Neg Hx     Glaucoma Neg Hx     Macular degeneration Neg Hx     Retinal detachment Neg Hx     Strabismus Neg Hx     Colon cancer Neg Hx     Esophageal cancer Neg Hx      Social History     Tobacco Use    Smoking status: Some Days     Current packs/day: 0.00     Types: Cigarettes     Last attempt to quit: 7/3/2019     Years since quittin.2    Smokeless tobacco: Never   Substance Use Topics    Alcohol use: Not Currently    Drug use: No     Review of Systems   Constitutional:  Positive for fever.   HENT:  Negative for sore throat.    Respiratory:  Positive for cough. Negative for shortness of breath.    Cardiovascular:  Negative for chest pain.   Gastrointestinal:  Negative for nausea.   Genitourinary:  Negative for dysuria.   Musculoskeletal:  Positive for myalgias. Negative for back pain.   Skin:  Negative for rash.   Neurological:  Negative for weakness.   Hematological:  Does not bruise/bleed easily.       Physical Exam     Initial Vitals [23 1746]   BP Pulse Resp Temp SpO2   (!) 152/72 83 18 98.2 °F (36.8 °C) 100 %      MAP       --         Physical Exam    Nursing note and vitals reviewed.  Constitutional: She appears well-developed.   HENT:   Head: Normocephalic.   Eyes: Pupils are equal, round, and reactive to light.   Neck:   Normal range of motion.  Cardiovascular:            No murmur heard.  Pulmonary/Chest: No respiratory distress.   Clear lungs bilaterally.  No respiratory distress.  No wheezing or rales.  Good air movement.     Abdominal: Abdomen is soft.   Musculoskeletal:         General: No edema.      Cervical back: Normal range of motion.      Neurological: She is alert.   Skin: Skin is warm.   Psychiatric: She has a normal mood and affect.         ED Course   Procedures  Labs Reviewed   CBC W/ AUTO DIFFERENTIAL - Abnormal; Notable for the following components:       Result Value    WBC 1.59 (*)     RBC 3.88 (*)     Hematocrit 35.8 (*)     MCH 31.4 (*)     Platelets 42 (*)     Lymph % 15.0 (*)     Platelet Estimate Decreased (*)     All other components within normal limits    Narrative:      WBC  critical result(s) called and verbal readback obtained from   Kassy Crowley RN by JERRY 09/16/2023 18:51   URINALYSIS, REFLEX TO URINE CULTURE - Abnormal; Notable for the following components:    Occult Blood UA Trace (*)     Urobilinogen, UA 2.0-3.0 (*)     All other components within normal limits    Narrative:     Specimen Source->Urine   CULTURE, BLOOD   COMPREHENSIVE METABOLIC PANEL   PROTIME-INR   LACTIC ACID, PLASMA          Imaging Results              X-Ray Chest AP Portable (Final result)  Result time 09/16/23 20:00:21      Final result by Lidia Willis MD (09/16/23 20:00:21)                   Impression:      No focal consolidation.      Electronically signed by: Lidia Willis  Date:    09/16/2023  Time:    20:00               Narrative:    EXAMINATION:  XR CHEST AP PORTABLE    CLINICAL HISTORY:  covid;    TECHNIQUE:  Single frontal view of the chest was performed.    COMPARISON:  07/10/2023    FINDINGS:  Lungs are clear.  No focal consolidation, pleural fluid, or pneumothorax.  Normal heart size.    Widened right AC joint with attenuated right distal clavicle may be postsurgical or posttraumatic.                                       Medications   rifAXIMin tablet 550 mg (550 mg Oral Given 9/16/23 2235)   sodium chloride 0.9% bolus 250 mL 250 mL (250 mLs Intravenous New Bag 9/17/23 0720)   acetaminophen tablet 500 mg (500 mg Oral Given 9/16/23 1819)   cefTRIAXone (ROCEPHIN) 1 g in dextrose 5 % in water (D5W) 100 mL IVPB (MB+) (0 g  Intravenous Stopped 9/16/23 2029)   pantoprazole EC tablet 40 mg (40 mg Oral Given 9/16/23 2217)   morphine injection 2 mg (2 mg Intravenous Given 9/17/23 0056)   ondansetron injection 4 mg (4 mg Intravenous Given 9/17/23 0055)   morphine injection 2 mg (2 mg Intravenous Given 9/17/23 0435)   0.9%  NaCl infusion (0 mLs Intravenous Stopped 9/17/23 0454)   morphine injection 1 mg (1 mg Intravenous Given 9/17/23 0721)   ondansetron injection 4 mg (4 mg Intravenous Given 9/17/23 0732)     Medical Decision Making  DDX:  Patient with significant medical history presenting with COVID and complaint of body aches.  Vitals are all within normal limits, patient is satting well, however given patient's history of liver failure, will check basic blood work, to ensure patient is not anemic.  DX:  CBC, CMP, INR, chest x-ray  TX:  I spoke with patient's hepatologist who requested that we give patient 500 mg of acetaminophen.  Dispo:  Pending workup.        Amount and/or Complexity of Data Reviewed  Labs: ordered.  Radiology: ordered.    Risk  OTC drugs.  Prescription drug management.               ED Course as of 09/17/23 0746   Sat Sep 16, 2023   2135 Given weakness and Boucher neutropenia and fall risk transfer accepted discussed with Dr. Gleason and primary who recommends transfer patient also wants to be transferred anticipate uncomplicated course therefore transferred as discussed [AB]   Sun Sep 17, 2023   0415 Patient doing well continue to check on the patient she continue has have subjective fever and intermittent headaches will treat this with morphine as patient has limited on the amount of Tylenol she can take in 24 hours   [AB]   0746 Patient reports positive outpatient covid test [NB]      ED Course User Index  [AB] Sameer Mcdonald MD  [NB] Andrea Poe MD                    Clinical Impression:   Final diagnoses:  [U07.1] COVID (Primary)  [D69.6] Thrombocytopenia  [R53.1] Weakness        ED Disposition  Condition    Transfer to Another Facility Stable                Andrea Poe MD  09/16/23 1820       Andrea Poe MD  09/17/23 0705       Andrea Poe MD  09/17/23 0746

## 2023-09-16 NOTE — TELEPHONE ENCOUNTER
Reason for Disposition   Patient sounds very sick or weak to the triager    Additional Information   Negative: SEVERE difficulty breathing (e.g., struggling for each breath, speaks in single words)   Negative: Difficult to awaken or acting confused (e.g., disoriented, slurred speech)   Negative: Bluish (or gray) lips or face now   Negative: Shock suspected (e.g., cold/pale/clammy skin, too weak to stand, low BP, rapid pulse)   Negative: Sounds like a life-threatening emergency to the triager   Negative: SEVERE or constant chest pain or pressure  (Exception: Mild central chest pain, present only when coughing.)   Negative: MODERATE difficulty breathing (e.g., speaks in phrases, SOB even at rest, pulse 100-120)   Negative: [1] Headache AND [2] stiff neck (can't touch chin to chest)   Negative: Oxygen level (e.g., pulse oximetry) 90 percent or lower   Negative: Chest pain or pressure  (Exception: MILD central chest pain, present only when coughing.)   Negative: [1] Drinking very little AND [2] dehydration suspected (e.g., no urine > 12 hours, very dry mouth, very lightheaded)    Protocols used: Coronavirus (COVID-19) Diagnosed or [id number]  Pt called re non alcoholic cirrhosis. Plt 54. Wbc low. Pt states she tested pos for covid yest. today in pain all over - only taking two tyenol a day . Pt feeling weak. T99.2 . PCP gave doxy on fri- took three doses so far for covid.  [place]. Pt states she doesn't want to take all her energy to get dressed and the hosp wont do anything. Pt agrees and states spouse will take her.

## 2023-09-16 NOTE — ED TRIAGE NOTES
53 y.o. female presents to ER Room/bed info not found   Chief Complaint   Patient presents with    COVID-19 Concerns   .   Pt reports tested COVID + at home yesteray, c/o body aches, chills. Reports can't take Ibuprofen and can only take Tylenol once every 24 hours

## 2023-09-17 ENCOUNTER — HOSPITAL ENCOUNTER (INPATIENT)
Facility: HOSPITAL | Age: 53
LOS: 2 days | Discharge: HOME | DRG: 178 | End: 2023-09-19
Attending: HOSPITALIST | Admitting: HOSPITALIST
Payer: COMMERCIAL

## 2023-09-17 VITALS
HEART RATE: 72 BPM | DIASTOLIC BLOOD PRESSURE: 63 MMHG | OXYGEN SATURATION: 100 % | SYSTOLIC BLOOD PRESSURE: 131 MMHG | RESPIRATION RATE: 18 BRPM | BODY MASS INDEX: 19.61 KG/M2 | WEIGHT: 129 LBS | TEMPERATURE: 99 F

## 2023-09-17 DIAGNOSIS — R07.9 CHEST PAIN: ICD-10-CM

## 2023-09-17 DIAGNOSIS — U07.1 COVID-19: ICD-10-CM

## 2023-09-17 LAB
ALBUMIN SERPL BCP-MCNC: 3.6 G/DL (ref 3.5–5.2)
ALP SERPL-CCNC: 73 U/L (ref 55–135)
ALT SERPL W/O P-5'-P-CCNC: 23 U/L (ref 10–44)
ANION GAP SERPL CALC-SCNC: 7 MMOL/L (ref 8–16)
AST SERPL-CCNC: 22 U/L (ref 10–40)
BASOPHILS # BLD AUTO: 0.01 K/UL (ref 0–0.2)
BASOPHILS NFR BLD: 0.6 % (ref 0–1.9)
BILIRUB SERPL-MCNC: 0.7 MG/DL (ref 0.1–1)
BUN SERPL-MCNC: 6 MG/DL (ref 6–20)
CALCIUM SERPL-MCNC: 8.8 MG/DL (ref 8.7–10.5)
CHLORIDE SERPL-SCNC: 107 MMOL/L (ref 95–110)
CO2 SERPL-SCNC: 25 MMOL/L (ref 23–29)
CREAT SERPL-MCNC: 0.7 MG/DL (ref 0.5–1.4)
DIFFERENTIAL METHOD: ABNORMAL
EOSINOPHIL # BLD AUTO: 0 K/UL (ref 0–0.5)
EOSINOPHIL NFR BLD: 1.2 % (ref 0–8)
ERYTHROCYTE [DISTWIDTH] IN BLOOD BY AUTOMATED COUNT: 12.7 % (ref 11.5–14.5)
EST. GFR  (NO RACE VARIABLE): >60 ML/MIN/1.73 M^2
GLUCOSE SERPL-MCNC: 93 MG/DL (ref 70–110)
HCT VFR BLD AUTO: 33.8 % (ref 37–48.5)
HGB BLD-MCNC: 11.7 G/DL (ref 12–16)
IMM GRANULOCYTES # BLD AUTO: 0.01 K/UL (ref 0–0.04)
IMM GRANULOCYTES NFR BLD AUTO: 0.6 % (ref 0–0.5)
INR PPP: 1.1 (ref 0.8–1.2)
LYMPHOCYTES # BLD AUTO: 0.4 K/UL (ref 1–4.8)
LYMPHOCYTES NFR BLD: 26.2 % (ref 18–48)
MAGNESIUM SERPL-MCNC: 1.7 MG/DL (ref 1.6–2.6)
MCH RBC QN AUTO: 30.3 PG (ref 27–31)
MCHC RBC AUTO-ENTMCNC: 34.6 G/DL (ref 32–36)
MCV RBC AUTO: 88 FL (ref 82–98)
MONOCYTES # BLD AUTO: 0.2 K/UL (ref 0.3–1)
MONOCYTES NFR BLD: 13.1 % (ref 4–15)
NEUTROPHILS # BLD AUTO: 1 K/UL (ref 1.8–7.7)
NEUTROPHILS NFR BLD: 58.3 % (ref 38–73)
NRBC BLD-RTO: 0 /100 WBC
OVALOCYTES BLD QL SMEAR: ABNORMAL
PLATELET # BLD AUTO: 40 K/UL (ref 150–450)
PLATELET BLD QL SMEAR: ABNORMAL
PMV BLD AUTO: 12 FL (ref 9.2–12.9)
POCT GLUCOSE: 96 MG/DL (ref 70–110)
POTASSIUM SERPL-SCNC: 3.5 MMOL/L (ref 3.5–5.1)
PROT SERPL-MCNC: 6.3 G/DL (ref 6–8.4)
PROTHROMBIN TIME: 12.2 SEC (ref 9–12.5)
RBC # BLD AUTO: 3.86 M/UL (ref 4–5.4)
SARS-COV-2 RDRP RESP QL NAA+PROBE: POSITIVE
SODIUM SERPL-SCNC: 139 MMOL/L (ref 136–145)
WBC # BLD AUTO: 1.68 K/UL (ref 3.9–12.7)

## 2023-09-17 PROCEDURE — 63600175 PHARM REV CODE 636 W HCPCS: Performed by: STUDENT IN AN ORGANIZED HEALTH CARE EDUCATION/TRAINING PROGRAM

## 2023-09-17 PROCEDURE — 83735 ASSAY OF MAGNESIUM: CPT | Performed by: STUDENT IN AN ORGANIZED HEALTH CARE EDUCATION/TRAINING PROGRAM

## 2023-09-17 PROCEDURE — 99223 PR INITIAL HOSPITAL CARE,LEVL III: ICD-10-PCS | Mod: ,,, | Performed by: STUDENT IN AN ORGANIZED HEALTH CARE EDUCATION/TRAINING PROGRAM

## 2023-09-17 PROCEDURE — 99222 1ST HOSP IP/OBS MODERATE 55: CPT | Mod: ,,, | Performed by: INTERNAL MEDICINE

## 2023-09-17 PROCEDURE — 25000003 PHARM REV CODE 250: Performed by: STUDENT IN AN ORGANIZED HEALTH CARE EDUCATION/TRAINING PROGRAM

## 2023-09-17 PROCEDURE — 85610 PROTHROMBIN TIME: CPT | Performed by: STUDENT IN AN ORGANIZED HEALTH CARE EDUCATION/TRAINING PROGRAM

## 2023-09-17 PROCEDURE — 27000207 HC ISOLATION

## 2023-09-17 PROCEDURE — 99222 PR INITIAL HOSPITAL CARE,LEVL II: ICD-10-PCS | Mod: ,,, | Performed by: INTERNAL MEDICINE

## 2023-09-17 PROCEDURE — 25000003 PHARM REV CODE 250: Performed by: EMERGENCY MEDICINE

## 2023-09-17 PROCEDURE — 99223 1ST HOSP IP/OBS HIGH 75: CPT | Mod: ,,, | Performed by: STUDENT IN AN ORGANIZED HEALTH CARE EDUCATION/TRAINING PROGRAM

## 2023-09-17 PROCEDURE — 63600175 PHARM REV CODE 636 W HCPCS: Mod: JZ,TB | Performed by: STUDENT IN AN ORGANIZED HEALTH CARE EDUCATION/TRAINING PROGRAM

## 2023-09-17 PROCEDURE — U0002 COVID-19 LAB TEST NON-CDC: HCPCS | Performed by: STUDENT IN AN ORGANIZED HEALTH CARE EDUCATION/TRAINING PROGRAM

## 2023-09-17 PROCEDURE — 63600175 PHARM REV CODE 636 W HCPCS: Performed by: EMERGENCY MEDICINE

## 2023-09-17 PROCEDURE — 80053 COMPREHEN METABOLIC PANEL: CPT | Performed by: STUDENT IN AN ORGANIZED HEALTH CARE EDUCATION/TRAINING PROGRAM

## 2023-09-17 PROCEDURE — 21400001 HC TELEMETRY ROOM

## 2023-09-17 PROCEDURE — 36415 COLL VENOUS BLD VENIPUNCTURE: CPT | Performed by: STUDENT IN AN ORGANIZED HEALTH CARE EDUCATION/TRAINING PROGRAM

## 2023-09-17 PROCEDURE — 85025 COMPLETE CBC W/AUTO DIFF WBC: CPT | Performed by: STUDENT IN AN ORGANIZED HEALTH CARE EDUCATION/TRAINING PROGRAM

## 2023-09-17 RX ORDER — MORPHINE SULFATE 2 MG/ML
2 INJECTION, SOLUTION INTRAMUSCULAR; INTRAVENOUS
Status: COMPLETED | OUTPATIENT
Start: 2023-09-17 | End: 2023-09-17

## 2023-09-17 RX ORDER — ONDANSETRON 2 MG/ML
4 INJECTION INTRAMUSCULAR; INTRAVENOUS EVERY 8 HOURS PRN
Status: DISCONTINUED | OUTPATIENT
Start: 2023-09-17 | End: 2023-09-19 | Stop reason: HOSPADM

## 2023-09-17 RX ORDER — IBUPROFEN 200 MG
16 TABLET ORAL
Status: DISCONTINUED | OUTPATIENT
Start: 2023-09-17 | End: 2023-09-19 | Stop reason: HOSPADM

## 2023-09-17 RX ORDER — GLUCAGON 1 MG
1 KIT INJECTION
Status: DISCONTINUED | OUTPATIENT
Start: 2023-09-17 | End: 2023-09-19 | Stop reason: HOSPADM

## 2023-09-17 RX ORDER — ACETAMINOPHEN 500 MG
500 TABLET ORAL EVERY 8 HOURS PRN
Status: DISCONTINUED | OUTPATIENT
Start: 2023-09-17 | End: 2023-09-19 | Stop reason: HOSPADM

## 2023-09-17 RX ORDER — PROCHLORPERAZINE EDISYLATE 5 MG/ML
5 INJECTION INTRAMUSCULAR; INTRAVENOUS EVERY 6 HOURS PRN
Status: DISCONTINUED | OUTPATIENT
Start: 2023-09-17 | End: 2023-09-19 | Stop reason: HOSPADM

## 2023-09-17 RX ORDER — DICYCLOMINE HYDROCHLORIDE 10 MG/1
10 CAPSULE ORAL
Status: DISCONTINUED | OUTPATIENT
Start: 2023-09-17 | End: 2023-09-18

## 2023-09-17 RX ORDER — MORPHINE SULFATE 2 MG/ML
1 INJECTION, SOLUTION INTRAMUSCULAR; INTRAVENOUS
Status: COMPLETED | OUTPATIENT
Start: 2023-09-17 | End: 2023-09-17

## 2023-09-17 RX ORDER — SODIUM CHLORIDE 0.9 % (FLUSH) 0.9 %
10 SYRINGE (ML) INJECTION EVERY 12 HOURS PRN
Status: DISCONTINUED | OUTPATIENT
Start: 2023-09-17 | End: 2023-09-19 | Stop reason: HOSPADM

## 2023-09-17 RX ORDER — NALOXONE HCL 0.4 MG/ML
0.02 VIAL (ML) INJECTION
Status: DISCONTINUED | OUTPATIENT
Start: 2023-09-17 | End: 2023-09-19 | Stop reason: HOSPADM

## 2023-09-17 RX ORDER — IBUPROFEN 200 MG
24 TABLET ORAL
Status: DISCONTINUED | OUTPATIENT
Start: 2023-09-17 | End: 2023-09-19 | Stop reason: HOSPADM

## 2023-09-17 RX ORDER — MORPHINE SULFATE 2 MG/ML
1 INJECTION, SOLUTION INTRAMUSCULAR; INTRAVENOUS EVERY 4 HOURS PRN
Status: DISCONTINUED | OUTPATIENT
Start: 2023-09-17 | End: 2023-09-17 | Stop reason: HOSPADM

## 2023-09-17 RX ORDER — SODIUM CHLORIDE 9 MG/ML
500 INJECTION, SOLUTION INTRAVENOUS
Status: COMPLETED | OUTPATIENT
Start: 2023-09-17 | End: 2023-09-17

## 2023-09-17 RX ORDER — HEPARIN SODIUM 5000 [USP'U]/ML
5000 INJECTION, SOLUTION INTRAVENOUS; SUBCUTANEOUS EVERY 8 HOURS
Status: DISCONTINUED | OUTPATIENT
Start: 2023-09-17 | End: 2023-09-17

## 2023-09-17 RX ORDER — AMITRIPTYLINE HYDROCHLORIDE 10 MG/1
10 TABLET, FILM COATED ORAL NIGHTLY
Status: DISCONTINUED | OUTPATIENT
Start: 2023-09-17 | End: 2023-09-19 | Stop reason: HOSPADM

## 2023-09-17 RX ORDER — PANTOPRAZOLE SODIUM 40 MG/1
40 TABLET, DELAYED RELEASE ORAL DAILY
Status: DISCONTINUED | OUTPATIENT
Start: 2023-09-17 | End: 2023-09-19 | Stop reason: HOSPADM

## 2023-09-17 RX ORDER — CARVEDILOL 3.12 MG/1
3.12 TABLET ORAL 2 TIMES DAILY
Status: DISCONTINUED | OUTPATIENT
Start: 2023-09-17 | End: 2023-09-19 | Stop reason: HOSPADM

## 2023-09-17 RX ORDER — ONDANSETRON 2 MG/ML
4 INJECTION INTRAMUSCULAR; INTRAVENOUS
Status: COMPLETED | OUTPATIENT
Start: 2023-09-17 | End: 2023-09-17

## 2023-09-17 RX ORDER — INSULIN ASPART 100 [IU]/ML
0-5 INJECTION, SOLUTION INTRAVENOUS; SUBCUTANEOUS
Status: DISCONTINUED | OUTPATIENT
Start: 2023-09-17 | End: 2023-09-17

## 2023-09-17 RX ORDER — IPRATROPIUM BROMIDE AND ALBUTEROL SULFATE 2.5; .5 MG/3ML; MG/3ML
3 SOLUTION RESPIRATORY (INHALATION) EVERY 4 HOURS PRN
Status: DISCONTINUED | OUTPATIENT
Start: 2023-09-17 | End: 2023-09-19 | Stop reason: HOSPADM

## 2023-09-17 RX ADMIN — DICYCLOMINE HYDROCHLORIDE 10 MG: 10 CAPSULE ORAL at 04:09

## 2023-09-17 RX ADMIN — AMITRIPTYLINE HYDROCHLORIDE 10 MG: 10 TABLET, FILM COATED ORAL at 09:09

## 2023-09-17 RX ADMIN — ONDANSETRON 4 MG: 2 INJECTION INTRAMUSCULAR; INTRAVENOUS at 12:09

## 2023-09-17 RX ADMIN — MORPHINE SULFATE 2 MG: 2 INJECTION, SOLUTION INTRAMUSCULAR; INTRAVENOUS at 12:09

## 2023-09-17 RX ADMIN — MORPHINE SULFATE 1 MG: 2 INJECTION, SOLUTION INTRAMUSCULAR; INTRAVENOUS at 07:09

## 2023-09-17 RX ADMIN — SODIUM CHLORIDE 500 ML: 9 INJECTION, SOLUTION INTRAVENOUS at 04:09

## 2023-09-17 RX ADMIN — SODIUM CHLORIDE 250 ML: 9 INJECTION, SOLUTION INTRAVENOUS at 07:09

## 2023-09-17 RX ADMIN — CARVEDILOL 3.12 MG: 3.12 TABLET, FILM COATED ORAL at 09:09

## 2023-09-17 RX ADMIN — REMDESIVIR 200 MG: 100 INJECTION, POWDER, LYOPHILIZED, FOR SOLUTION INTRAVENOUS at 01:09

## 2023-09-17 RX ADMIN — ACETAMINOPHEN 500 MG: 500 TABLET ORAL at 12:09

## 2023-09-17 RX ADMIN — CARVEDILOL 3.12 MG: 3.12 TABLET, FILM COATED ORAL at 12:09

## 2023-09-17 RX ADMIN — RIFAXIMIN 550 MG: 550 TABLET ORAL at 12:09

## 2023-09-17 RX ADMIN — ONDANSETRON 4 MG: 2 INJECTION INTRAMUSCULAR; INTRAVENOUS at 07:09

## 2023-09-17 RX ADMIN — PANTOPRAZOLE SODIUM 40 MG: 40 TABLET, DELAYED RELEASE ORAL at 12:09

## 2023-09-17 RX ADMIN — RIFAXIMIN 550 MG: 550 TABLET ORAL at 09:09

## 2023-09-17 RX ADMIN — THERA TABS 1 TABLET: TAB at 12:09

## 2023-09-17 RX ADMIN — MORPHINE SULFATE 2 MG: 2 INJECTION, SOLUTION INTRAMUSCULAR; INTRAVENOUS at 04:09

## 2023-09-17 NOTE — ED PROVIDER NOTES
Encounter Date: 9/16/2023       History   No chief complaint on file.    HPI  Review of patient's allergies indicates:   Allergen Reactions    Sulfa (sulfonamide antibiotics) Hives     Past Medical History:   Diagnosis Date    Anemia, unspecified     Anxiety     Arthritis     Ascites 11/23/2020    AVN (avascular necrosis of bone)     Cataract     COVID-19 vaccine administered     04/08/21, 04/30/21    Diarrhea of presumed infectious origin 7/31/2023    Edema 11/23/2020    Epigastric pain 7/31/2023    Esophageal varices     Glaucoma     Hepatic encephalopathy 11/23/2020    History of colon polyps     Hx of cirrhosis     non-alcoholic    Iron deficiency anemia secondary to blood loss (chronic)     Kidney stones     Portal hypertensive gastropathy     Unspecified cirrhosis of liver      Past Surgical History:   Procedure Laterality Date    APPENDECTOMY      COLONOSCOPY N/A 8/3/2023    Procedure: COLONOSCOPY;  Surgeon: Gerard Salinas MD;  Location: [facility];  Service: Endoscopy;  Laterality: N/A;    COLONOSCOPY W/ POLYPECTOMY      DISTAL CLAVICLE EXCISION Right 11/18/2021    Procedure: RIGHT SHOULDER ARTHROSCOPY, EXCISION, CLAVICLE, DISTAL; EXTENSIVE DEBRIDEMENT;  Surgeon: Isaiah Joyner MD;  Location: [facility];  Service: Orthopedics;  Laterality: Right;    ESOPHAGEAL VARICE LIGATION      ESOPHAGOGASTRODUODENOSCOPY N/A 11/10/2020    Procedure: EGD (ESOPHAGOGASTRODUODENOSCOPY);  Surgeon: Gerard Salinas MD;  Location: [facility];  Service: Endoscopy;  Laterality: N/A;    ESOPHAGOGASTRODUODENOSCOPY N/A 12/28/2020    Procedure: EGD (ESOPHAGOGASTRODUODENOSCOPY);  Surgeon: Adryan Park MD;  Location: [facility] (2ND FLR);  Service: Endoscopy;  Laterality: N/A;    ESOPHAGOGASTRODUODENOSCOPY N/A 02/15/2021    Procedure: EGD (ESOPHAGOGASTRODUODENOSCOPY);  Surgeon: Hari Greene MD;  Location: [facility] (4TH FLR);  Service: Endoscopy;  Laterality: N/A;  6 week follow-up variceal banding  Hx varices - Labs -  ERW  prep ins. emialed - COVID screening on 2/12/21 [place] - ERW    ESOPHAGOGASTRODUODENOSCOPY Left 08/03/2021    Procedure: EGD (ESOPHAGOGASTRODUODENOSCOPY);  Surgeon: Fabricio Corado MD;  Location: [facility] (4TH FLR);  Service: Gastroenterology;  Laterality: Left;  recent hematemasis. varices-labs on 7/26    COVID test at [facility] on 7/31-GT  requesting sooner    ESOPHAGOGASTRODUODENOSCOPY N/A 07/27/2022    Procedure: EGD (ESOPHAGOGASTRODUODENOSCOPY);  Surgeon: Eric Garcia MD;  Location: [facility];  Service: Endoscopy;  Laterality: N/A;    ESOPHAGOGASTRODUODENOSCOPY Left 08/29/2022    Procedure: EGD (ESOPHAGOGASTRODUODENOSCOPY);  Surgeon: Kacy Douglass MD;  Location: [facility] (2ND FLR);  Service: Endoscopy;  Laterality: Left;  Fully vaccinated/ clear liquids up to 4 hrs prior-RB  varices labs ordered-RB    ESOPHAGOGASTRODUODENOSCOPY N/A 10/05/2022    Procedure: EGD (ESOPHAGOGASTRODUODENOSCOPY);  Surgeon: Gerard Salinas MD;  Location: [facility];  Service: Endoscopy;  Laterality: N/A;    ESOPHAGOGASTRODUODENOSCOPY N/A 3/30/2023    Procedure: EGD (ESOPHAGOGASTRODUODENOSCOPY);  Surgeon: Gerard Salinas MD;  Location: [facility];  Service: Endoscopy;  Laterality: N/A;    ESOPHAGOGASTRODUODENOSCOPY N/A 8/3/2023    Procedure: EGD (ESOPHAGOGASTRODUODENOSCOPY);  Surgeon: Gerard Salinas MD;  Location: [facility];  Service: Endoscopy;  Laterality: N/A;    HYSTERECTOMY      KNEE SURGERY Bilateral     LITHOTRIPSY      RHINOPLASTY TIP      SHOULDER SURGERY Right     TONSILLECTOMY      TOTAL HIP ARTHROPLASTY Right      Family History   Problem Relation Age of Onset    No Known Problems Mother     Diabetes Mellitus Maternal Grandmother     Amblyopia Neg Hx     Blindness Neg Hx     Cataracts Neg Hx     Glaucoma Neg Hx     Macular degeneration Neg Hx     Retinal detachment Neg Hx     Strabismus Neg Hx     Colon cancer Neg Hx     Esophageal cancer Neg Hx      Social History     Tobacco Use    Smoking status:  Some Days     Current packs/day: 0.00     Types: Cigarettes     Last attempt to quit: 7/3/2019     Years since quittin.2    Smokeless tobacco: Never   Substance Use Topics    Alcohol use: Not Currently    Drug use: No     Review of Systems    Physical Exam     Initial Vitals   BP Pulse Resp Temp SpO2   23 1039 23 1033 -- 23 1039 23 1039   (!) 119/58 (!) 58  99.1 °F (37.3 °C) 98 %      MAP       --                Physical Exam    ED Course   Procedures  Labs Reviewed   COMPREHENSIVE METABOLIC PANEL - Abnormal; Notable for the following components:       Result Value    Anion Gap 7 (*)     All other components within normal limits   CBC W/ AUTO DIFFERENTIAL - Abnormal; Notable for the following components:    WBC 1.68 (*)     RBC 3.86 (*)     Hemoglobin 11.7 (*)     Hematocrit 33.8 (*)     Platelets 40 (*)     Immature Granulocytes 0.6 (*)     Gran # (ANC) 1.0 (*)     Lymph # 0.4 (*)     Mono # 0.2 (*)     Platelet Estimate Decreased (*)     All other components within normal limits    Narrative:     wbc   critical result(s) called and verbal readback obtained from   betzy alva rn by NRJ1 2023 12:54   MAGNESIUM   PROTIME-INR   POCT GLUCOSE               Medications   dicyclomine capsule 10 mg (10 mg Oral Given 23 1631)   amitriptyline tablet 10 mg (has no administration in time range)   carvediloL tablet 3.125 mg (3.125 mg Oral Given 23 1222)   pantoprazole EC tablet 40 mg (40 mg Oral Given 23 1221)   multivitamin tablet (1 tablet Oral Given 23 1220)   rifAXIMin tablet 550 mg (550 mg Oral Given 23 1220)   sodium chloride 0.9% flush 10 mL (has no administration in time range)   naloxone 0.4 mg/mL injection 0.02 mg (has no administration in time range)   glucose chewable tablet 16 g (has no administration in time range)   glucose chewable tablet 24 g (has no administration in time range)   glucagon (human recombinant) injection 1 mg (has no administration  in time range)   ondansetron injection 4 mg (has no administration in time range)   prochlorperazine injection Soln 5 mg (has no administration in time range)   dextrose 10% bolus 125 mL 125 mL (has no administration in time range)   dextrose 10% bolus 250 mL 250 mL (has no administration in time range)   remdesivir 200 mg in sodium chloride 0.9% 250 mL infusion (0 mg Intravenous Stopped 9/17/23 1336)     Followed by   remdesivir 100 mg in sodium chloride 0.9% 250 mL infusion (has no administration in time range)   acetaminophen tablet 500 mg (500 mg Oral Given 9/17/23 1220)   albuterol-ipratropium 2.5 mg-0.5 mg/3 mL nebulizer solution 3 mL (has no administration in time range)     Medical Decision Making             ED Course as of 09/17/23 1710   Sun Sep 17, 2023   1654 Throughout her hospital Stay in the ER I rechecked her at bedside multiple times and reassured her that she needed to be admitted and monitored as she has high risk of complicated course also that her weakness maybe from underlying sepsis. Recommend she stay in the hospital again. He throbocytopenia and weakness  [AB]      ED Course User Index  [AB] Sameer Mcdonald MD                    Clinical Impression:   Final diagnoses:  [U07.1] COVID-19               Sameer Mcdonald MD  09/17/23 0486

## 2023-09-17 NOTE — PLAN OF CARE
Tom Juarez - Intensive Care ([facility]-)  Initial Discharge Assessment       Primary Care Provider: James Samano MD    Admission Diagnosis: COVID-19 [U07.1]    Admission Date: 9/17/2023  Expected Discharge Date: 9/20/2023    Transition of Care Barriers: None    Payor: [employer] [facility] / Plan: BCBS ALL OUT OF STATE / Product Type: PPO /     Extended Emergency Contact Information  Primary Emergency Contact: Lavelle Aranda   [facility]  Mobile Phone: [phone]  Relation: Spouse    Discharge Plan A: Home with family  Discharge Plan B: Home      [id number] - [place], [place] - [address]  [address]  [place] [address]  Phone: [phone] Fax: [phone]      Initial Assessment (most recent)       Adult Discharge Assessment - 09/17/23 1300          Discharge Assessment    Assessment Type Discharge Planning Assessment     Confirmed/corrected address, phone number and insurance Yes     Confirmed Demographics Correct on Facesheet     Source of Information family;patient     If unable to respond/provide information was family/caregiver contacted? Yes     Contact Name/Number Lavelle Aranda (Spouse)   [phone] (Mobile)     When was your last doctors appointment? 09/14/23     Communicated IOANA with patient/caregiver Yes     [facility]     Do you expect to return to your current living situation? Yes     Do you have help at home or someone to help you manage your care at home? Yes     Who are your caregiver(s) and their phone number(s)? Lavelle Aranda (Spouse)   [phone] (Mobile     Prior to hospitilization cognitive status: Alert/Oriented     Current cognitive status: Alert/Oriented     Home Accessibility stairs to enter home     Number of Stairs, Main Entrance none     Equipment Currently Used at Home none     Readmission within 30 days? No     Patient currently being followed by outpatient case management? No     Do you currently have service(s) that help you  manage your care at home? No     Do you take prescription medications? Yes     Do you have prescription coverage? Yes     Coverage [facility] SHIELD - BCBS ALL OUT OF STATE     Do you have any problems affording any of your prescribed medications? No     Is the patient taking medications as prescribed? yes     Who is going to help you get home at discharge? Lavelle Aranda (Spouse)   [phone] (Mobile)     How do you get to doctors appointments? car, drives self;family or friend will provide     Are you on dialysis? No     Do you take coumadin? No     DME Needed Upon Discharge  other (see comments)   TBD    Discharge Plan discussed with: Patient     Transition of Care Barriers None     Discharge Plan A Home with family     Discharge Plan B Home        Physical Activity    On average, how many days per week do you engage in moderate to strenuous exercise (like a brisk walk)? 6 days     On average, how many minutes do you engage in exercise at this level? 30 min        Financial Resource Strain    How hard is it for you to pay for the very basics like food, housing, medical care, and heating? Not hard at all        Housing Stability    In the last 12 months, was there a time when you were not able to pay the mortgage or rent on time? No     In the last 12 months, was there a time when you did not have a steady place to sleep or slept in a shelter (including now)? No        Transportation Needs    In the past 12 months, has lack of transportation kept you from medical appointments or from getting medications? No     In the past 12 months, has lack of transportation kept you from meetings, work, or from getting things needed for daily living? No        Food Insecurity    Within the past 12 months, you worried that your food would run out before you got the money to buy more. Never true     Within the past 12 months, the food you bought just didn't last and you didn't have money to get more. Never true         Stress    Do you feel stress - tense, restless, nervous, or anxious, or unable to sleep at night because your mind is troubled all the time - these days? To some extent        Social Connections    In a typical week, how many times do you talk on the phone with family, friends, or neighbors? More than three times a week     How often do you get together with friends or relatives? Twice a week     How often do you attend Christianity or Roman Catholic services? 1 to 4 times per year     Do you belong to any clubs or organizations such as Christianity groups, unions, fraternal or athletic groups, or school groups? No     How often do you attend meetings of the clubs or organizations you belong to? 1 to 4 times per year     Are you , , , , never , or living with a partner?    Lavelle Aranda (Spouse)   [phone] (Mobile)       Alcohol Use    Q1: How often do you have a drink containing alcohol? Never     Q2: How many drinks containing alcohol do you have on a typical day when you are drinking? Patient does not drink     Q3: How often do you have six or more drinks on one occasion? Never                      CM called the  patient via phone  to complete discharge planning assessment.  Patient alert and oriented xs 4.  Patient verified all demographic information on facesheet is correct.  Patient verified PCP is James Blanco MD.  Patient verified primary health insurance is [employer] [facility] ALL OUT OF STATE. Patient/family is agreeable with bedside medication delivery.   Patient is not active with home health and has listed DME.  Patient with NO POA or Living Will.  Patient not on dialysis or medication coumadin.  Patient with no 30 day admission.  Patient with no financial issues at this time.  Patient family [spouse] will provide transportation upon discharge from facility.  Patient will have assistance from her   upon discharge Patient independent with ADLs. Patient   lives  her spouse, Lavelle STARK. All questions answered regarding Case Management Discharge Planning, patient verbalized understanding.  Discharge booklet with CM's contact information given to patient.    Alethea Gatica RN  Case Management  [facility]  [phone]

## 2023-09-17 NOTE — CONSULTS
[facility]  Gastroenterology  Consult Note    Patient Name: Tee Aranda  MRN: [id number]  Admission Date: 9/17/2023  Hospital Length of Stay: 0 days  Code Status: Full Code   Attending Provider: Dariana Lew MD   Consulting Provider: Evan Coker MD  Primary Care Physician: James Samano MD  Principal Problem:<principal problem not specified>    Inpatient consult to Hepatology  Consult performed by: Evan Coker MD  Consult ordered by: Evan Coker MD        Subjective:     HPI:   Ms Aranda is a 54yo PMHx decompensated CARTAGENA cirrhosis (ascites, HE) presents to [facility] with fatigue, found to be COVID positive.    Hepatology consulted for decompensated cirrhosis.     VS since arrival notable for AF, HR wnl, normotensive.  CBC w/ leukopenia 1.6, Hgb 12.2, plts 42  BMP and LFTs unremarkable  INR 1.2  Blood cultures pending    MELD 9    Objective:     Vitals:    09/17/23 1049   BP:    Pulse: 62   Temp:          Constitutional:  not in acute distress and well developed  HENT: Head: Normal, normocephalic, atraumatic.  Eyes: conjunctiva clear and sclera nonicteric  GI: soft, non-tender, without masses or organomegaly  Skin: normal color  Neurological: alert        Assessment/Plan:     54yo PMHx decompensated CARTAGENA cirrhosis (ascites, HE) presents to [facility] with fatigue, found to be COVID positive.    Hepatology consulted for decompensated cirrhosis.     Problem List:  decompensated CARTAGENA cirrhosis (ascites, HE)  COVID positive    Recommendations:  Daily CMP/ INR  Consider Remdesivir for COVID    Thank you for involving us in the care of Tee Aranda. Please call with any additional questions, concerns or changes in the patient's clinical status. We will continue to follow.     Evan Coker MD  Gastroenterology Fellow PGY VI  [facility]

## 2023-09-17 NOTE — SUBJECTIVE & OBJECTIVE
Past Medical History:   Diagnosis Date    Anemia, unspecified     Anxiety     Arthritis     Ascites 11/23/2020    AVN (avascular necrosis of bone)     Cataract     COVID-19 vaccine administered     04/08/21, 04/30/21    Diarrhea of presumed infectious origin 7/31/2023    Edema 11/23/2020    Epigastric pain 7/31/2023    Esophageal varices     Glaucoma     Hepatic encephalopathy 11/23/2020    History of colon polyps     Hx of cirrhosis     non-alcoholic    Iron deficiency anemia secondary to blood loss (chronic)     Kidney stones     Portal hypertensive gastropathy     Unspecified cirrhosis of liver        Past Surgical History:   Procedure Laterality Date    APPENDECTOMY      COLONOSCOPY N/A 8/3/2023    Procedure: COLONOSCOPY;  Surgeon: Gerard Salinas MD;  Location: [facility];  Service: Endoscopy;  Laterality: N/A;    COLONOSCOPY W/ POLYPECTOMY      DISTAL CLAVICLE EXCISION Right 11/18/2021    Procedure: RIGHT SHOULDER ARTHROSCOPY, EXCISION, CLAVICLE, DISTAL; EXTENSIVE DEBRIDEMENT;  Surgeon: Isaiah Joyner MD;  Location: [facility];  Service: Orthopedics;  Laterality: Right;    ESOPHAGEAL VARICE LIGATION      ESOPHAGOGASTRODUODENOSCOPY N/A 11/10/2020    Procedure: EGD (ESOPHAGOGASTRODUODENOSCOPY);  Surgeon: Gerard Salinas MD;  Location: [facility];  Service: Endoscopy;  Laterality: N/A;    ESOPHAGOGASTRODUODENOSCOPY N/A 12/28/2020    Procedure: EGD (ESOPHAGOGASTRODUODENOSCOPY);  Surgeon: Adryan Park MD;  Location: [facility] (2ND FLR);  Service: Endoscopy;  Laterality: N/A;    ESOPHAGOGASTRODUODENOSCOPY N/A 02/15/2021    Procedure: EGD (ESOPHAGOGASTRODUODENOSCOPY);  Surgeon: Hari Greene MD;  Location: [facility] (4TH FLR);  Service: Endoscopy;  Laterality: N/A;  6 week follow-up variceal banding  Hx varices - Labs - ERW  prep ins. emialed - COVID screening on 2/12/21 [place] - ERW    ESOPHAGOGASTRODUODENOSCOPY Left 08/03/2021    Procedure: EGD (ESOPHAGOGASTRODUODENOSCOPY);  Surgeon: Fabricio Corado,  MD;  Location: [facility] (4TH FLR);  Service: Gastroenterology;  Laterality: Left;  recent hematemasis. varices-labs on 7/26    COVID test at [facility] on 7/31-GT  requesting sooner    ESOPHAGOGASTRODUODENOSCOPY N/A 07/27/2022    Procedure: EGD (ESOPHAGOGASTRODUODENOSCOPY);  Surgeon: Eric Garcia MD;  Location: [facility];  Service: Endoscopy;  Laterality: N/A;    ESOPHAGOGASTRODUODENOSCOPY Left 08/29/2022    Procedure: EGD (ESOPHAGOGASTRODUODENOSCOPY);  Surgeon: Kacy Douglass MD;  Location: [facility] (2ND FLR);  Service: Endoscopy;  Laterality: Left;  Fully vaccinated/ clear liquids up to 4 hrs prior-RB  varices labs ordered-RB    ESOPHAGOGASTRODUODENOSCOPY N/A 10/05/2022    Procedure: EGD (ESOPHAGOGASTRODUODENOSCOPY);  Surgeon: Gerard Salinas MD;  Location: [facility];  Service: Endoscopy;  Laterality: N/A;    ESOPHAGOGASTRODUODENOSCOPY N/A 3/30/2023    Procedure: EGD (ESOPHAGOGASTRODUODENOSCOPY);  Surgeon: Gerard Salinas MD;  Location: [facility];  Service: Endoscopy;  Laterality: N/A;    ESOPHAGOGASTRODUODENOSCOPY N/A 8/3/2023    Procedure: EGD (ESOPHAGOGASTRODUODENOSCOPY);  Surgeon: Gerard Salinas MD;  Location: [facility];  Service: Endoscopy;  Laterality: N/A;    HYSTERECTOMY      KNEE SURGERY Bilateral     LITHOTRIPSY      RHINOPLASTY TIP      SHOULDER SURGERY Right     TONSILLECTOMY      TOTAL HIP ARTHROPLASTY Right        Review of patient's allergies indicates:   Allergen Reactions    Sulfa (sulfonamide antibiotics) Hives       Current Facility-Administered Medications on File Prior to Encounter   Medication    0.9%  NaCl infusion    [COMPLETED] 0.9%  NaCl infusion    [COMPLETED] acetaminophen tablet 500 mg    [COMPLETED] cefTRIAXone (ROCEPHIN) 1 g in dextrose 5 % in water (D5W) 100 mL IVPB (MB+)    [COMPLETED] morphine injection 1 mg    [COMPLETED] morphine injection 2 mg    [COMPLETED] morphine injection 2 mg    [COMPLETED] ondansetron injection 4 mg    [COMPLETED] ondansetron injection 4  mg    [COMPLETED] pantoprazole EC tablet 40 mg    [COMPLETED] sodium chloride 0.9% bolus 250 mL 250 mL    [DISCONTINUED] morphine injection 1 mg    [DISCONTINUED] rifAXIMin tablet 550 mg     Current Outpatient Medications on File Prior to Encounter   Medication Sig    amitriptyline (ELAVIL) 10 MG tablet Take 1 tablet (10 mg total) by mouth every evening.    carvediloL (COREG) 3.125 MG tablet TAKE 1 TABLET BY MOUTH 2 TIMES DAILY.    dicyclomine (BENTYL) 10 MG capsule Take 1 capsule (10 mg total) by mouth 3 (three) times daily before meals.    esomeprazole (NEXIUM) 40 MG capsule Take 1 capsule (40 mg total) by mouth 2 (two) times daily before meals.    multivitamin (THERAGRAN) per tablet Take 1 tablet by mouth once daily.    omega-3 fatty acids/fish oil (FISH OIL-OMEGA-3 FATTY ACIDS) 300-1,000 mg capsule Take 1 capsule by mouth once daily.    rifAXIMin (XIFAXAN) 550 mg Tab Take 1 tablet (550 mg total) by mouth 2 (two) times daily.    UNABLE TO FIND 4 (four) times daily as needed. Medible 20 mg blue raspberry 1/4 to 1/2 up to 4 times daily as needed.     Family History       Problem Relation (Age of Onset)    Diabetes Mellitus Maternal Grandmother    No Known Problems Mother          Tobacco Use    Smoking status: Some Days     Current packs/day: 0.00     Types: Cigarettes     Last attempt to quit: 7/3/2019     Years since quittin.2    Smokeless tobacco: Never   Substance and Sexual Activity    Alcohol use: Not Currently    Drug use: No    Sexual activity: Not on file     Review of Systems   Constitutional:  Positive for fatigue. Negative for activity change, chills and fever.   Respiratory:  Positive for cough. Negative for apnea, chest tightness and shortness of breath.    Cardiovascular:  Negative for chest pain and leg swelling.   Gastrointestinal:  Negative for abdominal distention and abdominal pain.   Genitourinary:  Negative for dysuria.   Neurological:  Negative for dizziness.   Psychiatric/Behavioral:   Negative for agitation and confusion.      Objective:     Vital Signs (Most Recent):  Temp: 99.1 °F (37.3 °C) (09/17/23 1039)  Pulse: 62 (09/17/23 1049)  BP: (!) 119/58 (09/17/23 1039)  SpO2: 98 % (09/17/23 1039) Vital Signs (24h Range):  Temp:  [98.2 °F (36.8 °C)-99.7 °F (37.6 °C)] 99.1 °F (37.3 °C)  Pulse:  [58-86] 62  Resp:  [16-20] 18  SpO2:  [97 %-100 %] 98 %  BP: ()/(53-74) 119/58        There is no height or weight on file to calculate BMI.     Physical Exam  Constitutional:       Appearance: She is ill-appearing.   Eyes:      Extraocular Movements: Extraocular movements intact.      Conjunctiva/sclera: Conjunctivae normal.   Cardiovascular:      Rate and Rhythm: Normal rate.   Pulmonary:      Effort: Pulmonary effort is normal. No respiratory distress.      Breath sounds: Normal breath sounds. No wheezing or rhonchi.   Abdominal:      General: Abdomen is flat. There is no distension.      Tenderness: There is no abdominal tenderness.   Musculoskeletal:      Right lower leg: No edema.      Left lower leg: No edema.   Skin:     General: Skin is warm and dry.   Neurological:      General: No focal deficit present.      Mental Status: She is alert and oriented to person, place, and time.   Psychiatric:         Mood and Affect: Mood normal.

## 2023-09-17 NOTE — PLAN OF CARE
Patient is 53yr old pleasant female transferred from [facility]. Patient tested positive covid at home on 09/15/2023 after testing positive 3 weeks ago. Patient arrived via EMS complaining of headache. Tylenol given and remdesivir started. Patient states feeling improved.   Problem: Adult Inpatient Plan of Care  Goal: Plan of Care Review  Outcome: Ongoing, Progressing  Flowsheets (Taken 9/17/2023 1645)  Plan of Care Reviewed With:   patient   spouse  Goal: Optimal Comfort and Wellbeing  Outcome: Ongoing, Progressing  Intervention: Monitor Pain and Promote Comfort  Flowsheets (Taken 9/17/2023 1645)  Pain Management Interventions: pain management plan reviewed with patient/caregiver     Problem: Infection  Goal: Absence of Infection Signs and Symptoms  Outcome: Ongoing, Progressing  Intervention: Prevent or Manage Infection  Flowsheets (Taken 9/17/2023 1645)  Fever Reduction/Comfort Measures: lightweight clothing  Infection Management: aseptic technique maintained  Isolation Precautions:   precautions initiated   airborne   contact   droplet

## 2023-09-17 NOTE — ASSESSMENT & PLAN NOTE
Patient is identified as High risk for severe complications of COVID 19 based on COVID risk score of 1   Initiate standard COVID protocols; COVID-19 testing ,Infection Control notification  and isolation- respiratory, contact and droplet per protocol    Diagnostics: CBC, CMP, CRP and Portable CXR    Management: Initiate targeted therapy with Remdesivir, 200mg IV x1, followed by 100mg IV daily x5 days total, Maintain oxygen saturations 92-96% via Nasal Cannula  LPM and monitor with continuous/intermittent pulse oximetry. , Inhaled bronchodilators as needed for shortness of breath., Continuous cardiac monitoring. and Manage respiratory failure (O2 requirement >10LPM or needing NIPPV/Mechanical ventilation) and/or Pneumonia (active chest infiltrates) separately as described below.    Advance Care Planning  Current advance care plan has not been discussed with patient/family/POA and patient currently wishes Full Code.      Patient refused dexamethasone, reports hx of AVN of hip with prior steroid use.

## 2023-09-17 NOTE — HPI
Transfer [facility] on [facility]17 for hepatology evaluation.  Per Transfer provider:   Patient is a 53 y.o. female who has a past medical history of nonalcoholic cirrhosis, Anemia, Anxiety, Arthritis, Ascites, AVN (avascular necrosis of bone), Esophageal varices, Hepatic encephalopathy, Iron deficiency anemia secondary to blood loss (chronic), Kidney stones, Portal hypertensive gastropathy presented with fatigue and weakness. Patient reports she began having body aches and fever of 101 at home. She states she tested positive for COVID . She is complaining of URI symptoms but is not hypoxic. Chest xray with no infiltrates. Referring provider reached out to patients transplant hepatologist who then recommended transfer to [facility] for further work up.     On my interview, patient is awake, alert and oriented x3. Reports feeling fatigued, intermittent cough w/o sputum production. Denies Fever, chills, abd pain. No ascites or b/l LE edema noted. Patient reports have chronic diarrhea with 3-4 BM a day, pasty in consistency. Follows with GI as OP. No other complaints at this time.

## 2023-09-17 NOTE — NURSING
Nurses Note -- 4 Eyes      9/17/2023   4:21 PM      Skin assessed during: Admit      [x] No Altered Skin Integrity Present    []Prevention Measures Documented      [] Yes- Altered Skin Integrity Present or Discovered   [] LDA Added if Not in Epic (Describe Wound)   [] New Altered Skin Integrity was Present on Admit and Documented in LDA   [] Wound Image Taken    Wound Care Consulted? No    Attending Nurse:  Brooklynn Leblanc RN    Second RN/Staff Member:  Cristina MULLEN RN

## 2023-09-17 NOTE — ASSESSMENT & PLAN NOTE
Hx of esophageal varices  Hx of portal hypertensive gastropathy   Thrombocytopenia    Consulted hepatology.  Continue home rifaximin   Not on lactulose given hx of chronic diarrhea. Follows with GI as OP.  Daily CBC, BMP, INR

## 2023-09-17 NOTE — H&P
[facility] - Intensive Care ([address] Medicine  History & Physical    Patient Name: Tee Aranda  MRN: [id number]  Patient Class: IP- Inpatient  Admission Date: 9/17/2023  Attending Physician: Dariana Lew MD   Primary Care Provider: James Samano MD         Patient information was obtained from patient, spouse/SO and past medical records.     Subjective:     Principal Problem:COVID-19    Chief Complaint: No chief complaint on file.       HPI: Transfer from [facility] to [facility] on 9/17 for hepatology evaluation.  Per Transfer provider:   Patient is a 53 y.o. female who has a past medical history of nonalcoholic cirrhosis, Anemia, Anxiety, Arthritis, Ascites, AVN (avascular necrosis of bone), Esophageal varices, Hepatic encephalopathy, Iron deficiency anemia secondary to blood loss (chronic), Kidney stones, Portal hypertensive gastropathy presented with fatigue and weakness. Patient reports she began having body aches and fever of 101 at home. She states she tested positive for COVID . She is complaining of URI symptoms but is not hypoxic. Chest xray with no infiltrates. Referring provider reached out to patients transplant hepatologist who then recommended transfer to [facility] for further work up.     On my interview, patient is awake, alert and oriented x3. Reports feeling fatigued, intermittent cough w/o sputum production. Denies Fever, chills, abd pain. No ascites or b/l LE edema noted. Patient reports have chronic diarrhea with 3-4 BM a day, pasty in consistency. Follows with GI as OP. No other complaints at this time.       Past Medical History:   Diagnosis Date    Anemia, unspecified     Anxiety     Arthritis     Ascites 11/23/2020    AVN (avascular necrosis of bone)     Cataract     COVID-19 vaccine administered     04/08/21, 04/30/21    Diarrhea of presumed infectious origin 7/31/2023    Edema 11/23/2020    Epigastric pain 7/31/2023    Esophageal varices     Glaucoma     Hepatic  encephalopathy 11/23/2020    History of colon polyps     Hx of cirrhosis     non-alcoholic    Iron deficiency anemia secondary to blood loss (chronic)     Kidney stones     Portal hypertensive gastropathy     Unspecified cirrhosis of liver        Past Surgical History:   Procedure Laterality Date    APPENDECTOMY      COLONOSCOPY N/A 8/3/2023    Procedure: COLONOSCOPY;  Surgeon: Gerard Salinas MD;  Location: [facility];  Service: Endoscopy;  Laterality: N/A;    COLONOSCOPY W/ POLYPECTOMY      DISTAL CLAVICLE EXCISION Right 11/18/2021    Procedure: RIGHT SHOULDER ARTHROSCOPY, EXCISION, CLAVICLE, DISTAL; EXTENSIVE DEBRIDEMENT;  Surgeon: Isaiah Joyner MD;  Location: [facility];  Service: Orthopedics;  Laterality: Right;    ESOPHAGEAL VARICE LIGATION      ESOPHAGOGASTRODUODENOSCOPY N/A 11/10/2020    Procedure: EGD (ESOPHAGOGASTRODUODENOSCOPY);  Surgeon: Gerard Salinas MD;  Location: [facility];  Service: Endoscopy;  Laterality: N/A;    ESOPHAGOGASTRODUODENOSCOPY N/A 12/28/2020    Procedure: EGD (ESOPHAGOGASTRODUODENOSCOPY);  Surgeon: Adryan Park MD;  Location: [facility] (2ND FLR);  Service: Endoscopy;  Laterality: N/A;    ESOPHAGOGASTRODUODENOSCOPY N/A 02/15/2021    Procedure: EGD (ESOPHAGOGASTRODUODENOSCOPY);  Surgeon: Hari Greene MD;  Location: [facility] (4TH FLR);  Service: Endoscopy;  Laterality: N/A;  6 week follow-up variceal banding  Hx varices - Labs - ERW  prep ins. emialed - COVID screening on 2/12/21 [place] - ERW    ESOPHAGOGASTRODUODENOSCOPY Left 08/03/2021    Procedure: EGD (ESOPHAGOGASTRODUODENOSCOPY);  Surgeon: Fabricio Corado MD;  Location: [facility] (4TH FLR);  Service: Gastroenterology;  Laterality: Left;  recent hematemasis. varices-labs on 7/26    COVID test at [facility] on 7/31-GT  requesting sooner    ESOPHAGOGASTRODUODENOSCOPY N/A 07/27/2022    Procedure: EGD (ESOPHAGOGASTRODUODENOSCOPY);  Surgeon: Eric Garcia MD;  Location: [facility];  Service: Endoscopy;   Laterality: N/A;    ESOPHAGOGASTRODUODENOSCOPY Left 08/29/2022    Procedure: EGD (ESOPHAGOGASTRODUODENOSCOPY);  Surgeon: Kacy Douglass MD;  Location: [facility] ([address] [place] [address]);  Service: Endoscopy;  Laterality: Left;  Fully vaccinated/ clear liquids up to 4 hrs prior-RB  varices labs ordered-RB    ESOPHAGOGASTRODUODENOSCOPY N/A 10/05/2022    Procedure: EGD (ESOPHAGOGASTRODUODENOSCOPY);  Surgeon: Gerard Salinas MD;  Location: [facility];  Service: Endoscopy;  Laterality: N/A;    ESOPHAGOGASTRODUODENOSCOPY N/A 3/30/2023    Procedure: EGD (ESOPHAGOGASTRODUODENOSCOPY);  Surgeon: Gerard Salinas MD;  Location: [facility];  Service: Endoscopy;  Laterality: N/A;    ESOPHAGOGASTRODUODENOSCOPY N/A 8/3/2023    Procedure: EGD (ESOPHAGOGASTRODUODENOSCOPY);  Surgeon: Gerard Salinas MD;  Location: [facility];  Service: Endoscopy;  Laterality: N/A;    HYSTERECTOMY      KNEE SURGERY Bilateral     LITHOTRIPSY      RHINOPLASTY TIP      SHOULDER SURGERY Right     TONSILLECTOMY      TOTAL HIP ARTHROPLASTY Right        Review of patient's allergies indicates:   Allergen Reactions    Sulfa (sulfonamide antibiotics) Hives       Current Facility-Administered Medications on File Prior to Encounter   Medication    0.9%  NaCl infusion    [COMPLETED] 0.9%  NaCl infusion    [COMPLETED] acetaminophen tablet 500 mg    [COMPLETED] cefTRIAXone (ROCEPHIN) 1 g in dextrose 5 % in water (D5W) 100 mL IVPB (MB+)    [COMPLETED] morphine injection 1 mg    [COMPLETED] morphine injection 2 mg    [COMPLETED] morphine injection 2 mg    [COMPLETED] ondansetron injection 4 mg    [COMPLETED] ondansetron injection 4 mg    [COMPLETED] pantoprazole EC tablet 40 mg    [COMPLETED] sodium chloride 0.9% bolus 250 mL 250 mL    [DISCONTINUED] morphine injection 1 mg    [DISCONTINUED] rifAXIMin tablet 550 mg     Current Outpatient Medications on File Prior to Encounter   Medication Sig    amitriptyline (ELAVIL) 10 MG tablet Take 1 tablet  (10 mg total) by mouth every evening.    carvediloL (COREG) 3.125 MG tablet TAKE 1 TABLET BY MOUTH 2 TIMES DAILY.    dicyclomine (BENTYL) 10 MG capsule Take 1 capsule (10 mg total) by mouth 3 (three) times daily before meals.    esomeprazole (NEXIUM) 40 MG capsule Take 1 capsule (40 mg total) by mouth 2 (two) times daily before meals.    multivitamin (THERAGRAN) per tablet Take 1 tablet by mouth once daily.    omega-3 fatty acids/fish oil (FISH OIL-OMEGA-3 FATTY ACIDS) 300-1,000 mg capsule Take 1 capsule by mouth once daily.    rifAXIMin (XIFAXAN) 550 mg Tab Take 1 tablet (550 mg total) by mouth 2 (two) times daily.    UNABLE TO FIND 4 (four) times daily as needed. Medible 20 mg blue raspberry / to 1/2 up to 4 times daily as needed.     Family History       Problem Relation (Age of Onset)    Diabetes Mellitus Maternal Grandmother    No Known Problems Mother          Tobacco Use    Smoking status: Some Days     Current packs/day: 0.00     Types: Cigarettes     Last attempt to quit: 7/3/2019     Years since quittin.2    Smokeless tobacco: Never   Substance and Sexual Activity    Alcohol use: Not Currently    Drug use: No    Sexual activity: Not on file     Review of Systems   Constitutional:  Positive for fatigue. Negative for activity change, chills and fever.   Respiratory:  Positive for cough. Negative for apnea, chest tightness and shortness of breath.    Cardiovascular:  Negative for chest pain and leg swelling.   Gastrointestinal:  Negative for abdominal distention and abdominal pain.   Genitourinary:  Negative for dysuria.   Neurological:  Negative for dizziness.   Psychiatric/Behavioral:  Negative for agitation and confusion.      Objective:     Vital Signs (Most Recent):  Temp: 99.1 °F (37.3 °C) (23 1039)  Pulse: 62 (23 1049)  BP: (!) 119/58 (23 1039)  SpO2: 98 % (23 1039) Vital Signs (24h Range):  Temp:  [98.2 °F (36.8 °C)-99.7 °F (37.6 °C)] 99.1 °F (37.3 °C)  Pulse:   [58-86] 62  Resp:  [16-20] 18  SpO2:  [97 %-100 %] 98 %  BP: ()/(53-74) 119/58        There is no height or weight on file to calculate BMI.     Physical Exam  Constitutional:       Appearance: She is ill-appearing.   Eyes:      Extraocular Movements: Extraocular movements intact.      Conjunctiva/sclera: Conjunctivae normal.   Cardiovascular:      Rate and Rhythm: Normal rate.   Pulmonary:      Effort: Pulmonary effort is normal. No respiratory distress.      Breath sounds: Normal breath sounds. No wheezing or rhonchi.   Abdominal:      General: Abdomen is flat. There is no distension.      Tenderness: There is no abdominal tenderness.   Musculoskeletal:      Right lower leg: No edema.      Left lower leg: No edema.   Skin:     General: Skin is warm and dry.   Neurological:      General: No focal deficit present.      Mental Status: She is alert and oriented to person, place, and time.   Psychiatric:         Mood and Affect: Mood normal.                 Assessment/Plan:     * COVID-19  Patient is identified as High risk for severe complications of COVID 19 based on COVID risk score of 1   Initiate standard COVID protocols; COVID-19 testing ,Infection Control notification  and isolation- respiratory, contact and droplet per protocol    Diagnostics: CBC, CMP, CRP and Portable CXR    Management: Initiate targeted therapy with Remdesivir, 200mg IV x1, followed by 100mg IV daily x5 days total, Maintain oxygen saturations 92-96% via Nasal Cannula  LPM and monitor with continuous/intermittent pulse oximetry. , Inhaled bronchodilators as needed for shortness of breath., Continuous cardiac monitoring. and Manage respiratory failure (O2 requirement >10LPM or needing NIPPV/Mechanical ventilation) and/or Pneumonia (active chest infiltrates) separately as described below.    Advance Care Planning  Current advance care plan has not been discussed with patient/family/POA and patient currently wishes Full Code.     Patient  refused dexamethasone, reports hx of AVN of hip with prior steroid use.     Liver cirrhosis secondary to CARTAGENA  Hx of esophageal varices  Hx of portal hypertensive gastropathy   Thrombocytopenia    Consulted hepatology.  Continue home rifaximin   Not on lactulose given hx of chronic diarrhea. Follows with GI as OP.  Daily CBC, BMP, INR        Diarrhea of presumed infectious origin  Follows with GI as OP  Reports 3-4 BM per day which are described as pasty in consistency         VTE Risk Mitigation (From admission, onward)         Ordered     IP VTE HIGH RISK PATIENT  Once         09/17/23 1141     Place sequential compression device  Until discontinued         09/17/23 1141                           Dariana Lew MD  Department of Hospital Medicine  [facility]

## 2023-09-17 NOTE — PROVIDER TRANSFER
(Physician in Lead of Transfers)   Outside Transfer Acceptance Note / Regional Referral Center    Referring facility: [facility]   Referring provider: FRANCISCO J ACUNA, FRANCISCO J ACUNA, FRANCISCO J GOMEZ, ASH IRAHETA  Accepting facility: [facility]  Accepting provider: ARJUN REYES JAIRO IGNACIO NAPIER, ESAU AKHTAR, ESAU CEDILLO, OMAR AKHTAR, ESAU TOLLIVER, MAIRA STOCK  Reason for transfer:  Higher level of care  Transfer diagnosis: CARTAGENA cirrhosis.   Transfer specialty requested: Critical Care Medicine  Gastroenterology  Gastroenterology  Gastroenterology  Hepatology  Transfer specialty notified: Yes  Transfer level: 2  Isolation status: No active isolations   Admission class or status: IP- Inpatient  IP- Inpatient  IP- Inpatient  OP- Observation  IP- Inpatient      Narrative     Patient is a 53 y.o. female who has a past medical history of nonalcoholic cirrhosis, Anemia, Anxiety, Arthritis, Ascites, AVN (avascular necrosis of bone), Esophageal varices, Hepatic encephalopathy, Iron deficiency anemia secondary to blood loss (chronic), Kidney stones, Portal hypertensive gastropathy presented with fatigue and weakness. Patient reports she began having body aches and fever of 101 at home. She states she as  tested positive for COVID yesterday. She is complaining of URI symptoms but is not hypoxic. Chest xray with no infiltrates. See below labs and imaging results. Referring provider reached out to patients transplant hepatologist who then recommended transfer for further work up. Stable for transfer.     Objective     Vitals: Temp: 99.1 °F (37.3 °C) (09/16/23 1819)  Pulse: 83 (09/16/23 1832)  Resp: 18 (09/16/23 1746)  BP: (!) 149/68 (09/16/23  "1832)  SpO2: 100 % (09/16/23 1832)    Recent Labs: see EPIC  CBC:  Recent Labs   Lab 09/16/23 1816   WBC 1.59*   HGB 12.2   HCT 35.8*   PLT 42*     CMP:  Recent Labs   Lab 09/16/23 1816   CALCIUM 9.0   ALBUMIN 4.0   PROT 6.6      K 4.0   CO2 27      BUN 10   CREATININE 0.8   ALKPHOS 96   ALT 25   AST 25   BILITOT 0.8     Recent Labs   Lab 09/16/23 1954   LACTATE 0.6     No results for input(s): "CPK", "CPKMB", "TROPONINI", "MB" in the last 168 hours.  BNP  No results for input(s): "BNP", "BNPTRIAGEBLO" in the last 168 hours.  ABG  No results for input(s): "PH", "PO2", "PCO2", "HCO3", "BE" in the last 168 hours.   Lab Results   Component Value Date    INR 1.2 09/16/2023    INR 1.1 07/26/2023    INR 1.1 07/10/2023       Recent imaging:   X-Ray Chest AP Portable  Narrative: EXAMINATION:  XR CHEST AP PORTABLE    CLINICAL HISTORY:  covid;    TECHNIQUE:  Single frontal view of the chest was performed.    COMPARISON:  07/10/2023    FINDINGS:  Lungs are clear.  No focal consolidation, pleural fluid, or pneumothorax.  Normal heart size.    Widened right AC joint with attenuated right distal clavicle may be postsurgical or posttraumatic.  Impression: No focal consolidation.    Electronically signed by: Lidia Willis  Date:    09/16/2023  Time:    20:00      Airway:     Vent settings:         IV access:        Peripheral IV - Single Lumen 09/16/23 1958 20 G Left Forearm (Active)   Site Assessment Clean;Dry;Intact;No redness 09/16/23 1958   Line Status Blood return noted;Saline locked 09/16/23 1958   Dressing Status Clean;Dry 09/16/23 1958   Dressing Intervention First dressing 09/16/23 1958     Allergies:   Review of patient's allergies indicates:   Allergen Reactions    Sulfa (sulfonamide antibiotics) Hives      NPO: No    Anticoagulation:   Anticoagulants       None             Instructions      [facility]-  Admit to Hospital Medicine  Upon patient arrival to floor, please send SecureChat to [facility] P or " call extension [phone] (if no answer, do NOT leave a callback number after the beep, rather please send a SecureChat to [facility] P), for Hospital Medicine admit team assignment and for additional admit orders for the patient.  Do not page the attending physician associated with the patient on arrival (this physician may not be on duty at the time of arrival).  Rather, always send a SecureChat to [facility] P or call [phone] to reach the triage physician for orders and team assignment.

## 2023-09-17 NOTE — ED PROVIDER NOTES
Encounter Date: 9/16/2023       History     Chief Complaint   Patient presents with    COVID-19 Concerns     I evaluated the patient who states she is very weak and unable to get off the toilet due to weakness discussed with her hepatologist transplant doctor who agrees with admission for observation and transferred to [facility] as with COVID and risk of worsening condition due to relative neutropenia we will also give empiric antibiotics and transfer she is in no acute distress and she is stable at the moment discussed with this with the patient and she agrees      Review of patient's allergies indicates:   Allergen Reactions    Sulfa (sulfonamide antibiotics) Hives     Past Medical History:   Diagnosis Date    Anemia, unspecified     Anxiety     Arthritis     Ascites 11/23/2020    AVN (avascular necrosis of bone)     Cataract     COVID-19 vaccine administered     04/08/21, 04/30/21    Diarrhea of presumed infectious origin 7/31/2023    Edema 11/23/2020    Epigastric pain 7/31/2023    Esophageal varices     Glaucoma     Hepatic encephalopathy 11/23/2020    History of colon polyps     Hx of cirrhosis     non-alcoholic    Iron deficiency anemia secondary to blood loss (chronic)     Kidney stones     Portal hypertensive gastropathy     Unspecified cirrhosis of liver      Past Surgical History:   Procedure Laterality Date    APPENDECTOMY      COLONOSCOPY N/A 8/3/2023    Procedure: COLONOSCOPY;  Surgeon: Gerard Salinas MD;  Location: [facility];  Service: Endoscopy;  Laterality: N/A;    COLONOSCOPY W/ POLYPECTOMY      DISTAL CLAVICLE EXCISION Right 11/18/2021    Procedure: RIGHT SHOULDER ARTHROSCOPY, EXCISION, CLAVICLE, DISTAL; EXTENSIVE DEBRIDEMENT;  Surgeon: Isaiah Joyner MD;  Location: [facility] OR;  Service: Orthopedics;  Laterality: Right;    ESOPHAGEAL VARICE LIGATION      ESOPHAGOGASTRODUODENOSCOPY N/A 11/10/2020    Procedure: EGD (ESOPHAGOGASTRODUODENOSCOPY);  Surgeon: Gerard Salinas MD;  [facility]:  [facility] END[facility];  Service: Endoscopy;  Laterality: N/A;    ESOPHAGOGASTRODUODENOSCOPY N/A 12/28/2020    Procedure: EGD (ESOPHAGOGASTRODUODENOSCOPY);  Surgeon: Adryan Park MD;  Location: [facility] (2ND FLR);  Service: Endoscopy;  Laterality: N/A;    ESOPHAGOGASTRODUODENOSCOPY N/A 02/15/2021    Procedure: EGD (ESOPHAGOGASTRODUODENOSCOPY);  Surgeon: Hari Greene MD;  Location: [facility] (4TH FLR);  Service: Endoscopy;  Laterality: N/A;  6 week follow-up variceal banding  Hx varices - Labs - ERW  prep ins. emialed - COVID screening on 2/12/21 [place] - ERW    ESOPHAGOGASTRODUODENOSCOPY Left 08/03/2021    Procedure: EGD (ESOPHAGOGASTRODUODENOSCOPY);  Surgeon: Fabricio Corado MD;  Location: [facility] (4TH FLR);  Service: Gastroenterology;  Laterality: Left;  recent hematemasis. varices-labs on 7/26    COVID test at [facility] on 7/31-GT  requesting sooner    ESOPHAGOGASTRODUODENOSCOPY N/A 07/27/2022    Procedure: EGD (ESOPHAGOGASTRODUODENOSCOPY);  Surgeon: Eric Garcia MD;  Location: [facility];  Service: Endoscopy;  Laterality: N/A;    ESOPHAGOGASTRODUODENOSCOPY Left 08/29/2022    Procedure: EGD (ESOPHAGOGASTRODUODENOSCOPY);  Surgeon: Kacy Douglass MD;  Location: [facility] (2ND FLR);  Service: Endoscopy;  Laterality: Left;  Fully vaccinated/ clear liquids up to 4 hrs prior-RB  varices labs ordered-RB    ESOPHAGOGASTRODUODENOSCOPY N/A 10/05/2022    Procedure: EGD (ESOPHAGOGASTRODUODENOSCOPY);  Surgeon: Gerard Salinas MD;  Location: [facility];  Service: Endoscopy;  Laterality: N/A;    ESOPHAGOGASTRODUODENOSCOPY N/A 3/30/2023    Procedure: EGD (ESOPHAGOGASTRODUODENOSCOPY);  Surgeon: Gerard Salinas MD;  Location: [facility];  Service: Endoscopy;  Laterality: N/A;    ESOPHAGOGASTRODUODENOSCOPY N/A 8/3/2023    Procedure: EGD (ESOPHAGOGASTRODUODENOSCOPY);  Surgeon: Gerard Salinas MD;  Location: [facility];  Service: Endoscopy;  Laterality: N/A;    HYSTERECTOMY      KNEE SURGERY Bilateral     LITHOTRIPSY       RHINOPLASTY TIP      SHOULDER SURGERY Right     TONSILLECTOMY      TOTAL HIP ARTHROPLASTY Right      Family History   Problem Relation Age of Onset    No Known Problems Mother     Diabetes Mellitus Maternal Grandmother     Amblyopia Neg Hx     Blindness Neg Hx     Cataracts Neg Hx     Glaucoma Neg Hx     Macular degeneration Neg Hx     Retinal detachment Neg Hx     Strabismus Neg Hx     Colon cancer Neg Hx     Esophageal cancer Neg Hx      Social History     Tobacco Use    Smoking status: Some Days     Current packs/day: 0.00     Types: Cigarettes     Last attempt to quit: 7/3/2019     Years since quittin.2    Smokeless tobacco: Never   Substance Use Topics    Alcohol use: Not Currently    Drug use: No     Review of Systems   Constitutional:  Negative for fever.   HENT:  Negative for sore throat.    Respiratory:  Negative for shortness of breath.    Cardiovascular:  Negative for chest pain.   Gastrointestinal:  Negative for nausea.   Genitourinary:  Negative for dysuria.   Musculoskeletal:  Negative for back pain.   Skin:  Negative for rash.   Neurological:  Negative for weakness.   Hematological:  Does not bruise/bleed easily.       Physical Exam     Initial Vitals [23 1746]   BP Pulse Resp Temp SpO2   (!) 152/72 83 18 98.2 °F (36.8 °C) 100 %      MAP       --         Physical Exam    Nursing note and vitals reviewed.  Constitutional: She appears well-developed and well-nourished.   HENT:   Head: Normocephalic and atraumatic.   Eyes: EOM are normal. Pupils are equal, round, and reactive to light.   Neck: Neck supple.   Normal range of motion.  Cardiovascular:  Normal rate, regular rhythm and normal heart sounds.           Pulmonary/Chest: Breath sounds normal.   Abdominal: Abdomen is soft. Bowel sounds are normal.   Musculoskeletal:         General: Normal range of motion.      Cervical back: Normal range of motion and neck supple.     Neurological: She is alert and oriented to person, place, and time.  She has normal strength. GCS score is 15. GCS eye subscore is 4. GCS verbal subscore is 5. GCS motor subscore is 6.   Skin: Skin is warm. Capillary refill takes less than 2 seconds.   Psychiatric: She has a normal mood and affect. Thought content normal.         ED Course   Procedures  Labs Reviewed   CBC W/ AUTO DIFFERENTIAL - Abnormal; Notable for the following components:       Result Value    WBC 1.59 (*)     RBC 3.88 (*)     Hematocrit 35.8 (*)     MCH 31.4 (*)     Platelets 42 (*)     Lymph % 15.0 (*)     Platelet Estimate Decreased (*)     All other components within normal limits    Narrative:      WBC  critical result(s) called and verbal readback obtained from   Kassy Crowley RN by JERRY 09/16/2023 18:51   CULTURE, BLOOD   COMPREHENSIVE METABOLIC PANEL   PROTIME-INR   URINALYSIS, REFLEX TO URINE CULTURE   LACTIC ACID, PLASMA          Imaging Results              X-Ray Chest AP Portable (In process)                      Medications   cefTRIAXone (ROCEPHIN) 1 g in dextrose 5 % in water (D5W) 100 mL IVPB (MB+) (has no administration in time range)   acetaminophen tablet 500 mg (500 mg Oral Given 9/16/23 1819)     Medical Decision Making  Discussed with her hepatologist and agree with admission she is relative anemia for her with a hemoglobin of 12.2 and her platelets are 40 2 in addition she has fall risk due to inability to get off the toilet from weakness Dr CAMARGO    Amount and/or Complexity of Data Reviewed  Labs: ordered.  Radiology: ordered.  Discussion of management or test interpretation with external provider(s): Dr CAMARGO and agree with admission and empiric abx     Risk  OTC drugs.  Prescription drug management.               ED Course as of 09/17/23 1551   Sat Sep 16, 2023   2135 Given weakness and Boucher neutropenia and fall risk transfer accepted discussed with Dr. Gleason and primary who recommends transfer patient also wants to be transferred anticipate uncomplicated course therefore transferred  as discussed [AB]   Sun Sep 17, 2023   0415 Patient doing well continue to check on the patient she continue has have subjective fever and intermittent headaches will treat this with morphine as patient has limited on the amount of Tylenol she can take in 24 hours   [AB]   2929 Patient reports positive outpatient covid test [NB]      ED Course User Index  [AB] Sameer Mcdonald MD  [NB] Andrea Poe MD                    Clinical Impression:   Final diagnoses:  [U07.1] COVID (Primary)  [D69.6] Thrombocytopenia  [R53.1] Weakness        ED Disposition Condition    Transfer to Another Facility Stable                Sameer Mcdonald MD  09/16/23 1939       Sameer Mcdonald MD  09/17/23 2555

## 2023-09-18 ENCOUNTER — TELEPHONE (OUTPATIENT)
Dept: HEPATOLOGY | Facility: CLINIC | Age: 53
End: 2023-09-18
Payer: COMMERCIAL

## 2023-09-18 PROBLEM — D69.6 THROMBOCYTOPENIA: Status: ACTIVE | Noted: 2023-09-18

## 2023-09-18 LAB
ALBUMIN SERPL BCP-MCNC: 3.4 G/DL (ref 3.5–5.2)
ALP SERPL-CCNC: 67 U/L (ref 55–135)
ALT SERPL W/O P-5'-P-CCNC: 20 U/L (ref 10–44)
ANION GAP SERPL CALC-SCNC: 7 MMOL/L (ref 8–16)
ANISOCYTOSIS BLD QL SMEAR: SLIGHT
AST SERPL-CCNC: 20 U/L (ref 10–40)
BASOPHILS # BLD AUTO: 0 K/UL (ref 0–0.2)
BASOPHILS NFR BLD: 0 % (ref 0–1.9)
BILIRUB SERPL-MCNC: 0.5 MG/DL (ref 0.1–1)
BUN SERPL-MCNC: 11 MG/DL (ref 6–20)
BURR CELLS BLD QL SMEAR: ABNORMAL
CALCIUM SERPL-MCNC: 8.7 MG/DL (ref 8.7–10.5)
CHLORIDE SERPL-SCNC: 106 MMOL/L (ref 95–110)
CO2 SERPL-SCNC: 26 MMOL/L (ref 23–29)
CREAT SERPL-MCNC: 0.7 MG/DL (ref 0.5–1.4)
DIFFERENTIAL METHOD: ABNORMAL
EOSINOPHIL # BLD AUTO: 0 K/UL (ref 0–0.5)
EOSINOPHIL NFR BLD: 1.2 % (ref 0–8)
ERYTHROCYTE [DISTWIDTH] IN BLOOD BY AUTOMATED COUNT: 12.3 % (ref 11.5–14.5)
EST. GFR  (NO RACE VARIABLE): >60 ML/MIN/1.73 M^2
GLUCOSE SERPL-MCNC: 82 MG/DL (ref 70–110)
HCT VFR BLD AUTO: 36.6 % (ref 37–48.5)
HGB BLD-MCNC: 11.8 G/DL (ref 12–16)
IMM GRANULOCYTES # BLD AUTO: 0 K/UL (ref 0–0.04)
IMM GRANULOCYTES NFR BLD AUTO: 0 % (ref 0–0.5)
INR PPP: 1.1 (ref 0.8–1.2)
LYMPHOCYTES # BLD AUTO: 0.6 K/UL (ref 1–4.8)
LYMPHOCYTES NFR BLD: 35.5 % (ref 18–48)
MAGNESIUM SERPL-MCNC: 1.8 MG/DL (ref 1.6–2.6)
MCH RBC QN AUTO: 30.3 PG (ref 27–31)
MCHC RBC AUTO-ENTMCNC: 32.2 G/DL (ref 32–36)
MCV RBC AUTO: 94 FL (ref 82–98)
MONOCYTES # BLD AUTO: 0.2 K/UL (ref 0.3–1)
MONOCYTES NFR BLD: 12.4 % (ref 4–15)
NEUTROPHILS # BLD AUTO: 0.9 K/UL (ref 1.8–7.7)
NEUTROPHILS NFR BLD: 50.9 % (ref 38–73)
NRBC BLD-RTO: 0 /100 WBC
OVALOCYTES BLD QL SMEAR: ABNORMAL
PLATELET # BLD AUTO: 42 K/UL (ref 150–450)
PMV BLD AUTO: 12.1 FL (ref 9.2–12.9)
POIKILOCYTOSIS BLD QL SMEAR: SLIGHT
POLYCHROMASIA BLD QL SMEAR: ABNORMAL
POTASSIUM SERPL-SCNC: 3.8 MMOL/L (ref 3.5–5.1)
PROT SERPL-MCNC: 6.1 G/DL (ref 6–8.4)
PROTHROMBIN TIME: 11.9 SEC (ref 9–12.5)
RBC # BLD AUTO: 3.9 M/UL (ref 4–5.4)
SODIUM SERPL-SCNC: 139 MMOL/L (ref 136–145)
WBC # BLD AUTO: 1.69 K/UL (ref 3.9–12.7)

## 2023-09-18 PROCEDURE — 63600175 PHARM REV CODE 636 W HCPCS: Mod: JZ,TB | Performed by: STUDENT IN AN ORGANIZED HEALTH CARE EDUCATION/TRAINING PROGRAM

## 2023-09-18 PROCEDURE — 36415 COLL VENOUS BLD VENIPUNCTURE: CPT | Performed by: STUDENT IN AN ORGANIZED HEALTH CARE EDUCATION/TRAINING PROGRAM

## 2023-09-18 PROCEDURE — 99232 PR SUBSEQUENT HOSPITAL CARE,LEVL II: ICD-10-PCS | Mod: ,,, | Performed by: STUDENT IN AN ORGANIZED HEALTH CARE EDUCATION/TRAINING PROGRAM

## 2023-09-18 PROCEDURE — 85025 COMPLETE CBC W/AUTO DIFF WBC: CPT | Performed by: STUDENT IN AN ORGANIZED HEALTH CARE EDUCATION/TRAINING PROGRAM

## 2023-09-18 PROCEDURE — 83735 ASSAY OF MAGNESIUM: CPT | Performed by: STUDENT IN AN ORGANIZED HEALTH CARE EDUCATION/TRAINING PROGRAM

## 2023-09-18 PROCEDURE — 80053 COMPREHEN METABOLIC PANEL: CPT | Performed by: STUDENT IN AN ORGANIZED HEALTH CARE EDUCATION/TRAINING PROGRAM

## 2023-09-18 PROCEDURE — 21400001 HC TELEMETRY ROOM

## 2023-09-18 PROCEDURE — 99232 SBSQ HOSP IP/OBS MODERATE 35: CPT | Mod: ,,, | Performed by: STUDENT IN AN ORGANIZED HEALTH CARE EDUCATION/TRAINING PROGRAM

## 2023-09-18 PROCEDURE — 25000003 PHARM REV CODE 250: Performed by: STUDENT IN AN ORGANIZED HEALTH CARE EDUCATION/TRAINING PROGRAM

## 2023-09-18 PROCEDURE — 27000207 HC ISOLATION

## 2023-09-18 PROCEDURE — 85610 PROTHROMBIN TIME: CPT | Performed by: STUDENT IN AN ORGANIZED HEALTH CARE EDUCATION/TRAINING PROGRAM

## 2023-09-18 RX ADMIN — RIFAXIMIN 550 MG: 550 TABLET ORAL at 08:09

## 2023-09-18 RX ADMIN — CARVEDILOL 3.12 MG: 3.12 TABLET, FILM COATED ORAL at 08:09

## 2023-09-18 RX ADMIN — ACETAMINOPHEN 500 MG: 500 TABLET ORAL at 08:09

## 2023-09-18 RX ADMIN — CEFTRIAXONE 2 G: 2 INJECTION, POWDER, FOR SOLUTION INTRAMUSCULAR; INTRAVENOUS at 10:09

## 2023-09-18 RX ADMIN — PANTOPRAZOLE SODIUM 40 MG: 40 TABLET, DELAYED RELEASE ORAL at 08:09

## 2023-09-18 RX ADMIN — REMDESIVIR 100 MG: 100 INJECTION, POWDER, LYOPHILIZED, FOR SOLUTION INTRAVENOUS at 08:09

## 2023-09-18 RX ADMIN — AMITRIPTYLINE HYDROCHLORIDE 10 MG: 10 TABLET, FILM COATED ORAL at 08:09

## 2023-09-18 RX ADMIN — THERA TABS 1 TABLET: TAB at 08:09

## 2023-09-18 NOTE — SUBJECTIVE & OBJECTIVE
Interval History: No new complaints. Remains on RA. Receiving 2/3 dose of IV remdesivir today. Hepatology following. Added empiric CTX given patient is leukopenic. Besides testing positive for covid, infectious workup (CXR, U and blood cx) neg so far       Review of Systems  Objective:     Vital Signs (Most Recent):  Temp: 98.2 °F (36.8 °C) (09/18/23 1522)  Pulse: 62 (09/18/23 1522)  Resp: 19 (09/18/23 1522)  BP: (!) 108/55 (09/18/23 1522)  SpO2: 100 % (09/18/23 1522) Vital Signs (24h Range):  Temp:  [98.1 °F (36.7 °C)-98.3 °F (36.8 °C)] 98.2 °F (36.8 °C)  Pulse:  [57-62] 62  Resp:  [16-19] 19  SpO2:  [98 %-100 %] 100 %  BP: (108-127)/(52-71) 108/55     Weight: 58 kg (127 lb 13.9 oz)  Body mass index is 19.44 kg/m².  No intake or output data in the 24 hours ending 09/18/23 1552      Physical Exam  Constitutional:       Appearance: She is ill-appearing.   Eyes:      Extraocular Movements: Extraocular movements intact.      Conjunctiva/sclera: Conjunctivae normal.   Cardiovascular:      Rate and Rhythm: Normal rate.   Pulmonary:      Effort: Pulmonary effort is normal. No respiratory distress.      Breath sounds: Normal breath sounds. No wheezing or rhonchi.   Abdominal:      General: Abdomen is flat. There is no distension.      Tenderness: There is no abdominal tenderness.   Musculoskeletal:      Right lower leg: No edema.      Left lower leg: No edema.   Skin:     General: Skin is warm and dry.   Neurological:      General: No focal deficit present.      Mental Status: She is alert and oriented to person, place, and time.   Psychiatric:         Mood and Affect: Mood normal.

## 2023-09-18 NOTE — PLAN OF CARE
No acute events. Vital signs stable. Remdesivir and ceftriaxone administered. Had several episodes of diarrhea, MD notified. WBC 1.69 today. No SOB reported, on RA. Ambulates independently.  at bedside. Headache this morning, tylenol given with good effect. Plan of care reviewed with patient.    Problem: Adult Inpatient Plan of Care  Goal: Plan of Care Review  Outcome: Ongoing, Progressing  Goal: Patient-Specific Goal (Individualized)  Outcome: Ongoing, Progressing  Goal: Absence of Hospital-Acquired Illness or Injury  Outcome: Ongoing, Progressing  Goal: Optimal Comfort and Wellbeing  Outcome: Ongoing, Progressing  Goal: Readiness for Transition of Care  Outcome: Ongoing, Progressing

## 2023-09-18 NOTE — PROGRESS NOTES
[facility] ([address] Medicine  Progress Note    Patient Name: Tee Aranda  MRN: [id number]  Patient Class: IP- Inpatient   Admission Date: 9/17/2023  Length of Stay: 1 days  Attending Physician: Dariana Lew MD  Primary Care Provider: James Samano MD        Subjective:     Principal Problem:COVID-19        HPI:  Transfer from [facility] to [facility] on 9/17 for hepatology evaluation.  Per Transfer provider:   Patient is a 53 y.o. female who has a past medical history of nonalcoholic cirrhosis, Anemia, Anxiety, Arthritis, Ascites, AVN (avascular necrosis of bone), Esophageal varices, Hepatic encephalopathy, Iron deficiency anemia secondary to blood loss (chronic), Kidney stones, Portal hypertensive gastropathy presented with fatigue and weakness. Patient reports she began having body aches and fever of 101 at home. She states she tested positive for COVID . She is complaining of URI symptoms but is not hypoxic. Chest xray with no infiltrates. Referring provider reached out to patients transplant hepatologist who then recommended transfer to [facility] for further work up.     On my interview, patient is awake, alert and oriented x3. Reports feeling fatigued, intermittent cough w/o sputum production. Denies Fever, chills, abd pain. No ascites or b/l LE edema noted. Patient reports have chronic diarrhea with 3-4 BM a day, pasty in consistency. Follows with GI as OP. No other complaints at this time.       Overview/Hospital Course:  Hepatology consulted. Receiving IV remdesivir. Added Ctx given leukopenia. Remains on RA      Interval History: No new complaints. Remains on RA. Receiving 2/3 dose of IV remdesivir today. Hepatology following. Added empiric CTX given patient is leukopenic. Besides testing positive for covid, infectious workup (CXR, U and blood cx) neg so far       Review of Systems  Objective:     Vital Signs (Most Recent):  Temp: 98.2 °F (36.8 °C) (09/18/23 1522)  Pulse: 62  (09/18/23 1522)  Resp: 19 (09/18/23 1522)  BP: (!) 108/55 (09/18/23 1522)  SpO2: 100 % (09/18/23 1522) Vital Signs (24h Range):  Temp:  [98.1 °F (36.7 °C)-98.3 °F (36.8 °C)] 98.2 °F (36.8 °C)  Pulse:  [57-62] 62  Resp:  [16-19] 19  SpO2:  [98 %-100 %] 100 %  BP: (108-127)/(52-71) 108/55     Weight: 58 kg (127 lb 13.9 oz)  Body mass index is 19.44 kg/m².  No intake or output data in the 24 hours ending 09/18/23 1552      Physical Exam  Constitutional:       Appearance: She is ill-appearing.   Eyes:      Extraocular Movements: Extraocular movements intact.      Conjunctiva/sclera: Conjunctivae normal.   Cardiovascular:      Rate and Rhythm: Normal rate.   Pulmonary:      Effort: Pulmonary effort is normal. No respiratory distress.      Breath sounds: Normal breath sounds. No wheezing or rhonchi.   Abdominal:      General: Abdomen is flat. There is no distension.      Tenderness: There is no abdominal tenderness.   Musculoskeletal:      Right lower leg: No edema.      Left lower leg: No edema.   Skin:     General: Skin is warm and dry.   Neurological:      General: No focal deficit present.      Mental Status: She is alert and oriented to person, place, and time.   Psychiatric:         Mood and Affect: Mood normal.                   Assessment/Plan:      * COVID-19  Patient is identified as High risk for severe complications of COVID 19 based on COVID risk score of 1   Initiate standard COVID protocols; COVID-19 testing ,Infection Control notification  and isolation- respiratory, contact and droplet per protocol    Diagnostics: CBC, CMP, CRP and Portable CXR    Management: Initiate targeted therapy with Remdesivir, 200mg IV x1, followed by 100mg IV daily x5 days total, Maintain oxygen saturations 92-96% via Nasal Cannula  LPM and monitor with continuous/intermittent pulse oximetry. , Inhaled bronchodilators as needed for shortness of breath., Continuous cardiac monitoring. and Manage respiratory failure (O2 requirement  >10LPM or needing NIPPV/Mechanical ventilation) and/or Pneumonia (active chest infiltrates) separately as described below.    Advance Care Planning  Current advance care plan has not been discussed with patient/family/POA and patient currently wishes Full Code.     IV remdesivir for 3 days  Empiric CTX given leukopenia  Patient refused dexamethasone, reports hx of AVN of hip with prior steroid use.     Liver cirrhosis secondary to CARTAGENA  Hx of esophageal varices  Hx of portal hypertensive gastropathy   Thrombocytopenia    Consulted hepatology.  Continue home rifaximin   Not on lactulose given hx of chronic diarrhea. Follows with GI as OP.  Daily CBC, BMP, INR        Diarrhea of presumed infectious origin  Follows with GI as OP  Reports 3-4 BM per day which are described as pasty in consistency         VTE Risk Mitigation (From admission, onward)         Ordered     IP VTE HIGH RISK PATIENT  Once         09/17/23 1141     Place sequential compression device  Until discontinued         09/17/23 1141                Discharge Planning   IOANA: 9/20/2023     Code Status: Full Code   Is the patient medically ready for discharge?: No    Reason for patient still in hospital (select all that apply): Patient trending condition  Discharge Plan A: Home with family                  Dariana Lew MD  Department of Hospital Medicine   [facility] - Intensive Care ([place])

## 2023-09-18 NOTE — PLAN OF CARE
Pt A&Ox4. Denies chest pain or SOB. VSS, afebrile, NSR/SB, room air. Up ad jaylen. No changes overnight. Pt rested comfortably all night. Continuing POC.

## 2023-09-18 NOTE — ASSESSMENT & PLAN NOTE
Patient is identified as High risk for severe complications of COVID 19 based on COVID risk score of 1   Initiate standard COVID protocols; COVID-19 testing ,Infection Control notification  and isolation- respiratory, contact and droplet per protocol    Diagnostics: CBC, CMP, CRP and Portable CXR    Management: Initiate targeted therapy with Remdesivir, 200mg IV x1, followed by 100mg IV daily x5 days total, Maintain oxygen saturations 92-96% via Nasal Cannula  LPM and monitor with continuous/intermittent pulse oximetry. , Inhaled bronchodilators as needed for shortness of breath., Continuous cardiac monitoring. and Manage respiratory failure (O2 requirement >10LPM or needing NIPPV/Mechanical ventilation) and/or Pneumonia (active chest infiltrates) separately as described below.    Advance Care Planning  Current advance care plan has not been discussed with patient/family/POA and patient currently wishes Full Code.      IV remdesivir for 3 days  Empiric CTX given leukopenia  Patient refused dexamethasone, reports hx of AVN of hip with prior steroid use.

## 2023-09-19 VITALS
HEIGHT: 68 IN | SYSTOLIC BLOOD PRESSURE: 125 MMHG | OXYGEN SATURATION: 100 % | RESPIRATION RATE: 18 BRPM | DIASTOLIC BLOOD PRESSURE: 60 MMHG | TEMPERATURE: 98 F | BODY MASS INDEX: 19.38 KG/M2 | WEIGHT: 127.88 LBS | HEART RATE: 51 BPM

## 2023-09-19 LAB
ALBUMIN SERPL BCP-MCNC: 3.5 G/DL (ref 3.5–5.2)
ALP SERPL-CCNC: 75 U/L (ref 55–135)
ALT SERPL W/O P-5'-P-CCNC: 19 U/L (ref 10–44)
ANION GAP SERPL CALC-SCNC: 9 MMOL/L (ref 8–16)
ANISOCYTOSIS BLD QL SMEAR: SLIGHT
AST SERPL-CCNC: 21 U/L (ref 10–40)
BASOPHILS # BLD AUTO: 0 K/UL (ref 0–0.2)
BASOPHILS NFR BLD: 0 % (ref 0–1.9)
BILIRUB SERPL-MCNC: 0.4 MG/DL (ref 0.1–1)
BUN SERPL-MCNC: 15 MG/DL (ref 6–20)
CALCIUM SERPL-MCNC: 9.1 MG/DL (ref 8.7–10.5)
CHLORIDE SERPL-SCNC: 109 MMOL/L (ref 95–110)
CMV DNA SPEC QL NAA+PROBE: NORMAL
CO2 SERPL-SCNC: 23 MMOL/L (ref 23–29)
CREAT SERPL-MCNC: 0.7 MG/DL (ref 0.5–1.4)
CYTOMEGALOVIRUS PCR, QUANT: NOT DETECTED IU/ML
DIFFERENTIAL METHOD: ABNORMAL
EOSINOPHIL # BLD AUTO: 0 K/UL (ref 0–0.5)
EOSINOPHIL NFR BLD: 2.1 % (ref 0–8)
ERYTHROCYTE [DISTWIDTH] IN BLOOD BY AUTOMATED COUNT: 12.4 % (ref 11.5–14.5)
EST. GFR  (NO RACE VARIABLE): >60 ML/MIN/1.73 M^2
GLUCOSE SERPL-MCNC: 96 MG/DL (ref 70–110)
HCT VFR BLD AUTO: 36.4 % (ref 37–48.5)
HGB BLD-MCNC: 12.6 G/DL (ref 12–16)
IMM GRANULOCYTES # BLD AUTO: 0 K/UL (ref 0–0.04)
IMM GRANULOCYTES NFR BLD AUTO: 0 % (ref 0–0.5)
INR PPP: 1.1 (ref 0.8–1.2)
LYMPHOCYTES # BLD AUTO: 0.7 K/UL (ref 1–4.8)
LYMPHOCYTES NFR BLD: 35.2 % (ref 18–48)
MAGNESIUM SERPL-MCNC: 1.8 MG/DL (ref 1.6–2.6)
MCH RBC QN AUTO: 31.3 PG (ref 27–31)
MCHC RBC AUTO-ENTMCNC: 34.6 G/DL (ref 32–36)
MCV RBC AUTO: 90 FL (ref 82–98)
MONOCYTES # BLD AUTO: 0.2 K/UL (ref 0.3–1)
MONOCYTES NFR BLD: 9.3 % (ref 4–15)
NEUTROPHILS # BLD AUTO: 1 K/UL (ref 1.8–7.7)
NEUTROPHILS NFR BLD: 53.4 % (ref 38–73)
NRBC BLD-RTO: 0 /100 WBC
PLATELET # BLD AUTO: 55 K/UL (ref 150–450)
PLATELET BLD QL SMEAR: ABNORMAL
PMV BLD AUTO: 12.3 FL (ref 9.2–12.9)
POTASSIUM SERPL-SCNC: 3.7 MMOL/L (ref 3.5–5.1)
PROT SERPL-MCNC: 6.3 G/DL (ref 6–8.4)
PROTHROMBIN TIME: 12.1 SEC (ref 9–12.5)
RBC # BLD AUTO: 4.03 M/UL (ref 4–5.4)
SODIUM SERPL-SCNC: 141 MMOL/L (ref 136–145)
WBC # BLD AUTO: 1.93 K/UL (ref 3.9–12.7)

## 2023-09-19 PROCEDURE — 80053 COMPREHEN METABOLIC PANEL: CPT | Performed by: STUDENT IN AN ORGANIZED HEALTH CARE EDUCATION/TRAINING PROGRAM

## 2023-09-19 PROCEDURE — 36415 COLL VENOUS BLD VENIPUNCTURE: CPT | Performed by: STUDENT IN AN ORGANIZED HEALTH CARE EDUCATION/TRAINING PROGRAM

## 2023-09-19 PROCEDURE — 85025 COMPLETE CBC W/AUTO DIFF WBC: CPT | Performed by: STUDENT IN AN ORGANIZED HEALTH CARE EDUCATION/TRAINING PROGRAM

## 2023-09-19 PROCEDURE — 83735 ASSAY OF MAGNESIUM: CPT | Performed by: STUDENT IN AN ORGANIZED HEALTH CARE EDUCATION/TRAINING PROGRAM

## 2023-09-19 PROCEDURE — 99239 HOSP IP/OBS DSCHRG MGMT >30: CPT | Mod: ,,, | Performed by: INTERNAL MEDICINE

## 2023-09-19 PROCEDURE — 99239 PR HOSPITAL DISCHARGE DAY,>30 MIN: ICD-10-PCS | Mod: ,,, | Performed by: INTERNAL MEDICINE

## 2023-09-19 PROCEDURE — 63600175 PHARM REV CODE 636 W HCPCS: Mod: JZ,TB | Performed by: STUDENT IN AN ORGANIZED HEALTH CARE EDUCATION/TRAINING PROGRAM

## 2023-09-19 PROCEDURE — 25000003 PHARM REV CODE 250: Performed by: STUDENT IN AN ORGANIZED HEALTH CARE EDUCATION/TRAINING PROGRAM

## 2023-09-19 PROCEDURE — 85610 PROTHROMBIN TIME: CPT | Performed by: STUDENT IN AN ORGANIZED HEALTH CARE EDUCATION/TRAINING PROGRAM

## 2023-09-19 RX ADMIN — CARVEDILOL 3.12 MG: 3.12 TABLET, FILM COATED ORAL at 08:09

## 2023-09-19 RX ADMIN — THERA TABS 1 TABLET: TAB at 08:09

## 2023-09-19 RX ADMIN — REMDESIVIR 100 MG: 100 INJECTION, POWDER, LYOPHILIZED, FOR SOLUTION INTRAVENOUS at 08:09

## 2023-09-19 RX ADMIN — RIFAXIMIN 550 MG: 550 TABLET ORAL at 08:09

## 2023-09-19 RX ADMIN — PANTOPRAZOLE SODIUM 40 MG: 40 TABLET, DELAYED RELEASE ORAL at 08:09

## 2023-09-19 NOTE — PLAN OF CARE
09/19/23 1450   Final Note   Assessment Type Final Discharge Note   Anticipated Discharge Disposition Home   What phone number can be called within the next 1-3 days to see how you are doing after discharge? [phone]   Hospital Resources/Appts/Education Provided Provided education on problems/symptoms using teachback;Provided patient/caregiver with written discharge plan information;Appointments scheduled and added to AVS   Post-Acute Status   Post-Acute Authorization Other   Coverage [facility] ALL OUT OF STATE   Other Status No Post-Acute Service Needs   Discharge Delays None known at this time     Alethea Gatica RN  Case Management  [facility]  [phone]

## 2023-09-19 NOTE — PLAN OF CARE
ROSAMARIA spoke with Lona and   HOSPFU with Dr James Samano  Date:  9/22/23  Time:  11:30 am     Alethea Gatica RN  Case Management  [facility]  [phone]

## 2023-09-19 NOTE — PLAN OF CARE
[facility] ([facility]  Discharge Final Note    Primary Care Provider: James Samano MD    Expected Discharge Date: 9/19/2023    Final Discharge Note (most recent)       Final Note - 09/19/23 1450          Final Note    Assessment Type Final Discharge Note     Anticipated Discharge Disposition Home or Self Care     What phone number can be called within the next 1-3 days to see how you are doing after discharge? [phone]     Hospital Resources/Appts/Education Provided Provided education on problems/symptoms using teachback;Provided patient/caregiver with written discharge plan information;Appointments scheduled and added to AVS        Post-Acute Status    Post-Acute Authorization Other     Coverage [facility] ALL OUT OF STATE     Other Status No Post-Acute Service Needs     Discharge Delays None known at this time                          CM met with patient/family and discussed discharge plans, patient to DC home with family, no needs. Patient  medications delivered to bedside. No   HME/DME  requested. And  follow up appointment[s] scheduled.   Transportation provided by family via private vehicle.    Alethea Gatica RN  Case Management  [facility]  [phone]

## 2023-09-19 NOTE — PROGRESS NOTES
Discharge education provided. AVS given to patient and reviewed. Patient and spouse verbalize understanding of education provided. PIV removed. Belongings with patient at time of discharge. Transport here to bring patient to spouse's car via wheelchair at 1200.

## 2023-09-19 NOTE — PLAN OF CARE
Pt slept well this shift. A&Ox 4. VSS. On RA.  at bedside. Up independently. Safety precautions in place. Call light in reach. No further concerns noted at this time. NADN this shift. Will continue to monitor.    Problem: Adult Inpatient Plan of Care  Goal: Plan of Care Review  Outcome: Ongoing, Progressing  Goal: Patient-Specific Goal (Individualized)  Outcome: Ongoing, Progressing  Goal: Absence of Hospital-Acquired Illness or Injury  Outcome: Ongoing, Progressing  Goal: Optimal Comfort and Wellbeing  Outcome: Ongoing, Progressing  Goal: Readiness for Transition of Care  Outcome: Ongoing, Progressing     Problem: Infection  Goal: Absence of Infection Signs and Symptoms  Outcome: Ongoing, Progressing

## 2023-09-22 LAB — BACTERIA BLD CULT: NORMAL

## 2023-09-26 ENCOUNTER — LAB VISIT (OUTPATIENT)
Dept: LAB | Facility: HOSPITAL | Age: 53
End: 2023-09-26
Attending: INTERNAL MEDICINE
Payer: COMMERCIAL

## 2023-09-26 DIAGNOSIS — K74.60 LIVER CIRRHOSIS SECONDARY TO NASH: Chronic | ICD-10-CM

## 2023-09-26 DIAGNOSIS — K75.81 LIVER CIRRHOSIS SECONDARY TO NASH: Chronic | ICD-10-CM

## 2023-09-26 LAB
ALBUMIN SERPL BCP-MCNC: 3.9 G/DL (ref 3.5–5.2)
ALP SERPL-CCNC: 119 U/L (ref 55–135)
ALT SERPL W/O P-5'-P-CCNC: 28 U/L (ref 10–44)
ANION GAP SERPL CALC-SCNC: 8 MMOL/L (ref 8–16)
AST SERPL-CCNC: 26 U/L (ref 10–40)
BASOPHILS # BLD AUTO: 0.01 K/UL (ref 0–0.2)
BASOPHILS NFR BLD: 0.3 % (ref 0–1.9)
BILIRUB DIRECT SERPL-MCNC: 0.2 MG/DL (ref 0.1–0.3)
BILIRUB SERPL-MCNC: 0.6 MG/DL (ref 0.1–1)
BUN SERPL-MCNC: 12 MG/DL (ref 6–20)
CALCIUM SERPL-MCNC: 9.1 MG/DL (ref 8.7–10.5)
CHLORIDE SERPL-SCNC: 107 MMOL/L (ref 95–110)
CO2 SERPL-SCNC: 27 MMOL/L (ref 23–29)
CREAT SERPL-MCNC: 0.8 MG/DL (ref 0.5–1.4)
DIFFERENTIAL METHOD: ABNORMAL
EOSINOPHIL # BLD AUTO: 0.1 K/UL (ref 0–0.5)
EOSINOPHIL NFR BLD: 1.4 % (ref 0–8)
ERYTHROCYTE [DISTWIDTH] IN BLOOD BY AUTOMATED COUNT: 12.7 % (ref 11.5–14.5)
EST. GFR  (NO RACE VARIABLE): >60 ML/MIN/1.73 M^2
GLUCOSE SERPL-MCNC: 115 MG/DL (ref 70–110)
HCT VFR BLD AUTO: 39.3 % (ref 37–48.5)
HGB BLD-MCNC: 13 G/DL (ref 12–16)
IMM GRANULOCYTES # BLD AUTO: 0.03 K/UL (ref 0–0.04)
IMM GRANULOCYTES NFR BLD AUTO: 0.8 % (ref 0–0.5)
INR PPP: 1.1 (ref 0.8–1.2)
LYMPHOCYTES # BLD AUTO: 0.8 K/UL (ref 1–4.8)
LYMPHOCYTES NFR BLD: 20.3 % (ref 18–48)
MCH RBC QN AUTO: 30.2 PG (ref 27–31)
MCHC RBC AUTO-ENTMCNC: 33.1 G/DL (ref 32–36)
MCV RBC AUTO: 91 FL (ref 82–98)
MONOCYTES # BLD AUTO: 0.3 K/UL (ref 0.3–1)
MONOCYTES NFR BLD: 6.8 % (ref 4–15)
NEUTROPHILS # BLD AUTO: 2.6 K/UL (ref 1.8–7.7)
NEUTROPHILS NFR BLD: 70.4 % (ref 38–73)
NRBC BLD-RTO: 0 /100 WBC
PLATELET # BLD AUTO: 84 K/UL (ref 150–450)
PMV BLD AUTO: 10.9 FL (ref 9.2–12.9)
POTASSIUM SERPL-SCNC: 4.1 MMOL/L (ref 3.5–5.1)
PROT SERPL-MCNC: 6.7 G/DL (ref 6–8.4)
PROTHROMBIN TIME: 11.7 SEC (ref 9–12.5)
RBC # BLD AUTO: 4.31 M/UL (ref 4–5.4)
SODIUM SERPL-SCNC: 142 MMOL/L (ref 136–145)
WBC # BLD AUTO: 3.7 K/UL (ref 3.9–12.7)

## 2023-09-26 PROCEDURE — 36415 COLL VENOUS BLD VENIPUNCTURE: CPT | Performed by: INTERNAL MEDICINE

## 2023-09-26 PROCEDURE — 82105 ALPHA-FETOPROTEIN SERUM: CPT | Performed by: INTERNAL MEDICINE

## 2023-09-26 PROCEDURE — 85610 PROTHROMBIN TIME: CPT | Performed by: INTERNAL MEDICINE

## 2023-09-26 PROCEDURE — 80053 COMPREHEN METABOLIC PANEL: CPT | Performed by: INTERNAL MEDICINE

## 2023-09-26 PROCEDURE — 82248 BILIRUBIN DIRECT: CPT | Performed by: INTERNAL MEDICINE

## 2023-09-26 PROCEDURE — 85025 COMPLETE CBC W/AUTO DIFF WBC: CPT | Performed by: INTERNAL MEDICINE

## 2023-09-26 PROCEDURE — 80321 ALCOHOLS BIOMARKERS 1OR 2: CPT | Performed by: INTERNAL MEDICINE

## 2023-09-27 LAB — AFP SERPL-MCNC: 2.6 NG/ML (ref 0–8.4)

## 2023-09-28 LAB
CLINICAL BIOCHEMIST REVIEW: NORMAL
PLPETH BLD-MCNC: <10 NG/ML
POPETH BLD-MCNC: <10 NG/ML

## 2023-10-02 NOTE — DISCHARGE SUMMARY
Discharge Summary  Hospital Medicine    Attending Provider on Discharge: Renate Tamayo MD  Hospital Medicine Team: [facility] HOSP MED L  Date of Admission:  9/17/2023     Date of Discharge:  9/19/2023  Code status: Full Code    Active Hospital Problems    Diagnosis  POA    *COVID-19 [U07.1]  Yes    Thrombocytopenia [D69.6]  Yes    Diarrhea of presumed infectious origin [R19.7]  Yes    Liver cirrhosis secondary to CARTAGENA [K75.81, K74.60]  Yes     Chronic      Resolved Hospital Problems   No resolved problems to display.     HPI  Transfer from [facility] ED to [facility] on 9/17 for hepatology evaluation.  Per Transfer provider:   Patient is a 53 y.o. female who has a past medical history of nonalcoholic cirrhosis, Anemia, Anxiety, Arthritis, Ascites, AVN (avascular necrosis of bone), Esophageal varices, Hepatic encephalopathy, Iron deficiency anemia secondary to blood loss (chronic), Kidney stones, Portal hypertensive gastropathy presented with fatigue and weakness. Patient reports she began having body aches and fever of 101 at home. She states she tested positive for COVID . She is complaining of URI symptoms but is not hypoxic. Chest xray with no infiltrates. Referring provider reached out to patients transplant hepatologist who then recommended transfer to [facility] for further work up.     On my interview, patient is awake, alert and oriented x3. Reports feeling fatigued, intermittent cough w/o sputum production. Denies Fever, chills, abd pain. No ascites or b/l LE edema noted. Patient reports have chronic diarrhea with 3-4 BM a day, pasty in consistency. Follows with GI as OP. No other complaints at this time.     Hospital Course  * COVID-19  Patient is identified as High risk for severe complications of COVID 19 based on COVID risk score of 1   Initiate standard COVID protocols; COVID-19 testing ,Infection Control notification  and isolation- respiratory, contact and droplet per protocol    Diagnostics: CBC, CMP, CRP and Portable  CXR    Management: Initiate targeted therapy with Remdesivir, 200mg IV x1, followed by 100mg IV daily x5 days total, Maintain oxygen saturations 92-96% via Nasal Cannula  LPM and monitor with continuous/intermittent pulse oximetry. , Inhaled bronchodilators as needed for shortness of breath., Continuous cardiac monitoring. and Manage respiratory failure (O2 requirement >10LPM or needing NIPPV/Mechanical ventilation) and/or Pneumonia (active chest infiltrates) separately as described below.    Advance Care Planning  Current advance care plan has not been discussed with patient/family/POA and patient currently wishes Full Code.     IV remdesivir for 3 days  Empiric CTX given leukopenia  Patient refused dexamethasone, reports hx of AVN of hip with prior steroid use.   Stay was unremarkable    Diarrhea of presumed infectious origin  Follows with GI as OP  Reports 3-4 BM per day which are described as pasty in consistency       Liver cirrhosis secondary to CARTAGENA  Hx of esophageal varices  Hx of portal hypertensive gastropathy   Thrombocytopenia    Consulted hepatology.  Continue home rifaximin   Not on lactulose given hx of chronic diarrhea. Follows with GI as OP.            Procedures: none    Consultants: hepatology    Discharge Medication List as of 9/19/2023 11:25 AM        START taking these medications    Details   pulse oximeter (PULSE OXIMETER) device by Apply Externally route 2 (two) times a day. Use twice daily at 8 AM and 3 PM and record the value in MyChart as directed., Starting Tue 9/19/2023, Normal           CONTINUE these medications which have NOT CHANGED    Details   amitriptyline (ELAVIL) 10 MG tablet Take 1 tablet (10 mg total) by mouth every evening., Starting Tue 8/22/2023, Until Wed 8/21/2024, Normal      carvediloL (COREG) 3.125 MG tablet TAKE 1 TABLET BY MOUTH 2 TIMES DAILY., Normal      esomeprazole (NEXIUM) 40 MG capsule Take 1 capsule (40 mg total) by mouth 2 (two) times daily before meals.,  Starting Mon 7/31/2023, Until Tue 7/30/2024, Normal      multivitamin (THERAGRAN) per tablet Take 1 tablet by mouth once daily., Historical Med      omega-3 fatty acids/fish oil (FISH OIL-OMEGA-3 FATTY ACIDS) 300-1,000 mg capsule Take 1 capsule by mouth once daily., Historical Med      rifAXIMin (XIFAXAN) 550 mg Tab Take 1 tablet (550 mg total) by mouth 2 (two) times daily., Starting Thu 3/2/2023, Normal      UNABLE TO FIND 4 (four) times daily as needed. Medible 20 mg blue raspberry 1/4 to 1/2 up to 4 times daily as needed., Historical Med             Discharge Diet:regular diet     Activity: activity as tolerated    Discharge Condition: Good    Disposition: Home or Self Care    Tests pending at the time of discharge: none      Time spent  on the discharge of the patient including review of hospital course with the patient. reviewing discharge medications and arranging follow-up care 35 min    Discharge examination Patient was seen and examined on the date of discharge and determined to be suitable for discharge.    Discharge plan     Future Appointments   Date Time Provider Department Center   10/14/2023  9:30 AM Bonnie Bowers MD [facility]

## 2023-10-03 ENCOUNTER — PATIENT MESSAGE (OUTPATIENT)
Dept: HEPATOLOGY | Facility: CLINIC | Age: 53
End: 2023-10-03
Payer: COMMERCIAL

## 2023-10-04 ENCOUNTER — HOSPITAL ENCOUNTER (OUTPATIENT)
Dept: RADIOLOGY | Facility: HOSPITAL | Age: 53
Discharge: HOME | End: 2023-10-04
Attending: FAMILY MEDICINE
Payer: COMMERCIAL

## 2023-10-04 ENCOUNTER — PATIENT MESSAGE (OUTPATIENT)
Dept: HEPATOLOGY | Facility: CLINIC | Age: 53
End: 2023-10-04
Payer: COMMERCIAL

## 2023-10-04 VITALS — WEIGHT: 127 LBS | BODY MASS INDEX: 19.25 KG/M2 | HEIGHT: 68 IN

## 2023-10-04 DIAGNOSIS — T14.8XXA TRAUMATIC HEMATOMA: ICD-10-CM

## 2023-10-04 DIAGNOSIS — T14.8XXA OTHER INJURY OF UNSPECIFIED BODY REGION, INITIAL ENCOUNTER: ICD-10-CM

## 2023-10-04 DIAGNOSIS — N63.0 LUMP OF BREAST: ICD-10-CM

## 2023-10-04 PROCEDURE — 77066 DX MAMMO INCL CAD BI: CPT | Mod: 26,,, | Performed by: RADIOLOGY

## 2023-10-04 PROCEDURE — 76642 US BREAST RIGHT LIMITED: ICD-10-PCS | Mod: 26,RT,, | Performed by: RADIOLOGY

## 2023-10-04 PROCEDURE — 77066 MAMMO DIGITAL DIAGNOSTIC BILAT WITH TOMO: ICD-10-PCS | Mod: 26,,, | Performed by: RADIOLOGY

## 2023-10-04 PROCEDURE — 77062 BREAST TOMOSYNTHESIS BI: CPT | Mod: TC

## 2023-10-04 PROCEDURE — 76642 ULTRASOUND BREAST LIMITED: CPT | Mod: 26,RT,, | Performed by: RADIOLOGY

## 2023-10-04 PROCEDURE — 76642 ULTRASOUND BREAST LIMITED: CPT | Mod: TC,RT

## 2023-10-04 PROCEDURE — 77062 MAMMO DIGITAL DIAGNOSTIC BILAT WITH TOMO: ICD-10-PCS | Mod: 26,,, | Performed by: RADIOLOGY

## 2023-10-04 PROCEDURE — 77062 BREAST TOMOSYNTHESIS BI: CPT | Mod: 26,,, | Performed by: RADIOLOGY

## 2023-10-09 ENCOUNTER — PATIENT MESSAGE (OUTPATIENT)
Dept: HEPATOLOGY | Facility: CLINIC | Age: 53
End: 2023-10-09
Payer: COMMERCIAL

## 2023-10-14 ENCOUNTER — OFFICE VISIT (OUTPATIENT)
Dept: HEPATOLOGY | Facility: CLINIC | Age: 53
End: 2023-10-14
Payer: COMMERCIAL

## 2023-10-14 VITALS
HEIGHT: 68 IN | BODY MASS INDEX: 19.91 KG/M2 | WEIGHT: 131.38 LBS | DIASTOLIC BLOOD PRESSURE: 72 MMHG | SYSTOLIC BLOOD PRESSURE: 132 MMHG | OXYGEN SATURATION: 96 % | HEART RATE: 66 BPM | RESPIRATION RATE: 18 BRPM

## 2023-10-14 DIAGNOSIS — K76.6 PORTAL HYPERTENSIVE GASTROPATHY: ICD-10-CM

## 2023-10-14 DIAGNOSIS — I85.10 SECONDARY ESOPHAGEAL VARICES WITHOUT BLEEDING: ICD-10-CM

## 2023-10-14 DIAGNOSIS — R63.4 UNINTENTIONAL WEIGHT LOSS: ICD-10-CM

## 2023-10-14 DIAGNOSIS — K31.89 PORTAL HYPERTENSIVE GASTROPATHY: ICD-10-CM

## 2023-10-14 DIAGNOSIS — K76.82 HEPATIC ENCEPHALOPATHY: ICD-10-CM

## 2023-10-14 DIAGNOSIS — K74.60 LIVER CIRRHOSIS SECONDARY TO NASH: Primary | Chronic | ICD-10-CM

## 2023-10-14 DIAGNOSIS — K75.81 LIVER CIRRHOSIS SECONDARY TO NASH: Primary | Chronic | ICD-10-CM

## 2023-10-14 PROCEDURE — 1160F PR REVIEW ALL MEDS BY PRESCRIBER/CLIN PHARMACIST DOCUMENTED: ICD-10-PCS | Mod: CPTII,S$GLB,, | Performed by: INTERNAL MEDICINE

## 2023-10-14 PROCEDURE — 99999 PR PBB SHADOW E&M-EST. PATIENT-LVL V: ICD-10-PCS | Mod: PBBFAC,,, | Performed by: INTERNAL MEDICINE

## 2023-10-14 PROCEDURE — 99214 OFFICE O/P EST MOD 30 MIN: CPT | Mod: S$GLB,,, | Performed by: INTERNAL MEDICINE

## 2023-10-14 PROCEDURE — 3078F DIAST BP <80 MM HG: CPT | Mod: CPTII,S$GLB,, | Performed by: INTERNAL MEDICINE

## 2023-10-14 PROCEDURE — 1111F PR DISCHARGE MEDS RECONCILED W/ CURRENT OUTPATIENT MED LIST: ICD-10-PCS | Mod: CPTII,S$GLB,, | Performed by: INTERNAL MEDICINE

## 2023-10-14 PROCEDURE — 3008F BODY MASS INDEX DOCD: CPT | Mod: CPTII,S$GLB,, | Performed by: INTERNAL MEDICINE

## 2023-10-14 PROCEDURE — 1159F PR MEDICATION LIST DOCUMENTED IN MEDICAL RECORD: ICD-10-PCS | Mod: CPTII,S$GLB,, | Performed by: INTERNAL MEDICINE

## 2023-10-14 PROCEDURE — 3008F PR BODY MASS INDEX (BMI) DOCUMENTED: ICD-10-PCS | Mod: CPTII,S$GLB,, | Performed by: INTERNAL MEDICINE

## 2023-10-14 PROCEDURE — 1160F RVW MEDS BY RX/DR IN RCRD: CPT | Mod: CPTII,S$GLB,, | Performed by: INTERNAL MEDICINE

## 2023-10-14 PROCEDURE — 1159F MED LIST DOCD IN RCRD: CPT | Mod: CPTII,S$GLB,, | Performed by: INTERNAL MEDICINE

## 2023-10-14 PROCEDURE — 3075F PR MOST RECENT SYSTOLIC BLOOD PRESS GE 130-139MM HG: ICD-10-PCS | Mod: CPTII,S$GLB,, | Performed by: INTERNAL MEDICINE

## 2023-10-14 PROCEDURE — 1111F DSCHRG MED/CURRENT MED MERGE: CPT | Mod: CPTII,S$GLB,, | Performed by: INTERNAL MEDICINE

## 2023-10-14 PROCEDURE — 3075F SYST BP GE 130 - 139MM HG: CPT | Mod: CPTII,S$GLB,, | Performed by: INTERNAL MEDICINE

## 2023-10-14 PROCEDURE — 99999 PR PBB SHADOW E&M-EST. PATIENT-LVL V: CPT | Mod: PBBFAC,,, | Performed by: INTERNAL MEDICINE

## 2023-10-14 PROCEDURE — 99214 PR OFFICE/OUTPT VISIT, EST, LEVL IV, 30-39 MIN: ICD-10-PCS | Mod: S$GLB,,, | Performed by: INTERNAL MEDICINE

## 2023-10-14 PROCEDURE — 3078F PR MOST RECENT DIASTOLIC BLOOD PRESSURE < 80 MM HG: ICD-10-PCS | Mod: CPTII,S$GLB,, | Performed by: INTERNAL MEDICINE

## 2023-10-14 RX ORDER — LACTULOSE 10 G/15ML
20 SOLUTION ORAL DAILY
Qty: 3000 ML | Refills: 5 | Status: SHIPPED | OUTPATIENT
Start: 2023-10-14 | End: 2024-03-24

## 2023-10-14 NOTE — PROGRESS NOTES
HEPATOLOGY FOLLOW UP    Referring Physician: James Samano MD  Current Corresponding Physician: James Samano MD, Gerard Salinas MD    Tee Aranda is here for follow up of decompensated cirrhosis    HPI   Tee Aranda was seen in consultation by my colleague Dr White 08/20. He noted:  This 53-year-old woman was referred for evaluation of cirrhosis.  She noticed easy bruisability.  She was found have a low platelet count.  She was assessed by Hematology.  She had a bone marrow aspirate and biopsy which was normal.  She eventually had an upper endoscopy which showed features of portal hypertension and grade 1 varices.  A FibroScan confirmed significant fatty liver as well as advanced fibrosis and cirrhosis.  Her weight was as high as 199 lb.  She has lost 26 lb after the diagnosis.  There is no family history of liver disease. She drinks alcohol socially.  She has mild ankle edema.  She has no symptoms of hepatic encephalopathy.  She initially had no symptoms of GI bleeding.    Admission 11/9/20: melena and weakenss. Hemoglobin fell to 5.9 and she was transfused 2 units of PRBC. EGD showed PHG and small varices. The finding was similar to an EGD done earlier this year (03/20). Small varices werenoted at that time as well. Coreg and protonix were prescribed but never started.  Admission 12/28/20: gastrintestinal hemorrhage and melena; EGD: esophageal varices banded  EGD 2/15/21: EV not banded; repeat 6 months recommended  ER visit 7/19/21 for hematemasis; hemoglobin stable at 12.5; LEFT AGAINST MEDICAL ADVICE    Interval History    Admission 9/17/23-9/19/23 for covid: treated with Remdesivir, ceftriaxone; pt refused dexamethasone  --now recovered from covid  --no decompensation with covid but maybe an increase in HE despite rifaximin    Diarrhea now resolved: Previously (at July 2023 visit), main complaint was abdominal pain/epigastric pain, diarrhea (watery and multiple stools per day) and associated  weight loss (about 20 lbs). An abdo US 7/25/23 suggested a stone impacted in the GB lumen. CT abdo 7/10/23: No radiopaque gallstones are seen.  No intrahepatic or extrahepatic biliary ductal dilatation is identified. HIDA scan 7/27/23: Normal HIDA scan.  No evidence of acute cholecystitis, cystic duct obstruction, or common bile duct obstruction.    HE: no longer taking lactulose but is on rifaximin  Ascites/edema, resolved- off lasix and aldactone  HCC screening CT abdo pelivs w contrast 7/10/23: no HCC  EGD 10/5/22: small varices; repeat EGD 10/23  EGD 8/29/22: varices banded  EGD 7/27/22: EV banded    EGD 8/3/23, 3/30/23: small EV, not banded; PHG  Colonoscopy 8/3/23: normal      Labs 9/26/23: Tbil 1.2, ALT 28, AST 26, ALKP 119  MELD 3.0: 8 at 9/26/2023 10:47 AM  MELD-Na: 7 at 9/26/2023 10:47 AM  Calculated from:  Serum Creatinine: 0.8 mg/dL (Using min of 1 mg/dL) at 9/26/2023 10:47 AM  Serum Sodium: 142 mmol/L (Using max of 137 mmol/L) at 9/26/2023 10:47 AM  Total Bilirubin: 0.6 mg/dL (Using min of 1 mg/dL) at 9/26/2023 10:47 AM  Serum Albumin: 3.9 g/dL (Using max of 3.5 g/dL) at 9/26/2023 10:47 AM  INR(ratio): 1.1 at 9/26/2023 10:47 AM  Age at listing (hypothetical): 53 years  Sex: Female at 9/26/2023 10:47 AM      Outpatient Encounter Medications as of 10/14/2023   Medication Sig Dispense Refill    amitriptyline (ELAVIL) 10 MG tablet Take 1 tablet (10 mg total) by mouth every evening. 30 tablet 11    carvediloL (COREG) 3.125 MG tablet TAKE 1 TABLET BY MOUTH 2 TIMES DAILY. 180 tablet 3    esomeprazole (NEXIUM) 40 MG capsule Take 1 capsule (40 mg total) by mouth 2 (two) times daily before meals. 60 capsule 11    multivitamin (THERAGRAN) per tablet Take 1 tablet by mouth once daily.      omega-3 fatty acids/fish oil (FISH OIL-OMEGA-3 FATTY ACIDS) 300-1,000 mg capsule Take 1 capsule by mouth once daily.      pulse oximeter (PULSE OXIMETER) device by Apply Externally route 2 (two) times a day. Use twice daily at 8  AM and 3 PM and record the value in [employer] as directed. 1 each 0    rifAXIMin (XIFAXAN) 550 mg Tab Take 1 tablet (550 mg total) by mouth 2 (two) times daily. 60 tablet 11    UNABLE TO FIND 4 (four) times daily as needed. Medible 20 mg blue raspberry 1/4 to 1/2 up to 4 times daily as needed.      [DISCONTINUED] dicyclomine (BENTYL) 10 MG capsule Take 1 capsule (10 mg total) by mouth 3 (three) times daily before meals. 90 capsule 11     Facility-Administered Encounter Medications as of 10/14/2023   Medication Dose Route Frequency Provider Last Rate Last Admin    0.9%  NaCl infusion   Intravenous Continuous PellegGerard guardado MD   Stopped at 03/30/23 0922    [COMPLETED] remdesivir 100 mg in sodium chloride 0.9% 250 mL infusion  100 mg Intravenous Daily Dariana Lew MD   Stopped at 09/19/23 0905    [DISCONTINUED] acetaminophen tablet 500 mg  500 mg Oral Q8H PRN Dariana Lew MD   500 mg at 09/18/23 0823    [DISCONTINUED] albuterol-ipratropium 2.5 mg-0.5 mg/3 mL nebulizer solution 3 mL  3 mL Nebulization Q4H PRN Dariana Lew MD        [DISCONTINUED] amitriptyline tablet 10 mg  10 mg Oral QHS Dariana Lew MD   10 mg at 09/18/23 2042    [DISCONTINUED] carvediloL tablet 3.125 mg  3.125 mg Oral BID Dariana Lew MD   3.125 mg at 09/19/23 0833    [DISCONTINUED] cefTRIAXone (ROCEPHIN) 2 g in dextrose 5 % in water (D5W) 100 mL IVPB (MB+)  2 g Intravenous Q24H Dariana Lew MD   Stopped at 09/18/23 1034    [DISCONTINUED] dextrose 10% bolus 125 mL 125 mL  12.5 g Intravenous PRN Dariana Lew MD        [DISCONTINUED] dextrose 10% bolus 250 mL 250 mL  25 g Intravenous PRN Dariana Lew MD        [DISCONTINUED] dicyclomine capsule 10 mg  10 mg Oral TID AC Dariana Lew MD   10 mg at 09/17/23 1631    [DISCONTINUED] glucagon (human recombinant) injection 1 mg  1 mg Intramuscular PRN Dariana Lew MD        [DISCONTINUED] glucose chewable tablet 16 g  16 g Oral PRDariana Walden MD        [DISCONTINUED]  glucose chewable tablet 24 g  24 g Oral PRN Dariana Lew MD        [DISCONTINUED] multivitamin tablet  1 tablet Oral Daily Dariana Lew MD   1 tablet at 09/19/23 0833    [DISCONTINUED] naloxone 0.4 mg/mL injection 0.02 mg  0.02 mg Intravenous PRN Dariana Lew MD        [DISCONTINUED] ondansetron injection 4 mg  4 mg Intravenous Q8H PRN Dariana Lew MD        [DISCONTINUED] pantoprazole EC tablet 40 mg  40 mg Oral Daily Dariana Lew MD   40 mg at 09/19/23 0833    [DISCONTINUED] prochlorperazine injection Soln 5 mg  5 mg Intravenous Q6H PRN Dariana Lew MD        [DISCONTINUED] rifAXIMin tablet 550 mg  550 mg Oral BID Sameer Mcdonald MD   550 mg at 09/16/23 2235    [DISCONTINUED] rifAXIMin tablet 550 mg  550 mg Oral BID Dariana Lew MD   550 mg at 09/19/23 0833    [DISCONTINUED] sodium chloride 0.9% flush 10 mL  10 mL Intravenous Q12H PRN Dariana Lew MD         Review of patient's allergies indicates:   Allergen Reactions    Sulfa (sulfonamide antibiotics) Hives     Past Medical History:   Diagnosis Date    Anemia, unspecified     Anxiety     Arthritis     Ascites 11/23/2020    AVN (avascular necrosis of bone)     Cataract     COVID-19 vaccine administered     04/08/21, 04/30/21    Diarrhea of presumed infectious origin 7/31/2023    Edema 11/23/2020    Epigastric pain 7/31/2023    Esophageal varices     Glaucoma     Hepatic encephalopathy 11/23/2020    History of colon polyps     Hx of cirrhosis     non-alcoholic    Iron deficiency anemia secondary to blood loss (chronic)     Kidney stones     Portal hypertensive gastropathy     Unspecified cirrhosis of liver        Review of Systems   Constitutional: Negative.    HENT: Negative.     Eyes: Negative.    Respiratory: Negative.     Cardiovascular: Negative.    Gastrointestinal: Negative.    Genitourinary: Negative.    Musculoskeletal: Negative.    Skin: Negative.    Neurological: Negative.    Psychiatric/Behavioral: Negative.       Vitals:     10/14/23 0924   BP: 132/72   Pulse: 66   Resp: 18   BMI 20    Physical Exam  Vitals reviewed.   Constitutional:       Appearance: She is well-developed.   HENT:      Head: Normocephalic and atraumatic.   Eyes:      General: No scleral icterus.     Conjunctiva/sclera: Conjunctivae normal.      Pupils: Pupils are equal, round, and reactive to light.   Neck:      Thyroid: No thyromegaly.   Cardiovascular:      Rate and Rhythm: Normal rate and regular rhythm.      Heart sounds: Normal heart sounds.   Pulmonary:      Effort: Pulmonary effort is normal.      Breath sounds: Normal breath sounds. No rales.   Abdominal:      General: Bowel sounds are normal. There is no distension.      Palpations: Abdomen is soft. There is no mass.      Tenderness: There is abdominal tenderness (low abdomen).   Musculoskeletal:         General: Normal range of motion.      Cervical back: Normal range of motion and neck supple.   Skin:     General: Skin is warm and dry.      Findings: No rash.   Neurological:      Mental Status: She is alert and oriented to person, place, and time.         MELD 3.0: 8 at 9/26/2023 10:47 AM  MELD-Na: 7 at 9/26/2023 10:47 AM  Calculated from:  Serum Creatinine: 0.8 mg/dL (Using min of 1 mg/dL) at 9/26/2023 10:47 AM  Serum Sodium: 142 mmol/L (Using max of 137 mmol/L) at 9/26/2023 10:47 AM  Total Bilirubin: 0.6 mg/dL (Using min of 1 mg/dL) at 9/26/2023 10:47 AM  Serum Albumin: 3.9 g/dL (Using max of 3.5 g/dL) at 9/26/2023 10:47 AM  INR(ratio): 1.1 at 9/26/2023 10:47 AM  Age at listing (hypothetical): 53 years  Sex: Female at 9/26/2023 10:47 AM      Lab Results   Component Value Date     (H) 09/26/2023    BUN 12 09/26/2023    CREATININE 0.8 09/26/2023    CALCIUM 9.1 09/26/2023     09/26/2023    K 4.1 09/26/2023     09/26/2023    PROT 6.7 09/26/2023    CO2 27 09/26/2023    ANIONGAP 8 09/26/2023    WBC 3.70 (L) 09/26/2023    RBC 4.31 09/26/2023    HGB 13.0 09/26/2023    HCT 39.3 09/26/2023    MCV  91 09/26/2023    MCH 30.2 09/26/2023    MCHC 33.1 09/26/2023     Lab Results   Component Value Date    RDW 12.7 09/26/2023    PLT 84 (L) 09/26/2023    MPV 10.9 09/26/2023    GRAN 2.6 09/26/2023    GRAN 70.4 09/26/2023    LYMPH 0.8 (L) 09/26/2023    LYMPH 20.3 09/26/2023    MONO 0.3 09/26/2023    MONO 6.8 09/26/2023    EOSINOPHIL 1.4 09/26/2023    BASOPHIL 0.3 09/26/2023    EOS 0.1 09/26/2023    BASO 0.01 09/26/2023    APTT 25.9 07/10/2023    GROUPTRH O POS 09/04/2022    BNP 69 07/10/2023    ALBUMIN 3.9 09/26/2023    BILIDIR 0.2 09/26/2023    AST 26 09/26/2023    ALT 28 09/26/2023    ALKPHOS 119 09/26/2023    MG 1.8 09/19/2023    LABPROT 11.7 09/26/2023    INR 1.1 09/26/2023       Assessment and Plan:  Tee Aranda is a 53 y.o. female with decompensated cirrhosis. She recently had covid which did not result in decompensation of liver disease. Diarrhea has resolved. Other recommendations:  1. Decompensated cirrhosis-has now improved- no longer decompensated: presumed to be from CARTAGE[place]; check meld labs every 12 weeks; HCC screening every 6 months (due next 01/24)  2. Hx GI bleeding and EV: repeat EGD 8/24  3. Ascites/edema, resolved: continue off diuretics  4. HE: continue rifaximin and restart lactulose   5. Low BMI: increase calorie intake    Return 6 months

## 2023-11-08 ENCOUNTER — LAB VISIT (OUTPATIENT)
Dept: LAB | Facility: HOSPITAL | Age: 53
End: 2023-11-08
Attending: FAMILY MEDICINE
Payer: COMMERCIAL

## 2023-11-08 DIAGNOSIS — K76.9 LIVER DISEASE: ICD-10-CM

## 2023-11-08 LAB
HAV IGG SER QL IA: NORMAL
HBV CORE AB SERPL QL IA: NORMAL
HBV SURFACE AB SER-ACNC: <3 MIU/ML
HBV SURFACE AB SER-ACNC: NORMAL M[IU]/ML

## 2023-11-08 PROCEDURE — 86790 VIRUS ANTIBODY NOS: CPT | Performed by: FAMILY MEDICINE

## 2023-11-08 PROCEDURE — 86704 HEP B CORE ANTIBODY TOTAL: CPT | Performed by: FAMILY MEDICINE

## 2023-11-08 PROCEDURE — 86706 HEP B SURFACE ANTIBODY: CPT | Mod: 91 | Performed by: FAMILY MEDICINE

## 2023-11-08 PROCEDURE — 36415 COLL VENOUS BLD VENIPUNCTURE: CPT | Performed by: FAMILY MEDICINE

## 2023-11-17 ENCOUNTER — PATIENT MESSAGE (OUTPATIENT)
Dept: HEPATOLOGY | Facility: CLINIC | Age: 53
End: 2023-11-17
Payer: COMMERCIAL

## 2023-12-20 ENCOUNTER — PATIENT MESSAGE (OUTPATIENT)
Dept: HEPATOLOGY | Facility: CLINIC | Age: 53
End: 2023-12-20
Payer: COMMERCIAL

## 2023-12-21 ENCOUNTER — TELEPHONE (OUTPATIENT)
Dept: HEPATOLOGY | Facility: CLINIC | Age: 53
End: 2023-12-21
Payer: COMMERCIAL

## 2023-12-21 NOTE — TELEPHONE ENCOUNTER
----- Message from Rachelle Donato sent at 12/21/2023 10:51 AM CST -----  Regarding: Pt Advice  Contact: [phone]  CONSULT/ADVISORY    Name of Caller: Lavelle ( Spouse)    Contact Preference:   [phone]    Nature of Call:  Requesting to speak with Suki about labs/Ultrasound that's scheduled for 12/27/2023 and 01/10/2024.

## 2023-12-28 ENCOUNTER — LAB VISIT (OUTPATIENT)
Dept: LAB | Facility: HOSPITAL | Age: 53
End: 2023-12-28
Attending: INTERNAL MEDICINE
Payer: COMMERCIAL

## 2023-12-28 DIAGNOSIS — K74.60 LIVER CIRRHOSIS SECONDARY TO NASH: Chronic | ICD-10-CM

## 2023-12-28 DIAGNOSIS — K75.81 LIVER CIRRHOSIS SECONDARY TO NASH: Chronic | ICD-10-CM

## 2023-12-28 LAB
ALBUMIN SERPL BCP-MCNC: 4.2 G/DL (ref 3.5–5.2)
ALP SERPL-CCNC: 99 U/L (ref 55–135)
ALT SERPL W/O P-5'-P-CCNC: 27 U/L (ref 10–44)
ANION GAP SERPL CALC-SCNC: 9 MMOL/L (ref 8–16)
AST SERPL-CCNC: 22 U/L (ref 10–40)
BASOPHILS # BLD AUTO: 0.01 K/UL (ref 0–0.2)
BASOPHILS NFR BLD: 0.3 % (ref 0–1.9)
BILIRUB DIRECT SERPL-MCNC: 0.3 MG/DL (ref 0.1–0.3)
BILIRUB SERPL-MCNC: 0.6 MG/DL (ref 0.1–1)
BUN SERPL-MCNC: 9 MG/DL (ref 6–20)
CALCIUM SERPL-MCNC: 9.6 MG/DL (ref 8.7–10.5)
CHLORIDE SERPL-SCNC: 104 MMOL/L (ref 95–110)
CO2 SERPL-SCNC: 27 MMOL/L (ref 23–29)
CREAT SERPL-MCNC: 0.8 MG/DL (ref 0.5–1.4)
DIFFERENTIAL METHOD BLD: ABNORMAL
EOSINOPHIL # BLD AUTO: 0.1 K/UL (ref 0–0.5)
EOSINOPHIL NFR BLD: 2.5 % (ref 0–8)
ERYTHROCYTE [DISTWIDTH] IN BLOOD BY AUTOMATED COUNT: 12.9 % (ref 11.5–14.5)
EST. GFR  (NO RACE VARIABLE): >60 ML/MIN/1.73 M^2
GLUCOSE SERPL-MCNC: 99 MG/DL (ref 70–110)
HCT VFR BLD AUTO: 42.3 % (ref 37–48.5)
HGB BLD-MCNC: 14.1 G/DL (ref 12–16)
IMM GRANULOCYTES # BLD AUTO: 0.01 K/UL (ref 0–0.04)
IMM GRANULOCYTES NFR BLD AUTO: 0.3 % (ref 0–0.5)
INR PPP: 1.1 (ref 0.8–1.2)
LYMPHOCYTES # BLD AUTO: 0.7 K/UL (ref 1–4.8)
LYMPHOCYTES NFR BLD: 19.2 % (ref 18–48)
MCH RBC QN AUTO: 30.5 PG (ref 27–31)
MCHC RBC AUTO-ENTMCNC: 33.3 G/DL (ref 32–36)
MCV RBC AUTO: 91 FL (ref 82–98)
MONOCYTES # BLD AUTO: 0.2 K/UL (ref 0.3–1)
MONOCYTES NFR BLD: 5.6 % (ref 4–15)
NEUTROPHILS # BLD AUTO: 2.6 K/UL (ref 1.8–7.7)
NEUTROPHILS NFR BLD: 72.1 % (ref 38–73)
NRBC BLD-RTO: 0 /100 WBC
PLATELET # BLD AUTO: 69 K/UL (ref 150–450)
PMV BLD AUTO: 11 FL (ref 9.2–12.9)
POTASSIUM SERPL-SCNC: 3.9 MMOL/L (ref 3.5–5.1)
PROT SERPL-MCNC: 7.4 G/DL (ref 6–8.4)
PROTHROMBIN TIME: 11.5 SEC (ref 9–12.5)
RBC # BLD AUTO: 4.63 M/UL (ref 4–5.4)
SODIUM SERPL-SCNC: 140 MMOL/L (ref 136–145)
WBC # BLD AUTO: 3.6 K/UL (ref 3.9–12.7)

## 2023-12-28 PROCEDURE — 36415 COLL VENOUS BLD VENIPUNCTURE: CPT | Performed by: INTERNAL MEDICINE

## 2023-12-28 PROCEDURE — 82248 BILIRUBIN DIRECT: CPT | Performed by: INTERNAL MEDICINE

## 2023-12-28 PROCEDURE — 80053 COMPREHEN METABOLIC PANEL: CPT | Performed by: INTERNAL MEDICINE

## 2023-12-28 PROCEDURE — 85025 COMPLETE CBC W/AUTO DIFF WBC: CPT | Performed by: INTERNAL MEDICINE

## 2023-12-28 PROCEDURE — 85610 PROTHROMBIN TIME: CPT | Performed by: INTERNAL MEDICINE

## 2024-01-10 ENCOUNTER — HOSPITAL ENCOUNTER (OUTPATIENT)
Dept: RADIOLOGY | Facility: HOSPITAL | Age: 54
Discharge: HOME | End: 2024-01-10
Attending: INTERNAL MEDICINE
Payer: COMMERCIAL

## 2024-01-10 DIAGNOSIS — K74.60 LIVER CIRRHOSIS SECONDARY TO NASH: Chronic | ICD-10-CM

## 2024-01-10 DIAGNOSIS — K75.81 LIVER CIRRHOSIS SECONDARY TO NASH: Chronic | ICD-10-CM

## 2024-01-10 PROCEDURE — 76700 US EXAM ABDOM COMPLETE: CPT | Mod: TC

## 2024-01-10 PROCEDURE — 76700 US EXAM ABDOM COMPLETE: CPT | Mod: 26,,, | Performed by: RADIOLOGY

## 2024-02-01 ENCOUNTER — TELEPHONE (OUTPATIENT)
Dept: HEPATOLOGY | Facility: CLINIC | Age: 54
End: 2024-02-01
Payer: COMMERCIAL

## 2024-02-14 ENCOUNTER — PATIENT MESSAGE (OUTPATIENT)
Dept: HEPATOLOGY | Facility: CLINIC | Age: 54
End: 2024-02-14
Payer: COMMERCIAL

## 2024-02-28 ENCOUNTER — TELEPHONE (OUTPATIENT)
Dept: HEPATOLOGY | Facility: CLINIC | Age: 54
End: 2024-02-28
Payer: COMMERCIAL

## 2024-02-28 ENCOUNTER — LAB VISIT (OUTPATIENT)
Dept: LAB | Facility: HOSPITAL | Age: 54
End: 2024-02-28
Attending: INTERNAL MEDICINE
Payer: COMMERCIAL

## 2024-02-28 DIAGNOSIS — K74.60 HEPATIC CIRRHOSIS, UNSPECIFIED HEPATIC CIRRHOSIS TYPE, UNSPECIFIED WHETHER ASCITES PRESENT: Primary | ICD-10-CM

## 2024-02-28 DIAGNOSIS — K74.60 LIVER CIRRHOSIS SECONDARY TO NASH: Chronic | ICD-10-CM

## 2024-02-28 DIAGNOSIS — K75.81 LIVER CIRRHOSIS SECONDARY TO NASH: Chronic | ICD-10-CM

## 2024-02-28 LAB
AFP SERPL-MCNC: 2.7 NG/ML (ref 0–8.4)
ALBUMIN SERPL BCP-MCNC: 4.4 G/DL (ref 3.5–5.2)
ALP SERPL-CCNC: 102 U/L (ref 55–135)
ALT SERPL W/O P-5'-P-CCNC: 29 U/L (ref 10–44)
ANION GAP SERPL CALC-SCNC: 9 MMOL/L (ref 8–16)
AST SERPL-CCNC: 27 U/L (ref 10–40)
BASOPHILS # BLD AUTO: 0.01 K/UL (ref 0–0.2)
BASOPHILS NFR BLD: 0.3 % (ref 0–1.9)
BILIRUB DIRECT SERPL-MCNC: 0.4 MG/DL (ref 0.1–0.3)
BILIRUB SERPL-MCNC: 1 MG/DL (ref 0.1–1)
BUN SERPL-MCNC: 14 MG/DL (ref 6–20)
CALCIUM SERPL-MCNC: 9.9 MG/DL (ref 8.7–10.5)
CHLORIDE SERPL-SCNC: 108 MMOL/L (ref 95–110)
CO2 SERPL-SCNC: 26 MMOL/L (ref 23–29)
CREAT SERPL-MCNC: 0.8 MG/DL (ref 0.5–1.4)
DIFFERENTIAL METHOD BLD: ABNORMAL
EOSINOPHIL # BLD AUTO: 0.1 K/UL (ref 0–0.5)
EOSINOPHIL NFR BLD: 2.8 % (ref 0–8)
ERYTHROCYTE [DISTWIDTH] IN BLOOD BY AUTOMATED COUNT: 12.7 % (ref 11.5–14.5)
EST. GFR  (NO RACE VARIABLE): >60 ML/MIN/1.73 M^2
GLUCOSE SERPL-MCNC: 114 MG/DL (ref 70–110)
HCT VFR BLD AUTO: 43.7 % (ref 37–48.5)
HGB BLD-MCNC: 14.3 G/DL (ref 12–16)
IMM GRANULOCYTES # BLD AUTO: 0.02 K/UL (ref 0–0.04)
IMM GRANULOCYTES NFR BLD AUTO: 0.5 % (ref 0–0.5)
INR PPP: 1.1 (ref 0.8–1.2)
LYMPHOCYTES # BLD AUTO: 0.8 K/UL (ref 1–4.8)
LYMPHOCYTES NFR BLD: 20 % (ref 18–48)
MCH RBC QN AUTO: 29.9 PG (ref 27–31)
MCHC RBC AUTO-ENTMCNC: 32.7 G/DL (ref 32–36)
MCV RBC AUTO: 91 FL (ref 82–98)
MONOCYTES # BLD AUTO: 0.3 K/UL (ref 0.3–1)
MONOCYTES NFR BLD: 7.2 % (ref 4–15)
NEUTROPHILS # BLD AUTO: 2.7 K/UL (ref 1.8–7.7)
NEUTROPHILS NFR BLD: 69.2 % (ref 38–73)
NRBC BLD-RTO: 0 /100 WBC
PLATELET # BLD AUTO: 73 K/UL (ref 150–450)
PMV BLD AUTO: 11.7 FL (ref 9.2–12.9)
POTASSIUM SERPL-SCNC: 4.9 MMOL/L (ref 3.5–5.1)
PROT SERPL-MCNC: 7.4 G/DL (ref 6–8.4)
PROTHROMBIN TIME: 11.8 SEC (ref 9–12.5)
RBC # BLD AUTO: 4.78 M/UL (ref 4–5.4)
SODIUM SERPL-SCNC: 143 MMOL/L (ref 136–145)
WBC # BLD AUTO: 3.9 K/UL (ref 3.9–12.7)

## 2024-02-28 PROCEDURE — 82248 BILIRUBIN DIRECT: CPT | Performed by: INTERNAL MEDICINE

## 2024-02-28 PROCEDURE — 80053 COMPREHEN METABOLIC PANEL: CPT | Performed by: INTERNAL MEDICINE

## 2024-02-28 PROCEDURE — 85025 COMPLETE CBC W/AUTO DIFF WBC: CPT | Performed by: INTERNAL MEDICINE

## 2024-02-28 PROCEDURE — 82105 ALPHA-FETOPROTEIN SERUM: CPT | Performed by: INTERNAL MEDICINE

## 2024-02-28 PROCEDURE — 36415 COLL VENOUS BLD VENIPUNCTURE: CPT | Performed by: INTERNAL MEDICINE

## 2024-02-28 PROCEDURE — 85610 PROTHROMBIN TIME: CPT | Performed by: INTERNAL MEDICINE

## 2024-03-12 ENCOUNTER — PATIENT MESSAGE (OUTPATIENT)
Dept: HEPATOLOGY | Facility: CLINIC | Age: 54
End: 2024-03-12
Payer: COMMERCIAL

## 2024-03-12 ENCOUNTER — HOSPITAL ENCOUNTER (EMERGENCY)
Facility: HOSPITAL | Age: 54
Discharge: HOME | End: 2024-03-12
Attending: EMERGENCY MEDICINE
Payer: COMMERCIAL

## 2024-03-12 ENCOUNTER — TELEPHONE (OUTPATIENT)
Dept: TRANSPLANT | Facility: CLINIC | Age: 54
End: 2024-03-12
Payer: COMMERCIAL

## 2024-03-12 VITALS
HEIGHT: 68 IN | RESPIRATION RATE: 18 BRPM | HEART RATE: 72 BPM | OXYGEN SATURATION: 100 % | WEIGHT: 137.69 LBS | BODY MASS INDEX: 20.87 KG/M2 | SYSTOLIC BLOOD PRESSURE: 122 MMHG | DIASTOLIC BLOOD PRESSURE: 61 MMHG | TEMPERATURE: 98 F

## 2024-03-12 DIAGNOSIS — E83.59 NEPHROCALCINOSIS: ICD-10-CM

## 2024-03-12 DIAGNOSIS — N20.0 NEPHROLITHIASIS: Primary | ICD-10-CM

## 2024-03-12 DIAGNOSIS — R10.9 FLANK PAIN: ICD-10-CM

## 2024-03-12 DIAGNOSIS — N29 NEPHROCALCINOSIS: ICD-10-CM

## 2024-03-12 LAB
ALBUMIN SERPL BCP-MCNC: 3.8 G/DL (ref 3.5–5.2)
ALP SERPL-CCNC: 94 U/L (ref 55–135)
ALT SERPL W/O P-5'-P-CCNC: 26 U/L (ref 10–44)
ANION GAP SERPL CALC-SCNC: 8 MMOL/L (ref 8–16)
AST SERPL-CCNC: 21 U/L (ref 10–40)
BACTERIA #/AREA URNS HPF: ABNORMAL /HPF
BASOPHILS # BLD AUTO: 0.01 K/UL (ref 0–0.2)
BASOPHILS NFR BLD: 0.3 % (ref 0–1.9)
BILIRUB SERPL-MCNC: 0.6 MG/DL (ref 0.1–1)
BILIRUB UR QL STRIP: NEGATIVE
BUN SERPL-MCNC: 14 MG/DL (ref 6–20)
CALCIUM SERPL-MCNC: 9.1 MG/DL (ref 8.7–10.5)
CHLORIDE SERPL-SCNC: 106 MMOL/L (ref 95–110)
CLARITY UR: ABNORMAL
CO2 SERPL-SCNC: 26 MMOL/L (ref 23–29)
COLOR UR: ABNORMAL
CREAT SERPL-MCNC: 0.8 MG/DL (ref 0.5–1.4)
DIFFERENTIAL METHOD BLD: ABNORMAL
EOSINOPHIL # BLD AUTO: 0.1 K/UL (ref 0–0.5)
EOSINOPHIL NFR BLD: 1.5 % (ref 0–8)
ERYTHROCYTE [DISTWIDTH] IN BLOOD BY AUTOMATED COUNT: 12.4 % (ref 11.5–14.5)
EST. GFR  (NO RACE VARIABLE): >60 ML/MIN/1.73 M^2
GLUCOSE SERPL-MCNC: 118 MG/DL (ref 70–110)
GLUCOSE UR QL STRIP: NEGATIVE
HCT VFR BLD AUTO: 36.8 % (ref 37–48.5)
HGB BLD-MCNC: 12.5 G/DL (ref 12–16)
HGB UR QL STRIP: ABNORMAL
IMM GRANULOCYTES # BLD AUTO: 0.01 K/UL (ref 0–0.04)
IMM GRANULOCYTES NFR BLD AUTO: 0.3 % (ref 0–0.5)
KETONES UR QL STRIP: NEGATIVE
LEUKOCYTE ESTERASE UR QL STRIP: NEGATIVE
LIPASE SERPL-CCNC: 37 U/L (ref 4–60)
LYMPHOCYTES # BLD AUTO: 0.5 K/UL (ref 1–4.8)
LYMPHOCYTES NFR BLD: 13.8 % (ref 18–48)
MCH RBC QN AUTO: 30.4 PG (ref 27–31)
MCHC RBC AUTO-ENTMCNC: 34 G/DL (ref 32–36)
MCV RBC AUTO: 90 FL (ref 82–98)
MICROSCOPIC COMMENT: ABNORMAL
MONOCYTES # BLD AUTO: 0.3 K/UL (ref 0.3–1)
MONOCYTES NFR BLD: 8.2 % (ref 4–15)
NEUTROPHILS # BLD AUTO: 3 K/UL (ref 1.8–7.7)
NEUTROPHILS NFR BLD: 75.9 % (ref 38–73)
NITRITE UR QL STRIP: NEGATIVE
NRBC BLD-RTO: 0 /100 WBC
PH UR STRIP: 6 [PH] (ref 5–8)
PLATELET # BLD AUTO: 58 K/UL (ref 150–450)
PMV BLD AUTO: 11.1 FL (ref 9.2–12.9)
POTASSIUM SERPL-SCNC: 4.1 MMOL/L (ref 3.5–5.1)
PROT SERPL-MCNC: 6.4 G/DL (ref 6–8.4)
PROT UR QL STRIP: ABNORMAL
RBC # BLD AUTO: 4.11 M/UL (ref 4–5.4)
RBC #/AREA URNS HPF: >100 /HPF (ref 0–4)
SODIUM SERPL-SCNC: 140 MMOL/L (ref 136–145)
SP GR UR STRIP: 1.02 (ref 1–1.03)
URN SPEC COLLECT METH UR: ABNORMAL
UROBILINOGEN UR STRIP-ACNC: NEGATIVE EU/DL
WBC # BLD AUTO: 3.92 K/UL (ref 3.9–12.7)

## 2024-03-12 PROCEDURE — 96374 THER/PROPH/DIAG INJ IV PUSH: CPT

## 2024-03-12 PROCEDURE — 96361 HYDRATE IV INFUSION ADD-ON: CPT

## 2024-03-12 PROCEDURE — 81000 URINALYSIS NONAUTO W/SCOPE: CPT | Performed by: EMERGENCY MEDICINE

## 2024-03-12 PROCEDURE — 85025 COMPLETE CBC W/AUTO DIFF WBC: CPT | Performed by: EMERGENCY MEDICINE

## 2024-03-12 PROCEDURE — 83690 ASSAY OF LIPASE: CPT | Performed by: EMERGENCY MEDICINE

## 2024-03-12 PROCEDURE — 96375 TX/PRO/DX INJ NEW DRUG ADDON: CPT

## 2024-03-12 PROCEDURE — 96376 TX/PRO/DX INJ SAME DRUG ADON: CPT

## 2024-03-12 PROCEDURE — 63600175 PHARM REV CODE 636 W HCPCS: Performed by: EMERGENCY MEDICINE

## 2024-03-12 PROCEDURE — 25000003 PHARM REV CODE 250: Performed by: EMERGENCY MEDICINE

## 2024-03-12 PROCEDURE — 99285 EMERGENCY DEPT VISIT HI MDM: CPT | Mod: 25

## 2024-03-12 PROCEDURE — 80053 COMPREHEN METABOLIC PANEL: CPT | Performed by: EMERGENCY MEDICINE

## 2024-03-12 RX ORDER — HYDROMORPHONE HYDROCHLORIDE 1 MG/ML
1 INJECTION, SOLUTION INTRAMUSCULAR; INTRAVENOUS; SUBCUTANEOUS
Status: COMPLETED | OUTPATIENT
Start: 2024-03-12 | End: 2024-03-12

## 2024-03-12 RX ORDER — PHENAZOPYRIDINE HYDROCHLORIDE 200 MG/1
200 TABLET, FILM COATED ORAL 3 TIMES DAILY
Qty: 6 TABLET | Refills: 0 | Status: SHIPPED | OUTPATIENT
Start: 2024-03-12 | End: 2024-03-22

## 2024-03-12 RX ORDER — TAMSULOSIN HYDROCHLORIDE 0.4 MG/1
0.4 CAPSULE ORAL
Status: COMPLETED | OUTPATIENT
Start: 2024-03-12 | End: 2024-03-12

## 2024-03-12 RX ORDER — NAPROXEN 500 MG/1
500 TABLET ORAL 2 TIMES DAILY
Qty: 30 TABLET | Refills: 1 | Status: ON HOLD | OUTPATIENT
Start: 2024-03-12 | End: 2024-03-16 | Stop reason: HOSPADM

## 2024-03-12 RX ORDER — ALUMINUM HYDROXIDE, MAGNESIUM HYDROXIDE, AND SIMETHICONE 1200; 120; 1200 MG/30ML; MG/30ML; MG/30ML
30 SUSPENSION ORAL ONCE
Status: DISCONTINUED | OUTPATIENT
Start: 2024-03-12 | End: 2024-03-12 | Stop reason: HOSPADM

## 2024-03-12 RX ORDER — ONDANSETRON HYDROCHLORIDE 2 MG/ML
8 INJECTION, SOLUTION INTRAVENOUS
Status: COMPLETED | OUTPATIENT
Start: 2024-03-12 | End: 2024-03-12

## 2024-03-12 RX ORDER — PHENAZOPYRIDINE HYDROCHLORIDE 100 MG/1
200 TABLET, FILM COATED ORAL
Status: COMPLETED | OUTPATIENT
Start: 2024-03-12 | End: 2024-03-12

## 2024-03-12 RX ORDER — HYDROCODONE BITARTRATE AND ACETAMINOPHEN 5; 325 MG/1; MG/1
1 TABLET ORAL EVERY 6 HOURS PRN
Qty: 12 TABLET | Refills: 0 | Status: ON HOLD | OUTPATIENT
Start: 2024-03-12 | End: 2024-03-16 | Stop reason: HOSPADM

## 2024-03-12 RX ORDER — KETOROLAC TROMETHAMINE 30 MG/ML
15 INJECTION, SOLUTION INTRAMUSCULAR; INTRAVENOUS
Status: COMPLETED | OUTPATIENT
Start: 2024-03-12 | End: 2024-03-12

## 2024-03-12 RX ORDER — LIDOCAINE HYDROCHLORIDE 20 MG/ML
15 SOLUTION OROPHARYNGEAL ONCE
Status: DISCONTINUED | OUTPATIENT
Start: 2024-03-12 | End: 2024-03-12 | Stop reason: HOSPADM

## 2024-03-12 RX ADMIN — HYDROMORPHONE HYDROCHLORIDE 1 MG: 1 INJECTION, SOLUTION INTRAMUSCULAR; INTRAVENOUS; SUBCUTANEOUS at 08:03

## 2024-03-12 RX ADMIN — KETOROLAC TROMETHAMINE 15 MG: 30 INJECTION, SOLUTION INTRAMUSCULAR at 08:03

## 2024-03-12 RX ADMIN — PHENAZOPYRIDINE 200 MG: 100 TABLET ORAL at 08:03

## 2024-03-12 RX ADMIN — HYDROMORPHONE HYDROCHLORIDE 1 MG: 1 INJECTION, SOLUTION INTRAMUSCULAR; INTRAVENOUS; SUBCUTANEOUS at 09:03

## 2024-03-12 RX ADMIN — SODIUM CHLORIDE 1000 ML: 9 INJECTION, SOLUTION INTRAVENOUS at 09:03

## 2024-03-12 RX ADMIN — ONDANSETRON 8 MG: 2 INJECTION INTRAMUSCULAR; INTRAVENOUS at 08:03

## 2024-03-12 RX ADMIN — SODIUM CHLORIDE 1000 ML: 9 INJECTION, SOLUTION INTRAVENOUS at 08:03

## 2024-03-12 RX ADMIN — HYDROMORPHONE HYDROCHLORIDE 1 MG: 1 INJECTION, SOLUTION INTRAMUSCULAR; INTRAVENOUS; SUBCUTANEOUS at 10:03

## 2024-03-12 RX ADMIN — TAMSULOSIN HYDROCHLORIDE 0.4 MG: 0.4 CAPSULE ORAL at 08:03

## 2024-03-12 NOTE — ED PROVIDER NOTES
Encounter Date: 3/12/2024       History     Chief Complaint   Patient presents with    Flank Pain    Abdominal Pain     HPI    Patient is a 53y WF hx ascites, esophageal varices, cirrosis presenting with hematuria and right flank pain for the past 3 days.  She has been taking flomax and tramadol without relief.  Urine is red.  No fevers, rashes.  Flank pain radiates down to the groin and feels like the stone is stuck because it has not moved.  Rated 9/10, burning and sharp in quality.  Nothing makes the pain worse.    Review of patient's allergies indicates:   Allergen Reactions    Sulfa (sulfonamide antibiotics) Hives     Past Medical History:   Diagnosis Date    Anemia, unspecified     Anxiety     Arthritis     Ascites 11/23/2020    AVN (avascular necrosis of bone)     Cataract     COVID-19 vaccine administered     04/08/21, 04/30/21    Diarrhea of presumed infectious origin 7/31/2023    Edema 11/23/2020    Epigastric pain 7/31/2023    Esophageal varices     Glaucoma     Hepatic encephalopathy 11/23/2020    History of colon polyps     Hx of cirrhosis     non-alcoholic    Iron deficiency anemia secondary to blood loss (chronic)     Kidney stones     Portal hypertensive gastropathy     Unintentional weight loss 10/14/2023    Unspecified cirrhosis of liver      Past Surgical History:   Procedure Laterality Date    APPENDECTOMY      COLONOSCOPY N/A 8/3/2023    Procedure: COLONOSCOPY;  Surgeon: Gerard Salinas MD;  Location: [facility];  Service: Endoscopy;  Laterality: N/A;    COLONOSCOPY W/ POLYPECTOMY      DISTAL CLAVICLE EXCISION Right 11/18/2021    Procedure: RIGHT SHOULDER ARTHROSCOPY, EXCISION, CLAVICLE, DISTAL; EXTENSIVE DEBRIDEMENT;  Surgeon: Isaiah Joyner MD;  Location: [facility] OR;  Service: Orthopedics;  Laterality: Right;    ESOPHAGEAL VARICE LIGATION      ESOPHAGOGASTRODUODENOSCOPY N/A 11/10/2020    Procedure: EGD (ESOPHAGOGASTRODUODENOSCOPY);  Surgeon: Gerard Salinas MD;  Location: [facility];   Service: Endoscopy;  Laterality: N/A;    ESOPHAGOGASTRODUODENOSCOPY N/A 12/28/2020    Procedure: EGD (ESOPHAGOGASTRODUODENOSCOPY);  Surgeon: Adryan Park MD;  Location: [facility] (2ND FLR);  Service: Endoscopy;  Laterality: N/A;    ESOPHAGOGASTRODUODENOSCOPY N/A 02/15/2021    Procedure: EGD (ESOPHAGOGASTRODUODENOSCOPY);  Surgeon: Hari Greene MD;  Location: [facility] (4TH FLR);  Service: Endoscopy;  Laterality: N/A;  6 week follow-up variceal banding  Hx varices - Labs - [facility]  prep ins. emialed - COVID screening on 2/12/21 [place] - [facility]    ESOPHAGOGASTRODUODENOSCOPY Left 08/03/2021    Procedure: EGD (ESOPHAGOGASTRODUODENOSCOPY);  Surgeon: Fabricio Corado MD;  Location: [facility] (4TH FLR);  Service: Gastroenterology;  Laterality: Left;  recent hematemasis. varices-labs on 7/26    COVID test at [facility] on 7/31-GT  requesting sooner    ESOPHAGOGASTRODUODENOSCOPY N/A 07/27/2022    Procedure: EGD (ESOPHAGOGASTRODUODENOSCOPY);  Surgeon: Eric Garcia MD;  Location: [facility];  Service: Endoscopy;  Laterality: N/A;    ESOPHAGOGASTRODUODENOSCOPY Left 08/29/2022    Procedure: EGD (ESOPHAGOGASTRODUODENOSCOPY);  Surgeon: Kacy Douglass MD;  Location: [facility] (2ND FLR);  Service: Endoscopy;  Laterality: Left;  Fully vaccinated/ clear liquids up to 4 hrs prior-RB  varices labs ordered-RB    ESOPHAGOGASTRODUODENOSCOPY N/A 10/05/2022    Procedure: EGD (ESOPHAGOGASTRODUODENOSCOPY);  Surgeon: Gerard Salinas MD;  Location: [facility];  Service: Endoscopy;  Laterality: N/A;    ESOPHAGOGASTRODUODENOSCOPY N/A 3/30/2023    Procedure: EGD (ESOPHAGOGASTRODUODENOSCOPY);  Surgeon: Gerard Salinas MD;  Location: [facility];  Service: Endoscopy;  Laterality: N/A;    ESOPHAGOGASTRODUODENOSCOPY N/A 8/3/2023    Procedure: EGD (ESOPHAGOGASTRODUODENOSCOPY);  Surgeon: Gerard Salinas MD;  Location: [facility];  Service: Endoscopy;  Laterality: N/A;    HYSTERECTOMY      KNEE SURGERY Bilateral     LITHOTRIPSY      RHINOPLASTY  TIP      SHOULDER SURGERY Right     TONSILLECTOMY      TOTAL HIP ARTHROPLASTY Right      Family History   Problem Relation Age of Onset    No Known Problems Mother     Diabetes Mellitus Maternal Grandmother     Amblyopia Neg Hx     Blindness Neg Hx     Cataracts Neg Hx     Glaucoma Neg Hx     Macular degeneration Neg Hx     Retinal detachment Neg Hx     Strabismus Neg Hx     Colon cancer Neg Hx     Esophageal cancer Neg Hx      Social History     Tobacco Use    Smoking status: Some Days     Current packs/day: 0.00     Types: Cigarettes     Last attempt to quit: 7/3/2019     Years since quittin.6    Smokeless tobacco: Never   Substance Use Topics    Alcohol use: Not Currently    Drug use: No     Review of Systems   Constitutional:  Negative for chills and fever.   Respiratory:  Negative for cough.    Cardiovascular:  Negative for chest pain.   Gastrointestinal:  Positive for abdominal pain.   Genitourinary:  Positive for flank pain.   Musculoskeletal:  Positive for back pain.   Skin:  Negative for wound.   All other systems reviewed and are negative.      Physical Exam     Initial Vitals [24 0730]   BP Pulse Resp Temp SpO2   (!) 116/59 70 18 97.7 °F (36.5 °C) 100 %      MAP       --         Physical Exam    Nursing note and vitals reviewed.  Constitutional: She appears well-developed. She appears distressed.   HENT:   Head: Normocephalic.   Mouth/Throat: Oropharynx is clear and moist.   Eyes: EOM are normal. Pupils are equal, round, and reactive to light.   Neck:   Normal range of motion.  Cardiovascular:  Normal rate and intact distal pulses.           Pulmonary/Chest: Breath sounds normal.   Abdominal: Abdomen is soft. There is abdominal tenderness.   There is right CVA tenderness.There is no rebound and no guarding.   Musculoskeletal:         General: Normal range of motion.      Cervical back: Normal range of motion.     Neurological: She is alert and oriented to person, place, and time. GCS score is  15. GCS eye subscore is 4. GCS verbal subscore is 5. GCS motor subscore is 6.   Skin: Skin is warm. Capillary refill takes less than 2 seconds.         ED Course   Critical Care    Date/Time: 3/12/2024 10:28 AM    Performed by: Cristina Saha MD  Authorized by: Cristina Saha MD  Direct patient critical care time: 65 minutes  Total critical care time (exclusive of procedural time) : 65 minutes        Labs Reviewed   CBC W/ AUTO DIFFERENTIAL - Abnormal; Notable for the following components:       Result Value    Hematocrit 36.8 (*)     Platelets 58 (*)     Lymph # 0.5 (*)     Gran % 75.9 (*)     Lymph % 13.8 (*)     All other components within normal limits   COMPREHENSIVE METABOLIC PANEL - Abnormal; Notable for the following components:    Glucose 118 (*)     All other components within normal limits   URINALYSIS - Abnormal; Notable for the following components:    Appearance, UA Cloudy (*)     Protein, UA Trace (*)     Occult Blood UA 3+ (*)     All other components within normal limits   URINALYSIS MICROSCOPIC - Abnormal; Notable for the following components:    RBC, UA >100 (*)     Bacteria Few (*)     All other components within normal limits   LIPASE          Imaging Results              CT Renal Stone Study ABD Pelvis WO (Final result)  Result time 03/12/24 08:01:49      Final result by Berna Anna MD (03/12/24 08:01:49)                   Impression:      Bilateral nonobstructing renal stones are noted in there is suspicion for bilateral medullary nephrocalcinosis.  There is moderate right hydronephrosis secondary to a right UPJ stone that measures 10 x 6 mm.    Imaging stigmata compatible with cirrhosis with portal hypertension.  Evaluation for underlying hepatic lesion limited given the lack of intravenous contrast material.  There is scattered ascites throughout the abdomen and pelvis as well as mesenteric edema.    More asymmetric stranding adjacent to the right colon, more pronounced as  compared to the previous study of 2023, this could be related to the patient's intrinsic hepatic disease; however, right-sided colitis cannot be entirely excluded although felt less likely.    Nonspecific thickening of the walls of the stomach, difficult to fully evaluate on this noncontrast examination although appearing similar to prior studies.      Electronically signed by: Berna Anna MD  Date:    03/12/2024  Time:    08:01               Narrative:    EXAMINATION:  CT RENAL STONE STUDY ABD PELVIS WO    CLINICAL HISTORY:  Flank pain, kidney stone suspected;    TECHNIQUE:  Low dose axial images, sagittal and coronal reformations were obtained from the lung bases to the pubic symphysis.  Contrast was not administered.    COMPARISON:  01/10/2024, 07/10/2023    FINDINGS:  The lung bases are clear.  The base of the heart appears normal.  Calcified atheromatous disease affects the aorta and its major branch vessels.    Punctate gallstones suspected.  No intrahepatic or extrahepatic biliary ductal dilatation is identified.  The liver demonstrates a micronodular contour in keeping with intrinsic hepatic disease/cirrhosis.  Evaluation for underlying lesions limited given the lack of intravenous contrast material.  The spleen is enlarged measuring up to 18 cm.  The pancreas and adrenal glands are unremarkable.    Bilateral nonobstructing renal stones are noted.  Suspicion for medullary nephrocalcinosis bilaterally with increased density in the renal medulla, more so on the left.  There is mild moderate right-sided hydronephrosis related to a right UPJ stone that measures 10 x 6 mm.  The urinary bladder is partially distended and has a normal appearance.  The uterus has been removed.  Adnexal regions are unremarkable.    Constipation.  Appendix is normal.  There is a small amount of ascites about the liver with interloop fluid seen throughout small bowel loops in the abdomen.  There is extensive mesenteric edema.   Suspected thickening of the walls of the stomach and a few scattered small bowel loops.  There is prominent stranding of the fat adjacent to the right colon, difficult to fully evaluate.    Right hip prosthesis.  Age-appropriate degenerative changes affect the skeleton.  Mild compression deformity of the superior endplate of L3, chronic.                                       Medications   sodium chloride 0.9% bolus 1,000 mL 1,000 mL (1,000 mLs Intravenous New Bag 3/12/24 0948)   HYDROmorphone injection 1 mg (has no administration in time range)   ketorolac injection 15 mg (15 mg Intravenous Given 3/12/24 0805)   sodium chloride 0.9% bolus 1,000 mL 1,000 mL (0 mLs Intravenous Stopped 3/12/24 0905)   HYDROmorphone injection 1 mg (1 mg Intravenous Given 3/12/24 0836)   tamsulosin 24 hr capsule 0.4 mg (0.4 mg Oral Given 3/12/24 0836)   phenazopyridine tablet 200 mg (200 mg Oral Given 3/12/24 0836)   ondansetron injection 8 mg (8 mg Intravenous Given 3/12/24 0836)   HYDROmorphone injection 1 mg (1 mg Intravenous Given 3/12/24 0948)     Medical Decision Making  This is an emergent evaluation of a 53y WF hx cirrhosis presenting with right flank pain.  Exam she is uncomfortable, non toxic, afebrile with right CVA tenderness.     Labs remarkable for no leukocytosis, anemia or electrolyte abnormalities.  UA without infection.  CT shows 10x6 stone at right UPJ  With mild hydronephrosis.  She is urinating without difficulty.  Plan is discharge with analgesics and urology follow up.  Appointment made with Dr. Cesar for 2pm tomorrow.     DDX: nephrolithiasis, UTI, pyelonephritis, colitis    Amount and/or Complexity of Data Reviewed  Labs: ordered.  Radiology: ordered.    Risk  Prescription drug management.                                      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 Provider    phenazopyridine (PYRIDIUM) 200 MG tablet Take 1 tablet (200 mg total) by mouth 3 (three) times daily. for 10 days 6 tablet 3/12/2024 3/22/2024 Cristina Saha MD    HYDROcodone-acetaminophen (NORCO) 5-325 mg per tablet Take 1 tablet by mouth every 6 (six) hours as needed for Pain. 12 tablet 3/12/2024 3/15/2024 Cristina Saha MD    naproxen (NAPROSYN) 500 MG tablet Take 1 tablet (500 mg total) by mouth 2 (two) times daily. 30 tablet 3/12/2024 -- Cristina aSha MD          Follow-up Information       Follow up With Specialties Details Why Contact Info    Isaiah Cesar MD Urology Schedule an appointment as soon as possible for a visit in 1 day 2pm tomorrow [address] [place], [place] [id number]  [phone]               Cristina Saha MD  03/12/24 2013

## 2024-03-12 NOTE — TELEPHONE ENCOUNTER
Call to pt re finding of ascites on renal scan. Has kidney stone with hydronephrosis and only scattered ascites. Explained this is not clinically significant.

## 2024-03-12 NOTE — ED TRIAGE NOTES
C/o right flank pain radiating into right abdomen and hematuria x 4 days. C/o nausea. Patient reports is taking Flomax. H/o renal stones.

## 2024-03-12 NOTE — TELEPHONE ENCOUNTER
Call returned to the patient at [phone] to inquire how she was doing?    Patient stated I just was discharged from [facility] in [facility].  I have a huge kidney stone. They gave me a referral to go and see a Urologist.  I am still passing some blood. I think my hematocrit drop some and my platelets too.  I wanted Dr Bowers to know because I have ascites.    We will forward the message to Dr Bowers and get back to you.

## 2024-03-13 ENCOUNTER — OFFICE VISIT (OUTPATIENT)
Dept: UROLOGY | Facility: CLINIC | Age: 54
End: 2024-03-13
Attending: SPECIALIST
Payer: COMMERCIAL

## 2024-03-13 VITALS — SYSTOLIC BLOOD PRESSURE: 132 MMHG | DIASTOLIC BLOOD PRESSURE: 74 MMHG | HEART RATE: 81 BPM | OXYGEN SATURATION: 98 %

## 2024-03-13 DIAGNOSIS — N20.0 NEPHROLITHIASIS: Primary | ICD-10-CM

## 2024-03-13 DIAGNOSIS — N13.30 HYDRONEPHROSIS, UNSPECIFIED HYDRONEPHROSIS TYPE: Primary | ICD-10-CM

## 2024-03-13 DIAGNOSIS — N20.0 KIDNEY STONE: ICD-10-CM

## 2024-03-13 LAB
BILIRUB SERPL-MCNC: NEGATIVE MG/DL
BLOOD URINE, POC: 250
CLARITY, POC UA: CLEAR
COLOR, POC UA: YELLOW
GLUCOSE UR QL STRIP: NORMAL
KETONES UR QL STRIP: NEGATIVE
LEUKOCYTE ESTERASE URINE, POC: NORMAL
NITRITE, POC UA: NEGATIVE
PH, POC UA: 6
PROTEIN, POC: NEGATIVE
SPECIFIC GRAVITY, POC UA: 1.01
UROBILINOGEN, POC UA: NORMAL

## 2024-03-13 PROCEDURE — 3078F DIAST BP <80 MM HG: CPT | Mod: CPTII,S$GLB,, | Performed by: SPECIALIST

## 2024-03-13 PROCEDURE — 99999 PR PBB SHADOW E&M-EST. PATIENT-LVL III: CPT | Mod: PBBFAC,,, | Performed by: SPECIALIST

## 2024-03-13 PROCEDURE — 3075F SYST BP GE 130 - 139MM HG: CPT | Mod: CPTII,S$GLB,, | Performed by: SPECIALIST

## 2024-03-13 PROCEDURE — 81002 URINALYSIS NONAUTO W/O SCOPE: CPT | Mod: S$GLB,,, | Performed by: SPECIALIST

## 2024-03-13 PROCEDURE — 1160F RVW MEDS BY RX/DR IN RCRD: CPT | Mod: CPTII,S$GLB,, | Performed by: SPECIALIST

## 2024-03-13 PROCEDURE — 99202 OFFICE O/P NEW SF 15 MIN: CPT | Mod: S$GLB,,, | Performed by: SPECIALIST

## 2024-03-13 PROCEDURE — 1159F MED LIST DOCD IN RCRD: CPT | Mod: CPTII,S$GLB,, | Performed by: SPECIALIST

## 2024-03-13 NOTE — PROGRESS NOTES
"[facility] - Urology   Clinic Note    SUBJECTIVE:     Chief Complaint   Patient presents with    Nephrolithiasis     Patient coming in for a hospital follow up for her kidney stone. States she passed a stone a few months ago, showed bruising on her back near her kidney. States pain when she needs to urinate but pain goes away once she empties her bladder.        Referral from: Cristina Saha MD.    History of Present Illness:  Tee Aranda is a 53 y.o. female who presents to clinic for 5x10 mm right proximal ureteral stone.    ER note states:  Patient is a 53y WF hx ascites, esophageal varices, cirrosis presenting with hematuria and right flank pain for the past 3 days. She has been taking flomax and tramadol without relief. Urine is red. No fevers, rashes. Flank pain radiates down to the groin and feels like the stone is stuck because it has not moved. Rated 9/10, burning and sharp in quality. Nothing makes the pain worse.   -------------------  Patient mentions "this pain is messing with my head," and she wants it out immediately.  I offered placing a stent.  I mentioned ESWL is relatively contraindicated with thrombocytopenia.  Her platelet count is 53.  She mentioned something to the effect she had ESWL with thrombocytopenia in the past.  She has intractable pain and her stone size does not warrant MET. I briefly discussed URS/LL, but she mentioned she wants it out now and wants to get her hepatologist involved.  She walked out and said she's going to main campus.    Patient endorses no additional complaints at this time.    Past Medical History:   Diagnosis Date    Anemia, unspecified     Anxiety     Arthritis     Ascites 11/23/2020    AVN (avascular necrosis of bone)     Cataract     COVID-19 vaccine administered     04/08/21, 04/30/21    Diarrhea of presumed infectious origin 7/31/2023    Edema 11/23/2020    Epigastric pain 7/31/2023    Esophageal varices     Glaucoma     Hepatic " encephalopathy 11/23/2020    History of colon polyps     Hx of cirrhosis     non-alcoholic    Iron deficiency anemia secondary to blood loss (chronic)     Kidney stones     Portal hypertensive gastropathy     Unintentional weight loss 10/14/2023    Unspecified cirrhosis of liver        Past Surgical History:   Procedure Laterality Date    APPENDECTOMY      COLONOSCOPY N/A 8/3/2023    Procedure: COLONOSCOPY;  Surgeon: Gerard Salinas MD;  Location: [facility];  Service: Endoscopy;  Laterality: N/A;    COLONOSCOPY W/ POLYPECTOMY      DISTAL CLAVICLE EXCISION Right 11/18/2021    Procedure: RIGHT SHOULDER ARTHROSCOPY, EXCISION, CLAVICLE, DISTAL; EXTENSIVE DEBRIDEMENT;  Surgeon: Isaiah Joyner MD;  Location: [facility];  Service: Orthopedics;  Laterality: Right;    ESOPHAGEAL VARICE LIGATION      ESOPHAGOGASTRODUODENOSCOPY N/A 11/10/2020    Procedure: EGD (ESOPHAGOGASTRODUODENOSCOPY);  Surgeon: Gerard Salinas MD;  Location: [facility];  Service: Endoscopy;  Laterality: N/A;    ESOPHAGOGASTRODUODENOSCOPY N/A 12/28/2020    Procedure: EGD (ESOPHAGOGASTRODUODENOSCOPY);  Surgeon: Adryan Park MD;  Location: [facility] (2ND FLR);  Service: Endoscopy;  Laterality: N/A;    ESOPHAGOGASTRODUODENOSCOPY N/A 02/15/2021    Procedure: EGD (ESOPHAGOGASTRODUODENOSCOPY);  Surgeon: Hari Greene MD;  Location: [facility] (4TH FLR);  Service: Endoscopy;  Laterality: N/A;  6 week follow-up variceal banding  Hx varices - Labs - ERW  prep ins. emialed - COVID screening on 2/12/21 [place] - ERW    ESOPHAGOGASTRODUODENOSCOPY Left 08/03/2021    Procedure: EGD (ESOPHAGOGASTRODUODENOSCOPY);  Surgeon: Fabricio Corado MD;  Location: [facility] (4TH FLR);  Service: Gastroenterology;  Laterality: Left;  recent hematemasis. varices-labs on 7/26    COVID test at [facility] on 7/31-GT  requesting sooner    ESOPHAGOGASTRODUODENOSCOPY N/A 07/27/2022    Procedure: EGD (ESOPHAGOGASTRODUODENOSCOPY);  Surgeon: Eric Garcia MD;  Location: [facility];   Service: Endoscopy;  Laterality: N/A;    ESOPHAGOGASTRODUODENOSCOPY Left 2022    Procedure: EGD (ESOPHAGOGASTRODUODENOSCOPY);  Surgeon: Kacy Douglass MD;  [facility]  Service: Endoscopy;  Laterality: Left;  Fully vaccinated/ clear liquids up to 4 hrs prior-RB  varices labs ordered-RB    ESOPHAGOGASTRODUODENOSCOPY N/A 10/05/2022    Procedure: EGD (ESOPHAGOGASTRODUODENOSCOPY);  Surgeon: Gerard Salinas MD;  Location: [facility];  Service: Endoscopy;  Laterality: N/A;    ESOPHAGOGASTRODUODENOSCOPY N/A 3/30/2023    Procedure: EGD (ESOPHAGOGASTRODUODENOSCOPY);  Surgeon: Gerard Salinas MD;  Location: [facility];  Service: Endoscopy;  Laterality: N/A;    ESOPHAGOGASTRODUODENOSCOPY N/A 8/3/2023    Procedure: EGD (ESOPHAGOGASTRODUODENOSCOPY);  Surgeon: Gerard Salinas MD;  Location: [facility];  Service: Endoscopy;  Laterality: N/A;    HYSTERECTOMY      KNEE SURGERY Bilateral     LITHOTRIPSY      RHINOPLASTY TIP      SHOULDER SURGERY Right     TONSILLECTOMY      TOTAL HIP ARTHROPLASTY Right        Family History   Problem Relation Age of Onset    No Known Problems Mother     Diabetes Mellitus Maternal Grandmother     Amblyopia Neg Hx     Blindness Neg Hx     Cataracts Neg Hx     Glaucoma Neg Hx     Macular degeneration Neg Hx     Retinal detachment Neg Hx     Strabismus Neg Hx     Colon cancer Neg Hx     Esophageal cancer Neg Hx        Social History     Tobacco Use    Smoking status: Some Days     Current packs/day: 0.00     Types: Cigarettes     Last attempt to quit: 7/3/2019     Years since quittin.6    Smokeless tobacco: Never   Substance Use Topics    Alcohol use: Not Currently    Drug use: No       Current Outpatient Medications on File Prior to Visit   Medication Sig Dispense Refill    carvediloL (COREG) 3.125 MG tablet TAKE 1 TABLET BY MOUTH 2 TIMES DAILY. 180 tablet 3    esomeprazole (NEXIUM) 40 MG capsule Take 1 capsule (40 mg total) by mouth 2 (two) times daily before  "meals. 60 capsule 11    HYDROcodone-acetaminophen (NORCO) 5-325 mg per tablet Take 1 tablet by mouth every 6 (six) hours as needed for Pain. 12 tablet 0    multivitamin (THERAGRAN) per tablet Take 1 tablet by mouth once daily.      omega-3 fatty acids/fish oil (FISH OIL-OMEGA-3 FATTY ACIDS) 300-1,000 mg capsule Take 1 capsule by mouth once daily.      phenazopyridine (PYRIDIUM) 200 MG tablet Take 1 tablet (200 mg total) by mouth 3 (three) times daily. for 10 days 6 tablet 0    rifAXIMin (XIFAXAN) 550 mg Tab Take 1 tablet (550 mg total) by mouth 2 (two) times daily. 60 tablet 11    UNABLE TO FIND 4 (four) times daily as needed. Medible 20 mg blue raspberry 1/4 to 1/2 up to 4 times daily as needed.      amitriptyline (ELAVIL) 10 MG tablet Take 1 tablet (10 mg total) by mouth every evening. 30 tablet 11    lactulose (CHRONULAC) 20 gram/30 mL Soln Take 30 mLs (20 g total) by mouth once daily. 3000 mL 5    naproxen (NAPROSYN) 500 MG tablet Take 1 tablet (500 mg total) by mouth 2 (two) times daily. (Patient not taking: Reported on 3/13/2024) 30 tablet 1    pulse oximeter (PULSE OXIMETER) device by Apply Externally route 2 (two) times a day. Use twice daily at 8 AM and 3 PM and record the value in MyChart as directed. 1 each 0     Current Facility-Administered Medications on File Prior to Visit   Medication Dose Route Frequency Provider Last Rate Last Admin    0.9%  NaCl infusion   Intravenous Continuous Rita, Gerard GUERRA MD   Stopped at 03/30/23 0973       Review of patient's allergies indicates:   Allergen Reactions    Sulfa (sulfonamide antibiotics) Hives       Review of Systems   Genitourinary:  Positive for flank pain.        Review of Systems:  A review of 10+ systems was conducted with pertinent positive and negative findings documented in HPI with all other systems reviewed and negative.    OBJECTIVE:     Estimated body mass index is 20.93 kg/m² as calculated from the following:    Height as of 3/12/24: 5' 8" " "(1.727 m).    Weight as of 3/12/24: 62.5 kg (137 lb 10.8 oz).    Vital Signs (Most Recent)  Vitals:    03/13/24 1351   BP: 132/74   Pulse: 81       Physical Exam:    Physical Exam  Neurological:      General: No focal deficit present.      Mental Status: She is alert.   Psychiatric:         Thought Content: Thought content normal.          Genitourinary Exam:  deferred      LABS:     Lab Results   Component Value Date    BUN 14 03/12/2024    CREATININE 0.8 03/12/2024    WBC 3.92 03/12/2024    HGB 12.5 03/12/2024    HCT 36.8 (L) 03/12/2024    PLT 58 (L) 03/12/2024    AST 21 03/12/2024    ALT 26 03/12/2024    ALKPHOS 94 03/12/2024    ALBUMIN 3.8 03/12/2024    HGBA1C 5.5 07/26/2022    INR 1.1 02/28/2024        Urinalysis:   No results found for: "UAREFLEX"       Imaging:  I have personally reviewed all relevant imaging studies.    Results for orders placed or performed during the hospital encounter of 03/12/24 (from the past 2160 hour(s))   CT Renal Stone Study ABD Pelvis WO    Narrative    EXAMINATION:  CT RENAL STONE STUDY ABD PELVIS WO    CLINICAL HISTORY:  Flank pain, kidney stone suspected;    TECHNIQUE:  Low dose axial images, sagittal and coronal reformations were obtained from the lung bases to the pubic symphysis.  Contrast was not administered.    COMPARISON:  01/10/2024, 07/10/2023    FINDINGS:  The lung bases are clear.  The base of the heart appears normal.  Calcified atheromatous disease affects the aorta and its major branch vessels.    Punctate gallstones suspected.  No intrahepatic or extrahepatic biliary ductal dilatation is identified.  The liver demonstrates a micronodular contour in keeping with intrinsic hepatic disease/cirrhosis.  Evaluation for underlying lesions limited given the lack of intravenous contrast material.  The spleen is enlarged measuring up to 18 cm.  The pancreas and adrenal glands are unremarkable.    Bilateral nonobstructing renal stones are noted.  Suspicion for medullary " nephrocalcinosis bilaterally with increased density in the renal medulla, more so on the left.  There is mild moderate right-sided hydronephrosis related to a right UPJ stone that measures 10 x 6 mm.  The urinary bladder is partially distended and has a normal appearance.  The uterus has been removed.  Adnexal regions are unremarkable.    Constipation.  Appendix is normal.  There is a small amount of ascites about the liver with interloop fluid seen throughout small bowel loops in the abdomen.  There is extensive mesenteric edema.  Suspected thickening of the walls of the stomach and a few scattered small bowel loops.  There is prominent stranding of the fat adjacent to the right colon, difficult to fully evaluate.    Right hip prosthesis.  Age-appropriate degenerative changes affect the skeleton.  Mild compression deformity of the superior endplate of L3, chronic.      Impression    Bilateral nonobstructing renal stones are noted in there is suspicion for bilateral medullary nephrocalcinosis.  There is moderate right hydronephrosis secondary to a right UPJ stone that measures 10 x 6 mm.    Imaging stigmata compatible with cirrhosis with portal hypertension.  Evaluation for underlying hepatic lesion limited given the lack of intravenous contrast material.  There is scattered ascites throughout the abdomen and pelvis as well as mesenteric edema.    More asymmetric stranding adjacent to the right colon, more pronounced as compared to the previous study of 2023, this could be related to the patient's intrinsic hepatic disease; however, right-sided colitis cannot be entirely excluded although felt less likely.    Nonspecific thickening of the walls of the stomach, difficult to fully evaluate on this noncontrast examination although appearing similar to prior studies.      Electronically signed by: Berna Anna MD  Date:    03/12/2024  Time:    08:01     No results found for this or any previous visit (from the past  2160 hour(s)).  CT Renal Stone Study ABD Pelvis WO  Narrative: EXAMINATION:  CT RENAL STONE STUDY ABD PELVIS WO    CLINICAL HISTORY:  Flank pain, kidney stone suspected;    TECHNIQUE:  Low dose axial images, sagittal and coronal reformations were obtained from the lung bases to the pubic symphysis.  Contrast was not administered.    COMPARISON:  01/10/2024, 07/10/2023    FINDINGS:  The lung bases are clear.  The base of the heart appears normal.  Calcified atheromatous disease affects the aorta and its major branch vessels.    Punctate gallstones suspected.  No intrahepatic or extrahepatic biliary ductal dilatation is identified.  The liver demonstrates a micronodular contour in keeping with intrinsic hepatic disease/cirrhosis.  Evaluation for underlying lesions limited given the lack of intravenous contrast material.  The spleen is enlarged measuring up to 18 cm.  The pancreas and adrenal glands are unremarkable.    Bilateral nonobstructing renal stones are noted.  Suspicion for medullary nephrocalcinosis bilaterally with increased density in the renal medulla, more so on the left.  There is mild moderate right-sided hydronephrosis related to a right UPJ stone that measures 10 x 6 mm.  The urinary bladder is partially distended and has a normal appearance.  The uterus has been removed.  Adnexal regions are unremarkable.    Constipation.  Appendix is normal.  There is a small amount of ascites about the liver with interloop fluid seen throughout small bowel loops in the abdomen.  There is extensive mesenteric edema.  Suspected thickening of the walls of the stomach and a few scattered small bowel loops.  There is prominent stranding of the fat adjacent to the right colon, difficult to fully evaluate.    Right hip prosthesis.  Age-appropriate degenerative changes affect the skeleton.  Mild compression deformity of the superior endplate of L3, chronic.  Impression: Bilateral nonobstructing renal stones are noted in  there is suspicion for bilateral medullary nephrocalcinosis.  There is moderate right hydronephrosis secondary to a right UPJ stone that measures 10 x 6 mm.    Imaging stigmata compatible with cirrhosis with portal hypertension.  Evaluation for underlying hepatic lesion limited given the lack of intravenous contrast material.  There is scattered ascites throughout the abdomen and pelvis as well as mesenteric edema.    More asymmetric stranding adjacent to the right colon, more pronounced as compared to the previous study of 2023, this could be related to the patient's intrinsic hepatic disease; however, right-sided colitis cannot be entirely excluded although felt less likely.    Nonspecific thickening of the walls of the stomach, difficult to fully evaluate on this noncontrast examination although appearing similar to prior studies.    Electronically signed by: Berna Anna MD  Date:    03/12/2024  Time:    08:01         ASSESSMENT     1. Nephrolithiasis        PLAN:     Patient mentioned she is going to [facility]    Isaiah Cesar MD  Urology  [facility]     Disclaimer: This note has been generated using voice-recognition software. There may be typographical errors that have been missed during proof-reading.

## 2024-03-14 ENCOUNTER — ANESTHESIA EVENT (OUTPATIENT)
Dept: SURGERY | Facility: HOSPITAL | Age: 54
End: 2024-03-14
Payer: COMMERCIAL

## 2024-03-14 ENCOUNTER — HOSPITAL ENCOUNTER (OUTPATIENT)
Facility: HOSPITAL | Age: 54
Discharge: HOME | End: 2024-03-16
Attending: EMERGENCY MEDICINE | Admitting: STUDENT IN AN ORGANIZED HEALTH CARE EDUCATION/TRAINING PROGRAM
Payer: COMMERCIAL

## 2024-03-14 ENCOUNTER — ANESTHESIA (OUTPATIENT)
Dept: SURGERY | Facility: HOSPITAL | Age: 54
End: 2024-03-14
Payer: COMMERCIAL

## 2024-03-14 DIAGNOSIS — K74.60 HEPATIC CIRRHOSIS, UNSPECIFIED HEPATIC CIRRHOSIS TYPE, UNSPECIFIED WHETHER ASCITES PRESENT: ICD-10-CM

## 2024-03-14 DIAGNOSIS — N20.2 CALCULUS OF KIDNEY WITH CALCULUS OF URETER: Primary | ICD-10-CM

## 2024-03-14 DIAGNOSIS — N20.0 KIDNEY STONES: ICD-10-CM

## 2024-03-14 DIAGNOSIS — R07.9 CHEST PAIN: ICD-10-CM

## 2024-03-14 DIAGNOSIS — D69.6 THROMBOCYTOPENIA: ICD-10-CM

## 2024-03-14 PROBLEM — N30.01 ACUTE CYSTITIS WITH HEMATURIA: Status: ACTIVE | Noted: 2024-03-14

## 2024-03-14 PROBLEM — N20.9 UROLITHIASIS: Status: ACTIVE | Noted: 2024-03-14

## 2024-03-14 LAB
ALBUMIN SERPL BCP-MCNC: 4 G/DL (ref 3.5–5.2)
ALP SERPL-CCNC: 83 U/L (ref 55–135)
ALT SERPL W/O P-5'-P-CCNC: 21 U/L (ref 10–44)
ANION GAP SERPL CALC-SCNC: 9 MMOL/L (ref 8–16)
AST SERPL-CCNC: 21 U/L (ref 10–40)
BACTERIA #/AREA URNS AUTO: ABNORMAL /HPF
BASOPHILS # BLD AUTO: 0.01 K/UL (ref 0–0.2)
BASOPHILS NFR BLD: 0.3 % (ref 0–1.9)
BILIRUB SERPL-MCNC: 1 MG/DL (ref 0.1–1)
BILIRUB UR QL STRIP: NEGATIVE
BUN SERPL-MCNC: 9 MG/DL (ref 6–20)
CALCIUM SERPL-MCNC: 9.7 MG/DL (ref 8.7–10.5)
CHLORIDE SERPL-SCNC: 105 MMOL/L (ref 95–110)
CLARITY UR REFRACT.AUTO: ABNORMAL
CO2 SERPL-SCNC: 27 MMOL/L (ref 23–29)
COLOR UR AUTO: YELLOW
CREAT SERPL-MCNC: 0.8 MG/DL (ref 0.5–1.4)
DIFFERENTIAL METHOD BLD: ABNORMAL
EOSINOPHIL # BLD AUTO: 0 K/UL (ref 0–0.5)
EOSINOPHIL NFR BLD: 0.8 % (ref 0–8)
ERYTHROCYTE [DISTWIDTH] IN BLOOD BY AUTOMATED COUNT: 12.4 % (ref 11.5–14.5)
EST. GFR  (NO RACE VARIABLE): >60 ML/MIN/1.73 M^2
GLUCOSE SERPL-MCNC: 133 MG/DL (ref 70–110)
GLUCOSE UR QL STRIP: NEGATIVE
HCT VFR BLD AUTO: 40.3 % (ref 37–48.5)
HCV AB SERPL QL IA: NORMAL
HGB BLD-MCNC: 13.7 G/DL (ref 12–16)
HGB UR QL STRIP: ABNORMAL
HIV 1+2 AB+HIV1 P24 AG SERPL QL IA: NORMAL
IMM GRANULOCYTES # BLD AUTO: 0.01 K/UL (ref 0–0.04)
IMM GRANULOCYTES NFR BLD AUTO: 0.3 % (ref 0–0.5)
KETONES UR QL STRIP: ABNORMAL
LEUKOCYTE ESTERASE UR QL STRIP: ABNORMAL
LYMPHOCYTES # BLD AUTO: 0.4 K/UL (ref 1–4.8)
LYMPHOCYTES NFR BLD: 11.2 % (ref 18–48)
MCH RBC QN AUTO: 31.1 PG (ref 27–31)
MCHC RBC AUTO-ENTMCNC: 34 G/DL (ref 32–36)
MCV RBC AUTO: 92 FL (ref 82–98)
MICROSCOPIC COMMENT: ABNORMAL
MONOCYTES # BLD AUTO: 0.2 K/UL (ref 0.3–1)
MONOCYTES NFR BLD: 5.3 % (ref 4–15)
NEUTROPHILS # BLD AUTO: 3.2 K/UL (ref 1.8–7.7)
NEUTROPHILS NFR BLD: 82.1 % (ref 38–73)
NITRITE UR QL STRIP: NEGATIVE
NRBC BLD-RTO: 0 /100 WBC
PH UR STRIP: 7 [PH] (ref 5–8)
PLATELET # BLD AUTO: 58 K/UL (ref 150–450)
PMV BLD AUTO: 11.5 FL (ref 9.2–12.9)
POTASSIUM SERPL-SCNC: 3.7 MMOL/L (ref 3.5–5.1)
PROT SERPL-MCNC: 6.9 G/DL (ref 6–8.4)
PROT UR QL STRIP: ABNORMAL
RBC # BLD AUTO: 4.4 M/UL (ref 4–5.4)
RBC #/AREA URNS AUTO: >100 /HPF (ref 0–4)
SODIUM SERPL-SCNC: 141 MMOL/L (ref 136–145)
SP GR UR STRIP: 1.02 (ref 1–1.03)
SQUAMOUS #/AREA URNS AUTO: 1 /HPF
URN SPEC COLLECT METH UR: ABNORMAL
WBC # BLD AUTO: 3.94 K/UL (ref 3.9–12.7)
WBC #/AREA URNS AUTO: 6 /HPF (ref 0–5)

## 2024-03-14 PROCEDURE — 71000033 HC RECOVERY, INTIAL HOUR: Performed by: UROLOGY

## 2024-03-14 PROCEDURE — 37000009 HC ANESTHESIA EA ADD 15 MINS: Performed by: UROLOGY

## 2024-03-14 PROCEDURE — 63600175 PHARM REV CODE 636 W HCPCS

## 2024-03-14 PROCEDURE — 86803 HEPATITIS C AB TEST: CPT | Performed by: PHYSICIAN ASSISTANT

## 2024-03-14 PROCEDURE — D9220A PRA ANESTHESIA: Mod: ANES,,, | Performed by: ANESTHESIOLOGY

## 2024-03-14 PROCEDURE — 85025 COMPLETE CBC W/AUTO DIFF WBC: CPT | Performed by: EMERGENCY MEDICINE

## 2024-03-14 PROCEDURE — 25000003 PHARM REV CODE 250

## 2024-03-14 PROCEDURE — 80053 COMPREHEN METABOLIC PANEL: CPT | Performed by: EMERGENCY MEDICINE

## 2024-03-14 PROCEDURE — 96375 TX/PRO/DX INJ NEW DRUG ADDON: CPT

## 2024-03-14 PROCEDURE — 25000003 PHARM REV CODE 250: Performed by: EMERGENCY MEDICINE

## 2024-03-14 PROCEDURE — 36000707: Performed by: UROLOGY

## 2024-03-14 PROCEDURE — 87389 HIV-1 AG W/HIV-1&-2 AB AG IA: CPT | Performed by: PHYSICIAN ASSISTANT

## 2024-03-14 PROCEDURE — 96376 TX/PRO/DX INJ SAME DRUG ADON: CPT

## 2024-03-14 PROCEDURE — 96361 HYDRATE IV INFUSION ADD-ON: CPT

## 2024-03-14 PROCEDURE — 99285 EMERGENCY DEPT VISIT HI MDM: CPT | Mod: 25

## 2024-03-14 PROCEDURE — 96365 THER/PROPH/DIAG IV INF INIT: CPT

## 2024-03-14 PROCEDURE — G0378 HOSPITAL OBSERVATION PER HR: HCPCS

## 2024-03-14 PROCEDURE — 37000008 HC ANESTHESIA 1ST 15 MINUTES: Performed by: UROLOGY

## 2024-03-14 PROCEDURE — 63600175 PHARM REV CODE 636 W HCPCS: Performed by: NURSE ANESTHETIST, CERTIFIED REGISTERED

## 2024-03-14 PROCEDURE — 63600175 PHARM REV CODE 636 W HCPCS: Performed by: STUDENT IN AN ORGANIZED HEALTH CARE EDUCATION/TRAINING PROGRAM

## 2024-03-14 PROCEDURE — 52332 CYSTOSCOPY AND TREATMENT: CPT | Mod: RT,,, | Performed by: UROLOGY

## 2024-03-14 PROCEDURE — 99214 OFFICE O/P EST MOD 30 MIN: CPT | Mod: 25,,, | Performed by: UROLOGY

## 2024-03-14 PROCEDURE — 87205 SMEAR GRAM STAIN: CPT | Performed by: UROLOGY

## 2024-03-14 PROCEDURE — 81001 URINALYSIS AUTO W/SCOPE: CPT | Performed by: EMERGENCY MEDICINE

## 2024-03-14 PROCEDURE — 87086 URINE CULTURE/COLONY COUNT: CPT | Mod: 59 | Performed by: UROLOGY

## 2024-03-14 PROCEDURE — 36000706: Performed by: UROLOGY

## 2024-03-14 PROCEDURE — D9220A PRA ANESTHESIA: Mod: CRNA,,, | Performed by: NURSE ANESTHETIST, CERTIFIED REGISTERED

## 2024-03-14 PROCEDURE — 25000003 PHARM REV CODE 250: Performed by: STUDENT IN AN ORGANIZED HEALTH CARE EDUCATION/TRAINING PROGRAM

## 2024-03-14 PROCEDURE — 25000003 PHARM REV CODE 250: Performed by: NURSE ANESTHETIST, CERTIFIED REGISTERED

## 2024-03-14 PROCEDURE — 71000015 HC POSTOP RECOV 1ST HR: Performed by: UROLOGY

## 2024-03-14 PROCEDURE — 87086 URINE CULTURE/COLONY COUNT: CPT | Performed by: EMERGENCY MEDICINE

## 2024-03-14 PROCEDURE — C1769 GUIDE WIRE: HCPCS | Performed by: UROLOGY

## 2024-03-14 PROCEDURE — 63600175 PHARM REV CODE 636 W HCPCS: Performed by: EMERGENCY MEDICINE

## 2024-03-14 PROCEDURE — C2617 STENT, NON-COR, TEM W/O DEL: HCPCS | Performed by: UROLOGY

## 2024-03-14 DEVICE — STENT URETERAL UNIV 6FR 26CM
Type: IMPLANTABLE DEVICE | Site: URETER | Status: NON-FUNCTIONAL
Removed: 2024-03-20

## 2024-03-14 RX ORDER — TAMSULOSIN HYDROCHLORIDE 0.4 MG/1
0.4 CAPSULE ORAL DAILY
Status: DISCONTINUED | OUTPATIENT
Start: 2024-03-14 | End: 2024-03-16 | Stop reason: HOSPADM

## 2024-03-14 RX ORDER — IBUPROFEN 200 MG
16 TABLET ORAL
Status: DISCONTINUED | OUTPATIENT
Start: 2024-03-14 | End: 2024-03-16 | Stop reason: HOSPADM

## 2024-03-14 RX ORDER — HYDROMORPHONE HYDROCHLORIDE 1 MG/ML
0.2 INJECTION, SOLUTION INTRAMUSCULAR; INTRAVENOUS; SUBCUTANEOUS
Status: DISCONTINUED | OUTPATIENT
Start: 2024-03-14 | End: 2024-03-16 | Stop reason: HOSPADM

## 2024-03-14 RX ORDER — HYDROMORPHONE HYDROCHLORIDE 1 MG/ML
0.2 INJECTION, SOLUTION INTRAMUSCULAR; INTRAVENOUS; SUBCUTANEOUS
Status: DISCONTINUED | OUTPATIENT
Start: 2024-03-14 | End: 2024-03-14

## 2024-03-14 RX ORDER — ONDANSETRON 8 MG/1
8 TABLET, ORALLY DISINTEGRATING ORAL EVERY 8 HOURS PRN
Status: DISCONTINUED | OUTPATIENT
Start: 2024-03-14 | End: 2024-03-16 | Stop reason: HOSPADM

## 2024-03-14 RX ORDER — TALC
6 POWDER (GRAM) TOPICAL NIGHTLY PRN
Status: DISCONTINUED | OUTPATIENT
Start: 2024-03-14 | End: 2024-03-16 | Stop reason: HOSPADM

## 2024-03-14 RX ORDER — TALC
6 POWDER (GRAM) TOPICAL NIGHTLY PRN
Status: DISCONTINUED | OUTPATIENT
Start: 2024-03-14 | End: 2024-03-14

## 2024-03-14 RX ORDER — PROCHLORPERAZINE EDISYLATE 5 MG/ML
5 INJECTION INTRAMUSCULAR; INTRAVENOUS EVERY 6 HOURS PRN
Status: DISCONTINUED | OUTPATIENT
Start: 2024-03-14 | End: 2024-03-16 | Stop reason: HOSPADM

## 2024-03-14 RX ORDER — CEFAZOLIN SODIUM 1 G/3ML
INJECTION, POWDER, FOR SOLUTION INTRAMUSCULAR; INTRAVENOUS
Status: DISCONTINUED | OUTPATIENT
Start: 2024-03-14 | End: 2024-03-14

## 2024-03-14 RX ORDER — ACETAMINOPHEN 325 MG/1
650 TABLET ORAL EVERY 4 HOURS PRN
Status: DISCONTINUED | OUTPATIENT
Start: 2024-03-14 | End: 2024-03-16 | Stop reason: HOSPADM

## 2024-03-14 RX ORDER — HYDROCODONE BITARTRATE AND ACETAMINOPHEN 5; 325 MG/1; MG/1
1 TABLET ORAL EVERY 6 HOURS PRN
Status: DISCONTINUED | OUTPATIENT
Start: 2024-03-14 | End: 2024-03-14

## 2024-03-14 RX ORDER — HYDROMORPHONE HYDROCHLORIDE 1 MG/ML
0.5 INJECTION, SOLUTION INTRAMUSCULAR; INTRAVENOUS; SUBCUTANEOUS
Status: COMPLETED | OUTPATIENT
Start: 2024-03-14 | End: 2024-03-14

## 2024-03-14 RX ORDER — ACETAMINOPHEN 500 MG
1000 TABLET ORAL EVERY 8 HOURS PRN
Status: DISCONTINUED | OUTPATIENT
Start: 2024-03-14 | End: 2024-03-14

## 2024-03-14 RX ORDER — GLUCAGON 1 MG
1 KIT INJECTION
Status: DISCONTINUED | OUTPATIENT
Start: 2024-03-14 | End: 2024-03-16 | Stop reason: HOSPADM

## 2024-03-14 RX ORDER — OXYCODONE HYDROCHLORIDE 10 MG/1
10 TABLET ORAL EVERY 4 HOURS PRN
Status: DISCONTINUED | OUTPATIENT
Start: 2024-03-14 | End: 2024-03-16 | Stop reason: HOSPADM

## 2024-03-14 RX ORDER — HYDROMORPHONE HYDROCHLORIDE 1 MG/ML
0.5 INJECTION, SOLUTION INTRAMUSCULAR; INTRAVENOUS; SUBCUTANEOUS EVERY 6 HOURS PRN
Status: DISCONTINUED | OUTPATIENT
Start: 2024-03-14 | End: 2024-03-14

## 2024-03-14 RX ORDER — FENTANYL CITRATE 50 UG/ML
25 INJECTION, SOLUTION INTRAMUSCULAR; INTRAVENOUS
Status: DISCONTINUED | OUTPATIENT
Start: 2024-03-14 | End: 2024-03-16 | Stop reason: HOSPADM

## 2024-03-14 RX ORDER — KETOROLAC TROMETHAMINE 15 MG/ML
15 INJECTION, SOLUTION INTRAMUSCULAR; INTRAVENOUS EVERY 6 HOURS PRN
Status: DISCONTINUED | OUTPATIENT
Start: 2024-03-14 | End: 2024-03-14

## 2024-03-14 RX ORDER — ALPRAZOLAM 0.25 MG/1
0.25 TABLET ORAL 3 TIMES DAILY PRN
Status: DISCONTINUED | OUTPATIENT
Start: 2024-03-14 | End: 2024-03-16 | Stop reason: HOSPADM

## 2024-03-14 RX ORDER — SODIUM CHLORIDE, SODIUM LACTATE, POTASSIUM CHLORIDE, CALCIUM CHLORIDE 600; 310; 30; 20 MG/100ML; MG/100ML; MG/100ML; MG/100ML
INJECTION, SOLUTION INTRAVENOUS CONTINUOUS
Status: ACTIVE | OUTPATIENT
Start: 2024-03-14 | End: 2024-03-14

## 2024-03-14 RX ORDER — LIDOCAINE HYDROCHLORIDE 20 MG/ML
INJECTION INTRAVENOUS
Status: DISCONTINUED | OUTPATIENT
Start: 2024-03-14 | End: 2024-03-14

## 2024-03-14 RX ORDER — OXYCODONE HYDROCHLORIDE 5 MG/1
5 TABLET ORAL EVERY 4 HOURS PRN
Status: DISCONTINUED | OUTPATIENT
Start: 2024-03-14 | End: 2024-03-14

## 2024-03-14 RX ORDER — PROPOFOL 10 MG/ML
VIAL (ML) INTRAVENOUS CONTINUOUS PRN
Status: DISCONTINUED | OUTPATIENT
Start: 2024-03-14 | End: 2024-03-14

## 2024-03-14 RX ORDER — NALOXONE HCL 0.4 MG/ML
0.02 VIAL (ML) INJECTION
Status: DISCONTINUED | OUTPATIENT
Start: 2024-03-14 | End: 2024-03-16 | Stop reason: HOSPADM

## 2024-03-14 RX ORDER — PHENAZOPYRIDINE HYDROCHLORIDE 100 MG/1
200 TABLET, FILM COATED ORAL 3 TIMES DAILY
Status: DISCONTINUED | OUTPATIENT
Start: 2024-03-14 | End: 2024-03-14

## 2024-03-14 RX ORDER — HYDROMORPHONE HYDROCHLORIDE 1 MG/ML
1 INJECTION, SOLUTION INTRAMUSCULAR; INTRAVENOUS; SUBCUTANEOUS EVERY 6 HOURS PRN
Status: DISCONTINUED | OUTPATIENT
Start: 2024-03-14 | End: 2024-03-14

## 2024-03-14 RX ORDER — SODIUM CHLORIDE 0.9 % (FLUSH) 0.9 %
10 SYRINGE (ML) INJECTION
Status: DISCONTINUED | OUTPATIENT
Start: 2024-03-14 | End: 2024-03-16 | Stop reason: HOSPADM

## 2024-03-14 RX ORDER — POLYETHYLENE GLYCOL 3350 17 G/17G
17 POWDER, FOR SOLUTION ORAL DAILY PRN
Status: DISCONTINUED | OUTPATIENT
Start: 2024-03-14 | End: 2024-03-16 | Stop reason: HOSPADM

## 2024-03-14 RX ORDER — OXYCODONE HYDROCHLORIDE 5 MG/1
5 TABLET ORAL EVERY 4 HOURS PRN
Status: DISCONTINUED | OUTPATIENT
Start: 2024-03-14 | End: 2024-03-16 | Stop reason: HOSPADM

## 2024-03-14 RX ORDER — CARVEDILOL 3.12 MG/1
3.12 TABLET ORAL 2 TIMES DAILY
Status: DISCONTINUED | OUTPATIENT
Start: 2024-03-14 | End: 2024-03-16 | Stop reason: HOSPADM

## 2024-03-14 RX ORDER — SODIUM CHLORIDE 0.9 % (FLUSH) 0.9 %
10 SYRINGE (ML) INJECTION EVERY 12 HOURS PRN
Status: DISCONTINUED | OUTPATIENT
Start: 2024-03-14 | End: 2024-03-16 | Stop reason: HOSPADM

## 2024-03-14 RX ORDER — SODIUM CHLORIDE 0.9 % (FLUSH) 0.9 %
3 SYRINGE (ML) INJECTION
Status: DISCONTINUED | OUTPATIENT
Start: 2024-03-14 | End: 2024-03-16 | Stop reason: HOSPADM

## 2024-03-14 RX ORDER — PANTOPRAZOLE SODIUM 40 MG/1
40 TABLET, DELAYED RELEASE ORAL DAILY
Status: DISCONTINUED | OUTPATIENT
Start: 2024-03-14 | End: 2024-03-16 | Stop reason: HOSPADM

## 2024-03-14 RX ORDER — IBUPROFEN 200 MG
24 TABLET ORAL
Status: DISCONTINUED | OUTPATIENT
Start: 2024-03-14 | End: 2024-03-16 | Stop reason: HOSPADM

## 2024-03-14 RX ORDER — OXYCODONE HCL 10 MG/1
10 TABLET, FILM COATED, EXTENDED RELEASE ORAL ONCE
Status: COMPLETED | OUTPATIENT
Start: 2024-03-14 | End: 2024-03-14

## 2024-03-14 RX ADMIN — ALPRAZOLAM 0.25 MG: 0.25 TABLET ORAL at 02:03

## 2024-03-14 RX ADMIN — HYDROMORPHONE HYDROCHLORIDE 0.2 MG: 0.5 INJECTION, SOLUTION INTRAMUSCULAR; INTRAVENOUS; SUBCUTANEOUS at 10:03

## 2024-03-14 RX ADMIN — HYDROMORPHONE HYDROCHLORIDE 0.5 MG: 1 INJECTION, SOLUTION INTRAMUSCULAR; INTRAVENOUS; SUBCUTANEOUS at 12:03

## 2024-03-14 RX ADMIN — OXYCODONE HYDROCHLORIDE 10 MG: 10 TABLET, FILM COATED, EXTENDED RELEASE ORAL at 04:03

## 2024-03-14 RX ADMIN — SODIUM CHLORIDE: 9 INJECTION, SOLUTION INTRAVENOUS at 06:03

## 2024-03-14 RX ADMIN — OXYCODONE HYDROCHLORIDE 10 MG: 10 TABLET ORAL at 07:03

## 2024-03-14 RX ADMIN — PROPOFOL 20 MG: 10 INJECTION, EMULSION INTRAVENOUS at 06:03

## 2024-03-14 RX ADMIN — CARVEDILOL 3.12 MG: 3.12 TABLET, FILM COATED ORAL at 09:03

## 2024-03-14 RX ADMIN — RIFAXIMIN 550 MG: 550 TABLET ORAL at 09:03

## 2024-03-14 RX ADMIN — CEFTRIAXONE 1 G: 1 INJECTION, POWDER, FOR SOLUTION INTRAMUSCULAR; INTRAVENOUS at 12:03

## 2024-03-14 RX ADMIN — PROPOFOL 150 MCG/KG/MIN: 10 INJECTION, EMULSION INTRAVENOUS at 06:03

## 2024-03-14 RX ADMIN — SODIUM CHLORIDE, POTASSIUM CHLORIDE, SODIUM LACTATE AND CALCIUM CHLORIDE: 600; 310; 30; 20 INJECTION, SOLUTION INTRAVENOUS at 02:03

## 2024-03-14 RX ADMIN — HYDROMORPHONE HYDROCHLORIDE 0.5 MG: 1 INJECTION, SOLUTION INTRAMUSCULAR; INTRAVENOUS; SUBCUTANEOUS at 09:03

## 2024-03-14 RX ADMIN — LIDOCAINE HYDROCHLORIDE 100 MG: 20 INJECTION INTRAVENOUS at 06:03

## 2024-03-14 RX ADMIN — HYDROMORPHONE HYDROCHLORIDE 0.5 MG: 1 INJECTION, SOLUTION INTRAMUSCULAR; INTRAVENOUS; SUBCUTANEOUS at 10:03

## 2024-03-14 RX ADMIN — CEFAZOLIN 2 G: 330 INJECTION, POWDER, FOR SOLUTION INTRAMUSCULAR; INTRAVENOUS at 06:03

## 2024-03-14 NOTE — CONSULTS
[facility]  Urology  Consult Note    Patient Name: Tee Aranda  MRN: [id number]  Admission Date: 3/14/2024  Hospital Length of Stay: 0   Code Status: Full Code   Attending Provider: Shanel Walker MD   Consulting Provider: Cristhian Melendez MD  Primary Care Physician: James Samano MD  Principal Problem:Urolithiasis    Inpatient consult to Urology  Consult performed by: Cristhian Melendez MD  Consult ordered by: Tiago Rose MD          Subjective:     HPI:  53yoF w/pmhx most notable for cirrhosis and associated thrombocytopenia (last plt 58) who presented to the ED for pain and n/v 2/2 to 10mm right UPJ stone. Says she's had pain and n/v and chills but no fevers for the past 5 days. Initially presented to ED on 3/12 symptomatic and had CT that showed 10mm R UPJ stone with associated mild hydronephrosis as well as nonobstrucing bilateral renal stones and a UA with bacteria. She saw urology but was unhappy with her care and decided to leave the ED to come to [facility]. In the ED here she has been hemodynamically stable, afebrile, not tachycardic not hypotensive, non elevated WBC (3.9), creatinine at baseline (0.8). UA today showed many bacteria. Today she denies any recent vomiting, endorses pain, denies subjective f/c. Last had a bite of churro at 7AM. Urology consulted for further assistance.     Past Medical History:   Diagnosis Date    Anemia, unspecified     Anxiety     Arthritis     Ascites 11/23/2020    AVN (avascular necrosis of bone)     Cataract     COVID-19 vaccine administered     04/08/21, 04/30/21    Diarrhea of presumed infectious origin 7/31/2023    Edema 11/23/2020    Epigastric pain 7/31/2023    Esophageal varices     Glaucoma     Hepatic encephalopathy 11/23/2020    History of colon polyps     Hx of cirrhosis     non-alcoholic    Iron deficiency anemia secondary to blood loss (chronic)     Kidney stones     Portal hypertensive gastropathy     Unintentional weight loss  10/14/2023    Unspecified cirrhosis of liver        Past Surgical History:   Procedure Laterality Date    APPENDECTOMY      COLONOSCOPY N/A 8/3/2023    Procedure: COLONOSCOPY;  Surgeon: Gerard Salinas MD;  Location: [facility];  Service: Endoscopy;  Laterality: N/A;    COLONOSCOPY W/ POLYPECTOMY      DISTAL CLAVICLE EXCISION Right 11/18/2021    Procedure: RIGHT SHOULDER ARTHROSCOPY, EXCISION, CLAVICLE, DISTAL; EXTENSIVE DEBRIDEMENT;  Surgeon: Isaiah Joyner MD;  Location: [facility];  Service: Orthopedics;  Laterality: Right;    ESOPHAGEAL VARICE LIGATION      ESOPHAGOGASTRODUODENOSCOPY N/A 11/10/2020    Procedure: EGD (ESOPHAGOGASTRODUODENOSCOPY);  Surgeon: Gerard Salinas MD;  Location: [facility];  Service: Endoscopy;  Laterality: N/A;    ESOPHAGOGASTRODUODENOSCOPY N/A 12/28/2020    Procedure: EGD (ESOPHAGOGASTRODUODENOSCOPY);  Surgeon: Adryan Park MD;  Location: [facility] (2ND FLR);  Service: Endoscopy;  Laterality: N/A;    ESOPHAGOGASTRODUODENOSCOPY N/A 02/15/2021    Procedure: EGD (ESOPHAGOGASTRODUODENOSCOPY);  Surgeon: Hari Greene MD;  Location: [facility] (4TH FLR);  Service: Endoscopy;  Laterality: N/A;  6 week follow-up variceal banding  Hx varices - Labs - ERW  prep ins. emialed - COVID screening on 2/12/21 [place] - ERW    ESOPHAGOGASTRODUODENOSCOPY Left 08/03/2021    Procedure: EGD (ESOPHAGOGASTRODUODENOSCOPY);  Surgeon: Fabricio Corado MD;  Location: [facility] (4TH FLR);  Service: Gastroenterology;  Laterality: Left;  recent hematemasis. varices-labs on 7/26    COVID test at [facility] on 7/31-GT  requesting sooner    ESOPHAGOGASTRODUODENOSCOPY N/A 07/27/2022    Procedure: EGD (ESOPHAGOGASTRODUODENOSCOPY);  Surgeon: Eric Garcia MD;  Location: [facility] ENDO;  Service: Endoscopy;  Laterality: N/A;    ESOPHAGOGASTRODUODENOSCOPY Left 08/29/2022    Procedure: EGD (ESOPHAGOGASTRODUODENOSCOPY);  Surgeon: Kacy Douglass MD;  Location: [facility] ([address] [place] [address]);  Service: Endoscopy;  Laterality: Left;   Fully vaccinated/ clear liquids up to 4 hrs prior-RB  varices labs ordered-RB    ESOPHAGOGASTRODUODENOSCOPY N/A 10/05/2022    Procedure: EGD (ESOPHAGOGASTRODUODENOSCOPY);  Surgeon: Gerard Salinas MD;  Location: [facility];  Service: Endoscopy;  Laterality: N/A;    ESOPHAGOGASTRODUODENOSCOPY N/A 3/30/2023    Procedure: EGD (ESOPHAGOGASTRODUODENOSCOPY);  Surgeon: Gerard Salinas MD;  Location: [facility] END[facility];  Service: Endoscopy;  Laterality: N/A;    ESOPHAGOGASTRODUODENOSCOPY N/A 8/3/2023    Procedure: EGD (ESOPHAGOGASTRODUODENOSCOPY);  Surgeon: Gerard Salinas MD;  Location: [facility];  Service: Endoscopy;  Laterality: N/A;    HYSTERECTOMY      KNEE SURGERY Bilateral     LITHOTRIPSY      RHINOPLASTY TIP      SHOULDER SURGERY Right     TONSILLECTOMY      TOTAL HIP ARTHROPLASTY Right        Review of patient's allergies indicates:   Allergen Reactions    Sulfa (sulfonamide antibiotics) Hives       Family History       Problem Relation (Age of Onset)    Diabetes Mellitus Maternal Grandmother    No Known Problems Mother            Tobacco Use    Smoking status: Some Days     Current packs/day: 0.00     Types: Cigarettes     Last attempt to quit: 7/3/2019     Years since quittin.7    Smokeless tobacco: Never   Substance and Sexual Activity    Alcohol use: Not Currently    Drug use: No    Sexual activity: Not on file           Objective:     Temp:  [97.9 °F (36.6 °C)-98.2 °F (36.8 °C)] 97.9 °F (36.6 °C)  Pulse:  [65-92] 67  Resp:  [16-20] 20  SpO2:  [98 %-100 %] 100 %  BP: (122-152)/(74-82) 122/74  Weight: 62.1 kg (137 lb)  Body mass index is 20.83 kg/m².           Drains       None                    Physical Exam  Constitutional:       General: She is not in acute distress.     Appearance: Normal appearance.   HENT:      Head: Normocephalic and atraumatic.      Nose: Nose normal.   Eyes:      Conjunctiva/sclera: Conjunctivae normal.   Cardiovascular:      Rate and Rhythm: Normal rate.   Pulmonary:       Effort: Pulmonary effort is normal. No respiratory distress.   Abdominal:      General: There is no distension.      Tenderness: There is right CVA tenderness. There is no left CVA tenderness.   Musculoskeletal:         General: No deformity.   Skin:     General: Skin is warm and dry.   Neurological:      General: No focal deficit present.      Mental Status: She is alert.   Psychiatric:         Mood and Affect: Mood normal.         Behavior: Behavior normal.          Significant Labs:    BMP:  Recent Labs   Lab 03/12/24  0754 03/14/24  0921    141   K 4.1 3.7    105   CO2 26 27   BUN 14 9   CREATININE 0.8 0.8   CALCIUM 9.1 9.7       CBC:  Recent Labs   Lab 03/12/24  0754 03/14/24  0921   WBC 3.92 3.94   HGB 12.5 13.7   HCT 36.8* 40.3   PLT 58* 58*       All pertinent labs results from the past 24 hours have been reviewed.    Significant Imaging:  All pertinent imaging results/findings from the past 24 hours have been reviewed.                    Assessment and Plan:     * Urolithiasis  53yoF w/10mm R UPJ stone and urine concerning for infection.    - Recommend admission to medicine given patient's medical co morbidities   - I discussed with the patient that she has an obstructing ureteral stone and concern for infected urine. While she is not septic at the moment, she is at risk for developing obstructive pyelonephritis and could become septic.  - Plan for cystoscopy with ureteral stent placement today. The stent is not currently emergent, but if patient decompensates stent may become urgent.  - NPO  - IVF  - Rocephin  - Consent obtained  - Pain control  - Nausea control  - Flomax  - Strain all urine          VTE Risk Mitigation (From admission, onward)           Ordered     IP VTE HIGH RISK PATIENT  Once         03/14/24 1233     Place sequential compression device  Until discontinued         03/14/24 1233     Reason for No Pharmacological VTE Prophylaxis  Once        Question:  Reasons:  Answer:   Patient is Ambulatory    03/14/24 1233     Place sequential compression device  Until discontinued         03/14/24 1233                    Thank you for your consult. I will follow-up with patient. Please contact us if you have any additional questions.    Cristhian Melendez MD  Urology  [facility]

## 2024-03-14 NOTE — HPI
53yoF w/pmhx most notable for cirrhosis and associated thrombocytopenia (last plt 58) who presented to the ED for pain and n/v 2/2 to 10mm right UPJ stone. Says she's had pain and n/v and chills but no fevers for the past 5 days. Initially presented to ED on 3/12 symptomatic and had CT that showed 10mm R UPJ stone with associated mild hydronephrosis as well as nonobstrucing bilateral renal stones and a UA with bacteria. She saw urology but was unhappy with her care and decided to leave the ED to come to [facility]. In the ED here she has been hemodynamically stable, afebrile, not tachycardic not hypotensive, non elevated WBC (3.9), creatinine at baseline (0.8). UA today showed many bacteria. Today she denies any recent vomiting, endorses pain, denies subjective f/c. Last had a bite of churro at 7AM. Urology consulted for further assistance.

## 2024-03-14 NOTE — HPI
Tee Aranda is a 53 y.o.F with hx of liver cirrhosis 2/2 CARTAGENA, portal hypertension, thrombocytopenia who presented to ED for pain, nausea and vomiting due to known R UPJ stone. She states that she has been experiencing these symptoms for the last 4-5d. Initially presented to the ED on 3/12 where CT findings showed a 10 x 6mm right UPJ stone with hydronephrosis. Followed-up with urology yesterday for the stone. During this visit, she states she was not happy with the care and decided that she would present to [facility] for further evaluation. Patient states she has a history of kidney stones in the past, but has not had one in the past 3-4 years. She has been taking norco 5-325 at home for pain relief. Currently rating pain 5/10. Denies fever, chills, chest pain, palpitations, SOB, cough, headaches, or any other symptoms at this time.     In ED: AFVSS. CBC unremarkable. CMP unremarkable. UA infectious appearing with many RBCs. Urology assessed patient in ED with plans for cystoscopy with ureteral stent placement today. S/p rocephin, 500cc IVF bolus, and dilaudid in ED. Admitted to  for further observation.

## 2024-03-14 NOTE — ASSESSMENT & PLAN NOTE
53yoF w/10mm R UPJ stone and urine concerning for infection.    - Recommend admission to medicine given patient's medical co morbidities   - I discussed with the patient that she has an obstructing ureteral stone and concern for infected urine. While she is not septic at the moment, she is at risk for developing obstructive pyelonephritis and could become septic.  - Plan for cystoscopy with ureteral stent placement today. The stent is not currently emergent, but if patient decompensates stent may become urgent.  - NPO  - IVF  - Rocephin  - Consent obtained  - Pain control  - Nausea control  - Flomax  - Strain all urine

## 2024-03-14 NOTE — ASSESSMENT & PLAN NOTE
Patient was found to have thrombocytopenia, the likely etiology is secondary to cirrhosis/portal hypertension, will monitor the platelets Daily. Will transfuse if platelet count is <50k (if undergoing surgical procedure or have active bleeding). Hold DVT prophylaxis if platelets are <50k. The patient's platelet results have been reviewed and are listed below.  Recent Labs   Lab 03/14/24  0921   PLT 58*

## 2024-03-14 NOTE — ASSESSMENT & PLAN NOTE
Portal hypertensive gastropathy   - history noted  - patient follows with Dr. Bowers who referred patient to [facility]   - continue home rifaximin

## 2024-03-14 NOTE — ED NOTES
"Patient identifiers for Tee Aranda 53 y.o. female checked and correct.  Chief Complaint   Patient presents with    Flank Pain     [id number] stone sent here from [place].     Past Medical History:   Diagnosis Date    Anemia, unspecified     Anxiety     Arthritis     Ascites 11/23/2020    AVN (avascular necrosis of bone)     Cataract     COVID-19 vaccine administered     04/08/21, 04/30/21    Diarrhea of presumed infectious origin 7/31/2023    Edema 11/23/2020    Epigastric pain 7/31/2023    Esophageal varices     Glaucoma     Hepatic encephalopathy 11/23/2020    History of colon polyps     Hx of cirrhosis     non-alcoholic    Iron deficiency anemia secondary to blood loss (chronic)     Kidney stones     Portal hypertensive gastropathy     Unintentional weight loss 10/14/2023    Unspecified cirrhosis of liver      Allergies reported:   Review of patient's allergies indicates:   Allergen Reactions    Sulfa (sulfonamide antibiotics) Hives         LOC: Patient is awake, alert, and aware of environment with an appropriate affect. Patient is oriented x 4 and speaking appropriately.  APPEARANCE: Patient resting comfortably and in no acute distress. Patient is clean and well groomed, patient's clothing is properly fastened.  HEENT: WDL  SKIN: The skin is warm and dry. Patient has normal skin turgor and moist mucus membranes. Pt has multiple bruises on upper and lower extremities due to "low platelet count".    MUSKULOSKELETAL: Patient is moving all extremities well, no obvious deformities noted. Pulses intact. +1 edema to BLE.   RESPIRATORY: Airway is open and patent. Respirations are spontaneous and non-labored with normal effort and rate.  CARDIAC: Patient has a normal rate and rhythm. 90 on cardiac monitor. No peripheral edema noted.   ABDOMEN: Mild distention noted. Soft and non-tender upon palpation.  NEUROLOGICAL: pupils 3mm, PERRL. Facial expression is symmetrical. Hand grasps are equal bilaterally. Normal " sensation in all extremities when touched with finger.

## 2024-03-14 NOTE — SUBJECTIVE & OBJECTIVE
Past Medical History:   Diagnosis Date    Anemia, unspecified     Anxiety     Arthritis     Ascites 11/23/2020    AVN (avascular necrosis of bone)     Cataract     COVID-19 vaccine administered     04/08/21, 04/30/21    Diarrhea of presumed infectious origin 7/31/2023    Edema 11/23/2020    Epigastric pain 7/31/2023    Esophageal varices     Glaucoma     Hepatic encephalopathy 11/23/2020    History of colon polyps     Hx of cirrhosis     non-alcoholic    Iron deficiency anemia secondary to blood loss (chronic)     Kidney stones     Portal hypertensive gastropathy     Unintentional weight loss 10/14/2023    Unspecified cirrhosis of liver        Past Surgical History:   Procedure Laterality Date    APPENDECTOMY      COLONOSCOPY N/A 8/3/2023    Procedure: COLONOSCOPY;  Surgeon: Gerard Salinas MD;  Location: [facility];  Service: Endoscopy;  Laterality: N/A;    COLONOSCOPY W/ POLYPECTOMY      DISTAL CLAVICLE EXCISION Right 11/18/2021    Procedure: RIGHT SHOULDER ARTHROSCOPY, EXCISION, CLAVICLE, DISTAL; EXTENSIVE DEBRIDEMENT;  Surgeon: Isaiah Joyner MD;  Location: [facility];  Service: Orthopedics;  Laterality: Right;    ESOPHAGEAL VARICE LIGATION      ESOPHAGOGASTRODUODENOSCOPY N/A 11/10/2020    Procedure: EGD (ESOPHAGOGASTRODUODENOSCOPY);  Surgeon: Gerard Salinas MD;  Location: [facility];  Service: Endoscopy;  Laterality: N/A;    ESOPHAGOGASTRODUODENOSCOPY N/A 12/28/2020    Procedure: EGD (ESOPHAGOGASTRODUODENOSCOPY);  Surgeon: Adryan Park MD;  Location: [facility] (2ND FLR);  Service: Endoscopy;  Laterality: N/A;    ESOPHAGOGASTRODUODENOSCOPY N/A 02/15/2021    Procedure: EGD (ESOPHAGOGASTRODUODENOSCOPY);  Surgeon: Hari Greene MD;  Location: [facility] (4TH FLR);  Service: Endoscopy;  Laterality: N/A;  6 week follow-up variceal banding  Hx varices - Labs - ERW  prep ins. emialed - COVID screening on 2/12/21 [place] - ERW    ESOPHAGOGASTRODUODENOSCOPY Left 08/03/2021    Procedure: EGD  (ESOPHAGOGASTRODUODENOSCOPY);  Surgeon: Fabricio Corado MD;  Location: [facility] (4TH FLR);  Service: Gastroenterology;  Laterality: Left;  recent hematemasis. varices-labs on     COVID test at [facility] on -GT  requesting sooner    ESOPHAGOGASTRODUODENOSCOPY N/A 2022    Procedure: EGD (ESOPHAGOGASTRODUODENOSCOPY);  Surgeon: Eric Garcia MD;  Location: [facility];  Service: Endoscopy;  Laterality: N/A;    ESOPHAGOGASTRODUODENOSCOPY Left 2022    Procedure: EGD (ESOPHAGOGASTRODUODENOSCOPY);  Surgeon: Kacy Douglass MD;  Location: [facility] (2ND FLR);  Service: Endoscopy;  Laterality: Left;  Fully vaccinated/ clear liquids up to 4 hrs prior-RB  varices labs ordered-RB    ESOPHAGOGASTRODUODENOSCOPY N/A 10/05/2022    Procedure: EGD (ESOPHAGOGASTRODUODENOSCOPY);  Surgeon: Gerard Salinas MD;  Location: [facility];  Service: Endoscopy;  Laterality: N/A;    ESOPHAGOGASTRODUODENOSCOPY N/A 3/30/2023    Procedure: EGD (ESOPHAGOGASTRODUODENOSCOPY);  Surgeon: Gerard Salinas MD;  Location: [facility];  Service: Endoscopy;  Laterality: N/A;    ESOPHAGOGASTRODUODENOSCOPY N/A 8/3/2023    Procedure: EGD (ESOPHAGOGASTRODUODENOSCOPY);  Surgeon: Gerard Salinas MD;  Location: [facility];  Service: Endoscopy;  Laterality: N/A;    HYSTERECTOMY      KNEE SURGERY Bilateral     LITHOTRIPSY      RHINOPLASTY TIP      SHOULDER SURGERY Right     TONSILLECTOMY      TOTAL HIP ARTHROPLASTY Right        Review of patient's allergies indicates:   Allergen Reactions    Sulfa (sulfonamide antibiotics) Hives       Family History       Problem Relation (Age of Onset)    Diabetes Mellitus Maternal Grandmother    No Known Problems Mother            Tobacco Use    Smoking status: Some Days     Current packs/day: 0.00     Types: Cigarettes     Last attempt to quit: 7/3/2019     Years since quittin.7    Smokeless tobacco: Never   Substance and Sexual Activity    Alcohol use: Not Currently    Drug use: No    Sexual  activity: Not on file           Objective:     Temp:  [97.9 °F (36.6 °C)-98.2 °F (36.8 °C)] 97.9 °F (36.6 °C)  Pulse:  [65-92] 67  Resp:  [16-20] 20  SpO2:  [98 %-100 %] 100 %  BP: (122-152)/(74-82) 122/74  Weight: 62.1 kg (137 lb)  Body mass index is 20.83 kg/m².           Drains       None                    Physical Exam  Constitutional:       General: She is not in acute distress.     Appearance: Normal appearance.   HENT:      Head: Normocephalic and atraumatic.      Nose: Nose normal.   Eyes:      Conjunctiva/sclera: Conjunctivae normal.   Cardiovascular:      Rate and Rhythm: Normal rate.   Pulmonary:      Effort: Pulmonary effort is normal. No respiratory distress.   Abdominal:      General: There is no distension.      Tenderness: There is right CVA tenderness. There is no left CVA tenderness.   Musculoskeletal:         General: No deformity.   Skin:     General: Skin is warm and dry.   Neurological:      General: No focal deficit present.      Mental Status: She is alert.   Psychiatric:         Mood and Affect: Mood normal.         Behavior: Behavior normal.          Significant Labs:    BMP:  Recent Labs   Lab 03/12/24  0754 03/14/24  0921    141   K 4.1 3.7    105   CO2 26 27   BUN 14 9   CREATININE 0.8 0.8   CALCIUM 9.1 9.7       CBC:  Recent Labs   Lab 03/12/24  0754 03/14/24  0921   WBC 3.92 3.94   HGB 12.5 13.7   HCT 36.8* 40.3   PLT 58* 58*       All pertinent labs results from the past 24 hours have been reviewed.    Significant Imaging:  All pertinent imaging results/findings from the past 24 hours have been reviewed.

## 2024-03-14 NOTE — SUBJECTIVE & OBJECTIVE
Past Medical History:   Diagnosis Date    Anemia, unspecified     Anxiety     Arthritis     Ascites 11/23/2020    AVN (avascular necrosis of bone)     Cataract     COVID-19 vaccine administered     04/08/21, 04/30/21    Diarrhea of presumed infectious origin 7/31/2023    Edema 11/23/2020    Epigastric pain 7/31/2023    Esophageal varices     Glaucoma     Hepatic encephalopathy 11/23/2020    History of colon polyps     Hx of cirrhosis     non-alcoholic    Iron deficiency anemia secondary to blood loss (chronic)     Kidney stones     Portal hypertensive gastropathy     Unintentional weight loss 10/14/2023    Unspecified cirrhosis of liver        Past Surgical History:   Procedure Laterality Date    APPENDECTOMY      COLONOSCOPY N/A 8/3/2023    Procedure: COLONOSCOPY;  Surgeon: Gerard Salinas MD;  Location: [facility];  Service: Endoscopy;  Laterality: N/A;    COLONOSCOPY W/ POLYPECTOMY      DISTAL CLAVICLE EXCISION Right 11/18/2021    Procedure: RIGHT SHOULDER ARTHROSCOPY, EXCISION, CLAVICLE, DISTAL; EXTENSIVE DEBRIDEMENT;  Surgeon: Isaiah Joyner MD;  Location: [facility];  Service: Orthopedics;  Laterality: Right;    ESOPHAGEAL VARICE LIGATION      ESOPHAGOGASTRODUODENOSCOPY N/A 11/10/2020    Procedure: EGD (ESOPHAGOGASTRODUODENOSCOPY);  Surgeon: Gerard Salinas MD;  Location: [facility];  Service: Endoscopy;  Laterality: N/A;    ESOPHAGOGASTRODUODENOSCOPY N/A 12/28/2020    Procedure: EGD (ESOPHAGOGASTRODUODENOSCOPY);  Surgeon: Adryan Park MD;  Location: [facility] (2ND FLR);  Service: Endoscopy;  Laterality: N/A;    ESOPHAGOGASTRODUODENOSCOPY N/A 02/15/2021    Procedure: EGD (ESOPHAGOGASTRODUODENOSCOPY);  Surgeon: Hari Greene MD;  Location: [facility] (4TH FLR);  Service: Endoscopy;  Laterality: N/A;  6 week follow-up variceal banding  Hx varices - Labs - ERW  prep ins. emialed - COVID screening on 2/12/21 [place] - ERW    ESOPHAGOGASTRODUODENOSCOPY Left 08/03/2021    Procedure: EGD  (ESOPHAGOGASTRODUODENOSCOPY);  Surgeon: Fabricio Corado MD;  Location: [facility] (4TH FLR);  Service: Gastroenterology;  Laterality: Left;  recent hematemasis. varices-labs on 7/26    COVID test at [facility] on 7/31-GT  requesting sooner    ESOPHAGOGASTRODUODENOSCOPY N/A 07/27/2022    Procedure: EGD (ESOPHAGOGASTRODUODENOSCOPY);  Surgeon: Eric Garcia MD;  Location: [facility];  Service: Endoscopy;  Laterality: N/A;    ESOPHAGOGASTRODUODENOSCOPY Left 08/29/2022    Procedure: EGD (ESOPHAGOGASTRODUODENOSCOPY);  Surgeon: Kacy Douglass MD;  Location: [facility] (2ND FLR);  Service: Endoscopy;  Laterality: Left;  Fully vaccinated/ clear liquids up to 4 hrs prior-RB  varices labs ordered-RB    ESOPHAGOGASTRODUODENOSCOPY N/A 10/05/2022    Procedure: EGD (ESOPHAGOGASTRODUODENOSCOPY);  Surgeon: Gerard Salinas MD;  Location: [facility];  Service: Endoscopy;  Laterality: N/A;    ESOPHAGOGASTRODUODENOSCOPY N/A 3/30/2023    Procedure: EGD (ESOPHAGOGASTRODUODENOSCOPY);  Surgeon: Gerard Salinas MD;  Location: [facility];  Service: Endoscopy;  Laterality: N/A;    ESOPHAGOGASTRODUODENOSCOPY N/A 8/3/2023    Procedure: EGD (ESOPHAGOGASTRODUODENOSCOPY);  Surgeon: Gerard Salinas MD;  Location: [facility];  Service: Endoscopy;  Laterality: N/A;    HYSTERECTOMY      KNEE SURGERY Bilateral     LITHOTRIPSY      RHINOPLASTY TIP      SHOULDER SURGERY Right     TONSILLECTOMY      TOTAL HIP ARTHROPLASTY Right        Review of patient's allergies indicates:   Allergen Reactions    Sulfa (sulfonamide antibiotics) Hives       Current Facility-Administered Medications on File Prior to Encounter   Medication    0.9%  NaCl infusion     Current Outpatient Medications on File Prior to Encounter   Medication Sig    amitriptyline (ELAVIL) 10 MG tablet Take 1 tablet (10 mg total) by mouth every evening.    carvediloL (COREG) 3.125 MG tablet TAKE 1 TABLET BY MOUTH 2 TIMES DAILY.    esomeprazole (NEXIUM) 40 MG capsule Take 1 capsule (40 mg  total) by mouth 2 (two) times daily before meals.    HYDROcodone-acetaminophen (NORCO) 5-325 mg per tablet Take 1 tablet by mouth every 6 (six) hours as needed for Pain.    lactulose (CHRONULAC) 20 gram/30 mL Soln Take 30 mLs (20 g total) by mouth once daily.    multivitamin (THERAGRAN) per tablet Take 1 tablet by mouth once daily.    naproxen (NAPROSYN) 500 MG tablet Take 1 tablet (500 mg total) by mouth 2 (two) times daily. (Patient not taking: Reported on 3/13/2024)    omega-3 fatty acids/fish oil (FISH OIL-OMEGA-3 FATTY ACIDS) 300-1,000 mg capsule Take 1 capsule by mouth once daily.    phenazopyridine (PYRIDIUM) 200 MG tablet Take 1 tablet (200 mg total) by mouth 3 (three) times daily. for 10 days    pulse oximeter (PULSE OXIMETER) device by Apply Externally route 2 (two) times a day. Use twice daily at 8 AM and 3 PM and record the value in [employer] as directed.    rifAXIMin (XIFAXAN) 550 mg Tab Take 1 tablet (550 mg total) by mouth 2 (two) times daily.    UNABLE TO FIND 4 (four) times daily as needed. Medible 20 mg blue raspberry 1/4 to 1/2 up to 4 times daily as needed.     Family History       Problem Relation (Age of Onset)    Diabetes Mellitus Maternal Grandmother    No Known Problems Mother          Tobacco Use    Smoking status: Some Days     Current packs/day: 0.00     Types: Cigarettes     Last attempt to quit: 7/3/2019     Years since quittin.7    Smokeless tobacco: Never   Substance and Sexual Activity    Alcohol use: Not Currently    Drug use: No    Sexual activity: Not on file     Review of Systems   Constitutional:  Negative for activity change, chills and fever.   HENT:  Negative for trouble swallowing.    Eyes:  Negative for photophobia and visual disturbance.   Respiratory:  Negative for cough, chest tightness and shortness of breath.    Cardiovascular:  Negative for chest pain, palpitations and leg swelling.   Gastrointestinal:  Positive for abdominal pain, nausea and vomiting. Negative for  constipation and diarrhea.   Genitourinary:  Positive for hematuria. Negative for dysuria and frequency.   Musculoskeletal:  Positive for back pain (R flank). Negative for gait problem and neck pain.   Skin:  Negative for rash and wound.   Neurological:  Negative for dizziness, syncope, speech difficulty and light-headedness.   Psychiatric/Behavioral:  Negative for agitation and confusion. The patient is not nervous/anxious.      Objective:     Vital Signs (Most Recent):  Temp: 98 °F (36.7 °C) (03/14/24 1320)  Pulse: 71 (03/14/24 1320)  Resp: 19 (03/14/24 1320)  BP: 130/71 (03/14/24 1320)  SpO2: 100 % (03/14/24 1320) Vital Signs (24h Range):  Temp:  [97.9 °F (36.6 °C)-98.2 °F (36.8 °C)] 98 °F (36.7 °C)  Pulse:  [65-92] 71  Resp:  [16-20] 19  SpO2:  [98 %-100 %] 100 %  BP: (122-152)/(71-82) 130/71     Weight: 62.1 kg (137 lb)  Body mass index is 20.83 kg/m².     Physical Exam  Vitals and nursing note reviewed.   Constitutional:       General: She is not in acute distress.     Appearance: She is well-developed.   HENT:      Head: Normocephalic and atraumatic.      Mouth/Throat:      Mouth: Mucous membranes are dry.      Pharynx: No oropharyngeal exudate.   Eyes:      Conjunctiva/sclera: Conjunctivae normal.      Pupils: Pupils are equal, round, and reactive to light.   Cardiovascular:      Rate and Rhythm: Normal rate and regular rhythm.      Heart sounds: Normal heart sounds.   Pulmonary:      Effort: Pulmonary effort is normal. No respiratory distress.      Breath sounds: Normal breath sounds. No wheezing.   Abdominal:      General: Bowel sounds are normal. There is no distension.      Palpations: Abdomen is soft.      Tenderness: There is abdominal tenderness (RLQ, suprapubic pain). There is right CVA tenderness.      Comments: Patient notes abdominal swelling   Musculoskeletal:         General: No tenderness. Normal range of motion.      Cervical back: Normal range of motion and neck supple.      Right lower leg:  Edema (trace) present.      Left lower leg: Edema (trace) present.   Lymphadenopathy:      Cervical: No cervical adenopathy.   Skin:     General: Skin is warm and dry.      Capillary Refill: Capillary refill takes less than 2 seconds.      Findings: No rash.   Neurological:      Mental Status: She is alert and oriented to person, place, and time.      Cranial Nerves: No cranial nerve deficit.      Sensory: No sensory deficit.      Coordination: Coordination normal.   Psychiatric:         Behavior: Behavior normal.         Thought Content: Thought content normal.         Judgment: Judgment normal.              CRANIAL NERVES     CN III, IV, VI   Pupils are equal, round, and reactive to light.       Significant Labs: All pertinent labs within the past 24 hours have been reviewed.    Bilirubin:   Recent Labs   Lab 02/28/24  1203 03/12/24  0754 03/14/24  0921   BILIDIR 0.4*  --   --    BILITOT 1.0 0.6 1.0     BMP:   Recent Labs   Lab 03/14/24  0921   *      K 3.7      CO2 27   BUN 9   CREATININE 0.8   CALCIUM 9.7     CBC:   Recent Labs   Lab 03/14/24  0921   WBC 3.94   HGB 13.7   HCT 40.3   PLT 58*     CMP:   Recent Labs   Lab 03/14/24  0921      K 3.7      CO2 27   *   BUN 9   CREATININE 0.8   CALCIUM 9.7   PROT 6.9   ALBUMIN 4.0   BILITOT 1.0   ALKPHOS 83   AST 21   ALT 21   ANIONGAP 9     Urine Studies:   Recent Labs   Lab 03/13/24  1357 03/14/24  1006   COLORU Yellow Yellow   APPEARANCEUA  --  Hazy*   PHUR 6 7.0   SPECGRAV 1.015 1.020   PROTEINUA  --  Trace*   GLUCUA  --  Negative   KETONESU negative Trace*   BILIRUBINUA  --  Negative   OCCULTUA  --  3+*   NITRITE negative Negative   UROBILINOGEN normal  --    LEUKOCYTESUR  --  Trace*   RBCUA  --  >100*   WBCUA  --  6*   BACTERIA  --  Moderate*   SQUAMEPITHEL  --  1       Significant Imaging: I have reviewed all pertinent imaging results/findings within the past 24 hours.  Imaging Results    None

## 2024-03-14 NOTE — ED PROVIDER NOTES
Encounter Date: 3/14/2024       History     Chief Complaint   Patient presents with    Flank Pain     [id number] stone sent here from [place].     HPI  53-year-old female with past medical history as noted below, most notably history of cirrhosis and thrombocytopenia, history of recurrent kidney stone, coming in in the setting of known kidney stone and uncontrolled pain.  Patient has been having right flank pain for 4-5 days, she presents his outside emergency department on 03/12, had a CT scan 10 x 6 right-sided stone with hydro, moderate amount.  Pain was controlled emergency department.  She was discharged with close Urology follow-up.  She saw the urologist and [facility].  See note below.  Offered stent but patient felt that the Urology there was not able to take care for because of her liver disease.  She called her liver specialist who told her to come to [facility] for evaluation.    She says she is still having severe pain.  She has been taking hydrocodone Tylenol at home she took 1 yesterday and 1 this morning.  Current pain is 8.5/10.  Still having blood in the urine, severe right flank pain and nausea.      She does note a bruise in her right flank and wonders whether there is related the kidney stone.  She denies trauma to the area although is unsure if she may have been rubbing it.   showed me old pictures of the right flank and it appears to initially have been your in distribution, resembling possible scratching?    Took Zofran this morning.    Review of patient's allergies indicates:   Allergen Reactions    Sulfa (sulfonamide antibiotics) Hives     Past Medical History:   Diagnosis Date    Anemia, unspecified     Anxiety     Arthritis     Ascites 11/23/2020    AVN (avascular necrosis of bone)     Cataract     COVID-19 vaccine administered     04/08/21, 04/30/21    Diarrhea of presumed infectious origin 7/31/2023    Edema 11/23/2020    Epigastric pain 7/31/2023    Esophageal varices      Glaucoma     Hepatic encephalopathy 11/23/2020    History of colon polyps     Hx of cirrhosis     non-alcoholic    Iron deficiency anemia secondary to blood loss (chronic)     Kidney stones     Portal hypertensive gastropathy     Unintentional weight loss 10/14/2023    Unspecified cirrhosis of liver      Past Surgical History:   Procedure Laterality Date    APPENDECTOMY      COLONOSCOPY N/A 8/3/2023    Procedure: COLONOSCOPY;  Surgeon: Gerard Salinas MD;  Location: [facility];  Service: Endoscopy;  Laterality: N/A;    COLONOSCOPY W/ POLYPECTOMY      CYSTOSCOPY W/ URETERAL STENT REMOVAL Right 3/14/2024    Procedure: CYSTOSCOPY, WITH URETERAL STENT REMOVAL;  Surgeon: Chris Carey MD;  Location: [facility] 1ST FLR;  Service: Urology;  Laterality: Right;    DISTAL CLAVICLE EXCISION Right 11/18/2021    Procedure: RIGHT SHOULDER ARTHROSCOPY, EXCISION, CLAVICLE, DISTAL; EXTENSIVE DEBRIDEMENT;  Surgeon: Isaiah Joyner MD;  Location: [facility];  Service: Orthopedics;  Laterality: Right;    ESOPHAGEAL VARICE LIGATION      ESOPHAGOGASTRODUODENOSCOPY N/A 11/10/2020    Procedure: EGD (ESOPHAGOGASTRODUODENOSCOPY);  Surgeon: Gerard Salinas MD;  Location: [facility];  Service: Endoscopy;  Laterality: N/A;    ESOPHAGOGASTRODUODENOSCOPY N/A 12/28/2020    Procedure: EGD (ESOPHAGOGASTRODUODENOSCOPY);  Surgeon: Adryan Park MD;  Location: [facility] (2ND FLR);  Service: Endoscopy;  Laterality: N/A;    ESOPHAGOGASTRODUODENOSCOPY N/A 02/15/2021    Procedure: EGD (ESOPHAGOGASTRODUODENOSCOPY);  Surgeon: Hari Greene MD;  Location: [facility] (4TH FLR);  Service: Endoscopy;  Laterality: N/A;  6 week follow-up variceal banding  Hx varices - Labs - ERW  prep ins. emialed - COVID screening on 2/12/21 [facility] - ERW    ESOPHAGOGASTRODUODENOSCOPY Left 08/03/2021    Procedure: EGD (ESOPHAGOGASTRODUODENOSCOPY);  Surgeon: Fabricio Corado MD;  Location: [facility] (4TH FLR);  Service: Gastroenterology;  Laterality: Left;  recent  hematemasis. varices-labs on     COVID test at [facility] on -GT  requesting sooner    ESOPHAGOGASTRODUODENOSCOPY N/A 2022    Procedure: EGD (ESOPHAGOGASTRODUODENOSCOPY);  Surgeon: Eric Garcia MD;  Location: [facility];  Service: Endoscopy;  Laterality: N/A;    ESOPHAGOGASTRODUODENOSCOPY Left 2022    Procedure: EGD (ESOPHAGOGASTRODUODENOSCOPY);  Surgeon: Kacy Douglass MD;  Location: [facility] (2ND FLR);  Service: Endoscopy;  Laterality: Left;  Fully vaccinated/ clear liquids up to 4 hrs prior-RB  varices labs ordered-RB    ESOPHAGOGASTRODUODENOSCOPY N/A 10/05/2022    Procedure: EGD (ESOPHAGOGASTRODUODENOSCOPY);  Surgeon: Gerard Salinas MD;  Location: [facility];  Service: Endoscopy;  Laterality: N/A;    ESOPHAGOGASTRODUODENOSCOPY N/A 3/30/2023    Procedure: EGD (ESOPHAGOGASTRODUODENOSCOPY);  Surgeon: Gerard Salinas MD;  Location: [facility];  Service: Endoscopy;  Laterality: N/A;    ESOPHAGOGASTRODUODENOSCOPY N/A 8/3/2023    Procedure: EGD (ESOPHAGOGASTRODUODENOSCOPY);  Surgeon: Gerard Salinas MD;  Location: [facility];  Service: Endoscopy;  Laterality: N/A;    HYSTERECTOMY      KNEE SURGERY Bilateral     LITHOTRIPSY      RHINOPLASTY TIP      SHOULDER SURGERY Right     TONSILLECTOMY      TOTAL HIP ARTHROPLASTY Right      Family History   Problem Relation Age of Onset    No Known Problems Mother     Diabetes Mellitus Maternal Grandmother     Amblyopia Neg Hx     Blindness Neg Hx     Cataracts Neg Hx     Glaucoma Neg Hx     Macular degeneration Neg Hx     Retinal detachment Neg Hx     Strabismus Neg Hx     Colon cancer Neg Hx     Esophageal cancer Neg Hx      Social History     Tobacco Use    Smoking status: Some Days     Current packs/day: 0.00     Types: Cigarettes     Last attempt to quit: 7/3/2019     Years since quittin.7    Smokeless tobacco: Never   Substance Use Topics    Alcohol use: Not Currently    Drug use: No     Review of Systems    Physical Exam     Initial Vitals  [03/14/24 0844]   BP Pulse Resp Temp SpO2   (!) 152/78 92 16 98 °F (36.7 °C) 99 %      MAP       --         Physical Exam  Physical Exam:  CONSTITUTIONAL: Well developed, well nourished, in no acute distress.  HENT: Normocephalic, atraumatic    EYES: Sclerae anicteric   NECK: Supple, no thyroid enlargement  CARDIOVASCULAR: Regular rate and rhythm without any murmurs, gallops, rubs.  RESPIRATORY: Speaking in full sentences. Breathing comfortably. Auscultation of the lungs revealed normal breath sounds b/l, no wheezing, no rales, no rhonchi.  ABDOMEN: Soft and nontender, no masses, no rebound or guarding. There is right CVA tenderness to palpation as well as some right mid axillary line abdominal discomfort.    NEUROLOGIC: Alert, interacting normally. No facial droop.   MSK: Moving all four extremities.  Skin: Warm and dry. No visible rash on exposed areas of skin.    Psych:  Anxious          ED Course   Procedures  Labs Reviewed   CBC W/ AUTO DIFFERENTIAL - Abnormal; Notable for the following components:       Result Value    MCH 31.1 (*)     Platelets 58 (*)     Lymph # 0.4 (*)     Mono # 0.2 (*)     Gran % 82.1 (*)     Lymph % 11.2 (*)     All other components within normal limits   COMPREHENSIVE METABOLIC PANEL - Abnormal; Notable for the following components:    Glucose 133 (*)     All other components within normal limits   URINALYSIS, REFLEX TO URINE CULTURE - Abnormal; Notable for the following components:    Appearance, UA Hazy (*)     Protein, UA Trace (*)     Ketones, UA Trace (*)     Occult Blood UA 3+ (*)     Leukocytes, UA Trace (*)     All other components within normal limits    Narrative:     Specimen Source->Urine   URINALYSIS MICROSCOPIC - Abnormal; Notable for the following components:    RBC, UA >100 (*)     WBC, UA 6 (*)     Bacteria Moderate (*)     All other components within normal limits    Narrative:     Specimen Source->Urine   CULTURE, URINE   HIV 1 / 2 ANTIBODY    Narrative:     Release  to patient->Immediate   HEPATITIS C ANTIBODY    Narrative:     Release to patient->Immediate          Imaging Results    None          Medications   sodium chloride 0.9% flush 10 mL (has no administration in time range)   carvediloL tablet 3.125 mg (3.125 mg Oral Given 3/16/24 0902)   pantoprazole EC tablet 40 mg (40 mg Oral Given 3/16/24 0902)   sodium chloride 0.9% flush 10 mL (has no administration in time range)   melatonin tablet 6 mg (has no administration in time range)   ondansetron disintegrating tablet 8 mg (has no administration in time range)   prochlorperazine injection Soln 5 mg (5 mg Intravenous Given 3/15/24 1620)   polyethylene glycol packet 17 g (has no administration in time range)   acetaminophen tablet 650 mg (has no administration in time range)   naloxone 0.4 mg/mL injection 0.02 mg (has no administration in time range)   glucose chewable tablet 16 g (has no administration in time range)   glucose chewable tablet 24 g (has no administration in time range)   glucagon (human recombinant) injection 1 mg (has no administration in time range)   dextrose 10% bolus 125 mL 125 mL (has no administration in time range)   dextrose 10% bolus 250 mL 250 mL (has no administration in time range)   tamsulosin 24 hr capsule 0.4 mg (0.4 mg Oral Given 3/16/24 0902)   lactated ringers infusion ( Intravenous New Bag 3/14/24 4729)   ALPRAZolam tablet 0.25 mg (0.25 mg Oral Given 3/15/24 2058)   rifAXIMin tablet 550 mg (550 mg Oral Given 3/16/24 0902)   oxyCODONE immediate release tablet Tab 10 mg (10 mg Oral Given 3/16/24 0716)   oxyCODONE immediate release tablet 5 mg (has no administration in time range)   HYDROmorphone injection 0.2 mg (0.2 mg Intravenous Given 3/16/24 0902)   sodium chloride 0.9% flush 3 mL (has no administration in time range)   fentaNYL 50 mcg/mL injection 25 mcg (has no administration in time range)   oxybutynin tablet 5 mg (5 mg Oral Given 3/16/24 0902)   phenazopyridine tablet 100 mg (100  "mg Oral Given 3/16/24 0902)   cefTRIAXone (Rocephin) 1 g in dextrose 5 % in water (D5W) 100 mL IVPB (MB+) (0 g Intravenous Stopped 3/15/24 1353)   0.9%  NaCl infusion (for blood administration) (has no administration in time range)   HYDROmorphone injection 0.5 mg (0.5 mg Intravenous Given 3/14/24 0934)   sodium chloride 0.9% bolus 500 mL 500 mL (0 mLs Intravenous Stopped 3/14/24 1040)   HYDROmorphone injection 0.5 mg (0.5 mg Intravenous Given 3/14/24 1013)   cefTRIAXone (Rocephin) 1 g in dextrose 5 % in water (D5W) 100 mL IVPB (MB+) (0 g Intravenous Stopped 3/14/24 1247)   HYDROmorphone injection 0.5 mg (0.5 mg Intravenous Given 3/14/24 1215)   oxyCODONE 12 hr tablet 10 mg (10 mg Oral Given 3/14/24 1643)   magnesium sulfate in dextrose IVPB (premix) 1 g (0 g Intravenous Stopped 3/15/24 0948)     Medical Decision Making  Amount and/or Complexity of Data Reviewed  External Data Reviewed: notes.     Details: Note from Urology in [place] yesterday:  History of Present Illness:  Tee Aranda is a 53 y.o. female who presents to clinic for 5x10 mm right proximal ureteral stone.     ER note states:  Patient is a 53y WF hx ascites, esophageal varices, cirrosis presenting with hematuria and right flank pain for the past 3 days. She has been taking flomax and tramadol without relief. Urine is red. No fevers, rashes. Flank pain radiates down to the groin and feels like the stone is stuck because it has not moved. Rated 9/10, burning and sharp in quality. Nothing makes the pain worse.   -------------------  Patient mentions "this pain is messing with my head," and she wants it out immediately.  I offered placing a stent.  I mentioned ESWL is relatively contraindicated with thrombocytopenia.  Her platelet count is 53.  She mentioned something to the effect she had ESWL with thrombocytopenia in the past.  She has intractable pain and her stone size does not warrant MET. I briefly discussed URS/LL, but she mentioned she " wants it out now and wants to get her hepatologist involved.  She walked out and said she's going to main campus.    ED Notes from 3/12:  This is an emergent evaluation of a 53y WF hx cirrhosis presenting with right flank pain.  Exam she is uncomfortable, non toxic, afebrile with right CVA tenderness.      Labs remarkable for no leukocytosis, anemia or electrolyte abnormalities.  UA without infection.  CT shows 10x6 stone at right UPJ  With mild hydronephrosis.  She is urinating without difficulty.  Plan is discharge with analgesics and urology follow up.  Appointment made with Dr. Cesar for 2pm tomorrow.       Labs: ordered.    Risk  Prescription drug management.      53-year-old female with past medical history as noted coming in with uncontrolled pain in the setting of known 10 x 6 right obstructing kidney stone.    Pain is uncontrolled despite outpatient Zofran and hydrocodone/Tylenol.  Patient has somewhat complicated as has thrombocytopenia.    Will recheck kidney function, blood counts, , UA to look for any interval changes since 2 days ago.    Imaging from 2 days ago reviewed, this time low suspicion for need for repeat imaging.  Family is unsure when this bruise on her right flank started she noticed it directly after her CT scan 2 days ago.  If there is any drop in her hemoglobin may reimage.    Will consult urology for recs given failed outpatient management complicated patient.    Update:  Patient remains hemodynamically stable in the emergency department.    Labs are benign.    UA shows possible signs of infection.  Given ceftriaxone and urine cultures are pending.  Urology consulted.  Given several doses of pain medications in the emergency department.  Urology would like the patient admitted to Hospital Medicine secondary to comorbidities.  Admit to Hospital Medicine, Urology planning on intervention later today.    Decision was made to hospitalize the patient.  Acute illness with threat to bodily  function.  Discussed with hospital medicine.                                        Clinical Impression:  Final diagnoses:  [N20.0] Kidney stones (Primary)  [D69.6] Thrombocytopenia  [K74.60] Hepatic cirrhosis, unspecified hepatic cirrhosis type, unspecified whether ascites present          ED Disposition Condition    Observation Stable                Tiago Rose MD  03/16/24 1039

## 2024-03-14 NOTE — H&P
[facility] - Emergency Dept  [facility] Medicine  History & Physical    Patient Name: Tee Aranda  MRN: [id number]  Patient Class: OP- Observation  Admission Date: 3/14/2024  Attending Physician: Shanel Walker MD   Primary Care Provider: James Samano MD         Patient information was obtained from patient, spouse/SO, and ER records.     Subjective:     Principal Problem:Urolithiasis    Chief Complaint:   Chief Complaint   Patient presents with    Flank Pain     [id number] stone sent here from [place].        HPI: Tee Aranda is a 53 y.o.F with hx of liver cirrhosis 2/2 CARTAGENA, portal hypertension, thrombocytopenia who presented to ED for pain, nausea and vomiting due to known R UPJ stone. She states that she has been experiencing these symptoms for the last 4-5d. Initially presented to the ED on 3/12 where CT findings showed a 10 x 6mm right UPJ stone with hydronephrosis. Followed-up with urology yesterday for the stone. During this visit, she states she was not happy with the care and decided that she would present to [facility] ED for further evaluation. Patient states she has a history of kidney stones in the past, but has not had one in the past 3-4 years. She has been taking norco 5-325 at home for pain relief. Currently rating pain 5/10. Denies fever, chills, chest pain, palpitations, SOB, cough, headaches, or any other symptoms at this time.     In ED: AFVSS. CBC unremarkable. CMP unremarkable. UA infectious appearing with many RBCs. Urology assessed patient in ED with plans for cystoscopy with ureteral stent placement today. S/p rocephin, 500cc IVF bolus, and dilaudid in ED. Admitted to  for further observation.       Past Medical History:   Diagnosis Date    Anemia, unspecified     Anxiety     Arthritis     Ascites 11/23/2020    AVN (avascular necrosis of bone)     Cataract     COVID-19 vaccine administered     04/08/21, 04/30/21    Diarrhea of presumed infectious origin 7/31/2023    Edema 11/23/2020     Epigastric pain 7/31/2023    Esophageal varices     Glaucoma     Hepatic encephalopathy 11/23/2020    History of colon polyps     Hx of cirrhosis     non-alcoholic    Iron deficiency anemia secondary to blood loss (chronic)     Kidney stones     Portal hypertensive gastropathy     Unintentional weight loss 10/14/2023    Unspecified cirrhosis of liver        Past Surgical History:   Procedure Laterality Date    APPENDECTOMY      COLONOSCOPY N/A 8/3/2023    Procedure: COLONOSCOPY;  Surgeon: Gerard Salinas MD;  Location: [facility];  Service: Endoscopy;  Laterality: N/A;    COLONOSCOPY W/ POLYPECTOMY      DISTAL CLAVICLE EXCISION Right 11/18/2021    Procedure: RIGHT SHOULDER ARTHROSCOPY, EXCISION, CLAVICLE, DISTAL; EXTENSIVE DEBRIDEMENT;  Surgeon: Isaiah Joyner MD;  Location: [facility] OR;  Service: Orthopedics;  Laterality: Right;    ESOPHAGEAL VARICE LIGATION      ESOPHAGOGASTRODUODENOSCOPY N/A 11/10/2020    Procedure: EGD (ESOPHAGOGASTRODUODENOSCOPY);  Surgeon: Gerard Salinas MD;  Location: [facility];  Service: Endoscopy;  Laterality: N/A;    ESOPHAGOGASTRODUODENOSCOPY N/A 12/28/2020    Procedure: EGD (ESOPHAGOGASTRODUODENOSCOPY);  Surgeon: Adryan Park MD;  Location: [facility] (2ND FLR);  Service: Endoscopy;  Laterality: N/A;    ESOPHAGOGASTRODUODENOSCOPY N/A 02/15/2021    Procedure: EGD (ESOPHAGOGASTRODUODENOSCOPY);  Surgeon: Hari Greene MD;  Location: [facility] (4TH FLR);  Service: Endoscopy;  Laterality: N/A;  6 week follow-up variceal banding  Hx varices - Labs - ERW  prep ins. emialed - COVID screening on 2/12/21 [place] - ERW    ESOPHAGOGASTRODUODENOSCOPY Left 08/03/2021    Procedure: EGD (ESOPHAGOGASTRODUODENOSCOPY);  Surgeon: Fabricio Corado MD;  Location: [facility] (4TH FLR);  Service: Gastroenterology;  Laterality: Left;  recent hematemasis. varices-labs on 7/26    COVID test at [facility] on 7/31-GT  requesting sooner    ESOPHAGOGASTRODUODENOSCOPY N/A 07/27/2022    Procedure: EGD  (ESOPHAGOGASTRODUODENOSCOPY);  Surgeon: Eric Garcia MD;  Location: [facility];  Service: Endoscopy;  Laterality: N/A;    ESOPHAGOGASTRODUODENOSCOPY Left 08/29/2022    Procedure: EGD (ESOPHAGOGASTRODUODENOSCOPY);  Surgeon: Kacy Douglass MD;  Location: [facility] (2ND FLR);  Service: Endoscopy;  Laterality: Left;  Fully vaccinated/ clear liquids up to 4 hrs prior-RB  varices labs ordered-RB    ESOPHAGOGASTRODUODENOSCOPY N/A 10/05/2022    Procedure: EGD (ESOPHAGOGASTRODUODENOSCOPY);  Surgeon: Gerard Salinas MD;  Location: [facility];  Service: Endoscopy;  Laterality: N/A;    ESOPHAGOGASTRODUODENOSCOPY N/A 3/30/2023    Procedure: EGD (ESOPHAGOGASTRODUODENOSCOPY);  Surgeon: Gerard Salinas MD;  Location: [facility];  Service: Endoscopy;  Laterality: N/A;    ESOPHAGOGASTRODUODENOSCOPY N/A 8/3/2023    Procedure: EGD (ESOPHAGOGASTRODUODENOSCOPY);  Surgeon: Gerard Salinas MD;  Location: [facility];  Service: Endoscopy;  Laterality: N/A;    HYSTERECTOMY      KNEE SURGERY Bilateral     LITHOTRIPSY      RHINOPLASTY TIP      SHOULDER SURGERY Right     TONSILLECTOMY      TOTAL HIP ARTHROPLASTY Right        Review of patient's allergies indicates:   Allergen Reactions    Sulfa (sulfonamide antibiotics) Hives       Current Facility-Administered Medications on File Prior to Encounter   Medication    0.9%  NaCl infusion     Current Outpatient Medications on File Prior to Encounter   Medication Sig    amitriptyline (ELAVIL) 10 MG tablet Take 1 tablet (10 mg total) by mouth every evening.    carvediloL (COREG) 3.125 MG tablet TAKE 1 TABLET BY MOUTH 2 TIMES DAILY.    esomeprazole (NEXIUM) 40 MG capsule Take 1 capsule (40 mg total) by mouth 2 (two) times daily before meals.    HYDROcodone-acetaminophen (NORCO) 5-325 mg per tablet Take 1 tablet by mouth every 6 (six) hours as needed for Pain.    lactulose (CHRONULAC) 20 gram/30 mL Soln Take 30 mLs (20 g total) by mouth once daily.    multivitamin (THERAGRAN) per tablet  Take 1 tablet by mouth once daily.    naproxen (NAPROSYN) 500 MG tablet Take 1 tablet (500 mg total) by mouth 2 (two) times daily. (Patient not taking: Reported on 3/13/2024)    omega-3 fatty acids/fish oil (FISH OIL-OMEGA-3 FATTY ACIDS) 300-1,000 mg capsule Take 1 capsule by mouth once daily.    phenazopyridine (PYRIDIUM) 200 MG tablet Take 1 tablet (200 mg total) by mouth 3 (three) times daily. for 10 days    pulse oximeter (PULSE OXIMETER) device by Apply Externally route 2 (two) times a day. Use twice daily at 8 AM and 3 PM and record the value in [employer] as directed.    rifAXIMin (XIFAXAN) 550 mg Tab Take 1 tablet (550 mg total) by mouth 2 (two) times daily.    UNABLE TO FIND 4 (four) times daily as needed. Medible 20 mg blue raspberry 1/4 to 1/2 up to 4 times daily as needed.     Family History       Problem Relation (Age of Onset)    Diabetes Mellitus Maternal Grandmother    No Known Problems Mother          Tobacco Use    Smoking status: Some Days     Current packs/day: 0.00     Types: Cigarettes     Last attempt to quit: 7/3/2019     Years since quittin.7    Smokeless tobacco: Never   Substance and Sexual Activity    Alcohol use: Not Currently    Drug use: No    Sexual activity: Not on file     Review of Systems   Constitutional:  Negative for activity change, chills and fever.   HENT:  Negative for trouble swallowing.    Eyes:  Negative for photophobia and visual disturbance.   Respiratory:  Negative for cough, chest tightness and shortness of breath.    Cardiovascular:  Negative for chest pain, palpitations and leg swelling.   Gastrointestinal:  Positive for abdominal pain, nausea and vomiting. Negative for constipation and diarrhea.   Genitourinary:  Positive for hematuria. Negative for dysuria and frequency.   Musculoskeletal:  Positive for back pain (R flank). Negative for gait problem and neck pain.   Skin:  Negative for rash and wound.   Neurological:  Negative for dizziness, syncope, speech  difficulty and light-headedness.   Psychiatric/Behavioral:  Negative for agitation and confusion. The patient is not nervous/anxious.      Objective:     Vital Signs (Most Recent):  Temp: 98 °F (36.7 °C) (03/14/24 1320)  Pulse: 71 (03/14/24 1320)  Resp: 19 (03/14/24 1320)  BP: 130/71 (03/14/24 1320)  SpO2: 100 % (03/14/24 1320) Vital Signs (24h Range):  Temp:  [97.9 °F (36.6 °C)-98.2 °F (36.8 °C)] 98 °F (36.7 °C)  Pulse:  [65-92] 71  Resp:  [16-20] 19  SpO2:  [98 %-100 %] 100 %  BP: (122-152)/(71-82) 130/71     Weight: 62.1 kg (137 lb)  Body mass index is 20.83 kg/m².     Physical Exam  Vitals and nursing note reviewed.   Constitutional:       General: She is not in acute distress.     Appearance: She is well-developed.   HENT:      Head: Normocephalic and atraumatic.      Mouth/Throat:      Mouth: Mucous membranes are dry.      Pharynx: No oropharyngeal exudate.   Eyes:      Conjunctiva/sclera: Conjunctivae normal.      Pupils: Pupils are equal, round, and reactive to light.   Cardiovascular:      Rate and Rhythm: Normal rate and regular rhythm.      Heart sounds: Normal heart sounds.   Pulmonary:      Effort: Pulmonary effort is normal. No respiratory distress.      Breath sounds: Normal breath sounds. No wheezing.   Abdominal:      General: Bowel sounds are normal. There is no distension.      Palpations: Abdomen is soft.      Tenderness: There is abdominal tenderness (RLQ, suprapubic pain). There is right CVA tenderness.      Comments: Patient notes abdominal swelling   Musculoskeletal:         General: No tenderness. Normal range of motion.      Cervical back: Normal range of motion and neck supple.      Right lower leg: Edema (trace) present.      Left lower leg: Edema (trace) present.   Lymphadenopathy:      Cervical: No cervical adenopathy.   Skin:     General: Skin is warm and dry.      Capillary Refill: Capillary refill takes less than 2 seconds.      Findings: No rash.   Neurological:      Mental Status:  She is alert and oriented to person, place, and time.      Cranial Nerves: No cranial nerve deficit.      Sensory: No sensory deficit.      Coordination: Coordination normal.   Psychiatric:         Behavior: Behavior normal.         Thought Content: Thought content normal.         Judgment: Judgment normal.              CRANIAL NERVES     CN III, IV, VI   Pupils are equal, round, and reactive to light.       Significant Labs: All pertinent labs within the past 24 hours have been reviewed.    Bilirubin:   Recent Labs   Lab 02/28/24  1203 03/12/24  0754 03/14/24  0921   BILIDIR 0.4*  --   --    BILITOT 1.0 0.6 1.0     BMP:   Recent Labs   Lab 03/14/24  0921   *      K 3.7      CO2 27   BUN 9   CREATININE 0.8   CALCIUM 9.7     CBC:   Recent Labs   Lab 03/14/24  0921   WBC 3.94   HGB 13.7   HCT 40.3   PLT 58*     CMP:   Recent Labs   Lab 03/14/24  0921      K 3.7      CO2 27   *   BUN 9   CREATININE 0.8   CALCIUM 9.7   PROT 6.9   ALBUMIN 4.0   BILITOT 1.0   ALKPHOS 83   AST 21   ALT 21   ANIONGAP 9     Urine Studies:   Recent Labs   Lab 03/13/24  1357 03/14/24  1006   COLORU Yellow Yellow   APPEARANCEUA  --  Hazy*   PHUR 6 7.0   SPECGRAV 1.015 1.020   PROTEINUA  --  Trace*   GLUCUA  --  Negative   KETONESU negative Trace*   BILIRUBINUA  --  Negative   OCCULTUA  --  3+*   NITRITE negative Negative   UROBILINOGEN normal  --    LEUKOCYTESUR  --  Trace*   RBCUA  --  >100*   WBCUA  --  6*   BACTERIA  --  Moderate*   SQUAMEPITHEL  --  1       Significant Imaging: I have reviewed all pertinent imaging results/findings within the past 24 hours.  Imaging Results    None         Assessment/Plan:     * Urolithiasis  - patient with pain/n/v x 4-5d 2/2 known R UPJ stone found on CT on 3/12  - AFVSS  - CBC and CMP unremarkable  - UA infectious appearing with >100 RBCs  - s/p rocephin in ED, continue for now  - follow urine culture   - s/p IVF and dilaudid in ED   - continue gentle IVF  - continue  prn analgesics for pain control   - urology consulted in ED:   - Recommend admission to medicine given patient's medical co morbidities   - I discussed with the patient that she has an obstructing ureteral stone and concern for infected urine. While she is not septic at the moment, she is at risk for developing obstructive pyelonephritis and could become septic.  - Plan for cystoscopy with ureteral stent placement today. The stent is not currently emergent, but if patient decompensates stent may become urgent.  - NPO  - IVF  - Rocephin  - Consent obtained  - Pain control  - Nausea control  - Flomax  - Strain all urine    Thrombocytopenia  Patient was found to have thrombocytopenia, the likely etiology is secondary to cirrhosis/portal hypertension, will monitor the platelets Daily. Will transfuse if platelet count is <50k (if undergoing surgical procedure or have active bleeding). Hold DVT prophylaxis if platelets are <50k. The patient's platelet results have been reviewed and are listed below.  Recent Labs   Lab 03/14/24  0921   PLT 58*         Liver cirrhosis secondary to CARTAGENA  Portal hypertensive gastropathy   - history noted  - patient follows with Dr. Bowers who referred patient to [facility] ED   - continue home rifaximin      VTE Risk Mitigation (From admission, onward)           Ordered     IP VTE HIGH RISK PATIENT  Once         03/14/24 1233     Place sequential compression device  Until discontinued         03/14/24 1233     Reason for No Pharmacological VTE Prophylaxis  Once        Question:  Reasons:  Answer:  Patient is Ambulatory    03/14/24 1233     Place sequential compression device  Until discontinued         03/14/24 1233                         On 03/14/2024, patient should be placed in hospital observation services under my care in collaboration with Dr. Shanel Walker.           Lisa Kraus PA-C  Department of Hospital Medicine  [facility] - Emergency Dept

## 2024-03-14 NOTE — ED NOTES
Admit PA notified of pt's request for additional pain meds 1.5 hrs after Dilaudid dose. Instructed to ask pt to wait for next dose of Dilaudid scheduled 6 hrs after last dose. Pt is upset. Pt ambassador  speaks w/ pt re: pain med.

## 2024-03-14 NOTE — OP NOTE
[facility] - Observation [facility]  Urology Department  Operative Note    Date: 03/14/2024    Pre-Op Diagnosis: Right proximal ureteral stone, concern for UTI    Post-Op Diagnosis: same    Procedure(s) Performed:    1. Cystoscopy with right ureteral JJ stent placement    Specimen(s): Urine for culture    Staff Surgeon: Chris Carey MD    Assistant Surgeon: Rush France MD     Circulator: Erica Ortiz RN     Anesthesia: Monitored Local Anesthesia with Sedation    Indications: Tee Aranda is a 53 y.o. female with right proximal ureteral stone with concern for UTI.    Findings:    Stone radioopque on xray  Right ureteral stent inserted without strings without complications    Estimated Blood Loss: min    Drains:  6 Setswana x 26 cm right JJ ureteral stent without strings    Procedure in Detail:  After risks, benefits and possible complications of the procedure were explained, the patient elected to undergo the procedure and informed consent was obtained. All questions were answered in the pankaj-operative area. The patient was transferred to the cystoscopy suite and placed on the fluoroscopy table in the supine position.  SCDs were applied and working. Time out was performed, pankaj-procedural antibiotics were given. Anesthesia was administered.  After adequate anesthesia the patient was placed in dorsal lithotomy position and prepped and draped in the usual sterile fashion.     A rigid cystoscope in a 22 Fr sheath was introduced into the patients bladder per urethra. This passed easily.  The entire urethra was visualized and revealed no strictures or masses.  Cystoscopy was performed which showed the right and left ureteral orifices in the normal anatomic position.  There were no bladder tumors, no trabeculations, and no bladder stones.      Our attention was turned to the patient's right ureteral orifice.  A motion wire was advanced up the right ureteral orifice to the level of the expected renal pelvis.  This  was confirmed using fluoroscopy.     We then passed a 6 Fr x 26 cm JJ ureteral stent without strings over the wire to the level of the renal pelvis under direct vision as well as flouroscopy. The guide wire was removed.  A 180 degree coil was observed in the renal pelvis as well as the bladder using fluoroscopy.  A 180 degree coil was also seen using direct visualization in the bladder.     There was return of cloudy urine upon ureteral stent placement. Urine was collected via the side port of the rigid cystoscope and passed off the sterile field for urine culture.    The bladder was drained and the cystoscope removed from the patient. The patient tolerated the procedure well and was transferred to the recovery room in stable condition.      Disposition: The patient will return to the floor under the care of hospital medicine. We will tentatively plan for outpatient stone management in the coming weeks. She can get ditropan 5 mg TID prn and pyridium 200 mg TID prn for stent discomfort if okay with primary.      Rush France MD

## 2024-03-14 NOTE — TRANSFER OF CARE
"Anesthesia Transfer of Care Note    Patient: Tee Aranda    Procedure(s) Performed: Procedure(s) (LRB):  CYSTOSCOPY, WITH URETERAL STENT REMOVAL (Right)    Patient location: PACU    Anesthesia Type: general    Transport from OR: Transported from OR on room air with adequate spontaneous ventilation    Post pain: adequate analgesia    Post assessment: no apparent anesthetic complications and tolerated procedure well    Post vital signs: stable    Level of consciousness: awake, alert and oriented    Nausea/Vomiting: no nausea/vomiting    Complications: none    Transfer of care protocol was followed    Last vitals: Visit Vitals  BP (!) 142/75 (BP Location: Left arm, Patient Position: Lying)   Pulse 62   Temp 36.6 °C (97.9 °F) (Tympanic)   Resp 18   Ht 5' 8" (1.727 m)   Wt 62.1 kg (136 lb 14.5 oz)   SpO2 100%   BMI 20.82 kg/m²     "

## 2024-03-15 ENCOUNTER — TELEPHONE (OUTPATIENT)
Dept: UROLOGY | Facility: CLINIC | Age: 54
End: 2024-03-15
Payer: COMMERCIAL

## 2024-03-15 ENCOUNTER — TELEPHONE (OUTPATIENT)
Dept: ADMINISTRATIVE | Facility: CLINIC | Age: 54
End: 2024-03-15
Payer: COMMERCIAL

## 2024-03-15 DIAGNOSIS — Z96.0 S/P URETERAL STENT PLACEMENT: ICD-10-CM

## 2024-03-15 DIAGNOSIS — N20.1 RIGHT URETERAL STONE: Primary | ICD-10-CM

## 2024-03-15 DIAGNOSIS — N39.0 URINARY TRACT INFECTION WITHOUT HEMATURIA, SITE UNSPECIFIED: ICD-10-CM

## 2024-03-15 LAB
ABO + RH BLD: ABNORMAL
ALBUMIN SERPL BCP-MCNC: 3.4 G/DL (ref 3.5–5.2)
ALP SERPL-CCNC: 65 U/L (ref 55–135)
ALT SERPL W/O P-5'-P-CCNC: 19 U/L (ref 10–44)
ANION GAP SERPL CALC-SCNC: 7 MMOL/L (ref 8–16)
AST SERPL-CCNC: 20 U/L (ref 10–40)
BACTERIA UR CULT: NORMAL
BACTERIA UR CULT: NORMAL
BASOPHILS # BLD AUTO: 0.01 K/UL (ref 0–0.2)
BASOPHILS NFR BLD: 0.4 % (ref 0–1.9)
BILIRUB SERPL-MCNC: 1 MG/DL (ref 0.1–1)
BLD GP AB SCN CELLS X3 SERPL QL: ABNORMAL
BLD GP AB SCN CELLS X3 SERPL QL: NORMAL
BLD PROD TYP BPU: NORMAL
BLOOD GROUP ANTIBODIES SERPL: NORMAL
BLOOD UNIT EXPIRATION DATE: NORMAL
BLOOD UNIT TYPE CODE: 5100
BLOOD UNIT TYPE: NORMAL
BUN SERPL-MCNC: 7 MG/DL (ref 6–20)
CALCIUM SERPL-MCNC: 9 MG/DL (ref 8.7–10.5)
CHLORIDE SERPL-SCNC: 106 MMOL/L (ref 95–110)
CO2 SERPL-SCNC: 25 MMOL/L (ref 23–29)
CODING SYSTEM: NORMAL
CREAT SERPL-MCNC: 0.7 MG/DL (ref 0.5–1.4)
CROSSMATCH INTERPRETATION: NORMAL
DIFFERENTIAL METHOD BLD: ABNORMAL
DISPENSE STATUS: NORMAL
EOSINOPHIL # BLD AUTO: 0 K/UL (ref 0–0.5)
EOSINOPHIL NFR BLD: 1.2 % (ref 0–8)
ERYTHROCYTE [DISTWIDTH] IN BLOOD BY AUTOMATED COUNT: 12.3 % (ref 11.5–14.5)
EST. GFR  (NO RACE VARIABLE): >60 ML/MIN/1.73 M^2
GLUCOSE SERPL-MCNC: 89 MG/DL (ref 70–110)
GRAM STN SPEC: NORMAL
GRAM STN SPEC: NORMAL
HCT VFR BLD AUTO: 35.1 % (ref 37–48.5)
HGB BLD-MCNC: 11.9 G/DL (ref 12–16)
IMM GRANULOCYTES # BLD AUTO: 0 K/UL (ref 0–0.04)
IMM GRANULOCYTES NFR BLD AUTO: 0 % (ref 0–0.5)
LYMPHOCYTES # BLD AUTO: 0.5 K/UL (ref 1–4.8)
LYMPHOCYTES NFR BLD: 20.2 % (ref 18–48)
MAGNESIUM SERPL-MCNC: 1.7 MG/DL (ref 1.6–2.6)
MCH RBC QN AUTO: 30.7 PG (ref 27–31)
MCHC RBC AUTO-ENTMCNC: 33.9 G/DL (ref 32–36)
MCV RBC AUTO: 91 FL (ref 82–98)
MONOCYTES # BLD AUTO: 0.2 K/UL (ref 0.3–1)
MONOCYTES NFR BLD: 8.2 % (ref 4–15)
NEUTROPHILS # BLD AUTO: 1.8 K/UL (ref 1.8–7.7)
NEUTROPHILS NFR BLD: 70 % (ref 38–73)
NRBC BLD-RTO: 0 /100 WBC
PHOSPHATE SERPL-MCNC: 3.8 MG/DL (ref 2.7–4.5)
PLATELET # BLD AUTO: 50 K/UL (ref 150–450)
PMV BLD AUTO: 11.5 FL (ref 9.2–12.9)
POCT GLUCOSE: 127 MG/DL (ref 70–110)
POTASSIUM SERPL-SCNC: 3.5 MMOL/L (ref 3.5–5.1)
PROT SERPL-MCNC: 5.7 G/DL (ref 6–8.4)
RBC # BLD AUTO: 3.88 M/UL (ref 4–5.4)
SODIUM SERPL-SCNC: 138 MMOL/L (ref 136–145)
SPECIMEN OUTDATE: ABNORMAL
UNIT NUMBER: NORMAL
WBC # BLD AUTO: 2.57 K/UL (ref 3.9–12.7)

## 2024-03-15 PROCEDURE — 96366 THER/PROPH/DIAG IV INF ADDON: CPT

## 2024-03-15 PROCEDURE — 25000003 PHARM REV CODE 250

## 2024-03-15 PROCEDURE — 83735 ASSAY OF MAGNESIUM: CPT

## 2024-03-15 PROCEDURE — P9035 PLATELET PHERES LEUKOREDUCED: HCPCS

## 2024-03-15 PROCEDURE — 36415 COLL VENOUS BLD VENIPUNCTURE: CPT | Mod: XB

## 2024-03-15 PROCEDURE — 96376 TX/PRO/DX INJ SAME DRUG ADON: CPT

## 2024-03-15 PROCEDURE — 36430 TRANSFUSION BLD/BLD COMPNT: CPT

## 2024-03-15 PROCEDURE — 86870 RBC ANTIBODY IDENTIFICATION: CPT

## 2024-03-15 PROCEDURE — 80053 COMPREHEN METABOLIC PANEL: CPT

## 2024-03-15 PROCEDURE — 96367 TX/PROPH/DG ADDL SEQ IV INF: CPT

## 2024-03-15 PROCEDURE — 25000003 PHARM REV CODE 250: Performed by: STUDENT IN AN ORGANIZED HEALTH CARE EDUCATION/TRAINING PROGRAM

## 2024-03-15 PROCEDURE — 86850 RBC ANTIBODY SCREEN: CPT

## 2024-03-15 PROCEDURE — 63600175 PHARM REV CODE 636 W HCPCS: Performed by: STUDENT IN AN ORGANIZED HEALTH CARE EDUCATION/TRAINING PROGRAM

## 2024-03-15 PROCEDURE — 96375 TX/PRO/DX INJ NEW DRUG ADDON: CPT

## 2024-03-15 PROCEDURE — 84100 ASSAY OF PHOSPHORUS: CPT

## 2024-03-15 PROCEDURE — 86901 BLOOD TYPING SEROLOGIC RH(D): CPT

## 2024-03-15 PROCEDURE — 63600175 PHARM REV CODE 636 W HCPCS

## 2024-03-15 PROCEDURE — 85025 COMPLETE CBC W/AUTO DIFF WBC: CPT

## 2024-03-15 PROCEDURE — G0378 HOSPITAL OBSERVATION PER HR: HCPCS

## 2024-03-15 RX ORDER — MAGNESIUM SULFATE 1 G/100ML
1 INJECTION INTRAVENOUS ONCE
Status: COMPLETED | OUTPATIENT
Start: 2024-03-15 | End: 2024-03-15

## 2024-03-15 RX ORDER — HYDROCODONE BITARTRATE AND ACETAMINOPHEN 500; 5 MG/1; MG/1
TABLET ORAL
Status: DISCONTINUED | OUTPATIENT
Start: 2024-03-15 | End: 2024-03-16 | Stop reason: HOSPADM

## 2024-03-15 RX ORDER — PHENAZOPYRIDINE HYDROCHLORIDE 100 MG/1
100 TABLET, FILM COATED ORAL
Status: DISCONTINUED | OUTPATIENT
Start: 2024-03-15 | End: 2024-03-16 | Stop reason: HOSPADM

## 2024-03-15 RX ORDER — OXYBUTYNIN CHLORIDE 5 MG/1
5 TABLET ORAL 3 TIMES DAILY
Status: DISCONTINUED | OUTPATIENT
Start: 2024-03-15 | End: 2024-03-16 | Stop reason: HOSPADM

## 2024-03-15 RX ADMIN — MAGNESIUM SULFATE HEPTAHYDRATE 1 G: 500 INJECTION, SOLUTION INTRAMUSCULAR; INTRAVENOUS at 08:03

## 2024-03-15 RX ADMIN — CEFTRIAXONE 1 G: 1 INJECTION, POWDER, FOR SOLUTION INTRAMUSCULAR; INTRAVENOUS at 01:03

## 2024-03-15 RX ADMIN — PHENAZOPYRIDINE HYDROCHLORIDE 100 MG: 100 TABLET ORAL at 01:03

## 2024-03-15 RX ADMIN — HYDROMORPHONE HYDROCHLORIDE 0.2 MG: 0.5 INJECTION, SOLUTION INTRAMUSCULAR; INTRAVENOUS; SUBCUTANEOUS at 09:03

## 2024-03-15 RX ADMIN — OXYBUTYNIN CHLORIDE 5 MG: 5 TABLET ORAL at 08:03

## 2024-03-15 RX ADMIN — TAMSULOSIN HYDROCHLORIDE 0.4 MG: 0.4 CAPSULE ORAL at 08:03

## 2024-03-15 RX ADMIN — ALPRAZOLAM 0.25 MG: 0.25 TABLET ORAL at 04:03

## 2024-03-15 RX ADMIN — PHENAZOPYRIDINE HYDROCHLORIDE 100 MG: 100 TABLET ORAL at 04:03

## 2024-03-15 RX ADMIN — RIFAXIMIN 550 MG: 550 TABLET ORAL at 08:03

## 2024-03-15 RX ADMIN — OXYCODONE HYDROCHLORIDE 10 MG: 10 TABLET ORAL at 08:03

## 2024-03-15 RX ADMIN — OXYCODONE HYDROCHLORIDE 10 MG: 10 TABLET ORAL at 03:03

## 2024-03-15 RX ADMIN — OXYBUTYNIN CHLORIDE 5 MG: 5 TABLET ORAL at 03:03

## 2024-03-15 RX ADMIN — OXYCODONE HYDROCHLORIDE 10 MG: 10 TABLET ORAL at 01:03

## 2024-03-15 RX ADMIN — CARVEDILOL 3.12 MG: 3.12 TABLET, FILM COATED ORAL at 08:03

## 2024-03-15 RX ADMIN — ALPRAZOLAM 0.25 MG: 0.25 TABLET ORAL at 08:03

## 2024-03-15 RX ADMIN — ALPRAZOLAM 0.25 MG: 0.25 TABLET ORAL at 03:03

## 2024-03-15 RX ADMIN — PANTOPRAZOLE SODIUM 40 MG: 40 TABLET, DELAYED RELEASE ORAL at 08:03

## 2024-03-15 RX ADMIN — PROCHLORPERAZINE EDISYLATE 5 MG: 5 INJECTION INTRAMUSCULAR; INTRAVENOUS at 04:03

## 2024-03-15 NOTE — ASSESSMENT & PLAN NOTE
53yoF w/10mm R UPJ stone and urine concerning for infection. The patient is s/p cystoscopy with right ureteral stent placement without strings.    -   - I discussed with the patient that she has an obstructing ureteral stone and concern for infected urine. While she is not septic at the moment, she is at risk for developing obstructive pyelonephritis and could become septic.  - Recommend discharge with daily flowmax, Ditropan, Pyridium, and two weeks of empiric abx follow by culture appropriate abx when it results   - Recommend fluid resuscitation   - Recommend MMPC  - Nausea control  - Strain all urine  - Morning labs are stable  - Rest of care per primary team   - Follow up for definitive management pending

## 2024-03-15 NOTE — PROGRESS NOTES
Escalation sent in error. Patient currently [facility] OBSERVATION 11H Bed 1156 A. Closing encounter.

## 2024-03-15 NOTE — PLAN OF CARE
Problem: Adult Inpatient Plan of Care  Goal: Plan of Care Review  3/14/2024 2342 by Almita Samano RN  Outcome: Ongoing, Progressing  3/14/2024 2341 by Almita Samano RN  Outcome: Ongoing, Progressing  Goal: Patient-Specific Goal (Individualized)  3/14/2024 2342 by Almita Samano RN  Outcome: Ongoing, Progressing  3/14/2024 2341 by Almita Samano RN  Outcome: Ongoing, Progressing  Goal: Absence of Hospital-Acquired Illness or Injury  3/14/2024 2342 by Almita Samano RN  Outcome: Ongoing, Progressing  3/14/2024 2341 by Almita Samano RN  Outcome: Ongoing, Progressing  Goal: Optimal Comfort and Wellbeing  3/14/2024 2342 by Almita Samano RN  Outcome: Ongoing, Progressing  3/14/2024 2341 by Almita Samano RN  Outcome: Ongoing, Progressing     Problem: Pain Acute  Goal: Acceptable Pain Control and Functional Ability  3/14/2024 2342 by Almita Samano RN  Outcome: Ongoing, Progressing  3/14/2024 2341 by Almita Samano RN  Outcome: Ongoing, Progressing     Problem: Infection  Goal: Absence of Infection Signs and Symptoms  Outcome: Ongoing, Progressing

## 2024-03-15 NOTE — ASSESSMENT & PLAN NOTE
Portal hypertensive gastropathy   - history noted  - patient follows with Dr. Bowers who referred patient to [facility] ED    - hepatology consulted per request of Dr. Bowers  - continue home rifaximin

## 2024-03-15 NOTE — HOSPITAL COURSE
Tee Aranda is a 53 y.o.F who was placed in observation for further evaluation of urolithiasis. She is s/p cystoscopy with R ureteral JJ stent placed with urology. Patient with improved pain following procedure, but with gross hematuria and platelets of 50. Platelets transfused. Urology with discharge recommendations of daily flomax, ditropan, pyridium and OP abx x2 weeks.  Patient medically ready for discharge. Plan to follow up with PCP, urology. Return precautions provided. Patient was seen and assessed on day of discharge. Plan of care discussed with patient, patient agreeable with plan, and all questions answered.    Physical Exam  Gen: in NAD, appears stated age  Neuro: AAOx3, motor, sensory, and strength grossly intact BL  HEENT: EOMI, PERRLA; no JVD appreciated  CVS: RRR, no m/r/g  Resp: lungs CTAB, no w/r/r; no belabored breathing or accessory muscle use appreciated   Abd: NTND, soft to palpation  Extrem: no UE or LE edema BL

## 2024-03-15 NOTE — ANESTHESIA POSTPROCEDURE EVALUATION
Anesthesia Post Evaluation    Patient: Tee Aranda    Procedure(s) Performed: Procedure(s) (LRB):  CYSTOSCOPY, WITH URETERAL STENT REMOVAL (Right)    Final Anesthesia Type: general      Patient location during evaluation: PACU  Patient participation: Yes- Able to Participate  Level of consciousness: awake and alert  Post-procedure vital signs: reviewed and stable  Pain management: adequate  Airway patency: patent    PONV status at discharge: No PONV  Anesthetic complications: no      Cardiovascular status: blood pressure returned to baseline  Respiratory status: unassisted  Hydration status: euvolemic  Follow-up not needed.              Vitals Value Taken Time   /57 03/15/24 0744   Temp 36.6 °C (97.9 °F) 03/15/24 0744   Pulse 50 03/15/24 1052   Resp 13 03/15/24 0912   SpO2 99 % 03/15/24 0744         Event Time   Out of Recovery 19:15:00         Pain/Dio Score: Pain Rating Prior to Med Admin: 8 (3/15/2024  9:12 AM)  Pain Rating Post Med Admin: 3 (3/15/2024  4:31 AM)  Dio Score: 9 (3/14/2024  7:15 PM)

## 2024-03-15 NOTE — NURSING
Nurses Note -- 4 Eyes      3/14/2024   7:30 PM      Skin assessed during: Admit      [x] No Altered Skin Integrity Present    []Prevention Measures Documented      [] Yes- Altered Skin Integrity Present or Discovered   [] LDA Added if Not in Epic (Describe Wound)   [] New Altered Skin Integrity was Present on Admit and Documented in LDA   [] Wound Image Taken    Wound Care Consulted? No    Attending Nurse: Tiffanie Mejía RN/Staff Member:   Igor         
Pt is concerned about her urine still bloody after her surgery and the writer notified Chelsi Elam PA-C about the pt concerns. No new orders at this time.  
Pt. Was transported via bed, with the doctor to 2nd floor surgery.  
06-Jan-2017 10:15

## 2024-03-15 NOTE — SUBJECTIVE & OBJECTIVE
"Interval History: No acute events overnight. Hemodynamically stable. The patient did not eat or ambulate. Pain is well controlled.     Review of Systems   Constitutional:  Negative for chills and fever.     Objective:     Temp:  [97.7 °F (36.5 °C)-98.3 °F (36.8 °C)] 98.3 °F (36.8 °C)  Pulse:  [55-92] 66  Resp:  [16-20] 18  SpO2:  [95 %-100 %] 96 %  BP: ()/(53-82) 97/79     Body mass index is 20.82 kg/m².           Drains       None                    Physical Exam  Vitals and nursing note reviewed.   Constitutional:       Appearance: Normal appearance.   HENT:      Head: Normocephalic and atraumatic.   Eyes:      Extraocular Movements: Extraocular movements intact.   Cardiovascular:      Rate and Rhythm: Normal rate.   Pulmonary:      Effort: Pulmonary effort is normal.   Abdominal:      General: Abdomen is flat.      Palpations: Abdomen is soft.   Skin:     General: Skin is warm.      Capillary Refill: Capillary refill takes less than 2 seconds.   Neurological:      General: No focal deficit present.      Mental Status: She is alert and oriented to person, place, and time.   Psychiatric:         Mood and Affect: Mood normal.           Significant Labs:    BMP:  Recent Labs   Lab 03/12/24  0754 03/14/24  0921 03/15/24  0440    141 138   K 4.1 3.7 3.5    105 106   CO2 26 27 25   BUN 14 9 7   CREATININE 0.8 0.8 0.7   CALCIUM 9.1 9.7 9.0       CBC:   Recent Labs   Lab 03/12/24  0754 03/14/24  0921 03/15/24  0440   WBC 3.92 3.94 2.57*   HGB 12.5 13.7 11.9*   HCT 36.8* 40.3 35.1*   PLT 58* 58* 50*       Blood Culture: No results for input(s): "LABBLOO" in the last 168 hours.  Urine Culture: No results for input(s): "LABURIN" in the last 168 hours.  Urine Studies:   Recent Labs   Lab 03/12/24  0735 03/13/24  1357 03/14/24  1006   COLORU Katerin Yellow Yellow   APPEARANCEUA Cloudy*  --  Hazy*   PHUR 6.0 6 7.0   SPECGRAV 1.020 1.015 1.020   PROTEINUA Trace*  --  Trace*   GLUCUA Negative  --  Negative "   KETONESU Negative negative Trace*   BILIRUBINUA Negative  --  Negative   OCCULTUA 3+*  --  3+*   NITRITE Negative negative Negative   UROBILINOGEN Negative normal  --    LEUKOCYTESUR Negative  --  Trace*   RBCUA >100*  --  >100*   WBCUA  --   --  6*   BACTERIA Few*  --  Moderate*   SQUAMEPITHEL  --   --  1       Significant Imaging:  All pertinent imaging results/findings from the past 24 hours have been reviewed.

## 2024-03-15 NOTE — PROGRESS NOTES
[facility] [address] [place] [address] Medicine  Progress Note    Patient Name: Tee Aranda  MRN: [id number]  Patient Class: OP- Observation   Admission Date: 3/14/2024  Length of Stay: 0 days  Attending Physician: Shanel Walker MD  Primary Care Provider: James Samano MD        Subjective:     Principal Problem:Urolithiasis        HPI:  Tee Aranda is a 53 y.o.F with hx of liver cirrhosis 2/2 CARTAGENA, portal hypertension, thrombocytopenia who presented to ED for pain, nausea and vomiting due to known R UPJ stone. She states that she has been experiencing these symptoms for the last 4-5d. Initially presented to the ED on 3/12 where CT findings showed a 10 x 6mm right UPJ stone with hydronephrosis. Followed-up with urology yesterday for the stone. During this visit, she states she was not happy with the care and decided that she would present to [facility] ED for further evaluation. Patient states she has a history of kidney stones in the past, but has not had one in the past 3-4 years. She has been taking norco 5-325 at home for pain relief. Currently rating pain 5/10. Denies fever, chills, chest pain, palpitations, SOB, cough, headaches, or any other symptoms at this time.     In ED: AFVSS. CBC unremarkable. CMP unremarkable. UA infectious appearing with many RBCs. Urology assessed patient in ED with plans for cystoscopy with ureteral stent placement today. S/p rocephin, 500cc IVF bolus, and dilaudid in ED. Admitted to  for further observation.       Overview/Hospital Course:  Tee Aranda is a 53 y.o.F who was placed in observation for further evaluation of urolithiasis. She is s/p cystoscopy with R ureteral JJ stent placed with urology. Patient with improved pain following procedure, but with gross hematuria and platelets of 50. Platelet transfusion pending. Urology with discharge recommendations of daily flomax, ditropan, pyridium and OP abx x2 weeks. Will discharge when medically ready.     Interval  History: Patient seen and assessed at bedside. Afebrile, VSS. Patient with improved pain from yesterday. Gross hematuria noted this morning with platelet count of 50 (patient is chronically low). Currently pending platelet transfusion.       Review of Systems   Constitutional:  Negative for activity change, chills and fever.   HENT:  Negative for trouble swallowing.    Eyes:  Negative for photophobia and visual disturbance.   Respiratory:  Negative for cough, chest tightness and shortness of breath.    Cardiovascular:  Negative for chest pain, palpitations and leg swelling.   Gastrointestinal:  Positive for abdominal pain. Negative for constipation and diarrhea.   Genitourinary:  Positive for hematuria. Negative for dysuria and frequency.   Musculoskeletal:  Positive for back pain (R flank - improved). Negative for gait problem and neck pain.   Skin:  Negative for rash and wound.   Neurological:  Negative for dizziness, syncope, speech difficulty and light-headedness.   Psychiatric/Behavioral:  Negative for agitation and confusion. The patient is not nervous/anxious.      Objective:     Vital Signs (Most Recent):  Temp: 97.5 °F (36.4 °C) (03/15/24 1600)  Pulse: 61 (03/15/24 1600)  Resp: 16 (03/15/24 1600)  BP: (!) 133/57 (03/15/24 1600)  SpO2: 99 % (03/15/24 1600) Vital Signs (24h Range):  Temp:  [97.5 °F (36.4 °C)-98.3 °F (36.8 °C)] 97.5 °F (36.4 °C)  Pulse:  [50-78] 61  Resp:  [16-20] 16  SpO2:  [95 %-100 %] 99 %  BP: ()/(53-79) 133/57     Weight: 62.1 kg (136 lb 14.5 oz)  Body mass index is 20.82 kg/m².    Intake/Output Summary (Last 24 hours) at 3/15/2024 1606  Last data filed at 3/15/2024 0344  Gross per 24 hour   Intake 100 ml   Output 400 ml   Net -300 ml         Physical Exam  Vitals and nursing note reviewed.   Constitutional:       General: She is not in acute distress.     Appearance: She is well-developed.   HENT:      Head: Normocephalic and atraumatic.      Mouth/Throat:      Mouth: Mucous  membranes are dry.      Pharynx: No oropharyngeal exudate.   Eyes:      Conjunctiva/sclera: Conjunctivae normal.      Pupils: Pupils are equal, round, and reactive to light.   Cardiovascular:      Rate and Rhythm: Normal rate and regular rhythm.      Heart sounds: Normal heart sounds.   Pulmonary:      Effort: Pulmonary effort is normal. No respiratory distress.      Breath sounds: Normal breath sounds. No wheezing.   Abdominal:      General: Bowel sounds are normal. There is no distension.      Palpations: Abdomen is soft.      Tenderness: There is abdominal tenderness (RLQ - improved from yesterday). There is right CVA tenderness.      Comments: Patient notes abdominal swelling   Musculoskeletal:         General: No tenderness. Normal range of motion.      Cervical back: Normal range of motion and neck supple.   Lymphadenopathy:      Cervical: No cervical adenopathy.   Skin:     General: Skin is warm and dry.      Capillary Refill: Capillary refill takes less than 2 seconds.      Findings: No rash.   Neurological:      Mental Status: She is alert and oriented to person, place, and time.      Cranial Nerves: No cranial nerve deficit.      Sensory: No sensory deficit.      Coordination: Coordination normal.   Psychiatric:         Behavior: Behavior normal.         Thought Content: Thought content normal.         Judgment: Judgment normal.             Significant Labs: All pertinent labs within the past 24 hours have been reviewed.    Significant Imaging: I have reviewed all pertinent imaging results/findings within the past 24 hours.    Assessment/Plan:      * Urolithiasis  - patient with pain/n/v x 4-5d 2/2 known R UPJ stone found on CT on 3/12  - AFVSS  - CBC and CMP unremarkable  - UA infectious appearing with >100 RBCs  - s/p rocephin in ED, continue for now  - follow urine culture   - s/p IVF and dilaudid in ED   - continue gentle IVF  - continue prn analgesics for pain control   - urology consulted in ED:   -  Recommend admission to medicine given patient's medical co morbidities   - I discussed with the patient that she has an obstructing ureteral stone and concern for infected urine. While she is not septic at the moment, she is at risk for developing obstructive pyelonephritis and could become septic.  - Plan for cystoscopy with ureteral stent placement today. The stent is not currently emergent, but if patient decompensates stent may become urgent.  - NPO  - IVF  - Rocephin  - Consent obtained  - Pain control  - Nausea control  - Flomax  - Strain all urine  - Urology with final recommendations of daily flomax, ditropan, pyridium and 2wks abx on discharge    Acute cystitis with hematuria  - UA infectious  - Ucx pending  - treating with rocephin for now      Thrombocytopenia  Patient was found to have thrombocytopenia, the likely etiology is secondary to cirrhosis/portal hypertension, will monitor the platelets Daily. Will transfuse if platelet count is <50k (if undergoing surgical procedure or have active bleeding). Hold DVT prophylaxis if platelets are <50k. The patient's platelet results have been reviewed and are listed below.  Recent Labs   Lab 03/15/24  0440   PLT 50*     - plt 50 with active hematuria  - pending platelet transfusion   - consent obtained and placed in chart      Liver cirrhosis secondary to CARTAGENA  Portal hypertensive gastropathy   - history noted  - patient follows with Dr. Bowers who referred patient to [facility] ED    - hepatology consulted per request of Dr. Bowers  - continue home rifaximin      VTE Risk Mitigation (From admission, onward)           Ordered     IP VTE HIGH RISK PATIENT  Once         03/14/24 1233     Place sequential compression device  Until discontinued         03/14/24 1233     Reason for No Pharmacological VTE Prophylaxis  Once        Question:  Reasons:  Answer:  Patient is Ambulatory    03/14/24 1233     Place sequential compression device  Until discontinued          03/14/24 1233                    Discharge Planning   IOANA: 3/16/2024     Code Status: Full Code   Is the patient medically ready for discharge?: No    Reason for patient still in hospital (select all that apply): Patient trending condition, Laboratory test, and Treatment                     Lisa Kraus PA-C  Department of Hospital Medicine   [facility]

## 2024-03-15 NOTE — ANESTHESIA PREPROCEDURE EVALUATION
"                                                                                                             03/15/2024  Tee Aranda is a 53 y.o., female.      Pre-op Assessment    I have reviewed the Patient Summary Reports.       I have reviewed the Medications.     Review of Systems  Anesthesia Hx:   History of prior surgery of interest to airway management or planning:            Denies Personal Hx of Anesthesia complications.                    Renal/:  Chronic Renal Disease                Hepatic/GI:      Liver Disease, Hepatitis           Musculoskeletal:  Arthritis               Psych:   anxiety                 Physical Exam  General: Well nourished    Airway:  Mallampati: III / II  Mouth Opening: Normal  TM Distance: Normal  Tongue: Normal  Neck ROM: Normal ROM    Chest/Lungs:  Normal Respiratory Rate    Heart:  Rate: Normal        Anesthesia Plan  Type of Anesthesia, risks & benefits discussed:    Anesthesia Type: Gen Natural Airway  Intra-op Monitoring Plan: Standard ASA Monitors  Post Op Pain Control Plan: multimodal analgesia  Induction:  IV  Informed Consent: Informed consent signed with the Patient and all parties understand the risks and agree with anesthesia plan.  All questions answered.   ASA Score: 3  Day of Surgery Review of History & Physical: H&P Update referred to the surgeon/provider.  Anesthesia Plan Notes: Bad bruising on right arm due to NIBP  Patient NPO since early AM (madi)  Aspiration event previously was unrelated to Anesthesia  Dentures "locked" in    Ready For Surgery From Anesthesia Perspective.     .      "

## 2024-03-15 NOTE — ASSESSMENT & PLAN NOTE
- patient with pain/n/v x 4-5d 2/2 known R UPJ stone found on CT on 3/12  - AFVSS  - CBC and CMP unremarkable  - UA infectious appearing with >100 RBCs  - s/p rocephin in ED, continue for now  - follow urine culture   - s/p IVF and dilaudid in ED   - continue gentle IVF  - continue prn analgesics for pain control   - urology consulted in ED:   - Recommend admission to medicine given patient's medical co morbidities   - I discussed with the patient that she has an obstructing ureteral stone and concern for infected urine. While she is not septic at the moment, she is at risk for developing obstructive pyelonephritis and could become septic.  - Plan for cystoscopy with ureteral stent placement today. The stent is not currently emergent, but if patient decompensates stent may become urgent.  - NPO  - IVF  - Rocephin  - Consent obtained  - Pain control  - Nausea control  - Flomax  - Strain all urine  - Urology with final recommendations of daily flomax, ditropan, pyridium and 2wks abx on discharge

## 2024-03-15 NOTE — NURSING TRANSFER
Nursing Transfer Note      3/14/2024   7:45 PM    Nurse giving handoff:katelynn soria  Nurse receiving handoff:bárbara,rn    Reason patient is being transferred: post procedure    Transfer To: 1156    Transfer via bed    Transfer with cardiac monitoring    Transported by transport    Transfer Vital Signs: see flowsheets    Telemetry: Rate 66  Order for Tele Monitor? Yes  4eyes on Skin: yes    Medicines sent: n/a    Any special needs or follow-up needed: routine    Patient belongings transferred with patient: Yes    Chart send with patient: Yes    Notified: spouse    Patient reassessed at: 3/14/24 4215

## 2024-03-15 NOTE — PROGRESS NOTES
[facility]  Urology  Progress Note    Patient Name: Tee Aranda  MRN: [id number]  Admission Date: 3/14/2024  Hospital Length of Stay: 0 days  Code Status: Full Code   Attending Provider: Shanel Walker MD   Primary Care Physician: James Samano MD    Subjective:     HPI:  53yoF w/pmhx most notable for cirrhosis and associated thrombocytopenia (last plt 58) who presented to the ED for pain and n/v 2/2 to 10mm right UPJ stone. Says she's had pain and n/v and chills but no fevers for the past 5 days. Initially presented to ED on 3/12 symptomatic and had CT that showed 10mm R UPJ stone with associated mild hydronephrosis as well as nonobstrucing bilateral renal stones and a UA with bacteria. She saw urology but was unhappy with her care and decided to leave the ED to come to [facility]. In the ED here she has been hemodynamically stable, afebrile, not tachycardic not hypotensive, non elevated WBC (3.9), creatinine at baseline (0.8). UA today showed many bacteria. Today she denies any recent vomiting, endorses pain, denies subjective f/c. Last had a bite of churro at 7AM. Urology consulted for further assistance.     Interval History: No acute events overnight. Hemodynamically stable. The patient did not eat or ambulate. Pain is well controlled.     Stent placement yesterday 3/14.    Review of Systems   Constitutional:  Negative for chills and fever.     Objective:     Temp:  [97.7 °F (36.5 °C)-98.3 °F (36.8 °C)] 98.3 °F (36.8 °C)  Pulse:  [55-92] 66  Resp:  [16-20] 18  SpO2:  [95 %-100 %] 96 %  BP: ()/(53-82) 97/79     Body mass index is 20.82 kg/m².           Drains       None                    Physical Exam  Vitals and nursing note reviewed.   Constitutional:       Appearance: Normal appearance.   HENT:      Head: Normocephalic and atraumatic.   Eyes:      Extraocular Movements: Extraocular movements intact.   Cardiovascular:      Rate and Rhythm: Normal rate.   Pulmonary:      Effort:  "Pulmonary effort is normal.   Abdominal:      General: Abdomen is flat.      Palpations: Abdomen is soft.   Skin:     General: Skin is warm.      Capillary Refill: Capillary refill takes less than 2 seconds.   Neurological:      General: No focal deficit present.      Mental Status: She is alert and oriented to person, place, and time.   Psychiatric:         Mood and Affect: Mood normal.           Significant Labs:    BMP:  Recent Labs   Lab 03/12/24  0754 03/14/24  0921 03/15/24  0440    141 138   K 4.1 3.7 3.5    105 106   CO2 26 27 25   BUN 14 9 7   CREATININE 0.8 0.8 0.7   CALCIUM 9.1 9.7 9.0       CBC:   Recent Labs   Lab 03/12/24  0754 03/14/24  0921 03/15/24  0440   WBC 3.92 3.94 2.57*   HGB 12.5 13.7 11.9*   HCT 36.8* 40.3 35.1*   PLT 58* 58* 50*       Blood Culture: No results for input(s): "LABBLOO" in the last 168 hours.  Urine Culture: No results for input(s): "LABURIN" in the last 168 hours.  Urine Studies:   Recent Labs   Lab 03/12/24  0735 03/13/24  1357 03/14/24  1006   COLORU Katerin Yellow Yellow   APPEARANCEUA Cloudy*  --  Hazy*   PHUR 6.0 6 7.0   SPECGRAV 1.020 1.015 1.020   PROTEINUA Trace*  --  Trace*   GLUCUA Negative  --  Negative   KETONESU Negative negative Trace*   BILIRUBINUA Negative  --  Negative   OCCULTUA 3+*  --  3+*   NITRITE Negative negative Negative   UROBILINOGEN Negative normal  --    LEUKOCYTESUR Negative  --  Trace*   RBCUA >100*  --  >100*   WBCUA  --   --  6*   BACTERIA Few*  --  Moderate*   SQUAMEPITHEL  --   --  1       Significant Imaging:  All pertinent imaging results/findings from the past 24 hours have been reviewed.                  Assessment/Plan:     * Urolithiasis  53yoF w/10mm R UPJ stone and urine concerning for infection. The patient is s/p cystoscopy with right ureteral stent placement without strings.    -   - I discussed with the patient that she has an obstructing ureteral stone and concern for infected urine. While she is not septic at the " moment, she is at risk for developing obstructive pyelonephritis and could become septic.  - Recommend discharge with daily flowmax, Ditropan, Pyridium, and two weeks of empiric abx follow by culture appropriate abx when it results   - Recommend fluid resuscitation   - Recommend MMPC  - Nausea control  - Strain all urine  - Morning labs are stable  - Rest of care per primary team     Will message for outpatient definitive stone management next Wednesday 3/20/24.          VTE Risk Mitigation (From admission, onward)           Ordered     IP VTE HIGH RISK PATIENT  Once         03/14/24 1233     Place sequential compression device  Until discontinued         03/14/24 1233     Reason for No Pharmacological VTE Prophylaxis  Once        Question:  Reasons:  Answer:  Patient is Ambulatory    03/14/24 1233     Place sequential compression device  Until discontinued         03/14/24 1233                    Alisa Coy MD  Urology  [facility]

## 2024-03-15 NOTE — TELEPHONE ENCOUNTER
Called the pt to confirm surgery date of 3/20, pt accepted. Informed the pt I will call the day before surgery with arrival time and pre-op instructions. Pt verbalized understanding.

## 2024-03-15 NOTE — ASSESSMENT & PLAN NOTE
Patient was found to have thrombocytopenia, the likely etiology is secondary to cirrhosis/portal hypertension, will monitor the platelets Daily. Will transfuse if platelet count is <50k (if undergoing surgical procedure or have active bleeding). Hold DVT prophylaxis if platelets are <50k. The patient's platelet results have been reviewed and are listed below.  Recent Labs   Lab 03/15/24  0440   PLT 50*     - plt 50 with active hematuria  - pending platelet transfusion   - consent obtained and placed in chart

## 2024-03-15 NOTE — SUBJECTIVE & OBJECTIVE
Interval History: Patient seen and assessed at bedside. Afebrile, VSS. Patient with improved pain from yesterday. Gross hematuria noted this morning with platelet count of 50 (patient is chronically low). Currently pending platelet transfusion.       Review of Systems   Constitutional:  Negative for activity change, chills and fever.   HENT:  Negative for trouble swallowing.    Eyes:  Negative for photophobia and visual disturbance.   Respiratory:  Negative for cough, chest tightness and shortness of breath.    Cardiovascular:  Negative for chest pain, palpitations and leg swelling.   Gastrointestinal:  Positive for abdominal pain. Negative for constipation and diarrhea.   Genitourinary:  Positive for hematuria. Negative for dysuria and frequency.   Musculoskeletal:  Positive for back pain (R flank - improved). Negative for gait problem and neck pain.   Skin:  Negative for rash and wound.   Neurological:  Negative for dizziness, syncope, speech difficulty and light-headedness.   Psychiatric/Behavioral:  Negative for agitation and confusion. The patient is not nervous/anxious.      Objective:     Vital Signs (Most Recent):  Temp: 97.5 °F (36.4 °C) (03/15/24 1600)  Pulse: 61 (03/15/24 1600)  Resp: 16 (03/15/24 1600)  BP: (!) 133/57 (03/15/24 1600)  SpO2: 99 % (03/15/24 1600) Vital Signs (24h Range):  Temp:  [97.5 °F (36.4 °C)-98.3 °F (36.8 °C)] 97.5 °F (36.4 °C)  Pulse:  [50-78] 61  Resp:  [16-20] 16  SpO2:  [95 %-100 %] 99 %  BP: ()/(53-79) 133/57     Weight: 62.1 kg (136 lb 14.5 oz)  Body mass index is 20.82 kg/m².    Intake/Output Summary (Last 24 hours) at 3/15/2024 1606  Last data filed at 3/15/2024 0344  Gross per 24 hour   Intake 100 ml   Output 400 ml   Net -300 ml         Physical Exam  Vitals and nursing note reviewed.   Constitutional:       General: She is not in acute distress.     Appearance: She is well-developed.   HENT:      Head: Normocephalic and atraumatic.      Mouth/Throat:      Mouth:  Mucous membranes are dry.      Pharynx: No oropharyngeal exudate.   Eyes:      Conjunctiva/sclera: Conjunctivae normal.      Pupils: Pupils are equal, round, and reactive to light.   Cardiovascular:      Rate and Rhythm: Normal rate and regular rhythm.      Heart sounds: Normal heart sounds.   Pulmonary:      Effort: Pulmonary effort is normal. No respiratory distress.      Breath sounds: Normal breath sounds. No wheezing.   Abdominal:      General: Bowel sounds are normal. There is no distension.      Palpations: Abdomen is soft.      Tenderness: There is abdominal tenderness (RLQ - improved from yesterday). There is right CVA tenderness.      Comments: Patient notes abdominal swelling   Musculoskeletal:         General: No tenderness. Normal range of motion.      Cervical back: Normal range of motion and neck supple.   Lymphadenopathy:      Cervical: No cervical adenopathy.   Skin:     General: Skin is warm and dry.      Capillary Refill: Capillary refill takes less than 2 seconds.      Findings: No rash.   Neurological:      Mental Status: She is alert and oriented to person, place, and time.      Cranial Nerves: No cranial nerve deficit.      Sensory: No sensory deficit.      Coordination: Coordination normal.   Psychiatric:         Behavior: Behavior normal.         Thought Content: Thought content normal.         Judgment: Judgment normal.             Significant Labs: All pertinent labs within the past 24 hours have been reviewed.    Significant Imaging: I have reviewed all pertinent imaging results/findings within the past 24 hours.

## 2024-03-15 NOTE — TELEPHONE ENCOUNTER
Right ureteral stone  -     Case Request Operating Room: CYSTOURETEROSCOPY,WITH HOLMIUM LASER LITHOTRIPSY OF URETERAL CALCULUS, CYSTOSCOPY, WITH URETERAL STENT EXCHANGE    S/P ureteral stent placement  -     Case Request Operating Room: CYSTOURETEROSCOPY,WITH HOLMIUM LASER LITHOTRIPSY OF URETERAL CALCULUS, CYSTOSCOPY, WITH URETERAL STENT EXCHANGE    Urinary tract infection without hematuria, site unspecified  -     Case Request Operating Room: CYSTOURETEROSCOPY,WITH HOLMIUM LASER LITHOTRIPSY OF URETERAL CALCULUS, CYSTOSCOPY, WITH URETERAL STENT EXCHANGE

## 2024-03-16 VITALS
WEIGHT: 136.88 LBS | SYSTOLIC BLOOD PRESSURE: 148 MMHG | HEIGHT: 68 IN | DIASTOLIC BLOOD PRESSURE: 64 MMHG | RESPIRATION RATE: 18 BRPM | OXYGEN SATURATION: 100 % | HEART RATE: 64 BPM | BODY MASS INDEX: 20.75 KG/M2 | TEMPERATURE: 98 F

## 2024-03-16 LAB
ALBUMIN SERPL BCP-MCNC: 3.4 G/DL (ref 3.5–5.2)
ALP SERPL-CCNC: 83 U/L (ref 55–135)
ALT SERPL W/O P-5'-P-CCNC: 26 U/L (ref 10–44)
ANION GAP SERPL CALC-SCNC: 7 MMOL/L (ref 8–16)
AST SERPL-CCNC: 31 U/L (ref 10–40)
BACTERIA UR CULT: NO GROWTH
BASOPHILS # BLD AUTO: 0.01 K/UL (ref 0–0.2)
BASOPHILS NFR BLD: 0.4 % (ref 0–1.9)
BILIRUB SERPL-MCNC: 0.8 MG/DL (ref 0.1–1)
BUN SERPL-MCNC: 10 MG/DL (ref 6–20)
CALCIUM SERPL-MCNC: 9 MG/DL (ref 8.7–10.5)
CHLORIDE SERPL-SCNC: 105 MMOL/L (ref 95–110)
CO2 SERPL-SCNC: 26 MMOL/L (ref 23–29)
CREAT SERPL-MCNC: 0.7 MG/DL (ref 0.5–1.4)
DIFFERENTIAL METHOD BLD: ABNORMAL
EOSINOPHIL # BLD AUTO: 0 K/UL (ref 0–0.5)
EOSINOPHIL NFR BLD: 1.6 % (ref 0–8)
ERYTHROCYTE [DISTWIDTH] IN BLOOD BY AUTOMATED COUNT: 12.2 % (ref 11.5–14.5)
EST. GFR  (NO RACE VARIABLE): >60 ML/MIN/1.73 M^2
GLUCOSE SERPL-MCNC: 93 MG/DL (ref 70–110)
HCT VFR BLD AUTO: 33.7 % (ref 37–48.5)
HGB BLD-MCNC: 11.5 G/DL (ref 12–16)
IMM GRANULOCYTES # BLD AUTO: 0.01 K/UL (ref 0–0.04)
IMM GRANULOCYTES NFR BLD AUTO: 0.4 % (ref 0–0.5)
LYMPHOCYTES # BLD AUTO: 0.6 K/UL (ref 1–4.8)
LYMPHOCYTES NFR BLD: 22.7 % (ref 18–48)
MAGNESIUM SERPL-MCNC: 1.9 MG/DL (ref 1.6–2.6)
MCH RBC QN AUTO: 30.5 PG (ref 27–31)
MCHC RBC AUTO-ENTMCNC: 34.1 G/DL (ref 32–36)
MCV RBC AUTO: 89 FL (ref 82–98)
MONOCYTES # BLD AUTO: 0.3 K/UL (ref 0.3–1)
MONOCYTES NFR BLD: 10.6 % (ref 4–15)
NEUTROPHILS # BLD AUTO: 1.6 K/UL (ref 1.8–7.7)
NEUTROPHILS NFR BLD: 64.3 % (ref 38–73)
NRBC BLD-RTO: 0 /100 WBC
PHOSPHATE SERPL-MCNC: 4.2 MG/DL (ref 2.7–4.5)
PLATELET # BLD AUTO: 71 K/UL (ref 150–450)
PMV BLD AUTO: 10.6 FL (ref 9.2–12.9)
POTASSIUM SERPL-SCNC: 3.7 MMOL/L (ref 3.5–5.1)
PROT SERPL-MCNC: 5.8 G/DL (ref 6–8.4)
RBC # BLD AUTO: 3.77 M/UL (ref 4–5.4)
SODIUM SERPL-SCNC: 138 MMOL/L (ref 136–145)
WBC # BLD AUTO: 2.55 K/UL (ref 3.9–12.7)

## 2024-03-16 PROCEDURE — 63600175 PHARM REV CODE 636 W HCPCS

## 2024-03-16 PROCEDURE — 86077 PHYS BLOOD BANK SERV XMATCH: CPT | Mod: ,,, | Performed by: INTERNAL MEDICINE

## 2024-03-16 PROCEDURE — 83735 ASSAY OF MAGNESIUM: CPT

## 2024-03-16 PROCEDURE — 25000003 PHARM REV CODE 250: Performed by: STUDENT IN AN ORGANIZED HEALTH CARE EDUCATION/TRAINING PROGRAM

## 2024-03-16 PROCEDURE — 84100 ASSAY OF PHOSPHORUS: CPT

## 2024-03-16 PROCEDURE — 25000003 PHARM REV CODE 250

## 2024-03-16 PROCEDURE — 94761 N-INVAS EAR/PLS OXIMETRY MLT: CPT

## 2024-03-16 PROCEDURE — 80053 COMPREHEN METABOLIC PANEL: CPT

## 2024-03-16 PROCEDURE — 85025 COMPLETE CBC W/AUTO DIFF WBC: CPT

## 2024-03-16 PROCEDURE — 96366 THER/PROPH/DIAG IV INF ADDON: CPT

## 2024-03-16 PROCEDURE — 99215 OFFICE O/P EST HI 40 MIN: CPT | Mod: ,,, | Performed by: INTERNAL MEDICINE

## 2024-03-16 PROCEDURE — 36415 COLL VENOUS BLD VENIPUNCTURE: CPT

## 2024-03-16 PROCEDURE — G0378 HOSPITAL OBSERVATION PER HR: HCPCS

## 2024-03-16 PROCEDURE — 63600175 PHARM REV CODE 636 W HCPCS: Performed by: STUDENT IN AN ORGANIZED HEALTH CARE EDUCATION/TRAINING PROGRAM

## 2024-03-16 PROCEDURE — 96376 TX/PRO/DX INJ SAME DRUG ADON: CPT

## 2024-03-16 RX ORDER — CIPROFLOXACIN 250 MG/1
250 TABLET, FILM COATED ORAL 2 TIMES DAILY
Qty: 28 TABLET | Refills: 0 | Status: ON HOLD | OUTPATIENT
Start: 2024-03-16 | End: 2024-03-26 | Stop reason: HOSPADM

## 2024-03-16 RX ORDER — PHENAZOPYRIDINE HYDROCHLORIDE 200 MG/1
200 TABLET, FILM COATED ORAL 3 TIMES DAILY PRN
Qty: 30 TABLET | Refills: 0 | Status: SHIPPED | OUTPATIENT
Start: 2024-03-16 | End: 2024-03-26

## 2024-03-16 RX ORDER — TAMSULOSIN HYDROCHLORIDE 0.4 MG/1
0.4 CAPSULE ORAL DAILY
Qty: 30 CAPSULE | Refills: 11 | Status: SHIPPED | OUTPATIENT
Start: 2024-03-17 | End: 2024-05-15

## 2024-03-16 RX ORDER — OXYBUTYNIN CHLORIDE 5 MG/1
5 TABLET ORAL 3 TIMES DAILY
Qty: 90 TABLET | Refills: 11 | Status: SHIPPED | OUTPATIENT
Start: 2024-03-16 | End: 2024-05-15

## 2024-03-16 RX ORDER — HYDROCODONE BITARTRATE AND ACETAMINOPHEN 5; 325 MG/1; MG/1
1 TABLET ORAL EVERY 6 HOURS PRN
Qty: 20 TABLET | Refills: 0 | Status: SHIPPED | OUTPATIENT
Start: 2024-03-16 | End: 2024-03-21

## 2024-03-16 RX ADMIN — HYDROMORPHONE HYDROCHLORIDE 0.2 MG: 0.5 INJECTION, SOLUTION INTRAMUSCULAR; INTRAVENOUS; SUBCUTANEOUS at 09:03

## 2024-03-16 RX ADMIN — OXYBUTYNIN CHLORIDE 5 MG: 5 TABLET ORAL at 09:03

## 2024-03-16 RX ADMIN — CARVEDILOL 3.12 MG: 3.12 TABLET, FILM COATED ORAL at 09:03

## 2024-03-16 RX ADMIN — PANTOPRAZOLE SODIUM 40 MG: 40 TABLET, DELAYED RELEASE ORAL at 09:03

## 2024-03-16 RX ADMIN — TAMSULOSIN HYDROCHLORIDE 0.4 MG: 0.4 CAPSULE ORAL at 09:03

## 2024-03-16 RX ADMIN — PHENAZOPYRIDINE HYDROCHLORIDE 100 MG: 100 TABLET ORAL at 09:03

## 2024-03-16 RX ADMIN — OXYCODONE HYDROCHLORIDE 10 MG: 10 TABLET ORAL at 03:03

## 2024-03-16 RX ADMIN — RIFAXIMIN 550 MG: 550 TABLET ORAL at 09:03

## 2024-03-16 RX ADMIN — OXYCODONE HYDROCHLORIDE 10 MG: 10 TABLET ORAL at 07:03

## 2024-03-16 RX ADMIN — PHENAZOPYRIDINE HYDROCHLORIDE 100 MG: 100 TABLET ORAL at 11:03

## 2024-03-16 RX ADMIN — CEFTRIAXONE 1 G: 1 INJECTION, POWDER, FOR SOLUTION INTRAMUSCULAR; INTRAVENOUS at 11:03

## 2024-03-16 RX ADMIN — OXYCODONE HYDROCHLORIDE 10 MG: 10 TABLET ORAL at 11:03

## 2024-03-16 RX ADMIN — OXYBUTYNIN CHLORIDE 5 MG: 5 TABLET ORAL at 03:03

## 2024-03-16 NOTE — CONSULTS
[facility]  Hepatology  Consult Note    Patient Name: Tee Aranda  MRN: [id number]  Admission Date: 3/14/2024  Hospital Length of Stay: 0 days  Code Status: Full Code   Attending Provider:  Dr. Lorenzo  Consulting Provider: Walter Quintero MD  Primary Care Physician: James Samano MD  Principal Problem:Urolithiasis    Inpatient consult to Hepatology  Consult performed by: Walter Quintero MD  Consult ordered by: Lisa Kraus PA-C        Subjective:     HPI: Tee Aranda is a 53 y.o. 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 CT findings showed a 10 x 6mm right UPJ stone with hydronephrosis.  In the ED, vital signs were stable.  UA with many RBCs.  Labs on arrival did not show any leukocytosis; CMP unremarkable.  She underwent cystoscopy with right ureteral JJ stent placement on 3/14.    She was diagnosed with MASH cirrhosis in 2020.  EGD at that time showed grade 1 varices and features of portal hypertension.  After her diagnosis, she lost a significant amount of weight.  In November 2020, she had an admission for melena; EGD showed pH G and small varices.  She had further admissions for upper GI bleeding requiring EGDs and variceal banding.  CT abdomen pelvis with contrast in July 2023 ruled out HCC.  AFP (2/24) 2.7.  Ultrasound abdomen (1/24) revealed hepatosplenomegaly with a 1.5 cm left hepatic lobe cyst.      Review of Systems   Constitutional:  Negative for fever.   Eyes:         No yellowing   Gastrointestinal:  Positive for abdominal pain. Negative for blood in stool, melena and vomiting.   Genitourinary:  Positive for urgency.   Skin:         No yellowing   Psychiatric/Behavioral:  Negative for substance abuse.         Objective:     Vitals:    03/16/24 0432   BP: (!) 108/58   Pulse: 66   Resp: 16   Temp: 97.3 °F (36.3  °C)         Constitutional: healthy,  alert,  not in acute distress, and well developed  HENT: Head: Normal, normocephalic, atraumatic.  Eyes: conjunctiva clear and sclera nonicteric  GI: Soft. RLQ tenderness present. No Hsmegaly. No peritoneal signs.   Skin: normal color and no jaundice  Neurological: alert, oriented x3  speech normal in context and clarity  memory intact grossly  Psychiatric: mood and affect are within normal limits, pt is a good historian; no memory problems were noted      Significant Labs:  Recent Labs   Lab 03/14/24  0921 03/15/24  0440 03/16/24  0332   HGB 13.7 11.9* 11.5*       Lab Results   Component Value Date    WBC 2.55 (L) 03/16/2024    HGB 11.5 (L) 03/16/2024    HCT 33.7 (L) 03/16/2024    MCV 89 03/16/2024    PLT 71 (L) 03/16/2024       Lab Results   Component Value Date     03/16/2024    K 3.7 03/16/2024     03/16/2024    CO2 26 03/16/2024    BUN 10 03/16/2024    CREATININE 0.7 03/16/2024    CALCIUM 9.0 03/16/2024    ANIONGAP 7 (L) 03/16/2024    ESTGFRAFRICA >60 07/28/2022    EGFRNONAA >60 07/28/2022       Lab Results   Component Value Date    ALT 26 03/16/2024    AST 31 03/16/2024    ALKPHOS 83 03/16/2024    BILITOT 0.8 03/16/2024       Lab Results   Component Value Date    INR 1.1 02/28/2024    INR 1.1 12/28/2023    INR 1.1 09/26/2023       Significant Imaging:  Reviewed pertinent radiology findings.       Assessment/Plan:     Tee Aranda is a 53 y.o. 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 CT findings showed a 10 x 6mm right UPJ stone with hydronephrosis.  cystoscopy with right ureteral JJ stent placement on 3/14. Her cirrhosis is well controlled and she is not exhibiting signs of HE (currently on Xifaxin monotherapy). S/P Up-to-date with HCC screening.     Problem List:  Decompensated MASH  cirrhosis    MELD 3.0: 8 at 2/28/2024 12:03 PM  MELD-Na: 7 at 2/28/2024 12:03 PM  Calculated from:  Serum Creatinine: 0.8 mg/dL (Using min of 1 mg/dL) at 2/28/2024 12:03 PM  Serum Sodium: 143 mmol/L (Using max of 137 mmol/L) at 2/28/2024 12:03 PM  Total Bilirubin: 1.0 mg/dL at 2/28/2024 12:03 PM  Serum Albumin: 4.4 g/dL (Using max of 3.5 g/dL) at 2/28/2024 12:03 PM  INR(ratio): 1.1 at 2/28/2024 12:03 PM  Age at listing (hypothetical): 53 years  Sex: Female at 2/28/2024 12:03 PM       Recommendations:  - Appreciate urology recommendations - repeat urological procedure on Wednesday as outpatient. Continue antibiotics per Urology recommendations.  - continue Xifaxan 550 mg b.i.d.  - if possible, avoid sedating drugs (benzodiazepines, opiates)  - Will arrange hepatology clinic follow up. Congratulated the patient on her weight loss and healthy lifestyle.   - low Na, high protein diet.   - Daily CBC, CMP, INR    Thank you for involving us in the care of Tee Aranda. Please call with any additional questions, concerns or changes in the patient's clinical status. We will sign off.      Walter Quintero MD  Gastroenterology Fellow PGY V  [facility]

## 2024-03-16 NOTE — PLAN OF CARE
Patient advised to call PCP office to schedule seven day hospital follow up appointment.  Family to provide transportation home.   03/16/24 1351   Final Note   Assessment Type Final Discharge Note   Anticipated Discharge Disposition Home   Hospital Resources/Appts/Education Provided Provided patient/caregiver with written discharge plan information   Post-Acute Status   Discharge Delays None known at this time     [address] [facility]  Discharge Final Note    Primary Care Provider: James Samano MD    Expected Discharge Date: 3/16/2024    Final Discharge Note (most recent)       Final Note - 03/16/24 1351          Final Note    Assessment Type Final Discharge Note (P)      Anticipated Discharge Disposition Home or Self Care (P)      Hospital Resources/Appts/Education Provided Provided patient/caregiver with written discharge plan information (P)         Post-Acute Status    Discharge Delays None known at this time (P)                      Important Message from Medicare             Contact Info       James Samano MD   Specialty: Family Medicine   Relationship: PCP - General    [place] [facility]  [address]  [place]  [place] [address]   Phone: [phone]       Next Steps: Schedule an appointment as soon as possible for a visit in 1 week(s)

## 2024-03-16 NOTE — PLAN OF CARE
Patient AAOX3, independent at baseline. PCP correct on facesheet. Patient denies owning any DME. Family to provide transportation home.   03/16/24 1214   Discharge Assessment   Assessment Type Discharge Planning Assessment   Confirmed/corrected address, phone number and insurance Yes   Confirmed Demographics Correct on Facesheet   Source of Information patient   Communicated IOANA with patient/caregiver Date not available/Unable to determine   People in Home spouse   Do you expect to return to your current living situation? Yes   Do you have help at home or someone to help you manage your care at home? Yes   Who are your caregiver(s) and their phone number(s)? Lavelle Aranda (Spouse)  [phone] (Mobile)   Prior to hospitilization cognitive status: Alert/Oriented   Current cognitive status: Alert/Oriented   Walking or Climbing Stairs Difficulty no   Dressing/Bathing Difficulty no   Equipment Currently Used at Home none   Readmission within 30 days? No   Do you currently have service(s) that help you manage your care at home? No   Do you take prescription medications? Yes   Do you have prescription coverage? Yes   Do you have any problems affording any of your prescribed medications? No   Is the patient taking medications as prescribed? yes   Who is going to help you get home at discharge? ManojLavelle (Spouse)  [phone] (Mobile)   How do you get to doctors appointments? car, drives self   Are you on dialysis? No   Do you take coumadin? No   Discharge Plan A Home with family   Discharge Plan B Home   DME Needed Upon Discharge  none   Discharge Plan discussed with: Patient   Transition of Care Barriers None     [facility]  Initial Discharge Assessment       Primary Care Provider: James Samano MD    Admission Diagnosis: Kidney stones [N20.0]  Thrombocytopenia [D69.6]  Chest pain [R07.9]  Hepatic cirrhosis, unspecified hepatic cirrhosis type, unspecified whether ascites present [K74.60]    Admission  Date: 3/14/2024  Expected Discharge Date: 3/16/2024    Transition of Care Barriers: (P) None    Payor: BLUE CROSS BLUE SHIELD / Plan: BCBS ALL OUT OF STATE / Product Type: PPO /     Extended Emergency Contact Information  Primary Emergency Contact: Lavelle Aranda   [facility]  Mobile Phone: [phone]  Relation: Spouse    Discharge Plan A: (P) Home with family  Discharge Plan B: (P) Home      [id number] - [place], [place] - [address]  [address]  [place] [address]  Phone: [phone] Fax: [phone]      Initial Assessment (most recent)       Adult Discharge Assessment - 03/16/24 1214          Discharge Assessment    Assessment Type Discharge Planning Assessment (P)      Confirmed/corrected address, phone number and insurance Yes (P)      Confirmed Demographics Correct on Facesheet (P)      Source of Information patient (P)      Communicated IOANA with patient/caregiver Date not available/Unable to determine (P)      People in Home spouse (P)      Do you expect to return to your current living situation? Yes (P)      Do you have help at home or someone to help you manage your care at home? Yes (P)      Who are your caregiver(s) and their phone number(s)? Lavelle Aranda (Spouse)  [phone] (Mobile) (P)      Prior to hospitilization cognitive status: Alert/Oriented (P)      Current cognitive status: Alert/Oriented (P)      Walking or Climbing Stairs Difficulty no (P)      Dressing/Bathing Difficulty no (P)      Equipment Currently Used at Home none (P)      Readmission within 30 days? No (P)      Do you currently have service(s) that help you manage your care at home? No (P)      Do you take prescription medications? Yes (P)      Do you have prescription coverage? Yes (P)      Do you have any problems affording any of your prescribed medications? No (P)      Is the patient taking medications as prescribed? yes (P)      Who is going to help you get home at discharge? Lavelle Aranda  (Spouse)  [phone] (Mobile) (P)      How do you get to doctors appointments? car, drives self (P)      Are you on dialysis? No (P)      Do you take coumadin? No (P)      Discharge Plan A Home with family (P)      Discharge Plan B Home (P)      DME Needed Upon Discharge  none (P)      Discharge Plan discussed with: Patient (P)      Transition of Care Barriers None (P)

## 2024-03-19 ENCOUNTER — TELEPHONE (OUTPATIENT)
Dept: UROLOGY | Facility: CLINIC | Age: 54
End: 2024-03-19
Payer: COMMERCIAL

## 2024-03-19 ENCOUNTER — PATIENT MESSAGE (OUTPATIENT)
Dept: UROLOGY | Facility: CLINIC | Age: 54
End: 2024-03-19
Payer: COMMERCIAL

## 2024-03-19 DIAGNOSIS — D69.6 THROMBOCYTOPENIA: Primary | ICD-10-CM

## 2024-03-19 LAB — PATHOLOGIST INTERPRETATION AB/XM: NORMAL

## 2024-03-19 NOTE — TELEPHONE ENCOUNTER
Called pt to confirm arrival time of 1:30pm for procedure on 3/20/24. Gave pt NPO instructions and gave pt opportunity to ask questions. Pt verbalized understanding.

## 2024-03-20 ENCOUNTER — HOSPITAL ENCOUNTER (OUTPATIENT)
Facility: HOSPITAL | Age: 54
Discharge: HOME | End: 2024-03-20
Attending: UROLOGY | Admitting: UROLOGY
Payer: COMMERCIAL

## 2024-03-20 ENCOUNTER — ANESTHESIA (OUTPATIENT)
Dept: SURGERY | Facility: HOSPITAL | Age: 54
End: 2024-03-20
Payer: COMMERCIAL

## 2024-03-20 ENCOUNTER — ANESTHESIA EVENT (OUTPATIENT)
Dept: SURGERY | Facility: HOSPITAL | Age: 54
End: 2024-03-20
Payer: COMMERCIAL

## 2024-03-20 VITALS
OXYGEN SATURATION: 100 % | HEART RATE: 61 BPM | DIASTOLIC BLOOD PRESSURE: 69 MMHG | HEIGHT: 68 IN | RESPIRATION RATE: 31 BRPM | WEIGHT: 137 LBS | TEMPERATURE: 97 F | SYSTOLIC BLOOD PRESSURE: 145 MMHG | BODY MASS INDEX: 20.76 KG/M2

## 2024-03-20 DIAGNOSIS — N20.1 RIGHT URETERAL STONE: Primary | ICD-10-CM

## 2024-03-20 PROCEDURE — 36000707: Performed by: UROLOGY

## 2024-03-20 PROCEDURE — 71000044 HC DOSC ROUTINE RECOVERY FIRST HOUR: Performed by: UROLOGY

## 2024-03-20 PROCEDURE — 25000003 PHARM REV CODE 250: Performed by: STUDENT IN AN ORGANIZED HEALTH CARE EDUCATION/TRAINING PROGRAM

## 2024-03-20 PROCEDURE — 74420 UROGRAPHY RTRGR +-KUB: CPT | Mod: 26,,, | Performed by: UROLOGY

## 2024-03-20 PROCEDURE — D9220A PRA ANESTHESIA: Mod: CRNA,,, | Performed by: NURSE ANESTHETIST, CERTIFIED REGISTERED

## 2024-03-20 PROCEDURE — 63600175 PHARM REV CODE 636 W HCPCS: Performed by: STUDENT IN AN ORGANIZED HEALTH CARE EDUCATION/TRAINING PROGRAM

## 2024-03-20 PROCEDURE — 27201423 OPTIME MED/SURG SUP & DEVICES STERILE SUPPLY: Performed by: UROLOGY

## 2024-03-20 PROCEDURE — 36000706: Performed by: UROLOGY

## 2024-03-20 PROCEDURE — 71000015 HC POSTOP RECOV 1ST HR: Performed by: UROLOGY

## 2024-03-20 PROCEDURE — C1769 GUIDE WIRE: HCPCS | Performed by: UROLOGY

## 2024-03-20 PROCEDURE — D9220A PRA ANESTHESIA: Mod: ANES,,, | Performed by: ANESTHESIOLOGY

## 2024-03-20 PROCEDURE — 52356 CYSTO/URETERO W/LITHOTRIPSY: CPT | Mod: RT,,, | Performed by: UROLOGY

## 2024-03-20 PROCEDURE — 82365 CALCULUS SPECTROSCOPY: CPT | Performed by: UROLOGY

## 2024-03-20 PROCEDURE — 37000008 HC ANESTHESIA 1ST 15 MINUTES: Performed by: UROLOGY

## 2024-03-20 PROCEDURE — 25500020 PHARM REV CODE 255: Performed by: UROLOGY

## 2024-03-20 PROCEDURE — C2617 STENT, NON-COR, TEM W/O DEL: HCPCS | Performed by: UROLOGY

## 2024-03-20 PROCEDURE — 25000003 PHARM REV CODE 250: Performed by: NURSE ANESTHETIST, CERTIFIED REGISTERED

## 2024-03-20 PROCEDURE — 63600175 PHARM REV CODE 636 W HCPCS: Performed by: NURSE ANESTHETIST, CERTIFIED REGISTERED

## 2024-03-20 PROCEDURE — 37000009 HC ANESTHESIA EA ADD 15 MINS: Performed by: UROLOGY

## 2024-03-20 DEVICE — STENT URETERAL UNIV 6FR 26CM: Type: IMPLANTABLE DEVICE | Site: URETER | Status: FUNCTIONAL

## 2024-03-20 RX ORDER — ONDANSETRON HYDROCHLORIDE 2 MG/ML
INJECTION, SOLUTION INTRAVENOUS
Status: DISCONTINUED | OUTPATIENT
Start: 2024-03-20 | End: 2024-03-20

## 2024-03-20 RX ORDER — SODIUM CHLORIDE, SODIUM LACTATE, POTASSIUM CHLORIDE, CALCIUM CHLORIDE 600; 310; 30; 20 MG/100ML; MG/100ML; MG/100ML; MG/100ML
INJECTION, SOLUTION INTRAVENOUS CONTINUOUS PRN
Status: DISCONTINUED | OUTPATIENT
Start: 2024-03-20 | End: 2024-03-20

## 2024-03-20 RX ORDER — TAMSULOSIN HYDROCHLORIDE 0.4 MG/1
0.4 CAPSULE ORAL DAILY
Qty: 30 CAPSULE | Refills: 0 | Status: SHIPPED | OUTPATIENT
Start: 2024-03-20 | End: 2025-03-20

## 2024-03-20 RX ORDER — PROCHLORPERAZINE EDISYLATE 5 MG/ML
5 INJECTION INTRAMUSCULAR; INTRAVENOUS EVERY 30 MIN PRN
Status: DISCONTINUED | OUTPATIENT
Start: 2024-03-20 | End: 2024-03-20 | Stop reason: HOSPADM

## 2024-03-20 RX ORDER — LIDOCAINE HYDROCHLORIDE 10 MG/ML
1 INJECTION, SOLUTION EPIDURAL; INFILTRATION; INTRACAUDAL; PERINEURAL ONCE
Status: DISCONTINUED | OUTPATIENT
Start: 2024-03-20 | End: 2024-03-20 | Stop reason: HOSPADM

## 2024-03-20 RX ORDER — LIDOCAINE HYDROCHLORIDE 20 MG/ML
INJECTION INTRAVENOUS
Status: DISCONTINUED | OUTPATIENT
Start: 2024-03-20 | End: 2024-03-20

## 2024-03-20 RX ORDER — DEXAMETHASONE SODIUM PHOSPHATE 4 MG/ML
INJECTION, SOLUTION INTRA-ARTICULAR; INTRALESIONAL; INTRAMUSCULAR; INTRAVENOUS; SOFT TISSUE
Status: DISCONTINUED | OUTPATIENT
Start: 2024-03-20 | End: 2024-03-20

## 2024-03-20 RX ORDER — PROPOFOL 10 MG/ML
VIAL (ML) INTRAVENOUS
Status: DISCONTINUED | OUTPATIENT
Start: 2024-03-20 | End: 2024-03-20

## 2024-03-20 RX ORDER — HYDROMORPHONE HYDROCHLORIDE 1 MG/ML
0.2 INJECTION, SOLUTION INTRAMUSCULAR; INTRAVENOUS; SUBCUTANEOUS EVERY 5 MIN PRN
Status: DISCONTINUED | OUTPATIENT
Start: 2024-03-20 | End: 2024-03-20 | Stop reason: HOSPADM

## 2024-03-20 RX ORDER — OXYCODONE HYDROCHLORIDE 5 MG/1
5 TABLET ORAL
Status: DISCONTINUED | OUTPATIENT
Start: 2024-03-20 | End: 2024-03-20 | Stop reason: HOSPADM

## 2024-03-20 RX ORDER — FENTANYL CITRATE 50 UG/ML
25 INJECTION, SOLUTION INTRAMUSCULAR; INTRAVENOUS EVERY 5 MIN PRN
Status: COMPLETED | OUTPATIENT
Start: 2024-03-20 | End: 2024-03-20

## 2024-03-20 RX ORDER — ROCURONIUM BROMIDE 10 MG/ML
INJECTION, SOLUTION INTRAVENOUS
Status: DISCONTINUED | OUTPATIENT
Start: 2024-03-20 | End: 2024-03-20

## 2024-03-20 RX ORDER — MIDAZOLAM HYDROCHLORIDE 1 MG/ML
INJECTION INTRAMUSCULAR; INTRAVENOUS
Status: DISCONTINUED | OUTPATIENT
Start: 2024-03-20 | End: 2024-03-20

## 2024-03-20 RX ORDER — CEFAZOLIN SODIUM 1 G/3ML
INJECTION, POWDER, FOR SOLUTION INTRAMUSCULAR; INTRAVENOUS
Status: DISCONTINUED | OUTPATIENT
Start: 2024-03-20 | End: 2024-03-20

## 2024-03-20 RX ORDER — OXYCODONE HYDROCHLORIDE 5 MG/1
5 TABLET ORAL EVERY 4 HOURS PRN
Qty: 10 TABLET | Refills: 0 | Status: ON HOLD | OUTPATIENT
Start: 2024-03-20 | End: 2024-03-26 | Stop reason: HOSPADM

## 2024-03-20 RX ORDER — FENTANYL CITRATE 50 UG/ML
INJECTION, SOLUTION INTRAMUSCULAR; INTRAVENOUS
Status: DISCONTINUED | OUTPATIENT
Start: 2024-03-20 | End: 2024-03-20

## 2024-03-20 RX ORDER — METOCLOPRAMIDE HYDROCHLORIDE 5 MG/ML
10 INJECTION INTRAMUSCULAR; INTRAVENOUS EVERY 10 MIN PRN
Status: DISCONTINUED | OUTPATIENT
Start: 2024-03-20 | End: 2024-03-20 | Stop reason: HOSPADM

## 2024-03-20 RX ADMIN — ONDANSETRON 4 MG: 2 INJECTION INTRAMUSCULAR; INTRAVENOUS at 02:03

## 2024-03-20 RX ADMIN — CEFAZOLIN 2 G: 330 INJECTION, POWDER, FOR SOLUTION INTRAMUSCULAR; INTRAVENOUS at 02:03

## 2024-03-20 RX ADMIN — ROCURONIUM BROMIDE 50 MG: 10 INJECTION, SOLUTION INTRAVENOUS at 02:03

## 2024-03-20 RX ADMIN — FENTANYL CITRATE 100 MCG: 50 INJECTION, SOLUTION INTRAMUSCULAR; INTRAVENOUS at 02:03

## 2024-03-20 RX ADMIN — SODIUM CHLORIDE, SODIUM LACTATE, POTASSIUM CHLORIDE, AND CALCIUM CHLORIDE: 600; 310; 30; 20 INJECTION, SOLUTION INTRAVENOUS at 02:03

## 2024-03-20 RX ADMIN — OXYCODONE HYDROCHLORIDE 5 MG: 5 TABLET ORAL at 04:03

## 2024-03-20 RX ADMIN — MIDAZOLAM HYDROCHLORIDE 2 MG: 1 INJECTION, SOLUTION INTRAMUSCULAR; INTRAVENOUS at 02:03

## 2024-03-20 RX ADMIN — LIDOCAINE HYDROCHLORIDE 100 MG: 20 INJECTION INTRAVENOUS at 02:03

## 2024-03-20 RX ADMIN — GLYCOPYRROLATE 0.2 MG: 0.2 INJECTION, SOLUTION INTRAMUSCULAR; INTRAVENOUS at 02:03

## 2024-03-20 RX ADMIN — FENTANYL CITRATE 25 MCG: 50 INJECTION INTRAMUSCULAR; INTRAVENOUS at 04:03

## 2024-03-20 RX ADMIN — SUGAMMADEX 200 MG: 100 INJECTION, SOLUTION INTRAVENOUS at 03:03

## 2024-03-20 RX ADMIN — DEXAMETHASONE SODIUM PHOSPHATE 4 MG: 4 INJECTION, SOLUTION INTRAMUSCULAR; INTRAVENOUS at 02:03

## 2024-03-20 RX ADMIN — PROPOFOL 150 MG: 10 INJECTION, EMULSION INTRAVENOUS at 02:03

## 2024-03-20 NOTE — PROGRESS NOTES
Recovery care complete. Pt tolerated well. Discharge instructions and handouts provided. Pt and spouse verbalized understanding. Pt denies pain at discharge, pt voided 100 ml sanguinous urine, pt given po fluids and tolerated well. Pt instructed to pull stent out on Monday and written details given in handout. Pt in NAD, VSS. Pt escort out of unit via wheelchair with spouse.

## 2024-03-20 NOTE — OP NOTE
[facility]  Operative Note    Date: 03/20/2024    Pre-Op Diagnosis:   Right ureteral stone  Right renal stones   Patient Active Problem List    Diagnosis Date Noted    Right ureteral stone 03/20/2024    Urolithiasis 03/14/2024    Acute cystitis with hematuria 03/14/2024    Unintentional weight loss 10/14/2023    Thrombocytopenia 09/18/2023    COVID-19 09/17/2023    Diarrhea of presumed infectious origin 07/31/2023    Epigastric pain 07/31/2023    Arthralgia of right acromioclavicular joint     Tear of right glenoid labrum     Nontraumatic incomplete tear of right rotator cuff     Esophageal varices 02/15/2021    Portal hypertensive gastropathy 12/29/2020    Liver cirrhosis secondary to CARTAGENA 08/31/2020    Combined forms of age-related cataract of right eye 10/19/2018       Post-Op Diagnosis: same    Procedure(s) Performed:   1.  Right ureteroscopy with laser lithotripsy, stone basket extraction, right retrograde pyelogram and right ureteral stent exchange (CPT 19238)    Specimen(s): Right ureteral stone    Staff Surgeon:  Chris Carey MD     Assistant Surgeon: Juan Mccartney MD (TA); Alisa Coy MD    Anesthesia: General endotracheal anesthesia    Indications: Tee Aranda is a 53 y.o. female with a right ureteral stone, presenting for definitive stone management.  She currently does have a JJ ureteral stent in place.    Findings:   - Proximal ureteral stone dusted with larger fragments extracted  - Pyeloscopy revealing 2 renal stones that were dusted  - Stent with strings placed    Estimated Blood Loss: min    Drains: 6 Fr x 26 cm JJ ureteral stent with strings    Procedure in detail:  After informed consent was obtained, the patient was brought the the cystoscopy suite and placed in the supine position.  SCDs were applied and working.  Anesthesia was administered.  The patient was then placed in the dorsal lithotomy position and prepped and draped in the usual sterile fashion.      A  rigid cystoscope in a 22 Fr sheath was introduced into the patient's urethra.  This passed easily.  The entire urethra was visualized which showed no strictures or masses. The right ureteral stent was grasped and pulled to the meatus. A motion wire was passed up the stent into the renal pelvis. A second stiff glide wire was then passed up the right ureteral orifice again confirmed using fluoro.  The cystoscope was removed keeping the wires in place.    A 12/14 ureteral access sheath was then passed over the free wire under fluoroscopic guidance. It was guided and stopped distally to the proximal ureteral stone. Subsequently, the flexible ureteroscope was passed into the patient's ureter through the access sheath under direct vision. A 272 micron laser fiber was passed through the ureteroscope.  The stone was fragmented using the laser.  The laser fiber was removed and a Nitinol tipless basket was introduced through thereteroscope.  Stone fragments were removed and collected for specimen analysis. The ureteroscope was advanced into the kidney. Flexible pyeloscopy was performed systematically.  A stone was encountered at the level of mid and lower poles. These were also dusted with larger fragments removed via the stone basket.     A retrograde pyelogram was performed through the ureteroscope ensuring that all calices had been evaluated. The ureteroscope was removed keeping the motion wire in place. The entire course of the ureter was visualized as the ureteroscope and access sheath were simultaneously removed.  There were no significant ureteral fragments left behind.     A 6 Fr x 26 cm JJ ureteral stent with strings was passed over the wire and up into the renal pelvis using fluoro.  When the coil appeared to be in good position in the kidney, the wire was removed under continuous fluoro.  Good coils were seen in the kidney and the bladder using fluoro.      The patient tolerated the procedure well and was  transferred to the recovery room in stable condition.      Disposition:  The patient will follow up with Dr. Carey in 3 months.      Juan Mccartney MD

## 2024-03-20 NOTE — ANESTHESIA PREPROCEDURE EVALUATION
Pre-operative evaluation for Procedure(s) (LRB):  CYSTOURETEROSCOPY,WITH HOLMIUM LASER LITHOTRIPSY OF URETERAL CALCULUS (Right)  CYSTOSCOPY, WITH URETERAL STENT EXCHANGE (Right)    Tee Aranda is a 53 y.o. female  with history of ACRTAGENA cirrhosis (complicated by ascites and esophageal varices, and HE), anxiety, glaucoma, and kidney stones who presents for above procedure.       Prev airway (11/18/21):     Induction:  Intravenous    Intubated:  Postinduction    Mask Ventilation:  Easy mask    Attempts:  1    Attempted By:  Staff anesthesiologist    Method of Intubation:  Direct    Blade:  Ange 3    Laryngeal View Grade: Grade IIb - only the arytenoids and epiglottis seen      Difficult Airway Encountered?: No      Complications:  None    Airway Device Size:  7.0    Style/Cuff Inflation:  Cuffed    Tube secured:  21    Secured at:  The lips    Placement Verified By:  Capnometry    Complicating Factors:  None    Findings Post-Intubation:  BS equal bilateral    2D Echo: none on record      EKG:   Vent. Rate : 061 BPM     Atrial Rate : 061 BPM      P-R Int : 132 ms          QRS Dur : 078 ms       QT Int : 384 ms       P-R-T Axes : 050 076 058 degrees      QTc Int : 386 ms     Normal sinus rhythm   Normal ECG       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    Portal hypertensive gastropathy    Esophageal varices    Arthralgia of right acromioclavicular joint    Tear of right glenoid labrum    Nontraumatic incomplete tear of right rotator cuff    Diarrhea of presumed infectious origin    Epigastric pain    COVID-19    Thrombocytopenia    Unintentional weight loss    Urolithiasis    Acute cystitis with hematuria       Review of patient's allergies indicates:   Allergen Reactions    Sulfa (sulfonamide antibiotics) Hives       Past Surgical History:   Procedure Laterality Date    APPENDECTOMY      COLONOSCOPY N/A  8/3/2023    Procedure: COLONOSCOPY;  Surgeon: Gerard Salinas MD;  Location: [facility];  Service: Endoscopy;  Laterality: N/A;    COLONOSCOPY W/ POLYPECTOMY      CYSTOSCOPY W/ URETERAL STENT REMOVAL Right 3/14/2024    Procedure: CYSTOSCOPY, WITH URETERAL STENT REMOVAL;  Surgeon: Chris Carey MD;  Location: [facility] 1ST FLR;  Service: Urology;  Laterality: Right;    DISTAL CLAVICLE EXCISION Right 11/18/2021    Procedure: RIGHT SHOULDER ARTHROSCOPY, EXCISION, CLAVICLE, DISTAL; EXTENSIVE DEBRIDEMENT;  Surgeon: Isaiah Joyner MD;  Location: [facility];  Service: Orthopedics;  Laterality: Right;    ESOPHAGEAL VARICE LIGATION      ESOPHAGOGASTRODUODENOSCOPY N/A 11/10/2020    Procedure: EGD (ESOPHAGOGASTRODUODENOSCOPY);  Surgeon: Gerard Salinas MD;  Location: [facility];  Service: Endoscopy;  Laterality: N/A;    ESOPHAGOGASTRODUODENOSCOPY N/A 12/28/2020    Procedure: EGD (ESOPHAGOGASTRODUODENOSCOPY);  Surgeon: Adryan Park MD;  Location: [facility] (2ND FLR);  Service: Endoscopy;  Laterality: N/A;    ESOPHAGOGASTRODUODENOSCOPY N/A 02/15/2021    Procedure: EGD (ESOPHAGOGASTRODUODENOSCOPY);  Surgeon: Hari Greene MD;  Location: [facility] (4TH FLR);  Service: Endoscopy;  Laterality: N/A;  6 week follow-up variceal banding  Hx varices - Labs - ERW  prep ins. emialed - COVID screening on 2/12/21 [place] - ERW    ESOPHAGOGASTRODUODENOSCOPY Left 08/03/2021    Procedure: EGD (ESOPHAGOGASTRODUODENOSCOPY);  Surgeon: Fabricio Corado MD;  Location: [facility] (4TH FLR);  Service: Gastroenterology;  Laterality: Left;  recent hematemasis. varices-labs on 7/26    COVID test at [facility] on 7/31-GT  requesting sooner    ESOPHAGOGASTRODUODENOSCOPY N/A 07/27/2022    Procedure: EGD (ESOPHAGOGASTRODUODENOSCOPY);  Surgeon: Eric Garcia MD;  Location: [facility];  Service: Endoscopy;  Laterality: N/A;    ESOPHAGOGASTRODUODENOSCOPY Left 08/29/2022    Procedure: EGD (ESOPHAGOGASTRODUODENOSCOPY);  Surgeon: Kacy Douglass MD;   "Location: [facility] (2ND FLR);  Service: Endoscopy;  Laterality: Left;  Fully vaccinated/ clear liquids up to 4 hrs prior-RB  varices labs ordered-RB    ESOPHAGOGASTRODUODENOSCOPY N/A 10/05/2022    Procedure: EGD (ESOPHAGOGASTRODUODENOSCOPY);  Surgeon: Gerard Salinas MD;  Location: [facility];  Service: Endoscopy;  Laterality: N/A;    ESOPHAGOGASTRODUODENOSCOPY N/A 3/30/2023    Procedure: EGD (ESOPHAGOGASTRODUODENOSCOPY);  Surgeon: Gerard Salinas MD;  Location: [facility];  Service: Endoscopy;  Laterality: N/A;    ESOPHAGOGASTRODUODENOSCOPY N/A 8/3/2023    Procedure: EGD (ESOPHAGOGASTRODUODENOSCOPY);  Surgeon: Gerard Salinas MD;  Location: [facility];  Service: Endoscopy;  Laterality: N/A;    HYSTERECTOMY      KNEE SURGERY Bilateral     LITHOTRIPSY      RHINOPLASTY TIP      SHOULDER SURGERY Right     TONSILLECTOMY      TOTAL HIP ARTHROPLASTY Right          Vital Signs:         CBC: No results for input(s): "WBC", "RBC", "HGB", "HCT", "PLT", "MCV", "MCH", "MCHC" in the last 72 hours.    CMP: No results for input(s): "NA", "K", "CL", "CO2", "BUN", "CREATININE", "GLU", "MG", "PHOS", "CALCIUM", "ALBUMIN", "PROT", "ALKPHOS", "ALT", "AST", "BILITOT" in the last 72 hours.    INR  No results for input(s): "PT", "INR", "PROTIME", "APTT" in the last 72 hours.            Pre-op Assessment    I have reviewed the Patient Summary Reports.     I have reviewed the Nursing Notes. I have reviewed the NPO Status.   I have reviewed the Medications.     Review of Systems  Anesthesia Hx:  No problems with previous Anesthesia             Denies Family Hx of Anesthesia complications.    Denies Personal Hx of Anesthesia complications.                    Hematology/Oncology:    Oncology Normal                                   Cardiovascular:  Cardiovascular Normal Exercise tolerance: good   Denies Pacemaker.   Denies Valvular problems/Murmurs.             ECG has been reviewed.                          Pulmonary:  Pulmonary " Normal    Denies Asthma.                    Renal/:  Chronic Renal Disease renal calculi               Hepatic/GI:      Liver Disease,            Endocrine:  Endocrine Normal            Psych:   anxiety                 Physical Exam  General: Alert and Oriented    Airway:  Mallampati: II   Mouth Opening: Normal  TM Distance: Normal  Tongue: Normal    Dental:  Intact    Chest/Lungs:  Clear to auscultation, Normal Respiratory Rate    Heart:  Rate: Normal  Rhythm: Regular Rhythm        Anesthesia Plan  Type of Anesthesia, risks & benefits discussed:    Anesthesia Type: Gen ETT  Intra-op Monitoring Plan: Standard ASA Monitors  Post Op Pain Control Plan: IV/PO Opioids PRN and multimodal analgesia  Induction:  IV  Airway Plan: Direct and Video  Informed Consent: Informed consent signed with the Patient and all parties understand the risks and agree with anesthesia plan.  All questions answered.   ASA Score: 3  Day of Surgery Review of History & Physical: H&P Update referred to the surgeon/provider.    Ready For Surgery From Anesthesia Perspective.     .

## 2024-03-20 NOTE — ANESTHESIA PROCEDURE NOTES
Intubation    Date/Time: 3/20/2024 2:16 PM    Performed by: Kieran Reyes CRNA  Authorized by: David Veras MD    Intubation:     Induction:  Intravenous    Intubated:  Postinduction    Mask Ventilation:  Easy mask    Attempts:  1    Attempted By:  CRNA    Method of Intubation:  Direct    Blade:  Coronel 3    Laryngeal View Grade: Grade I - full view of cords      Difficult Airway Encountered?: No      Complications:  None    Airway Device:  Oral endotracheal tube    Airway Device Size:  7.0    Style/Cuff Inflation:  Cuffed    Inflation Amount (mL):  5    Tube secured:  21    Secured at:  The lips    Placement Verified By:  Capnometry    Complicating Factors:  None    Findings Post-Intubation:  BS equal bilateral

## 2024-03-20 NOTE — TRANSFER OF CARE
"Anesthesia Transfer of Care Note    Patient: Tee Aranda    Procedure(s) Performed: Procedure(s) (LRB):  CYSTOSCOPY (N/A)  URETEROSCOPY (Right)  LITHOTRIPSY, USING LASER (Right)  EXTRACTION - STONE (Right)  REMOVAL-STENT (Right)  INSERTION, STENT, URETER (Right)  PYELOGRAM, RETROGRADE (Right)    Patient location: PACU    Anesthesia Type: general    Transport from OR: Transported from OR on 6-10 L/min O2 by face mask with adequate spontaneous ventilation    Post pain: adequate analgesia    Post assessment: no apparent anesthetic complications    Post vital signs: stable    Level of consciousness: awake    Nausea/Vomiting: no nausea/vomiting    Complications: none    Transfer of care protocol was followed      Last vitals: Visit Vitals  BP (!) 118/56   Pulse 61   Temp 37.1 °C (98.8 °F) (Skin)   Resp 18   Ht 5' 8" (1.727 m)   Wt 62.1 kg (137 lb)   Breastfeeding No   BMI 20.83 kg/m²     "

## 2024-03-20 NOTE — BRIEF OP NOTE
Tom Juarez - Surgery ([facility])  Brief Operative Note    Surgery Date: 3/20/2024     Surgeon(s) and Role:     * Chris Carey MD - Primary     * Juan Mccartney MD - Resident - Assisting     * Alisa Coy MD - Resident - Assisting        Pre-op Diagnosis:  Right ureteral stone [N20.1]  S/P ureteral stent placement [Z96.0]  Urinary tract infection without hematuria, site unspecified [N39.0]    Post-op Diagnosis:  Post-Op Diagnosis Codes:     * Right ureteral stone [N20.1]     * S/P ureteral stent placement [Z96.0]     * Urinary tract infection without hematuria, site unspecified [N39.0]    Procedure(s) (LRB):  CYSTOSCOPY (N/A)  URETEROSCOPY (Right)  LITHOTRIPSY, USING LASER (Right)  EXTRACTION - STONE (Right)  REMOVAL-STENT (Right)  INSERTION, STENT, URETER (Right)  PYELOGRAM, RETROGRADE (Right)    Anesthesia: General    Operative Findings:   - Proximal ureteral stone dusted with larger fragments extracted  - Pyeloscopy revealing 2 renal stones that were dusted  - Stent with strings placed    Estimated Blood Loss: * No values recorded between 3/20/2024  2:26 PM and 3/20/2024  3:36 PM *         Specimens:   Specimen (24h ago, onward)      None              Discharge Note    OUTCOME: Patient tolerated treatment/procedure well without complication and is now ready for discharge.    DISPOSITION: Home or Self Care    FINAL DIAGNOSIS:  <principal problem not specified>    FOLLOWUP: In clinic    DISCHARGE INSTRUCTIONS:  No discharge procedures on file.

## 2024-03-20 NOTE — H&P
Urology [facility]    HPI:  Tee Aranda is a 53 y.o. female with a 10 mm right UPJ stone, she underwent right ureteral stent placement on 3/14/2024. Today she reports for definitive stone management.     She denies fever, chills, nausea, vomiting.     She has a recent urine culture on 3/14/2024 which is No growth.     ROS:  Neg except per HPI    Past Medical History:   Diagnosis Date    Anemia, unspecified     Anxiety     Arthritis     Ascites 11/23/2020    AVN (avascular necrosis of bone)     Cataract     COVID-19 vaccine administered     04/08/21, 04/30/21    Diarrhea of presumed infectious origin 7/31/2023    Edema 11/23/2020    Epigastric pain 7/31/2023    Esophageal varices     Glaucoma     Hepatic encephalopathy 11/23/2020    History of colon polyps     Hx of cirrhosis     non-alcoholic    Iron deficiency anemia secondary to blood loss (chronic)     Kidney stones     Portal hypertensive gastropathy     Unintentional weight loss 10/14/2023    Unspecified cirrhosis of liver        Past Surgical History:   Procedure Laterality Date    APPENDECTOMY      COLONOSCOPY N/A 8/3/2023    Procedure: COLONOSCOPY;  Surgeon: Gerard Salinas MD;  Location: [facility];  Service: Endoscopy;  Laterality: N/A;    COLONOSCOPY W/ POLYPECTOMY      CYSTOSCOPY W/ URETERAL STENT REMOVAL Right 3/14/2024    Procedure: CYSTOSCOPY, WITH URETERAL STENT REMOVAL;  Surgeon: Chris Carey MD;  Location: [address];  Service: Urology;  Laterality: Right;    DISTAL CLAVICLE EXCISION Right 11/18/2021    Procedure: RIGHT SHOULDER ARTHROSCOPY, EXCISION, CLAVICLE, DISTAL; EXTENSIVE DEBRIDEMENT;  Surgeon: Isaiah Joyner MD;  Location: [facility];  Service: Orthopedics;  Laterality: Right;    ESOPHAGEAL VARICE LIGATION      ESOPHAGOGASTRODUODENOSCOPY N/A 11/10/2020    Procedure: EGD (ESOPHAGOGASTRODUODENOSCOPY);  Surgeon: Gerard Salinas MD;  Location: [facility];  Service: Endoscopy;  Laterality: N/A;     ESOPHAGOGASTRODUODENOSCOPY N/A 12/28/2020    Procedure: EGD (ESOPHAGOGASTRODUODENOSCOPY);  Surgeon: Adryan Park MD;  Location: [facility] (2ND FLR);  Service: Endoscopy;  Laterality: N/A;    ESOPHAGOGASTRODUODENOSCOPY N/A 02/15/2021    Procedure: EGD (ESOPHAGOGASTRODUODENOSCOPY);  Surgeon: Hari Greene MD;  Location: [facility] (4TH FLR);  Service: Endoscopy;  Laterality: N/A;  6 week follow-up variceal banding  Hx varices - Labs - ERW  prep ins. emialed - COVID screening on 2/12/21 [place] - ERW    ESOPHAGOGASTRODUODENOSCOPY Left 08/03/2021    Procedure: EGD (ESOPHAGOGASTRODUODENOSCOPY);  Surgeon: Fabricio Corado MD;  Location: [facility] (4TH FLR);  Service: Gastroenterology;  Laterality: Left;  recent hematemasis. varices-labs on 7/26    COVID test at [facility] on 7/31-GT  requesting sooner    ESOPHAGOGASTRODUODENOSCOPY N/A 07/27/2022    Procedure: EGD (ESOPHAGOGASTRODUODENOSCOPY);  Surgeon: Eric Garcia MD;  Location: [facility];  Service: Endoscopy;  Laterality: N/A;    ESOPHAGOGASTRODUODENOSCOPY Left 08/29/2022    Procedure: EGD (ESOPHAGOGASTRODUODENOSCOPY);  Surgeon: Kacy Douglass MD;  Location: [facility] (2ND FLR);  Service: Endoscopy;  Laterality: Left;  Fully vaccinated/ clear liquids up to 4 hrs prior-RB  varices labs ordered-RB    ESOPHAGOGASTRODUODENOSCOPY N/A 10/05/2022    Procedure: EGD (ESOPHAGOGASTRODUODENOSCOPY);  Surgeon: Gerard Salinas MD;  Location: [facility];  Service: Endoscopy;  Laterality: N/A;    ESOPHAGOGASTRODUODENOSCOPY N/A 3/30/2023    Procedure: EGD (ESOPHAGOGASTRODUODENOSCOPY);  Surgeon: Gerard Salinas MD;  Location: [facility];  Service: Endoscopy;  Laterality: N/A;    ESOPHAGOGASTRODUODENOSCOPY N/A 8/3/2023    Procedure: EGD (ESOPHAGOGASTRODUODENOSCOPY);  Surgeon: Gerard Salinas MD;  Location: [facility];  Service: Endoscopy;  Laterality: N/A;    HYSTERECTOMY      KNEE SURGERY Bilateral     LITHOTRIPSY      RHINOPLASTY TIP      SHOULDER SURGERY Right      TONSILLECTOMY      TOTAL HIP ARTHROPLASTY Right        Social History     Socioeconomic History    Marital status:    Tobacco Use    Smoking status: Some Days     Current packs/day: 0.00     Types: Cigarettes     Last attempt to quit: 7/3/2019     Years since quittin.7    Smokeless tobacco: Never   Substance and Sexual Activity    Alcohol use: Not Currently    Drug use: No     Social Determinants of Health     Financial Resource Strain: Low Risk  (2023)    Overall Financial Resource Strain (CARDIA)     Difficulty of Paying Living Expenses: Not hard at all   Food Insecurity: No Food Insecurity (2023)    Hunger Vital Sign     Worried About Running Out of Food in the Last Year: Never true     Ran Out of Food in the Last Year: Never true   Transportation Needs: No Transportation Needs (2023)    PRAPARE - Transportation     Lack of Transportation (Medical): No     Lack of Transportation (Non-Medical): No   Physical Activity: Sufficiently Active (2023)    Exercise Vital Sign     Days of Exercise per Week: 6 days     Minutes of Exercise per Session: 30 min   Stress: Stress Concern Present (2023)    [place] [employer] [place] [employer] - Occupational Stress Questionnaire     Feeling of Stress : To some extent   Social Connections: Socially Integrated (2023)    Social Connection and Isolation Panel [NHANES]     Frequency of Communication with Friends and Family: More than three times a week     Frequency of Social Gatherings with Friends and Family: Twice a week     Attends [employer] Services: 1 to 4 times per year     Active Member of Clubs or Organizations: No     Attends Club or Organization Meetings: 1 to 4 times per year     Marital Status:    Housing Stability: Unknown (2023)    Housing Stability Vital Sign     Unable to Pay for Housing in the Last Year: No     Unstable Housing in the Last Year: No       Family History   Problem Relation Age of Onset    No Known  Problems Mother     Diabetes Mellitus Maternal Grandmother     Amblyopia Neg Hx     Blindness Neg Hx     Cataracts Neg Hx     Glaucoma Neg Hx     Macular degeneration Neg Hx     Retinal detachment Neg Hx     Strabismus Neg Hx     Colon cancer Neg Hx     Esophageal cancer Neg Hx        Review of patient's allergies indicates:   Allergen Reactions    Sulfa (sulfonamide antibiotics) Hives       Current Facility-Administered Medications on File Prior to Encounter   Medication Dose Route Frequency Provider Last Rate Last Admin    0.9%  NaCl infusion   Intravenous Continuous Gerard Salinas MD   Stopped at 03/30/23 0922     Current Outpatient Medications on File Prior to Encounter   Medication Sig Dispense Refill    carvediloL (COREG) 3.125 MG tablet TAKE 1 TABLET BY MOUTH 2 TIMES DAILY. 180 tablet 3    ciprofloxacin HCl (CIPRO) 250 MG tablet Take 1 tablet (250 mg total) by mouth 2 (two) times a day. for 14 days 28 tablet 0    esomeprazole (NEXIUM) 40 MG capsule Take 1 capsule (40 mg total) by mouth 2 (two) times daily before meals. 60 capsule 11    HYDROcodone-acetaminophen (NORCO) 5-325 mg per tablet Take 1 tablet by mouth every 6 (six) hours as needed for Pain. 20 tablet 0    multivitamin (THERAGRAN) per tablet Take 1 tablet by mouth once daily.      omega-3 fatty acids/fish oil (FISH OIL-OMEGA-3 FATTY ACIDS) 300-1,000 mg capsule Take 1 capsule by mouth once daily.      oxybutynin (DITROPAN) 5 MG Tab Take 1 tablet (5 mg total) by mouth 3 (three) times daily. 90 tablet 11    phenazopyridine (PYRIDIUM) 200 MG tablet Take 1 tablet (200 mg total) by mouth 3 (three) times daily. for 10 days 6 tablet 0    rifAXIMin (XIFAXAN) 550 mg Tab Take 1 tablet (550 mg total) by mouth 2 (two) times daily. 60 tablet 11    tamsulosin (FLOMAX) 0.4 mg Cap Take 1 capsule (0.4 mg total) by mouth once daily. 30 capsule 11    UNABLE TO FIND 4 (four) times daily as needed. Medible 20 mg blue raspberry 1/4 to 1/2 up to 4 times daily as  "needed.      amitriptyline (ELAVIL) 10 MG tablet Take 1 tablet (10 mg total) by mouth every evening. 30 tablet 11    lactulose (CHRONULAC) 20 gram/30 mL Soln Take 30 mLs (20 g total) by mouth once daily. 3000 mL 5    phenazopyridine (PYRIDIUM) 200 MG tablet Take 1 tablet (200 mg total) by mouth 3 (three) times daily as needed for Pain. 30 tablet 0    pulse oximeter (PULSE OXIMETER) device by Apply Externally route 2 (two) times a day. Use twice daily at 8 AM and 3 PM and record the value in [employer] as directed. 1 each 0         Physical Exam:  Estimated body mass index is 20.83 kg/m² as calculated from the following:    Height as of this encounter: 5' 8" (1.727 m).    Weight as of this encounter: 62.1 kg (137 lb).    General: No acute distress, well developed. AAOx3  Head: Normocephalic, Atraumatic  Eyes: Extra-occular movements intact, No discharge  Neck: supple, symmetrical, trachea midline  Lungs: normal respiratory effort, no respiratory distress, no wheezes  CV: regular rate, 2+ pulses  Abdomen: soft, non-tender, non-distended, no organomegaly  MSK: no edema, no deformities, normal ROM  Skin: skin color, texture, turgor normal.  Neurologic: no focal deficits, sensation intact    Labs:      Lab Results   Component Value Date    WBC 2.55 (L) 03/16/2024    HGB 11.5 (L) 03/16/2024    HCT 33.7 (L) 03/16/2024    MCV 89 03/16/2024    PLT 71 (L) 03/16/2024           BMP  Lab Results   Component Value Date     03/16/2024    K 3.7 03/16/2024     03/16/2024    CO2 26 03/16/2024    BUN 10 03/16/2024    CREATININE 0.7 03/16/2024    CALCIUM 9.0 03/16/2024    ANIONGAP 7 (L) 03/16/2024    EGFRNORACEVR >60.0 03/16/2024         Assessment: Tee Aranda is a 53 y.o. female with a right UPJ stone.     Plan:     1. To OR for right laser lithotripsy with stent exchange with Dr. Carey.   2. Consents signed   3. I have explained the risk, benefits, and alternatives of the procedure in detail. The patient voices " understanding and all questions have been answered. The patient agrees to proceed as planned.     Alisa Coy MD

## 2024-03-20 NOTE — DISCHARGE INSTRUCTIONS
Postoperative Instructions for Stone Surgery    1. Ureteral Stent:     - You have a ureteral stent in place with strings. You may remove your stent on Monday 3/25/24.     - To remove your stent, it is recommended that you do so in the shower or while urinating. To remove the stent, gently grasp the strings hanging outside of your urethra and slowly and gently pull downward until the stent is completely removed. The ureteral stent is approximately 12 inches in length.    2. Hydration and Fluid Intake:     - It is essential to drink plenty of fluids following the ureteroscopy procedure to promote flushing of the urinary system and prevent dehydration.     - Aim to drink at least 8 to 10 glasses of water or other non-caffeinated, non-alcoholic beverages daily, unless instructed otherwise by your healthcare provider.    3. Pain Management:     - You may experience mild to moderate discomfort or pain after the ureteroscopy procedure. This is usually manageable with over-the-counter pain relievers such as acetaminophen or nonsteroidal anti-inflammatory drugs (NSAIDs).     - You may experience mild bladder and flank discomfort especially when voiding due to your ureteral stent. This may be alleviated with medications that were prescribed to you such as oxybutynin and/or pyridium. You may take these as needed for any urinary discomfort while a stent. Please note this discomfort is temporary and should subside once the stent is removed.     - If prescribed pain medication, take it as instructed, following the prescribed dosage and frequency.    4. Urinary Symptoms:     - It is common to experience some urinary symptoms after the procedure, such as urgency, frequency, burning sensation, or blood in the urine (hematuria). These symptoms should gradually improve within a few days.     - If the symptoms worsen or if you notice significant blood clots or inability to urinate, contact your healthcare provider.    5. Activity and  Rest:     - It is advisable to take it easy and get plenty of rest for the first few days following the procedure. Avoid strenuous activities, heavy lifting, or vigorous exercise for 1-2 days.     - Gradually resume normal activities as you feel comfortable, but listen to your body and avoid overexertion.    6. Diet and Nutrition:     - Follow any dietary recommendations provided by your healthcare provider. In general, maintain a balanced diet with adequate fiber intake to prevent constipation.     - Avoid excessive consumption of salt, spicy foods, and caffeinated or carbonated beverages, as these may irritate the urinary system.    7. Follow-up Appointments:     - Attend all scheduled follow-up appointments with your healthcare provider to monitor your recovery and assess the success of the stone removal.     - You will be contacted via phone or the patient portal about any follow up appointments and tests/imaging in about 1-2 weeks.     - Additional imaging or laboratory tests may be ordered to evaluate the effectiveness of the procedure.    8. Signs of Complications:     - Be aware of potential complications and contact your healthcare provider if you experience any of the following: persistent or worsening pain, fever, chills, excessive fatigue, severe bleeding, inability to pass urine, or signs of infection.    9. Medication Compliance:     - If prescribed any medications, such as antibiotics or medications to prevent stone recurrence, take them as instructed by your healthcare provider. Follow the recommended dosage and complete the full course of medication.    If you have any additional questions, call [phone] and ask for your provider. If after hours (night or weekends) ask for urology on call and you will be directed to the appropriate provider.

## 2024-03-21 NOTE — ANESTHESIA POSTPROCEDURE EVALUATION
Anesthesia Post Evaluation    Patient: Tee Aranda    Procedure(s) Performed: Procedure(s) (LRB):  CYSTOSCOPY (N/A)  URETEROSCOPY (Right)  LITHOTRIPSY, USING LASER (Right)  EXTRACTION - STONE (Right)  REMOVAL-STENT (Right)  INSERTION, STENT, URETER (Right)  PYELOGRAM, RETROGRADE (Right)    Final Anesthesia Type: general      Patient location during evaluation: PACU  Patient participation: Yes- Able to Participate  Level of consciousness: awake and alert and awake  Post-procedure vital signs: reviewed and stable  Pain management: adequate  Airway patency: patent    PONV status at discharge: No PONV  Anesthetic complications: no      Cardiovascular status: blood pressure returned to baseline  Respiratory status: unassisted and spontaneous ventilation  Hydration status: euvolemic  Follow-up not needed.              Vitals Value Taken Time   /69 03/20/24 1715   Temp 36.3 °C (97.3 °F) 03/20/24 1545   Pulse 61 03/20/24 1715   Resp 31 03/20/24 1715   SpO2 100 % 03/20/24 1715         No case tracking events are documented in the log.      Pain/Dio Score: Pain Rating Prior to Med Admin: 5 (3/20/2024  4:47 PM)  Dio Score: 10 (3/20/2024  4:30 PM)

## 2024-03-22 LAB
COMPN STONE: NORMAL
LABORATORY COMMENT REPORT: NORMAL
SPECIMEN SOURCE: NORMAL
STONE ANALYSIS IR-IMP: NORMAL

## 2024-03-24 ENCOUNTER — HOSPITAL ENCOUNTER (OUTPATIENT)
Facility: HOSPITAL | Age: 54
Discharge: HOME | End: 2024-03-26
Attending: EMERGENCY MEDICINE | Admitting: STUDENT IN AN ORGANIZED HEALTH CARE EDUCATION/TRAINING PROGRAM
Payer: COMMERCIAL

## 2024-03-24 DIAGNOSIS — R06.02 SHORTNESS OF BREATH: ICD-10-CM

## 2024-03-24 DIAGNOSIS — N20.1 RIGHT URETERAL STONE: ICD-10-CM

## 2024-03-24 DIAGNOSIS — R18.8 OTHER ASCITES: Primary | ICD-10-CM

## 2024-03-24 PROBLEM — K76.6 PORTAL HYPERTENSION: Status: ACTIVE | Noted: 2024-03-24

## 2024-03-24 LAB
ABO + RH BLD: NORMAL
ALBUMIN SERPL BCP-MCNC: 3.4 G/DL (ref 3.5–5.2)
ALP SERPL-CCNC: 98 U/L (ref 55–135)
ALT SERPL W/O P-5'-P-CCNC: 21 U/L (ref 10–44)
ANION GAP SERPL CALC-SCNC: 4 MMOL/L (ref 8–16)
AST SERPL-CCNC: 20 U/L (ref 10–40)
BACTERIA #/AREA URNS AUTO: ABNORMAL /HPF
BASOPHILS # BLD AUTO: 0.01 K/UL (ref 0–0.2)
BASOPHILS NFR BLD: 0.4 % (ref 0–1.9)
BILIRUB SERPL-MCNC: 0.6 MG/DL (ref 0.1–1)
BILIRUB UR QL STRIP: ABNORMAL
BLD GP AB SCN CELLS X3 SERPL QL: NORMAL
BNP SERPL-MCNC: 86 PG/ML (ref 0–99)
BUN SERPL-MCNC: 11 MG/DL (ref 6–20)
CALCIUM SERPL-MCNC: 9.3 MG/DL (ref 8.7–10.5)
CHLORIDE SERPL-SCNC: 104 MMOL/L (ref 95–110)
CLARITY UR REFRACT.AUTO: ABNORMAL
CO2 SERPL-SCNC: 31 MMOL/L (ref 23–29)
COLOR UR AUTO: YELLOW
CREAT SERPL-MCNC: 0.9 MG/DL (ref 0.5–1.4)
DIFFERENTIAL METHOD BLD: ABNORMAL
EOSINOPHIL # BLD AUTO: 0.1 K/UL (ref 0–0.5)
EOSINOPHIL NFR BLD: 2.8 % (ref 0–8)
ERYTHROCYTE [DISTWIDTH] IN BLOOD BY AUTOMATED COUNT: 12.8 % (ref 11.5–14.5)
EST. GFR  (NO RACE VARIABLE): >60 ML/MIN/1.73 M^2
GLUCOSE SERPL-MCNC: 111 MG/DL (ref 70–110)
GLUCOSE UR QL STRIP: NEGATIVE
HCT VFR BLD AUTO: 36.4 % (ref 37–48.5)
HGB BLD-MCNC: 11.5 G/DL (ref 12–16)
HGB UR QL STRIP: ABNORMAL
HYALINE CASTS UR QL AUTO: 0 /LPF
IMM GRANULOCYTES # BLD AUTO: 0.02 K/UL (ref 0–0.04)
IMM GRANULOCYTES NFR BLD AUTO: 0.7 % (ref 0–0.5)
INR PPP: 1.1 (ref 0.8–1.2)
KETONES UR QL STRIP: NEGATIVE
LEUKOCYTE ESTERASE UR QL STRIP: ABNORMAL
LYMPHOCYTES # BLD AUTO: 0.4 K/UL (ref 1–4.8)
LYMPHOCYTES NFR BLD: 15.6 % (ref 18–48)
MCH RBC QN AUTO: 30.7 PG (ref 27–31)
MCHC RBC AUTO-ENTMCNC: 31.6 G/DL (ref 32–36)
MCV RBC AUTO: 97 FL (ref 82–98)
MICROSCOPIC COMMENT: ABNORMAL
MONOCYTES # BLD AUTO: 0.3 K/UL (ref 0.3–1)
MONOCYTES NFR BLD: 9.2 % (ref 4–15)
NEUTROPHILS # BLD AUTO: 2 K/UL (ref 1.8–7.7)
NEUTROPHILS NFR BLD: 71.3 % (ref 38–73)
NITRITE UR QL STRIP: POSITIVE
NRBC BLD-RTO: 0 /100 WBC
PH UR STRIP: 7 [PH] (ref 5–8)
PLATELET # BLD AUTO: 59 K/UL (ref 150–450)
PMV BLD AUTO: 12 FL (ref 9.2–12.9)
POTASSIUM SERPL-SCNC: 4 MMOL/L (ref 3.5–5.1)
PROT SERPL-MCNC: 6.2 G/DL (ref 6–8.4)
PROT UR QL STRIP: ABNORMAL
PROTHROMBIN TIME: 12.2 SEC (ref 9–12.5)
RBC # BLD AUTO: 3.75 M/UL (ref 4–5.4)
RBC #/AREA URNS AUTO: >100 /HPF (ref 0–4)
SODIUM SERPL-SCNC: 139 MMOL/L (ref 136–145)
SP GR UR STRIP: 1.01 (ref 1–1.03)
SPECIMEN OUTDATE: NORMAL
SQUAMOUS #/AREA URNS AUTO: 1 /HPF
TROPONIN I SERPL DL<=0.01 NG/ML-MCNC: 0.09 NG/ML (ref 0–0.03)
TROPONIN I SERPL DL<=0.01 NG/ML-MCNC: <0.006 NG/ML (ref 0–0.03)
URN SPEC COLLECT METH UR: ABNORMAL
WBC # BLD AUTO: 2.82 K/UL (ref 3.9–12.7)
WBC #/AREA URNS AUTO: 29 /HPF (ref 0–5)

## 2024-03-24 PROCEDURE — 94761 N-INVAS EAR/PLS OXIMETRY MLT: CPT

## 2024-03-24 PROCEDURE — 84484 ASSAY OF TROPONIN QUANT: CPT | Performed by: PHYSICIAN ASSISTANT

## 2024-03-24 PROCEDURE — G0378 HOSPITAL OBSERVATION PER HR: HCPCS

## 2024-03-24 PROCEDURE — 96375 TX/PRO/DX INJ NEW DRUG ADDON: CPT

## 2024-03-24 PROCEDURE — 85610 PROTHROMBIN TIME: CPT | Performed by: PHYSICIAN ASSISTANT

## 2024-03-24 PROCEDURE — 80053 COMPREHEN METABOLIC PANEL: CPT | Performed by: PHYSICIAN ASSISTANT

## 2024-03-24 PROCEDURE — 81001 URINALYSIS AUTO W/SCOPE: CPT | Performed by: PHYSICIAN ASSISTANT

## 2024-03-24 PROCEDURE — 86850 RBC ANTIBODY SCREEN: CPT | Performed by: PHYSICIAN ASSISTANT

## 2024-03-24 PROCEDURE — 83880 ASSAY OF NATRIURETIC PEPTIDE: CPT | Performed by: PHYSICIAN ASSISTANT

## 2024-03-24 PROCEDURE — 96376 TX/PRO/DX INJ SAME DRUG ADON: CPT

## 2024-03-24 PROCEDURE — 25500020 PHARM REV CODE 255: Performed by: EMERGENCY MEDICINE

## 2024-03-24 PROCEDURE — 93010 ELECTROCARDIOGRAM REPORT: CPT | Mod: ,,, | Performed by: INTERNAL MEDICINE

## 2024-03-24 PROCEDURE — 99285 EMERGENCY DEPT VISIT HI MDM: CPT | Mod: 25

## 2024-03-24 PROCEDURE — 93005 ELECTROCARDIOGRAM TRACING: CPT

## 2024-03-24 PROCEDURE — 63600175 PHARM REV CODE 636 W HCPCS: Performed by: PHYSICIAN ASSISTANT

## 2024-03-24 PROCEDURE — 87086 URINE CULTURE/COLONY COUNT: CPT | Performed by: PHYSICIAN ASSISTANT

## 2024-03-24 PROCEDURE — 85025 COMPLETE CBC W/AUTO DIFF WBC: CPT | Performed by: PHYSICIAN ASSISTANT

## 2024-03-24 RX ORDER — OXYCODONE HYDROCHLORIDE 5 MG/1
5 TABLET ORAL EVERY 4 HOURS PRN
Status: DISCONTINUED | OUTPATIENT
Start: 2024-03-24 | End: 2024-03-25

## 2024-03-24 RX ORDER — FUROSEMIDE 10 MG/ML
20 INJECTION INTRAMUSCULAR; INTRAVENOUS
Status: COMPLETED | OUTPATIENT
Start: 2024-03-24 | End: 2024-03-24

## 2024-03-24 RX ORDER — OXYBUTYNIN CHLORIDE 5 MG/1
5 TABLET ORAL 3 TIMES DAILY
Status: DISCONTINUED | OUTPATIENT
Start: 2024-03-25 | End: 2024-03-26 | Stop reason: HOSPADM

## 2024-03-24 RX ORDER — ONDANSETRON HYDROCHLORIDE 2 MG/ML
4 INJECTION, SOLUTION INTRAVENOUS
Status: COMPLETED | OUTPATIENT
Start: 2024-03-24 | End: 2024-03-24

## 2024-03-24 RX ORDER — CARVEDILOL 3.12 MG/1
3.12 TABLET ORAL 2 TIMES DAILY
Status: DISCONTINUED | OUTPATIENT
Start: 2024-03-24 | End: 2024-03-26 | Stop reason: HOSPADM

## 2024-03-24 RX ORDER — TALC
6 POWDER (GRAM) TOPICAL NIGHTLY PRN
Status: DISCONTINUED | OUTPATIENT
Start: 2024-03-24 | End: 2024-03-26 | Stop reason: HOSPADM

## 2024-03-24 RX ORDER — SPIRONOLACTONE 50 MG/1
50 TABLET, FILM COATED ORAL DAILY
Status: DISCONTINUED | OUTPATIENT
Start: 2024-03-25 | End: 2024-03-26 | Stop reason: HOSPADM

## 2024-03-24 RX ORDER — MORPHINE SULFATE 4 MG/ML
4 INJECTION, SOLUTION INTRAMUSCULAR; INTRAVENOUS
Status: COMPLETED | OUTPATIENT
Start: 2024-03-24 | End: 2024-03-24

## 2024-03-24 RX ORDER — HYDROMORPHONE HYDROCHLORIDE 1 MG/ML
0.5 INJECTION, SOLUTION INTRAMUSCULAR; INTRAVENOUS; SUBCUTANEOUS
Status: COMPLETED | OUTPATIENT
Start: 2024-03-24 | End: 2024-03-24

## 2024-03-24 RX ORDER — SODIUM CHLORIDE 0.9 % (FLUSH) 0.9 %
10 SYRINGE (ML) INJECTION
Status: DISCONTINUED | OUTPATIENT
Start: 2024-03-24 | End: 2024-03-26 | Stop reason: HOSPADM

## 2024-03-24 RX ORDER — FUROSEMIDE 20 MG/1
20 TABLET ORAL DAILY
Status: DISCONTINUED | OUTPATIENT
Start: 2024-03-25 | End: 2024-03-26 | Stop reason: HOSPADM

## 2024-03-24 RX ORDER — TAMSULOSIN HYDROCHLORIDE 0.4 MG/1
0.4 CAPSULE ORAL DAILY
Status: DISCONTINUED | OUTPATIENT
Start: 2024-03-25 | End: 2024-03-26 | Stop reason: HOSPADM

## 2024-03-24 RX ADMIN — IOHEXOL 75 ML: 350 INJECTION, SOLUTION INTRAVENOUS at 04:03

## 2024-03-24 RX ADMIN — MORPHINE SULFATE 4 MG: 4 INJECTION INTRAVENOUS at 04:03

## 2024-03-24 RX ADMIN — ONDANSETRON 4 MG: 2 INJECTION INTRAMUSCULAR; INTRAVENOUS at 04:03

## 2024-03-24 RX ADMIN — HYDROMORPHONE HYDROCHLORIDE 0.5 MG: 1 INJECTION, SOLUTION INTRAMUSCULAR; INTRAVENOUS; SUBCUTANEOUS at 09:03

## 2024-03-24 RX ADMIN — FUROSEMIDE 20 MG: 10 INJECTION, SOLUTION INTRAVENOUS at 06:03

## 2024-03-24 RX ADMIN — HYDROMORPHONE HYDROCHLORIDE 0.5 MG: 1 INJECTION, SOLUTION INTRAMUSCULAR; INTRAVENOUS; SUBCUTANEOUS at 05:03

## 2024-03-24 NOTE — PROGRESS NOTES
53M w/ hx of CARTAGENA, nephrolithiasis, s/p right ureteroscopy with Dr. Carey on 03/20.  She presents to the ED with abdominal swelling and right flank pain.    The patient underwent R US with LL during which a proximal ureteral stone was dusted and fragments basket extracted.  Her R renal stones were dusted at that time.   She was left with a R ureteral stent with strings.  Patient was instructed to remove her stent with strings tomorrow.  She is uncomfortable doing so.      She endorses gross hematuria with clots.  She also endorses urinary frequency and stent pain while voiding.  This has worsened since yesterday.         In the ED she is afebrile VSS.  Bladder scan not reliable due to ascites.  Cath PVR was 50 mL.  UA not concerning for infection.  Creatinine at baseline.  Patient denies fever and chills.  No leukocytosis.  CT AP obtained in the ED shows migration of the proximal stent coil into the proximal ureter.  Distal coil within the bladder.  Mild hydronephrosis as expected.  Some stone fragment in the right lower pole.  No stone seen within the ureter.

## 2024-03-24 NOTE — ED NOTES
Patient identifiers verified and correct for Tee Aranda.  LOC: The patient is awake, alert and aware of environment with an appropriate affect, the patient is oriented x 3 and speaking appropriately.   APPEARANCE: Patient appears comfortable and in no acute distress, patient is clean and well groomed.  SKIN: The skin is warm and dry, color consistent with ethnicity, patient has normal skin turgor and moist mucus membranes, skin intact, no breakdown or bruising noted.   MUSCULOSKELETAL: Patient moving all extremities spontaneously, no swelling noted.  RESPIRATORY: Airway is open and patent, respirations are spontaneous, patient has a normal effort and rate, no accessory muscle use noted, O2 sats noted at 96% on room air.  CARDIAC: Patient has a normal rate and regular rhythm, pitting edema in lower extremities, capillary refill < 3 seconds.   GASTRO: abdomen round and distended. +N/V/D.  : Pt has stent. C/o retention. Urine is dark bryn.  NEURO: Pt opens eyes spontaneously, behavior appropriate to situation, follows commands, facial expression symmetrical, bilateral hand grasp equal and even, purposeful motor response noted, normal sensation in all extremities when touched with a finger.

## 2024-03-24 NOTE — ED TRIAGE NOTES
Pt had lithotripsy with stent on Wednesday. Pt having pain control issues. C/o bloating and feeling of retention.

## 2024-03-24 NOTE — ED PROVIDER NOTES
Encounter Date: 3/24/2024       History     Chief Complaint   Patient presents with    Post-op Problem     Had ureteral stent placed on wed, now trouble urinating and pressure to abd and back     53-year-old female with CARTAGENA, liver cirrhosis, esophageal varices, portal hypertensive gastropathy, anemia, kidney stones presents to the ED with abdominal pain.  Patient reports severe lower abdominal pain, right flank pain, abdominal swelling, leg swelling since yesterday.  She describes the pain as pressure.  She also feels worse pain when attempting to urinate.  She states that she has had difficulty urinating since yesterday.  She reports associated nausea.  Of note, patient had a ureteral stent placed on 03/20.  Denies fever, chills.       Review of patient's allergies indicates:   Allergen Reactions    Sulfa (sulfonamide antibiotics) Hives     Past Medical History:   Diagnosis Date    Anemia, unspecified     Anxiety     Arthritis     Ascites 11/23/2020    AVN (avascular necrosis of bone)     Cataract     COVID-19 vaccine administered     04/08/21, 04/30/21    Diarrhea of presumed infectious origin 7/31/2023    Edema 11/23/2020    Epigastric pain 7/31/2023    Esophageal varices     Glaucoma     Hepatic encephalopathy 11/23/2020    History of colon polyps     Hx of cirrhosis     non-alcoholic    Iron deficiency anemia secondary to blood loss (chronic)     Kidney stones     Portal hypertensive gastropathy     Unintentional weight loss 10/14/2023    Unspecified cirrhosis of liver      Past Surgical History:   Procedure Laterality Date    APPENDECTOMY      COLONOSCOPY N/A 8/3/2023    Procedure: COLONOSCOPY;  Surgeon: Gerard Salinas MD;  Location: [facility];  Service: Endoscopy;  Laterality: N/A;    COLONOSCOPY W/ POLYPECTOMY      CYSTOSCOPY N/A 3/20/2024    Procedure: CYSTOSCOPY;  Surgeon: Chris Carey MD;  Location: [address];  Service: Urology;  Laterality: N/A;    CYSTOSCOPY W/ URETERAL STENT REMOVAL  Right 3/14/2024    Procedure: CYSTOSCOPY, WITH URETERAL STENT REMOVAL;  Surgeon: Chris Carey MD;  Location: [facility] OR 1ST FLR;  Service: Urology;  Laterality: Right;    DISTAL CLAVICLE EXCISION Right 11/18/2021    Procedure: RIGHT SHOULDER ARTHROSCOPY, EXCISION, CLAVICLE, DISTAL; EXTENSIVE DEBRIDEMENT;  Surgeon: Isaiah Joyner MD;  Location: [facility] OR;  Service: Orthopedics;  Laterality: Right;    ESOPHAGEAL VARICE LIGATION      ESOPHAGOGASTRODUODENOSCOPY N/A 11/10/2020    Procedure: EGD (ESOPHAGOGASTRODUODENOSCOPY);  Surgeon: Gerard Salinas MD;  Location: [facility] ENDO;  Service: Endoscopy;  Laterality: N/A;    ESOPHAGOGASTRODUODENOSCOPY N/A 12/28/2020    Procedure: EGD (ESOPHAGOGASTRODUODENOSCOPY);  Surgeon: Adryan Park MD;  Location: [facility] ENDO (2ND FLR);  Service: Endoscopy;  Laterality: N/A;    ESOPHAGOGASTRODUODENOSCOPY N/A 02/15/2021    Procedure: EGD (ESOPHAGOGASTRODUODENOSCOPY);  Surgeon: Hari Greene MD;  Location: [facility] ENDO (4TH FLR);  Service: Endoscopy;  Laterality: N/A;  6 week follow-up variceal banding  Hx varices - Labs - ERW  prep ins. emialed - COVID screening on 2/12/21 [facility] - ERW    ESOPHAGOGASTRODUODENOSCOPY Left 08/03/2021    Procedure: EGD (ESOPHAGOGASTRODUODENOSCOPY);  Surgeon: Fabricio Corado MD;  Location: [facility] ENDO (4TH FLR);  Service: Gastroenterology;  Laterality: Left;  recent hematemasis. varices-labs on 7/26    COVID test at [facility] on 7/31-GT  requesting sooner    ESOPHAGOGASTRODUODENOSCOPY N/A 07/27/2022    Procedure: EGD (ESOPHAGOGASTRODUODENOSCOPY);  Surgeon: Eric Garcia MD;  Location: [facility] ENDO;  Service: Endoscopy;  Laterality: N/A;    ESOPHAGOGASTRODUODENOSCOPY Left 08/29/2022    Procedure: EGD (ESOPHAGOGASTRODUODENOSCOPY);  Surgeon: Kacy Douglass MD;  Location: [facility] ENDO (2ND FLR);  Service: Endoscopy;  Laterality: Left;  Fully vaccinated/ clear liquids up to 4 hrs prior-RB  varices labs ordered-RB    ESOPHAGOGASTRODUODENOSCOPY N/A 10/05/2022     Procedure: EGD (ESOPHAGOGASTRODUODENOSCOPY);  Surgeon: Gerard Salinas MD;  Location: [facility] ENDO;  Service: Endoscopy;  Laterality: N/A;    ESOPHAGOGASTRODUODENOSCOPY N/A 3/30/2023    Procedure: EGD (ESOPHAGOGASTRODUODENOSCOPY);  Surgeon: Gerard Salinas MD;  Location: [facility] ENDO;  Service: Endoscopy;  Laterality: N/A;    ESOPHAGOGASTRODUODENOSCOPY N/A 8/3/2023    Procedure: EGD (ESOPHAGOGASTRODUODENOSCOPY);  Surgeon: Gerard Salinas MD;  Location: [facility];  Service: Endoscopy;  Laterality: N/A;    EXTRACTION - STONE Right 3/20/2024    Procedure: EXTRACTION - STONE;  Surgeon: Chris Carey MD;  Location: [address];  Service: Urology;  Laterality: Right;    HYSTERECTOMY      KNEE SURGERY Bilateral     LASER LITHOTRIPSY Right 3/20/2024    Procedure: LITHOTRIPSY, USING LASER;  Surgeon: Chris Carey MD;  Location: [address];  Service: Urology;  Laterality: Right;    LITHOTRIPSY      REMOVAL-STENT Right 3/20/2024    Procedure: REMOVAL-STENT;  Surgeon: Chris Carey MD;  Location: [facility] OR [address] [place] [address];  Service: Urology;  Laterality: Right;    RETROGRADE PYELOGRAPHY Right 3/20/2024    Procedure: PYELOGRAM, RETROGRADE;  Surgeon: Chris Carey MD;  Location: [address];  Service: Urology;  Laterality: Right;    RHINOPLASTY TIP      SHOULDER SURGERY Right     TONSILLECTOMY      TOTAL HIP ARTHROPLASTY Right     URETERAL STENT PLACEMENT Right 3/20/2024    Procedure: INSERTION, STENT, URETER;  Surgeon: Chris Carey MD;  Location: [facility] OR [address] [place] [address];  Service: Urology;  Laterality: Right;    URETEROSCOPY Right 3/20/2024    Procedure: URETEROSCOPY;  Surgeon: Chris Carey MD;  Location: [address];  Service: Urology;  Laterality: Right;     Family History   Problem Relation Age of Onset    No Known Problems Mother     Diabetes Mellitus Maternal Grandmother     Amblyopia Neg Hx     Blindness Neg Hx     Cataracts Neg Hx     Glaucoma Neg Hx     Macular degeneration Neg Hx     Retinal  detachment Neg Hx     Strabismus Neg Hx     Colon cancer Neg Hx     Esophageal cancer Neg Hx      Social History     Tobacco Use    Smoking status: Some Days     Current packs/day: 0.00     Types: Cigarettes     Last attempt to quit: 7/3/2019     Years since quittin.7    Smokeless tobacco: Never   Substance Use Topics    Alcohol use: Not Currently    Drug use: No     Review of Systems   Constitutional:  Negative for fever.   Gastrointestinal:  Positive for abdominal distention and abdominal pain.   Genitourinary:  Positive for difficulty urinating.       Physical Exam     Initial Vitals [24 1445]   BP Pulse Resp Temp SpO2   (!) 117/55 93 18 97.9 °F (36.6 °C) 96 %      MAP       --         Physical Exam    Nursing note and vitals reviewed.  Constitutional: She appears well-developed and well-nourished. She is not diaphoretic.  Non-toxic appearance. She does not appear ill. No distress.   Appears pale and uncomfortable   HENT:   Head: Normocephalic and atraumatic.   Neck: Neck supple.   Cardiovascular:  Normal rate and regular rhythm.     Exam reveals no gallop and no friction rub.       No murmur heard.  Pulmonary/Chest: Effort normal and breath sounds normal. No accessory muscle usage. No tachypnea. No respiratory distress. She has no decreased breath sounds. She has no wheezes. She has no rhonchi. She has no rales.   Abdominal: Abdomen is soft. She exhibits distension. There is abdominal tenderness in the right upper quadrant, right lower quadrant, periumbilical area and suprapubic area. There is no guarding.   Musculoskeletal:      Cervical back: Neck supple.      Right lower le+ Pitting Edema present.      Left lower le+ Pitting Edema present.     Neurological: She is alert.   Skin: No rash noted.   Psychiatric: She has a normal mood and affect. Her behavior is normal.         ED Course   Procedures  Labs Reviewed   CBC W/ AUTO DIFFERENTIAL - Abnormal; Notable for the following components:        Result Value    WBC 2.82 (*)     RBC 3.75 (*)     Hemoglobin 11.5 (*)     Hematocrit 36.4 (*)     MCHC 31.6 (*)     Platelets 59 (*)     Immature Granulocytes 0.7 (*)     Lymph # 0.4 (*)     Lymph % 15.6 (*)     All other components within normal limits   COMPREHENSIVE METABOLIC PANEL - Abnormal; Notable for the following components:    CO2 31 (*)     Glucose 111 (*)     Albumin 3.4 (*)     Anion Gap 4 (*)     All other components within normal limits   URINALYSIS, REFLEX TO URINE CULTURE - Abnormal; Notable for the following components:    Appearance, UA Hazy (*)     Protein, UA 1+ (*)     Bilirubin (UA) 1+ (*)     Occult Blood UA 3+ (*)     Nitrite, UA Positive (*)     Leukocytes, UA 2+ (*)     All other components within normal limits    Narrative:     Specimen Source->Urine   URINALYSIS MICROSCOPIC - Abnormal; Notable for the following components:    RBC, UA >100 (*)     WBC, UA 29 (*)     All other components within normal limits    Narrative:     Specimen Source->Urine   TROPONIN I - Abnormal; Notable for the following components:    Troponin I 0.091 (*)     All other components within normal limits    Narrative:     Add on Trop per Alethea Cohen PA-C @ 18:48 pm to order # [id number]   CULTURE, URINE   PROTIME-INR   B-TYPE NATRIURETIC PEPTIDE   TROPONIN I   B-TYPE NATRIURETIC PEPTIDE    Narrative:     Add on Trop per Alethea Cohen PA-C @ 18:48 pm to order # [id number]    PER LEANDRA GEORGE PA-C REQUEST ADD ON BNP (ORDER [id number])    03/24/2024  19:32    TROPONIN I   TYPE & SCREEN   ISTAT CHEM8          Imaging Results              X-Ray Chest PA And Lateral (Final result)  Result time 03/24/24 19:04:30      Final result by Jules Hess MD (03/24/24 19:04:30)                   Impression:      Unremarkable exam.      Electronically signed by: Jules Hess MD  Date:    03/24/2024  Time:    19:04               Narrative:    EXAMINATION:  XR CHEST PA AND LATERAL    CLINICAL HISTORY:  Shortness of  breath    TECHNIQUE:  PA and lateral views of the chest were performed.    COMPARISON:  09/16/2023.    FINDINGS:  The trachea is unremarkable.  The cardiomediastinal silhouette is within normal limits.  The hilar structures are unremarkable.  There is no evidence of free air beneath the hemidiaphragms.  There are no pleural effusions.  There is no evidence of a pneumothorax.  There is no evidence of pneumomediastinum.  No airspace opacity is present.  The osseous structures are unremarkable.                                       CT Abdomen Pelvis With IV Contrast NO Oral Contrast (Final result)  Result time 03/24/24 17:13:03      Final result by Adonay Call MD (03/24/24 17:13:03)                   Impression:      Postoperative changes from recent lithotripsy.  Small residual stone fragments in the right kidney.  Right ureteral stent, however there is moderate hydroureteronephrosis suggesting stent malfunction.    Wedge-shaped region of hypoenhancement in the lower pole right kidney, concerning for infarction or pyelonephritis.  Correlate with urinalysis.  No organized perirenal collection.    Cirrhosis with signs of portal hypertension including splenomegaly and moderate volume ascites.    Other findings as described.      Electronically signed by: Adonay Call  Date:    03/24/2024  Time:    17:13               Narrative:    EXAMINATION:  CT ABDOMEN PELVIS WITH IV CONTRAST    CLINICAL HISTORY:  Abdominal abscess/infection suspected;    TECHNIQUE:  Low dose axial images, sagittal and coronal reformations were obtained from the lung bases to the pubic symphysis following the IV administration of 75 mL of Omnipaque 350 .  Oral contrast was not given.    COMPARISON:  CT abdomen pelvis 03/12/2024    FINDINGS:  LUNG BASES: Unremarkable.    HEPATOBILIARY: Cirrhotic liver morphology.  Unchanged 1.4 cm cyst in the right hepatic lobe.  No new focal hepatic lesions.  There is gallbladder wall thickening, nonspecific in  the setting of cirrhosis.  No calcified gallstones, gallbladder distension, or bile duct dilatation.    SPLEEN: Splenomegaly measuring 17.3 cm.    PANCREAS: No focal masses or ductal dilatation.    ADRENALS: No adrenal nodules.    KIDNEYS/URETERS: Postoperative changes from recent lithotripsy.  There are small residual nonobstructing stones in the right kidney.  There is a right-sided ureteral stent; proximal pigtail terminates in the proximal ureter.  Moderate right hydroureteronephrosis.  There is a wedge-shaped region of hypoenhancement in the lower pole right kidney.  No organized perirenal collection.  Left kidney is unremarkable.    BLADDER/PELVIC ORGANS: Partially obscured by metal artifact from right hip hardware.  Small focus of air in the bladder, likely iatrogenic.  Hysterectomy.    PERITONEUM / RETROPERITONEUM: Moderate volume ascites and areas of mesenteric fat stranding.  No organized intraperitoneal collection.  No free air.    LYMPH NODES: No lymphadenopathy.    VESSELS: Portal veins are patent.  Moderate atherosclerosis.    GI TRACT: No distention or wall thickening. Normal appendix.    BONES AND SOFT TISSUES: Partially imaged right hip arthroplasty.  Butterfly configuration of the T9 vertebral body.  Remote compression deformities of L3 and L5.  No acute fracture or focal lesion.                                       Medications   HYDROmorphone injection 0.5 mg (has no administration in time range)   morphine injection 4 mg (4 mg Intravenous Given 3/24/24 1601)   ondansetron injection 4 mg (4 mg Intravenous Given 3/24/24 1601)   iohexoL (OMNIPAQUE 350) injection 75 mL (75 mLs Intravenous Given 3/24/24 1638)   HYDROmorphone injection 0.5 mg (0.5 mg Intravenous Given 3/24/24 1746)   furosemide injection 20 mg (20 mg Intravenous Given 3/24/24 1837)     Medical Decision Making  53-year-old female presents to the ED with lower and right-sided abdominal pain, abdominal swelling, leg swelling since  yesterday.  Vitals within normal limits.  Afebrile.  Patient appears pale and uncomfortable.  Abdomen is swollen with tenderness mostly on the right side.  2+ pitting edema to the lower legs.    My differential diagnosis includes but is not limited to:  Intra-abdominal infection, intra-abdominal hemorrhage, ascites, decompensated liver failure, renal failure, urinary retention    Plan: labs, CT ab/pel, pain/nausea control.     Labs with thrombocytopenia near patient's baseline of 50s to 70s. Has moderate ascites as well as moderate hydroureteronephrosis on CT scan. CT also showed either possible pyelonephritis versus renal infarct (?).  Discussed with Urology.  They recommended removal of the stent.  Patient has not removed this type of stent in the past and was uncomfortable removing this herself due to her severe pain.  Urology saw patient and removed the stent.  Urology also did not feel that her urinalysis represented an infection but rather expected findings with recent stent placement. Also patient is taking Pyridium which may account for the positive nitrites in the urine. This is why she was not given antibiotics initially. Her renal and liver functions are normal. We were not able to explain her ascites or her 2+ pitting lower extremity edema so we ordered cardiac enzymes which showed elevated troponin is 0.091. BNP was normal. Patient never had chest pain. She did complain of some shortness of breath, which I suspect could be related to her ascites. EKG did not show any ischemic patterns.  Patient was given a dose of Lasix in the ED.    Discussed with Hospital Medicine.  Patient will be placed in observation for further evaluation of ascites, leg edema and elevated troponin.  She is agreeable. I have reviewed the patient's records and discussed this case with my supervising physician.           Amount and/or Complexity of Data Reviewed  Labs: ordered. Decision-making details documented in ED  Course.  Radiology: ordered.    Risk  Prescription drug management.               ED Course as of 03/24/24 2130   Sun Mar 24, 2024   1644 NITRITE UA(!): Positive  Patient currently taking pyridium which may cause false pos nitrites.  Rare bacteria noted.  High RBCs and WBCs, but this may be present with ureteral stent placement. [EH]      ED Course User Index  [EH] Alethea Cohen PA-C                           Clinical Impression:  Final diagnoses:  [R18.8] Other ascites (Primary)  [R06.02] Shortness of breath          ED Disposition Condition    Observation Stable                Alethea Cohen PA-C  03/24/24 2130

## 2024-03-25 ENCOUNTER — PATIENT MESSAGE (OUTPATIENT)
Dept: HEPATOLOGY | Facility: CLINIC | Age: 54
End: 2024-03-25
Payer: COMMERCIAL

## 2024-03-25 LAB
ALBUMIN SERPL BCP-MCNC: 3.1 G/DL (ref 3.5–5.2)
ALP SERPL-CCNC: 86 U/L (ref 55–135)
ALT SERPL W/O P-5'-P-CCNC: 18 U/L (ref 10–44)
ANION GAP SERPL CALC-SCNC: 3 MMOL/L (ref 8–16)
AST SERPL-CCNC: 18 U/L (ref 10–40)
BACTERIA UR CULT: NO GROWTH
BASOPHILS # BLD AUTO: 0.01 K/UL (ref 0–0.2)
BASOPHILS NFR BLD: 0.5 % (ref 0–1.9)
BILIRUB SERPL-MCNC: 0.5 MG/DL (ref 0.1–1)
BUN SERPL-MCNC: 12 MG/DL (ref 6–20)
CALCIUM SERPL-MCNC: 8.7 MG/DL (ref 8.7–10.5)
CHLORIDE SERPL-SCNC: 102 MMOL/L (ref 95–110)
CO2 SERPL-SCNC: 33 MMOL/L (ref 23–29)
CREAT SERPL-MCNC: 0.8 MG/DL (ref 0.5–1.4)
DIFFERENTIAL METHOD BLD: ABNORMAL
EOSINOPHIL # BLD AUTO: 0.1 K/UL (ref 0–0.5)
EOSINOPHIL NFR BLD: 2.3 % (ref 0–8)
ERYTHROCYTE [DISTWIDTH] IN BLOOD BY AUTOMATED COUNT: 13 % (ref 11.5–14.5)
EST. GFR  (NO RACE VARIABLE): >60 ML/MIN/1.73 M^2
GLUCOSE SERPL-MCNC: 108 MG/DL (ref 70–110)
HCT VFR BLD AUTO: 33.1 % (ref 37–48.5)
HGB BLD-MCNC: 10.5 G/DL (ref 12–16)
IMM GRANULOCYTES # BLD AUTO: 0.01 K/UL (ref 0–0.04)
IMM GRANULOCYTES NFR BLD AUTO: 0.5 % (ref 0–0.5)
LYMPHOCYTES # BLD AUTO: 0.5 K/UL (ref 1–4.8)
LYMPHOCYTES NFR BLD: 24.7 % (ref 18–48)
MCH RBC QN AUTO: 30.2 PG (ref 27–31)
MCHC RBC AUTO-ENTMCNC: 31.7 G/DL (ref 32–36)
MCV RBC AUTO: 95 FL (ref 82–98)
MONOCYTES # BLD AUTO: 0.2 K/UL (ref 0.3–1)
MONOCYTES NFR BLD: 10.5 % (ref 4–15)
NEUTROPHILS # BLD AUTO: 1.4 K/UL (ref 1.8–7.7)
NEUTROPHILS NFR BLD: 61.5 % (ref 38–73)
NRBC BLD-RTO: 0 /100 WBC
OHS QRS DURATION: 94 MS
OHS QTC CALCULATION: 397 MS
PLATELET # BLD AUTO: 60 K/UL (ref 150–450)
PMV BLD AUTO: 11.7 FL (ref 9.2–12.9)
POTASSIUM SERPL-SCNC: 3.6 MMOL/L (ref 3.5–5.1)
PROT SERPL-MCNC: 5.2 G/DL (ref 6–8.4)
RBC # BLD AUTO: 3.48 M/UL (ref 4–5.4)
SODIUM SERPL-SCNC: 138 MMOL/L (ref 136–145)
WBC # BLD AUTO: 2.19 K/UL (ref 3.9–12.7)

## 2024-03-25 PROCEDURE — 85025 COMPLETE CBC W/AUTO DIFF WBC: CPT | Performed by: HOSPITALIST

## 2024-03-25 PROCEDURE — 25000003 PHARM REV CODE 250: Performed by: STUDENT IN AN ORGANIZED HEALTH CARE EDUCATION/TRAINING PROGRAM

## 2024-03-25 PROCEDURE — G0378 HOSPITAL OBSERVATION PER HR: HCPCS

## 2024-03-25 PROCEDURE — 96365 THER/PROPH/DIAG IV INF INIT: CPT

## 2024-03-25 PROCEDURE — 80053 COMPREHEN METABOLIC PANEL: CPT | Performed by: HOSPITALIST

## 2024-03-25 PROCEDURE — 25000003 PHARM REV CODE 250: Performed by: HOSPITALIST

## 2024-03-25 PROCEDURE — 63600175 PHARM REV CODE 636 W HCPCS: Performed by: STUDENT IN AN ORGANIZED HEALTH CARE EDUCATION/TRAINING PROGRAM

## 2024-03-25 PROCEDURE — 36415 COLL VENOUS BLD VENIPUNCTURE: CPT | Performed by: HOSPITALIST

## 2024-03-25 PROCEDURE — 96376 TX/PRO/DX INJ SAME DRUG ADON: CPT

## 2024-03-25 RX ORDER — HYDROMORPHONE HYDROCHLORIDE 1 MG/ML
1 INJECTION, SOLUTION INTRAMUSCULAR; INTRAVENOUS; SUBCUTANEOUS EVERY 6 HOURS PRN
Status: DISCONTINUED | OUTPATIENT
Start: 2024-03-25 | End: 2024-03-25

## 2024-03-25 RX ORDER — ALPRAZOLAM 0.5 MG/1
0.5 TABLET ORAL 2 TIMES DAILY PRN
Status: DISCONTINUED | OUTPATIENT
Start: 2024-03-25 | End: 2024-03-26 | Stop reason: HOSPADM

## 2024-03-25 RX ORDER — MORPHINE SULFATE 4 MG/ML
2 INJECTION, SOLUTION INTRAMUSCULAR; INTRAVENOUS EVERY 6 HOURS PRN
Status: DISCONTINUED | OUTPATIENT
Start: 2024-03-25 | End: 2024-03-25

## 2024-03-25 RX ORDER — HYDROMORPHONE HYDROCHLORIDE 1 MG/ML
1 INJECTION, SOLUTION INTRAMUSCULAR; INTRAVENOUS; SUBCUTANEOUS EVERY 6 HOURS PRN
Status: DISCONTINUED | OUTPATIENT
Start: 2024-03-25 | End: 2024-03-26

## 2024-03-25 RX ORDER — HYDROCODONE BITARTRATE AND ACETAMINOPHEN 10; 325 MG/1; MG/1
1 TABLET ORAL EVERY 6 HOURS PRN
Status: DISCONTINUED | OUTPATIENT
Start: 2024-03-25 | End: 2024-03-26

## 2024-03-25 RX ORDER — ONDANSETRON HYDROCHLORIDE 2 MG/ML
4 INJECTION, SOLUTION INTRAVENOUS EVERY 6 HOURS PRN
Status: DISCONTINUED | OUTPATIENT
Start: 2024-03-25 | End: 2024-03-26 | Stop reason: HOSPADM

## 2024-03-25 RX ORDER — PANTOPRAZOLE SODIUM 40 MG/1
40 TABLET, DELAYED RELEASE ORAL DAILY
Status: DISCONTINUED | OUTPATIENT
Start: 2024-03-25 | End: 2024-03-26 | Stop reason: HOSPADM

## 2024-03-25 RX ADMIN — OXYCODONE 5 MG: 5 TABLET ORAL at 03:03

## 2024-03-25 RX ADMIN — CEFTRIAXONE 1 G: 1 INJECTION, POWDER, FOR SOLUTION INTRAMUSCULAR; INTRAVENOUS at 06:03

## 2024-03-25 RX ADMIN — HYDROMORPHONE HYDROCHLORIDE 1 MG: 1 INJECTION, SOLUTION INTRAMUSCULAR; INTRAVENOUS; SUBCUTANEOUS at 01:03

## 2024-03-25 RX ADMIN — ALPRAZOLAM 0.5 MG: 0.5 TABLET ORAL at 06:03

## 2024-03-25 RX ADMIN — HYDROCODONE BITARTRATE AND ACETAMINOPHEN 1 TABLET: 10; 325 TABLET ORAL at 08:03

## 2024-03-25 RX ADMIN — MORPHINE SULFATE 2 MG: 4 INJECTION INTRAVENOUS at 10:03

## 2024-03-25 RX ADMIN — RIFAXIMIN 550 MG: 550 TABLET ORAL at 12:03

## 2024-03-25 RX ADMIN — PANTOPRAZOLE SODIUM 40 MG: 40 TABLET, DELAYED RELEASE ORAL at 08:03

## 2024-03-25 RX ADMIN — RIFAXIMIN 550 MG: 550 TABLET ORAL at 08:03

## 2024-03-25 RX ADMIN — ONDANSETRON 4 MG: 2 INJECTION INTRAMUSCULAR; INTRAVENOUS at 03:03

## 2024-03-25 RX ADMIN — HYDROCODONE BITARTRATE AND ACETAMINOPHEN 1 TABLET: 10; 325 TABLET ORAL at 03:03

## 2024-03-25 RX ADMIN — TAMSULOSIN HYDROCHLORIDE 0.4 MG: 0.4 CAPSULE ORAL at 08:03

## 2024-03-25 NOTE — ASSESSMENT & PLAN NOTE
53F s/p R URS on 3/20.  Urology consulted for stent with strings removal.      - cath UA with clear yellow urine, PVR 50 mL   - stent with strings removed at bedside   - UA not concerning for infection   - f/u as scheduled with Dr. Carey

## 2024-03-25 NOTE — ASSESSMENT & PLAN NOTE
S/p urethral stent on 03/20 with misalignment resulting in pain.  Stent removed by Urology in the emergency room today.  Pain control with morphine prn and oxycodone prn

## 2024-03-25 NOTE — ASSESSMENT & PLAN NOTE
Patient has history of esophageal varices.  Now CT abdomen showing moderate volume of his hiatus..  Received IV Lasix in the emergency room.  Will start her on Lasix and spironolactone.  Needs to follow up with hepatology as outpatient

## 2024-03-25 NOTE — PLAN OF CARE
Problem: Adult Inpatient Plan of Care  Goal: Plan of Care Review  Outcome: Ongoing, Progressing  Goal: Patient-Specific Goal (Individualized)  Outcome: Ongoing, Progressing  Goal: Absence of Hospital-Acquired Illness or Injury  Outcome: Ongoing, Progressing  Goal: Optimal Comfort and Wellbeing  Outcome: Ongoing, Progressing     Problem: Fall Injury Risk  Goal: Absence of Fall and Fall-Related Injury  Outcome: Ongoing, Progressing     Problem: Pain Acute  Goal: Acceptable Pain Control and Functional Ability  Outcome: Ongoing, Progressing     Problem: Infection  Goal: Absence of Infection Signs and Symptoms  Outcome: Ongoing, Progressing     Problem: Impaired Wound Healing  Goal: Optimal Wound Healing  Outcome: Ongoing, Progressing

## 2024-03-25 NOTE — CONSULTS
[facility]  Urology  Consult Note    Patient Name: Tee Aranda  MRN: [id number]  Admission Date: 3/24/2024  Hospital Length of Stay: 0   Code Status: Prior   Attending Provider: Gerard Cat MD   Consulting Provider: Chasidy Man MD  Primary Care Physician: James Samano MD  Principal Problem:<principal problem not specified>    Inpatient consult to Urology  Consult performed by: Chasidy Man MD  Consult ordered by: Alethea Cohen PA-C  Reason for consult: s/p R URS, stent with strings          Subjective:     HPI:  53M w/ hx of CARTAGENA, nephrolithiasis, s/p right ureteroscopy with Dr. Carey on 03/20.  She presents to the ED with abdominal swelling and suprapubic pain radiating to the R flank.    The patient underwent R US with LL during which a proximal ureteral stone was dusted and fragments basket extracted.  Her R renal stones were dusted at that time.   She was left with a R ureteral stent with strings.  Patient was instructed to remove her stent with strings tomorrow.  She is uncomfortable doing so.      She endorses gross hematuria with clots.  She also endorses urinary frequency and stent pain while voiding.  This has worsened since yesterday.     In the ED she is afebrile VSS.  Bladder scan not reliable due to ascites.  Cath PVR was 50 mL.  UA not concerning for infection.  Creatinine 0.9 at baseline.  Patient denies fever and chills.  No leukocytosis.  CT AP obtained in the ED shows migration of the proximal stent coil into the proximal ureter.  Distal coil within the bladder.  Mild hydronephrosis as expected.  Some stone fragments in the right lower pole.  No stone seen within the ureter.      Past Medical History:   Diagnosis Date    Anemia, unspecified     Anxiety     Arthritis     Ascites 11/23/2020    AVN (avascular necrosis of bone)     Cataract     COVID-19 vaccine administered     04/08/21, 04/30/21    Diarrhea of presumed infectious origin 7/31/2023    Edema 11/23/2020    Epigastric  pain 7/31/2023    Esophageal varices     Glaucoma     Hepatic encephalopathy 11/23/2020    History of colon polyps     Hx of cirrhosis     non-alcoholic    Iron deficiency anemia secondary to blood loss (chronic)     Kidney stones     Portal hypertensive gastropathy     Unintentional weight loss 10/14/2023    Unspecified cirrhosis of liver        Past Surgical History:   Procedure Laterality Date    APPENDECTOMY      COLONOSCOPY N/A 8/3/2023    Procedure: COLONOSCOPY;  Surgeon: Gerard Salinas MD;  Location: [facility];  Service: Endoscopy;  Laterality: N/A;    COLONOSCOPY W/ POLYPECTOMY      CYSTOSCOPY N/A 3/20/2024    Procedure: CYSTOSCOPY;  Surgeon: Chris Carey MD;  Location: [facility] OR 1ST FLR;  Service: Urology;  Laterality: N/A;    CYSTOSCOPY W/ URETERAL STENT REMOVAL Right 3/14/2024    Procedure: CYSTOSCOPY, WITH URETERAL STENT REMOVAL;  Surgeon: Chris Carey MD;  [facility];  Service: Urology;  Laterality: Right;    DISTAL CLAVICLE EXCISION Right 11/18/2021    Procedure: RIGHT SHOULDER ARTHROSCOPY, EXCISION, CLAVICLE, DISTAL; EXTENSIVE DEBRIDEMENT;  Surgeon: Isaiah Joyner MD;  Location: [facility];  Service: Orthopedics;  Laterality: Right;    ESOPHAGEAL VARICE LIGATION      ESOPHAGOGASTRODUODENOSCOPY N/A 11/10/2020    Procedure: EGD (ESOPHAGOGASTRODUODENOSCOPY);  Surgeon: Gerard Salinas MD;  Location: [facility];  Service: Endoscopy;  Laterality: N/A;    ESOPHAGOGASTRODUODENOSCOPY N/A 12/28/2020    Procedure: EGD (ESOPHAGOGASTRODUODENOSCOPY);  Surgeon: Adryan Park MD;  Location: [facility] (2ND FLR);  Service: Endoscopy;  Laterality: N/A;    ESOPHAGOGASTRODUODENOSCOPY N/A 02/15/2021    Procedure: EGD (ESOPHAGOGASTRODUODENOSCOPY);  Surgeon: Hari Greene MD;  Location: [facility] (4TH FLR);  Service: Endoscopy;  Laterality: N/A;  6 week follow-up variceal banding  Hx varices - Labs - ERW  prep ins. emialed - COVID screening on 2/12/21 [facility]     ESOPHAGOGASTRODUODENOSCOPY Left 08/03/2021    Procedure: EGD (ESOPHAGOGASTRODUODENOSCOPY);  Surgeon: Fabricio Corado MD;  Location: [facility] (4TH FLR);  Service: Gastroenterology;  Laterality: Left;  recent hematemasis. varices-labs on 7/26    COVID test at [facility] on 7/31-GT  requesting sooner    ESOPHAGOGASTRODUODENOSCOPY N/A 07/27/2022    Procedure: EGD (ESOPHAGOGASTRODUODENOSCOPY);  Surgeon: Eric Garcia MD;  Location: [facility];  Service: Endoscopy;  Laterality: N/A;    ESOPHAGOGASTRODUODENOSCOPY Left 08/29/2022    Procedure: EGD (ESOPHAGOGASTRODUODENOSCOPY);  Surgeon: Kacy Douglass MD;  Location: [facility] (2ND FLR);  Service: Endoscopy;  Laterality: Left;  Fully vaccinated/ clear liquids up to 4 hrs prior-RB  varices labs ordered-RB    ESOPHAGOGASTRODUODENOSCOPY N/A 10/05/2022    Procedure: EGD (ESOPHAGOGASTRODUODENOSCOPY);  Surgeon: Gerard Salinas MD;  Location: [facility];  Service: Endoscopy;  Laterality: N/A;    ESOPHAGOGASTRODUODENOSCOPY N/A 3/30/2023    Procedure: EGD (ESOPHAGOGASTRODUODENOSCOPY);  Surgeon: Gerard Salinas MD;  Location: [facility];  Service: Endoscopy;  Laterality: N/A;    ESOPHAGOGASTRODUODENOSCOPY N/A 8/3/2023    Procedure: EGD (ESOPHAGOGASTRODUODENOSCOPY);  Surgeon: Gerard Salinas MD;  Location: [facility];  Service: Endoscopy;  Laterality: N/A;    EXTRACTION - STONE Right 3/20/2024    Procedure: EXTRACTION - STONE;  Surgeon: Chris Carey MD;  Location: [facility] OR 1ST FLR;  Service: Urology;  Laterality: Right;    HYSTERECTOMY      KNEE SURGERY Bilateral     LASER LITHOTRIPSY Right 3/20/2024    Procedure: LITHOTRIPSY, USING LASER;  Surgeon: Chris Carey MD;  Location: [facility] OR 1ST FLR;  Service: Urology;  Laterality: Right;    LITHOTRIPSY      REMOVAL-STENT Right 3/20/2024    Procedure: REMOVAL-STENT;  Surgeon: Chris Carey MD;  Location: [facility] OR [address] [place] [address];  Service: Urology;  Laterality: Right;    RETROGRADE PYELOGRAPHY Right 3/20/2024    Procedure: PYELOGRAM,  RETROGRADE;  Surgeon: Chris Carey MD;  Location: [facility] OR [address] [place] [address];  Service: Urology;  Laterality: Right;    RHINOPLASTY TIP      SHOULDER SURGERY Right     TONSILLECTOMY      TOTAL HIP ARTHROPLASTY Right     URETERAL STENT PLACEMENT Right 3/20/2024    Procedure: INSERTION, STENT, URETER;  Surgeon: Chris Carey MD;  Location: [facility] OR [address] [place] [address];  Service: Urology;  Laterality: Right;    URETEROSCOPY Right 3/20/2024    Procedure: URETEROSCOPY;  Surgeon: Chris Carey MD;  Location: [facility] OR [address] [place] [address];  Service: Urology;  Laterality: Right;       Review of patient's allergies indicates:   Allergen Reactions    Sulfa (sulfonamide antibiotics) Hives       Family History       Problem Relation (Age of Onset)    Diabetes Mellitus Maternal Grandmother    No Known Problems Mother            Tobacco Use    Smoking status: Some Days     Current packs/day: 0.00     Types: Cigarettes     Last attempt to quit: 7/3/2019     Years since quittin.7    Smokeless tobacco: Never   Substance and Sexual Activity    Alcohol use: Not Currently    Drug use: No    Sexual activity: Not on file       Review of Systems   Constitutional:  Negative for chills and fever.   Genitourinary:  Positive for frequency, hematuria and urgency.       Objective:     Temp:  [97.7 °F (36.5 °C)-97.9 °F (36.6 °C)] 97.7 °F (36.5 °C)  Pulse:  [65-93] 65  Resp:  [16-18] 16  SpO2:  [96 %-99 %] 98 %  BP: (116-125)/(55-71) 125/59  Weight: 66.7 kg (147 lb)  Body mass index is 22.35 kg/m².           Drains       None                    Physical Exam  Constitutional:       Appearance: Normal appearance.   Abdominal:      General: There is no distension.      Palpations: Abdomen is soft.   Genitourinary:     Comments: Strings in place taped to thigh.  Cath PVR 50 mL.  Cath urine clear yellow.   Neurological:      Mental Status: She is alert.          Significant Labs:    BMP:  Recent Labs   Lab 24  1541      K 4.0      CO2 31*   BUN 11   CREATININE  0.9   CALCIUM 9.3       CBC:  Recent Labs   Lab 03/24/24  1541   WBC 2.82*   HGB 11.5*   HCT 36.4*   PLT 59*       All pertinent labs results from the past 24 hours have been reviewed.    Significant Imaging:  All pertinent imaging results/findings from the past 24 hours have been reviewed.                    Assessment and Plan:     Urolithiasis  53F s/p R URS on 3/20.  Urology consulted for stent with strings removal.      - cath UA with clear yellow urine, PVR 50 mL   - stent with strings removed at bedside   - UA not concerning for infection   - f/u as scheduled with Dr. Aurelio pepper for discharge from urology perspective   - rest of care per ED         VTE Risk Mitigation (From admission, onward)      None            Thank you for your consult. I will sign off. Please contact us if you have any additional questions.    Chasidy Man MD  Urology  [facility]

## 2024-03-25 NOTE — ED NOTES
Received bedside report from MELANIE Saha. Pt conencted to monitor, VSS. Call light in reach.  at bedside.

## 2024-03-25 NOTE — H&P
[facility]  [facility] Medicine  History & Physical    Patient Name: Tee Aranda  MRN: [id number]  Patient Class: OP- Observation  Admission Date: 3/24/2024  Attending Physician: Luis Cummings MD   Primary Care Provider: James Samano MD         Patient information was obtained from patient and ER records.     Subjective:     Principal Problem:<principal problem not specified>    Chief Complaint:   Chief Complaint   Patient presents with    Post-op Problem     Had ureteral stent placed on wed, now trouble urinating and pressure to abd and back        HPI: 53-year-old female with past medical history of liver cirrhosis secondary to CARTAGENA who underwent urethral stent for kidney stones on witnessed day presents to the emergency room for severe abdominal pain and distended abdomen.  Patient continued to have pain since she had the procedure on witnessed day and due to worsening pain she presented to the emergency room.  Urology was consulted in the emergency room who removed the stent.  Patient still continued to have abdominal pain for which hospital medicine was consulted for admission for pain management.  Currently she denies having any dysuria, polyuria but complains of having abdominal pain.  CT abdomen concerning for pyelonephritis/hydronephrosis for malfunction of the stent and moderate ascites.  Urinalysis shows WBC of 25 and RBC of 100    Past Medical History:   Diagnosis Date    Anemia, unspecified     Anxiety     Arthritis     Ascites 11/23/2020    AVN (avascular necrosis of bone)     Cataract     COVID-19 vaccine administered     04/08/21, 04/30/21    Diarrhea of presumed infectious origin 7/31/2023    Edema 11/23/2020    Epigastric pain 7/31/2023    Esophageal varices     Glaucoma     Hepatic encephalopathy 11/23/2020    History of colon polyps     Hx of cirrhosis     non-alcoholic    Iron deficiency anemia secondary to blood loss (chronic)     Kidney stones     Portal hypertensive  gastropathy     Unintentional weight loss 10/14/2023    Unspecified cirrhosis of liver        Past Surgical History:   Procedure Laterality Date    APPENDECTOMY      COLONOSCOPY N/A 8/3/2023    Procedure: COLONOSCOPY;  Surgeon: Gerard Salinas MD;  Location: [facility];  Service: Endoscopy;  Laterality: N/A;    COLONOSCOPY W/ POLYPECTOMY      CYSTOSCOPY N/A 3/20/2024    Procedure: CYSTOSCOPY;  Surgeon: Chris Carey MD;  Location: [facility] OR 1ST FLR;  Service: Urology;  Laterality: N/A;    CYSTOSCOPY W/ URETERAL STENT REMOVAL Right 3/14/2024    Procedure: CYSTOSCOPY, WITH URETERAL STENT REMOVAL;  Surgeon: Chris Carey MD;  Location: [facility] OR 1ST FLR;  Service: Urology;  Laterality: Right;    DISTAL CLAVICLE EXCISION Right 11/18/2021    Procedure: RIGHT SHOULDER ARTHROSCOPY, EXCISION, CLAVICLE, DISTAL; EXTENSIVE DEBRIDEMENT;  Surgeon: Isaiah Joyner MD;  Location: [facility];  Service: Orthopedics;  Laterality: Right;    ESOPHAGEAL VARICE LIGATION      ESOPHAGOGASTRODUODENOSCOPY N/A 11/10/2020    Procedure: EGD (ESOPHAGOGASTRODUODENOSCOPY);  Surgeon: Gerard Salinas MD;  Location: [facility];  Service: Endoscopy;  Laterality: N/A;    ESOPHAGOGASTRODUODENOSCOPY N/A 12/28/2020    Procedure: EGD (ESOPHAGOGASTRODUODENOSCOPY);  Surgeon: Adryan Park MD;  Location: [facility] (2ND FLR);  Service: Endoscopy;  Laterality: N/A;    ESOPHAGOGASTRODUODENOSCOPY N/A 02/15/2021    Procedure: EGD (ESOPHAGOGASTRODUODENOSCOPY);  Surgeon: Hari Greene MD;  Location: [facility] (4TH FLR);  Service: Endoscopy;  Laterality: N/A;  6 week follow-up variceal banding  Hx varices - Labs - ERW  prep ins. emialed - COVID screening on 2/12/21 [facility]    ESOPHAGOGASTRODUODENOSCOPY Left 08/03/2021    Procedure: EGD (ESOPHAGOGASTRODUODENOSCOPY);  Surgeon: Fabricio Corado MD;  Location: [facility] (4TH FLR);  Service: Gastroenterology;  Laterality: Left;  recent hematemasis. varices-labs on 7/26    COVID test at [facility] on  7/31-GT  requesting sooner    ESOPHAGOGASTRODUODENOSCOPY N/A 07/27/2022    Procedure: EGD (ESOPHAGOGASTRODUODENOSCOPY);  Surgeon: Eric Garcia MD;  Location: [facility];  Service: Endoscopy;  Laterality: N/A;    ESOPHAGOGASTRODUODENOSCOPY Left 08/29/2022    Procedure: EGD (ESOPHAGOGASTRODUODENOSCOPY);  Surgeon: Kacy Douglass MD;  Location: [facility] (2ND FLR);  Service: Endoscopy;  Laterality: Left;  Fully vaccinated/ clear liquids up to 4 hrs prior-RB  varices labs ordered-RB    ESOPHAGOGASTRODUODENOSCOPY N/A 10/05/2022    Procedure: EGD (ESOPHAGOGASTRODUODENOSCOPY);  Surgeon: Gerard Salinas MD;  Location: [facility];  Service: Endoscopy;  Laterality: N/A;    ESOPHAGOGASTRODUODENOSCOPY N/A 3/30/2023    Procedure: EGD (ESOPHAGOGASTRODUODENOSCOPY);  Surgeon: Gerard Salinas MD;  Location: [facility];  Service: Endoscopy;  Laterality: N/A;    ESOPHAGOGASTRODUODENOSCOPY N/A 8/3/2023    Procedure: EGD (ESOPHAGOGASTRODUODENOSCOPY);  Surgeon: Gerard Salinas MD;  Location: [facility];  Service: Endoscopy;  Laterality: N/A;    EXTRACTION - STONE Right 3/20/2024    Procedure: EXTRACTION - STONE;  Surgeon: Chris Carey MD;  Location: [facility] OR [address] [place] [address];  Service: Urology;  Laterality: Right;    HYSTERECTOMY      KNEE SURGERY Bilateral     LASER LITHOTRIPSY Right 3/20/2024    Procedure: LITHOTRIPSY, USING LASER;  Surgeon: Chris Carey MD;  [facility];  Service: Urology;  Laterality: Right;    LITHOTRIPSY      REMOVAL-STENT Right 3/20/2024    Procedure: REMOVAL-STENT;  Surgeon: Chris Carey MD;  Location: [facility] OR 1ST FLR;  Service: Urology;  Laterality: Right;    RETROGRADE PYELOGRAPHY Right 3/20/2024    Procedure: PYELOGRAM, RETROGRADE;  Surgeon: Chris Carey MD;  [facility];  Service: Urology;  Laterality: Right;    RHINOPLASTY TIP      SHOULDER SURGERY Right     TONSILLECTOMY      TOTAL HIP ARTHROPLASTY Right     URETERAL STENT PLACEMENT Right 3/20/2024    Procedure:  INSERTION, STENT, URETER;  Surgeon: Chris Carey MD;  Location: [facility] OR [address] [place] [address];  Service: Urology;  Laterality: Right;    URETEROSCOPY Right 3/20/2024    Procedure: URETEROSCOPY;  Surgeon: Chris Carey MD;  Location: [facility] OR [address] [place] [address];  Service: Urology;  Laterality: Right;       Review of patient's allergies indicates:   Allergen Reactions    Sulfa (sulfonamide antibiotics) Hives       Current Facility-Administered Medications on File Prior to Encounter   Medication    0.9%  NaCl infusion     Current Outpatient Medications on File Prior to Encounter   Medication Sig    carvediloL (COREG) 3.125 MG tablet TAKE 1 TABLET BY MOUTH 2 TIMES DAILY.    ciprofloxacin HCl (CIPRO) 250 MG tablet Take 1 tablet (250 mg total) by mouth 2 (two) times a day. for 14 days    esomeprazole (NEXIUM) 40 MG capsule Take 1 capsule (40 mg total) by mouth 2 (two) times daily before meals.    multivitamin (THERAGRAN) per tablet Take 1 tablet by mouth once daily.    omega-3 fatty acids/fish oil (FISH OIL-OMEGA-3 FATTY ACIDS) 300-1,000 mg capsule Take 1 capsule by mouth once daily.    oxybutynin (DITROPAN) 5 MG Tab Take 1 tablet (5 mg total) by mouth 3 (three) times daily.    oxyCODONE (ROXICODONE) 5 MG immediate release tablet Take 1 tablet (5 mg total) by mouth every 4 (four) hours as needed for Pain.    phenazopyridine (PYRIDIUM) 200 MG tablet Take 1 tablet (200 mg total) by mouth 3 (three) times daily as needed for Pain.    rifAXIMin (XIFAXAN) 550 mg Tab Take 1 tablet (550 mg total) by mouth 2 (two) times daily.    tamsulosin (FLOMAX) 0.4 mg Cap Take 1 capsule (0.4 mg total) by mouth once daily.    [DISCONTINUED] amitriptyline (ELAVIL) 10 MG tablet Take 1 tablet (10 mg total) by mouth every evening.    tamsulosin (FLOMAX) 0.4 mg Cap Take 1 capsule (0.4 mg total) by mouth once daily.    UNABLE TO FIND 4 (four) times daily as needed. Medible 20 mg blue raspberry 1/4 to 1/2 up to 4 times daily as needed.    [DISCONTINUED] lactulose  (CHRONULAC) 20 gram/30 mL Soln Take 30 mLs (20 g total) by mouth once daily.    [DISCONTINUED] pulse oximeter (PULSE OXIMETER) device by Apply Externally route 2 (two) times a day. Use twice daily at 8 AM and 3 PM and record the value in MyChart as directed.     Family History       Problem Relation (Age of Onset)    Diabetes Mellitus Maternal Grandmother    No Known Problems Mother          Tobacco Use    Smoking status: Some Days     Current packs/day: 0.00     Types: Cigarettes     Last attempt to quit: 7/3/2019     Years since quittin.7    Smokeless tobacco: Never   Substance and Sexual Activity    Alcohol use: Not Currently    Drug use: No    Sexual activity: Not on file     Review of Systems   Constitutional: Negative.    HENT: Negative.     Eyes: Negative.    Respiratory: Negative.     Cardiovascular: Negative.    Gastrointestinal:  Positive for abdominal distention and abdominal pain.   Endocrine: Negative.    Genitourinary: Negative.    Musculoskeletal: Negative.    Neurological: Negative.      Objective:     Vital Signs (Most Recent):  Temp: 97.7 °F (36.5 °C) (24 1708)  Pulse: 65 (24 1748)  Resp: 20 (24 2138)  BP: (!) 125/59 (24 1748)  SpO2: 98 % (24 1748) Vital Signs (24h Range):  Temp:  [97.7 °F (36.5 °C)-97.9 °F (36.6 °C)] 97.7 °F (36.5 °C)  Pulse:  [65-93] 65  Resp:  [16-20] 20  SpO2:  [96 %-99 %] 98 %  BP: (116-125)/(55-71) 125/59     Weight: 66.7 kg (147 lb)  Body mass index is 22.35 kg/m².     Physical Exam  Constitutional:       Appearance: Normal appearance.   Cardiovascular:      Rate and Rhythm: Normal rate and regular rhythm.   Pulmonary:      Effort: Pulmonary effort is normal.      Breath sounds: Normal breath sounds.   Abdominal:      General: Abdomen is flat. Bowel sounds are normal.      Tenderness: There is abdominal tenderness.   Musculoskeletal:         General: Normal range of motion.      Cervical back: Normal range of motion.   Skin:     General:  Skin is warm.      Capillary Refill: Capillary refill takes less than 2 seconds.   Neurological:      General: No focal deficit present.      Mental Status: She is alert and oriented to person, place, and time.                Significant Labs: All pertinent labs within the past 24 hours have been reviewed.    Significant Imaging: I have reviewed all pertinent imaging results/findings within the past 24 hours.  Assessment/Plan:         Acute cystitis with hematuria    UA shows 20 WBCs cells.  Will treat her with ceftriaxone and can be discharged on ciprofloxacin tomorrow morning once the pain is well controlled    Urolithiasis    S/p urethral stent on 03/20 with misalignment resulting in pain.  Stent removed by Urology in the emergency room today.  Pain control with morphine prn and oxycodone prn     Portal hypertension  Patient has history of esophageal varices.  Now CT abdomen showing moderate volume of his hiatus..  Received IV Lasix in the emergency room.  Will start her on Lasix and spironolactone.  Needs to follow up with hepatology as outpatient    Thrombocytopenia  Patient was found to have thrombocytopenia, the likely etiology is secondary to cirrhosis/portal hypertension, will monitor the platelets Daily. Will transfuse if platelet count is <10k. Hold DVT prophylaxis if platelets are <50k. The patient's platelet results have been reviewed and are listed below.  Recent Labs   Lab 03/24/24  1541   PLT 59*           VTE Risk Mitigation (From admission, onward)           Ordered     IP VTE HIGH RISK PATIENT  Once         03/24/24 2213     Place sequential compression device  Until discontinued         03/24/24 2213     Place TYREE hose  Until discontinued         03/24/24 2213                       On 03/24/2024, patient should be placed in hospital observation services under my care.             Tae Lopez MD  Department of Hospital Medicine  [facility]

## 2024-03-25 NOTE — SUBJECTIVE & OBJECTIVE
Past Medical History:   Diagnosis Date    Anemia, unspecified     Anxiety     Arthritis     Ascites 11/23/2020    AVN (avascular necrosis of bone)     Cataract     COVID-19 vaccine administered     04/08/21, 04/30/21    Diarrhea of presumed infectious origin 7/31/2023    Edema 11/23/2020    Epigastric pain 7/31/2023    Esophageal varices     Glaucoma     Hepatic encephalopathy 11/23/2020    History of colon polyps     Hx of cirrhosis     non-alcoholic    Iron deficiency anemia secondary to blood loss (chronic)     Kidney stones     Portal hypertensive gastropathy     Unintentional weight loss 10/14/2023    Unspecified cirrhosis of liver        Past Surgical History:   Procedure Laterality Date    APPENDECTOMY      COLONOSCOPY N/A 8/3/2023    Procedure: COLONOSCOPY;  Surgeon: Gerard Salinas MD;  Location: [facility];  Service: Endoscopy;  Laterality: N/A;    COLONOSCOPY W/ POLYPECTOMY      CYSTOSCOPY N/A 3/20/2024    Procedure: CYSTOSCOPY;  Surgeon: Chris Carey MD;  Location: [facility] OR 1ST FLR;  Service: Urology;  Laterality: N/A;    CYSTOSCOPY W/ URETERAL STENT REMOVAL Right 3/14/2024    Procedure: CYSTOSCOPY, WITH URETERAL STENT REMOVAL;  Surgeon: Chris aCrey MD;  Location: [facility] OR 1ST FLR;  Service: Urology;  Laterality: Right;    DISTAL CLAVICLE EXCISION Right 11/18/2021    Procedure: RIGHT SHOULDER ARTHROSCOPY, EXCISION, CLAVICLE, DISTAL; EXTENSIVE DEBRIDEMENT;  Surgeon: Isaiah Joyner MD;  Location: [facility];  Service: Orthopedics;  Laterality: Right;    ESOPHAGEAL VARICE LIGATION      ESOPHAGOGASTRODUODENOSCOPY N/A 11/10/2020    Procedure: EGD (ESOPHAGOGASTRODUODENOSCOPY);  Surgeon: Gerard Salinas MD;  Location: [facility];  Service: Endoscopy;  Laterality: N/A;    ESOPHAGOGASTRODUODENOSCOPY N/A 12/28/2020    Procedure: EGD (ESOPHAGOGASTRODUODENOSCOPY);  Surgeon: Adryan Park MD;  Location: [facility] (2ND FLR);  Service: Endoscopy;  Laterality: N/A;    ESOPHAGOGASTRODUODENOSCOPY N/A 02/15/2021     Procedure: EGD (ESOPHAGOGASTRODUODENOSCOPY);  Surgeon: Hari Greene MD;  Location: [facility] (4TH FLR);  Service: Endoscopy;  Laterality: N/A;  6 week follow-up variceal banding  Hx varices - Labs - ERW  prep ins. emialed - COVID screening on 2/12/21 [place] - ERW    ESOPHAGOGASTRODUODENOSCOPY Left 08/03/2021    Procedure: EGD (ESOPHAGOGASTRODUODENOSCOPY);  Surgeon: Fabricio Corado MD;  Location: [facility] (4TH FLR);  Service: Gastroenterology;  Laterality: Left;  recent hematemasis. varices-labs on 7/26    COVID test at [facility] on 7/31-GT  requesting sooner    ESOPHAGOGASTRODUODENOSCOPY N/A 07/27/2022    Procedure: EGD (ESOPHAGOGASTRODUODENOSCOPY);  Surgeon: Eric Garcia MD;  Location: [facility];  Service: Endoscopy;  Laterality: N/A;    ESOPHAGOGASTRODUODENOSCOPY Left 08/29/2022    Procedure: EGD (ESOPHAGOGASTRODUODENOSCOPY);  Surgeon: Kacy Douglass MD;  Location: [facility] (2ND FLR);  Service: Endoscopy;  Laterality: Left;  Fully vaccinated/ clear liquids up to 4 hrs prior-RB  varices labs ordered-RB    ESOPHAGOGASTRODUODENOSCOPY N/A 10/05/2022    Procedure: EGD (ESOPHAGOGASTRODUODENOSCOPY);  Surgeon: Gerard Salinas MD;  Location: [facility];  Service: Endoscopy;  Laterality: N/A;    ESOPHAGOGASTRODUODENOSCOPY N/A 3/30/2023    Procedure: EGD (ESOPHAGOGASTRODUODENOSCOPY);  Surgeon: Gerard Salinas MD;  Location: [facility];  Service: Endoscopy;  Laterality: N/A;    ESOPHAGOGASTRODUODENOSCOPY N/A 8/3/2023    Procedure: EGD (ESOPHAGOGASTRODUODENOSCOPY);  Surgeon: Gerard Salinas MD;  Location: [facility];  Service: Endoscopy;  Laterality: N/A;    EXTRACTION - STONE Right 3/20/2024    Procedure: EXTRACTION - STONE;  Surgeon: Chris Carey MD;  Location: [facility] OR 1ST FLR;  Service: Urology;  Laterality: Right;    HYSTERECTOMY      KNEE SURGERY Bilateral     LASER LITHOTRIPSY Right 3/20/2024    Procedure: LITHOTRIPSY, USING LASER;  Surgeon: Chris Carey MD;  Location: [facility] OR [address] [place] [address];   Service: Urology;  Laterality: Right;    LITHOTRIPSY      REMOVAL-STENT Right 3/20/2024    Procedure: REMOVAL-STENT;  Surgeon: Chris Carey MD;  [facility];  Service: Urology;  Laterality: Right;    RETROGRADE PYELOGRAPHY Right 3/20/2024    Procedure: PYELOGRAM, RETROGRADE;  Surgeon: Chris Carey MD;  [facility];  Service: Urology;  Laterality: Right;    RHINOPLASTY TIP      SHOULDER SURGERY Right     TONSILLECTOMY      TOTAL HIP ARTHROPLASTY Right     URETERAL STENT PLACEMENT Right 3/20/2024    Procedure: INSERTION, STENT, URETER;  Surgeon: Chris Carey MD;  [facility];  Service: Urology;  Laterality: Right;    URETEROSCOPY Right 3/20/2024    Procedure: URETEROSCOPY;  Surgeon: Chris Carey MD;  [facility];  Service: Urology;  Laterality: Right;       Review of patient's allergies indicates:   Allergen Reactions    Sulfa (sulfonamide antibiotics) Hives       Current Facility-Administered Medications on File Prior to Encounter   Medication    0.9%  NaCl infusion     Current Outpatient Medications on File Prior to Encounter   Medication Sig    carvediloL (COREG) 3.125 MG tablet TAKE 1 TABLET BY MOUTH 2 TIMES DAILY.    ciprofloxacin HCl (CIPRO) 250 MG tablet Take 1 tablet (250 mg total) by mouth 2 (two) times a day. for 14 days    esomeprazole (NEXIUM) 40 MG capsule Take 1 capsule (40 mg total) by mouth 2 (two) times daily before meals.    multivitamin (THERAGRAN) per tablet Take 1 tablet by mouth once daily.    omega-3 fatty acids/fish oil (FISH OIL-OMEGA-3 FATTY ACIDS) 300-1,000 mg capsule Take 1 capsule by mouth once daily.    oxybutynin (DITROPAN) 5 MG Tab Take 1 tablet (5 mg total) by mouth 3 (three) times daily.    oxyCODONE (ROXICODONE) 5 MG immediate release tablet Take 1 tablet (5 mg total) by mouth every 4 (four) hours as needed for Pain.    phenazopyridine (PYRIDIUM) 200 MG tablet Take 1 tablet (200 mg total) by mouth 3 (three) times  daily as needed for Pain.    rifAXIMin (XIFAXAN) 550 mg Tab Take 1 tablet (550 mg total) by mouth 2 (two) times daily.    tamsulosin (FLOMAX) 0.4 mg Cap Take 1 capsule (0.4 mg total) by mouth once daily.    [DISCONTINUED] amitriptyline (ELAVIL) 10 MG tablet Take 1 tablet (10 mg total) by mouth every evening.    tamsulosin (FLOMAX) 0.4 mg Cap Take 1 capsule (0.4 mg total) by mouth once daily.    UNABLE TO FIND 4 (four) times daily as needed. Medible 20 mg blue raspberry / to 12 up to 4 times daily as needed.    [DISCONTINUED] lactulose (CHRONULAC) 20 gram/30 mL Soln Take 30 mLs (20 g total) by mouth once daily.    [DISCONTINUED] pulse oximeter (PULSE OXIMETER) device by Apply Externally route 2 (two) times a day. Use twice daily at 8 AM and 3 PM and record the value in MyChart as directed.     Family History       Problem Relation (Age of Onset)    Diabetes Mellitus Maternal Grandmother    No Known Problems Mother          Tobacco Use    Smoking status: Some Days     Current packs/day: 0.00     Types: Cigarettes     Last attempt to quit: 7/3/2019     Years since quittin.7    Smokeless tobacco: Never   Substance and Sexual Activity    Alcohol use: Not Currently    Drug use: No    Sexual activity: Not on file     Review of Systems   Constitutional: Negative.    HENT: Negative.     Eyes: Negative.    Respiratory: Negative.     Cardiovascular: Negative.    Gastrointestinal:  Positive for abdominal distention and abdominal pain.   Endocrine: Negative.    Genitourinary: Negative.    Musculoskeletal: Negative.    Neurological: Negative.      Objective:     Vital Signs (Most Recent):  Temp: 97.7 °F (36.5 °C) (24 170)  Pulse: 65 (24 174)  Resp: 20 (248)  BP: (!) 125/59 (24 174)  SpO2: 98 % (24) Vital Signs (24h Range):  Temp:  [97.7 °F (36.5 °C)-97.9 °F (36.6 °C)] 97.7 °F (36.5 °C)  Pulse:  [65-93] 65  Resp:  [16-20] 20  SpO2:  [96 %-99 %] 98 %  BP: (116-125)/(55-71) 125/59      Weight: 66.7 kg (147 lb)  Body mass index is 22.35 kg/m².     Physical Exam  Constitutional:       Appearance: Normal appearance.   Cardiovascular:      Rate and Rhythm: Normal rate and regular rhythm.   Pulmonary:      Effort: Pulmonary effort is normal.      Breath sounds: Normal breath sounds.   Abdominal:      General: Abdomen is flat. Bowel sounds are normal.      Tenderness: There is abdominal tenderness.   Musculoskeletal:         General: Normal range of motion.      Cervical back: Normal range of motion.   Skin:     General: Skin is warm.      Capillary Refill: Capillary refill takes less than 2 seconds.   Neurological:      General: No focal deficit present.      Mental Status: She is alert and oriented to person, place, and time.                Significant Labs: All pertinent labs within the past 24 hours have been reviewed.    Significant Imaging: I have reviewed all pertinent imaging results/findings within the past 24 hours.

## 2024-03-25 NOTE — PLAN OF CARE
[facility]  Initial Discharge Assessment       Primary Care Provider: James Samano MD    Admission Diagnosis: Shortness of breath [R06.02]  Other ascites [R18.8]    Admission Date: 3/24/2024  Expected Discharge Date: 3/26/2024         Payor: [employer] / Plan: BCBS ALL OUT OF STATE / Product Type: PPO /     Extended Emergency Contact Information  Primary Emergency Contact: Lavelle Aranda   [place]  Mobile Phone: [phone]  Relation: Spouse    Discharge Plan A: Home with family  Discharge Plan B: Home with family      [id number] - [place], [place] - [address]  [address]  [place] [address]  Phone: [phone] Fax: [phone]          Readmission Assessment (most recent)       Readmission Assessment - 03/25/24 1143          Readmission    Why were you hospitalized in the last 30 days? Right ureteral stone (P)      Why were you readmitted? Related to previous admission (P)      When you left the hospital where did you go? Home with Family (P)      Was this a planned readmission? No (P)                         SW completed Discharge Planning Assessment with patient via bedside. Discharge planning booklet given to patient/family and whiteboard updated with IOANA and phone #. All questions answered.    Patient reported that her  will provide transportation upon discharge.     Patient reported that she live at home with her  and son, and prior to hospitalization she was independent with her ADL's. Patient reported that she is not on dialysis and does not go to a Coumadin clinic.      Patient lives in a [facility] with 0 steps to enter.     Discharge Plan A and Plan B have been determined by review of patient's clinical status, future medical and therapeutic needs, and coverage/benefits for post-acute care in coordination with multidisciplinary team members.      Trudy Dwyer LMSW  [facility]  Ext. [phone]

## 2024-03-25 NOTE — SUBJECTIVE & OBJECTIVE
Interval History: Pt seen and examined this morning on rounds. SANAZ. Actively passing stone fragments now: PRNs provided. Discussed need for empiric rocephin pending Ucx results.  updated at bedside. Care plan reviewed. Otherwise, doing well and with no further complaints at this time.      Objective:     Vital Signs (Most Recent):  Temp: 98.3 °F (36.8 °C) (03/25/24 1112)  Pulse: 66 (03/25/24 1112)  Resp: 20 (03/25/24 1112)  BP: (!) 111/53 (03/25/24 1112)  SpO2: 95 % (03/25/24 1112) Vital Signs (24h Range):  Temp:  [97.7 °F (36.5 °C)-98.9 °F (37.2 °C)] 98.3 °F (36.8 °C)  Pulse:  [58-93] 66  Resp:  [16-20] 20  SpO2:  [94 %-99 %] 95 %  BP: ()/(50-71) 111/53     Weight: 65 kg (143 lb 4.8 oz)  Body mass index is 21.79 kg/m².  No intake or output data in the 24 hours ending 03/25/24 1150        Physical Exam  Gen: in NAD, appears stated age  Neuro: AAOx4, CN2-12 grossly intact BL; motor, sensory, and strength grossly intact BL  HEENT: NTNC, EOMI, PERRLA, MMM; no thyromegaly or lymphadenopathy; no JVD appreciated  CVS: RRR, no m/r/g; S1/S2 auscultated with no S3 or S4; capillary refill < 2 sec  Resp: lungs CTAB, no w/r/r; no belabored breathing or accessory muscle use appreciated   Abd: BS+ in all 4 quadrants; suprapubic/lower abdomen TTP, soft to palpation; no organomegaly appreciated; right-sided CVA tenderness present  Extrem: pulses full, equal, and regular over all 4 extremities; no UE or LE edema BL        Significant Labs: All pertinent labs within the past 24 hours have been reviewed.    Significant Imaging: I have reviewed all pertinent imaging results/findings within the past 24 hours.

## 2024-03-25 NOTE — HOSPITAL COURSE
Pt admitted to [facility] and remained stable overnight and into 3/25/2024. Pt began to actively pass stone fragments: PRN analgesics provided. Although urology felt that pt did not have UTI, her UA was highly concerning for infection; started on empiric rocephin pending Ucx results. Hepatology consulted per pt request, and they recommended a paracentesis (no overt ascites noted), which the pt refused. Complained of ongoing CVA/abdominal pain into 3/26/2024: CT renal stone protocol ordered; no acute findings noted, discussed with urology. Pt deemed appropriate for discharge; seen and examined prior to departure. Plan discussed with pt, who was agreeable and amenable; medications were discussed and reviewed, outpatient follow-up scheduled, ER precautions were given, all questions were answered to the pt's satisfaction, and Mrs. Aranda was subsequently discharged.

## 2024-03-25 NOTE — PROGRESS NOTES
[facility] [address] [place] [address] Medicine  Progress Note    Patient Name: Tee Aranda  MRN: [id number]  Patient Class: OP- Observation   Admission Date: 3/24/2024  Length of Stay: 0 days  Attending Physician: Luis Cummings MD  Primary Care Provider: James Samano MD        Subjective:     Principal Problem:Acute cystitis with hematuria        HPI:  53-year-old female with past medical history of liver cirrhosis secondary to CARTAGENA who underwent urethral stent for kidney stones on Wednesday, she presents to the emergency room for severe abdominal pain and distended abdomen.  Patient continued to have pain since she had the procedure on witnessed day and due to worsening pain she presented to the emergency room.  Urology was consulted in the emergency room who removed the stent.  Patient still continued to have abdominal pain for which hospital medicine was consulted for admission for pain management.  Currently she denies having any dysuria, polyuria but complains of having abdominal pain.  CT abdomen concerning for pyelonephritis/hydronephrosis for malfunction of the stent and moderate ascites.  Urinalysis shows WBC of 25 and RBC of 100    Overview/Hospital Course:  Pt admitted to [facility] [place] and remained stable overnight and into 3/25/2024. Pt began to actively pass stone fragments: PRN analgesics provided. Although urology felt that pt did not have UTI, her UA was highly concerning for infection; started on empiric rocephin pending Ucx results.    Interval History: Pt seen and examined this morning on rounds. SANAZ. Actively passing stone fragments now: PRNs provided. Discussed need for empiric rocephin pending Ucx results.  updated at bedside. Care plan reviewed. Otherwise, doing well and with no further complaints at this time.      Objective:     Vital Signs (Most Recent):  Temp: 98.3 °F (36.8 °C) (03/25/24 1112)  Pulse: 66 (03/25/24 1112)  Resp: 20 (03/25/24 1112)  BP: (!) 111/53 (03/25/24 1112)  SpO2:  95 % (03/25/24 1112) Vital Signs (24h Range):  Temp:  [97.7 °F (36.5 °C)-98.9 °F (37.2 °C)] 98.3 °F (36.8 °C)  Pulse:  [58-93] 66  Resp:  [16-20] 20  SpO2:  [94 %-99 %] 95 %  BP: ()/(50-71) 111/53     Weight: 65 kg (143 lb 4.8 oz)  Body mass index is 21.79 kg/m².  No intake or output data in the 24 hours ending 03/25/24 1150        Physical Exam  Gen: in NAD, appears stated age  Neuro: AAOx4, CN2-12 grossly intact BL; motor, sensory, and strength grossly intact BL  HEENT: NTNC, EOMI, PERRLA, MMM; no thyromegaly or lymphadenopathy; no JVD appreciated  CVS: RRR, no m/r/g; S1/S2 auscultated with no S3 or S4; capillary refill < 2 sec  Resp: lungs CTAB, no w/r/r; no belabored breathing or accessory muscle use appreciated   Abd: BS+ in all 4 quadrants; suprapubic/lower abdomen TTP, soft to palpation; no organomegaly appreciated; right-sided CVA tenderness present  Extrem: pulses full, equal, and regular over all 4 extremities; no UE or LE edema BL        Significant Labs: All pertinent labs within the past 24 hours have been reviewed.    Significant Imaging: I have reviewed all pertinent imaging results/findings within the past 24 hours.    Assessment/Plan:      * Acute cystitis with hematuria  - Interval history and physical exam findings as described above  - UA findings reviewed  - UCx pending  - Afebrile, no leukocytosis  - Empiric rocephin provided pending final C&S results    Urolithiasis  - Stent removed by urology  - Now actively passing stone fragments  - PRN analgesics provided    Liver cirrhosis secondary to CARTAGENA  MELD-Na score calculated; MELD 3.0: 9 at 3/25/2024  4:52 AM  MELD-Na: 7 at 3/25/2024  4:52 AM  Calculated from:  Serum Creatinine: 0.8 mg/dL (Using min of 1 mg/dL) at 3/25/2024  4:52 AM  Serum Sodium: 138 mmol/L (Using max of 137 mmol/L) at 3/25/2024  4:52 AM  Total Bilirubin: 0.5 mg/dL (Using min of 1 mg/dL) at 3/25/2024  4:52 AM  Serum Albumin: 3.1 g/dL at 3/25/2024  4:52 AM  INR(ratio): 1.1  at 3/24/2024  3:41 PM  Age at listing (hypothetical): 53 years  Sex: Female at 3/25/2024  4:52 AM      - History as noted  - Holding diuretics while BP soft    Portal hypertension  - Diuretics held while BP soft    Portal hypertensive gastropathy  - Noted    Esophageal varices  - Uncomplicated  - Continue home PPI    Thrombocytopenia  - Noted, monitoring on daily labs      VTE Risk Mitigation (From admission, onward)           Ordered     IP VTE HIGH RISK PATIENT  Once         03/24/24 2213     Place sequential compression device  Until discontinued         03/24/24 2213     Place TYREE hose  Until discontinued         03/24/24 2213                    Discharge Planning   IOANA: 3/26/2024     Code Status: Full Code   Is the patient medically ready for discharge?: No    Reason for patient still in hospital (select all that apply): Patient trending condition  Discharge Plan A: Home with family            Luis Cummings MD  Attending Physician  Medical Director - [facility] Observation Unit  Department of Hospital Medicine  3/25/2024

## 2024-03-25 NOTE — ASSESSMENT & PLAN NOTE
UA shows 20 WBCs cells.  Will treat her with ceftriaxone and can be discharged on ciprofloxacin tomorrow morning once the pain is well controlled

## 2024-03-25 NOTE — ASSESSMENT & PLAN NOTE
MELD-Na score calculated; MELD 3.0: 9 at 3/25/2024  4:52 AM  MELD-Na: 7 at 3/25/2024  4:52 AM  Calculated from:  Serum Creatinine: 0.8 mg/dL (Using min of 1 mg/dL) at 3/25/2024  4:52 AM  Serum Sodium: 138 mmol/L (Using max of 137 mmol/L) at 3/25/2024  4:52 AM  Total Bilirubin: 0.5 mg/dL (Using min of 1 mg/dL) at 3/25/2024  4:52 AM  Serum Albumin: 3.1 g/dL at 3/25/2024  4:52 AM  INR(ratio): 1.1 at 3/24/2024  3:41 PM  Age at listing (hypothetical): 53 years  Sex: Female at 3/25/2024  4:52 AM      - History as noted  - Holding diuretics while BP soft

## 2024-03-25 NOTE — ASSESSMENT & PLAN NOTE
- Interval history and physical exam findings as described above  - UA findings reviewed  - UCx pending  - Afebrile, no leukocytosis  - Empiric rocephin provided pending final C&S results

## 2024-03-25 NOTE — SUBJECTIVE & OBJECTIVE
Past Medical History:   Diagnosis Date    Anemia, unspecified     Anxiety     Arthritis     Ascites 11/23/2020    AVN (avascular necrosis of bone)     Cataract     COVID-19 vaccine administered     04/08/21, 04/30/21    Diarrhea of presumed infectious origin 7/31/2023    Edema 11/23/2020    Epigastric pain 7/31/2023    Esophageal varices     Glaucoma     Hepatic encephalopathy 11/23/2020    History of colon polyps     Hx of cirrhosis     non-alcoholic    Iron deficiency anemia secondary to blood loss (chronic)     Kidney stones     Portal hypertensive gastropathy     Unintentional weight loss 10/14/2023    Unspecified cirrhosis of liver        Past Surgical History:   Procedure Laterality Date    APPENDECTOMY      COLONOSCOPY N/A 8/3/2023    Procedure: COLONOSCOPY;  Surgeon: Gerard Salinas MD;  Location: [facility];  Service: Endoscopy;  Laterality: N/A;    COLONOSCOPY W/ POLYPECTOMY      CYSTOSCOPY N/A 3/20/2024    Procedure: CYSTOSCOPY;  Surgeon: Chris Carey MD;  Location: [facility] OR 1ST FLR;  Service: Urology;  Laterality: N/A;    CYSTOSCOPY W/ URETERAL STENT REMOVAL Right 3/14/2024    Procedure: CYSTOSCOPY, WITH URETERAL STENT REMOVAL;  Surgeon: Chris Carey MD;  Location: [facility] OR 1ST FLR;  Service: Urology;  Laterality: Right;    DISTAL CLAVICLE EXCISION Right 11/18/2021    Procedure: RIGHT SHOULDER ARTHROSCOPY, EXCISION, CLAVICLE, DISTAL; EXTENSIVE DEBRIDEMENT;  Surgeon: Isaiah Joyner MD;  Location: [facility];  Service: Orthopedics;  Laterality: Right;    ESOPHAGEAL VARICE LIGATION      ESOPHAGOGASTRODUODENOSCOPY N/A 11/10/2020    Procedure: EGD (ESOPHAGOGASTRODUODENOSCOPY);  Surgeon: Gerard Salinas MD;  Location: [facility];  Service: Endoscopy;  Laterality: N/A;    ESOPHAGOGASTRODUODENOSCOPY N/A 12/28/2020    Procedure: EGD (ESOPHAGOGASTRODUODENOSCOPY);  Surgeon: Adryan Park MD;  Location: [facility] (2ND FLR);  Service: Endoscopy;  Laterality: N/A;    ESOPHAGOGASTRODUODENOSCOPY N/A 02/15/2021     Procedure: EGD (ESOPHAGOGASTRODUODENOSCOPY);  Surgeon: Hari Greene MD;  Location: [facility] (4TH FLR);  Service: Endoscopy;  Laterality: N/A;  6 week follow-up variceal banding  Hx varices - Labs - ERW  prep ins. emialed - COVID screening on 2/12/21 [place] - ERW    ESOPHAGOGASTRODUODENOSCOPY Left 08/03/2021    Procedure: EGD (ESOPHAGOGASTRODUODENOSCOPY);  Surgeon: Fabricio Corado MD;  Location: [facility] (4TH FLR);  Service: Gastroenterology;  Laterality: Left;  recent hematemasis. varices-labs on 7/26    COVID test at [facility] on 7/31-GT  requesting sooner    ESOPHAGOGASTRODUODENOSCOPY N/A 07/27/2022    Procedure: EGD (ESOPHAGOGASTRODUODENOSCOPY);  Surgeon: Eric Garcia MD;  Location: [facility];  Service: Endoscopy;  Laterality: N/A;    ESOPHAGOGASTRODUODENOSCOPY Left 08/29/2022    Procedure: EGD (ESOPHAGOGASTRODUODENOSCOPY);  Surgeon: Kacy Douglass MD;  Location: [facility] (2ND FLR);  Service: Endoscopy;  Laterality: Left;  Fully vaccinated/ clear liquids up to 4 hrs prior-RB  varices labs ordered-RB    ESOPHAGOGASTRODUODENOSCOPY N/A 10/05/2022    Procedure: EGD (ESOPHAGOGASTRODUODENOSCOPY);  Surgeon: Gerard Salinas MD;  Location: [facility];  Service: Endoscopy;  Laterality: N/A;    ESOPHAGOGASTRODUODENOSCOPY N/A 3/30/2023    Procedure: EGD (ESOPHAGOGASTRODUODENOSCOPY);  Surgeon: Gerard Salinas MD;  Location: [facility];  Service: Endoscopy;  Laterality: N/A;    ESOPHAGOGASTRODUODENOSCOPY N/A 8/3/2023    Procedure: EGD (ESOPHAGOGASTRODUODENOSCOPY);  Surgeon: Gerard Salinas MD;  Location: [facility];  Service: Endoscopy;  Laterality: N/A;    EXTRACTION - STONE Right 3/20/2024    Procedure: EXTRACTION - STONE;  Surgeon: Chris Carey MD;  Location: [facility] OR 1ST FLR;  Service: Urology;  Laterality: Right;    HYSTERECTOMY      KNEE SURGERY Bilateral     LASER LITHOTRIPSY Right 3/20/2024    Procedure: LITHOTRIPSY, USING LASER;  Surgeon: Chris Carey MD;  Location: [facility] OR [address] [place] [address];   Service: Urology;  Laterality: Right;    LITHOTRIPSY      REMOVAL-STENT Right 3/20/2024    Procedure: REMOVAL-STENT;  Surgeon: Chris Carey MD;  [facility];  Service: Urology;  Laterality: Right;    RETROGRADE PYELOGRAPHY Right 3/20/2024    Procedure: PYELOGRAM, RETROGRADE;  Surgeon: Chris Carey MD;  [facility];  Service: Urology;  Laterality: Right;    RHINOPLASTY TIP      SHOULDER SURGERY Right     TONSILLECTOMY      TOTAL HIP ARTHROPLASTY Right     URETERAL STENT PLACEMENT Right 3/20/2024    Procedure: INSERTION, STENT, URETER;  Surgeon: Chris Carey MD;  [facility];  Service: Urology;  Laterality: Right;    URETEROSCOPY Right 3/20/2024    Procedure: URETEROSCOPY;  Surgeon: Chris Carey MD;  [facility];  Service: Urology;  Laterality: Right;       Review of patient's allergies indicates:   Allergen Reactions    Sulfa (sulfonamide antibiotics) Hives       Family History       Problem Relation (Age of Onset)    Diabetes Mellitus Maternal Grandmother    No Known Problems Mother            Tobacco Use    Smoking status: Some Days     Current packs/day: 0.00     Types: Cigarettes     Last attempt to quit: 7/3/2019     Years since quittin.7    Smokeless tobacco: Never   Substance and Sexual Activity    Alcohol use: Not Currently    Drug use: No    Sexual activity: Not on file       Review of Systems   Constitutional:  Negative for chills and fever.   Genitourinary:  Positive for frequency, hematuria and urgency.       Objective:     Temp:  [97.7 °F (36.5 °C)-97.9 °F (36.6 °C)] 97.7 °F (36.5 °C)  Pulse:  [65-93] 65  Resp:  [16-18] 16  SpO2:  [96 %-99 %] 98 %  BP: (116-125)/(55-71) 125/59  Weight: 66.7 kg (147 lb)  Body mass index is 22.35 kg/m².           Drains       None                    Physical Exam  Constitutional:       Appearance: Normal appearance.   Abdominal:      General: There is no distension.      Palpations: Abdomen is soft.    Genitourinary:     Comments: Strings in place taped to thigh.  Cath PVR 50 mL.  Cath urine clear yellow.   Neurological:      Mental Status: She is alert.          Significant Labs:    BMP:  Recent Labs   Lab 03/24/24  1541      K 4.0      CO2 31*   BUN 11   CREATININE 0.9   CALCIUM 9.3       CBC:  Recent Labs   Lab 03/24/24  1541   WBC 2.82*   HGB 11.5*   HCT 36.4*   PLT 59*       All pertinent labs results from the past 24 hours have been reviewed.    Significant Imaging:  All pertinent imaging results/findings from the past 24 hours have been reviewed.

## 2024-03-25 NOTE — PROGRESS NOTES
Pt awake and alert.  Bed in lowest position. Side rails up x2.  Call bell and personal belongings within reach.  Safety precautions maintained.  Pt free of falls or injuries.  No further issues or concerns at  this time.  Plan of care continues.

## 2024-03-25 NOTE — HPI
53-year-old female with past medical history of liver cirrhosis secondary to CARTAGENA who underwent urethral stent for kidney stones on Wednesday, she presents to the emergency room for severe abdominal pain and distended abdomen.  Patient continued to have pain since she had the procedure on witnessed day and due to worsening pain she presented to the emergency room.  Urology was consulted in the emergency room who removed the stent.  Patient still continued to have abdominal pain for which hospital medicine was consulted for admission for pain management.  Currently she denies having any dysuria, polyuria but complains of having abdominal pain.  CT abdomen concerning for pyelonephritis/hydronephrosis for malfunction of the stent and moderate ascites.  Urinalysis shows WBC of 25 and RBC of 100

## 2024-03-25 NOTE — HPI
53M w/ hx of CARTAGENA, nephrolithiasis, s/p right ureteroscopy with Dr. Carey on 03/20.  She presents to the ED with abdominal swelling and suprapubic pain radiating to the R flank.    The patient underwent R US with LL during which a proximal ureteral stone was dusted and fragments basket extracted.  Her R renal stones were dusted at that time.   She was left with a R ureteral stent with strings.  Patient was instructed to remove her stent with strings tomorrow.  She is uncomfortable doing so.      She endorses gross hematuria with clots.  She also endorses urinary frequency and stent pain while voiding.  This has worsened since yesterday.     In the ED she is afebrile VSS.  Bladder scan not reliable due to ascites.  Cath PVR was 50 mL.  UA not concerning for infection.  Creatinine 0.9 at baseline.  Patient denies fever and chills.  No leukocytosis.  CT AP obtained in the ED shows migration of the proximal stent coil into the proximal ureter.  Distal coil within the bladder.  Mild hydronephrosis as expected.  Some stone fragments in the right lower pole.  No stone seen within the ureter.

## 2024-03-25 NOTE — ASSESSMENT & PLAN NOTE
Patient was found to have thrombocytopenia, the likely etiology is secondary to cirrhosis/portal hypertension, will monitor the platelets Daily. Will transfuse if platelet count is <10k. Hold DVT prophylaxis if platelets are <50k. The patient's platelet results have been reviewed and are listed below.  Recent Labs   Lab 03/24/24  1541   PLT 59*

## 2024-03-26 ENCOUNTER — PATIENT MESSAGE (OUTPATIENT)
Dept: HEPATOLOGY | Facility: HOSPITAL | Age: 54
End: 2024-03-26
Payer: COMMERCIAL

## 2024-03-26 VITALS
HEIGHT: 68 IN | TEMPERATURE: 98 F | WEIGHT: 143.31 LBS | HEART RATE: 58 BPM | RESPIRATION RATE: 20 BRPM | BODY MASS INDEX: 21.72 KG/M2 | DIASTOLIC BLOOD PRESSURE: 56 MMHG | OXYGEN SATURATION: 96 % | SYSTOLIC BLOOD PRESSURE: 114 MMHG

## 2024-03-26 LAB
ALBUMIN SERPL BCP-MCNC: 3.6 G/DL (ref 3.5–5.2)
ALP SERPL-CCNC: 82 U/L (ref 55–135)
ALT SERPL W/O P-5'-P-CCNC: 21 U/L (ref 10–44)
ANION GAP SERPL CALC-SCNC: 8 MMOL/L (ref 8–16)
AST SERPL-CCNC: 22 U/L (ref 10–40)
BASOPHILS # BLD AUTO: 0.01 K/UL (ref 0–0.2)
BASOPHILS NFR BLD: 0.4 % (ref 0–1.9)
BILIRUB SERPL-MCNC: 0.9 MG/DL (ref 0.1–1)
BUN SERPL-MCNC: 8 MG/DL (ref 6–20)
CALCIUM SERPL-MCNC: 9.2 MG/DL (ref 8.7–10.5)
CHLORIDE SERPL-SCNC: 105 MMOL/L (ref 95–110)
CO2 SERPL-SCNC: 27 MMOL/L (ref 23–29)
CREAT SERPL-MCNC: 0.8 MG/DL (ref 0.5–1.4)
DIFFERENTIAL METHOD BLD: ABNORMAL
EOSINOPHIL # BLD AUTO: 0 K/UL (ref 0–0.5)
EOSINOPHIL NFR BLD: 1.5 % (ref 0–8)
ERYTHROCYTE [DISTWIDTH] IN BLOOD BY AUTOMATED COUNT: 12.8 % (ref 11.5–14.5)
EST. GFR  (NO RACE VARIABLE): >60 ML/MIN/1.73 M^2
GLUCOSE SERPL-MCNC: 121 MG/DL (ref 70–110)
HCT VFR BLD AUTO: 38.6 % (ref 37–48.5)
HGB BLD-MCNC: 12.4 G/DL (ref 12–16)
IMM GRANULOCYTES # BLD AUTO: 0.01 K/UL (ref 0–0.04)
IMM GRANULOCYTES NFR BLD AUTO: 0.4 % (ref 0–0.5)
LYMPHOCYTES # BLD AUTO: 0.6 K/UL (ref 1–4.8)
LYMPHOCYTES NFR BLD: 21.1 % (ref 18–48)
MAGNESIUM SERPL-MCNC: 1.9 MG/DL (ref 1.6–2.6)
MCH RBC QN AUTO: 30.1 PG (ref 27–31)
MCHC RBC AUTO-ENTMCNC: 32.1 G/DL (ref 32–36)
MCV RBC AUTO: 94 FL (ref 82–98)
MONOCYTES # BLD AUTO: 0.2 K/UL (ref 0.3–1)
MONOCYTES NFR BLD: 9 % (ref 4–15)
NEUTROPHILS # BLD AUTO: 1.8 K/UL (ref 1.8–7.7)
NEUTROPHILS NFR BLD: 67.6 % (ref 38–73)
NRBC BLD-RTO: 0 /100 WBC
PHOSPHATE SERPL-MCNC: 3.7 MG/DL (ref 2.7–4.5)
PLATELET # BLD AUTO: 62 K/UL (ref 150–450)
PMV BLD AUTO: 11.5 FL (ref 9.2–12.9)
POTASSIUM SERPL-SCNC: 4 MMOL/L (ref 3.5–5.1)
PROT SERPL-MCNC: 6.2 G/DL (ref 6–8.4)
RBC # BLD AUTO: 4.12 M/UL (ref 4–5.4)
SODIUM SERPL-SCNC: 140 MMOL/L (ref 136–145)
WBC # BLD AUTO: 2.66 K/UL (ref 3.9–12.7)

## 2024-03-26 PROCEDURE — 99215 OFFICE O/P EST HI 40 MIN: CPT | Mod: ,,, | Performed by: STUDENT IN AN ORGANIZED HEALTH CARE EDUCATION/TRAINING PROGRAM

## 2024-03-26 PROCEDURE — 84100 ASSAY OF PHOSPHORUS: CPT | Performed by: STUDENT IN AN ORGANIZED HEALTH CARE EDUCATION/TRAINING PROGRAM

## 2024-03-26 PROCEDURE — 80053 COMPREHEN METABOLIC PANEL: CPT | Performed by: STUDENT IN AN ORGANIZED HEALTH CARE EDUCATION/TRAINING PROGRAM

## 2024-03-26 PROCEDURE — G0378 HOSPITAL OBSERVATION PER HR: HCPCS

## 2024-03-26 PROCEDURE — 85025 COMPLETE CBC W/AUTO DIFF WBC: CPT | Performed by: STUDENT IN AN ORGANIZED HEALTH CARE EDUCATION/TRAINING PROGRAM

## 2024-03-26 PROCEDURE — 96376 TX/PRO/DX INJ SAME DRUG ADON: CPT

## 2024-03-26 PROCEDURE — 96366 THER/PROPH/DIAG IV INF ADDON: CPT

## 2024-03-26 PROCEDURE — 36415 COLL VENOUS BLD VENIPUNCTURE: CPT | Performed by: STUDENT IN AN ORGANIZED HEALTH CARE EDUCATION/TRAINING PROGRAM

## 2024-03-26 PROCEDURE — 63600175 PHARM REV CODE 636 W HCPCS: Performed by: STUDENT IN AN ORGANIZED HEALTH CARE EDUCATION/TRAINING PROGRAM

## 2024-03-26 PROCEDURE — 25000003 PHARM REV CODE 250: Performed by: STUDENT IN AN ORGANIZED HEALTH CARE EDUCATION/TRAINING PROGRAM

## 2024-03-26 PROCEDURE — 25000003 PHARM REV CODE 250: Performed by: HOSPITALIST

## 2024-03-26 PROCEDURE — 83735 ASSAY OF MAGNESIUM: CPT | Performed by: STUDENT IN AN ORGANIZED HEALTH CARE EDUCATION/TRAINING PROGRAM

## 2024-03-26 RX ORDER — HYDROCODONE BITARTRATE AND ACETAMINOPHEN 10; 325 MG/1; MG/1
1 TABLET ORAL EVERY 4 HOURS PRN
Status: DISCONTINUED | OUTPATIENT
Start: 2024-03-26 | End: 2024-03-26 | Stop reason: HOSPADM

## 2024-03-26 RX ORDER — FUROSEMIDE 40 MG/1
40 TABLET ORAL DAILY
Qty: 30 TABLET | Refills: 11 | Status: SHIPPED | OUTPATIENT
Start: 2024-03-26 | End: 2024-05-15

## 2024-03-26 RX ORDER — CEFDINIR 300 MG/1
300 CAPSULE ORAL 2 TIMES DAILY
Qty: 10 CAPSULE | Refills: 0 | Status: SHIPPED | OUTPATIENT
Start: 2024-03-26 | End: 2024-03-31

## 2024-03-26 RX ORDER — HYDROCODONE BITARTRATE AND ACETAMINOPHEN 10; 325 MG/1; MG/1
1 TABLET ORAL EVERY 6 HOURS PRN
Qty: 12 TABLET | Refills: 0 | Status: SHIPPED | OUTPATIENT
Start: 2024-03-26 | End: 2024-03-29

## 2024-03-26 RX ORDER — SPIRONOLACTONE 100 MG/1
100 TABLET, FILM COATED ORAL DAILY
Qty: 30 TABLET | Refills: 11 | Status: SHIPPED | OUTPATIENT
Start: 2024-03-26 | End: 2024-05-15

## 2024-03-26 RX ADMIN — HYDROCODONE BITARTRATE AND ACETAMINOPHEN 1 TABLET: 10; 325 TABLET ORAL at 07:03

## 2024-03-26 RX ADMIN — CEFTRIAXONE 1 G: 1 INJECTION, POWDER, FOR SOLUTION INTRAMUSCULAR; INTRAVENOUS at 06:03

## 2024-03-26 RX ADMIN — ALPRAZOLAM 0.5 MG: 0.5 TABLET ORAL at 11:03

## 2024-03-26 RX ADMIN — SPIRONOLACTONE 50 MG: 50 TABLET ORAL at 09:03

## 2024-03-26 RX ADMIN — HYDROMORPHONE HYDROCHLORIDE 1 MG: 1 INJECTION, SOLUTION INTRAMUSCULAR; INTRAVENOUS; SUBCUTANEOUS at 09:03

## 2024-03-26 RX ADMIN — PANTOPRAZOLE SODIUM 40 MG: 40 TABLET, DELAYED RELEASE ORAL at 09:03

## 2024-03-26 RX ADMIN — HYDROCODONE BITARTRATE AND ACETAMINOPHEN 1 TABLET: 10; 325 TABLET ORAL at 01:03

## 2024-03-26 RX ADMIN — FUROSEMIDE 20 MG: 20 TABLET ORAL at 09:03

## 2024-03-26 RX ADMIN — TAMSULOSIN HYDROCHLORIDE 0.4 MG: 0.4 CAPSULE ORAL at 09:03

## 2024-03-26 RX ADMIN — RIFAXIMIN 550 MG: 550 TABLET ORAL at 09:03

## 2024-03-26 NOTE — PROGRESS NOTES
[facility]ation [address] [place] [address] Medicine  Progress Note    Patient Name: Tee Aranda  MRN: [id number]  Patient Class: OP- Observation   Admission Date: 3/24/2024  Length of Stay: 0 days  Attending Physician: Luis Cummings MD  Primary Care Provider: James Samano MD        Subjective:     Principal Problem:Urolithiasis        HPI:  53-year-old female with past medical history of liver cirrhosis secondary to CARTAGENA who underwent urethral stent for kidney stones on Wednesday, she presents to the emergency room for severe abdominal pain and distended abdomen.  Patient continued to have pain since she had the procedure on witnessed day and due to worsening pain she presented to the emergency room.  Urology was consulted in the emergency room who removed the stent.  Patient still continued to have abdominal pain for which hospital medicine was consulted for admission for pain management.  Currently she denies having any dysuria, polyuria but complains of having abdominal pain.  CT abdomen concerning for pyelonephritis/hydronephrosis for malfunction of the stent and moderate ascites.  Urinalysis shows WBC of 25 and RBC of 100    Overview/Hospital Course:  Pt admitted to [facility] and remained stable overnight and into 3/25/2024. Pt began to actively pass stone fragments: PRN analgesics provided. Although urology felt that pt did not have UTI, her UA was highly concerning for infection; started on empiric rocephin pending Ucx results. Hepatology consulted per pt request, and they recommended a paracentesis (no overt ascites noted), which the pt refused. Complained of ongoing CVA/abdominal pain into 3/26/2024: CT kidney stone protocol ordered.    Interval History: Pt seen and examined this morning on rounds. SANAZ. Has not seen any stone fragments in her urine since yesterday, though continues to complain of ongoing severe pain; discussed need to obtain repeat CT renal stone protocol.  updated at bedside. Care  plan reviewed. Otherwise, doing well and with no further complaints at this time.      Objective:     Vital Signs (Most Recent):  Temp: 98.2 °F (36.8 °C) (03/26/24 0812)  Pulse: (!) 58 (03/26/24 0812)  Resp: 20 (03/26/24 0916)  BP: (!) 114/56 (03/26/24 0812)  SpO2: 96 % (03/26/24 0812) Vital Signs (24h Range):  Temp:  [97.9 °F (36.6 °C)-98.3 °F (36.8 °C)] 98.2 °F (36.8 °C)  Pulse:  [56-71] 58  Resp:  [12-20] 20  SpO2:  [95 %-97 %] 96 %  BP: (107-127)/(56-59) 114/56     Weight: 65 kg (143 lb 4.8 oz)  Body mass index is 21.79 kg/m².    Intake/Output Summary (Last 24 hours) at 3/26/2024 1119  Last data filed at 3/26/2024 0552  Gross per 24 hour   Intake 1538 ml   Output 1700 ml   Net -162 ml           Physical Exam  Gen: in NAD, appears stated age  Neuro: AAOx4, CN2-12 grossly intact BL; motor, sensory, and strength grossly intact BL  HEENT: NTNC, EOMI, PERRLA, MMM; no thyromegaly or lymphadenopathy; no JVD appreciated  CVS: RRR, no m/r/g; S1/S2 auscultated with no S3 or S4; capillary refill < 2 sec  Resp: lungs CTAB, no w/r/r; no belabored breathing or accessory muscle use appreciated   Abd: BS+ in all 4 quadrants; suprapubic/lower abdomen TTP, soft to palpation; no organomegaly appreciated; right-sided CVA tenderness present  Extrem: pulses full, equal, and regular over all 4 extremities; no UE or LE edema BL        Significant Labs: All pertinent labs within the past 24 hours have been reviewed.    Significant Imaging: I have reviewed all pertinent imaging results/findings within the past 24 hours.    Assessment/Plan:      * Urolithiasis  - Stent removed by urology  - No longer actively passing stone fragments, but complains of ongoing pain  - Repeat CT renal stone protocol ordered  - PRN analgesics provided    Acute cystitis with hematuria  - Interval history and physical exam findings as described above  - UA findings reviewed  - UCx unrevealing  - Afebrile, no leukocytosis  - Continue rocephin    Liver cirrhosis  secondary to CARTAGENA  MELD-Na score calculated; MELD 3.0: 8 at 3/26/2024  7:32 AM  MELD-Na: 7 at 3/26/2024  7:32 AM  Calculated from:  Serum Creatinine: 0.8 mg/dL (Using min of 1 mg/dL) at 3/26/2024  7:32 AM  Serum Sodium: 140 mmol/L (Using max of 137 mmol/L) at 3/26/2024  7:32 AM  Total Bilirubin: 0.9 mg/dL (Using min of 1 mg/dL) at 3/26/2024  7:32 AM  Serum Albumin: 3.6 g/dL (Using max of 3.5 g/dL) at 3/26/2024  7:32 AM  INR(ratio): 1.1 at 3/24/2024  3:41 PM  Age at listing (hypothetical): 53 years  Sex: Female at 3/26/2024  7:32 AM      - History as noted  - Holding diuretics while BP soft  - Hepatology consulted, though pt refused paracentesis    Portal hypertension  - Diuretics held while BP soft    Portal hypertensive gastropathy  - Noted    Esophageal varices  - Uncomplicated  - Continue home PPI    Thrombocytopenia  - Noted, monitoring on daily labs      VTE Risk Mitigation (From admission, onward)           Ordered     IP VTE HIGH RISK PATIENT  Once         03/24/24 2213     Place sequential compression device  Until discontinued         03/24/24 2213     Place TYREE hose  Until discontinued         03/24/24 2213                    Discharge Planning   IOANA: 3/27/2024     Code Status: Full Code   Is the patient medically ready for discharge?: No    Reason for patient still in hospital (select all that apply): Patient trending condition  Discharge Plan A: Home with family            Luis Cummings MD  Attending Physician  Medical Director - [facility]  Department of Hospital Medicine  3/26/2024

## 2024-03-26 NOTE — PLAN OF CARE
Problem: Adult Inpatient Plan of Care  Goal: Plan of Care Review  Outcome: Ongoing, Progressing  Goal: Patient-Specific Goal (Individualized)  Outcome: Ongoing, Progressing  Goal: Absence of Hospital-Acquired Illness or Injury  Outcome: Ongoing, Progressing  Goal: Optimal Comfort and Wellbeing  Outcome: Ongoing, Progressing  Goal: Readiness for Transition of Care  Outcome: Ongoing, Progressing     Problem: Fall Injury Risk  Goal: Absence of Fall and Fall-Related Injury  Outcome: Ongoing, Progressing     Problem: Pain Acute  Goal: Acceptable Pain Control and Functional Ability  Outcome: Ongoing, Progressing     Problem: Infection  Goal: Absence of Infection Signs and Symptoms  Outcome: Ongoing, Progressing     Problem: Impaired Wound Healing  Goal: Optimal Wound Healing  Outcome: Ongoing, Progressing

## 2024-03-26 NOTE — SUBJECTIVE & OBJECTIVE
Interval History: Pt seen and examined this morning on rounds. SANAZ. Has not seen any stone fragments in her urine since yesterday, though continues to complain of ongoing severe pain; discussed need to obtain repeat CT renal stone protocol.  updated at bedside. Care plan reviewed. Otherwise, doing well and with no further complaints at this time.      Objective:     Vital Signs (Most Recent):  Temp: 98.2 °F (36.8 °C) (03/26/24 0812)  Pulse: (!) 58 (03/26/24 0812)  Resp: 20 (03/26/24 0916)  BP: (!) 114/56 (03/26/24 0812)  SpO2: 96 % (03/26/24 0812) Vital Signs (24h Range):  Temp:  [97.9 °F (36.6 °C)-98.3 °F (36.8 °C)] 98.2 °F (36.8 °C)  Pulse:  [56-71] 58  Resp:  [12-20] 20  SpO2:  [95 %-97 %] 96 %  BP: (107-127)/(56-59) 114/56     Weight: 65 kg (143 lb 4.8 oz)  Body mass index is 21.79 kg/m².    Intake/Output Summary (Last 24 hours) at 3/26/2024 1119  Last data filed at 3/26/2024 0552  Gross per 24 hour   Intake 1538 ml   Output 1700 ml   Net -162 ml           Physical Exam  Gen: in NAD, appears stated age  Neuro: AAOx4, CN2-12 grossly intact BL; motor, sensory, and strength grossly intact BL  HEENT: NTNC, EOMI, PERRLA, MMM; no thyromegaly or lymphadenopathy; no JVD appreciated  CVS: RRR, no m/r/g; S1/S2 auscultated with no S3 or S4; capillary refill < 2 sec  Resp: lungs CTAB, no w/r/r; no belabored breathing or accessory muscle use appreciated   Abd: BS+ in all 4 quadrants; suprapubic/lower abdomen TTP, soft to palpation; no organomegaly appreciated; right-sided CVA tenderness present  Extrem: pulses full, equal, and regular over all 4 extremities; no UE or LE edema BL        Significant Labs: All pertinent labs within the past 24 hours have been reviewed.    Significant Imaging: I have reviewed all pertinent imaging results/findings within the past 24 hours.

## 2024-03-26 NOTE — ASSESSMENT & PLAN NOTE
- Interval history and physical exam findings as described above  - UA findings reviewed  - UCx unrevealing  - Afebrile, no leukocytosis  - Continue rocephin

## 2024-03-26 NOTE — ASSESSMENT & PLAN NOTE
53F s/p R URS on 3/20.  Urology consulted for stent with strings removal.      -CT scan from today without signs of obstructing urolithiasis.  There is mild right ureteral pelvic dilation which is consistent with the postoperative course for ureteroscopy and recent ureteral stent placement.  No true hydronephrosis noted on the scan.  -no indications for urologic interventions at this point.  As patient is currently subjectively and objectively absent from urological symptoms.  - f/u as scheduled with Dr. Carey

## 2024-03-26 NOTE — ASSESSMENT & PLAN NOTE
Patient with a known history of cirrhosis secondary to CARTAGENA with Grade 1 varices seen on upper endoscopy. Her cirrhosis is compensated this admission. No active bleeding. No history of prior SBP. She is not encephalopathic and no asterixis on exam. The location of her abdominal pain seems related to her pyelonephritis and not ascites-related.     -Recommend paracentesis  -Recommend increasing to Lasix 40, Spironolactone 100

## 2024-03-26 NOTE — DISCHARGE SUMMARY
[facility] - Observation [address] [place] [address] Medicine  Discharge Summary      Patient Name: Tee Aranda  MRN: [id number]  JESSICA: [id number]  Patient Class: OP- Observation  Admission Date: 3/24/2024  Hospital Length of Stay: 0 days  Discharge Date and Time:  03/26/2024 2:52 PM  Attending Physician: Luis Cummings MD   Discharging Provider: Luis Cummings MD  Primary Care Provider: James Samano MD  [facility] Medicine Team: [facility] Luis Cummings MD  Primary Care Team: [facility]    HPI:   53-year-old female with past medical history of liver cirrhosis secondary to CARTAGENA who underwent urethral stent for kidney stones on Wednesday, she presents to the emergency room for severe abdominal pain and distended abdomen.  Patient continued to have pain since she had the procedure on witnessed day and due to worsening pain she presented to the emergency room.  Urology was consulted in the emergency room who removed the stent.  Patient still continued to have abdominal pain for which hospital medicine was consulted for admission for pain management.  Currently she denies having any dysuria, polyuria but complains of having abdominal pain.  CT abdomen concerning for pyelonephritis/hydronephrosis for malfunction of the stent and moderate ascites.  Urinalysis shows WBC of 25 and RBC of 100    * No surgery found *      Hospital Course:   [place] [facility] [place] and into 3/25/2024. Pt began to actively pass stone fragments: PRN analgesics provided. Although urology felt that pt did not have UTI, her UA was highly concerning for infection; started on empiric rocephin pending Ucx results. Hepatology consulted per pt request, and they recommended a paracentesis (no overt ascites noted), which the pt refused. Complained of ongoing CVA/abdominal pain into 3/26/2024: CT renal stone protocol ordered; no acute findings noted, discussed with urology. Pt deemed appropriate for discharge; seen and examined  prior to departure. Plan discussed with pt, who was agreeable and amenable; medications were discussed and reviewed, outpatient follow-up scheduled, ER precautions were given, all questions were answered to the pt's satisfaction, and Mrs. Aranda was subsequently discharged.       Goals of Care Treatment Preferences:  Code Status: Full Code      Consults:   Consults (From admission, onward)          Status Ordering Provider     Inpatient consult to Hepatology  Once        Provider:  (Not yet assigned)    Completed NEFTALI HANNA     Inpatient consult to Urology  Once        Provider:  (Not yet assigned)    Completed SO MOBLEY            No new Assessment & Plan notes have been filed under this hospital service since the last note was generated.  Service: Hospital Medicine    Final Active Diagnoses:    Diagnosis Date Noted POA    PRINCIPAL PROBLEM:  Urolithiasis [N20.9] 03/14/2024 Yes    Acute cystitis with hematuria [N30.01] 03/14/2024 Yes    Liver cirrhosis secondary to CARTAGENA [K75.81, K74.60] 08/31/2020 Yes     Chronic    Portal hypertension [K76.6] 03/24/2024 Yes    Portal hypertensive gastropathy [K76.6, K31.89] 12/29/2020 Yes    Esophageal varices [I85.00] 02/15/2021 Yes    Thrombocytopenia [D69.6] 09/18/2023 Yes      Problems Resolved During this Admission:       Discharged Condition: stable    Disposition: Home or Self Care    Follow Up:   Follow-up Information       James Samano MD. Schedule an appointment as soon as possible for a visit.    Specialty: Family Medicine  Contact information:  [address]  [facility]  [place] [address]  [phone]                           Patient Instructions:      Ambulatory referral/consult to Urology   Standing Status: Future   Referral Priority: Routine Referral Type: Consultation   Referral Reason: Specialty Services Required   Requested Specialty: Urology   Number of Visits Requested: 1     Ambulatory referral/consult to Family Practice   Standing  Status: Future   Referral Priority: Routine Referral Type: Consultation   Referral Reason: Specialty Services Required   Requested Specialty: Family Medicine   Number of Visits Requested: 1     Diet Cardiac     Notify your health care provider if you experience any of the following:  increased confusion or weakness     Notify your health care provider if you experience any of the following:  persistent dizziness, light-headedness, or visual disturbances     Notify your health care provider if you experience any of the following:  worsening rash     Notify your health care provider if you experience any of the following:  severe persistent headache     Notify your health care provider if you experience any of the following:  difficulty breathing or increased cough     Notify your health care provider if you experience any of the following:  severe uncontrolled pain     Notify your health care provider if you experience any of the following:  persistent nausea and vomiting or diarrhea     Notify your health care provider if you experience any of the following:  temperature >100.4     Activity as tolerated       Significant Diagnostic Studies: Labs: All labs within the past 24 hours have been reviewed    Pending Diagnostic Studies:       None           Medications:  Reconciled Home Medications:      Medication List        START taking these medications      cefdinir 300 MG capsule  Commonly known as: OMNICEF  Take 1 capsule (300 mg total) by mouth 2 (two) times daily. for 5 days     HYDROcodone-acetaminophen  mg per tablet  Commonly known as: NORCO  Take 1 tablet by mouth every 6 (six) hours as needed for Pain.            CONTINUE taking these medications      carvediloL 3.125 MG tablet  Commonly known as: COREG  TAKE 1 TABLET BY MOUTH 2 TIMES DAILY.     esomeprazole 40 MG capsule  Commonly known as: NEXIUM  Take 1 capsule (40 mg total) by mouth 2 (two) times daily before meals.     fish oil-omega-3 fatty acids  300-1,000 mg capsule  Take 1 capsule by mouth once daily.     multivitamin per tablet  Commonly known as: THERAGRAN  Take 1 tablet by mouth once daily.     oxybutynin 5 MG Tab  Commonly known as: DITROPAN  Take 1 tablet (5 mg total) by mouth 3 (three) times daily.     phenazopyridine 200 MG tablet  Commonly known as: PYRIDIUM  Take 1 tablet (200 mg total) by mouth 3 (three) times daily as needed for Pain.     * tamsulosin 0.4 mg Cap  Commonly known as: FLOMAX  Take 1 capsule (0.4 mg total) by mouth once daily.     * tamsulosin 0.4 mg Cap  Commonly known as: FLOMAX  Take 1 capsule (0.4 mg total) by mouth once daily.     UNABLE TO FIND  4 (four) times daily as needed. Medible 20 mg blue raspberry 1/4 to 1/2 up to 4 times daily as needed.     XIFAXAN 550 mg Tab  Generic drug: rifAXIMin  Take 1 tablet (550 mg total) by mouth 2 (two) times daily.           * This list has 2 medication(s) that are the same as other medications prescribed for you. Read the directions carefully, and ask your doctor or other care provider to review them with you.                STOP taking these medications      ciprofloxacin HCl 250 MG tablet  Commonly known as: CIPRO     oxyCODONE 5 MG immediate release tablet  Commonly known as: ROXICODONE              Indwelling Lines/Drains at time of discharge:   Lines/Drains/Airways       None                   Time spent on the discharge of patient: 35 minutes         Luis Cummings MD  Attending Physician  Medical Director - [facility]  Department of Hospital Medicine  3/26/2024

## 2024-03-26 NOTE — ASSESSMENT & PLAN NOTE
MELD-Na score calculated; MELD 3.0: 8 at 3/26/2024  7:32 AM  MELD-Na: 7 at 3/26/2024  7:32 AM  Calculated from:  Serum Creatinine: 0.8 mg/dL (Using min of 1 mg/dL) at 3/26/2024  7:32 AM  Serum Sodium: 140 mmol/L (Using max of 137 mmol/L) at 3/26/2024  7:32 AM  Total Bilirubin: 0.9 mg/dL (Using min of 1 mg/dL) at 3/26/2024  7:32 AM  Serum Albumin: 3.6 g/dL (Using max of 3.5 g/dL) at 3/26/2024  7:32 AM  INR(ratio): 1.1 at 3/24/2024  3:41 PM  Age at listing (hypothetical): 53 years  Sex: Female at 3/26/2024  7:32 AM      - History as noted  - Holding diuretics while BP soft  - Hepatology consulted, though pt refused paracentesis

## 2024-03-26 NOTE — SUBJECTIVE & OBJECTIVE
Review of Systems    Past Medical History:   Diagnosis Date    Anemia, unspecified     Anxiety     Arthritis     Ascites 11/23/2020    AVN (avascular necrosis of bone)     Cataract     COVID-19 vaccine administered     04/08/21, 04/30/21    Diarrhea of presumed infectious origin 7/31/2023    Edema 11/23/2020    Epigastric pain 7/31/2023    Esophageal varices     Glaucoma     Hepatic encephalopathy 11/23/2020    History of colon polyps     Hx of cirrhosis     non-alcoholic    Iron deficiency anemia secondary to blood loss (chronic)     Kidney stones     Portal hypertensive gastropathy     Unintentional weight loss 10/14/2023    Unspecified cirrhosis of liver        Past Surgical History:   Procedure Laterality Date    APPENDECTOMY      COLONOSCOPY N/A 8/3/2023    Procedure: COLONOSCOPY;  Surgeon: Gerard Salinas MD;  Location: [facility];  Service: Endoscopy;  Laterality: N/A;    COLONOSCOPY W/ POLYPECTOMY      CYSTOSCOPY N/A 3/20/2024    Procedure: CYSTOSCOPY;  Surgeon: Chris Carey MD;  Location: [facility] OR 1ST FLR;  Service: Urology;  Laterality: N/A;    CYSTOSCOPY W/ URETERAL STENT REMOVAL Right 3/14/2024    Procedure: CYSTOSCOPY, WITH URETERAL STENT REMOVAL;  Surgeon: Chris Carey MD;  Location: [facility] OR 1ST FLR;  Service: Urology;  Laterality: Right;    DISTAL CLAVICLE EXCISION Right 11/18/2021    Procedure: RIGHT SHOULDER ARTHROSCOPY, EXCISION, CLAVICLE, DISTAL; EXTENSIVE DEBRIDEMENT;  Surgeon: Isaiah Joyner MD;  Location: [facility];  Service: Orthopedics;  Laterality: Right;    ESOPHAGEAL VARICE LIGATION      ESOPHAGOGASTRODUODENOSCOPY N/A 11/10/2020    Procedure: EGD (ESOPHAGOGASTRODUODENOSCOPY);  Surgeon: Gerard Salinas MD;  Location: [facility];  Service: Endoscopy;  Laterality: N/A;    ESOPHAGOGASTRODUODENOSCOPY N/A 12/28/2020    Procedure: EGD (ESOPHAGOGASTRODUODENOSCOPY);  Surgeon: Adryan Park MD;  Location: [facility] (2ND FLR);  Service: Endoscopy;  Laterality: N/A;     ESOPHAGOGASTRODUODENOSCOPY N/A 02/15/2021    Procedure: EGD (ESOPHAGOGASTRODUODENOSCOPY);  Surgeon: Hari Greene MD;  Location: [facility] (4TH FLR);  Service: Endoscopy;  Laterality: N/A;  6 week follow-up variceal banding  Hx varices - Labs - ERW  prep ins. emialed - COVID screening on 2/12/21 [place] - ERW    ESOPHAGOGASTRODUODENOSCOPY Left 08/03/2021    Procedure: EGD (ESOPHAGOGASTRODUODENOSCOPY);  Surgeon: Fabricio Corado MD;  Location: [facility] (4TH FLR);  Service: Gastroenterology;  Laterality: Left;  recent hematemasis. varices-labs on 7/26    COVID test at [facility] on 7/31-GT  requesting sooner    ESOPHAGOGASTRODUODENOSCOPY N/A 07/27/2022    Procedure: EGD (ESOPHAGOGASTRODUODENOSCOPY);  Surgeon: Eric Garcia MD;  Location: [facility];  Service: Endoscopy;  Laterality: N/A;    ESOPHAGOGASTRODUODENOSCOPY Left 08/29/2022    Procedure: EGD (ESOPHAGOGASTRODUODENOSCOPY);  Surgeon: Kacy Douglass MD;  Location: [facility] (2ND FLR);  Service: Endoscopy;  Laterality: Left;  Fully vaccinated/ clear liquids up to 4 hrs prior-RB  varices labs ordered-RB    ESOPHAGOGASTRODUODENOSCOPY N/A 10/05/2022    Procedure: EGD (ESOPHAGOGASTRODUODENOSCOPY);  Surgeon: Gerard Salinas MD;  Location: [facility];  Service: Endoscopy;  Laterality: N/A;    ESOPHAGOGASTRODUODENOSCOPY N/A 3/30/2023    Procedure: EGD (ESOPHAGOGASTRODUODENOSCOPY);  Surgeon: Gerard Salinas MD;  Location: [facility];  Service: Endoscopy;  Laterality: N/A;    ESOPHAGOGASTRODUODENOSCOPY N/A 8/3/2023    Procedure: EGD (ESOPHAGOGASTRODUODENOSCOPY);  Surgeon: Gerard Salinas MD;  Location: [facility];  Service: Endoscopy;  Laterality: N/A;    EXTRACTION - STONE Right 3/20/2024    Procedure: EXTRACTION - STONE;  Surgeon: Chris Carey MD;  Location: [facility] OR [address] [place] [address];  Service: Urology;  Laterality: Right;    HYSTERECTOMY      KNEE SURGERY Bilateral     LASER LITHOTRIPSY Right 3/20/2024    Procedure: LITHOTRIPSY, USING LASER;  Surgeon: Aurelio  Chris FOSTER MD;  [facility];  Service: Urology;  Laterality: Right;    LITHOTRIPSY      REMOVAL-STENT Right 3/20/2024    Procedure: REMOVAL-STENT;  Surgeon: Chris Carey MD;  [facility];  Service: Urology;  Laterality: Right;    RETROGRADE PYELOGRAPHY Right 3/20/2024    Procedure: PYELOGRAM, RETROGRADE;  Surgeon: Chris Carey MD;  [facility];  Service: Urology;  Laterality: Right;    RHINOPLASTY TIP      SHOULDER SURGERY Right     TONSILLECTOMY      TOTAL HIP ARTHROPLASTY Right     URETERAL STENT PLACEMENT Right 3/20/2024    Procedure: INSERTION, STENT, URETER;  Surgeon: Chris Carey MD;  [facility];  Service: Urology;  Laterality: Right;    URETEROSCOPY Right 3/20/2024    Procedure: URETEROSCOPY;  Surgeon: Chris Carey MD;  Location: [facility] OR [address] [place] [address];  Service: Urology;  Laterality: Right;         Review of patient's allergies indicates:   Allergen Reactions    Sulfa (sulfonamide antibiotics) Hives         Tobacco Use    Smoking status: Some Days     Current packs/day: 0.00     Types: Cigarettes     Last attempt to quit: 7/3/2019     Years since quittin.7    Smokeless tobacco: Never   Substance and Sexual Activity    Alcohol use: Not Currently    Drug use: No    Sexual activity: Not on file       Medications Prior to Admission   Medication Sig Dispense Refill Last Dose    carvediloL (COREG) 3.125 MG tablet TAKE 1 TABLET BY MOUTH 2 TIMES DAILY. 180 tablet 3 3/24/2024    ciprofloxacin HCl (CIPRO) 250 MG tablet Take 1 tablet (250 mg total) by mouth 2 (two) times a day. for 14 days 28 tablet 0 3/24/2024    esomeprazole (NEXIUM) 40 MG capsule Take 1 capsule (40 mg total) by mouth 2 (two) times daily before meals. 60 capsule 11 3/24/2024    multivitamin (THERAGRAN) per tablet Take 1 tablet by mouth once daily.   3/24/2024    omega-3 fatty acids/fish oil (FISH OIL-OMEGA-3 FATTY ACIDS) 300-1,000 mg capsule Take 1 capsule by mouth once daily.    3/24/2024    oxybutynin (DITROPAN) 5 MG Tab Take 1 tablet (5 mg total) by mouth 3 (three) times daily. 90 tablet 11 3/24/2024    oxyCODONE (ROXICODONE) 5 MG immediate release tablet Take 1 tablet (5 mg total) by mouth every 4 (four) hours as needed for Pain. 10 tablet 0 3/24/2024    phenazopyridine (PYRIDIUM) 200 MG tablet Take 1 tablet (200 mg total) by mouth 3 (three) times daily as needed for Pain. 30 tablet 0 3/24/2024    rifAXIMin (XIFAXAN) 550 mg Tab Take 1 tablet (550 mg total) by mouth 2 (two) times daily. 60 tablet 11 3/24/2024    tamsulosin (FLOMAX) 0.4 mg Cap Take 1 capsule (0.4 mg total) by mouth once daily. 30 capsule 11 3/24/2024    tamsulosin (FLOMAX) 0.4 mg Cap Take 1 capsule (0.4 mg total) by mouth once daily. 30 capsule 0     UNABLE TO FIND 4 (four) times daily as needed. Medible 20 mg blue raspberry 1/4 to 1/2 up to 4 times daily as needed.          Objective:     Vital Signs (Most Recent):  Temp: 98.2 °F (36.8 °C) (03/26/24 0812)  Pulse: (!) 58 (03/26/24 0812)  Resp: 20 (03/26/24 0916)  BP: (!) 114/56 (03/26/24 0812)  SpO2: 96 % (03/26/24 0812) Vital Signs (24h Range):  Temp:  [97.9 °F (36.6 °C)-98.3 °F (36.8 °C)] 98.2 °F (36.8 °C)  Pulse:  [56-71] 58  Resp:  [12-20] 20  SpO2:  [95 %-97 %] 96 %  BP: (107-127)/(53-59) 114/56     Weight: 65 kg (143 lb 4.8 oz) (03/25/24 0445)  Body mass index is 21.79 kg/m².       Physical Exam  Constitutional:       General: She is not in acute distress.     Appearance: Normal appearance.   HENT:      Head: Normocephalic and atraumatic.   Eyes:      Extraocular Movements: Extraocular movements intact.      Pupils: Pupils are equal, round, and reactive to light.   Cardiovascular:      Rate and Rhythm: Normal rate and regular rhythm.   Pulmonary:      Effort: Pulmonary effort is normal. No respiratory distress.      Breath sounds: Normal breath sounds.   Abdominal:      General: There is distension.      Palpations: Abdomen is soft.      Tenderness: There is  abdominal tenderness. There is right CVA tenderness.      Comments: R costovertebral tenderness and flank pain   Musculoskeletal:      Right lower leg: No edema.      Left lower leg: No edema.   Skin:     Coloration: Skin is not jaundiced.   Neurological:      General: No focal deficit present.      Mental Status: She is alert and oriented to person, place, and time.      Comments: No asterixis             MELD 3.0: 8 at 3/26/2024  7:32 AM  MELD-Na: 7 at 3/26/2024  7:32 AM  Calculated from:  Serum Creatinine: 0.8 mg/dL (Using min of 1 mg/dL) at 3/26/2024  7:32 AM  Serum Sodium: 140 mmol/L (Using max of 137 mmol/L) at 3/26/2024  7:32 AM  Total Bilirubin: 0.9 mg/dL (Using min of 1 mg/dL) at 3/26/2024  7:32 AM  Serum Albumin: 3.6 g/dL (Using max of 3.5 g/dL) at 3/26/2024  7:32 AM  INR(ratio): 1.1 at 3/24/2024  3:41 PM  Age at listing (hypothetical): 53 years  Sex: Female at 3/26/2024  7:32 AM      Significant Labs:  Labs within the past month have been reviewed.    Significant Imaging:  Labs: Reviewed

## 2024-03-26 NOTE — HPI
53-year-old female with past medical history of liver cirrhosis secondary to CARTAGENA who had a recent right urethral stent for kidney stones on Wednesday and presented to the emergency room for severe abdominal pain and distended abdomen following her procedure. CT abdomen on admission concerning for pyelonephritis/hydronephrosis for malfunction of the stent and moderate ascites. Urinalysis shows WBC of 25 and RBC of 100. Urology was consulted in the emergency room and removed the stent. Hepatology consulted for evaluation by her outpatient hepatologist. She has a known history of compensated cirrhosis secondary to CARTAGENA with Grade 1 varices seen on upper endoscopy. No active bleeding this admission. She has moderate abdominal distension that has improved since her stent removal with associated right flank pain and costovertebral tenderness that radiates to her R groin. Abdominal tenderness does not seem to be related to ascites. She has no history of prior SBP and is not encephalopathic.       Notable labs: Hg 12.4, Plt 62, Na 140, Cr 0.8, normal LFTs.

## 2024-03-26 NOTE — ASSESSMENT & PLAN NOTE
- Stent removed by urology  - No longer actively passing stone fragments, but complains of ongoing pain  - Repeat CT renal stone protocol ordered  - PRN analgesics provided

## 2024-03-26 NOTE — SUBJECTIVE & OBJECTIVE
Interval History: NAEON. AFVSS. Pain well controlled. No current flank pain or dysuria/difficulty urinating.  Creatinine stable at 0.8.  White count normal.  Good urine output.  Recent CT scan with residual right ureteral and pelvic dilation without true hydronephrosis.  No signs of obstructing stones.      Objective:     Temp:  [97.9 °F (36.6 °C)-98.3 °F (36.8 °C)] 98.2 °F (36.8 °C)  Pulse:  [56-71] 58  Resp:  [12-20] 20  SpO2:  [95 %-97 %] 96 %  BP: (107-127)/(56-59) 114/56     Body mass index is 21.79 kg/m².    Date 03/26/24 0700 - 03/27/24 0659   Shift 8813-7437 9972-5200 3270-7371 24 Hour Total   INTAKE   P.O. 480   480   Shift Total(mL/kg) 480(7.4)   480(7.4)   OUTPUT   Urine(mL/kg/hr) 400   400   Shift Total(mL/kg) 400(6.2)   400(6.2)   Weight (kg) 65 65 65 65          Drains       None                    Physical Exam  Vitals and nursing note reviewed.   Constitutional:       General: She is not in acute distress.  HENT:      Head: Atraumatic.      Nose: Nose normal.   Eyes:      Extraocular Movements: Extraocular movements intact.   Cardiovascular:      Rate and Rhythm: Normal rate.   Pulmonary:      Effort: Pulmonary effort is normal.   Abdominal:      General: Abdomen is flat. There is no distension.      Tenderness: There is no abdominal tenderness. There is no right CVA tenderness or left CVA tenderness.   Musculoskeletal:         General: Normal range of motion.      Cervical back: Normal range of motion.   Skin:     Coloration: Skin is not jaundiced.   Neurological:      General: No focal deficit present.      Mental Status: She is alert and oriented to person, place, and time.   Psychiatric:         Mood and Affect: Mood normal.         Behavior: Behavior normal.           Significant Labs:    BMP:  Recent Labs   Lab 03/24/24  1541 03/25/24  0452 03/26/24  0732    138 140   K 4.0 3.6 4.0    102 105   CO2 31* 33* 27   BUN 11 12 8   CREATININE 0.9 0.8 0.8   CALCIUM 9.3 8.7 9.2       CBC:    Recent Labs   Lab 03/24/24  1541 03/25/24  0452 03/26/24  0732   WBC 2.82* 2.19* 2.66*   HGB 11.5* 10.5* 12.4   HCT 36.4* 33.1* 38.6   PLT 59* 60* 62*       All pertinent labs results from the past 24 hours have been reviewed.    Significant Imaging:  All pertinent imaging results/findings from the past 24 hours have been reviewed.

## 2024-03-26 NOTE — CONSULTS
[facility]  Hepatology  Consult Note    Patient Name: Tee Aranda  MRN: [id number]  Admission Date: 3/24/2024  Hospital Length of Stay: 0 days  Attending Provider: Luis Cummings MD   Primary Care Physician: James Samano MD  Principal Problem:Acute cystitis with hematuria    Consults  Subjective:         HPI:  53-year-old female with past medical history of liver cirrhosis secondary to CARTAGENA who had a recent right urethral stent for kidney stones on Wednesday and presented to the emergency room for severe abdominal pain and distended abdomen following her procedure. CT abdomen on admission concerning for pyelonephritis/hydronephrosis for malfunction of the stent and moderate ascites. Urinalysis shows WBC of 25 and RBC of 100. Urology was consulted in the emergency room and removed the stent. Hepatology consulted for evaluation by her outpatient hepatologist. She has a known history of compensated cirrhosis secondary to CARTAGENA with Grade 1 varices seen on upper endoscopy. No active bleeding this admission. She has moderate abdominal distension that has improved since her stent removal with associated right flank pain and costovertebral tenderness that radiates to her R groin. She has no history of prior SBP and is not encephalopathic.       Notable labs: Hg 12.4, Plt 62, Na 140, Cr 0.8, normal LFTs. MELD is 7.      Review of Systems    Past Medical History:   Diagnosis Date    Anemia, unspecified     Anxiety     Arthritis     Ascites 11/23/2020    AVN (avascular necrosis of bone)     Cataract     COVID-19 vaccine administered     04/08/21, 04/30/21    Diarrhea of presumed infectious origin 7/31/2023    Edema 11/23/2020    Epigastric pain 7/31/2023    Esophageal varices     Glaucoma     Hepatic encephalopathy 11/23/2020    History of colon polyps     Hx of cirrhosis     non-alcoholic    Iron deficiency anemia secondary to blood loss (chronic)     Kidney stones     Portal hypertensive gastropathy      Unintentional weight loss 10/14/2023    Unspecified cirrhosis of liver        Past Surgical History:   Procedure Laterality Date    APPENDECTOMY      COLONOSCOPY N/A 8/3/2023    Procedure: COLONOSCOPY;  Surgeon: Gerard Salinas MD;  Location: [facility];  Service: Endoscopy;  Laterality: N/A;    COLONOSCOPY W/ POLYPECTOMY      CYSTOSCOPY N/A 3/20/2024    Procedure: CYSTOSCOPY;  Surgeon: Chris Carey MD;  Location: [facility] OR 1ST FLR;  Service: Urology;  Laterality: N/A;    CYSTOSCOPY W/ URETERAL STENT REMOVAL Right 3/14/2024    Procedure: CYSTOSCOPY, WITH URETERAL STENT REMOVAL;  Surgeon: Chris Carey MD;  Location: [facility] OR 1ST FLR;  Service: Urology;  Laterality: Right;    DISTAL CLAVICLE EXCISION Right 11/18/2021    Procedure: RIGHT SHOULDER ARTHROSCOPY, EXCISION, CLAVICLE, DISTAL; EXTENSIVE DEBRIDEMENT;  Surgeon: Isaiah Joyner MD;  Location: [facility];  Service: Orthopedics;  Laterality: Right;    ESOPHAGEAL VARICE LIGATION      ESOPHAGOGASTRODUODENOSCOPY N/A 11/10/2020    Procedure: EGD (ESOPHAGOGASTRODUODENOSCOPY);  Surgeon: Gerard Salinas MD;  Location: [facility];  Service: Endoscopy;  Laterality: N/A;    ESOPHAGOGASTRODUODENOSCOPY N/A 12/28/2020    Procedure: EGD (ESOPHAGOGASTRODUODENOSCOPY);  Surgeon: Adryan Park MD;  Location: [facility] (2ND FLR);  Service: Endoscopy;  Laterality: N/A;    ESOPHAGOGASTRODUODENOSCOPY N/A 02/15/2021    Procedure: EGD (ESOPHAGOGASTRODUODENOSCOPY);  Surgeon: Hari Greene MD;  Location: [facility] (4TH FLR);  Service: Endoscopy;  Laterality: N/A;  6 week follow-up variceal banding  Hx varices - Labs - ERW  prep ins. emialed - COVID screening on 2/12/21 [place] - ERW    ESOPHAGOGASTRODUODENOSCOPY Left 08/03/2021    Procedure: EGD (ESOPHAGOGASTRODUODENOSCOPY);  Surgeon: Fabricio Corado MD;  Location: [facility] (4TH FLR);  Service: Gastroenterology;  Laterality: Left;  recent hematemasis. varices-labs on 7/26    COVID test at [facility] on 7/31-GT  requesting  sooner    ESOPHAGOGASTRODUODENOSCOPY N/A 07/27/2022    Procedure: EGD (ESOPHAGOGASTRODUODENOSCOPY);  Surgeon: Eric Garcia MD;  Location: [facility];  Service: Endoscopy;  Laterality: N/A;    ESOPHAGOGASTRODUODENOSCOPY Left 08/29/2022    Procedure: EGD (ESOPHAGOGASTRODUODENOSCOPY);  Surgeon: Kacy Douglass MD;  Location: [facility] (2ND FLR);  Service: Endoscopy;  Laterality: Left;  Fully vaccinated/ clear liquids up to 4 hrs prior-RB  varices labs ordered-RB    ESOPHAGOGASTRODUODENOSCOPY N/A 10/05/2022    Procedure: EGD (ESOPHAGOGASTRODUODENOSCOPY);  Surgeon: Gerard Salinas MD;  Location: [facility];  Service: Endoscopy;  Laterality: N/A;    ESOPHAGOGASTRODUODENOSCOPY N/A 3/30/2023    Procedure: EGD (ESOPHAGOGASTRODUODENOSCOPY);  Surgeon: Gerard Salinas MD;  Location: [facility];  Service: Endoscopy;  Laterality: N/A;    ESOPHAGOGASTRODUODENOSCOPY N/A 8/3/2023    Procedure: EGD (ESOPHAGOGASTRODUODENOSCOPY);  Surgeon: Gerard Salinas MD;  Location: [facility];  Service: Endoscopy;  Laterality: N/A;    EXTRACTION - STONE Right 3/20/2024    Procedure: EXTRACTION - STONE;  Surgeon: Chris Carey MD;  [facility];  Service: Urology;  Laterality: Right;    HYSTERECTOMY      KNEE SURGERY Bilateral     LASER LITHOTRIPSY Right 3/20/2024    Procedure: LITHOTRIPSY, USING LASER;  Surgeon: Chris Carey MD;  Location: [facility] OR 1ST FLR;  Service: Urology;  Laterality: Right;    LITHOTRIPSY      REMOVAL-STENT Right 3/20/2024    Procedure: REMOVAL-STENT;  Surgeon: Chris Carey MD;  Location: [facility] OR 1ST FLR;  Service: Urology;  Laterality: Right;    RETROGRADE PYELOGRAPHY Right 3/20/2024    Procedure: PYELOGRAM, RETROGRADE;  Surgeon: Chris Carey MD;  [facility];  Service: Urology;  Laterality: Right;    RHINOPLASTY TIP      SHOULDER SURGERY Right     TONSILLECTOMY      TOTAL HIP ARTHROPLASTY Right     URETERAL STENT PLACEMENT Right 3/20/2024    Procedure: INSERTION, STENT,  URETER;  Surgeon: Chris Carey MD;  Location: [facility] OR [address] [place] [address];  Service: Urology;  Laterality: Right;    URETEROSCOPY Right 3/20/2024    Procedure: URETEROSCOPY;  Surgeon: Chris Carey MD;  Location: [facility] OR [address] [place] [address];  Service: Urology;  Laterality: Right;         Review of patient's allergies indicates:   Allergen Reactions    Sulfa (sulfonamide antibiotics) Hives         Tobacco Use    Smoking status: Some Days     Current packs/day: 0.00     Types: Cigarettes     Last attempt to quit: 7/3/2019     Years since quittin.7    Smokeless tobacco: Never   Substance and Sexual Activity    Alcohol use: Not Currently    Drug use: No    Sexual activity: Not on file       Medications Prior to Admission   Medication Sig Dispense Refill Last Dose    carvediloL (COREG) 3.125 MG tablet TAKE 1 TABLET BY MOUTH 2 TIMES DAILY. 180 tablet 3 3/24/2024    ciprofloxacin HCl (CIPRO) 250 MG tablet Take 1 tablet (250 mg total) by mouth 2 (two) times a day. for 14 days 28 tablet 0 3/24/2024    esomeprazole (NEXIUM) 40 MG capsule Take 1 capsule (40 mg total) by mouth 2 (two) times daily before meals. 60 capsule 11 3/24/2024    multivitamin (THERAGRAN) per tablet Take 1 tablet by mouth once daily.   3/24/2024    omega-3 fatty acids/fish oil (FISH OIL-OMEGA-3 FATTY ACIDS) 300-1,000 mg capsule Take 1 capsule by mouth once daily.   3/24/2024    oxybutynin (DITROPAN) 5 MG Tab Take 1 tablet (5 mg total) by mouth 3 (three) times daily. 90 tablet 11 3/24/2024    oxyCODONE (ROXICODONE) 5 MG immediate release tablet Take 1 tablet (5 mg total) by mouth every 4 (four) hours as needed for Pain. 10 tablet 0 3/24/2024    phenazopyridine (PYRIDIUM) 200 MG tablet Take 1 tablet (200 mg total) by mouth 3 (three) times daily as needed for Pain. 30 tablet 0 3/24/2024    rifAXIMin (XIFAXAN) 550 mg Tab Take 1 tablet (550 mg total) by mouth 2 (two) times daily. 60 tablet 11 3/24/2024    tamsulosin (FLOMAX) 0.4 mg Cap Take 1 capsule (0.4 mg total) by mouth  once daily. 30 capsule 11 3/24/2024    tamsulosin (FLOMAX) 0.4 mg Cap Take 1 capsule (0.4 mg total) by mouth once daily. 30 capsule 0     UNABLE TO FIND 4 (four) times daily as needed. Medible 20 mg blue raspberry 1/4 to 1/2 up to 4 times daily as needed.          Objective:     Vital Signs (Most Recent):  Temp: 98.2 °F (36.8 °C) (03/26/24 0812)  Pulse: (!) 58 (03/26/24 0812)  Resp: 20 (03/26/24 0916)  BP: (!) 114/56 (03/26/24 0812)  SpO2: 96 % (03/26/24 0812) Vital Signs (24h Range):  Temp:  [97.9 °F (36.6 °C)-98.3 °F (36.8 °C)] 98.2 °F (36.8 °C)  Pulse:  [56-71] 58  Resp:  [12-20] 20  SpO2:  [95 %-97 %] 96 %  BP: (107-127)/(53-59) 114/56     Weight: 65 kg (143 lb 4.8 oz) (03/25/24 0445)  Body mass index is 21.79 kg/m².       Physical Exam  Constitutional:       General: She is not in acute distress.     Appearance: Normal appearance.   HENT:      Head: Normocephalic and atraumatic.   Eyes:      Extraocular Movements: Extraocular movements intact.      Pupils: Pupils are equal, round, and reactive to light.   Cardiovascular:      Rate and Rhythm: Normal rate and regular rhythm.   Pulmonary:      Effort: Pulmonary effort is normal. No respiratory distress.      Breath sounds: Normal breath sounds.   Abdominal:      General: There is distension.      Palpations: Abdomen is soft.      Tenderness: There is abdominal tenderness. There is right CVA tenderness.      Comments: R costovertebral tenderness and flank pain   Musculoskeletal:      Right lower leg: No edema.      Left lower leg: No edema.   Skin:     Coloration: Skin is not jaundiced.   Neurological:      General: No focal deficit present.      Mental Status: She is alert and oriented to person, place, and time.      Comments: No asterixis             MELD 3.0: 8 at 3/26/2024  7:32 AM  MELD-Na: 7 at 3/26/2024  7:32 AM  Calculated from:  Serum Creatinine: 0.8 mg/dL (Using min of 1 mg/dL) at 3/26/2024  7:32 AM  Serum Sodium: 140 mmol/L (Using max of 137 mmol/L) at  3/26/2024  7:32 AM  Total Bilirubin: 0.9 mg/dL (Using min of 1 mg/dL) at 3/26/2024  7:32 AM  Serum Albumin: 3.6 g/dL (Using max of 3.5 g/dL) at 3/26/2024  7:32 AM  INR(ratio): 1.1 at 3/24/2024  3:41 PM  Age at listing (hypothetical): 53 years  Sex: Female at 3/26/2024  7:32 AM      Significant Labs:  Labs within the past month have been reviewed.    Significant Imaging:  Labs: Reviewed  Assessment/Plan:     GI  Liver cirrhosis secondary to CARTAGENA  Patient with a known history of cirrhosis secondary to CARTAGENA with Grade 1 varices seen on upper endoscopy. Her cirrhosis is compensated this admission. No active bleeding. No history of prior SBP. She is not encephalopathic and no asterixis on exam. The location of her abdominal pain seems related to her pyelonephritis and not ascites-related. Moderate ascites seen on CT. Recommend following up with paracentesis.    -Recommend paracentesis  -Recommend increasing to Lasix 40, Spironolactone 100              Thank you for your consult. I will sign off. Please contact us if you have any additional questions.    Idalia Man, DO  Hepatology  [facility]

## 2024-03-26 NOTE — PROGRESS NOTES
[facility]  Urology  Progress Note    Patient Name: Tee Aranda  MRN: [id number]  Admission Date: 3/24/2024  Hospital Length of Stay: 0 days  Code Status: Full Code   Attending Provider: Luis Cummings MD   Primary Care Physician: James Samano MD    Subjective:     HPI:  53M w/ hx of CARTAGENA, nephrolithiasis, s/p right ureteroscopy with Dr. Carey on 03/20.  She presents to the ED with abdominal swelling and suprapubic pain radiating to the R flank.    The patient underwent R US with LL during which a proximal ureteral stone was dusted and fragments basket extracted.  Her R renal stones were dusted at that time.   She was left with a R ureteral stent with strings.  Patient was instructed to remove her stent with strings tomorrow.  She is uncomfortable doing so.      She endorses gross hematuria with clots.  She also endorses urinary frequency and stent pain while voiding.  This has worsened since yesterday.     In the ED she is afebrile VSS.  Bladder scan not reliable due to ascites.  Cath PVR was 50 mL.  UA not concerning for infection.  Creatinine 0.9 at baseline.  Patient denies fever and chills.  No leukocytosis.  CT AP obtained in the ED shows migration of the proximal stent coil into the proximal ureter.  Distal coil within the bladder.  Mild hydronephrosis as expected.  Some stone fragments in the right lower pole.  No stone seen within the ureter.      Interval History: NAEON. AFVSS. Pain well controlled. No current flank pain or dysuria/difficulty urinating.  Creatinine stable at 0.8.  White count normal.  Good urine output.  Recent CT scan with residual right ureteral and pelvic dilation without true hydronephrosis.  No signs of obstructing stones.      Objective:     Temp:  [97.9 °F (36.6 °C)-98.3 °F (36.8 °C)] 98.2 °F (36.8 °C)  Pulse:  [56-71] 58  Resp:  [12-20] 20  SpO2:  [95 %-97 %] 96 %  BP: (107-127)/(56-59) 114/56     Body mass index is 21.79 kg/m².    Date 03/26/24 0700 - 03/27/24  0659   Shift 6462-3421 2077-7778 3554-9676 24 Hour Total   INTAKE   P.O. 480   480   Shift Total(mL/kg) 480(7.4)   480(7.4)   OUTPUT   Urine(mL/kg/hr) 400   400   Shift Total(mL/kg) 400(6.2)   400(6.2)   Weight (kg) 65 65 65 65          Drains       None                    Physical Exam  Vitals and nursing note reviewed.   Constitutional:       General: She is not in acute distress.  HENT:      Head: Atraumatic.      Nose: Nose normal.   Eyes:      Extraocular Movements: Extraocular movements intact.   Cardiovascular:      Rate and Rhythm: Normal rate.   Pulmonary:      Effort: Pulmonary effort is normal.   Abdominal:      General: Abdomen is flat. There is no distension.      Tenderness: There is no abdominal tenderness. There is no right CVA tenderness or left CVA tenderness.   Musculoskeletal:         General: Normal range of motion.      Cervical back: Normal range of motion.   Skin:     Coloration: Skin is not jaundiced.   Neurological:      General: No focal deficit present.      Mental Status: She is alert and oriented to person, place, and time.   Psychiatric:         Mood and Affect: Mood normal.         Behavior: Behavior normal.           Significant Labs:    BMP:  Recent Labs   Lab 03/24/24  1541 03/25/24  0452 03/26/24  0732    138 140   K 4.0 3.6 4.0    102 105   CO2 31* 33* 27   BUN 11 12 8   CREATININE 0.9 0.8 0.8   CALCIUM 9.3 8.7 9.2       CBC:   Recent Labs   Lab 03/24/24  1541 03/25/24  0452 03/26/24  0732   WBC 2.82* 2.19* 2.66*   HGB 11.5* 10.5* 12.4   HCT 36.4* 33.1* 38.6   PLT 59* 60* 62*       All pertinent labs results from the past 24 hours have been reviewed.    Significant Imaging:  All pertinent imaging results/findings from the past 24 hours have been reviewed.                  Assessment/Plan:     * Urolithiasis  53F s/p R URS on 3/20.  Urology consulted for stent with strings removal.      -CT scan from today without signs of obstructing urolithiasis.  There is mild right  ureteral pelvic dilation which is consistent with the postoperative course for ureteroscopy and recent ureteral stent placement.  No true hydronephrosis noted on the scan.  -no indications for urologic interventions at this point.  As patient is currently subjectively and objectively absent from urological symptoms.  - f/u as scheduled with Dr. Carey         VTE Risk Mitigation (From admission, onward)           Ordered     IP VTE HIGH RISK PATIENT  Once         03/24/24 2213     Place sequential compression device  Until discontinued         03/24/24 2213     Place TYREE hose  Until discontinued         03/24/24 2213                    Andrea Aguirre MD  Urology  [facility] - Observation 11H

## 2024-03-26 NOTE — PLAN OF CARE
Problem: Adult Inpatient Plan of Care  Goal: Plan of Care Review  Outcome: Met  Goal: Patient-Specific Goal (Individualized)  Outcome: Met  Goal: Absence of Hospital-Acquired Illness or Injury  Outcome: Met  Goal: Optimal Comfort and Wellbeing  Outcome: Met  Goal: Readiness for Transition of Care  Outcome: Met     Problem: Fall Injury Risk  Goal: Absence of Fall and Fall-Related Injury  Outcome: Met     Problem: Pain Acute  Goal: Acceptable Pain Control and Functional Ability  Outcome: Met     Problem: Infection  Goal: Absence of Infection Signs and Symptoms  Outcome: Met     Problem: Impaired Wound Healing  Goal: Optimal Wound Healing  Outcome: Met

## 2024-03-27 NOTE — PLAN OF CARE
[facility]  Discharge Final Note    Primary Care Provider: James Samano MD    Expected Discharge Date: 3/26/2024    Patient discharged to home via personal transportation.     Patient's bedside nurse and patient notified of the above.    Discharge Plan A and Plan B have been determined by review of patient's clinical status, future medical and therapeutic needs, and coverage/benefits for post-acute care in coordination with multidisciplinary team members.        Final Discharge Note (most recent)       Final Note - 03/27/24 0939          Final Note    Assessment Type Final Discharge Note (P)      Anticipated Discharge Disposition Home or Self Care (P)         Post-Acute Status    Post-Acute Authorization Other (P)      Other Status No Post-Acute Service Needs (P)                      Important Message from Medicare             Contact Info       James Samano MD   Specialty: Family Medicine   Relationship: PCP - General    [place] [facility]  [address]  [place]  [place] [address]   Phone: [phone]       Next Steps: Go on 4/3/2024    Instructions: Hospital Follow Up with Drea Hameed MD at 10:30AM; Bring Discharge Summary, ID, Insurance Card, and Medication List            Future Appointments   Date Time Provider Department Center   3/28/2024  2:00 PM [facility]   4/23/2024  1:30 PM Bonnie Bowers MD [facility] [place]   6/21/2024 10:15 AM [facility] OIC-US1 MASTER [facility] ULTR [facility]   6/25/2024  1:40 PM Chris Carey MD [facility] UROLOGY [facility]        scheduled post-discharge follow-up appointment and information added to AVS.     Trudy Dwyer, LMSW [facility]  Ext. [phone]

## 2024-03-28 ENCOUNTER — TELEPHONE (OUTPATIENT)
Dept: HEPATOLOGY | Facility: CLINIC | Age: 54
End: 2024-03-28
Payer: COMMERCIAL

## 2024-03-28 ENCOUNTER — PATIENT MESSAGE (OUTPATIENT)
Dept: ADMINISTRATIVE | Facility: CLINIC | Age: 54
End: 2024-03-28
Payer: COMMERCIAL

## 2024-03-28 ENCOUNTER — PATIENT OUTREACH (OUTPATIENT)
Dept: ADMINISTRATIVE | Facility: CLINIC | Age: 54
End: 2024-03-28
Payer: COMMERCIAL

## 2024-03-28 NOTE — PROGRESS NOTES
C3 nurse attempted to contact Tee Aranda  for a TCC post hospital discharge follow up call. The patient is unable to conduct the call @ this time. The patient  requested a callback.    The patient has a scheduled HOSFU appointment with Drea Hameed on 4/3/24 @ 1030.

## 2024-03-28 NOTE — PROGRESS NOTES
2nd Attempt made to reach patient for TCC call. No answer.  Message sent via [facility] portal for Post Discharge Attempt.

## 2024-03-30 ENCOUNTER — LAB VISIT (OUTPATIENT)
Dept: LAB | Facility: HOSPITAL | Age: 54
End: 2024-03-30
Attending: INTERNAL MEDICINE
Payer: COMMERCIAL

## 2024-03-30 DIAGNOSIS — K74.60 LIVER CIRRHOSIS SECONDARY TO NASH: Chronic | ICD-10-CM

## 2024-03-30 DIAGNOSIS — K75.81 LIVER CIRRHOSIS SECONDARY TO NASH: Chronic | ICD-10-CM

## 2024-03-30 LAB
BASOPHILS # BLD AUTO: 0.01 K/UL (ref 0–0.2)
BASOPHILS NFR BLD: 0.3 % (ref 0–1.9)
DIFFERENTIAL METHOD BLD: ABNORMAL
EOSINOPHIL # BLD AUTO: 0.1 K/UL (ref 0–0.5)
EOSINOPHIL NFR BLD: 1.5 % (ref 0–8)
ERYTHROCYTE [DISTWIDTH] IN BLOOD BY AUTOMATED COUNT: 12.9 % (ref 11.5–14.5)
HCT VFR BLD AUTO: 38.3 % (ref 37–48.5)
HGB BLD-MCNC: 12.7 G/DL (ref 12–16)
IMM GRANULOCYTES # BLD AUTO: 0.02 K/UL (ref 0–0.04)
IMM GRANULOCYTES NFR BLD AUTO: 0.6 % (ref 0–0.5)
INR PPP: 1.1 (ref 0.8–1.2)
LYMPHOCYTES # BLD AUTO: 0.6 K/UL (ref 1–4.8)
LYMPHOCYTES NFR BLD: 17.4 % (ref 18–48)
MCH RBC QN AUTO: 29.8 PG (ref 27–31)
MCHC RBC AUTO-ENTMCNC: 33.2 G/DL (ref 32–36)
MCV RBC AUTO: 90 FL (ref 82–98)
MONOCYTES # BLD AUTO: 0.2 K/UL (ref 0.3–1)
MONOCYTES NFR BLD: 6.6 % (ref 4–15)
NEUTROPHILS # BLD AUTO: 2.5 K/UL (ref 1.8–7.7)
NEUTROPHILS NFR BLD: 73.6 % (ref 38–73)
NRBC BLD-RTO: 0 /100 WBC
PLATELET # BLD AUTO: 82 K/UL (ref 150–450)
PMV BLD AUTO: 12.1 FL (ref 9.2–12.9)
PROTHROMBIN TIME: 12.1 SEC (ref 9–12.5)
RBC # BLD AUTO: 4.26 M/UL (ref 4–5.4)
WBC # BLD AUTO: 3.33 K/UL (ref 3.9–12.7)

## 2024-03-30 PROCEDURE — 85025 COMPLETE CBC W/AUTO DIFF WBC: CPT | Performed by: INTERNAL MEDICINE

## 2024-03-30 PROCEDURE — 36415 COLL VENOUS BLD VENIPUNCTURE: CPT | Performed by: INTERNAL MEDICINE

## 2024-03-30 PROCEDURE — 85610 PROTHROMBIN TIME: CPT | Performed by: INTERNAL MEDICINE

## 2024-04-01 ENCOUNTER — HOSPITAL ENCOUNTER (EMERGENCY)
Facility: HOSPITAL | Age: 54
Discharge: HOME | End: 2024-04-01
Attending: EMERGENCY MEDICINE
Payer: COMMERCIAL

## 2024-04-01 VITALS
HEART RATE: 74 BPM | WEIGHT: 123 LBS | TEMPERATURE: 98 F | BODY MASS INDEX: 18.64 KG/M2 | RESPIRATION RATE: 18 BRPM | OXYGEN SATURATION: 99 % | HEIGHT: 68 IN | DIASTOLIC BLOOD PRESSURE: 71 MMHG | SYSTOLIC BLOOD PRESSURE: 121 MMHG

## 2024-04-01 DIAGNOSIS — R25.2 LEG CRAMPING: ICD-10-CM

## 2024-04-01 DIAGNOSIS — M62.838 MUSCLE SPASMS OF BOTH LOWER EXTREMITIES: Primary | ICD-10-CM

## 2024-04-01 DIAGNOSIS — E83.42 HYPOMAGNESEMIA: ICD-10-CM

## 2024-04-01 LAB
ALBUMIN SERPL BCP-MCNC: 4.3 G/DL (ref 3.5–5.2)
ALP SERPL-CCNC: 80 U/L (ref 55–135)
ALT SERPL W/O P-5'-P-CCNC: 20 U/L (ref 10–44)
ANION GAP SERPL CALC-SCNC: 10 MMOL/L (ref 8–16)
AST SERPL-CCNC: 23 U/L (ref 10–40)
BASOPHILS # BLD AUTO: 0.02 K/UL (ref 0–0.2)
BASOPHILS NFR BLD: 0.3 % (ref 0–1.9)
BILIRUB SERPL-MCNC: 0.8 MG/DL (ref 0.1–1)
BUN SERPL-MCNC: 27 MG/DL (ref 6–20)
CALCIUM SERPL-MCNC: 9.3 MG/DL (ref 8.7–10.5)
CHLORIDE SERPL-SCNC: 108 MMOL/L (ref 95–110)
CK SERPL-CCNC: 74 U/L (ref 20–180)
CO2 SERPL-SCNC: 22 MMOL/L (ref 23–29)
CREAT SERPL-MCNC: 0.8 MG/DL (ref 0.5–1.4)
DIFFERENTIAL METHOD BLD: ABNORMAL
EOSINOPHIL # BLD AUTO: 0 K/UL (ref 0–0.5)
EOSINOPHIL NFR BLD: 0.6 % (ref 0–8)
ERYTHROCYTE [DISTWIDTH] IN BLOOD BY AUTOMATED COUNT: 13 % (ref 11.5–14.5)
EST. GFR  (NO RACE VARIABLE): >60 ML/MIN/1.73 M^2
GLUCOSE SERPL-MCNC: 95 MG/DL (ref 70–110)
HCT VFR BLD AUTO: 38.4 % (ref 37–48.5)
HGB BLD-MCNC: 12.8 G/DL (ref 12–16)
IMM GRANULOCYTES # BLD AUTO: 0.02 K/UL (ref 0–0.04)
IMM GRANULOCYTES NFR BLD AUTO: 0.3 % (ref 0–0.5)
INR PPP: 1.2 (ref 0.8–1.2)
LYMPHOCYTES # BLD AUTO: 0.6 K/UL (ref 1–4.8)
LYMPHOCYTES NFR BLD: 8.9 % (ref 18–48)
MAGNESIUM SERPL-MCNC: 1.5 MG/DL (ref 1.6–2.6)
MCH RBC QN AUTO: 29.9 PG (ref 27–31)
MCHC RBC AUTO-ENTMCNC: 33.3 G/DL (ref 32–36)
MCV RBC AUTO: 90 FL (ref 82–98)
MONOCYTES # BLD AUTO: 0.5 K/UL (ref 0.3–1)
MONOCYTES NFR BLD: 7.8 % (ref 4–15)
NEUTROPHILS # BLD AUTO: 5.1 K/UL (ref 1.8–7.7)
NEUTROPHILS NFR BLD: 82.1 % (ref 38–73)
NRBC BLD-RTO: 0 /100 WBC
PLATELET # BLD AUTO: 93 K/UL (ref 150–450)
PMV BLD AUTO: 10.9 FL (ref 9.2–12.9)
POTASSIUM SERPL-SCNC: 3.8 MMOL/L (ref 3.5–5.1)
PROT SERPL-MCNC: 7 G/DL (ref 6–8.4)
PROTHROMBIN TIME: 12.7 SEC (ref 9–12.5)
RBC # BLD AUTO: 4.28 M/UL (ref 4–5.4)
SODIUM SERPL-SCNC: 140 MMOL/L (ref 136–145)
TROPONIN I SERPL DL<=0.01 NG/ML-MCNC: 0.02 NG/ML (ref 0–0.03)
WBC # BLD AUTO: 6.18 K/UL (ref 3.9–12.7)

## 2024-04-01 PROCEDURE — 93010 ELECTROCARDIOGRAM REPORT: CPT | Mod: ,,, | Performed by: INTERNAL MEDICINE

## 2024-04-01 PROCEDURE — 85025 COMPLETE CBC W/AUTO DIFF WBC: CPT

## 2024-04-01 PROCEDURE — 25000003 PHARM REV CODE 250

## 2024-04-01 PROCEDURE — 82550 ASSAY OF CK (CPK): CPT

## 2024-04-01 PROCEDURE — 93005 ELECTROCARDIOGRAM TRACING: CPT

## 2024-04-01 PROCEDURE — 99284 EMERGENCY DEPT VISIT MOD MDM: CPT | Mod: 25

## 2024-04-01 PROCEDURE — 83735 ASSAY OF MAGNESIUM: CPT

## 2024-04-01 PROCEDURE — 63600175 PHARM REV CODE 636 W HCPCS

## 2024-04-01 PROCEDURE — 96361 HYDRATE IV INFUSION ADD-ON: CPT

## 2024-04-01 PROCEDURE — 85610 PROTHROMBIN TIME: CPT

## 2024-04-01 PROCEDURE — 84484 ASSAY OF TROPONIN QUANT: CPT

## 2024-04-01 PROCEDURE — 80053 COMPREHEN METABOLIC PANEL: CPT

## 2024-04-01 PROCEDURE — 96365 THER/PROPH/DIAG IV INF INIT: CPT

## 2024-04-01 PROCEDURE — 36415 COLL VENOUS BLD VENIPUNCTURE: CPT

## 2024-04-01 RX ORDER — METHOCARBAMOL 500 MG/1
1000 TABLET, FILM COATED ORAL 3 TIMES DAILY
Qty: 30 TABLET | Refills: 0 | Status: SHIPPED | OUTPATIENT
Start: 2024-04-01 | End: 2024-04-06

## 2024-04-01 RX ORDER — MAGNESIUM SULFATE 1 G/100ML
1 INJECTION INTRAVENOUS
Status: COMPLETED | OUTPATIENT
Start: 2024-04-01 | End: 2024-04-01

## 2024-04-01 RX ORDER — METHOCARBAMOL 500 MG/1
1000 TABLET, FILM COATED ORAL 3 TIMES DAILY
Qty: 30 TABLET | Refills: 0 | Status: SHIPPED | OUTPATIENT
Start: 2024-04-01 | End: 2024-04-01

## 2024-04-01 RX ORDER — METHOCARBAMOL 500 MG/1
1000 TABLET, FILM COATED ORAL
Status: COMPLETED | OUTPATIENT
Start: 2024-04-01 | End: 2024-04-01

## 2024-04-01 RX ADMIN — SODIUM CHLORIDE 1000 ML: 9 INJECTION, SOLUTION INTRAVENOUS at 02:04

## 2024-04-01 RX ADMIN — METHOCARBAMOL 1000 MG: 500 TABLET ORAL at 03:04

## 2024-04-01 RX ADMIN — MAGNESIUM SULFATE IN DEXTROSE 1 G: 10 INJECTION, SOLUTION INTRAVENOUS at 04:04

## 2024-04-01 NOTE — ED PROVIDER NOTES
Encounter Date: 4/1/2024       History     Chief Complaint   Patient presents with    Leg Pain     This note is dictated on M*Modal word recognition program.  There are word recognition mistakes and grammatical errors that are occasionally missed on review.     Tee Aranda is a 53 y.o. female presents to ER today with complaints of bilateral leg and ankle cramping.  Patient reports she started with cramping at 3:30 a.m. this morning.  She reports she was able to go the gym and work out 20 minutes until the leg cramping became very severe.  Patient reports right now she can not move her lower extremities without severe cramping to calf and ankles.  Patient denies any swelling to lower extremities.  Patient reports she was recently discharged from [facility] main campus for kidney stone with urethral stent.    The history is provided by the patient.     Review of patient's allergies indicates:   Allergen Reactions    Sulfa (sulfonamide antibiotics) Hives     Past Medical History:   Diagnosis Date    Anemia, unspecified     Anxiety     Arthritis     Ascites 11/23/2020    AVN (avascular necrosis of bone)     Cataract     COVID-19 vaccine administered     04/08/21, 04/30/21    Diarrhea of presumed infectious origin 7/31/2023    Edema 11/23/2020    Epigastric pain 7/31/2023    Esophageal varices     Glaucoma     Hepatic encephalopathy 11/23/2020    History of colon polyps     Hx of cirrhosis     non-alcoholic    Iron deficiency anemia secondary to blood loss (chronic)     Kidney stones     Portal hypertensive gastropathy     Unintentional weight loss 10/14/2023    Unspecified cirrhosis of liver      Past Surgical History:   Procedure Laterality Date    APPENDECTOMY      COLONOSCOPY N/A 8/3/2023    Procedure: COLONOSCOPY;  Surgeon: Gerard Salinas MD;  Location: [facility];  Service: Endoscopy;  Laterality: N/A;    COLONOSCOPY W/ POLYPECTOMY      CYSTOSCOPY N/A 3/20/2024    Procedure: CYSTOSCOPY;  Surgeon: Aurelio  Chris FOSTER MD;  Location: [facility] 1ST FLR;  Service: Urology;  Laterality: N/A;    CYSTOSCOPY W/ URETERAL STENT REMOVAL Right 3/14/2024    Procedure: CYSTOSCOPY, WITH URETERAL STENT REMOVAL;  Surgeon: Chris Carey MD;  Location: [facility] 1ST FLR;  Service: Urology;  Laterality: Right;    DISTAL CLAVICLE EXCISION Right 11/18/2021    Procedure: RIGHT SHOULDER ARTHROSCOPY, EXCISION, CLAVICLE, DISTAL; EXTENSIVE DEBRIDEMENT;  Surgeon: Isaiah Joyner MD;  Location: [facility];  Service: Orthopedics;  Laterality: Right;    ESOPHAGEAL VARICE LIGATION      ESOPHAGOGASTRODUODENOSCOPY N/A 11/10/2020    Procedure: EGD (ESOPHAGOGASTRODUODENOSCOPY);  Surgeon: Gerard Salinas MD;  Location: [facility];  Service: Endoscopy;  Laterality: N/A;    ESOPHAGOGASTRODUODENOSCOPY N/A 12/28/2020    Procedure: EGD (ESOPHAGOGASTRODUODENOSCOPY);  Surgeon: Adryan Park MD;  Location: [facility] (2ND FLR);  Service: Endoscopy;  Laterality: N/A;    ESOPHAGOGASTRODUODENOSCOPY N/A 02/15/2021    Procedure: EGD (ESOPHAGOGASTRODUODENOSCOPY);  Surgeon: Hari Greene MD;  Location: [facility] (4TH FLR);  Service: Endoscopy;  Laterality: N/A;  6 week follow-up variceal banding  Hx varices - Labs - ERW  prep ins. emialed - COVID screening on 2/12/21 [place] - ERW    ESOPHAGOGASTRODUODENOSCOPY Left 08/03/2021    Procedure: EGD (ESOPHAGOGASTRODUODENOSCOPY);  Surgeon: Fabricio Corado MD;  Location: [facility] (4TH FLR);  Service: Gastroenterology;  Laterality: Left;  recent hematemasis. varices-labs on 7/26    COVID test at [facility] on 7/31-GT  requesting sooner    ESOPHAGOGASTRODUODENOSCOPY N/A 07/27/2022    Procedure: EGD (ESOPHAGOGASTRODUODENOSCOPY);  Surgeon: Eric Garcia MD;  Location: [facility];  Service: Endoscopy;  Laterality: N/A;    ESOPHAGOGASTRODUODENOSCOPY Left 08/29/2022    Procedure: EGD (ESOPHAGOGASTRODUODENOSCOPY);  Surgeon: Kacy Douglass MD;  Location: [facility] ([address] [place] [address]);  Service: Endoscopy;  Laterality: Left;  Fully  vaccinated/ clear liquids up to 4 hrs prior-RB  varices labs ordered-RB    ESOPHAGOGASTRODUODENOSCOPY N/A 10/05/2022    Procedure: EGD (ESOPHAGOGASTRODUODENOSCOPY);  Surgeon: Gerard Salinas MD;  Location: [facility];  Service: Endoscopy;  Laterality: N/A;    ESOPHAGOGASTRODUODENOSCOPY N/A 3/30/2023    Procedure: EGD (ESOPHAGOGASTRODUODENOSCOPY);  Surgeon: Gerard Salinas MD;  Location: [facility] END[facility];  Service: Endoscopy;  Laterality: N/A;    ESOPHAGOGASTRODUODENOSCOPY N/A 8/3/2023    Procedure: EGD (ESOPHAGOGASTRODUODENOSCOPY);  Surgeon: Gerard Salinas MD;  Location: [facility] END[facility];  Service: Endoscopy;  Laterality: N/A;    EXTRACTION - STONE Right 3/20/2024    Procedure: EXTRACTION - STONE;  Surgeon: Chris Carey MD;  [facility];  Service: Urology;  Laterality: Right;    HYSTERECTOMY      KNEE SURGERY Bilateral     LASER LITHOTRIPSY Right 3/20/2024    Procedure: LITHOTRIPSY, USING LASER;  Surgeon: Chris Carey MD;  Location: [facility] OR 1ST FLR;  Service: Urology;  Laterality: Right;    LITHOTRIPSY      REMOVAL-STENT Right 3/20/2024    Procedure: REMOVAL-STENT;  Surgeon: Chris Carey MD;  Location: [facility] OR 1ST FLR;  Service: Urology;  Laterality: Right;    RETROGRADE PYELOGRAPHY Right 3/20/2024    Procedure: PYELOGRAM, RETROGRADE;  Surgeon: Chris Carey MD;  Location: [facility] OR 1ST FLR;  Service: Urology;  Laterality: Right;    RHINOPLASTY TIP      SHOULDER SURGERY Right     TONSILLECTOMY      TOTAL HIP ARTHROPLASTY Right     URETERAL STENT PLACEMENT Right 3/20/2024    Procedure: INSERTION, STENT, URETER;  Surgeon: Chris Carey MD;  Location: [facility] OR 1ST FLR;  Service: Urology;  Laterality: Right;    URETEROSCOPY Right 3/20/2024    Procedure: URETEROSCOPY;  Surgeon: Chris Carey MD;  Location: [facility] OR 1ST FLR;  Service: Urology;  Laterality: Right;     Family History   Problem Relation Age of Onset    No Known Problems Mother     Diabetes Mellitus Maternal Grandmother     Amblyopia  Neg Hx     Blindness Neg Hx     Cataracts Neg Hx     Glaucoma Neg Hx     Macular degeneration Neg Hx     Retinal detachment Neg Hx     Strabismus Neg Hx     Colon cancer Neg Hx     Esophageal cancer Neg Hx      Social History     Tobacco Use    Smoking status: Some Days     Current packs/day: 0.00     Types: Cigarettes     Last attempt to quit: 7/3/2019     Years since quittin.7    Smokeless tobacco: Never   Substance Use Topics    Alcohol use: Not Currently    Drug use: No     Review of Systems   Constitutional: Negative.    HENT: Negative.     Eyes: Negative.    Respiratory: Negative.     Endocrine: Negative.    Musculoskeletal:  Positive for myalgias.        Patient reports severe leg cramping.       Physical Exam     Initial Vitals [24 1432]   BP Pulse Resp Temp SpO2   136/74 84 20 97.8 °F (36.6 °C) 100 %      MAP       --         Physical Exam    Constitutional: She appears well-developed and well-nourished. She is not diaphoretic. No distress.   HENT:   Right Ear: External ear normal.   Left Ear: External ear normal.   Eyes: Pupils are equal, round, and reactive to light. Right eye exhibits no discharge. Left eye exhibits no discharge.   Neck: Neck supple.   Normal range of motion.  Cardiovascular:  Normal rate, regular rhythm and normal heart sounds.           No murmur heard.  Pulmonary/Chest: Breath sounds normal. No respiratory distress. She has no wheezes.   Abdominal: Abdomen is soft. Bowel sounds are normal. She exhibits no distension and no mass. There is no abdominal tenderness. There is no rebound and no guarding.   Musculoskeletal:         General: Tenderness (patient has tenderness to bilateral calves.) present.      Cervical back: Normal range of motion and neck supple.     Neurological: She is alert and oriented to person, place, and time. She has normal strength and normal reflexes. GCS score is 15. GCS eye subscore is 4. GCS verbal subscore is 5. GCS motor subscore is 6.   Skin:  Capillary refill takes less than 2 seconds. No rash noted. No erythema.   Psychiatric: She has a normal mood and affect. Her behavior is normal. Judgment and thought content normal.         ED Course   Procedures  Labs Reviewed   CBC W/ AUTO DIFFERENTIAL - Abnormal; Notable for the following components:       Result Value    Platelets 93 (*)     Lymph # 0.6 (*)     Gran % 82.1 (*)     Lymph % 8.9 (*)     All other components within normal limits   COMPREHENSIVE METABOLIC PANEL - Abnormal; Notable for the following components:    CO2 22 (*)     BUN 27 (*)     All other components within normal limits   PROTIME-INR - Abnormal; Notable for the following components:    Prothrombin Time 12.7 (*)     All other components within normal limits   MAGNESIUM - Abnormal; Notable for the following components:    Magnesium 1.5 (*)     All other components within normal limits   TROPONIN I   CK   MAGNESIUM   PROTIME-INR     EKG Readings: (Independently Interpreted)   Initial Reading: No STEMI. Rhythm: Sinus Arrhythmia. Heart Rate: 73. Clinical Impression: Normal Sinus Rhythm and Sinus Arrhythmia       Imaging Results    None          Medications   sodium chloride 0.9% bolus 1,000 mL 1,000 mL (0 mLs Intravenous Stopped 4/1/24 1606)   methocarbamoL tablet 1,000 mg (1,000 mg Oral Given 4/1/24 1517)   magnesium sulfate in dextrose IVPB (premix) 1 g (0 g Intravenous Stopped 4/1/24 1715)     Medical Decision Making  Differential diagnosis include leg cramping, musculoskeletal strain, muscle spasms, electrolyte imbalance, dehydration, restless leg syndrome, rhabdo    Troponin, CPK within normal limits.    CBC reveals no acute metabolic derangement no anemia.    CMP has mildly elevated BUN at 27.  Patient's magnesium 1.5.  Replenished in ER today.  EKG reveals normal sinus rhythm with a sinus arrhythmia.  Patient reports Robaxin tremendously helped her leg cramping while in emergency department.  Vital signs hemodynamically stable at this  time.     Amount and/or Complexity of Data Reviewed  Labs: ordered.    Risk  Prescription drug management.                                      Clinical Impression:  Final diagnoses:  [R25.2] Leg cramping  [M62.838] Muscle spasms of both lower extremities (Primary)  [E83.42] Hypomagnesemia          ED Disposition Condition    Discharge Stable          ED Prescriptions       Medication Sig Dispense Start Date End Date Auth. Provider    methocarbamoL (ROBAXIN) 500 MG Tab  (Status: Discontinued) Take 2 tablets (1,000 mg total) by mouth 3 (three) times daily. for 5 days 30 tablet 4/1/2024 4/1/2024 Alex Bowman, NP    methocarbamoL (ROBAXIN) 500 MG Tab Take 2 tablets (1,000 mg total) by mouth 3 (three) times daily. for 5 days 30 tablet 4/1/2024 4/6/2024 Alex Bowman, ALISHA          Follow-up Information    None          Alex Bowman, ALISHA  04/01/24 0918       Alex Bowman, ALISHA  04/01/24 9789

## 2024-04-01 NOTE — ED TRIAGE NOTES
C/o bilateral leg cramps since 0330 this morning. Patient also with c/o nausea. Patient given Zofran and 400 ml NS per [facility].

## 2024-04-02 LAB
OHS QRS DURATION: 74 MS
OHS QTC CALCULATION: 409 MS

## 2024-04-10 DIAGNOSIS — K74.60 LIVER CIRRHOSIS SECONDARY TO NASH: Chronic | ICD-10-CM

## 2024-04-10 DIAGNOSIS — K75.81 LIVER CIRRHOSIS SECONDARY TO NASH: Chronic | ICD-10-CM

## 2024-04-19 ENCOUNTER — LAB VISIT (OUTPATIENT)
Dept: LAB | Facility: HOSPITAL | Age: 54
End: 2024-04-19
Attending: INTERNAL MEDICINE
Payer: COMMERCIAL

## 2024-04-19 DIAGNOSIS — K74.60 LIVER CIRRHOSIS SECONDARY TO NASH: Chronic | ICD-10-CM

## 2024-04-19 DIAGNOSIS — K75.81 LIVER CIRRHOSIS SECONDARY TO NASH: Chronic | ICD-10-CM

## 2024-04-19 LAB
AFP SERPL-MCNC: 2.5 NG/ML (ref 0–8.4)
ALBUMIN SERPL BCP-MCNC: 4.2 G/DL (ref 3.5–5.2)
ALP SERPL-CCNC: 138 U/L (ref 55–135)
ALT SERPL W/O P-5'-P-CCNC: 52 U/L (ref 10–44)
ANION GAP SERPL CALC-SCNC: 6 MMOL/L (ref 8–16)
AST SERPL-CCNC: 52 U/L (ref 10–40)
BASOPHILS # BLD AUTO: 0.02 K/UL (ref 0–0.2)
BASOPHILS NFR BLD: 0.6 % (ref 0–1.9)
BILIRUB DIRECT SERPL-MCNC: 0.2 MG/DL (ref 0.1–0.3)
BILIRUB SERPL-MCNC: 0.4 MG/DL (ref 0.1–1)
BUN SERPL-MCNC: 22 MG/DL (ref 6–20)
CALCIUM SERPL-MCNC: 9.5 MG/DL (ref 8.7–10.5)
CHLORIDE SERPL-SCNC: 109 MMOL/L (ref 95–110)
CO2 SERPL-SCNC: 24 MMOL/L (ref 23–29)
CREAT SERPL-MCNC: 0.8 MG/DL (ref 0.5–1.4)
DIFFERENTIAL METHOD BLD: ABNORMAL
EOSINOPHIL # BLD AUTO: 0.1 K/UL (ref 0–0.5)
EOSINOPHIL NFR BLD: 2 % (ref 0–8)
ERYTHROCYTE [DISTWIDTH] IN BLOOD BY AUTOMATED COUNT: 13.2 % (ref 11.5–14.5)
EST. GFR  (NO RACE VARIABLE): >60 ML/MIN/1.73 M^2
GLUCOSE SERPL-MCNC: 142 MG/DL (ref 70–110)
HCT VFR BLD AUTO: 38.9 % (ref 37–48.5)
HGB BLD-MCNC: 13.3 G/DL (ref 12–16)
IMM GRANULOCYTES # BLD AUTO: 0.01 K/UL (ref 0–0.04)
IMM GRANULOCYTES NFR BLD AUTO: 0.3 % (ref 0–0.5)
INR PPP: 1.1 (ref 0.8–1.2)
LYMPHOCYTES # BLD AUTO: 0.6 K/UL (ref 1–4.8)
LYMPHOCYTES NFR BLD: 15.7 % (ref 18–48)
MCH RBC QN AUTO: 29.8 PG (ref 27–31)
MCHC RBC AUTO-ENTMCNC: 34.2 G/DL (ref 32–36)
MCV RBC AUTO: 87 FL (ref 82–98)
MONOCYTES # BLD AUTO: 0.2 K/UL (ref 0.3–1)
MONOCYTES NFR BLD: 6.6 % (ref 4–15)
NEUTROPHILS # BLD AUTO: 2.6 K/UL (ref 1.8–7.7)
NEUTROPHILS NFR BLD: 74.8 % (ref 38–73)
NRBC BLD-RTO: 0 /100 WBC
PLATELET # BLD AUTO: 74 K/UL (ref 150–450)
PMV BLD AUTO: 11 FL (ref 9.2–12.9)
POTASSIUM SERPL-SCNC: 4.1 MMOL/L (ref 3.5–5.1)
PROT SERPL-MCNC: 7.1 G/DL (ref 6–8.4)
PROTHROMBIN TIME: 12.2 SEC (ref 9–12.5)
RBC # BLD AUTO: 4.47 M/UL (ref 4–5.4)
SODIUM SERPL-SCNC: 139 MMOL/L (ref 136–145)
WBC # BLD AUTO: 3.5 K/UL (ref 3.9–12.7)

## 2024-04-19 PROCEDURE — 80053 COMPREHEN METABOLIC PANEL: CPT | Performed by: INTERNAL MEDICINE

## 2024-04-19 PROCEDURE — 82248 BILIRUBIN DIRECT: CPT | Performed by: INTERNAL MEDICINE

## 2024-04-19 PROCEDURE — 36415 COLL VENOUS BLD VENIPUNCTURE: CPT | Performed by: INTERNAL MEDICINE

## 2024-04-19 PROCEDURE — 85610 PROTHROMBIN TIME: CPT | Performed by: INTERNAL MEDICINE

## 2024-04-19 PROCEDURE — 82105 ALPHA-FETOPROTEIN SERUM: CPT | Performed by: INTERNAL MEDICINE

## 2024-04-19 PROCEDURE — 85025 COMPLETE CBC W/AUTO DIFF WBC: CPT | Performed by: INTERNAL MEDICINE

## 2024-04-22 ENCOUNTER — PATIENT MESSAGE (OUTPATIENT)
Dept: HEPATOLOGY | Facility: CLINIC | Age: 54
End: 2024-04-22
Payer: COMMERCIAL

## 2024-04-23 ENCOUNTER — TELEPHONE (OUTPATIENT)
Dept: UROLOGY | Facility: CLINIC | Age: 54
End: 2024-04-23
Payer: COMMERCIAL

## 2024-04-23 ENCOUNTER — HOSPITAL ENCOUNTER (OUTPATIENT)
Facility: HOSPITAL | Age: 54
Discharge: HOME | End: 2024-04-24
Attending: EMERGENCY MEDICINE | Admitting: EMERGENCY MEDICINE
Payer: COMMERCIAL

## 2024-04-23 ENCOUNTER — OFFICE VISIT (OUTPATIENT)
Dept: HEPATOLOGY | Facility: CLINIC | Age: 54
End: 2024-04-23
Payer: COMMERCIAL

## 2024-04-23 VITALS
WEIGHT: 127.44 LBS | TEMPERATURE: 99 F | HEIGHT: 68 IN | SYSTOLIC BLOOD PRESSURE: 137 MMHG | BODY MASS INDEX: 19.32 KG/M2 | DIASTOLIC BLOOD PRESSURE: 98 MMHG | HEART RATE: 82 BPM

## 2024-04-23 DIAGNOSIS — K80.20 CALCULUS OF GALLBLADDER WITHOUT CHOLECYSTITIS WITHOUT OBSTRUCTION: ICD-10-CM

## 2024-04-23 DIAGNOSIS — K31.89 PORTAL HYPERTENSIVE GASTROPATHY: ICD-10-CM

## 2024-04-23 DIAGNOSIS — R10.9 RIGHT SIDED ABDOMINAL PAIN: Primary | ICD-10-CM

## 2024-04-23 DIAGNOSIS — K75.81 LIVER CIRRHOSIS SECONDARY TO NASH: Chronic | ICD-10-CM

## 2024-04-23 DIAGNOSIS — K76.6 PORTAL HYPERTENSION: ICD-10-CM

## 2024-04-23 DIAGNOSIS — K74.60 LIVER CIRRHOSIS SECONDARY TO NASH: Chronic | ICD-10-CM

## 2024-04-23 DIAGNOSIS — I85.10 SECONDARY ESOPHAGEAL VARICES WITHOUT BLEEDING: Primary | ICD-10-CM

## 2024-04-23 DIAGNOSIS — R10.9 ABDOMINAL PAIN: ICD-10-CM

## 2024-04-23 DIAGNOSIS — K76.6 PORTAL HYPERTENSIVE GASTROPATHY: ICD-10-CM

## 2024-04-23 LAB
ALBUMIN SERPL BCP-MCNC: 4.2 G/DL (ref 3.5–5.2)
ALP SERPL-CCNC: 102 U/L (ref 55–135)
ALT SERPL W/O P-5'-P-CCNC: 78 U/L (ref 10–44)
ANION GAP SERPL CALC-SCNC: 9 MMOL/L (ref 8–16)
APTT PPP: 28.5 SEC (ref 21–32)
AST SERPL-CCNC: 51 U/L (ref 10–40)
BASOPHILS # BLD AUTO: 0.01 K/UL (ref 0–0.2)
BASOPHILS NFR BLD: 0.3 % (ref 0–1.9)
BILIRUB SERPL-MCNC: 0.7 MG/DL (ref 0.1–1)
BILIRUB UR QL STRIP: NEGATIVE
BUN SERPL-MCNC: 17 MG/DL (ref 6–20)
CALCIUM SERPL-MCNC: 9.8 MG/DL (ref 8.7–10.5)
CHLORIDE SERPL-SCNC: 106 MMOL/L (ref 95–110)
CLARITY UR REFRACT.AUTO: CLEAR
CO2 SERPL-SCNC: 25 MMOL/L (ref 23–29)
COLOR UR AUTO: YELLOW
CREAT SERPL-MCNC: 0.7 MG/DL (ref 0.5–1.4)
DIFFERENTIAL METHOD BLD: ABNORMAL
EOSINOPHIL # BLD AUTO: 0.1 K/UL (ref 0–0.5)
EOSINOPHIL NFR BLD: 1.9 % (ref 0–8)
ERYTHROCYTE [DISTWIDTH] IN BLOOD BY AUTOMATED COUNT: 13 % (ref 11.5–14.5)
EST. GFR  (NO RACE VARIABLE): >60 ML/MIN/1.73 M^2
GLUCOSE SERPL-MCNC: 97 MG/DL (ref 70–110)
GLUCOSE UR QL STRIP: NEGATIVE
HCT VFR BLD AUTO: 38.7 % (ref 37–48.5)
HGB BLD-MCNC: 13.3 G/DL (ref 12–16)
HGB UR QL STRIP: NEGATIVE
IMM GRANULOCYTES # BLD AUTO: 0.01 K/UL (ref 0–0.04)
IMM GRANULOCYTES NFR BLD AUTO: 0.3 % (ref 0–0.5)
INR PPP: 1.2 (ref 0.8–1.2)
KETONES UR QL STRIP: NEGATIVE
LACTATE SERPL-SCNC: 1 MMOL/L (ref 0.5–2.2)
LEUKOCYTE ESTERASE UR QL STRIP: NEGATIVE
LIPASE SERPL-CCNC: 27 U/L (ref 4–60)
LYMPHOCYTES # BLD AUTO: 0.7 K/UL (ref 1–4.8)
LYMPHOCYTES NFR BLD: 22.7 % (ref 18–48)
MCH RBC QN AUTO: 30.4 PG (ref 27–31)
MCHC RBC AUTO-ENTMCNC: 34.4 G/DL (ref 32–36)
MCV RBC AUTO: 89 FL (ref 82–98)
MONOCYTES # BLD AUTO: 0.3 K/UL (ref 0.3–1)
MONOCYTES NFR BLD: 8 % (ref 4–15)
NEUTROPHILS # BLD AUTO: 2.1 K/UL (ref 1.8–7.7)
NEUTROPHILS NFR BLD: 66.8 % (ref 38–73)
NITRITE UR QL STRIP: NEGATIVE
NRBC BLD-RTO: 0 /100 WBC
PH UR STRIP: 7 [PH] (ref 5–8)
PLATELET # BLD AUTO: 60 K/UL (ref 150–450)
PMV BLD AUTO: 11.7 FL (ref 9.2–12.9)
POTASSIUM SERPL-SCNC: 3.6 MMOL/L (ref 3.5–5.1)
PROT SERPL-MCNC: 7 G/DL (ref 6–8.4)
PROT UR QL STRIP: NEGATIVE
PROTHROMBIN TIME: 12.6 SEC (ref 9–12.5)
RBC # BLD AUTO: 4.37 M/UL (ref 4–5.4)
SODIUM SERPL-SCNC: 140 MMOL/L (ref 136–145)
SP GR UR STRIP: 1.02 (ref 1–1.03)
URN SPEC COLLECT METH UR: NORMAL
WBC # BLD AUTO: 3.13 K/UL (ref 3.9–12.7)

## 2024-04-23 PROCEDURE — 81003 URINALYSIS AUTO W/O SCOPE: CPT | Performed by: EMERGENCY MEDICINE

## 2024-04-23 PROCEDURE — 83605 ASSAY OF LACTIC ACID: CPT | Performed by: EMERGENCY MEDICINE

## 2024-04-23 PROCEDURE — 25500020 PHARM REV CODE 255: Performed by: EMERGENCY MEDICINE

## 2024-04-23 PROCEDURE — G0378 HOSPITAL OBSERVATION PER HR: HCPCS

## 2024-04-23 PROCEDURE — 99214 OFFICE O/P EST MOD 30 MIN: CPT | Mod: S$GLB,,, | Performed by: INTERNAL MEDICINE

## 2024-04-23 PROCEDURE — 3075F SYST BP GE 130 - 139MM HG: CPT | Mod: CPTII,S$GLB,, | Performed by: INTERNAL MEDICINE

## 2024-04-23 PROCEDURE — 99285 EMERGENCY DEPT VISIT HI MDM: CPT | Mod: 25

## 2024-04-23 PROCEDURE — 3008F BODY MASS INDEX DOCD: CPT | Mod: CPTII,S$GLB,, | Performed by: INTERNAL MEDICINE

## 2024-04-23 PROCEDURE — 85610 PROTHROMBIN TIME: CPT | Performed by: EMERGENCY MEDICINE

## 2024-04-23 PROCEDURE — 99999 PR PBB SHADOW E&M-EST. PATIENT-LVL IV: CPT | Mod: PBBFAC,,, | Performed by: INTERNAL MEDICINE

## 2024-04-23 PROCEDURE — 3080F DIAST BP >= 90 MM HG: CPT | Mod: CPTII,S$GLB,, | Performed by: INTERNAL MEDICINE

## 2024-04-23 PROCEDURE — 1159F MED LIST DOCD IN RCRD: CPT | Mod: CPTII,S$GLB,, | Performed by: INTERNAL MEDICINE

## 2024-04-23 PROCEDURE — 96375 TX/PRO/DX INJ NEW DRUG ADDON: CPT

## 2024-04-23 PROCEDURE — 25000003 PHARM REV CODE 250: Performed by: PHYSICIAN ASSISTANT

## 2024-04-23 PROCEDURE — 85730 THROMBOPLASTIN TIME PARTIAL: CPT | Performed by: EMERGENCY MEDICINE

## 2024-04-23 PROCEDURE — 83690 ASSAY OF LIPASE: CPT | Performed by: EMERGENCY MEDICINE

## 2024-04-23 PROCEDURE — 85025 COMPLETE CBC W/AUTO DIFF WBC: CPT | Performed by: EMERGENCY MEDICINE

## 2024-04-23 PROCEDURE — 96376 TX/PRO/DX INJ SAME DRUG ADON: CPT

## 2024-04-23 PROCEDURE — 63600175 PHARM REV CODE 636 W HCPCS: Performed by: EMERGENCY MEDICINE

## 2024-04-23 PROCEDURE — 1160F RVW MEDS BY RX/DR IN RCRD: CPT | Mod: CPTII,S$GLB,, | Performed by: INTERNAL MEDICINE

## 2024-04-23 PROCEDURE — 80053 COMPREHEN METABOLIC PANEL: CPT | Performed by: EMERGENCY MEDICINE

## 2024-04-23 PROCEDURE — 96374 THER/PROPH/DIAG INJ IV PUSH: CPT | Mod: 59

## 2024-04-23 RX ORDER — CARVEDILOL 3.12 MG/1
3.12 TABLET ORAL 2 TIMES DAILY
Status: DISCONTINUED | OUTPATIENT
Start: 2024-04-23 | End: 2024-04-24 | Stop reason: HOSPADM

## 2024-04-23 RX ORDER — ACETAMINOPHEN 325 MG/1
650 TABLET ORAL EVERY 8 HOURS PRN
Status: DISCONTINUED | OUTPATIENT
Start: 2024-04-23 | End: 2024-04-24 | Stop reason: HOSPADM

## 2024-04-23 RX ORDER — ONDANSETRON HYDROCHLORIDE 2 MG/ML
4 INJECTION, SOLUTION INTRAVENOUS
Status: COMPLETED | OUTPATIENT
Start: 2024-04-23 | End: 2024-04-23

## 2024-04-23 RX ORDER — ALUMINUM HYDROXIDE, MAGNESIUM HYDROXIDE, AND SIMETHICONE 1200; 120; 1200 MG/30ML; MG/30ML; MG/30ML
30 SUSPENSION ORAL
Status: DISCONTINUED | OUTPATIENT
Start: 2024-04-24 | End: 2024-04-24 | Stop reason: HOSPADM

## 2024-04-23 RX ORDER — MORPHINE SULFATE 4 MG/ML
4 INJECTION, SOLUTION INTRAMUSCULAR; INTRAVENOUS
Status: COMPLETED | OUTPATIENT
Start: 2024-04-23 | End: 2024-04-23

## 2024-04-23 RX ORDER — TALC
6 POWDER (GRAM) TOPICAL NIGHTLY PRN
Status: DISCONTINUED | OUTPATIENT
Start: 2024-04-23 | End: 2024-04-24 | Stop reason: HOSPADM

## 2024-04-23 RX ORDER — SUCRALFATE 1 G/10ML
1 SUSPENSION ORAL EVERY 6 HOURS
Status: DISCONTINUED | OUTPATIENT
Start: 2024-04-24 | End: 2024-04-24 | Stop reason: HOSPADM

## 2024-04-23 RX ORDER — MORPHINE SULFATE 4 MG/ML
4 INJECTION, SOLUTION INTRAMUSCULAR; INTRAVENOUS EVERY 4 HOURS PRN
Status: DISCONTINUED | OUTPATIENT
Start: 2024-04-23 | End: 2024-04-24 | Stop reason: HOSPADM

## 2024-04-23 RX ORDER — KETOROLAC TROMETHAMINE 30 MG/ML
10 INJECTION, SOLUTION INTRAMUSCULAR; INTRAVENOUS
Status: DISCONTINUED | OUTPATIENT
Start: 2024-04-23 | End: 2024-04-23

## 2024-04-23 RX ORDER — SODIUM CHLORIDE 0.9 % (FLUSH) 0.9 %
10 SYRINGE (ML) INJECTION
Status: DISCONTINUED | OUTPATIENT
Start: 2024-04-23 | End: 2024-04-24 | Stop reason: HOSPADM

## 2024-04-23 RX ORDER — ONDANSETRON HYDROCHLORIDE 2 MG/ML
4 INJECTION, SOLUTION INTRAVENOUS EVERY 8 HOURS PRN
Status: DISCONTINUED | OUTPATIENT
Start: 2024-04-23 | End: 2024-04-24 | Stop reason: HOSPADM

## 2024-04-23 RX ORDER — OXYCODONE HYDROCHLORIDE 5 MG/1
5 TABLET ORAL EVERY 4 HOURS PRN
Status: DISCONTINUED | OUTPATIENT
Start: 2024-04-23 | End: 2024-04-24 | Stop reason: HOSPADM

## 2024-04-23 RX ADMIN — CARVEDILOL 3.12 MG: 3.12 TABLET, FILM COATED ORAL at 11:04

## 2024-04-23 RX ADMIN — IOHEXOL 75 ML: 350 INJECTION, SOLUTION INTRAVENOUS at 07:04

## 2024-04-23 RX ADMIN — ONDANSETRON 4 MG: 2 INJECTION INTRAMUSCULAR; INTRAVENOUS at 08:04

## 2024-04-23 RX ADMIN — MORPHINE SULFATE 4 MG: 4 INJECTION INTRAVENOUS at 04:04

## 2024-04-23 RX ADMIN — OXYCODONE 5 MG: 5 TABLET ORAL at 11:04

## 2024-04-23 RX ADMIN — ONDANSETRON 4 MG: 2 INJECTION INTRAMUSCULAR; INTRAVENOUS at 04:04

## 2024-04-23 RX ADMIN — MORPHINE SULFATE 4 MG: 4 INJECTION INTRAVENOUS at 08:04

## 2024-04-23 RX ADMIN — SUCRALFATE 1 G: 1 SUSPENSION ORAL at 11:04

## 2024-04-23 RX ADMIN — RIFAXIMIN 550 MG: 550 TABLET ORAL at 11:04

## 2024-04-23 NOTE — TELEPHONE ENCOUNTER
CT RSS 3/26/24  1. There is mild right hydronephrosis, noting previous ureteral stent has been removed since the previous exam.  Urothelial thickening may reflect residual inflammation from the same however correlation with urinalysis recommended to exclude sequela of infection.  2. Focus of calcification within the posterior aspect of the interpolar region of the right kidney/lower pole, reflecting sequela of prior insult or infarct.  Correlation and follow-up is advised.  3. Findings suggesting cirrhosis with portal hypertension including splenomegaly and ascites.  4. Mild wall thickening of a few proximal jejunal loops, portal enteropathy is a consideration.  Other infectious or inflammatory enteropathy can present in this fashion, correlation with current symptomatology is recommended.    I called the pt just now and found out that she is at [facility].  I asked to see a urology on call and they can help her if it is related to her urologic problem.

## 2024-04-23 NOTE — PROGRESS NOTES
HEPATOLOGY FOLLOW UP    Referring Physician: James Samano MD  Current Corresponding Physician: James Samano MD, Gerard Salinas MD    Tee Aranda is here for follow up of decompensated cirrhosis    HPI   Tee Aranda was seen in consultation by my colleague Dr White 08/20. He noted:  This 53-year-old woman was referred for evaluation of cirrhosis.  She noticed easy bruisability.  She was found have a low platelet count.  She was assessed by Hematology.  She had a bone marrow aspirate and biopsy which was normal.  She eventually had an upper endoscopy which showed features of portal hypertension and grade 1 varices.  A FibroScan confirmed significant fatty liver as well as advanced fibrosis and cirrhosis.  Her weight was as high as 199 lb.  She has lost 26 lb after the diagnosis.  There is no family history of liver disease. She drinks alcohol socially.  She has mild ankle edema.  She has no symptoms of hepatic encephalopathy.  She initially had no symptoms of GI bleeding.    Admission 11/9/20: melena and weakenss. Hemoglobin fell to 5.9 and she was transfused 2 units of PRBC. EGD showed PHG and small varices. The finding was similar to an EGD done earlier this year (03/20). Small varices werenoted at that time as well. Coreg and protonix were prescribed but never started.  Admission 12/28/20: gastrintestinal hemorrhage and melena; EGD: esophageal varices banded  EGD 2/15/21: EV not banded; repeat 6 months recommended  ER visit 7/19/21 for hematemasis; hemoglobin stable at 12.5; LEFT AGAINST MEDICAL ADVICE    Interval History  --has been having issues with nephrolithiasis- continue to have pain with urination  --diuretics on hold due to issues with nephrolithiasis    Admission 9/17/23-9/19/23 for covid: treated with Remdesivir, ceftriaxone; pt refused dexamethasone  --now recovered from covid  --no decompensation with covid but maybe an increase in HE despite rifaximin    Diarrhea now resolved:  Previously (at July 2023 visit), main complaint was abdominal pain/epigastric pain, diarrhea (watery and multiple stools per day) and associated weight loss (about 20 lbs). An abdo US 7/25/23 suggested a stone impacted in the GB lumen. CT abdo 7/10/23: No radiopaque gallstones are seen.  No intrahepatic or extrahepatic biliary ductal dilatation is identified. HIDA scan 7/27/23: Normal HIDA scan.  No evidence of acute cholecystitis, cystic duct obstruction, or common bile duct obstruction.    HE: no longer taking lactulose but is on rifaximin  Ascites/edema, resolved- off lasix and aldactone  HCC screening CT abdo pelivs w contrast 7/10/23: no HCC  EGD 10/5/22: small varices; repeat EGD 10/23  EGD 8/29/22: varices banded  EGD 7/27/22: EV banded    EGD 8/3/23, 3/30/23: small EV, not banded; PHG  Colonoscopy 8/3/23: normal      Labs 4/19/24: Tbil 0.4, ALT 52, AST 52, ALKP 138  MELD 3.0: 8 at 4/19/2024  9:55 AM  MELD-Na: 7 at 4/19/2024  9:55 AM  Calculated from:  Serum Creatinine: 0.8 mg/dL (Using min of 1 mg/dL) at 4/19/2024  9:55 AM  Serum Sodium: 139 mmol/L (Using max of 137 mmol/L) at 4/19/2024  9:55 AM  Total Bilirubin: 0.4 mg/dL (Using min of 1 mg/dL) at 4/19/2024  9:55 AM  Serum Albumin: 4.2 g/dL (Using max of 3.5 g/dL) at 4/19/2024  9:55 AM  INR(ratio): 1.1 at 4/19/2024  9:55 AM  Age at listing (hypothetical): 53 years  Sex: Female at 4/19/2024  9:55 AM      Outpatient Encounter Medications as of 4/23/2024   Medication Sig Dispense Refill    carvediloL (COREG) 3.125 MG tablet TAKE 1 TABLET BY MOUTH 2 TIMES DAILY. 180 tablet 3    esomeprazole (NEXIUM) 40 MG capsule Take 1 capsule (40 mg total) by mouth 2 (two) times daily before meals. 60 capsule 11    multivitamin (THERAGRAN) per tablet Take 1 tablet by mouth once daily.      omega-3 fatty acids/fish oil (FISH OIL-OMEGA-3 FATTY ACIDS) 300-1,000 mg capsule Take 1 capsule by mouth once daily.      rifAXIMin (XIFAXAN) 550 mg Tab Take 1 tablet (550 mg total) by  mouth 2 (two) times daily. 60 tablet 11    UNABLE TO FIND 4 (four) times daily as needed. Medible 20 mg blue raspberry 1/4 to 1/2 up to 4 times daily as needed.      [] cefdinir (OMNICEF) 300 MG capsule Take 1 capsule (300 mg total) by mouth 2 (two) times daily. for 5 days 10 capsule 0    furosemide (LASIX) 40 MG tablet Take 1 tablet (40 mg total) by mouth once daily. 30 tablet 11    [] HYDROcodone-acetaminophen (NORCO)  mg per tablet Take 1 tablet by mouth every 6 (six) hours as needed for Pain. 12 tablet 0    [] methocarbamoL (ROBAXIN) 500 MG Tab Take 2 tablets (1,000 mg total) by mouth 3 (three) times daily. for 5 days 30 tablet 0    oxybutynin (DITROPAN) 5 MG Tab Take 1 tablet (5 mg total) by mouth 3 (three) times daily. 90 tablet 11    [] phenazopyridine (PYRIDIUM) 200 MG tablet Take 1 tablet (200 mg total) by mouth 3 (three) times daily as needed for Pain. 30 tablet 0    spironolactone (ALDACTONE) 100 MG tablet Take 1 tablet (100 mg total) by mouth once daily. 30 tablet 11    tamsulosin (FLOMAX) 0.4 mg Cap Take 1 capsule (0.4 mg total) by mouth once daily. 30 capsule 11    tamsulosin (FLOMAX) 0.4 mg Cap Take 1 capsule (0.4 mg total) by mouth once daily. 30 capsule 0    [DISCONTINUED] ciprofloxacin HCl (CIPRO) 250 MG tablet Take 1 tablet (250 mg total) by mouth 2 (two) times a day. for 14 days 28 tablet 0    [DISCONTINUED] oxyCODONE (ROXICODONE) 5 MG immediate release tablet Take 1 tablet (5 mg total) by mouth every 4 (four) hours as needed for Pain. 10 tablet 0    [DISCONTINUED] rifAXIMin (XIFAXAN) 550 mg Tab Take 1 tablet (550 mg total) by mouth 2 (two) times daily. 60 tablet 11     Facility-Administered Encounter Medications as of 2024   Medication Dose Route Frequency Provider Last Rate Last Admin    0.9%  NaCl infusion   Intravenous Continuous Gerard Salinas MD   Stopped at 23 4421    [DISCONTINUED] ALPRAZolam tablet 0.5 mg  0.5 mg Oral BID PRN Emiliano  MD Luis   0.5 mg at 03/26/24 1131    [DISCONTINUED] carvediloL tablet 3.125 mg  3.125 mg Oral BID Tae Lopez MD        [DISCONTINUED] cefTRIAXone (Rocephin) 1 g in dextrose 5 % in water (D5W) 100 mL IVPB (MB+)  1 g Intravenous Q24H Luis Cummings MD   Stopped at 03/26/24 0719    [DISCONTINUED] furosemide tablet 20 mg  20 mg Oral Daily Tae Lopez MD   20 mg at 03/26/24 0926    [DISCONTINUED] HYDROcodone-acetaminophen  mg per tablet 1 tablet  1 tablet Oral Q6H PRN Luis Cummings MD   1 tablet at 03/26/24 1330    [DISCONTINUED] HYDROmorphone injection 1 mg  1 mg Intravenous Q6H PRN Luis Cummings MD   1 mg at 03/26/24 0916    [DISCONTINUED] melatonin tablet 6 mg  6 mg Oral Nightly PRN Tae Lopez MD        [DISCONTINUED] ondansetron injection 4 mg  4 mg Intravenous Q6H PRN Luis Cummings MD   4 mg at 03/25/24 1509    [DISCONTINUED] oxybutynin tablet 5 mg  5 mg Oral TID Tae Lopez MD        [DISCONTINUED] pantoprazole EC tablet 40 mg  40 mg Oral Daily Luis Cummings MD   40 mg at 03/26/24 0927    [DISCONTINUED] promethazine (PHENERGAN) 12.5 mg in dextrose 5 % (D5W) 50 mL IVPB  12.5 mg Intravenous Q6H PRN Luis Cummings MD        [DISCONTINUED] rifAXIMin tablet 550 mg  550 mg Oral BID Tae Lopez MD   550 mg at 03/26/24 0926    [DISCONTINUED] sodium chloride 0.9% flush 10 mL  10 mL Intravenous PRN Tae Lopez MD        [DISCONTINUED] spironolactone tablet 50 mg  50 mg Oral Daily Tae Lopez MD   50 mg at 03/26/24 0926    [DISCONTINUED] tamsulosin 24 hr capsule 0.4 mg  0.4 mg Oral Daily Tae Lopez MD   0.4 mg at 03/26/24 0926     Review of patient's allergies indicates:   Allergen Reactions    Sulfa (sulfonamide antibiotics) Hives     Past Medical History:   Diagnosis Date    Anemia, unspecified     Anxiety     Arthritis     Ascites 11/23/2020    AVN (avascular necrosis of bone)     Cataract     COVID-19 vaccine administered      04/08/21, 04/30/21    Diarrhea of presumed infectious origin 7/31/2023    Edema 11/23/2020    Epigastric pain 7/31/2023    Esophageal varices     Glaucoma     Hepatic encephalopathy 11/23/2020    History of colon polyps     Hx of cirrhosis     non-alcoholic    Iron deficiency anemia secondary to blood loss (chronic)     Kidney stones     Portal hypertensive gastropathy     Unintentional weight loss 10/14/2023    Unspecified cirrhosis of liver        Review of Systems   Constitutional: Negative.    HENT: Negative.     Eyes: Negative.    Respiratory: Negative.     Cardiovascular: Negative.    Gastrointestinal: Negative.    Genitourinary: Negative.    Musculoskeletal: Negative.    Skin: Negative.    Neurological: Negative.    Psychiatric/Behavioral: Negative.       Vitals:    04/23/24 1305   BP: (!) 137/98   Pulse: 82   Temp: 98.6 °F (37 °C)   BMI 20    Physical Exam  Vitals reviewed.   Constitutional:       Appearance: She is well-developed.   HENT:      Head: Normocephalic and atraumatic.   Eyes:      General: No scleral icterus.     Conjunctiva/sclera: Conjunctivae normal.      Pupils: Pupils are equal, round, and reactive to light.   Neck:      Thyroid: No thyromegaly.   Cardiovascular:      Rate and Rhythm: Normal rate and regular rhythm.      Heart sounds: Normal heart sounds.   Pulmonary:      Effort: Pulmonary effort is normal.      Breath sounds: Normal breath sounds. No rales.   Abdominal:      General: Bowel sounds are normal. There is no distension.      Palpations: Abdomen is soft. There is no mass.      Tenderness: There is abdominal tenderness (low abdomen).   Musculoskeletal:         General: Normal range of motion.      Cervical back: Normal range of motion and neck supple.   Skin:     General: Skin is warm and dry.      Findings: No rash.   Neurological:      Mental Status: She is alert and oriented to person, place, and time.         MELD 3.0: 8 at 4/19/2024  9:55 AM  MELD-Na: 7 at 4/19/2024  9:55  AM  Calculated from:  Serum Creatinine: 0.8 mg/dL (Using min of 1 mg/dL) at 4/19/2024  9:55 AM  Serum Sodium: 139 mmol/L (Using max of 137 mmol/L) at 4/19/2024  9:55 AM  Total Bilirubin: 0.4 mg/dL (Using min of 1 mg/dL) at 4/19/2024  9:55 AM  Serum Albumin: 4.2 g/dL (Using max of 3.5 g/dL) at 4/19/2024  9:55 AM  INR(ratio): 1.1 at 4/19/2024  9:55 AM  Age at listing (hypothetical): 53 years  Sex: Female at 4/19/2024  9:55 AM      Lab Results   Component Value Date     (H) 04/19/2024    BUN 22 (H) 04/19/2024    CREATININE 0.8 04/19/2024    CALCIUM 9.5 04/19/2024     04/19/2024    K 4.1 04/19/2024     04/19/2024    PROT 7.1 04/19/2024    CO2 24 04/19/2024    ANIONGAP 6 (L) 04/19/2024    WBC 3.50 (L) 04/19/2024    RBC 4.47 04/19/2024    HGB 13.3 04/19/2024    HCT 38.9 04/19/2024    MCV 87 04/19/2024    MCH 29.8 04/19/2024    MCHC 34.2 04/19/2024     Lab Results   Component Value Date    RDW 13.2 04/19/2024    PLT 74 (L) 04/19/2024    MPV 11.0 04/19/2024    GRAN 2.6 04/19/2024    GRAN 74.8 (H) 04/19/2024    LYMPH 0.6 (L) 04/19/2024    LYMPH 15.7 (L) 04/19/2024    MONO 0.2 (L) 04/19/2024    MONO 6.6 04/19/2024    EOSINOPHIL 2.0 04/19/2024    BASOPHIL 0.6 04/19/2024    EOS 0.1 04/19/2024    BASO 0.02 04/19/2024    APTT 25.9 07/10/2023    GROUPTRH O POS 03/24/2024    BNP 86 03/24/2024    ALBUMIN 4.2 04/19/2024    BILIDIR 0.2 04/19/2024    AST 52 (H) 04/19/2024    ALT 52 (H) 04/19/2024    ALKPHOS 138 (H) 04/19/2024    MG 1.5 (L) 04/01/2024    LABPROT 12.2 04/19/2024    INR 1.1 04/19/2024       Assessment and Plan:  Tee Aranda is a 53 y.o. female with decompensated cirrhosis now complicated by nephrolithiasis. Other recommendations:  1. Decompensated cirrhosis- check meld labs every 12 weeks; HCC screening every 6 months  2. Hx GI bleeding and EV: repeat EGD 8/24  3. Ascites/edema, resolved: hold on diuretics until kidney stone issues have resolved  4. HE: continue rifaximin and restart lactulose    5. To ER for evaluation of flank pain    Return 6 months  A total of 35 minutes was spent reviewing the patient's chart, examining the patient, reviewing labs and imaging and coordinating care with the patient's care team.

## 2024-04-23 NOTE — ED PROVIDER NOTES
Encounter Date: 4/23/2024       History     Chief Complaint   Patient presents with    Post-op Problem     Stage 4 cirrhosis, 10x8 mm stone stent removed on the 24th, having more pain     Pt is a 54 yo F with PMH of liver cirrhosis, nephrolithiasis who presents with R flank pain.  Pain is sharp and radiating to her groin. Her abdomen is distended more than normal but not as much as it was with her recent kidney stones and stenting.   No fever, no change in chronic loose stools  No hematuria, dysuria    The history is provided by the patient and medical records.     Review of patient's allergies indicates:   Allergen Reactions    Sulfa (sulfonamide antibiotics) Hives     Past Medical History:   Diagnosis Date    Anemia, unspecified     Anxiety     Arthritis     Ascites 11/23/2020    AVN (avascular necrosis of bone)     Cataract     COVID-19 vaccine administered     04/08/21, 04/30/21    Diarrhea of presumed infectious origin 7/31/2023    Edema 11/23/2020    Epigastric pain 7/31/2023    Esophageal varices     Glaucoma     Hepatic encephalopathy 11/23/2020    History of colon polyps     Hx of cirrhosis     non-alcoholic    Iron deficiency anemia secondary to blood loss (chronic)     Kidney stones     Portal hypertensive gastropathy     Unintentional weight loss 10/14/2023    Unspecified cirrhosis of liver      Past Surgical History:   Procedure Laterality Date    APPENDECTOMY      COLONOSCOPY N/A 8/3/2023    Procedure: COLONOSCOPY;  Surgeon: Gerard Salinas MD;  Location: [facility];  Service: Endoscopy;  Laterality: N/A;    COLONOSCOPY W/ POLYPECTOMY      CYSTOSCOPY N/A 3/20/2024    Procedure: CYSTOSCOPY;  Surgeon: Chris Carey MD;  Location: [address];  Service: Urology;  Laterality: N/A;    CYSTOSCOPY W/ URETERAL STENT REMOVAL Right 3/14/2024    Procedure: CYSTOSCOPY, WITH URETERAL STENT REMOVAL;  Surgeon: Chris Carey MD;  Location: [facility] OR [address] [place] [address];  Service: Urology;  Laterality: Right;    DISTAL  CLAVICLE EXCISION Right 11/18/2021    Procedure: RIGHT SHOULDER ARTHROSCOPY, EXCISION, CLAVICLE, DISTAL; EXTENSIVE DEBRIDEMENT;  Surgeon: Isaiah Joyner MD;  Location: [facility] OR;  Service: Orthopedics;  Laterality: Right;    ESOPHAGEAL VARICE LIGATION      ESOPHAGOGASTRODUODENOSCOPY N/A 11/10/2020    Procedure: EGD (ESOPHAGOGASTRODUODENOSCOPY);  Surgeon: Gerard Salinas MD;  Location: [facility] ENDO;  Service: Endoscopy;  Laterality: N/A;    ESOPHAGOGASTRODUODENOSCOPY N/A 12/28/2020    Procedure: EGD (ESOPHAGOGASTRODUODENOSCOPY);  Surgeon: Adryan Park MD;  Location: [facility] (2ND FLR);  Service: Endoscopy;  Laterality: N/A;    ESOPHAGOGASTRODUODENOSCOPY N/A 02/15/2021    Procedure: EGD (ESOPHAGOGASTRODUODENOSCOPY);  Surgeon: Hari Greene MD;  Location: [facility] (4TH FLR);  Service: Endoscopy;  Laterality: N/A;  6 week follow-up variceal banding  Hx varices - Labs - ERW  prep ins. emialed - COVID screening on 2/12/21 [place] - ERW    ESOPHAGOGASTRODUODENOSCOPY Left 08/03/2021    Procedure: EGD (ESOPHAGOGASTRODUODENOSCOPY);  Surgeon: Fabricoi Corado MD;  Location: [facility] (4TH FLR);  Service: Gastroenterology;  Laterality: Left;  recent hematemasis. varices-labs on 7/26    COVID test at [facility] on 7/31-GT  requesting sooner    ESOPHAGOGASTRODUODENOSCOPY N/A 07/27/2022    Procedure: EGD (ESOPHAGOGASTRODUODENOSCOPY);  Surgeon: Eric Garcia MD;  Location: [facility] ENDO;  Service: Endoscopy;  Laterality: N/A;    ESOPHAGOGASTRODUODENOSCOPY Left 08/29/2022    Procedure: EGD (ESOPHAGOGASTRODUODENOSCOPY);  Surgeon: Kacy Douglass MD;  Location: [facility] (2ND FLR);  Service: Endoscopy;  Laterality: Left;  Fully vaccinated/ clear liquids up to 4 hrs prior-RB  varices labs ordered-RB    ESOPHAGOGASTRODUODENOSCOPY N/A 10/05/2022    Procedure: EGD (ESOPHAGOGASTRODUODENOSCOPY);  Surgeon: Gerard Salinas MD;  Location: [facility];  Service: Endoscopy;  Laterality: N/A;    ESOPHAGOGASTRODUODENOSCOPY N/A 3/30/2023     Procedure: EGD (ESOPHAGOGASTRODUODENOSCOPY);  Surgeon: Gerard Salinas MD;  Location: [facility] ENDO;  Service: Endoscopy;  Laterality: N/A;    ESOPHAGOGASTRODUODENOSCOPY N/A 8/3/2023    Procedure: EGD (ESOPHAGOGASTRODUODENOSCOPY);  Surgeon: Gerard Salinas MD;  Location: [facility] ENDO;  Service: Endoscopy;  Laterality: N/A;    EXTRACTION - STONE Right 3/20/2024    Procedure: EXTRACTION - STONE;  Surgeon: Chris Carey MD;  Location: [facility] OR 1ST FLR;  Service: Urology;  Laterality: Right;    HYSTERECTOMY      KNEE SURGERY Bilateral     LASER LITHOTRIPSY Right 3/20/2024    Procedure: LITHOTRIPSY, USING LASER;  Surgeon: Chris Carey MD;  Location: [facility] OR 1ST FLR;  Service: Urology;  Laterality: Right;    LITHOTRIPSY      REMOVAL-STENT Right 3/20/2024    Procedure: REMOVAL-STENT;  Surgeon: Chris Carey MD;  [facility];  Service: Urology;  Laterality: Right;    RETROGRADE PYELOGRAPHY Right 3/20/2024    Procedure: PYELOGRAM, RETROGRADE;  Surgeon: Chris Carey MD;  [facility];  Service: Urology;  Laterality: Right;    RHINOPLASTY TIP      SHOULDER SURGERY Right     TONSILLECTOMY      TOTAL HIP ARTHROPLASTY Right     URETERAL STENT PLACEMENT Right 3/20/2024    Procedure: INSERTION, STENT, URETER;  Surgeon: Chris Carey MD;  [facility];  Service: Urology;  Laterality: Right;    URETEROSCOPY Right 3/20/2024    Procedure: URETEROSCOPY;  Surgeon: Chris Carey MD;  Location: [facility] OR 1ST FLR;  Service: Urology;  Laterality: Right;     Family History   Problem Relation Name Age of Onset    No Known Problems Mother      Diabetes Mellitus Maternal Grandmother      Amblyopia Neg Hx      Blindness Neg Hx      Cataracts Neg Hx      Glaucoma Neg Hx      Macular degeneration Neg Hx      Retinal detachment Neg Hx      Strabismus Neg Hx      Colon cancer Neg Hx      Esophageal cancer Neg Hx       Social History     Tobacco Use    Smoking status: Some Days     Current  packs/day: 0.00     Types: Cigarettes     Last attempt to quit: 7/3/2019     Years since quittin.8    Smokeless tobacco: Never   Substance Use Topics    Alcohol use: Not Currently    Drug use: No         Physical Exam     Initial Vitals [24 1444]   BP Pulse Resp Temp SpO2   (!) 146/67 95 20 98.3 °F (36.8 °C) 100 %      MAP       --         Physical Exam    Nursing note and vitals reviewed.  Constitutional: She appears well-developed and well-nourished. She is not diaphoretic. No distress.   HENT:   Head: Normocephalic and atraumatic.   Eyes: Conjunctivae and EOM are normal. No scleral icterus.   Neck: Neck supple.   Normal range of motion.  Cardiovascular:  Normal rate and regular rhythm.           Pulmonary/Chest: No respiratory distress.   Abdominal: She exhibits no distension. There is abdominal tenderness (R abdomen). There is no rebound.   Musculoskeletal:      Cervical back: Normal range of motion and neck supple.     Neurological: She is alert and oriented to person, place, and time. GCS score is 15. GCS eye subscore is 4. GCS verbal subscore is 5. GCS motor subscore is 6.   Skin: Skin is warm and dry.   No jaundice   Psychiatric: She has a normal mood and affect. Her behavior is normal. Judgment and thought content normal.         ED Course   Procedures  Labs Reviewed   COMPREHENSIVE METABOLIC PANEL - Abnormal; Notable for the following components:       Result Value    AST 51 (*)     ALT 78 (*)     All other components within normal limits   CBC W/ AUTO DIFFERENTIAL - Abnormal; Notable for the following components:    WBC 3.13 (*)     Platelets 60 (*)     Lymph # 0.7 (*)     All other components within normal limits   PROTIME-INR - Abnormal; Notable for the following components:    Prothrombin Time 12.6 (*)     All other components within normal limits   URINALYSIS, REFLEX TO URINE CULTURE    Narrative:     Specimen Source->Urine   APTT   LIPASE   LACTIC ACID, PLASMA          Imaging Results               CT Abdomen Pelvis With IV Contrast NO Oral Contrast (Final result)  Result time 04/23/24 20:12:36      Final result by Ten Holder MD (04/23/24 20:12:36)                   Impression:      No abdominal abscess identified.    Cirrhotic liver morphology with stigmata of portal hypertension including splenomegaly and trace ascites.  Wall thickening and inflammatory changes involving the ascending colon, with nonspecific wall thickening of the stomach, potentially related to portal colopathy/gastropathy.  Gastritis and/or colitis could appear similarly.    Overall decreased volume of ascites when compared with 03/24/2024 CT.    Cholelithiasis.    Other incidental findings discussed in the body of the report.      Electronically signed by: Ten Holder MD  Date:    04/23/2024  Time:    20:12               Narrative:    EXAMINATION:  CT ABDOMEN PELVIS WITH IV CONTRAST    CLINICAL HISTORY:  Abdominal abscess/infection suspected;    TECHNIQUE:  Low dose axial images, sagittal and coronal reformations were obtained from the lung bases to the pubic symphysis following the IV administration of 75 mL of Omnipaque 350 .  Oral contrast was not given.    COMPARISON:  Ultrasound, 04/23/2024.  CT abdomen pelvis without contrast, 04/23/2024.  CT abdomen pelvis with contrast, 03/24/2024.    FINDINGS:  Lower Chest:    Lung bases are stable.  Heart size is normal.    Abdomen:    Cirrhotic liver morphology with nodular contour and lobar redistribution.  Probable simple cyst in the right hepatic lobe.  No new liver lesion.  Small gallstone and probable biliary sludge noted in the gallbladder.  Mild gallbladder wall thickening versus pericholecystic fluid.  No intrahepatic biliary ductal dilatation.    Spleen is again enlarged as seen previously, measuring greater than 18 cm in AP diameter.  Adrenal glands and pancreas are stable.    The kidneys are symmetric.  No hydronephrosis. Similar minimal distension of the right  ureter when compared to the left side.  No asymmetric perinephric inflammatory fat stranding.  Simple appearing cyst in the lower pole of the right kidney.    Minimal wall thickening of the stomach as seen previously.  No small bowel obstruction.  Appendix is normal.  Scattered colonic diverticula.  Mild wall thickening and fat stranding involving the ascending colon, similar to CT obtained earlier the same day.  Interval decrease in abdominopelvic free fluid when compared with 03/24/2024.  There is trace abdominopelvic free fluid and mild mesenteric edema.  No pneumoperitoneum.    No bulky retroperitoneal lymphadenopathy.    Abdominal aorta is normal in caliber with mild-to-moderate calcific atherosclerosis.    Portal and splenic veins are patent.  Bolus timing limits evaluation of the SMV.  No portal venous gas.    Pelvis:    Evaluation of the pelvis limited by streak artifact from right hip prosthesis.  Urinary bladder is unremarkable.  Probable phleboliths in the pelvis.  Rectum is unremarkable.  Small volume pelvic free fluid.    Bones and soft tissues:    No aggressive osseous lesions.  Similar mild height loss involving the superior endplate of L3 vertebral body.  Probable congenital abnormality of the T9 vertebral body.  Right hip prosthesis.  Mild degenerative changes in the spine and left hip.  Extraperitoneal soft tissues are negative for acute finding.                                       US Abdomen Limited (Final result)  Result time 04/23/24 19:43:06      Final result by Jules Hess MD (04/23/24 19:43:06)                   Impression:      Cirrhosis with sequelae of portal hypertension including splenomegaly and perisplenic collaterals.  Hepatic steatosis.    Cholelithiasis.  Gallbladder wall thickening, a nonspecific finding which can be seen with underlying liver disease.  No pericholecystic fluid.  Negative sonographic Gibbs sign.    Left hepatic lobe simple cyst.    Electronically signed by  resident: Zion Gutiérrez  Date:    04/23/2024  Time:    19:28    Electronically signed by: Jules Hess MD  Date:    04/23/2024  Time:    19:43               Narrative:    EXAMINATION:  US ABDOMEN LIMITED    CLINICAL HISTORY:  right sided abdominal pain.    TECHNIQUE:  Limited ultrasound of the right upper quadrant of the abdomen including pancreas, liver, gallbladder, common bile duct was performed.    COMPARISON:  CT renal stone 04/23/2024    Ultrasound abdomen 01/10/2024, 07/25/2023    HIDA scan 07/27/2023    FINDINGS:  Liver: Normal in size, measuring 14.7 cm.  Subtle nodular contour suggestive of cirrhosis.  Diffusely increased parenchymal echogenicity and elevated hepatorenal index (3.4) consistent with steatosis.  1.7 x 1.7 x 1.9 cm simple cyst in the left hepatic lobe.    Gallbladder: Several non-mobile echogenic foci measuring up to 6 mm, likely adherent stones.  Of note, one of the stones appears to be in the gallbladder neck similar to multiple priors dating back to at least 2023 (noting negative HIDA scan on 07/27/2023).  Gallbladder wall thickening measuring up to 6 mm.  No hypervascularity of the gallbladder wall.  No pericholecystic fluid.  No sonographic Gibbs's sign.    Biliary system: The common duct is not dilated, measuring 4 mm.  No intrahepatic ductal dilatation.    Spleen: Enlarged homogeneous echotexture measuring 15 x 5.1 cm.    Pancreas: The visualized portions of pancreas appear normal.    Miscellaneous: No ascites.  Perisplenic collaterals.                                        CT Renal Stone Study ABD Pelvis WO (Final result)  Result time 04/23/24 17:31:22      Final result by Manny Wooten MD (04/23/24 17:31:22)                   Impression:      This report was flagged in Epic as abnormal.    1. There is residual mild wall thickening and inflammation about the cecum and proximal ascending colon, the degree of surrounding fluid has decreased since the previous exam.  Correlation  with any history of portal colopathy.  Additionally, there has been interval improvement of abdominopelvic ascites noting small amount of fluid remains in the pelvis.  2. Splenomegaly.  3. Wall thickening of the stomach, possibly reflecting gastritis, correlation recommended.  4. Cirrhotic configuration of the liver.  5. Cholelithiasis without secondary findings to suggest acute cholecystitis.  6. Please see above for several additional findings.      Electronically signed by: Manny Wooten MD  Date:    04/23/2024  Time:    17:31               Narrative:    EXAMINATION:  CT RENAL STONE STUDY ABD PELVIS WO    CLINICAL HISTORY:  Flank pain, kidney stone suspected;    TECHNIQUE:  Low dose axial images, sagittal and coronal reformations were obtained from the lung bases to the pubic symphysis.  Contrast was not administered.    COMPARISON:  03/26/2024    FINDINGS:  Images of the lower thorax are remarkable for bilateral dependent atelectasis, mild    The liver has a nodular contour.  The pancreas and adrenal glands have a grossly unremarkable noncontrast appearance.  The spleen is enlarged.  There is cholelithiasis without secondary findings to suggest acute cholecystitis.  The stomach is decompressed, and may account for wall thickening however correlation with any history of gastritis.  No significant abdominal lymphadenopathy.    There is no nephrolithiasis.  There is prominence of the bilateral renal collecting systems.  The bilateral ureters are unable to be followed in their entirety to the urinary bladder, no definite calculi seen although correlation with urinalysis is recommended.  The urinary bladder is mildly distended without wall thickening.  The uterus is absent the adnexa is unremarkable.    There are a few scattered colonic diverticula without inflammation.  There is inflammation about the proximal ascending colon and cecum.  The terminal ileum and appendix are unremarkable.  The small bowel is grossly  unremarkable.  There are a few scattered shotty periaortic, pericaval, and mesenteric lymph nodes.  There is a small amount of free fluid in the pelvis, may be partially loculated on the right.    Right hip arthroplasty appears intact.  There is osteopenia.  There are degenerative changes of the bilateral sacroiliac joints and spine.  There is superior endplate height loss involving L3, similar to the previous exam.  No significant inguinal lymphadenopathy.                                       Medications   carvediloL tablet 3.125 mg (3.125 mg Oral Given 4/23/24 2317)   sucralfate 100 mg/mL suspension 1 g (1 g Oral Given 4/23/24 2317)   aluminum-magnesium hydroxide-simethicone 200-200-20 mg/5 mL suspension 30 mL (has no administration in time range)   multivitamin tablet (has no administration in time range)   rifAXIMin tablet 550 mg (550 mg Oral Given 4/23/24 2317)   sodium chloride 0.9% flush 10 mL (has no administration in time range)   melatonin tablet 6 mg (has no administration in time range)   acetaminophen tablet 650 mg (has no administration in time range)   morphine injection 4 mg (has no administration in time range)   oxyCODONE immediate release tablet 5 mg (5 mg Oral Given 4/23/24 2318)   ondansetron injection 4 mg (has no administration in time range)   morphine injection 4 mg (4 mg Intravenous Given 4/23/24 1647)   ondansetron injection 4 mg (4 mg Intravenous Given 4/23/24 1649)   iohexoL (OMNIPAQUE 350) injection 75 mL (75 mLs Intravenous Given 4/23/24 1937)   morphine injection 4 mg (4 mg Intravenous Given 4/23/24 2037)   ondansetron injection 4 mg (4 mg Intravenous Given 4/23/24 2045)     Medical Decision Making  DDX: kidney stones, pyelonephritis, renal abscess, UTI, gastroenteritis, diverticulitis, ovarian torsion, appendicitis, cholelithiasis, cholecystitis    Plan for labs, CT renal stone protocol    CT with gallstone w/o signs of obstruction  She however is having ongoing pain.   US and CT  with contrast ordered for further evaluation  Discussed with general surgery concern for ongoing pain in the setting of cholelithiasis  HIDA scan ordered and patient placed in EDOU.  dispo pending hida scan    Amount and/or Complexity of Data Reviewed  Labs: ordered.  Radiology: ordered.    Risk  OTC drugs.  Prescription drug management.               ED Course as of 04/23/24 2350   Tue Apr 23, 2024   1831 Updated bed 11 on her CT findings [GK]      ED Course User Index  [GK] Kassandra Carter MD                           Clinical Impression:  Final diagnoses:  [R10.9] Abdominal pain  [R10.9] Right sided abdominal pain (Primary)  [K80.20] Calculus of gallbladder without cholecystitis without obstruction          ED Disposition Condition    Observation Stable                Kassandra Carter MD  04/23/24 [id number]

## 2024-04-23 NOTE — ED NOTES
Patient comes into the emergency department by POV with complaints of pain. Patient states that had stent placed on march 20th to break up a kidney stone, over the next couple of days pt stomach was distended causing some sob so she came back march 24th and had the stent taken out in the er and she was able to pass stones after that. Since then pt has had R sided flank pain that radiates up R side of back . Pt endorsing LLQ abd tenderness. Patient denies voiding pattern changes.

## 2024-04-23 NOTE — TELEPHONE ENCOUNTER
----- Message from Bonnie Bowers MD sent at 4/23/2024  1:33 PM CDT -----  Regarding: significant flank pain  Dr Carey, pt continues to have flank pain since 3/24/24. Is there any way you can see her this week?   NB

## 2024-04-23 NOTE — Clinical Note
Diagnosis: Abdominal pain [349790]   Future Attending Provider: BUSTER ARMANDO [[id number]]   Is the patient being sent to ED Observation?: Yes

## 2024-04-24 VITALS
WEIGHT: 127 LBS | BODY MASS INDEX: 19.25 KG/M2 | SYSTOLIC BLOOD PRESSURE: 120 MMHG | HEART RATE: 67 BPM | RESPIRATION RATE: 16 BRPM | OXYGEN SATURATION: 99 % | DIASTOLIC BLOOD PRESSURE: 67 MMHG | TEMPERATURE: 98 F | HEIGHT: 68 IN

## 2024-04-24 DIAGNOSIS — N13.30 HYDRONEPHROSIS, UNSPECIFIED HYDRONEPHROSIS TYPE: Primary | ICD-10-CM

## 2024-04-24 LAB
ALBUMIN SERPL BCP-MCNC: 4 G/DL (ref 3.5–5.2)
ALP SERPL-CCNC: 83 U/L (ref 55–135)
ALT SERPL W/O P-5'-P-CCNC: 66 U/L (ref 10–44)
ANION GAP SERPL CALC-SCNC: 10 MMOL/L (ref 8–16)
AST SERPL-CCNC: 43 U/L (ref 10–40)
BASOPHILS # BLD AUTO: 0.01 K/UL (ref 0–0.2)
BASOPHILS NFR BLD: 0.2 % (ref 0–1.9)
BILIRUB SERPL-MCNC: 1 MG/DL (ref 0.1–1)
BUN SERPL-MCNC: 14 MG/DL (ref 6–20)
CALCIUM SERPL-MCNC: 9.6 MG/DL (ref 8.7–10.5)
CHLORIDE SERPL-SCNC: 105 MMOL/L (ref 95–110)
CO2 SERPL-SCNC: 26 MMOL/L (ref 23–29)
CREAT SERPL-MCNC: 0.8 MG/DL (ref 0.5–1.4)
DIFFERENTIAL METHOD BLD: ABNORMAL
EOSINOPHIL # BLD AUTO: 0.1 K/UL (ref 0–0.5)
EOSINOPHIL NFR BLD: 1.5 % (ref 0–8)
ERYTHROCYTE [DISTWIDTH] IN BLOOD BY AUTOMATED COUNT: 13.1 % (ref 11.5–14.5)
EST. GFR  (NO RACE VARIABLE): >60 ML/MIN/1.73 M^2
GLUCOSE SERPL-MCNC: 109 MG/DL (ref 70–110)
HCT VFR BLD AUTO: 38.3 % (ref 37–48.5)
HGB BLD-MCNC: 12.9 G/DL (ref 12–16)
IMM GRANULOCYTES # BLD AUTO: 0.01 K/UL (ref 0–0.04)
IMM GRANULOCYTES NFR BLD AUTO: 0.2 % (ref 0–0.5)
LYMPHOCYTES # BLD AUTO: 0.9 K/UL (ref 1–4.8)
LYMPHOCYTES NFR BLD: 22.4 % (ref 18–48)
MCH RBC QN AUTO: 30.2 PG (ref 27–31)
MCHC RBC AUTO-ENTMCNC: 33.7 G/DL (ref 32–36)
MCV RBC AUTO: 90 FL (ref 82–98)
MONOCYTES # BLD AUTO: 0.4 K/UL (ref 0.3–1)
MONOCYTES NFR BLD: 10 % (ref 4–15)
NEUTROPHILS # BLD AUTO: 2.6 K/UL (ref 1.8–7.7)
NEUTROPHILS NFR BLD: 65.7 % (ref 38–73)
NRBC BLD-RTO: 0 /100 WBC
PLATELET # BLD AUTO: 60 K/UL (ref 150–450)
PMV BLD AUTO: 11.4 FL (ref 9.2–12.9)
POTASSIUM SERPL-SCNC: 3.6 MMOL/L (ref 3.5–5.1)
PROT SERPL-MCNC: 6.7 G/DL (ref 6–8.4)
RBC # BLD AUTO: 4.27 M/UL (ref 4–5.4)
SODIUM SERPL-SCNC: 141 MMOL/L (ref 136–145)
WBC # BLD AUTO: 4.01 K/UL (ref 3.9–12.7)

## 2024-04-24 PROCEDURE — 25000003 PHARM REV CODE 250: Performed by: PHYSICIAN ASSISTANT

## 2024-04-24 PROCEDURE — 80053 COMPREHEN METABOLIC PANEL: CPT | Performed by: PHYSICIAN ASSISTANT

## 2024-04-24 PROCEDURE — 63600175 PHARM REV CODE 636 W HCPCS: Performed by: PHYSICIAN ASSISTANT

## 2024-04-24 PROCEDURE — A9537 TC99M MEBROFENIN: HCPCS | Performed by: NURSE PRACTITIONER

## 2024-04-24 PROCEDURE — 63600175 PHARM REV CODE 636 W HCPCS: Performed by: STUDENT IN AN ORGANIZED HEALTH CARE EDUCATION/TRAINING PROGRAM

## 2024-04-24 PROCEDURE — 25000003 PHARM REV CODE 250: Performed by: STUDENT IN AN ORGANIZED HEALTH CARE EDUCATION/TRAINING PROGRAM

## 2024-04-24 PROCEDURE — 85025 COMPLETE CBC W/AUTO DIFF WBC: CPT | Performed by: PHYSICIAN ASSISTANT

## 2024-04-24 PROCEDURE — G0378 HOSPITAL OBSERVATION PER HR: HCPCS

## 2024-04-24 RX ORDER — OXYCODONE HYDROCHLORIDE 5 MG/1
TABLET ORAL
Qty: 16 TABLET | Refills: 0 | Status: SHIPPED | OUTPATIENT
Start: 2024-04-24 | End: 2024-05-15

## 2024-04-24 RX ORDER — KIT FOR THE PREPARATION OF TECHNETIUM TC 99M MEBROFENIN 45 MG/10ML
5 INJECTION, POWDER, LYOPHILIZED, FOR SOLUTION INTRAVENOUS
Status: COMPLETED | OUTPATIENT
Start: 2024-04-24 | End: 2024-04-24

## 2024-04-24 RX ADMIN — MORPHINE SULFATE 4 MG: 4 INJECTION INTRAVENOUS at 01:04

## 2024-04-24 RX ADMIN — OXYCODONE 5 MG: 5 TABLET ORAL at 12:04

## 2024-04-24 RX ADMIN — KIT FOR THE PREPARATION OF TECHNETIUM TC 99M MEBROFENIN 5.5 MILLICURIE: 45 INJECTION, POWDER, LYOPHILIZED, FOR SOLUTION INTRAVENOUS at 09:04

## 2024-04-24 RX ADMIN — ONDANSETRON 4 MG: 2 INJECTION INTRAMUSCULAR; INTRAVENOUS at 02:04

## 2024-04-24 RX ADMIN — SUCRALFATE 1 G: 1 SUSPENSION ORAL at 11:04

## 2024-04-24 RX ADMIN — RIFAXIMIN 550 MG: 550 TABLET ORAL at 10:04

## 2024-04-24 RX ADMIN — THERA TABS 1 TABLET: TAB at 10:04

## 2024-04-24 RX ADMIN — CARVEDILOL 3.12 MG: 3.12 TABLET, FILM COATED ORAL at 10:04

## 2024-04-24 RX ADMIN — PROMETHAZINE HYDROCHLORIDE 25 MG: 25 INJECTION INTRAMUSCULAR; INTRAVENOUS at 05:04

## 2024-04-24 NOTE — CARE UPDATE
HIDA scan demonstrates a patent cystic duct with filling of the gallbladder. HIDA negative for cholecystitis. General Surgery to sign off, please call with any questions or change in status.    Tom Valdovinos MD  General Surgery Resident, PGY-3

## 2024-04-24 NOTE — H&P
ED Observation Unit  History and Physical      I assumed care of this patient from the Main ED at onset of observation time, 2140 on 04/23/2024.       History of Present Illness:    Pt is a 52 yo F with PMH of liver cirrhosis, nephrolithiasis who presents with R flank pain.  Pain is sharp and radiating to her groin. Her abdomen is distended more than normal.   No fever, no change in chronic loose stools  No hematuria, dysuria     I reviewed the ED Provider Note dated 04/23/2024 prior to my evaluation of this patient.  I reviewed all labs and imaging performed in the Main ED, prior to patient being placed in Observation. Patient was placed in the ED Observation Unit for Right sided abdominal pain.    PMHx   Past Medical History:   Diagnosis Date    Anemia, unspecified     Anxiety     Arthritis     Ascites 11/23/2020    AVN (avascular necrosis of bone)     Cataract     COVID-19 vaccine administered     04/08/21, 04/30/21    Diarrhea of presumed infectious origin 7/31/2023    Edema 11/23/2020    Epigastric pain 7/31/2023    Esophageal varices     Glaucoma     Hepatic encephalopathy 11/23/2020    History of colon polyps     Hx of cirrhosis     non-alcoholic    Iron deficiency anemia secondary to blood loss (chronic)     Kidney stones     Portal hypertensive gastropathy     Unintentional weight loss 10/14/2023    Unspecified cirrhosis of liver       Past Surgical History:   Procedure Laterality Date    APPENDECTOMY      COLONOSCOPY N/A 8/3/2023    Procedure: COLONOSCOPY;  Surgeon: Gerard Salinas MD;  Location: [facility];  Service: Endoscopy;  Laterality: N/A;    COLONOSCOPY W/ POLYPECTOMY      CYSTOSCOPY N/A 3/20/2024    Procedure: CYSTOSCOPY;  Surgeon: Chris Carey MD;  Location: [address];  Service: Urology;  Laterality: N/A;    CYSTOSCOPY W/ URETERAL STENT REMOVAL Right 3/14/2024    Procedure: CYSTOSCOPY, WITH URETERAL STENT REMOVAL;  Surgeon: Chris Carey MD;  Location: [address];  Service:  Urology;  Laterality: Right;    DISTAL CLAVICLE EXCISION Right 11/18/2021    Procedure: RIGHT SHOULDER ARTHROSCOPY, EXCISION, CLAVICLE, DISTAL; EXTENSIVE DEBRIDEMENT;  Surgeon: Isaiah Joyner MD;  Location: [facility] OR;  Service: Orthopedics;  Laterality: Right;    ESOPHAGEAL VARICE LIGATION      ESOPHAGOGASTRODUODENOSCOPY N/A 11/10/2020    Procedure: EGD (ESOPHAGOGASTRODUODENOSCOPY);  Surgeon: Gerard Salinas MD;  Location: [facility] ENDO;  Service: Endoscopy;  Laterality: N/A;    ESOPHAGOGASTRODUODENOSCOPY N/A 12/28/2020    Procedure: EGD (ESOPHAGOGASTRODUODENOSCOPY);  Surgeon: Adryan Park MD;  Location: [facility] (2ND FLR);  Service: Endoscopy;  Laterality: N/A;    ESOPHAGOGASTRODUODENOSCOPY N/A 02/15/2021    Procedure: EGD (ESOPHAGOGASTRODUODENOSCOPY);  Surgeon: Hari Greene MD;  Location: [facility] (4TH FLR);  Service: Endoscopy;  Laterality: N/A;  6 week follow-up variceal banding  Hx varices - Labs - ERW  prep ins. emialed - COVID screening on 2/12/21 [place] - ERW    ESOPHAGOGASTRODUODENOSCOPY Left 08/03/2021    Procedure: EGD (ESOPHAGOGASTRODUODENOSCOPY);  Surgeon: Fabricio Corado MD;  Location: [facility] (4TH FLR);  Service: Gastroenterology;  Laterality: Left;  recent hematemasis. varices-labs on 7/26    COVID test at [facility] on 7/31-GT  requesting sooner    ESOPHAGOGASTRODUODENOSCOPY N/A 07/27/2022    Procedure: EGD (ESOPHAGOGASTRODUODENOSCOPY);  Surgeon: Eric Garcia MD;  Location: [facility] ENDO;  Service: Endoscopy;  Laterality: N/A;    ESOPHAGOGASTRODUODENOSCOPY Left 08/29/2022    Procedure: EGD (ESOPHAGOGASTRODUODENOSCOPY);  Surgeon: Kacy Douglass MD;  Location: [facility] (2ND FLR);  Service: Endoscopy;  Laterality: Left;  Fully vaccinated/ clear liquids up to 4 hrs prior-RB  varices labs ordered-RB    ESOPHAGOGASTRODUODENOSCOPY N/A 10/05/2022    Procedure: EGD (ESOPHAGOGASTRODUODENOSCOPY);  Surgeon: Gerard Salinas MD;  Location: [facility];  Service: Endoscopy;  Laterality: N/A;     ESOPHAGOGASTRODUODENOSCOPY N/A 3/30/2023    Procedure: EGD (ESOPHAGOGASTRODUODENOSCOPY);  Surgeon: Gerard Salinas MD;  Location: [facility] ENDO;  Service: Endoscopy;  Laterality: N/A;    ESOPHAGOGASTRODUODENOSCOPY N/A 8/3/2023    Procedure: EGD (ESOPHAGOGASTRODUODENOSCOPY);  Surgeon: Gerard Salinas MD;  Location: [facility] ENDO;  Service: Endoscopy;  Laterality: N/A;    EXTRACTION - STONE Right 3/20/2024    Procedure: EXTRACTION - STONE;  Surgeon: Chris Carey MD;  Location: [facility] OR 1ST FLR;  Service: Urology;  Laterality: Right;    HYSTERECTOMY      KNEE SURGERY Bilateral     LASER LITHOTRIPSY Right 3/20/2024    Procedure: LITHOTRIPSY, USING LASER;  Surgeon: Chris Carey MD;  Location: [facility] OR 1ST FLR;  Service: Urology;  Laterality: Right;    LITHOTRIPSY      REMOVAL-STENT Right 3/20/2024    Procedure: REMOVAL-STENT;  Surgeon: Chris Carey MD;  Location: [facility] OR 1ST FLR;  Service: Urology;  Laterality: Right;    RETROGRADE PYELOGRAPHY Right 3/20/2024    Procedure: PYELOGRAM, RETROGRADE;  Surgeon: Chris Carey MD;  Location: [facility] OR 1ST FLR;  Service: Urology;  Laterality: Right;    RHINOPLASTY TIP      SHOULDER SURGERY Right     TONSILLECTOMY      TOTAL HIP ARTHROPLASTY Right     URETERAL STENT PLACEMENT Right 3/20/2024    Procedure: INSERTION, STENT, URETER;  Surgeon: Chris Carey MD;  Location: [facility] OR 1ST FLR;  Service: Urology;  Laterality: Right;    URETEROSCOPY Right 3/20/2024    Procedure: URETEROSCOPY;  Surgeon: Chris Carey MD;  Location: [facility] OR 1ST FLR;  Service: Urology;  Laterality: Right;        Family Hx   Family History   Problem Relation Name Age of Onset    No Known Problems Mother      Diabetes Mellitus Maternal Grandmother      Amblyopia Neg Hx      Blindness Neg Hx      Cataracts Neg Hx      Glaucoma Neg Hx      Macular degeneration Neg Hx      Retinal detachment Neg Hx      Strabismus Neg Hx      Colon cancer Neg Hx      Esophageal cancer Neg Hx          Social Hx   Social  History     Socioeconomic History    Marital status:    Tobacco Use    Smoking status: Some Days     Current packs/day: 0.00     Types: Cigarettes     Last attempt to quit: 7/3/2019     Years since quittin.8    Smokeless tobacco: Never   Substance and Sexual Activity    Alcohol use: Not Currently    Drug use: No     Social Determinants of Health     Financial Resource Strain: Low Risk  (2023)    Overall Financial Resource Strain (CARDIA)     Difficulty of Paying Living Expenses: Not hard at all   Food Insecurity: No Food Insecurity (2023)    Hunger Vital Sign     Worried About Running Out of Food in the Last Year: Never true     Ran Out of Food in the Last Year: Never true   Transportation Needs: No Transportation Needs (2023)    PRAPARE - Transportation     Lack of Transportation (Medical): No     Lack of Transportation (Non-Medical): No   Physical Activity: Sufficiently Active (2023)    Exercise Vital Sign     Days of Exercise per Week: 6 days     Minutes of Exercise per Session: 30 min   Stress: Stress Concern Present (2023)    [employer] - Occupational Stress Questionnaire     Feeling of Stress : To some extent   Social Connections: Socially Integrated (2023)    Social Connection and Isolation Panel [NHANES]     Frequency of Communication with Friends and Family: More than three times a week     Frequency of Social Gatherings with Friends and Family: Twice a week     Attends Orthodoxy Services: 1 to 4 times per year     Active Member of Clubs or Organizations: No     Attends Club or Organization Meetings: 1 to 4 times per year     Marital Status:    Housing Stability: Unknown (2023)    Housing Stability Vital Sign     Unable to Pay for Housing in the Last Year: No     Unstable Housing in the Last Year: No        Vital Signs   Vitals:    24 1924 24 2100   BP: 136/83 128/74  (!) 141/71   BP  Location: Left arm      Patient Position: Lying      Pulse: 71 76  74   Resp: (!) 26 10 18 (!) 23   Temp: 98.1 °F (36.7 °C)      TempSrc: Oral      SpO2: 100% 99%  99%   Weight:       Height:            Review of Systems  Review of Systems   Constitutional:  Negative for chills and fever.   HENT:  Negative for sore throat.    Respiratory:  Negative for shortness of breath.    Cardiovascular:  Negative for chest pain.   Gastrointestinal:  Positive for abdominal pain and diarrhea.   Genitourinary:  Negative for dysuria.   Musculoskeletal: Negative.    Neurological:  Negative for weakness.       Physical Exam  Physical Exam  Vitals and nursing note reviewed.   Constitutional:       Appearance: Normal appearance.   HENT:      Head: Normocephalic and atraumatic.      Nose: Nose normal.      Mouth/Throat:      Pharynx: Oropharynx is clear.   Eyes:      Conjunctiva/sclera: Conjunctivae normal.   Cardiovascular:      Rate and Rhythm: Normal rate.   Pulmonary:      Effort: Pulmonary effort is normal.      Breath sounds: Normal breath sounds.   Abdominal:      General: Abdomen is flat.      Tenderness: There is abdominal tenderness (Right side).   Musculoskeletal:         General: Normal range of motion.   Skin:     General: Skin is warm and dry.      Capillary Refill: Capillary refill takes less than 2 seconds.   Neurological:      General: No focal deficit present.      Mental Status: She is alert and oriented to person, place, and time.         Medications:   Scheduled Meds:  Current Facility-Administered Medications   Medication Dose Route Frequency    [START ON 4/24/2024] aluminum-magnesium hydroxide-simethicone  30 mL Oral QID (AC & HS)    carvediloL  3.125 mg Oral BID    [START ON 4/24/2024] multivitamin  1 tablet Oral Daily    rifAXIMin  550 mg Oral BID    [START ON 4/24/2024] sucralfate  1 g Oral Q6H     Continuous Infusions:  Current Facility-Administered Medications   Medication Dose Route Frequency Last Rate Last  Admin     PRN Meds:.  Current Facility-Administered Medications:     acetaminophen, 650 mg, Oral, Q8H PRN    melatonin, 6 mg, Oral, Nightly PRN    morphine, 4 mg, Intravenous, Q4H PRN    ondansetron, 4 mg, Intravenous, Q8H PRN    oxyCODONE, 5 mg, Oral, Q4H PRN    sodium chloride 0.9%, 10 mL, Intravenous, PRN      Assessment/Plan:  Abdominal pain:  -no evidence of obstructive uropathy on ED CT renal stone.  There is gallbladder wall thickening with negative sonographic Gibbs per ultrasound.  Several small gallstones also noted on imaging.  -admitted to ED observation unit for pain control, HIDA scan  tomorrow and General surgery was consulted in the ED and will follow..    Case was discussed with the ED provider, Dr Carter

## 2024-04-24 NOTE — ED NOTES
Assumed care of pt at this time. VSS, RR even and unlabored. Resting in bed comfortably. No voiced compaints of pain or discomfort at this time. Safety protocols remain. Verified lines/leads attatched to patient at this time- reattached BP cuff and continuous tele    General: Awake and alert:  Neck: Supple  Respiratory: Nonlabored respirations. No audible wheeze. Speaking in full sentences.  Cardiac: Well-perfused. No acrocyanosis.  Abdomen: Nausea and dry-heaving. Supple  Neurological: Moves all extremities symmetrically and equally. Answers questions appropriately.

## 2024-04-24 NOTE — PLAN OF CARE
[facility] - Emergency Dept  Initial Discharge Assessment       Primary Care Provider: James Samano MD    Admission Diagnosis: Abdominal pain [R10.9]    Admission Date: 4/23/2024  Expected Discharge Date:     Pt stated she is independent with her ambulation and ADL's and does not require assistance or equipment.    Pt to d/c home with no needs when ready    Transition of Care Barriers: (P) None    Payor: BLUE CROSS BLUE SHIELD / Plan: BCBS ALL OUT OF STATE / Product Type: PPO /     Extended Emergency Contact Information  Primary Emergency Contact: Lavelle Aranda   [facility]  Mobile Phone: [phone]  Relation: Spouse    Discharge Plan A: (P) Home  Discharge Plan B: (P) Home      [id number] - [place], [place] - [address]  [address]  [place] [address]  Phone: [phone] Fax: [phone]      Initial Assessment (most recent)       Adult Discharge Assessment - 04/24/24 0718          Discharge Assessment    Assessment Type Discharge Planning Assessment (P)      Confirmed/corrected address, phone number and insurance Yes (P)      Confirmed Demographics Correct on Facesheet (P)      Source of Information patient (P)      Reason For Admission Right sided abdominal pain (P)      People in Home child(wilton), adult;spouse (P)      Facility Arrived From: home (P)      Do you expect to return to your current living situation? Yes (P)      Do you have help at home or someone to help you manage your care at home? No (P)      Prior to hospitilization cognitive status: Alert/Oriented;No Deficits (P)      Current cognitive status: No Deficits;Alert/Oriented (P)      Walking or Climbing Stairs Difficulty no (P)      Dressing/Bathing Difficulty no (P)      Home Accessibility wheelchair accessible (P)      Home Layout Able to live on 1st floor (P)      Equipment Currently Used at Home none (P)      Do you have any problems affording any of your prescribed medications? No (P)      Is the patient taking  medications as prescribed? yes (P)      Who is going to help you get home at discharge? family/friends (P)      How do you get to doctors appointments? car, drives self (P)      Are you on dialysis? No (P)      Do you take coumadin? No (P)      Discharge Plan A Home (P)      Discharge Plan B Home (P)      DME Needed Upon Discharge  none (P)      Discharge Plan discussed with: Patient (P)      Transition of Care Barriers None (P)         Physical Activity    On average, how many days per week do you engage in moderate to strenuous exercise (like a brisk walk)? 4 days (P)      On average, how many minutes do you engage in exercise at this level? 80 min (P)         Financial Resource Strain    How hard is it for you to pay for the very basics like food, housing, medical care, and heating? Not very hard (P)         Housing Stability    In the last 12 months, was there a time when you were not able to pay the mortgage or rent on time? No (P)      At any time in the past 12 months, were you homeless or living in a shelter (including now)? No (P)         Transportation Needs    In the past 12 months, has lack of transportation kept you from medical appointments or from getting medications? No (P)      In the past 12 months, has lack of transportation kept you from meetings, work, or from getting things needed for daily living? No (P)         Food Insecurity    Within the past 12 months, you worried that your food would run out before you got the money to buy more. Never true (P)      Within the past 12 months, the food you bought just didn't last and you didn't have money to get more. Never true (P)         Stress    Do you feel stress - tense, restless, nervous, or anxious, or unable to sleep at night because your mind is troubled all the time - these days? To some extent (P)         Social Connections    In a typical week, how many times do you talk on the phone with family, friends, or neighbors? More than three times a  week (P)      How often do you get together with friends or relatives? More than three times a week (P)      How often do you attend Faith or Jehovah's witness services? Never (P)      Do you belong to any clubs or organizations such as Faith groups, unions, fraternal or athletic groups, or school groups? Yes (P)      How often do you attend meetings of the clubs or organizations you belong to? More than 4 times per year (P)      Are you , , , , never , or living with a partner?  (P)         Alcohol Use    Q1: How often do you have a drink containing alcohol? Never (P)      Q2: How many drinks containing alcohol do you have on a typical day when you are drinking? Patient does not drink (P)      Q3: How often do you have six or more drinks on one occasion? Never (P)         OTHER    Name(s) of People in Home spouse Lavelle, adult son 29yo (P)                    Cornelia Garcia CD, MSW, LMSW, RSW   Case Management  [facility]  Email: [email or web address]

## 2024-04-24 NOTE — CONSULTS
[facility] Dept  General Surgery  Consult Note    Inpatient consult to General surgery  Consult performed by: Malina Jones MD  Consult ordered by: Aman Marx PA-C  Reason for consult: cholelithiasis  Assessment/Recommendations: 54yo F w/PMH cirrhosis (MELD-Na 8 today), arthritis who presents to the ED with one month of right flank pain since she had a stent placed then removed for right sided nephrolithiasis, general surgery consulted for cholelithiasis noted on imaging today    - patient symptoms not consistent with symptomatic cholelithiasis (pain constant, chronic in the right flank/back, no association with meals), tender to palpation over right posterior flank more consistent with sequela from stent removal  - US without signs of acute cholecystitis, noted to have cholelithiasis; recommend HIDA scan   - consider consult to urology for workup of R flank pain s/p stent removal if HIDA is negative  - general surgery will follow, please call with questions        Subjective:     Chief Complaint/Reason for Admission: abdominal pain    History of Present Illness: Ms. Aranda is a 54yo F w/PMH cirrhosis (MELD-Na 8), arthritis who presents to the ED with one month of right flank pain since she had a stent placed and then removed for right sided nephrolithiasis. Patient states that she has had kidney stones for the past 30 years. Most recently, she had R sided flank pain with associated nausea one month ago and was found to have a right sided kidney stone. A stent was placed per urology but then her abdomen became distended and she had worsening pain which prompted them to remove the stent in the ED last month. Since that time she has had continued right sided flank pain with nausea. She does endorse intermittent epigastric pain that started since she came to the ED. Pain is not associated with eating. Denies fevers, vomiting, chest pain, changes to bowel habits, dysuria, hematuria. She has chronic  diarrhea with 5-6 loose BMs per day.     Workup in the ED showed cirrhosis with portal hypertension, cholelithiasis with mild gallbladder wall thickening relatively unchanged from prior. Also noted to have inflammatory changes around the ascending colon and stomach. Leukopenic on labs (chronic), T bili within normal limits.    PSH: hysterectomy, appendectomy, knee surgeries, distal clavicle excision, rhinoplasty    Current Facility-Administered Medications   Medication Dose Route Frequency Provider Last Rate Last Admin    acetaminophen tablet 650 mg  650 mg Oral Q8H PRN Aman Marx PA-C        [START ON 4/24/2024] aluminum-magnesium hydroxide-simethicone 200-200-20 mg/5 mL suspension 30 mL  30 mL Oral QID (AC & HS) Aman Marx PA-C        carvediloL tablet 3.125 mg  3.125 mg Oral BID Aman Marx PA-C   3.125 mg at 04/23/24 2317    melatonin tablet 6 mg  6 mg Oral Nightly PRN Aman Marx PA-C        morphine injection 4 mg  4 mg Intravenous Q4H PRN Aman Marx PA-C        [START ON 4/24/2024] multivitamin tablet  1 tablet Oral Daily Aman Marx PA-C        ondansetron injection 4 mg  4 mg Intravenous Q8H PRN Aman Marx PA-C        oxyCODONE immediate release tablet 5 mg  5 mg Oral Q4H PRN Aman Marx PA-C   5 mg at 04/23/24 2318    rifAXIMin tablet 550 mg  550 mg Oral BID Aman Marx PA-C   550 mg at 04/23/24 2317    sodium chloride 0.9% flush 10 mL  10 mL Intravenous PRN Aman Marx PA-C        [START ON 4/24/2024] sucralfate 100 mg/mL suspension 1 g  1 g Oral Q6H Aman Marx PA-C   1 g at 04/23/24 2317     Current Outpatient Medications   Medication Sig Dispense Refill    carvediloL (COREG) 3.125 MG tablet TAKE 1 TABLET BY MOUTH 2 TIMES DAILY. 180 tablet 3    esomeprazole (NEXIUM) 40 MG capsule Take 1 capsule (40 mg total) by mouth 2 (two) times daily before meals. 60 capsule 11    multivitamin (THERAGRAN) per tablet Take 1 tablet by mouth once daily.      omega-3 fatty acids/fish oil (FISH OIL-OMEGA-3 FATTY  ACIDS) 300-1,000 mg capsule Take 1 capsule by mouth once daily.      rifAXIMin (XIFAXAN) 550 mg Tab Take 1 tablet (550 mg total) by mouth 2 (two) times daily. 60 tablet 11    furosemide (LASIX) 40 MG tablet Take 1 tablet (40 mg total) by mouth once daily. 30 tablet 11    oxybutynin (DITROPAN) 5 MG Tab Take 1 tablet (5 mg total) by mouth 3 (three) times daily. 90 tablet 11    spironolactone (ALDACTONE) 100 MG tablet Take 1 tablet (100 mg total) by mouth once daily. 30 tablet 11    tamsulosin (FLOMAX) 0.4 mg Cap Take 1 capsule (0.4 mg total) by mouth once daily. 30 capsule 11    tamsulosin (FLOMAX) 0.4 mg Cap Take 1 capsule (0.4 mg total) by mouth once daily. 30 capsule 0    UNABLE TO FIND 4 (four) times daily as needed. Medible 20 mg blue raspberry 1/4 to 1/2 up to 4 times daily as needed.       Facility-Administered Medications Ordered in Other Encounters   Medication Dose Route Frequency Provider Last Rate Last Admin    0.9%  NaCl infusion   Intravenous Continuous Gerard Salinas MD   Stopped at 03/30/23 0922       Review of patient's allergies indicates:   Allergen Reactions    Sulfa (sulfonamide antibiotics) Hives       Past Medical History:   Diagnosis Date    Anemia, unspecified     Anxiety     Arthritis     Ascites 11/23/2020    AVN (avascular necrosis of bone)     Cataract     COVID-19 vaccine administered     04/08/21, 04/30/21    Diarrhea of presumed infectious origin 7/31/2023    Edema 11/23/2020    Epigastric pain 7/31/2023    Esophageal varices     Glaucoma     Hepatic encephalopathy 11/23/2020    History of colon polyps     Hx of cirrhosis     non-alcoholic    Iron deficiency anemia secondary to blood loss (chronic)     Kidney stones     Portal hypertensive gastropathy     Unintentional weight loss 10/14/2023    Unspecified cirrhosis of liver      Past Surgical History:   Procedure Laterality Date    APPENDECTOMY      COLONOSCOPY N/A 8/3/2023    Procedure: COLONOSCOPY;  Surgeon: Gerard Salinas  MD MARI;  Location: [facility];  Service: Endoscopy;  Laterality: N/A;    COLONOSCOPY W/ POLYPECTOMY      CYSTOSCOPY N/A 3/20/2024    Procedure: CYSTOSCOPY;  Surgeon: Chris Carey MD;  Location: [facility] OR 1ST FLR;  Service: Urology;  Laterality: N/A;    CYSTOSCOPY W/ URETERAL STENT REMOVAL Right 3/14/2024    Procedure: CYSTOSCOPY, WITH URETERAL STENT REMOVAL;  Surgeon: Chris Carey MD;  Location: [facility] OR 1ST FLR;  Service: Urology;  Laterality: Right;    DISTAL CLAVICLE EXCISION Right 11/18/2021    Procedure: RIGHT SHOULDER ARTHROSCOPY, EXCISION, CLAVICLE, DISTAL; EXTENSIVE DEBRIDEMENT;  Surgeon: Isaiah Joyner MD;  Location: [facility];  Service: Orthopedics;  Laterality: Right;    ESOPHAGEAL VARICE LIGATION      ESOPHAGOGASTRODUODENOSCOPY N/A 11/10/2020    Procedure: EGD (ESOPHAGOGASTRODUODENOSCOPY);  Surgeon: Gerard Salinas MD;  Location: [facility];  Service: Endoscopy;  Laterality: N/A;    ESOPHAGOGASTRODUODENOSCOPY N/A 12/28/2020    Procedure: EGD (ESOPHAGOGASTRODUODENOSCOPY);  Surgeon: Adryan Park MD;  Location: [facility] (2ND FLR);  Service: Endoscopy;  Laterality: N/A;    ESOPHAGOGASTRODUODENOSCOPY N/A 02/15/2021    Procedure: EGD (ESOPHAGOGASTRODUODENOSCOPY);  Surgeon: Hari Greene MD;  Location: [facility] (4TH FLR);  Service: Endoscopy;  Laterality: N/A;  6 week follow-up variceal banding  Hx varices - Labs - ERW  prep ins. emialed - COVID screening on 2/12/21 [place] - [facility]W    ESOPHAGOGASTRODUODENOSCOPY Left 08/03/2021    Procedure: EGD (ESOPHAGOGASTRODUODENOSCOPY);  Surgeon: Fabricio Corado MD;  Location: [facility] (4TH FLR);  Service: Gastroenterology;  Laterality: Left;  recent hematemasis. varices-labs on 7/26    COVID test at [facility] on 7/31-GT  requesting sooner    ESOPHAGOGASTRODUODENOSCOPY N/A 07/27/2022    Procedure: EGD (ESOPHAGOGASTRODUODENOSCOPY);  Surgeon: Eric Garcia MD;  Location: [facility];  Service: Endoscopy;  Laterality: N/A;    ESOPHAGOGASTRODUODENOSCOPY Left  08/29/2022    Procedure: EGD (ESOPHAGOGASTRODUODENOSCOPY);  Surgeon: Kacy Douglass MD;  Location: [facility] ENDO (2ND FLR);  Service: Endoscopy;  Laterality: Left;  Fully vaccinated/ clear liquids up to 4 hrs prior-RB  varices labs ordered-RB    ESOPHAGOGASTRODUODENOSCOPY N/A 10/05/2022    Procedure: EGD (ESOPHAGOGASTRODUODENOSCOPY);  Surgeon: Gerard Salinas MD;  Location: [facility] ENDO;  Service: Endoscopy;  Laterality: N/A;    ESOPHAGOGASTRODUODENOSCOPY N/A 3/30/2023    Procedure: EGD (ESOPHAGOGASTRODUODENOSCOPY);  Surgeon: Gerard Salinas MD;  Location: [facility];  Service: Endoscopy;  Laterality: N/A;    ESOPHAGOGASTRODUODENOSCOPY N/A 8/3/2023    Procedure: EGD (ESOPHAGOGASTRODUODENOSCOPY);  Surgeon: Gerard Salinas MD;  Location: [facility];  Service: Endoscopy;  Laterality: N/A;    EXTRACTION - STONE Right 3/20/2024    Procedure: EXTRACTION - STONE;  Surgeon: Chris Carey MD;  Location: [facility] OR 1ST FLR;  Service: Urology;  Laterality: Right;    HYSTERECTOMY      KNEE SURGERY Bilateral     LASER LITHOTRIPSY Right 3/20/2024    Procedure: LITHOTRIPSY, USING LASER;  Surgeon: Chris Carey MD;  Location: [facility] OR 1ST FLR;  Service: Urology;  Laterality: Right;    LITHOTRIPSY      REMOVAL-STENT Right 3/20/2024    Procedure: REMOVAL-STENT;  Surgeon: Chris Carey MD;  Location: [facility] OR 1ST FLR;  Service: Urology;  Laterality: Right;    RETROGRADE PYELOGRAPHY Right 3/20/2024    Procedure: PYELOGRAM, RETROGRADE;  Surgeon: Chris Carey MD;  Location: [facility] OR 1ST FLR;  Service: Urology;  Laterality: Right;    RHINOPLASTY TIP      SHOULDER SURGERY Right     TONSILLECTOMY      TOTAL HIP ARTHROPLASTY Right     URETERAL STENT PLACEMENT Right 3/20/2024    Procedure: INSERTION, STENT, URETER;  Surgeon: Chris Carey MD;  Location: [facility] OR 1ST FLR;  Service: Urology;  Laterality: Right;    URETEROSCOPY Right 3/20/2024    Procedure: URETEROSCOPY;  Surgeon: Chris Carey MD;  Location: [facility] OR 1ST FLR;   Service: Urology;  Laterality: Right;     Family History       Problem Relation (Age of Onset)    Diabetes Mellitus Maternal Grandmother    No Known Problems Mother          Tobacco Use    Smoking status: Some Days     Current packs/day: 0.00     Types: Cigarettes     Last attempt to quit: 7/3/2019     Years since quittin.8    Smokeless tobacco: Never   Substance and Sexual Activity    Alcohol use: Not Currently    Drug use: No    Sexual activity: Not on file     Review of Systems   Constitutional:  Positive for chills. Negative for fever.   Respiratory:  Negative for chest tightness and shortness of breath.    Cardiovascular:  Negative for chest pain.   Gastrointestinal:  Positive for abdominal pain, diarrhea and nausea. Negative for blood in stool, constipation and vomiting.   Genitourinary:  Negative for dysuria and hematuria.   Musculoskeletal:  Positive for back pain and myalgias.   Skin:  Negative for color change and wound.   Neurological:  Negative for dizziness and headaches.   Psychiatric/Behavioral:  Negative for agitation and confusion.      Objective:     Vital Signs (Most Recent):  Temp: 98.1 °F (36.7 °C) (24 192)  Pulse: 68 (240)  Resp: (!) 21 (24)  BP: (!) 146/96 (24)  SpO2: 100 % (24) Vital Signs (24h Range):  Temp:  [98.1 °F (36.7 °C)-98.6 °F (37 °C)] 98.1 °F (36.7 °C)  Pulse:  [68-95] 68  Resp:  [10-26] 21  SpO2:  [99 %-100 %] 100 %  BP: (128-155)/(67-98) 146/96     Weight: 57.6 kg (127 lb)  Body mass index is 19.31 kg/m².    No intake or output data in the 24 hours ending 24    Physical Exam  Constitutional:       Appearance: Normal appearance.   HENT:      Head: Normocephalic and atraumatic.   Cardiovascular:      Rate and Rhythm: Normal rate.   Pulmonary:      Effort: Pulmonary effort is normal. No respiratory distress.   Abdominal:      General: There is no distension.      Palpations: Abdomen is soft.      Comments: Tender to  palpation of R flank/back, abdomen is soft and non-tender   Skin:     General: Skin is warm and dry.      Capillary Refill: Capillary refill takes less than 2 seconds.   Neurological:      General: No focal deficit present.      Mental Status: She is alert.         Significant Labs:  CBC:   Recent Labs   Lab 04/23/24  1621   WBC 3.13*   RBC 4.37   HGB 13.3   HCT 38.7   PLT 60*   MCV 89   MCH 30.4   MCHC 34.4     CMP:   Recent Labs   Lab 04/23/24  1621   GLU 97   CALCIUM 9.8   ALBUMIN 4.2   PROT 7.0      K 3.6   CO2 25      BUN 17   CREATININE 0.7   ALKPHOS 102   ALT 78*   AST 51*   BILITOT 0.7       Significant Diagnostics:  I have reviewed all pertinent imaging results/findings within the past 24 hours.    Assessment/Plan:    54yo F w/PMH cirrhosis (MELD-Na 8 today), arthritis who presents to the ED with one month of right flank pain since she had a stent placed then removed for right sided nephrolithiasis, general surgery consulted for cholelithiasis noted on imaging today    - patient symptoms not consistent with symptomatic cholelithiasis (pain constant, chronic in the right flank/back, no association with meals), tender to palpation over right posterior flank more consistent with sequela from stent removal  - US without signs of acute cholecystitis, noted to have cholelithiasis; recommend HIDA scan   - consider consult to urology for workup of R flank pain s/p stent removal if HIDA is negative  - general surgery will follow, please call with questions      Thank you for your consult. I will follow-up with patient. Please contact us if you have any additional questions.    Malina Jones MD  General Surgery  [facility]

## 2024-04-24 NOTE — DISCHARGE INSTRUCTIONS
Return to the ER for any changes, worsening or concerns. Continue your home medications as prescribed.

## 2024-04-24 NOTE — ED NOTES
LOC/ APPEARANCE: The patient is AAOx4. Pt is speaking appropriately, no slurred speech. Pt  remains in hospital gown. Continuous cardiac monitor, cont pulse ox, and auto BP cuff on patient. Pt is clean and well groomed. Pt reports pain level of 8. Pt updated on POC. Bed low and locked with side rails up x2, call bell in pt reach.  SKIN: Skin is warm dry and intact, and color is consistent with ethnicity.  No breakdown or brusing visible. Mucus membranes moist, acyanotic.  RESPIRATORY: Airway is open and patent. Respirations-spontaneous, unlabored, regular rate, equal bilaterally on inspiration and expiration. No accessory muscle use noted.   CARDIAC: Patient has regular heart rate.  No peripheral edema noted, and patient has no c/o chest pain. Peripheral pulses present equal and strong throughout.  ABDOMEN: Soft and tender to palpation with no distention noted. Pt has no complaints of abnormal bowel movements, denies blood in stool. Pt reports normal appetite.   NEUROLOGIC: Eyes open spontaneously and facial expression symmetrical. Pt behavior appropriate to situation, and pt follows commands. Pt reports sensation present in all extremities when touched with a finger, denies any numbness or tingling. PERRLA  MUSCULOSKELETAL: Spontaneous movement noted to all extremities. Hand  equal and leg strength strong +5 bilaterally.   : No complaints of frequency, burning, urgency or blood in the urine. No complaints of incontinence.

## 2024-04-24 NOTE — ED NOTES
Patient identifiers for Tee Aranda 53 y.o. female checked and correct.  Chief Complaint   Patient presents with    Post-op Problem     Stage 4 cirrhosis, 10x8 mm stone stent removed on the 24th, having more pain     Past Medical History:   Diagnosis Date    Anemia, unspecified     Anxiety     Arthritis     Ascites 11/23/2020    AVN (avascular necrosis of bone)     Cataract     COVID-19 vaccine administered     04/08/21, 04/30/21    Diarrhea of presumed infectious origin 7/31/2023    Edema 11/23/2020    Epigastric pain 7/31/2023    Esophageal varices     Glaucoma     Hepatic encephalopathy 11/23/2020    History of colon polyps     Hx of cirrhosis     non-alcoholic    Iron deficiency anemia secondary to blood loss (chronic)     Kidney stones     Portal hypertensive gastropathy     Unintentional weight loss 10/14/2023    Unspecified cirrhosis of liver      Allergies reported:   Review of patient's allergies indicates:   Allergen Reactions    Sulfa (sulfonamide antibiotics) Hives         LOC: Patient is awake, alert, and aware of environment with an appropriate affect. Patient is oriented x 4 and speaking appropriately.  APPEARANCE: Patient resting comfortably and in no acute distress. Patient is clean and well groomed, patient's clothing is properly fastened.   SKIN: The skin is warm and dry. Patient has normal skin turgor and moist mucus membranes.   MUSKULOSKELETAL: Patient is moving all extremities well, no obvious deformities noted. Pulses intact.   RESPIRATORY: Airway is open and patent. Respirations are spontaneous and non-labored with normal effort and rate.  CARDIAC: Patient has a normal rate and rhythm. Normal sinus on cardiac monitor. No peripheral edema noted.   ABDOMEN: No distention noted. Soft and non-tender upon palpation.  NEUROLOGICAL: PERRL. Facial expression is symmetrical. Hand grasps are equal bilaterally. Normal sensation in all extremities when touched with finger.

## 2024-04-25 ENCOUNTER — TELEPHONE (OUTPATIENT)
Dept: GASTROENTEROLOGY | Facility: CLINIC | Age: 54
End: 2024-04-25
Payer: COMMERCIAL

## 2024-04-25 ENCOUNTER — TELEPHONE (OUTPATIENT)
Dept: UROLOGY | Facility: CLINIC | Age: 54
End: 2024-04-25
Payer: COMMERCIAL

## 2024-04-25 ENCOUNTER — TELEPHONE (OUTPATIENT)
Dept: HEPATOLOGY | Facility: CLINIC | Age: 54
End: 2024-04-25
Payer: COMMERCIAL

## 2024-04-25 ENCOUNTER — TELEPHONE (OUTPATIENT)
Dept: SURGERY | Facility: CLINIC | Age: 54
End: 2024-04-25
Payer: COMMERCIAL

## 2024-04-25 DIAGNOSIS — N23 KIDNEY PAIN: ICD-10-CM

## 2024-04-25 DIAGNOSIS — N13.70 VESICOURETERAL REFLUX: Primary | ICD-10-CM

## 2024-04-25 NOTE — TELEPHONE ENCOUNTER
Lavelle stated that the pt went to the ER on 4/23/24 and she was informed to contact Hepatology for a hospital f/u. He also stated that she just seen you this week and would like to know if the pt need to come back in before her next appt in July or if you can view her test results and she wait until her next f/u appt.        Please advise,

## 2024-04-25 NOTE — DISCHARGE SUMMARY
ED Observation Unit  Discharge Summary        History of Present Illness:    Pt is a 54 yo F with PMH of liver cirrhosis, nephrolithiasis who presents with R flank pain.  Pain is sharp and radiating to her groin. Her abdomen is distended more than normal.   No fever, no change in chronic loose stools  No hematuria, dysuria      I reviewed the ED Provider Note dated 04/23/2024 prior to my evaluation of this patient.  I reviewed all labs and imaging performed in the Main ED, prior to patient being placed in Observation. Patient was placed in the ED Observation Unit for Right sided abdominal pain.    Observation Course:    Pt with right sided abdominal pain. Hx of non alcoholic cirrhosis as well as kidney stone now s/p stent removal one month ago. She is afebrile, non toxic and non distress. VSS. CBC, CMP and UA are unremarkable. LFT are very slightly elevated and are at her baseline. She has abdominal US, CT abdomen and pelvis without contrast and a HIDA scan done during her stay. Non obstructing gallstones are noted without any emergent surgical pathology. General surgery followed the case and signed off following negative HIDA. I also discussed this case with urology given her kidney stone with stent placement and removal about a month ago. Given clean urine, normal kidney function and unremarkable scan they do not feel as though her pain is urologic and recommend outpatient follow up with outpatient imaging. Pt reports that she still has pain and upgrade to hospital medicine for intractable pain was offered but the patient refused citing that she is ready to go home. Plan to dc home with short course of oral oxycodone and outpatient f/u with PCP, general surgery, hepatology and urology. Return precautions discussed.    Consultants:    General surgery    Final Diagnosis:  Right sided abdominal pain  Gallstones    Discharge Condition: Stable    Disposition: Home or Self Care     Time spent on the discharge of the  patient including review of hospital course with the patient. reviewing discharge medications and arranging follow-up care 35 minutes.  Patient was seen and examined on the date of discharge and determined to be suitable for discharge.    Follow Up:  Future Appointments   Date Time Provider Department Center   6/21/2024 10:15 AM [facility] OI-US1 [facility]   6/25/2024  1:40 PM Chris Carey MD [facility] UROLOGY [address]   7/1/2024 10:00 AM LAB, [facility] STA LAB [facility]   7/11/2024  2:00 PM Bonnie Bowers MD [facility]   PCP, Urology, General surgery and hepatology.

## 2024-04-25 NOTE — PROGRESS NOTES
"ED Observation Unit  Progress Note      HPI   Pt is a 52 yo F with PMH of liver cirrhosis, nephrolithiasis who presents with R flank pain.  Pain is sharp and radiating to her groin. Her abdomen is distended more than normal.   No fever, no change in chronic loose stools  No hematuria, dysuria      I reviewed the ED Provider Note dated 04/23/2024 prior to my evaluation of this patient.  I reviewed all labs and imaging performed in the Main ED, prior to patient being placed in Observation. Patient was placed in the ED Observation Unit for Right sided abdominal pain.    Interval History   Pt in bed with family member at bedside. VSS. She is afebrile, non toxic and non distressed. She is currently NPO awaiting HIDA scan.    She is disgruntled about the poor quality of sleep she had last night and the noise level in the main ED overnight.    She has not requested pain medication since 1:48 am. States that she is "okay for now".    PMHx   Past Medical History:   Diagnosis Date    Anemia, unspecified     Anxiety     Arthritis     Ascites 11/23/2020    AVN (avascular necrosis of bone)     Cataract     COVID-19 vaccine administered     04/08/21, 04/30/21    Diarrhea of presumed infectious origin 7/31/2023    Edema 11/23/2020    Epigastric pain 7/31/2023    Esophageal varices     Glaucoma     Hepatic encephalopathy 11/23/2020    History of colon polyps     Hx of cirrhosis     non-alcoholic    Iron deficiency anemia secondary to blood loss (chronic)     Kidney stones     Portal hypertensive gastropathy     Unintentional weight loss 10/14/2023    Unspecified cirrhosis of liver       Past Surgical History:   Procedure Laterality Date    APPENDECTOMY      COLONOSCOPY N/A 8/3/2023    Procedure: COLONOSCOPY;  Surgeon: Gerard Salinas MD;  Location: [facility];  Service: Endoscopy;  Laterality: N/A;    COLONOSCOPY W/ POLYPECTOMY      CYSTOSCOPY N/A 3/20/2024    Procedure: CYSTOSCOPY;  Surgeon: Chris Carey MD;  Location: [facility]" OR 1ST FLR;  Service: Urology;  Laterality: N/A;    CYSTOSCOPY W/ URETERAL STENT REMOVAL Right 3/14/2024    Procedure: CYSTOSCOPY, WITH URETERAL STENT REMOVAL;  Surgeon: Chris Carey MD;  Location: [facility] 1ST FLR;  Service: Urology;  Laterality: Right;    DISTAL CLAVICLE EXCISION Right 11/18/2021    Procedure: RIGHT SHOULDER ARTHROSCOPY, EXCISION, CLAVICLE, DISTAL; EXTENSIVE DEBRIDEMENT;  Surgeon: Isaiah Joyner MD;  Location: [facility];  Service: Orthopedics;  Laterality: Right;    ESOPHAGEAL VARICE LIGATION      ESOPHAGOGASTRODUODENOSCOPY N/A 11/10/2020    Procedure: EGD (ESOPHAGOGASTRODUODENOSCOPY);  Surgeon: Gerard Salinas MD;  Location: [facility];  Service: Endoscopy;  Laterality: N/A;    ESOPHAGOGASTRODUODENOSCOPY N/A 12/28/2020    Procedure: EGD (ESOPHAGOGASTRODUODENOSCOPY);  Surgeon: Adryan Pakr MD;  Location: [facility] (2ND FLR);  Service: Endoscopy;  Laterality: N/A;    ESOPHAGOGASTRODUODENOSCOPY N/A 02/15/2021    Procedure: EGD (ESOPHAGOGASTRODUODENOSCOPY);  Surgeon: Hari Greene MD;  Location: [facility] (4TH FLR);  Service: Endoscopy;  Laterality: N/A;  6 week follow-up variceal banding  Hx varices - Labs - ERW  prep ins. emialed - COVID screening on 2/12/21 [place] - ERW    ESOPHAGOGASTRODUODENOSCOPY Left 08/03/2021    Procedure: EGD (ESOPHAGOGASTRODUODENOSCOPY);  Surgeon: Fabricio Corado MD;  Location: [facility] (4TH FLR);  Service: Gastroenterology;  Laterality: Left;  recent hematemasis. varices-labs on 7/26    COVID test at [facility] on 7/31-GT  requesting sooner    ESOPHAGOGASTRODUODENOSCOPY N/A 07/27/2022    Procedure: EGD (ESOPHAGOGASTRODUODENOSCOPY);  Surgeon: Eric Garcia MD;  Location: [facility] ENDO;  Service: Endoscopy;  Laterality: N/A;    ESOPHAGOGASTRODUODENOSCOPY Left 08/29/2022    Procedure: EGD (ESOPHAGOGASTRODUODENOSCOPY);  Surgeon: Kacy Douglass MD;  Location: [facility] ([address] [place] [address]);  Service: Endoscopy;  Laterality: Left;  Fully vaccinated/ clear liquids up to 4 hrs  prior-RB  varices labs ordered-RB    ESOPHAGOGASTRODUODENOSCOPY N/A 10/05/2022    Procedure: EGD (ESOPHAGOGASTRODUODENOSCOPY);  Surgeon: Gerard Salinas MD;  Location: [facility] ENDO;  Service: Endoscopy;  Laterality: N/A;    ESOPHAGOGASTRODUODENOSCOPY N/A 3/30/2023    Procedure: EGD (ESOPHAGOGASTRODUODENOSCOPY);  Surgeon: Gerard Salinas MD;  Location: [facility] ENDO;  Service: Endoscopy;  Laterality: N/A;    ESOPHAGOGASTRODUODENOSCOPY N/A 8/3/2023    Procedure: EGD (ESOPHAGOGASTRODUODENOSCOPY);  Surgeon: Gerard Salinas MD;  Location: [facility] ENDO;  Service: Endoscopy;  Laterality: N/A;    EXTRACTION - STONE Right 3/20/2024    Procedure: EXTRACTION - STONE;  Surgeon: Chris Carey MD;  Location: [facility] OR [address] [place] [address];  Service: Urology;  Laterality: Right;    HYSTERECTOMY      KNEE SURGERY Bilateral     LASER LITHOTRIPSY Right 3/20/2024    Procedure: LITHOTRIPSY, USING LASER;  Surgeon: Chris Carey MD;  [facility];  Service: Urology;  Laterality: Right;    LITHOTRIPSY      REMOVAL-STENT Right 3/20/2024    Procedure: REMOVAL-STENT;  Surgeon: Chris Carey MD;  [facility];  Service: Urology;  Laterality: Right;    RETROGRADE PYELOGRAPHY Right 3/20/2024    Procedure: PYELOGRAM, RETROGRADE;  Surgeon: Chris Carey MD;  [facility];  Service: Urology;  Laterality: Right;    RHINOPLASTY TIP      SHOULDER SURGERY Right     TONSILLECTOMY      TOTAL HIP ARTHROPLASTY Right     URETERAL STENT PLACEMENT Right 3/20/2024    Procedure: INSERTION, STENT, URETER;  Surgeon: Chris Carey MD;  [facility];  Service: Urology;  Laterality: Right;    URETEROSCOPY Right 3/20/2024    Procedure: URETEROSCOPY;  Surgeon: Chris Carey MD;  [facility];  Service: Urology;  Laterality: Right;        Family Hx   Family History   Problem Relation Name Age of Onset    No Known Problems Mother      Diabetes Mellitus Maternal Grandmother      Amblyopia Neg Hx       Blindness Neg Hx      Cataracts Neg Hx      Glaucoma Neg Hx      Macular degeneration Neg Hx      Retinal detachment Neg Hx      Strabismus Neg Hx      Colon cancer Neg Hx      Esophageal cancer Neg Hx          Social Hx   Social History     Socioeconomic History    Marital status:    Tobacco Use    Smoking status: Some Days     Current packs/day: 0.00     Types: Cigarettes     Last attempt to quit: 7/3/2019     Years since quittin.8    Smokeless tobacco: Never   Substance and Sexual Activity    Alcohol use: Not Currently    Drug use: No     Social Determinants of Health     Financial Resource Strain: Low Risk  (2024)    Overall Financial Resource Strain (CARDIA)     Difficulty of Paying Living Expenses: Not very hard   Food Insecurity: No Food Insecurity (2024)    Hunger Vital Sign     Worried About Running Out of Food in the Last Year: Never true     Ran Out of Food in the Last Year: Never true   Transportation Needs: No Transportation Needs (2024)    PRAPARE - Transportation     Lack of Transportation (Medical): No     Lack of Transportation (Non-Medical): No   Physical Activity: Sufficiently Active (2024)    Exercise Vital Sign     Days of Exercise per Week: 4 days     Minutes of Exercise per Session: 80 min   Stress: Stress Concern Present (2024)    [employer] [address] [employer] - Occupational Stress Questionnaire     Feeling of Stress : To some extent   Social Connections: Moderately Integrated (2024)    Social Connection and Isolation Panel [NHANES]     Frequency of Communication with Friends and Family: More than three times a week     Frequency of Social Gatherings with Friends and Family: More than three times a week     Attends Caodaism Services: Never     Active Member of Clubs or Organizations: Yes     Attends Club or Organization Meetings: More than 4 times per year     Marital Status:    Housing Stability: Unknown (2024)    Housing  Stability Vital Sign     Unable to Pay for Housing in the Last Year: No     Homeless in the Last Year: No        Vital Signs   Vitals:    04/24/24 1120 04/24/24 1202 04/24/24 1208 04/24/24 1315   BP: (!) 131/58 120/67  120/67   BP Location: Left arm   Left arm   Patient Position: Lying   Lying   Pulse: 60 67  67   Resp: 19 (!) 25 16 16   Temp: 97.9 °F (36.6 °C)   98.3 °F (36.8 °C)   TempSrc: Oral      SpO2: 100% 99%  99%   Weight:       Height:            Review of Systems  Per HPI and interval history.    Brief Physical Exam/Reassessment   Vitals and nursing note reviewed.   Constitutional:       Appearance: Normal appearance.   HENT:      Head: Normocephalic and atraumatic.      Nose: Nose normal.      Mouth/Throat:      Pharynx: Oropharynx is clear.   Eyes:      Conjunctiva/sclera: Conjunctivae normal.   Cardiovascular:      Rate and Rhythm: Normal rate.   Pulmonary:      Effort: Pulmonary effort is normal.      Breath sounds: Normal breath sounds.   Abdominal:      General: Abdomen is flat.      Tenderness: There is abdominal tenderness (Right side).   Musculoskeletal:         General: Normal range of motion.   Skin:     General: Skin is warm and dry.      Capillary Refill: Capillary refill takes less than 2 seconds.   Neurological:      General: No focal deficit present.      Mental Status: She is alert and oriented to person, place, and time.     Labs/Imaging   Labs Reviewed   COMPREHENSIVE METABOLIC PANEL - Abnormal; Notable for the following components:       Result Value    AST 51 (*)     ALT 78 (*)     All other components within normal limits   CBC W/ AUTO DIFFERENTIAL - Abnormal; Notable for the following components:    WBC 3.13 (*)     Platelets 60 (*)     Lymph # 0.7 (*)     All other components within normal limits   PROTIME-INR - Abnormal; Notable for the following components:    Prothrombin Time 12.6 (*)     All other components within normal limits   CBC W/ AUTO DIFFERENTIAL - Abnormal; Notable for  the following components:    Platelets 60 (*)     Lymph # 0.9 (*)     All other components within normal limits   COMPREHENSIVE METABOLIC PANEL - Abnormal; Notable for the following components:    AST 43 (*)     ALT 66 (*)     All other components within normal limits   URINALYSIS, REFLEX TO URINE CULTURE    Narrative:     Specimen Source->Urine   APTT   LIPASE   LACTIC ACID, PLASMA      Imaging Results              NM Hepatobiliary Scan (HIDA) (Final result)  Result time 04/24/24 11:22:03      Final result by Vic Wilson IV, MD (04/24/24 11:22:03)                   Impression:      The cystic and common bile ducts are patent.    Electronically signed by resident: John Sims  Date:    04/24/2024  Time:    10:59    Electronically signed by: Vic Wilson  Date:    04/24/2024  Time:    11:22               Narrative:    EXAMINATION:  NM HEPATOBILIARY IMAGING INC GB (HIDA)    CLINICAL HISTORY:  Biliary colic, recurrent, gallbladder dyskinesia suspected;    TECHNIQUE:  Following the IV administration of 5.5 mCi of Tc-99m-mebrofenin, sequential dynamic anterior images of the abdomen were obtained for 60 minutes followed by a right lateral view at 60 minutes.    COMPARISON:  CT abdomen pelvis 04/23/2024.    FINDINGS:  The early images demonstrate homogeneous uptake of the radiopharmaceutical by the liver with no focal hepatic abnormalities.    Normal activity is present in the common bile duct, gallbladder, and small bowel.                                       CT Abdomen Pelvis With IV Contrast NO Oral Contrast (Final result)  Result time 04/23/24 20:12:36      Final result by Ten Holder MD (04/23/24 20:12:36)                   Impression:      No abdominal abscess identified.    Cirrhotic liver morphology with stigmata of portal hypertension including splenomegaly and trace ascites.  Wall thickening and inflammatory changes involving the ascending colon, with nonspecific wall thickening of the  stomach, potentially related to portal colopathy/gastropathy.  Gastritis and/or colitis could appear similarly.    Overall decreased volume of ascites when compared with 03/24/2024 CT.    Cholelithiasis.    Other incidental findings discussed in the body of the report.      Electronically signed by: Ten Holder MD  Date:    04/23/2024  Time:    20:12               Narrative:    EXAMINATION:  CT ABDOMEN PELVIS WITH IV CONTRAST    CLINICAL HISTORY:  Abdominal abscess/infection suspected;    TECHNIQUE:  Low dose axial images, sagittal and coronal reformations were obtained from the lung bases to the pubic symphysis following the IV administration of 75 mL of Omnipaque 350 .  Oral contrast was not given.    COMPARISON:  Ultrasound, 04/23/2024.  CT abdomen pelvis without contrast, 04/23/2024.  CT abdomen pelvis with contrast, 03/24/2024.    FINDINGS:  Lower Chest:    Lung bases are stable.  Heart size is normal.    Abdomen:    Cirrhotic liver morphology with nodular contour and lobar redistribution.  Probable simple cyst in the right hepatic lobe.  No new liver lesion.  Small gallstone and probable biliary sludge noted in the gallbladder.  Mild gallbladder wall thickening versus pericholecystic fluid.  No intrahepatic biliary ductal dilatation.    Spleen is again enlarged as seen previously, measuring greater than 18 cm in AP diameter.  Adrenal glands and pancreas are stable.    The kidneys are symmetric.  No hydronephrosis. Similar minimal distension of the right ureter when compared to the left side.  No asymmetric perinephric inflammatory fat stranding.  Simple appearing cyst in the lower pole of the right kidney.    Minimal wall thickening of the stomach as seen previously.  No small bowel obstruction.  Appendix is normal.  Scattered colonic diverticula.  Mild wall thickening and fat stranding involving the ascending colon, similar to CT obtained earlier the same day.  Interval decrease in abdominopelvic free  fluid when compared with 03/24/2024.  There is trace abdominopelvic free fluid and mild mesenteric edema.  No pneumoperitoneum.    No bulky retroperitoneal lymphadenopathy.    Abdominal aorta is normal in caliber with mild-to-moderate calcific atherosclerosis.    Portal and splenic veins are patent.  Bolus timing limits evaluation of the SMV.  No portal venous gas.    Pelvis:    Evaluation of the pelvis limited by streak artifact from right hip prosthesis.  Urinary bladder is unremarkable.  Probable phleboliths in the pelvis.  Rectum is unremarkable.  Small volume pelvic free fluid.    Bones and soft tissues:    No aggressive osseous lesions.  Similar mild height loss involving the superior endplate of L3 vertebral body.  Probable congenital abnormality of the T9 vertebral body.  Right hip prosthesis.  Mild degenerative changes in the spine and left hip.  Extraperitoneal soft tissues are negative for acute finding.                                       US Abdomen Limited (Final result)  Result time 04/23/24 19:43:06      Final result by Jules Hess MD (04/23/24 19:43:06)                   Impression:      Cirrhosis with sequelae of portal hypertension including splenomegaly and perisplenic collaterals.  Hepatic steatosis.    Cholelithiasis.  Gallbladder wall thickening, a nonspecific finding which can be seen with underlying liver disease.  No pericholecystic fluid.  Negative sonographic Gibbs sign.    Left hepatic lobe simple cyst.    Electronically signed by resident: Zion Gutiérrez  Date:    04/23/2024  Time:    19:28    Electronically signed by: Jules Hess MD  Date:    04/23/2024  Time:    19:43               Narrative:    EXAMINATION:  US ABDOMEN LIMITED    CLINICAL HISTORY:  right sided abdominal pain.    TECHNIQUE:  Limited ultrasound of the right upper quadrant of the abdomen including pancreas, liver, gallbladder, common bile duct was performed.    COMPARISON:  CT renal stone 04/23/2024    Ultrasound  abdomen 01/10/2024, 07/25/2023    HIDA scan 07/27/2023    FINDINGS:  Liver: Normal in size, measuring 14.7 cm.  Subtle nodular contour suggestive of cirrhosis.  Diffusely increased parenchymal echogenicity and elevated hepatorenal index (3.4) consistent with steatosis.  1.7 x 1.7 x 1.9 cm simple cyst in the left hepatic lobe.    Gallbladder: Several non-mobile echogenic foci measuring up to 6 mm, likely adherent stones.  Of note, one of the stones appears to be in the gallbladder neck similar to multiple priors dating back to at least 2023 (noting negative HIDA scan on 07/27/2023).  Gallbladder wall thickening measuring up to 6 mm.  No hypervascularity of the gallbladder wall.  No pericholecystic fluid.  No sonographic Gibbs's sign.    Biliary system: The common duct is not dilated, measuring 4 mm.  No intrahepatic ductal dilatation.    Spleen: Enlarged homogeneous echotexture measuring 15 x 5.1 cm.    Pancreas: The visualized portions of pancreas appear normal.    Miscellaneous: No ascites.  Perisplenic collaterals.                                        CT Renal Stone Study ABD Pelvis WO (Final result)  Result time 04/23/24 17:31:22      Final result by Manny Wooten MD (04/23/24 17:31:22)                   Impression:      This report was flagged in Epic as abnormal.    1. There is residual mild wall thickening and inflammation about the cecum and proximal ascending colon, the degree of surrounding fluid has decreased since the previous exam.  Correlation with any history of portal colopathy.  Additionally, there has been interval improvement of abdominopelvic ascites noting small amount of fluid remains in the pelvis.  2. Splenomegaly.  3. Wall thickening of the stomach, possibly reflecting gastritis, correlation recommended.  4. Cirrhotic configuration of the liver.  5. Cholelithiasis without secondary findings to suggest acute cholecystitis.  6. Please see above for several additional  findings.      Electronically signed by: Manny Wooten MD  Date:    04/23/2024  Time:    17:31               Narrative:    EXAMINATION:  CT RENAL STONE STUDY ABD PELVIS WO    CLINICAL HISTORY:  Flank pain, kidney stone suspected;    TECHNIQUE:  Low dose axial images, sagittal and coronal reformations were obtained from the lung bases to the pubic symphysis.  Contrast was not administered.    COMPARISON:  03/26/2024    FINDINGS:  Images of the lower thorax are remarkable for bilateral dependent atelectasis, mild    The liver has a nodular contour.  The pancreas and adrenal glands have a grossly unremarkable noncontrast appearance.  The spleen is enlarged.  There is cholelithiasis without secondary findings to suggest acute cholecystitis.  The stomach is decompressed, and may account for wall thickening however correlation with any history of gastritis.  No significant abdominal lymphadenopathy.    There is no nephrolithiasis.  There is prominence of the bilateral renal collecting systems.  The bilateral ureters are unable to be followed in their entirety to the urinary bladder, no definite calculi seen although correlation with urinalysis is recommended.  The urinary bladder is mildly distended without wall thickening.  The uterus is absent the adnexa is unremarkable.    There are a few scattered colonic diverticula without inflammation.  There is inflammation about the proximal ascending colon and cecum.  The terminal ileum and appendix are unremarkable.  The small bowel is grossly unremarkable.  There are a few scattered shotty periaortic, pericaval, and mesenteric lymph nodes.  There is a small amount of free fluid in the pelvis, may be partially loculated on the right.    Right hip arthroplasty appears intact.  There is osteopenia.  There are degenerative changes of the bilateral sacroiliac joints and spine.  There is superior endplate height loss involving L3, similar to the previous exam.  No significant  "inguinal lymphadenopathy.                                       I reviewed all labs, imaging reports and EKGs (if performed) associated with this ED/EDOU visit.     Plan   Abdominal pain  Gallstones  -no evidence of obstructive uropathy on ED CT renal stone.  There is gallbladder wall thickening with negative sonographic Gibbs per ultrasound.  Several small gallstones also noted on imaging.  -admitted to ED observation unit for pain control, HIDA scan and -General surgery was consulted in the ED and will follow.  -HIDA shows no obstructive findings. General surgery has signed off.  -Urology consult was placed but after discussion with Dr. De Dios, he does not feel as though consult is warranted per [facility] secure chat conversation as below.  Her scan is unchanged and Cr is at baseline, no real hydro. I can place a note in the chart if you want but there is nothing urologic causing this pain. She has many other comorbidities and has chronic pain in this area...   I reviewed the imaging with Dr. Carey and he agrees that we do not need to see her. She already has outpatient follow up with him scheduled 6/25   -I discussed HIDA findings and urology conversation with the patient. She reports that she is still having pain. I offered upgrade to  for intractable pain but pt refuses stating "I just want to go home."   -Dc'd home with rx for short course of oxycodone  -She was instructed to follow up outpatient with Hepatology, Urology, General surgery and PCP. Strict ED return precautions discussed.    I have discussed this case with EDOU provider Aman Marx PA-C, urologist Dr. France and with general surgery.    "

## 2024-04-25 NOTE — TELEPHONE ENCOUNTER
----- Message from Richa Chaparro sent at 4/25/2024  8:22 AM CDT -----  Regarding: appt access  Contact: [phone]  Pt is calling to speak with someone in provider office in regards to scheduling an appt with the provider for a hosp f/u pt was discharged on 4/24 pt  is asking for a return call please call pt at   [phone]

## 2024-04-25 NOTE — TELEPHONE ENCOUNTER
MA tried to schedule patient but schedule is not open at the moment so MA had to put patient on waiting list

## 2024-04-25 NOTE — TELEPHONE ENCOUNTER
"----- Message from Joby Echavarria sent at 4/25/2024 10:07 AM CDT -----  Regarding: call back  Consult/Advisory:        Name Of Caller:  Rolf     Contact Preference?:[phone]     What is the nature of the call?: Calling to speak w/someone in regards to some question about her appt        Additional Notes:  "Thank you for all that you do for our patients"  "

## 2024-04-25 NOTE — TELEPHONE ENCOUNTER
Talked to the pt.    Vesicoureteral reflux  -     FL Cystogram Voiding ([facility]) Radiologist Performed (xpd); Future; Expected date: 04/25/2024  -     Simple Urodynamics w/ Cysto; Future    Kidney pain  -     FL Cystogram Voiding ([facility]) Radiologist Performed (xpd); Future; Expected date: 04/25/2024  -     Simple Urodynamics w/ Cysto; Future       C/o back pain when she voids.  She has MAG 3 renal scan scheduled on 4/29 to rule out obstruction.  Keep that and will evaluate her with VCUG and SUDS cysto when I return.  ?5/7?

## 2024-04-25 NOTE — TELEPHONE ENCOUNTER
----- Message from Rissa Headley LPN sent at 4/25/2024  3:04 PM CDT -----  Regarding: FW: orders  Contact: [phone]  Pt has nuc med study for Monday the 29, what are we doing from there?  ----- Message -----  From: Rissa Headley LPN  Sent: 4/25/2024  10:59 AM CDT  To: Gregoria Lutz LPN  Subject: FW: orders                                       Message was left in nuc med to get pt scheduled and pt was instructed to check pt portal. Pt requested 1 st available appt.  ----- Message -----  From: Desi Kwan  Sent: 4/25/2024   9:58 AM CDT  To: Aurelio CARLOS Staff  Subject: orders                                           Pt  calling  in requesting call back to regarding  orders for test please call to discuss  further

## 2024-04-26 ENCOUNTER — PATIENT OUTREACH (OUTPATIENT)
Dept: ADMINISTRATIVE | Facility: CLINIC | Age: 54
End: 2024-04-26
Payer: COMMERCIAL

## 2024-04-29 ENCOUNTER — TELEPHONE (OUTPATIENT)
Dept: GASTROENTEROLOGY | Facility: CLINIC | Age: 54
End: 2024-04-29
Payer: COMMERCIAL

## 2024-04-29 ENCOUNTER — HOSPITAL ENCOUNTER (OUTPATIENT)
Dept: RADIOLOGY | Facility: HOSPITAL | Age: 54
Discharge: HOME | End: 2024-04-29
Attending: STUDENT IN AN ORGANIZED HEALTH CARE EDUCATION/TRAINING PROGRAM
Payer: COMMERCIAL

## 2024-04-29 DIAGNOSIS — N13.30 HYDRONEPHROSIS, UNSPECIFIED HYDRONEPHROSIS TYPE: ICD-10-CM

## 2024-04-29 PROCEDURE — 63600175 PHARM REV CODE 636 W HCPCS: Performed by: STUDENT IN AN ORGANIZED HEALTH CARE EDUCATION/TRAINING PROGRAM

## 2024-04-29 PROCEDURE — 78708 K FLOW/FUNCT IMAGE W/DRUG: CPT | Mod: TC

## 2024-04-29 PROCEDURE — 78708 K FLOW/FUNCT IMAGE W/DRUG: CPT | Mod: 26,,, | Performed by: STUDENT IN AN ORGANIZED HEALTH CARE EDUCATION/TRAINING PROGRAM

## 2024-04-29 PROCEDURE — A9562 TC99M MERTIATIDE: HCPCS | Performed by: STUDENT IN AN ORGANIZED HEALTH CARE EDUCATION/TRAINING PROGRAM

## 2024-04-29 RX ORDER — FUROSEMIDE 10 MG/ML
40 INJECTION INTRAMUSCULAR; INTRAVENOUS ONCE
Status: COMPLETED | OUTPATIENT
Start: 2024-04-29 | End: 2024-04-29

## 2024-04-29 RX ORDER — BETIATIDE 1 MG/1
5 INJECTION, POWDER, LYOPHILIZED, FOR SOLUTION INTRAVENOUS
Status: COMPLETED | OUTPATIENT
Start: 2024-04-29 | End: 2024-04-29

## 2024-04-29 RX ADMIN — FUROSEMIDE 40 MG: 10 INJECTION, SOLUTION INTRAVENOUS at 11:04

## 2024-04-29 RX ADMIN — BETIATIDE 4.7 MILLICURIE: 1 INJECTION, POWDER, LYOPHILIZED, FOR SOLUTION INTRAVENOUS at 10:04

## 2024-04-29 NOTE — TELEPHONE ENCOUNTER
MA was unable to leave a voicemail for patient to give the office a call back. MA has placed patient on the waiting list. There are no available appointments at the moment.

## 2024-04-30 ENCOUNTER — TELEPHONE (OUTPATIENT)
Dept: UROLOGY | Facility: CLINIC | Age: 54
End: 2024-04-30
Payer: COMMERCIAL

## 2024-04-30 NOTE — TELEPHONE ENCOUNTER
----- Message from Rissa Headley LPN sent at 4/29/2024  3:27 PM CDT -----  Regarding: FW: Conversation  Contact: pt.  [phone]    ----- Message -----  From: Camden Amaro  Sent: 4/26/2024   8:28 AM CDT  To: [facility] Staff  Subject: Conversation                                     Pt's  Lavelle is calling to speak with provider about pt's ER visit and pain. Lavelle says pt  needs to be seen before June. Asking to speak with provider. Patient Requesting Call Back @  [phone]

## 2024-05-07 ENCOUNTER — HOSPITAL ENCOUNTER (OUTPATIENT)
Dept: RADIOLOGY | Facility: HOSPITAL | Age: 54
Discharge: HOME | End: 2024-05-07
Attending: UROLOGY
Payer: COMMERCIAL

## 2024-05-07 ENCOUNTER — PROCEDURE VISIT (OUTPATIENT)
Dept: UROLOGY | Facility: CLINIC | Age: 54
End: 2024-05-07
Payer: COMMERCIAL

## 2024-05-07 VITALS
HEART RATE: 70 BPM | BODY MASS INDEX: 18.79 KG/M2 | SYSTOLIC BLOOD PRESSURE: 121 MMHG | WEIGHT: 124 LBS | HEIGHT: 68 IN | DIASTOLIC BLOOD PRESSURE: 73 MMHG

## 2024-05-07 DIAGNOSIS — N13.70 VESICOURETERAL REFLUX: ICD-10-CM

## 2024-05-07 DIAGNOSIS — N23 KIDNEY PAIN: ICD-10-CM

## 2024-05-07 PROCEDURE — 51784 ANAL/URINARY MUSCLE STUDY: CPT | Mod: 26,51,S$GLB, | Performed by: UROLOGY

## 2024-05-07 PROCEDURE — 51728 CYSTOMETROGRAM W/VP: CPT | Mod: 26,S$GLB,, | Performed by: UROLOGY

## 2024-05-07 PROCEDURE — 51600 INJECTION FOR BLADDER X-RAY: CPT | Mod: ,,, | Performed by: RADIOLOGY

## 2024-05-07 PROCEDURE — 74455 X-RAY URETHRA/BLADDER: CPT | Mod: 26,,, | Performed by: RADIOLOGY

## 2024-05-07 PROCEDURE — 51741 ELECTRO-UROFLOWMETRY FIRST: CPT | Mod: 26,51,S$GLB, | Performed by: UROLOGY

## 2024-05-07 PROCEDURE — 25500020 PHARM REV CODE 255: Performed by: UROLOGY

## 2024-05-07 PROCEDURE — 51600 INJECTION FOR BLADDER X-RAY: CPT

## 2024-05-07 PROCEDURE — 52000 CYSTOURETHROSCOPY: CPT | Mod: 59,S$GLB,, | Performed by: UROLOGY

## 2024-05-07 PROCEDURE — 51797 INTRAABDOMINAL PRESSURE TEST: CPT | Mod: 26,S$GLB,, | Performed by: UROLOGY

## 2024-05-07 RX ADMIN — DIATRIZOATE MEGLUMINE 300 ML: 180 INJECTION, SOLUTION INTRAVESICAL at 10:05

## 2024-05-07 NOTE — PROCEDURES
Simple Urodynamics w/ Cysto    Date/Time: 5/7/2024 8:00 AM    Performed by: Chris Carey MD  Authorized by: Chris Carey MD  Comments: Procedure Date:  05/07/2024    Procedure:   Diagnostic Cystourethroscopy   Complex Cystometrogram   Voiding / Pressure Study with Intrarectal Balloon   Complex Uroflow   Electromyogram of Anal Sphincter.   Ford catheter placement.    Pre-OP Diagnosis:   Right flank pain with urination since right ureteral stent removal   Post-OP Diagnosis:   same   Anesthesia:   Anesthesia Administered:   Intraurethral instillation of 10 mL 2% lidocaine (Xylocaine) jelly.   Findings:  CT RSS  1. There is mild right hydronephrosis, noting previous ureteral stent has been removed since the previous exam.  Urothelial thickening may reflect residual inflammation from the same however correlation with urinalysis recommended to exclude sequela of infection.  2. Focus of calcification within the posterior aspect of the interpolar region of the right kidney/lower pole, reflecting sequela of prior insult or infarct.  Correlation and follow-up is advised.  3. Findings suggesting cirrhosis with portal hypertension including splenomegaly and ascites.  4. Mild wall thickening of a few proximal jejunal loops, portal enteropathy is a consideration.  Other infectious or inflammatory enteropathy can present in this fashion, correlation with current symptomatology is recommended.    MAG 3 renal scan:  1.  The bilateral kidneys are not obstructed.     2.  The differential renal function is 45 % on the left and 55 % on the right.    --- Bladder ---   CYSTOMETROGRAM ( Filling Phase ):   Cystometric Numeric Data:   - First Desire (Sensation): 102 mL.   - Normal Desire: 117 mL.   - Strong Desire: 150 mL.   - Urgency (Imminent Void) : 274 mL.   - Maximum Cystometric Capacity: 278 mL.   Compliance:   - normal.   Leak Point Pressure:   - Valsalva ( Abdominal ) Leak Point Pressure: none.   UROFLOW:   - Voided Volume:  291 mL.   - Residual Urine: 10 mL.   - Maximum Flow Rate: 23 mL/sec.   - Flow Pattern: normal  VOIDING PRESSURE STUDY ( Voiding Phase ):   Detrusor Pressure:   - Maximum Detrusor Pressure: 36 cm of water.   - Detrusor Pressure at Maximum Flow: 33 cm of water.   - Detrusor Contraction Characteristics: Sustained contraction(s).   ELECTROMYOGRAM:   - normal.     ---Diagnostic Cystourethroscopy ---   Normal urethra.   Width of Bladder Neck Opening: Approximately 18 Fr.   Normal bladder.   Normal ureteral orifices bilaterally.   No inflammation or scarring noted from the right UO.      Description of Procedure:   Informed Consent:   - Risks, benefits and alternatives of procedure discussed with   patient and informed consent obtained.   Patient Position:   - Supine.   --- Bladder ---   Prep and Drape:   - Patient prepped and draped in usual sterile fashion using povidone   iodine (Betadine).   --- Diagnostic Cystourethroscopy ---   Instruments:   - 16 Fr flexible cystoscope with 0 degree lens.   Procedure Details:   - Cystoscope passed under vision into bladder.   - Bladder and urethra examined in their entirety with findings as   above.   --- Urodynamic Studies ---   Procedure Details:   Cystometrogram:   - Catheter(s) passed into the bladder.   - Rectal balloon inserted.   - Catheter(s) connected to infusion medium and to pressure recording   device.   - Infusion Rate: 30 mL / min.   Electromyogram:   - Perineal electromyogram pad placed and connected to electromyogram   recording device.   Equipment:   - Catheters: Double lumen catheter.   - Medium: Liquid.   - Pressure Recording Device: Calibrated electronic equipment.   Complications:   No immediate complications.    CONCLUSIONS:   1. Normal urodynamic study  2. Normal cysto with no abnormality involving the UOs.    So far she reports the right abdominal and side pain with urination ( dull pressure like pain).  The pain lasts for a while and gets better.  No  obstruction involving the urinary tract noted on CT, MAG 3 renal scan, cysto and all other urologic evaluation.  She is scheduled to undergo VCUG today.    16 F Banda catheter placed with a catheter plug following suds cysto.  The banda can be removed after VCUG.    Post-OP Plan:   Patient was discharged home in a stable condition.  Medications: doxy  Follow up:  as needed

## 2024-05-07 NOTE — PATIENT INSTRUCTIONS
_                                                                                                                                                                                             If any problems after hours or weekends, you may call [phone] and ask for the urology resident on call.SIMPLE URODYNAMIC STUDY (SUDS) & CYSTOSCOPY  UROLOGY CLINIC DISCHARGE INSTRUCTIONS    You have had a procedure that will require time to properly heal. Follow the instructions you have been given on how to care for yourself once you are home. Below is additional information to help in your recovery.    ACTIVITY  There are no restrictions in activity. Start doing again the things you did before the procedure.  You may experience a slight burning sensation. You may notice a small amount of blood in your urine. This will clear up within a day. Call the clinic if this continues beyond 48 hours.    DIET  Continue your normal diet. You may eat the same foods you ate before your procedure.  Drink plenty of fluids during the first 24-48 hours following your procedure.    MEDICATIONS  Resume all other previous medications from your prescribing physician.  Continue any pre=procedure antibiotics until they are all gone.    SIGNS AND SYMPTOMS TO REPORT TO THE DOCTOR  Chills or fever greater than 101° F within 24 hours of procedure.  Changes in urination, such as increased bleeding, foul smell, cloudy urine, or painful urination.  Call your doctor with any questions or concerns.    For any emergency situation, call 911 immediately or go to your nearest emergency room.    [facility]  [phone]

## 2024-05-08 ENCOUNTER — TELEPHONE (OUTPATIENT)
Dept: UROLOGY | Facility: CLINIC | Age: 54
End: 2024-05-08
Payer: COMMERCIAL

## 2024-05-08 NOTE — TELEPHONE ENCOUNTER
VCUG  Unremarkable voiding cystourethrogram without evidence of reflux.     The findings informed to pt.  No urologic problems noted.  Continue flomax.  She will follow up with her GI physician.

## 2024-05-15 ENCOUNTER — OFFICE VISIT (OUTPATIENT)
Dept: GASTROENTEROLOGY | Facility: CLINIC | Age: 54
End: 2024-05-15
Payer: COMMERCIAL

## 2024-05-15 VITALS
WEIGHT: 128.5 LBS | HEART RATE: 67 BPM | BODY MASS INDEX: 19.48 KG/M2 | DIASTOLIC BLOOD PRESSURE: 80 MMHG | HEIGHT: 68 IN | SYSTOLIC BLOOD PRESSURE: 123 MMHG

## 2024-05-15 DIAGNOSIS — R11.0 NAUSEA: Primary | ICD-10-CM

## 2024-05-15 PROCEDURE — 99999 PR PBB SHADOW E&M-EST. PATIENT-LVL III: CPT | Mod: PBBFAC,,, | Performed by: STUDENT IN AN ORGANIZED HEALTH CARE EDUCATION/TRAINING PROGRAM

## 2024-05-15 PROCEDURE — 3008F BODY MASS INDEX DOCD: CPT | Mod: CPTII,S$GLB,, | Performed by: STUDENT IN AN ORGANIZED HEALTH CARE EDUCATION/TRAINING PROGRAM

## 2024-05-15 PROCEDURE — 99214 OFFICE O/P EST MOD 30 MIN: CPT | Mod: S$GLB,,, | Performed by: STUDENT IN AN ORGANIZED HEALTH CARE EDUCATION/TRAINING PROGRAM

## 2024-05-15 PROCEDURE — 3079F DIAST BP 80-89 MM HG: CPT | Mod: CPTII,S$GLB,, | Performed by: STUDENT IN AN ORGANIZED HEALTH CARE EDUCATION/TRAINING PROGRAM

## 2024-05-15 PROCEDURE — 3074F SYST BP LT 130 MM HG: CPT | Mod: CPTII,S$GLB,, | Performed by: STUDENT IN AN ORGANIZED HEALTH CARE EDUCATION/TRAINING PROGRAM

## 2024-05-15 PROCEDURE — 1159F MED LIST DOCD IN RCRD: CPT | Mod: CPTII,S$GLB,, | Performed by: STUDENT IN AN ORGANIZED HEALTH CARE EDUCATION/TRAINING PROGRAM

## 2024-05-15 NOTE — PROGRESS NOTES
[facility] Follow-UP Note    Reason for Follow-Up:  abdominal pain     PCP:   James Samano       HPI:  This is a 53 y.o. female last seen in GI clinic on 8/2023 for diarrhea, abdominal pain and weight loss. Previously seen by Dr Leach in 2021. She has a issues with diarrhea from chart review. Her last clinic visit with Dr Leach in 2021 mentions loose to watery stools 4 times per day. Also mentions an associated weight loss at that time. She had tried probiotics. She avoids daily, sorbitol, eggs. Eats low salt and sugar. She had negative celiac serologies. Diarrhea at that time felt different than what is going on currently for patient however. Now she has issues with post prandial diarrhea with urgency for the past 2 months. Stools are watery and occur 5-10 mins after eating. No blood. Associated with belching and gas. Stools are foul smelling which is also different compared to her prior diarrhea complaint in 2021. Stool studies have been unrevealing. CT 7/2023 showed cirrhosis and thickening of stomach and some pericolonic inflammation. The colonic inflammation was noted on prior CT scans and possibly related to liver disease per radiology report. HIDA scan recently negative. She has also been seen recently by surgery for abdominal pain which is in the RUQ. She then had EGD and cscope in 8/2023 which showed normal colon endoscopically and EGD showed small EV and PHTG. Colon biopsies were negative for microscopic colitis. Terminal ileum was not intubated. She was put on bentyl which she took for a few days but caused blurry vision. She also has lost 20 lbs in the past 6 months. She does exercise and watch her diet. Also has history of CARTAGENA cirrhosis. Has had EV on EGD which have bled and required banding in the past.  Takes xifaxin for HE.       After last visit, she had a hospital admission for COVID related weakness and leukopenia and was followed by hepatology service. And later in  "3/2024 was admitted for obstructive nephrolithiasis. She has stable liver function.     Interval History:  At last visit, we checked fecal elastase and calprotectin which were both wnl. She has continued to have weight loss which has been unintentional. She is now 120 from 140. She is also still having pain in her abdomen most on the right side which did not improve with kidney stone treatment. She has also had recent CT, US and HIDA which were unrevealing for cause of pain. She does take elavil which helps her loose stools and abdominal pain but does cause some constipation so she uses this a few times a week. She is also still having issues with nausea, bloating, poor appetite. Liver function has been stable but some mild increase in enzymes recently.      Objective Findings:    Vital Signs:  /80   Pulse 67   Ht 5' 8" (1.727 m)   Wt 58.3 kg (128 lb 8.5 oz)   BMI 19.54 kg/m²   Body mass index is 19.54 kg/m².    Physical Exam:  General Appearance: Well appearing in no acute distress  Head:   Normocephalic, without obvious abnormality  Eyes:    No scleral icterus    Lungs: CTA bilaterally in anterior and posterior fields   Heart:  Regular rate and rhythm, no murmurs heard  Abdomen: Soft, non tender, non distended with positive bowel sounds in all four quadrants.          Imaging:     HIDA 7/2023   Impression:     Normal HIDA scan.  No evidence of acute cholecystitis, cystic duct obstruction, or common bile duct obstruction.     CT 7/2023  Impression:     Imaging findings suggestive for intrinsic hepatic disease/cirrhosis with a heterogenous appearance of the liver with regions of focal fatty infiltration as well as splenomegaly.     Mild thickening of the walls of the stomach which could reflect a portal gastropathy.     Liquid stool in the right colon with surrounding pericolonic inflammation.  This was seen on prior studies is also likely related to underlying liver disease.  Component of colitis not " excluded.     Nonobstructing right-sided nephrolithiasis.     Endoscopy:       Cscope 8/2023   Findings:        The perianal and digital rectal examinations were normal.        The entire examined colon appeared normal on direct and retroflexion        views.        Biopsies were taken with a cold forceps in the ascending colon for        histology.      DIAGNOSIS:   Mucosal biopsy, ascending colon: No pathologic changes; no evidence of   lymphocytic or collagenous colitis.      EGD 8/2023   Findings:        Grade I varices were found in the lower third of the esophagus.        Moderate portal hypertensive gastropathy was found in the entire        examined stomach.        The examined duodenum was normal.      Assessment:     Abdominal pain   Unintentional weight loss   Nausea   Diarrhea   CARTAGENA Cirrhosis      Patient with continued unintentional weight loss with nausea, poor appetite, abdominal pain and diarrhea. EGD and cscope done in 2023 which was not revealing but TI was not intubated and no small bowel biopsies obtained. Will plan to repeat EGD and cscope. Also will order GES.      Recommendations:     - EGD/cscope for weight loss, abdominal pain, variceal surveillance   - GES   - Elavil is helping some, will continue for now   - She did not tolerate bentyl   - She is on PPI daily   - If work up is negative would consider breath testing for SIBO       RTC after endoscopy       Order summary:  Orders Placed This Encounter    NM Gastric Emptying         Thank you so much for allowing me to participate in the care of Tee Douglass MD  Gastroenterology   [facility]

## 2024-05-21 ENCOUNTER — TELEPHONE (OUTPATIENT)
Dept: ENDOSCOPY | Facility: HOSPITAL | Age: 54
End: 2024-05-21
Payer: COMMERCIAL

## 2024-05-21 DIAGNOSIS — R10.9 ABDOMINAL PAIN, UNSPECIFIED ABDOMINAL LOCATION: ICD-10-CM

## 2024-05-21 DIAGNOSIS — Z01.812 ENCOUNTER FOR PREPROCEDURAL LABORATORY EXAMINATION: ICD-10-CM

## 2024-05-21 DIAGNOSIS — R63.4 WEIGHT LOSS: Primary | ICD-10-CM

## 2024-05-21 DIAGNOSIS — K74.60 HEPATIC CIRRHOSIS, UNSPECIFIED HEPATIC CIRRHOSIS TYPE, UNSPECIFIED WHETHER ASCITES PRESENT: ICD-10-CM

## 2024-05-21 DIAGNOSIS — Z12.11 SCREENING FOR COLON CANCER: Primary | ICD-10-CM

## 2024-05-21 DIAGNOSIS — K22.89 ESOPHAGEAL BLEED, NON-VARICEAL: ICD-10-CM

## 2024-05-21 NOTE — TELEPHONE ENCOUNTER
Spoke to patient to schedule procedure(s) Colonoscopy/EGD       Physician to perform procedure(s) Dr. YAJAIRA Garcia  Date of Procedure (s) 5/27/2024  Arrival Time 9:00Am   Time of Procedure(s) 10:00 Am    Location of Procedure(s) [facility]  Type of Rx Prep sent to patient: PEG  Instructions provided to patient via [facility]    Patient was informed on the following information and verbalized understanding. Screening questionnaire reviewed with patient and complete. If procedure requires anesthesia, a responsible adult needs to be present to accompany the patient home, patient cannot drive after receiving anesthesia. Appointment details are tentative, especially check-in time. Patient will receive a prep-op call 7 days prior to confirm check-in time for procedure. If applicable the patient should contact their pharmacy to verify Rx for procedure prep is ready for pick-up. Patient was advised to call the scheduling department at [phone] if pharmacy states no Rx is available. Patient was advised to call the endoscopy scheduling department if any questions or concerns arise.      [facility] Scheduling Department

## 2024-05-21 NOTE — TELEPHONE ENCOUNTER
"----- Message from Viviana Bernardo sent at 5/16/2024  8:34 AM CDT -----    ----- Message -----  From: Kacy Douglass MD  Sent: 5/15/2024   1:16 PM CDT  To: [facility]    Procedure: EGD/Colonoscopy    Diagnosis: weight loss, variceal surveillance and abdominal pain     Procedure Timing: Within 4 weeks (Urgent)    #If within 4 weeks selected, please erickson as high priority#    #If greater than 12 weeks, please select "5-12 weeks" and delay sending until 3 months prior to requested date#     Location:  Endo, [facility] 2-Endo, and [facility] 4-Endo    Additional Scheduling Information: Cirrhosis    Prep Specifications:Standard prep    Is the patient taking a GLP-1 Agonist:no    Have you attached a patient to this message: yes  "

## 2024-05-22 ENCOUNTER — HOSPITAL ENCOUNTER (OUTPATIENT)
Dept: RADIOLOGY | Facility: HOSPITAL | Age: 54
Discharge: HOME | End: 2024-05-22
Attending: STUDENT IN AN ORGANIZED HEALTH CARE EDUCATION/TRAINING PROGRAM
Payer: COMMERCIAL

## 2024-05-22 ENCOUNTER — PATIENT MESSAGE (OUTPATIENT)
Dept: GASTROENTEROLOGY | Facility: CLINIC | Age: 54
End: 2024-05-22
Payer: COMMERCIAL

## 2024-05-22 DIAGNOSIS — R11.0 NAUSEA: ICD-10-CM

## 2024-05-22 PROCEDURE — 78264 GASTRIC EMPTYING IMG STUDY: CPT | Mod: TC

## 2024-05-22 PROCEDURE — 78264 GASTRIC EMPTYING IMG STUDY: CPT | Mod: 26,,, | Performed by: STUDENT IN AN ORGANIZED HEALTH CARE EDUCATION/TRAINING PROGRAM

## 2024-05-22 PROCEDURE — A9541 TC99M SULFUR COLLOID: HCPCS | Performed by: STUDENT IN AN ORGANIZED HEALTH CARE EDUCATION/TRAINING PROGRAM

## 2024-05-22 RX ORDER — TECHNETIUM TC 99M SULFUR COLLOID 2 MG
1 KIT MISCELLANEOUS
Status: COMPLETED | OUTPATIENT
Start: 2024-05-22 | End: 2024-05-22

## 2024-05-22 RX ADMIN — TECHNETIUM TC 99M SULFUR COLLOID 1.1 MILLICURIE: KIT at 08:05

## 2024-05-24 ENCOUNTER — LAB VISIT (OUTPATIENT)
Dept: LAB | Facility: HOSPITAL | Age: 54
End: 2024-05-24
Attending: INTERNAL MEDICINE
Payer: COMMERCIAL

## 2024-05-24 DIAGNOSIS — K74.60 HEPATIC CIRRHOSIS, UNSPECIFIED HEPATIC CIRRHOSIS TYPE, UNSPECIFIED WHETHER ASCITES PRESENT: ICD-10-CM

## 2024-05-24 DIAGNOSIS — Z12.11 SCREENING FOR COLON CANCER: ICD-10-CM

## 2024-05-24 DIAGNOSIS — Z01.812 ENCOUNTER FOR PREPROCEDURAL LABORATORY EXAMINATION: ICD-10-CM

## 2024-05-24 LAB
ABO + RH BLD: ABNORMAL
BASOPHILS # BLD AUTO: 0.02 K/UL (ref 0–0.2)
BASOPHILS NFR BLD: 0.5 % (ref 0–1.9)
BLD GP AB SCN CELLS X3 SERPL QL: ABNORMAL
BLOOD GROUP ANTIBODIES SERPL: NORMAL
DIFFERENTIAL METHOD BLD: ABNORMAL
EOSINOPHIL # BLD AUTO: 0.1 K/UL (ref 0–0.5)
EOSINOPHIL NFR BLD: 1.5 % (ref 0–8)
ERYTHROCYTE [DISTWIDTH] IN BLOOD BY AUTOMATED COUNT: 13.3 % (ref 11.5–14.5)
HCT VFR BLD AUTO: 41.3 % (ref 37–48.5)
HGB BLD-MCNC: 13.4 G/DL (ref 12–16)
IMM GRANULOCYTES # BLD AUTO: 0.01 K/UL (ref 0–0.04)
IMM GRANULOCYTES NFR BLD AUTO: 0.2 % (ref 0–0.5)
INR PPP: 1.1 (ref 0.8–1.2)
LYMPHOCYTES # BLD AUTO: 0.7 K/UL (ref 1–4.8)
LYMPHOCYTES NFR BLD: 17.2 % (ref 18–48)
MCH RBC QN AUTO: 29.2 PG (ref 27–31)
MCHC RBC AUTO-ENTMCNC: 32.4 G/DL (ref 32–36)
MCV RBC AUTO: 90 FL (ref 82–98)
MONOCYTES # BLD AUTO: 0.3 K/UL (ref 0.3–1)
MONOCYTES NFR BLD: 7.4 % (ref 4–15)
NEUTROPHILS # BLD AUTO: 3 K/UL (ref 1.8–7.7)
NEUTROPHILS NFR BLD: 73.2 % (ref 38–73)
NRBC BLD-RTO: 0 /100 WBC
PLATELET # BLD AUTO: 88 K/UL (ref 150–450)
PMV BLD AUTO: 11 FL (ref 9.2–12.9)
PROTHROMBIN TIME: 11.6 SEC (ref 9–12.5)
RBC # BLD AUTO: 4.59 M/UL (ref 4–5.4)
WBC # BLD AUTO: 4.08 K/UL (ref 3.9–12.7)

## 2024-05-24 PROCEDURE — 36415 COLL VENOUS BLD VENIPUNCTURE: CPT | Performed by: INTERNAL MEDICINE

## 2024-05-24 PROCEDURE — 85610 PROTHROMBIN TIME: CPT | Performed by: INTERNAL MEDICINE

## 2024-05-24 PROCEDURE — 85025 COMPLETE CBC W/AUTO DIFF WBC: CPT | Performed by: INTERNAL MEDICINE

## 2024-05-24 PROCEDURE — 86870 RBC ANTIBODY IDENTIFICATION: CPT | Performed by: INTERNAL MEDICINE

## 2024-05-24 PROCEDURE — 86901 BLOOD TYPING SEROLOGIC RH(D): CPT | Performed by: INTERNAL MEDICINE

## 2024-05-27 ENCOUNTER — HOSPITAL ENCOUNTER (OUTPATIENT)
Facility: HOSPITAL | Age: 54
Discharge: HOME | End: 2024-05-27
Attending: INTERNAL MEDICINE | Admitting: INTERNAL MEDICINE
Payer: COMMERCIAL

## 2024-05-27 ENCOUNTER — ANESTHESIA EVENT (OUTPATIENT)
Dept: ENDOSCOPY | Facility: HOSPITAL | Age: 54
End: 2024-05-27
Payer: COMMERCIAL

## 2024-05-27 ENCOUNTER — ANESTHESIA (OUTPATIENT)
Dept: ENDOSCOPY | Facility: HOSPITAL | Age: 54
End: 2024-05-27
Payer: COMMERCIAL

## 2024-05-27 VITALS
DIASTOLIC BLOOD PRESSURE: 63 MMHG | HEIGHT: 68 IN | RESPIRATION RATE: 12 BRPM | HEART RATE: 60 BPM | TEMPERATURE: 98 F | OXYGEN SATURATION: 99 % | BODY MASS INDEX: 19.1 KG/M2 | SYSTOLIC BLOOD PRESSURE: 135 MMHG | WEIGHT: 126 LBS

## 2024-05-27 DIAGNOSIS — R19.7 DIARRHEA: ICD-10-CM

## 2024-05-27 PROCEDURE — 45385 COLONOSCOPY W/LESION REMOVAL: CPT | Performed by: INTERNAL MEDICINE

## 2024-05-27 PROCEDURE — 37000009 HC ANESTHESIA EA ADD 15 MINS: Performed by: INTERNAL MEDICINE

## 2024-05-27 PROCEDURE — 43239 EGD BIOPSY SINGLE/MULTIPLE: CPT | Mod: 51,,, | Performed by: INTERNAL MEDICINE

## 2024-05-27 PROCEDURE — 27201012 HC FORCEPS, HOT/COLD, DISP: Performed by: INTERNAL MEDICINE

## 2024-05-27 PROCEDURE — 27201089 HC SNARE, DISP (ANY): Performed by: INTERNAL MEDICINE

## 2024-05-27 PROCEDURE — 25000003 PHARM REV CODE 250: Performed by: NURSE ANESTHETIST, CERTIFIED REGISTERED

## 2024-05-27 PROCEDURE — 37000008 HC ANESTHESIA 1ST 15 MINUTES: Performed by: INTERNAL MEDICINE

## 2024-05-27 PROCEDURE — 43239 EGD BIOPSY SINGLE/MULTIPLE: CPT | Performed by: INTERNAL MEDICINE

## 2024-05-27 PROCEDURE — 25000003 PHARM REV CODE 250: Performed by: ANESTHESIOLOGY

## 2024-05-27 PROCEDURE — 88305 TISSUE EXAM BY PATHOLOGIST: CPT | Mod: 26,,, | Performed by: PATHOLOGY

## 2024-05-27 PROCEDURE — 45385 COLONOSCOPY W/LESION REMOVAL: CPT | Mod: ,,, | Performed by: INTERNAL MEDICINE

## 2024-05-27 PROCEDURE — 63600175 PHARM REV CODE 636 W HCPCS: Performed by: ANESTHESIOLOGY

## 2024-05-27 PROCEDURE — 88305 TISSUE EXAM BY PATHOLOGIST: CPT | Performed by: PATHOLOGY

## 2024-05-27 PROCEDURE — E9220 PRA ENDO ANESTHESIA: HCPCS | Mod: ,,, | Performed by: NURSE ANESTHETIST, CERTIFIED REGISTERED

## 2024-05-27 RX ORDER — PROPOFOL 10 MG/ML
VIAL (ML) INTRAVENOUS CONTINUOUS PRN
Status: DISCONTINUED | OUTPATIENT
Start: 2024-05-27 | End: 2024-05-27

## 2024-05-27 RX ORDER — LIDOCAINE HYDROCHLORIDE 20 MG/ML
INJECTION INTRAVENOUS
Status: DISCONTINUED | OUTPATIENT
Start: 2024-05-27 | End: 2024-05-27

## 2024-05-27 RX ORDER — SODIUM CHLORIDE 9 MG/ML
INJECTION, SOLUTION INTRAVENOUS CONTINUOUS
Status: DISCONTINUED | OUTPATIENT
Start: 2024-05-27 | End: 2024-05-27 | Stop reason: HOSPADM

## 2024-05-27 RX ORDER — PROPOFOL 10 MG/ML
VIAL (ML) INTRAVENOUS
Status: DISCONTINUED | OUTPATIENT
Start: 2024-05-27 | End: 2024-05-27

## 2024-05-27 RX ADMIN — PROPOFOL 100 MG: 10 INJECTION, EMULSION INTRAVENOUS at 10:05

## 2024-05-27 RX ADMIN — GLYCOPYRROLATE 0.1 MG: 0.2 INJECTION, SOLUTION INTRAMUSCULAR; INTRAVENOUS at 10:05

## 2024-05-27 RX ADMIN — PROPOFOL 150 MCG/KG/MIN: 10 INJECTION, EMULSION INTRAVENOUS at 10:05

## 2024-05-27 RX ADMIN — LIDOCAINE HYDROCHLORIDE 100 MG: 20 INJECTION INTRAVENOUS at 10:05

## 2024-05-27 RX ADMIN — SODIUM CHLORIDE: 0.9 INJECTION, SOLUTION INTRAVENOUS at 09:05

## 2024-05-27 NOTE — ANESTHESIA POSTPROCEDURE EVALUATION
Anesthesia Post Evaluation    Patient: Tee Aranda    Procedure(s) Performed: Procedure(s) (LRB):  EGD (ESOPHAGOGASTRODUODENOSCOPY) (N/A)  COLONOSCOPY (N/A)    Final Anesthesia Type: general      Patient location during evaluation: PACU  Patient participation: Yes- Able to Participate  Level of consciousness: awake and alert  Post-procedure vital signs: reviewed and stable  Pain management: adequate  Airway patency: patent    PONV status at discharge: No PONV  Anesthetic complications: no      Cardiovascular status: blood pressure returned to baseline  Respiratory status: spontaneous ventilation and room air  Hydration status: euvolemic  Follow-up not needed.              Vitals Value Taken Time   /57 05/27/24 1040   Temp 36.8 °C (98.2 °F) 05/27/24 1040   Pulse 74 05/27/24 1040   Resp 12 05/27/24 1040   SpO2 96 % 05/27/24 1040         No case tracking events are documented in the log.      Pain/Dio Score: Dio Score: 10 (5/27/2024 10:40 AM)

## 2024-05-27 NOTE — H&P
Short Stay Endoscopy History and Physical    PCP - James Samano MD  Referring Physician - Kacy Douglass MD  [address] [place] [address]    Procedure - Endoscopy  ASA - per anesthesia  Mallampati - per anesthesia  History of Anesthesia problems - no  Family history Anesthesia problems -  no   Plan of anesthesia - General    HPI  53 y.o. female  Reason for procedure:   Variceal surveillance  Diarrhea    ROS:  Constitutional: No fevers, chills, No weight loss  CV: No chest pain  Pulm: No cough, No shortness of breath  GI: see HPI    Medical History:  has a past medical history of Anemia, unspecified, Anxiety, Arthritis, Ascites (11/23/2020), AVN (avascular necrosis of bone), Cataract, COVID-19 vaccine administered, Diarrhea of presumed infectious origin (7/31/2023), Edema (11/23/2020), Epigastric pain (7/31/2023), Esophageal varices, Glaucoma, Hepatic encephalopathy (11/23/2020), History of colon polyps, cirrhosis, Iron deficiency anemia secondary to blood loss (chronic), Kidney stones, Portal hypertensive gastropathy, Unintentional weight loss (10/14/2023), and Unspecified cirrhosis of liver.    Surgical History:  has a past surgical history that includes Total hip arthroplasty (Right); Tonsillectomy; Knee surgery (Bilateral); Rhinoplasty tip; Lithotripsy; Hysterectomy; Esophagogastroduodenoscopy (N/A, 11/10/2020); Esophagogastroduodenoscopy (N/A, 12/28/2020); Esophagogastroduodenoscopy (N/A, 02/15/2021); Esophagogastroduodenoscopy (Left, 08/03/2021); Distal clavicle excision (Right, 11/18/2021); Esophagogastroduodenoscopy (N/A, 07/27/2022); Esophagogastroduodenoscopy (Left, 08/29/2022); Appendectomy; Esophagogastroduodenoscopy (N/A, 10/05/2022); Esophageal varice ligation; Colonoscopy w/ polypectomy; Shoulder surgery (Right); Esophagogastroduodenoscopy (N/A, 3/30/2023); Esophagogastroduodenoscopy (N/A, 8/3/2023); Colonoscopy (N/A, 8/3/2023); Cystoscopy w/ ureteral stent removal (Right, 3/14/2024);  Cystoscopy (N/A, 3/20/2024); Ureteroscopy (Right, 3/20/2024); Laser lithotripsy (Right, 3/20/2024); extraction - stone (Right, 3/20/2024); removal-stent (Right, 3/20/2024); Ureteral stent placement (Right, 3/20/2024); and Retrograde pyelography (Right, 3/20/2024).    Family History: family history includes Diabetes Mellitus in her maternal grandmother; No Known Problems in her mother..    Social History:  reports that she has been smoking cigarettes. She has never used smokeless tobacco. She reports that she does not currently use alcohol. She reports that she does not use drugs.    Review of patient's allergies indicates:   Allergen Reactions    Sulfa (sulfonamide antibiotics) Hives       Medications:   Medications Prior to Admission   Medication Sig Dispense Refill Last Dose    carvediloL (COREG) 3.125 MG tablet TAKE 1 TABLET BY MOUTH 2 TIMES DAILY. 180 tablet 3 5/26/2024    esomeprazole (NEXIUM) 40 MG capsule Take 1 capsule (40 mg total) by mouth 2 (two) times daily before meals. 60 capsule 11 5/26/2024    multivitamin (THERAGRAN) per tablet Take 1 tablet by mouth once daily.   5/26/2024    omega-3 fatty acids/fish oil (FISH OIL-OMEGA-3 FATTY ACIDS) 300-1,000 mg capsule Take 1 capsule by mouth once daily.   5/26/2024    rifAXIMin (XIFAXAN) 550 mg Tab Take 1 tablet (550 mg total) by mouth 2 (two) times daily. 60 tablet 11 5/26/2024    tamsulosin (FLOMAX) 0.4 mg Cap Take 1 capsule (0.4 mg total) by mouth once daily. 30 capsule 0 5/26/2024    UNABLE TO FIND 4 (four) times daily as needed. Medible 20 mg blue raspberry 1/4 to 1/2 up to 4 times daily as needed.          Physical Exam:    Vital Signs:   Vitals:    05/27/24 0931   BP: 132/77   Pulse: 66   Resp: 16   Temp: 98.6 °F (37 °C)       General Appearance: Well appearing in no acute distress  Abdomen: Soft, non tender, non distended with normal bowel sounds, no masses    Labs:  Lab Results   Component Value Date    WBC 4.08 05/24/2024    HGB 13.4 05/24/2024    HCT  41.3 05/24/2024    PLT 88 (L) 05/24/2024    ALT 52 (H) 04/29/2024    AST 33 04/29/2024     04/29/2024    K 3.8 04/29/2024     04/29/2024    CREATININE 0.8 04/29/2024    BUN 14 04/29/2024    CO2 29 04/29/2024    TSH 0.38 (L) 08/02/2023    INR 1.1 05/24/2024    HGBA1C 5.5 07/26/2022       I have explained the risks and benefits of this endoscopic procedure to the patient including but not limited to bleeding, inflammation, infection, perforation, and death.      Evan Coker MD

## 2024-05-27 NOTE — ANESTHESIA PREPROCEDURE EVALUATION
05/27/2024  Tee Aranda is a 53 y.o., female.    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    Portal hypertensive gastropathy    Esophageal varices    Arthralgia of right acromioclavicular joint    Tear of right glenoid labrum    Nontraumatic incomplete tear of right rotator cuff    Diarrhea of presumed infectious origin    Epigastric pain    COVID-19    Thrombocytopenia    Unintentional weight loss    Urolithiasis    Acute cystitis with hematuria    Right ureteral stone    Portal hypertension    Right sided abdominal pain       Past Medical History:   Diagnosis Date    Anemia, unspecified     Anxiety     Arthritis     Ascites 11/23/2020    AVN (avascular necrosis of bone)     Cataract     COVID-19 vaccine administered     04/08/21, 04/30/21    Diarrhea of presumed infectious origin 7/31/2023    Edema 11/23/2020    Epigastric pain 7/31/2023    Esophageal varices     Glaucoma     Hepatic encephalopathy 11/23/2020    History of colon polyps     Hx of cirrhosis     non-alcoholic    Iron deficiency anemia secondary to blood loss (chronic)     Kidney stones     Portal hypertensive gastropathy     Unintentional weight loss 10/14/2023    Unspecified cirrhosis of liver        ECHO: No results found for this or any previous visit.      There is no height or weight on file to calculate BMI.    Tobacco Use: High Risk (5/15/2024)    Patient History     Smoking Tobacco Use: Some Days     Smokeless Tobacco Use: Never     Passive Exposure: Not on file       Social History     Substance and Sexual Activity   Drug Use No        Alcohol Use: Not At Risk (5/4/2024)    AUDIT-C     Frequency of Alcohol Consumption:  Never     Average Number of Drinks: Patient does not drink     Frequency of Binge Drinking: Never       Review of patient's allergies indicates:   Allergen Reactions    Sulfa (sulfonamide antibiotics) Hives         Airway:  No value filed.    Pre-op Assessment    I have reviewed the Patient Summary Reports.     I have reviewed the Nursing Notes. I have reviewed the NPO Status.   I have reviewed the Medications.     Review of Systems  Anesthesia Hx:  No problems with previous Anesthesia             Denies Family Hx of Anesthesia complications.    Denies Personal Hx of Anesthesia complications.                    Hematology/Oncology:  Hematology Normal   Oncology Normal                                   EENT/Dental:  EENT/Dental Normal           Cardiovascular:  Cardiovascular Normal Exercise tolerance: good                 ECG has been reviewed.                          Pulmonary:  Pulmonary Normal                       Renal/:  Chronic Renal Disease                Hepatic/GI:      Liver Disease, Hepatitis           Musculoskeletal:  Musculoskeletal Normal                Neurological:  Neurology Normal                                      Endocrine:  Endocrine Normal            Dermatological:  Skin Normal    Psych:  Psychiatric Normal                     Anesthesia Plan  Type of Anesthesia, risks & benefits discussed:    Anesthesia Type: Gen Natural Airway  Intra-op Monitoring Plan: Standard ASA Monitors  Post Op Pain Control Plan: multimodal analgesia and IV/PO Opioids PRN  Induction:  IV  Informed Consent: Informed consent signed with the Patient and all parties understand the risks and agree with anesthesia plan.  All questions answered. Patient consented to blood products? No  ASA Score: 3  Day of Surgery Review of History & Physical: H&P Update referred to the surgeon/provider.I have interviewed and examined the patient. I have reviewed the patient's H&P dated: There are no significant changes.     Ready  For Surgery From Anesthesia Perspective.   .

## 2024-05-27 NOTE — PROVATION PATIENT INSTRUCTIONS
Discharge Summary/Instructions after an Endoscopic Procedure  Patient Name: Tee Aranda  Patient MRN: [id number]  Patient YOB: 1970  Monday, May 27, 2024  Eric Garcia MD  Dear patient,  As a result of recent federal legislation (The Federal Cures Act), you may   receive lab or pathology results from your procedure in your [facility]   account before your physician is able to contact you. Your physician or   their representative will relay the results to you with their   recommendations at their soonest availability.  Thank you,  RESTRICTIONS:  During your procedure today, you received medications for sedation.  These   medications may affect your judgment, balance and coordination.  Therefore,   for 24 hours, you have the following restrictions:   - DO NOT drive a car, operate machinery, make legal/financial decisions,   sign important papers or drink alcohol.    ACTIVITY:  Today: no heavy lifting, straining or running due to procedural   sedation/anesthesia.  The following day: return to full activity including work.  DIET:  Eat and drink normally unless instructed otherwise.     TREATMENT FOR COMMON SIDE EFFECTS:  - Mild abdominal pain, nausea, belching, bloating or excessive gas:  rest,   eat lightly and use a heating pad.  - Sore Throat: treat with throat lozenges and/or gargle with warm salt   water.  - Because air was used during the procedure, expelling large amounts of air   from your rectum or belching is normal.  - If a bowel prep was taken, you may not have a bowel movement for 1-3 days.    This is normal.  SYMPTOMS TO WATCH FOR AND REPORT TO YOUR PHYSICIAN:  1. Abdominal pain or bloating, other than gas cramps.  2. Chest pain.  3. Back pain.  4. Signs of infection such as: chills or fever occurring within 24 hours   after the procedure.  5. Rectal bleeding, which would show as bright red, maroon, or black stools.   (A tablespoon of blood from the rectum is not serious, especially if    hemorrhoids are present.)  6. Vomiting.  7. Weakness or dizziness.  GO DIRECTLY TO THE NEAREST EMERGENCY ROOM IF YOU HAVE ANY OF THE FOLLOWING:      Difficulty breathing              Chills and/or fever over 101 F   Persistent vomiting and/or vomiting blood   Severe abdominal pain   Severe chest pain   Black, tarry stools   Bleeding- more than one tablespoon   Any other symptom or condition that you feel may need urgent attention  Your doctor recommends these additional instructions:  If any biopsies were taken, your doctors clinic will contact you in 1 to 2   weeks with any results.  - Discharge patient to home.   - Resume previous diet.   - Continue present medications.   - Await pathology results.   - Repeat colonoscopy in 5 years for screening purposes.   - Return to referring physician at appointment to be scheduled.  For questions, problems or results please call your physician - Eric Garcia MD at Work:  [phone].  [facility], EMERGENCY ROOM PHONE NUMBER: [phone]  IF A COMPLICATION OR EMERGENCY SITUATION ARISES AND YOU ARE UNABLE TO REACH   YOUR PHYSICIAN - GO DIRECTLY TO THE EMERGENCY ROOM.  Eric Garcia MD  5/27/2024 10:39:06 AM  This report has been verified and signed electronically.  Dear patient,  As a result of recent federal legislation (The Federal Cures Act), you may   receive lab or pathology results from your procedure in your [facility]   account before your physician is able to contact you. Your physician or   their representative will relay the results to you with their   recommendations at their soonest availability.  Thank you,  PROVATION

## 2024-05-27 NOTE — TRANSFER OF CARE
"Anesthesia Transfer of Care Note    Patient: Tee Aranda    Procedure(s) Performed: Procedure(s) (LRB):  EGD (ESOPHAGOGASTRODUODENOSCOPY) (N/A)  COLONOSCOPY (N/A)    Patient location: PACU    Anesthesia Type: general    Transport from OR: Transported from OR on room air with adequate spontaneous ventilation    Post pain: adequate analgesia    Post assessment: no apparent anesthetic complications    Post vital signs: stable    Level of consciousness: awake    Nausea/Vomiting: no nausea/vomiting    Complications: none    Transfer of care protocol was followed    Last vitals: Visit Vitals  /77 (BP Location: Left arm, Patient Position: Lying)   Pulse 66   Temp 37 °C (98.6 °F) (Temporal)   Resp 16   Ht 5' 8" (1.727 m)   Wt 57.2 kg (126 lb)   SpO2 100%   Breastfeeding No   BMI 19.16 kg/m²     "

## 2024-05-27 NOTE — PROVATION PATIENT INSTRUCTIONS
Discharge Summary/Instructions after an Endoscopic Procedure  Patient Name: Tee Aranda  Patient MRN: [id number]  Patient YOB: 1970  Monday, May 27, 2024  Eric Garcia MD  Dear patient,  As a result of recent federal legislation (The Federal Cures Act), you may   receive lab or pathology results from your procedure in your [facility]   account before your physician is able to contact you. Your physician or   their representative will relay the results to you with their   recommendations at their soonest availability.  Thank you,  RESTRICTIONS:  During your procedure today, you received medications for sedation.  These   medications may affect your judgment, balance and coordination.  Therefore,   for 24 hours, you have the following restrictions:   - DO NOT drive a car, operate machinery, make legal/financial decisions,   sign important papers or drink alcohol.    ACTIVITY:  Today: no heavy lifting, straining or running due to procedural   sedation/anesthesia.  The following day: return to full activity including work.  DIET:  Eat and drink normally unless instructed otherwise.     TREATMENT FOR COMMON SIDE EFFECTS:  - Mild abdominal pain, nausea, belching, bloating or excessive gas:  rest,   eat lightly and use a heating pad.  - Sore Throat: treat with throat lozenges and/or gargle with warm salt   water.  - Because air was used during the procedure, expelling large amounts of air   from your rectum or belching is normal.  - If a bowel prep was taken, you may not have a bowel movement for 1-3 days.    This is normal.  SYMPTOMS TO WATCH FOR AND REPORT TO YOUR PHYSICIAN:  1. Abdominal pain or bloating, other than gas cramps.  2. Chest pain.  3. Back pain.  4. Signs of infection such as: chills or fever occurring within 24 hours   after the procedure.  5. Rectal bleeding, which would show as bright red, maroon, or black stools.   (A tablespoon of blood from the rectum is not serious, especially if    hemorrhoids are present.)  6. Vomiting.  7. Weakness or dizziness.  GO DIRECTLY TO THE NEAREST EMERGENCY ROOM IF YOU HAVE ANY OF THE FOLLOWING:      Difficulty breathing              Chills and/or fever over 101 F   Persistent vomiting and/or vomiting blood   Severe abdominal pain   Severe chest pain   Black, tarry stools   Bleeding- more than one tablespoon   Any other symptom or condition that you feel may need urgent attention  Your doctor recommends these additional instructions:  If any biopsies were taken, your doctors clinic will contact you in 1 to 2   weeks with any results.  - Await pathology results.   - Discharge patient to home.   - Continue present medications.   - Await pathology results.  For questions, problems or results please call your physician - Eric Garcia MD at Work:  [phone].  [facility], EMERGENCY ROOM PHONE NUMBER: [phone]  IF A COMPLICATION OR EMERGENCY SITUATION ARISES AND YOU ARE UNABLE TO REACH   YOUR PHYSICIAN - GO DIRECTLY TO THE EMERGENCY ROOM.  Eric Garcia MD  5/27/2024 10:19:02 AM  This report has been verified and signed electronically.  Dear patient,  As a result of recent federal legislation (The Federal Cures Act), you may   receive lab or pathology results from your procedure in your [facility]   account before your physician is able to contact you. Your physician or   their representative will relay the results to you with their   recommendations at their soonest availability.  Thank you,  PROVATION

## 2024-05-29 LAB
FINAL PATHOLOGIC DIAGNOSIS: NORMAL
GROSS: NORMAL
Lab: NORMAL

## 2024-05-31 ENCOUNTER — PATIENT MESSAGE (OUTPATIENT)
Dept: HEPATOLOGY | Facility: CLINIC | Age: 54
End: 2024-05-31
Payer: COMMERCIAL

## 2024-06-04 ENCOUNTER — PATIENT MESSAGE (OUTPATIENT)
Dept: GASTROENTEROLOGY | Facility: CLINIC | Age: 54
End: 2024-06-04
Payer: COMMERCIAL

## 2024-06-05 DIAGNOSIS — R10.9 ABDOMINAL PAIN, UNSPECIFIED ABDOMINAL LOCATION: Primary | ICD-10-CM

## 2024-06-05 DIAGNOSIS — R19.7 DIARRHEA, UNSPECIFIED TYPE: ICD-10-CM

## 2024-06-14 ENCOUNTER — LAB VISIT (OUTPATIENT)
Dept: LAB | Facility: HOSPITAL | Age: 54
End: 2024-06-14
Attending: FAMILY MEDICINE
Payer: COMMERCIAL

## 2024-06-14 DIAGNOSIS — R53.83 FATIGUE: Primary | ICD-10-CM

## 2024-06-14 LAB
ERYTHROCYTE [SEDIMENTATION RATE] IN BLOOD BY WESTERGREN METHOD: 4 MM/HR (ref 0–20)
T4 SERPL-MCNC: 9.5 UG/DL (ref 4.5–11.5)
TSH SERPL DL<=0.005 MIU/L-ACNC: 1.11 UIU/ML (ref 0.4–4)

## 2024-06-14 PROCEDURE — 86038 ANTINUCLEAR ANTIBODIES: CPT | Performed by: FAMILY MEDICINE

## 2024-06-14 PROCEDURE — 84436 ASSAY OF TOTAL THYROXINE: CPT | Performed by: FAMILY MEDICINE

## 2024-06-14 PROCEDURE — 36415 COLL VENOUS BLD VENIPUNCTURE: CPT | Performed by: FAMILY MEDICINE

## 2024-06-14 PROCEDURE — 85651 RBC SED RATE NONAUTOMATED: CPT | Performed by: FAMILY MEDICINE

## 2024-06-14 PROCEDURE — 84443 ASSAY THYROID STIM HORMONE: CPT | Performed by: FAMILY MEDICINE

## 2024-06-17 LAB — ANA SER QL IF: NORMAL

## 2024-06-19 ENCOUNTER — LAB VISIT (OUTPATIENT)
Dept: LAB | Facility: HOSPITAL | Age: 54
End: 2024-06-19
Attending: FAMILY MEDICINE
Payer: COMMERCIAL

## 2024-06-19 DIAGNOSIS — K76.9 LD (LIVER DISEASE): Primary | ICD-10-CM

## 2024-06-19 LAB
ALBUMIN SERPL BCP-MCNC: 4.2 G/DL (ref 3.5–5.2)
ALP SERPL-CCNC: 86 U/L (ref 55–135)
ALT SERPL W/O P-5'-P-CCNC: 33 U/L (ref 10–44)
ANION GAP SERPL CALC-SCNC: 9 MMOL/L (ref 8–16)
AST SERPL-CCNC: 25 U/L (ref 10–40)
BASOPHILS # BLD AUTO: 0.01 K/UL (ref 0–0.2)
BASOPHILS NFR BLD: 0.3 % (ref 0–1.9)
BILIRUB SERPL-MCNC: 1 MG/DL (ref 0.1–1)
BUN SERPL-MCNC: 17 MG/DL (ref 6–20)
CALCIUM SERPL-MCNC: 10.1 MG/DL (ref 8.7–10.5)
CHLORIDE SERPL-SCNC: 106 MMOL/L (ref 95–110)
CO2 SERPL-SCNC: 24 MMOL/L (ref 23–29)
CREAT SERPL-MCNC: 0.8 MG/DL (ref 0.5–1.4)
DIFFERENTIAL METHOD BLD: ABNORMAL
EOSINOPHIL # BLD AUTO: 0.1 K/UL (ref 0–0.5)
EOSINOPHIL NFR BLD: 1.5 % (ref 0–8)
ERYTHROCYTE [DISTWIDTH] IN BLOOD BY AUTOMATED COUNT: 13.2 % (ref 11.5–14.5)
EST. GFR  (NO RACE VARIABLE): >60 ML/MIN/1.73 M^2
GLUCOSE SERPL-MCNC: 128 MG/DL (ref 70–110)
HCT VFR BLD AUTO: 39.4 % (ref 37–48.5)
HGB BLD-MCNC: 13.1 G/DL (ref 12–16)
IMM GRANULOCYTES # BLD AUTO: 0.01 K/UL (ref 0–0.04)
IMM GRANULOCYTES NFR BLD AUTO: 0.3 % (ref 0–0.5)
LYMPHOCYTES # BLD AUTO: 0.6 K/UL (ref 1–4.8)
LYMPHOCYTES NFR BLD: 14.4 % (ref 18–48)
MCH RBC QN AUTO: 29.4 PG (ref 27–31)
MCHC RBC AUTO-ENTMCNC: 33.2 G/DL (ref 32–36)
MCV RBC AUTO: 89 FL (ref 82–98)
MONOCYTES # BLD AUTO: 0.3 K/UL (ref 0.3–1)
MONOCYTES NFR BLD: 7.6 % (ref 4–15)
NEUTROPHILS # BLD AUTO: 3 K/UL (ref 1.8–7.7)
NEUTROPHILS NFR BLD: 75.9 % (ref 38–73)
NRBC BLD-RTO: 0 /100 WBC
PLATELET # BLD AUTO: 77 K/UL (ref 150–450)
PMV BLD AUTO: 10.5 FL (ref 9.2–12.9)
POTASSIUM SERPL-SCNC: 4.7 MMOL/L (ref 3.5–5.1)
PROT SERPL-MCNC: 7.2 G/DL (ref 6–8.4)
RBC # BLD AUTO: 4.45 M/UL (ref 4–5.4)
SODIUM SERPL-SCNC: 139 MMOL/L (ref 136–145)
WBC # BLD AUTO: 3.96 K/UL (ref 3.9–12.7)

## 2024-06-19 PROCEDURE — 85025 COMPLETE CBC W/AUTO DIFF WBC: CPT | Performed by: FAMILY MEDICINE

## 2024-06-19 PROCEDURE — 36415 COLL VENOUS BLD VENIPUNCTURE: CPT | Performed by: FAMILY MEDICINE

## 2024-06-19 PROCEDURE — 80053 COMPREHEN METABOLIC PANEL: CPT | Performed by: FAMILY MEDICINE

## 2024-06-21 ENCOUNTER — HOSPITAL ENCOUNTER (OUTPATIENT)
Dept: RADIOLOGY | Facility: HOSPITAL | Age: 54
Discharge: HOME | End: 2024-06-21
Attending: STUDENT IN AN ORGANIZED HEALTH CARE EDUCATION/TRAINING PROGRAM
Payer: COMMERCIAL

## 2024-07-08 ENCOUNTER — LAB VISIT (OUTPATIENT)
Dept: LAB | Facility: HOSPITAL | Age: 54
End: 2024-07-08
Attending: INTERNAL MEDICINE
Payer: COMMERCIAL

## 2024-07-08 DIAGNOSIS — K74.60 LIVER CIRRHOSIS SECONDARY TO NASH: Chronic | ICD-10-CM

## 2024-07-08 DIAGNOSIS — K75.81 LIVER CIRRHOSIS SECONDARY TO NASH: Chronic | ICD-10-CM

## 2024-07-08 LAB
AFP SERPL-MCNC: 2.4 NG/ML (ref 0–8.4)
ALBUMIN SERPL BCP-MCNC: 4 G/DL (ref 3.5–5.2)
ALP SERPL-CCNC: 96 U/L (ref 55–135)
ALT SERPL W/O P-5'-P-CCNC: 31 U/L (ref 10–44)
ANION GAP SERPL CALC-SCNC: 8 MMOL/L (ref 8–16)
AST SERPL-CCNC: 25 U/L (ref 10–40)
BASOPHILS # BLD AUTO: 0.02 K/UL (ref 0–0.2)
BASOPHILS NFR BLD: 0.6 % (ref 0–1.9)
BILIRUB DIRECT SERPL-MCNC: 0.3 MG/DL (ref 0.1–0.3)
BILIRUB SERPL-MCNC: 0.9 MG/DL (ref 0.1–1)
BUN SERPL-MCNC: 16 MG/DL (ref 6–20)
CALCIUM SERPL-MCNC: 9.5 MG/DL (ref 8.7–10.5)
CHLORIDE SERPL-SCNC: 104 MMOL/L (ref 95–110)
CO2 SERPL-SCNC: 27 MMOL/L (ref 23–29)
CREAT SERPL-MCNC: 0.9 MG/DL (ref 0.5–1.4)
DIFFERENTIAL METHOD BLD: ABNORMAL
EOSINOPHIL # BLD AUTO: 0.1 K/UL (ref 0–0.5)
EOSINOPHIL NFR BLD: 1.4 % (ref 0–8)
ERYTHROCYTE [DISTWIDTH] IN BLOOD BY AUTOMATED COUNT: 13.4 % (ref 11.5–14.5)
EST. GFR  (NO RACE VARIABLE): >60 ML/MIN/1.73 M^2
GLUCOSE SERPL-MCNC: 112 MG/DL (ref 70–110)
HCT VFR BLD AUTO: 40.4 % (ref 37–48.5)
HGB BLD-MCNC: 13.3 G/DL (ref 12–16)
IMM GRANULOCYTES # BLD AUTO: 0.02 K/UL (ref 0–0.04)
IMM GRANULOCYTES NFR BLD AUTO: 0.6 % (ref 0–0.5)
INR PPP: 1.2 (ref 0.8–1.2)
LYMPHOCYTES # BLD AUTO: 0.6 K/UL (ref 1–4.8)
LYMPHOCYTES NFR BLD: 17.4 % (ref 18–48)
MCH RBC QN AUTO: 29.4 PG (ref 27–31)
MCHC RBC AUTO-ENTMCNC: 32.9 G/DL (ref 32–36)
MCV RBC AUTO: 89 FL (ref 82–98)
MONOCYTES # BLD AUTO: 0.2 K/UL (ref 0.3–1)
MONOCYTES NFR BLD: 6.8 % (ref 4–15)
NEUTROPHILS # BLD AUTO: 2.6 K/UL (ref 1.8–7.7)
NEUTROPHILS NFR BLD: 73.2 % (ref 38–73)
NRBC BLD-RTO: 0 /100 WBC
PLATELET # BLD AUTO: 70 K/UL (ref 150–450)
PMV BLD AUTO: 11.7 FL (ref 9.2–12.9)
POTASSIUM SERPL-SCNC: 3.9 MMOL/L (ref 3.5–5.1)
PROT SERPL-MCNC: 6.8 G/DL (ref 6–8.4)
PROTHROMBIN TIME: 12.6 SEC (ref 9–12.5)
RBC # BLD AUTO: 4.53 M/UL (ref 4–5.4)
SODIUM SERPL-SCNC: 139 MMOL/L (ref 136–145)
WBC # BLD AUTO: 3.51 K/UL (ref 3.9–12.7)

## 2024-07-08 PROCEDURE — 85610 PROTHROMBIN TIME: CPT | Performed by: INTERNAL MEDICINE

## 2024-07-08 PROCEDURE — 36415 COLL VENOUS BLD VENIPUNCTURE: CPT | Performed by: INTERNAL MEDICINE

## 2024-07-08 PROCEDURE — 82248 BILIRUBIN DIRECT: CPT | Performed by: INTERNAL MEDICINE

## 2024-07-08 PROCEDURE — 80053 COMPREHEN METABOLIC PANEL: CPT | Performed by: INTERNAL MEDICINE

## 2024-07-08 PROCEDURE — 85025 COMPLETE CBC W/AUTO DIFF WBC: CPT | Performed by: INTERNAL MEDICINE

## 2024-07-08 PROCEDURE — 82105 ALPHA-FETOPROTEIN SERUM: CPT | Performed by: INTERNAL MEDICINE

## 2024-07-11 ENCOUNTER — OFFICE VISIT (OUTPATIENT)
Dept: HEPATOLOGY | Facility: CLINIC | Age: 54
End: 2024-07-11
Payer: COMMERCIAL

## 2024-07-11 VITALS
HEART RATE: 78 BPM | HEIGHT: 68 IN | BODY MASS INDEX: 20.36 KG/M2 | TEMPERATURE: 98 F | OXYGEN SATURATION: 98 % | WEIGHT: 134.38 LBS | DIASTOLIC BLOOD PRESSURE: 79 MMHG | SYSTOLIC BLOOD PRESSURE: 133 MMHG

## 2024-07-11 DIAGNOSIS — I85.10 SECONDARY ESOPHAGEAL VARICES WITHOUT BLEEDING: ICD-10-CM

## 2024-07-11 DIAGNOSIS — K75.81 LIVER CIRRHOSIS SECONDARY TO NASH: Chronic | ICD-10-CM

## 2024-07-11 DIAGNOSIS — K76.6 PORTAL HYPERTENSION: ICD-10-CM

## 2024-07-11 DIAGNOSIS — K74.60 HEPATIC CIRRHOSIS, UNSPECIFIED HEPATIC CIRRHOSIS TYPE, UNSPECIFIED WHETHER ASCITES PRESENT: Primary | ICD-10-CM

## 2024-07-11 DIAGNOSIS — K74.60 LIVER CIRRHOSIS SECONDARY TO NASH: Chronic | ICD-10-CM

## 2024-07-11 PROCEDURE — 3008F BODY MASS INDEX DOCD: CPT | Mod: CPTII,S$GLB,, | Performed by: INTERNAL MEDICINE

## 2024-07-11 PROCEDURE — 99214 OFFICE O/P EST MOD 30 MIN: CPT | Mod: S$GLB,,, | Performed by: INTERNAL MEDICINE

## 2024-07-11 PROCEDURE — 3075F SYST BP GE 130 - 139MM HG: CPT | Mod: CPTII,S$GLB,, | Performed by: INTERNAL MEDICINE

## 2024-07-11 PROCEDURE — 3078F DIAST BP <80 MM HG: CPT | Mod: CPTII,S$GLB,, | Performed by: INTERNAL MEDICINE

## 2024-07-11 PROCEDURE — 1160F RVW MEDS BY RX/DR IN RCRD: CPT | Mod: CPTII,S$GLB,, | Performed by: INTERNAL MEDICINE

## 2024-07-11 PROCEDURE — 1159F MED LIST DOCD IN RCRD: CPT | Mod: CPTII,S$GLB,, | Performed by: INTERNAL MEDICINE

## 2024-07-11 PROCEDURE — 99999 PR PBB SHADOW E&M-EST. PATIENT-LVL V: CPT | Mod: PBBFAC,,, | Performed by: INTERNAL MEDICINE

## 2024-07-11 NOTE — PATIENT INSTRUCTIONS
Labs every 12 weeks  Liver cancer screening every 6 months-abdo US with AFP due next 10/24  EGD 5/2025  Return 4 months

## 2024-07-11 NOTE — PROGRESS NOTES
HEPATOLOGY FOLLOW UP    Referring Physician: James Samano MD  Current Corresponding Physician: James Samano MD, Gerard Salinas MD    Tee Aranda is here for follow up of decompensated cirrhosis    HPI   Tee Aranda was seen in consultation by my colleague Dr White 08/20. He noted:  This 53-year-old woman was referred for evaluation of cirrhosis.  She noticed easy bruisability.  She was found have a low platelet count.  She was assessed by Hematology.  She had a bone marrow aspirate and biopsy which was normal.  She eventually had an upper endoscopy which showed features of portal hypertension and grade 1 varices.  A FibroScan confirmed significant fatty liver as well as advanced fibrosis and cirrhosis.  Her weight was as high as 199 lb.  She has lost 26 lb after the diagnosis.  There is no family history of liver disease. She drinks alcohol socially.  She has mild ankle edema.  She has no symptoms of hepatic encephalopathy.  She initially had no symptoms of GI bleeding.    Admission 11/9/20: melena and weakenss. Hemoglobin fell to 5.9 and she was transfused 2 units of PRBC. EGD showed PHG and small varices. The finding was similar to an EGD done earlier this year (03/20). Small varices werenoted at that time as well. Coreg and protonix were prescribed but never started.  Admission 12/28/20: gastrintestinal hemorrhage and melena; EGD: esophageal varices banded  EGD 2/15/21: EV not banded; repeat 6 months recommended  ER visit 7/19/21 for hematemasis; hemoglobin stable at 12.5; LEFT AGAINST MEDICAL ADVICE    Interval History  --had been having issues with nephrolithiasis- now resolved    HE: no longer taking lactulose but is on rifaximin  Ascites/edema, resolved- off lasix and aldactone  HCC screening CT abdo pelivs w contrast 4/23/24: no HCC  EGD 5/27/24: small varices  Colonoscopy 5/27/24: normal    Labs 7/8/24: Tbil 0.9, ALT 31, AST 25, ALKP 96  MELD 3.0: 9 at 7/8/2024  9:22 AM  MELD-Na: 8 at  7/8/2024  9:22 AM  Calculated from:  Serum Creatinine: 0.9 mg/dL (Using min of 1 mg/dL) at 7/8/2024  9:22 AM  Serum Sodium: 139 mmol/L (Using max of 137 mmol/L) at 7/8/2024  9:22 AM  Total Bilirubin: 0.9 mg/dL (Using min of 1 mg/dL) at 7/8/2024  9:22 AM  Serum Albumin: 4.0 g/dL (Using max of 3.5 g/dL) at 7/8/2024  9:22 AM  INR(ratio): 1.2 at 7/8/2024  9:22 AM  Age at listing (hypothetical): 54 years  Sex: Female at 7/8/2024  9:22 AM      Outpatient Encounter Medications as of 7/11/2024   Medication Sig Dispense Refill    carvediloL (COREG) 3.125 MG tablet TAKE 1 TABLET BY MOUTH 2 TIMES DAILY. 180 tablet 3    esomeprazole (NEXIUM) 40 MG capsule Take 1 capsule (40 mg total) by mouth 2 (two) times daily before meals. 60 capsule 11    multivitamin (THERAGRAN) per tablet Take 1 tablet by mouth once daily.      omega-3 fatty acids/fish oil (FISH OIL-OMEGA-3 FATTY ACIDS) 300-1,000 mg capsule Take 1 capsule by mouth once daily.      rifAXIMin (XIFAXAN) 550 mg Tab Take 1 tablet (550 mg total) by mouth 2 (two) times daily. 60 tablet 11    UNABLE TO FIND 4 (four) times daily as needed. Medible 20 mg blue raspberry 1/4 to 1/2 up to 4 times daily as needed.      tamsulosin (FLOMAX) 0.4 mg Cap Take 1 capsule (0.4 mg total) by mouth once daily. 30 capsule 0     Facility-Administered Encounter Medications as of 7/11/2024   Medication Dose Route Frequency Provider Last Rate Last Admin    0.9%  NaCl infusion   Intravenous Continuous PellGerard ritchie MD   Stopped at 03/30/23 0922     Review of patient's allergies indicates:   Allergen Reactions    Sulfa (sulfonamide antibiotics) Hives     Past Medical History:   Diagnosis Date    Anemia, unspecified     Anxiety     Arthritis     Ascites 11/23/2020    AVN (avascular necrosis of bone)     Cataract     COVID-19 vaccine administered     04/08/21, 04/30/21    Diarrhea of presumed infectious origin 7/31/2023    Edema 11/23/2020    Epigastric pain 7/31/2023    Esophageal varices      Glaucoma     Hepatic encephalopathy 11/23/2020    History of colon polyps     Hx of cirrhosis     non-alcoholic    Iron deficiency anemia secondary to blood loss (chronic)     Kidney stones     Portal hypertensive gastropathy     Unintentional weight loss 10/14/2023    Unspecified cirrhosis of liver        Review of Systems   Constitutional: Negative.    HENT: Negative.     Eyes: Negative.    Respiratory: Negative.     Cardiovascular: Negative.    Gastrointestinal: Negative.    Genitourinary: Negative.    Musculoskeletal: Negative.    Skin: Negative.    Neurological: Negative.    Psychiatric/Behavioral: Negative.       Vitals:    07/11/24 1409   BP: 133/79   Pulse: 78   Temp: 97.7 °F (36.5 °C)   BMI 20    Physical Exam  Vitals reviewed.   Constitutional:       Appearance: She is well-developed.   HENT:      Head: Normocephalic and atraumatic.   Eyes:      General: No scleral icterus.     Conjunctiva/sclera: Conjunctivae normal.      Pupils: Pupils are equal, round, and reactive to light.   Neck:      Thyroid: No thyromegaly.   Cardiovascular:      Rate and Rhythm: Normal rate and regular rhythm.      Heart sounds: Normal heart sounds.   Pulmonary:      Effort: Pulmonary effort is normal.      Breath sounds: Normal breath sounds. No rales.   Abdominal:      General: Bowel sounds are normal. There is no distension.      Palpations: Abdomen is soft. There is no mass.      Tenderness: There is abdominal tenderness (low abdomen).   Musculoskeletal:         General: Normal range of motion.      Cervical back: Normal range of motion and neck supple.   Skin:     General: Skin is warm and dry.      Findings: No rash.   Neurological:      Mental Status: She is alert and oriented to person, place, and time.         MELD 3.0: 9 at 7/8/2024  9:22 AM  MELD-Na: 8 at 7/8/2024  9:22 AM  Calculated from:  Serum Creatinine: 0.9 mg/dL (Using min of 1 mg/dL) at 7/8/2024  9:22 AM  Serum Sodium: 139 mmol/L (Using max of 137 mmol/L) at  7/8/2024  9:22 AM  Total Bilirubin: 0.9 mg/dL (Using min of 1 mg/dL) at 7/8/2024  9:22 AM  Serum Albumin: 4.0 g/dL (Using max of 3.5 g/dL) at 7/8/2024  9:22 AM  INR(ratio): 1.2 at 7/8/2024  9:22 AM  Age at listing (hypothetical): 54 years  Sex: Female at 7/8/2024  9:22 AM      Lab Results   Component Value Date     (H) 07/08/2024    BUN 16 07/08/2024    CREATININE 0.9 07/08/2024    CALCIUM 9.5 07/08/2024     07/08/2024    K 3.9 07/08/2024     07/08/2024    PROT 6.8 07/08/2024    CO2 27 07/08/2024    ANIONGAP 8 07/08/2024    WBC 3.51 (L) 07/08/2024    RBC 4.53 07/08/2024    HGB 13.3 07/08/2024    HCT 40.4 07/08/2024    MCV 89 07/08/2024    MCH 29.4 07/08/2024    MCHC 32.9 07/08/2024     Lab Results   Component Value Date    RDW 13.4 07/08/2024    PLT 70 (L) 07/08/2024    MPV 11.7 07/08/2024    GRAN 2.6 07/08/2024    GRAN 73.2 (H) 07/08/2024    LYMPH 0.6 (L) 07/08/2024    LYMPH 17.4 (L) 07/08/2024    MONO 0.2 (L) 07/08/2024    MONO 6.8 07/08/2024    EOSINOPHIL 1.4 07/08/2024    BASOPHIL 0.6 07/08/2024    EOS 0.1 07/08/2024    BASO 0.02 07/08/2024    APTT 28.5 04/23/2024    GROUPTRH O POS 05/24/2024    BNP 86 03/24/2024    ALBUMIN 4.0 07/08/2024    BILIDIR 0.3 07/08/2024    AST 25 07/08/2024    ALT 31 07/08/2024    ALKPHOS 96 07/08/2024    MG 1.5 (L) 04/01/2024    LABPROT 12.6 (H) 07/08/2024    INR 1.2 07/08/2024       Assessment and Plan:  Tee Aranda is a 54 y.o. female with very mild decompensated cirrhosis (only HE on rifaximin); MELD <15; medically early for liver transplant. Other recommendations:  1. Decompensated cirrhosis- check meld labs every 12 weeks; HCC screening every 6 months (due next 10/24)  2. Hx GI bleeding and EV: repeat EGD 5/25  3. Ascites/edema, resolved:off diuretics   4. HE: continue rifaximin; restart lactulose if have memory problems    Return 4 months  A total of 35 minutes was spent reviewing the patient's chart, examining the patient, reviewing labs and imaging  and coordinating care with the patient's care team.

## 2024-07-25 ENCOUNTER — PATIENT MESSAGE (OUTPATIENT)
Dept: HEPATOLOGY | Facility: CLINIC | Age: 54
End: 2024-07-25
Payer: COMMERCIAL

## 2024-07-25 NOTE — TELEPHONE ENCOUNTER
Call returned to the Patient.  Sorry to hear your  is not feeling well.  Ms Oliveira just continue to use the same precautions that you are using everyday.    Most important is to wash hands.  Sanitize hands from everyday contact ie, pumping gas, grocery carts, opening doors.    At home clean as you usually do, especially common contact areas, bathrooms, door handles, refrigerator handles.  Remember to wash you hands.  Patient voiced understanding.

## 2024-07-26 DIAGNOSIS — R10.13 EPIGASTRIC PAIN: ICD-10-CM

## 2024-07-26 RX ORDER — ESOMEPRAZOLE MAGNESIUM 40 MG/1
CAPSULE, DELAYED RELEASE ORAL
Qty: 180 CAPSULE | Refills: 3 | Status: SHIPPED | OUTPATIENT
Start: 2024-07-26

## 2024-07-30 DIAGNOSIS — K70.30 ALCOHOLIC CIRRHOSIS OF LIVER WITHOUT ASCITES: ICD-10-CM

## 2024-07-31 RX ORDER — CARVEDILOL 3.12 MG/1
3.12 TABLET ORAL 2 TIMES DAILY
Qty: 180 TABLET | Refills: 3 | Status: SHIPPED | OUTPATIENT
Start: 2024-07-31

## 2024-08-30 ENCOUNTER — HOSPITAL ENCOUNTER (EMERGENCY)
Facility: HOSPITAL | Age: 54
Discharge: HOME | End: 2024-08-30
Attending: SURGERY
Payer: COMMERCIAL

## 2024-08-30 ENCOUNTER — TELEPHONE (OUTPATIENT)
Dept: HEPATOLOGY | Facility: CLINIC | Age: 54
End: 2024-08-30
Payer: COMMERCIAL

## 2024-08-30 ENCOUNTER — PATIENT MESSAGE (OUTPATIENT)
Dept: HEPATOLOGY | Facility: CLINIC | Age: 54
End: 2024-08-30
Payer: COMMERCIAL

## 2024-08-30 VITALS
OXYGEN SATURATION: 100 % | HEIGHT: 68 IN | RESPIRATION RATE: 20 BRPM | TEMPERATURE: 98 F | BODY MASS INDEX: 20.34 KG/M2 | HEART RATE: 71 BPM | SYSTOLIC BLOOD PRESSURE: 133 MMHG | WEIGHT: 134.25 LBS | DIASTOLIC BLOOD PRESSURE: 70 MMHG

## 2024-08-30 DIAGNOSIS — U07.1 COVID-19: ICD-10-CM

## 2024-08-30 DIAGNOSIS — N20.0 KIDNEY STONE: Primary | ICD-10-CM

## 2024-08-30 LAB
ALBUMIN SERPL BCP-MCNC: 4.1 G/DL (ref 3.5–5.2)
ALP SERPL-CCNC: 93 U/L (ref 55–135)
ALT SERPL W/O P-5'-P-CCNC: 29 U/L (ref 10–44)
AMMONIA PLAS-SCNC: 49 UMOL/L (ref 10–50)
ANION GAP SERPL CALC-SCNC: 9 MMOL/L (ref 8–16)
AST SERPL-CCNC: 25 U/L (ref 10–40)
BACTERIA #/AREA URNS HPF: ABNORMAL /HPF
BASOPHILS # BLD AUTO: 0.01 K/UL (ref 0–0.2)
BASOPHILS NFR BLD: 0.3 % (ref 0–1.9)
BILIRUB DIRECT SERPL-MCNC: 0.5 MG/DL (ref 0.1–0.3)
BILIRUB SERPL-MCNC: 1.3 MG/DL (ref 0.1–1)
BILIRUB UR QL STRIP: NEGATIVE
BUN SERPL-MCNC: 22 MG/DL (ref 6–20)
CALCIUM SERPL-MCNC: 9.6 MG/DL (ref 8.7–10.5)
CAOX CRY URNS QL MICRO: ABNORMAL
CHLORIDE SERPL-SCNC: 107 MMOL/L (ref 95–110)
CK SERPL-CCNC: 49 U/L (ref 20–180)
CLARITY UR: CLEAR
CO2 SERPL-SCNC: 24 MMOL/L (ref 23–29)
COLOR UR: YELLOW
CREAT SERPL-MCNC: 0.9 MG/DL (ref 0.5–1.4)
DIFFERENTIAL METHOD BLD: ABNORMAL
EOSINOPHIL # BLD AUTO: 0.1 K/UL (ref 0–0.5)
EOSINOPHIL NFR BLD: 1.6 % (ref 0–8)
ERYTHROCYTE [DISTWIDTH] IN BLOOD BY AUTOMATED COUNT: 13.2 % (ref 11.5–14.5)
EST. GFR  (NO RACE VARIABLE): >60 ML/MIN/1.73 M^2
GLUCOSE SERPL-MCNC: 137 MG/DL (ref 70–110)
GLUCOSE UR QL STRIP: NEGATIVE
HCT VFR BLD AUTO: 39.7 % (ref 37–48.5)
HGB BLD-MCNC: 13.7 G/DL (ref 12–16)
HGB UR QL STRIP: ABNORMAL
HYALINE CASTS #/AREA URNS LPF: 2 /LPF
IMM GRANULOCYTES # BLD AUTO: 0.01 K/UL (ref 0–0.04)
IMM GRANULOCYTES NFR BLD AUTO: 0.3 % (ref 0–0.5)
KETONES UR QL STRIP: NEGATIVE
LDH SERPL L TO P-CCNC: 163 U/L (ref 110–260)
LEUKOCYTE ESTERASE UR QL STRIP: ABNORMAL
LYMPHOCYTES # BLD AUTO: 0.7 K/UL (ref 1–4.8)
LYMPHOCYTES NFR BLD: 22.9 % (ref 18–48)
MCH RBC QN AUTO: 29.7 PG (ref 27–31)
MCHC RBC AUTO-ENTMCNC: 34.5 G/DL (ref 32–36)
MCV RBC AUTO: 86 FL (ref 82–98)
MICROSCOPIC COMMENT: ABNORMAL
MONOCYTES # BLD AUTO: 0.3 K/UL (ref 0.3–1)
MONOCYTES NFR BLD: 9.2 % (ref 4–15)
NEUTROPHILS # BLD AUTO: 2.1 K/UL (ref 1.8–7.7)
NEUTROPHILS NFR BLD: 65.7 % (ref 38–73)
NITRITE UR QL STRIP: NEGATIVE
NRBC BLD-RTO: 0 /100 WBC
PH UR STRIP: 7 [PH] (ref 5–8)
PLATELET # BLD AUTO: 74 K/UL (ref 150–450)
PMV BLD AUTO: 11.5 FL (ref 9.2–12.9)
POTASSIUM SERPL-SCNC: 3.8 MMOL/L (ref 3.5–5.1)
PROT SERPL-MCNC: 7 G/DL (ref 6–8.4)
PROT UR QL STRIP: ABNORMAL
RBC # BLD AUTO: 4.61 M/UL (ref 4–5.4)
RBC #/AREA URNS HPF: >100 /HPF (ref 0–4)
SARS-COV-2 RDRP RESP QL NAA+PROBE: NEGATIVE
SARS-COV-2 RDRP RESP QL NAA+PROBE: NEGATIVE
SODIUM SERPL-SCNC: 140 MMOL/L (ref 136–145)
SP GR UR STRIP: 1.01 (ref 1–1.03)
SQUAMOUS #/AREA URNS HPF: 1 /HPF
TROPONIN I SERPL DL<=0.01 NG/ML-MCNC: <0.006 NG/ML (ref 0–0.03)
URN SPEC COLLECT METH UR: ABNORMAL
UROBILINOGEN UR STRIP-ACNC: 1 EU/DL
WBC # BLD AUTO: 3.14 K/UL (ref 3.9–12.7)
WBC #/AREA URNS HPF: 7 /HPF (ref 0–5)

## 2024-08-30 PROCEDURE — 86140 C-REACTIVE PROTEIN: CPT | Performed by: SURGERY

## 2024-08-30 PROCEDURE — U0002 COVID-19 LAB TEST NON-CDC: HCPCS | Performed by: SURGERY

## 2024-08-30 PROCEDURE — 99285 EMERGENCY DEPT VISIT HI MDM: CPT | Mod: 25

## 2024-08-30 PROCEDURE — 85025 COMPLETE CBC W/AUTO DIFF WBC: CPT | Performed by: SURGERY

## 2024-08-30 PROCEDURE — U0002 COVID-19 LAB TEST NON-CDC: HCPCS | Mod: 91 | Performed by: STUDENT IN AN ORGANIZED HEALTH CARE EDUCATION/TRAINING PROGRAM

## 2024-08-30 PROCEDURE — 93010 ELECTROCARDIOGRAM REPORT: CPT | Mod: ,,, | Performed by: INTERNAL MEDICINE

## 2024-08-30 PROCEDURE — 93005 ELECTROCARDIOGRAM TRACING: CPT

## 2024-08-30 PROCEDURE — 82728 ASSAY OF FERRITIN: CPT | Performed by: SURGERY

## 2024-08-30 PROCEDURE — 82248 BILIRUBIN DIRECT: CPT | Performed by: SURGERY

## 2024-08-30 PROCEDURE — 83615 LACTATE (LD) (LDH) ENZYME: CPT | Performed by: SURGERY

## 2024-08-30 PROCEDURE — 84484 ASSAY OF TROPONIN QUANT: CPT | Performed by: SURGERY

## 2024-08-30 PROCEDURE — 82550 ASSAY OF CK (CPK): CPT | Performed by: SURGERY

## 2024-08-30 PROCEDURE — 80053 COMPREHEN METABOLIC PANEL: CPT | Performed by: SURGERY

## 2024-08-30 PROCEDURE — 82140 ASSAY OF AMMONIA: CPT | Performed by: STUDENT IN AN ORGANIZED HEALTH CARE EDUCATION/TRAINING PROGRAM

## 2024-08-30 PROCEDURE — 81000 URINALYSIS NONAUTO W/SCOPE: CPT | Performed by: SURGERY

## 2024-08-30 PROCEDURE — 86038 ANTINUCLEAR ANTIBODIES: CPT | Performed by: SURGERY

## 2024-08-30 RX ORDER — OXYCODONE HYDROCHLORIDE 5 MG/1
5 TABLET ORAL EVERY 4 HOURS PRN
Qty: 14 TABLET | Refills: 0 | Status: SHIPPED | OUTPATIENT
Start: 2024-08-30 | End: 2024-09-12

## 2024-08-30 RX ORDER — OXYCODONE HYDROCHLORIDE 5 MG/1
5 TABLET ORAL EVERY 4 HOURS PRN
Qty: 14 TABLET | Refills: 0 | Status: SHIPPED | OUTPATIENT
Start: 2024-08-30 | End: 2024-08-30

## 2024-08-30 NOTE — ED PROVIDER NOTES
Encounter Date: 8/30/2024       History     Chief Complaint   Patient presents with    COVID-19 Concerns     Patient has a history of nonalcoholic cirrhosis and CARTAGENA disease with esophageal varices tested positive for COVID at home yesterday contacted her liver doctor at [facility] who recommended IV remdesivir as inpatient as that iswhat she did last time she got COVID she will get laboratory tests here in and consult the liver specialist    The history is provided by the patient.   Illness   The current episode started today. The problem occurs rarely. The problem has been unchanged. The pain is at a severity of 0/10. Nothing relieves the symptoms. Nothing aggravates the symptoms. Associated symptoms include cough. Pertinent negatives include no eye itching, no photophobia, no congestion, no ear pain, no mouth sores, no rhinorrhea, no stridor, no swollen glands, no muscle aches and no pain.     Review of patient's allergies indicates:   Allergen Reactions    Sulfa (sulfonamide antibiotics) Hives     Past Medical History:   Diagnosis Date    Anemia, unspecified     Anxiety     Arthritis     Ascites 11/23/2020    AVN (avascular necrosis of bone)     Cataract     COVID-19 vaccine administered     04/08/21, 04/30/21    Diarrhea of presumed infectious origin 7/31/2023    Edema 11/23/2020    Epigastric pain 7/31/2023    Esophageal varices     Glaucoma     Hepatic encephalopathy 11/23/2020    History of colon polyps     Hx of cirrhosis     non-alcoholic    Iron deficiency anemia secondary to blood loss (chronic)     Kidney stones     Portal hypertensive gastropathy     Unintentional weight loss 10/14/2023    Unspecified cirrhosis of liver      Past Surgical History:   Procedure Laterality Date    APPENDECTOMY      COLONOSCOPY N/A 8/3/2023    Procedure: COLONOSCOPY;  Surgeon: Gerard Salinsa MD;  Location: [facility];  Service: Endoscopy;  Laterality: N/A;    COLONOSCOPY N/A 5/27/2024    Procedure: COLONOSCOPY;   Surgeon: Eric Garcia MD;  Location: [facility] (4TH FLR);  Service: Endoscopy;  Laterality: N/A;  Dr. Douglass, egd/colon, weight loss, variceal surveillance and abdominal pain, standard prep, cirrhosis labs ordered    COLONOSCOPY W/ POLYPECTOMY      CYSTOSCOPY N/A 3/20/2024    Procedure: CYSTOSCOPY;  Surgeon: Chris Carey MD;  Location: [facility] OR 1ST FLR;  Service: Urology;  Laterality: N/A;    CYSTOSCOPY  9/4/2024    Procedure: CYSTOSCOPY;  Surgeon: Chris Carey MD;  Location: [facility] OR 1ST FLR;  Service: Urology;;    CYSTOSCOPY W/ URETERAL STENT REMOVAL Right 3/14/2024    Procedure: CYSTOSCOPY, WITH URETERAL STENT REMOVAL;  Surgeon: Chris Carey MD;  Location: [facility] OR 1ST FLR;  Service: Urology;  Laterality: Right;    DISTAL CLAVICLE EXCISION Right 11/18/2021    Procedure: RIGHT SHOULDER ARTHROSCOPY, EXCISION, CLAVICLE, DISTAL; EXTENSIVE DEBRIDEMENT;  Surgeon: Isaiah Joyner MD;  Location: [facility];  Service: Orthopedics;  Laterality: Right;    ESOPHAGEAL VARICE LIGATION      ESOPHAGOGASTRODUODENOSCOPY N/A 11/10/2020    Procedure: EGD (ESOPHAGOGASTRODUODENOSCOPY);  Surgeon: Gerard Salinas MD;  Location[facility];  Service: Endoscopy;  Laterality: N/A;    ESOPHAGOGASTRODUODENOSCOPY N/A 12/28/2020    Procedure: EGD (ESOPHAGOGASTRODUODENOSCOPY);  Surgeon: Adryan Park MD;  Location: [facility] (2ND FLR);  Service: Endoscopy;  Laterality: N/A;    ESOPHAGOGASTRODUODENOSCOPY N/A 02/15/2021    Procedure: EGD (ESOPHAGOGASTRODUODENOSCOPY);  Surgeon: Hari Greene MD;  Location: [facility] (4TH FLR);  Service: Endoscopy;  Laterality: N/A;  6 week follow-up variceal banding  Hx varices - Labs - ERW  prep ins. emialed - COVID screening on 2/12/21 [place] - ERW    ESOPHAGOGASTRODUODENOSCOPY Left 08/03/2021    Procedure: EGD (ESOPHAGOGASTRODUODENOSCOPY);  Surgeon: Fabricio Corado MD;  Location: [facility] ([address] [place] [address]);  Service: Gastroenterology;  Laterality: Left;  recent hematemasis. varices-labs on 7/26     COVID test at [facility] on 7/31-GT  requesting sooner    ESOPHAGOGASTRODUODENOSCOPY N/A 07/27/2022    Procedure: EGD (ESOPHAGOGASTRODUODENOSCOPY);  Surgeon: Eric Garcia MD;  Location: [facility];  Service: Endoscopy;  Laterality: N/A;    ESOPHAGOGASTRODUODENOSCOPY Left 08/29/2022    Procedure: EGD (ESOPHAGOGASTRODUODENOSCOPY);  Surgeon: Kacy Douglass MD;  Location: [facility] (2ND FLR);  Service: Endoscopy;  Laterality: Left;  Fully vaccinated/ clear liquids up to 4 hrs prior-RB  varices labs ordered-RB    ESOPHAGOGASTRODUODENOSCOPY N/A 10/05/2022    Procedure: EGD (ESOPHAGOGASTRODUODENOSCOPY);  Surgeon: Gerard Salinas MD;  Location: [facility];  Service: Endoscopy;  Laterality: N/A;    ESOPHAGOGASTRODUODENOSCOPY N/A 3/30/2023    Procedure: EGD (ESOPHAGOGASTRODUODENOSCOPY);  Surgeon: Gerard Salinas MD;  Location: [facility];  Service: Endoscopy;  Laterality: N/A;    ESOPHAGOGASTRODUODENOSCOPY N/A 8/3/2023    Procedure: EGD (ESOPHAGOGASTRODUODENOSCOPY);  Surgeon: Gerard Salinas MD;  Location: [facility];  Service: Endoscopy;  Laterality: N/A;    ESOPHAGOGASTRODUODENOSCOPY N/A 5/27/2024    Procedure: EGD (ESOPHAGOGASTRODUODENOSCOPY);  Surgeon: Eric Garcia MD;  Location: [facility] (4TH FLR);  Service: Endoscopy;  Laterality: N/A;    EXTRACTION - STONE Right 3/20/2024    Procedure: EXTRACTION - STONE;  Surgeon: Chris Carey MD;  Location: [facility] OR 1ST FLR;  Service: Urology;  Laterality: Right;    HYSTERECTOMY      KNEE SURGERY Bilateral     LASER LITHOTRIPSY Right 3/20/2024    Procedure: LITHOTRIPSY, USING LASER;  Surgeon: Chris Carey MD;  Location: [facility] OR 1ST FLR;  Service: Urology;  Laterality: Right;    LITHOTRIPSY      REMOVAL-STENT Right 3/20/2024    Procedure: REMOVAL-STENT;  Surgeon: Chris Carey MD;  Location: [facility] OR [address] [place] [address];  Service: Urology;  Laterality: Right;    RETROGRADE PYELOGRAPHY Right 3/20/2024    Procedure: PYELOGRAM, RETROGRADE;  Surgeon: Chris Carey MD;  [facility];  Service: Urology;  Laterality: Right;    RHINOPLASTY TIP      SHOULDER SURGERY Right     TONSILLECTOMY      TOTAL HIP ARTHROPLASTY Right     URETERAL STENT PLACEMENT Right 3/20/2024    Procedure: INSERTION, STENT, URETER;  Surgeon: Chris Carey MD;  Location: [address];  Service: Urology;  Laterality: Right;    URETERAL STENT PLACEMENT Right 2024    Procedure: INSERTION, STENT, URETER;  Surgeon: Chris Carey MD;  Location: [address];  Service: Urology;  Laterality: Right;    URETEROSCOPY Right 3/20/2024    Procedure: URETEROSCOPY;  Surgeon: Chris Carey MD;  Location: [address];  Service: Urology;  Laterality: Right;     Family History   Problem Relation Name Age of Onset    No Known Problems Mother      Diabetes Mellitus Maternal Grandmother      Amblyopia Neg Hx      Blindness Neg Hx      Cataracts Neg Hx      Glaucoma Neg Hx      Macular degeneration Neg Hx      Retinal detachment Neg Hx      Strabismus Neg Hx      Colon cancer Neg Hx      Esophageal cancer Neg Hx       Social History     Tobacco Use    Smoking status: Former     Current packs/day: 0.00     Types: Cigarettes     Quit date: 7/3/2019     Years since quittin.1    Smokeless tobacco: Never   Substance Use Topics    Alcohol use: Not Currently    Drug use: No     Review of Systems   Constitutional: Negative.    HENT:  Negative for congestion, ear pain, mouth sores and rhinorrhea.    Eyes:  Negative for photophobia, pain and itching.   Respiratory:  Positive for cough. Negative for stridor.    Gastrointestinal: Negative.    Endocrine: Negative.    Genitourinary: Negative.    Musculoskeletal: Negative.    Skin: Negative.    Allergic/Immunologic: Negative.    Neurological: Negative.    Hematological: Negative.    Psychiatric/Behavioral: Negative.         Physical Exam     Initial Vitals [24 1707]   BP Pulse Resp Temp SpO2   133/70 71 20 98.4 °F (36.9 °C) 100 %      MAP       --         Physical  Exam    Nursing note and vitals reviewed.  Constitutional: She appears well-developed and well-nourished.   HENT:   Head: Normocephalic.   Neck:   Normal range of motion.  Cardiovascular:  Normal rate.           Pulmonary/Chest: Breath sounds normal.   Abdominal: Abdomen is soft.   Genitourinary:    Vagina normal.     Musculoskeletal:         General: Normal range of motion.      Cervical back: Normal range of motion.     Neurological: She is alert. GCS score is 15. GCS eye subscore is 4. GCS verbal subscore is 5. GCS motor subscore is 6.   Skin: Skin is warm and dry.   Psychiatric: She has a normal mood and affect.         ED Course   Procedures  Labs Reviewed   CBC W/ AUTO DIFFERENTIAL - Abnormal       Result Value    WBC 3.14 (*)     RBC 4.61      Hemoglobin 13.7      Hematocrit 39.7      MCV 86      MCH 29.7      MCHC 34.5      RDW 13.2      Platelets 74 (*)     MPV 11.5      Immature Granulocytes 0.3      Gran # (ANC) 2.1      Immature Grans (Abs) 0.01      Lymph # 0.7 (*)     Mono # 0.3      Eos # 0.1      Baso # 0.01      nRBC 0      Gran % 65.7      Lymph % 22.9      Mono % 9.2      Eosinophil % 1.6      Basophil % 0.3      Differential Method Automated     COMPREHENSIVE METABOLIC PANEL - Abnormal    Sodium 140      Potassium 3.8      Chloride 107      CO2 24      Glucose 137 (*)     BUN 22 (*)     Creatinine 0.9      Calcium 9.6      Total Protein 7.0      Albumin 4.1      Total Bilirubin 1.3 (*)     Alkaline Phosphatase 93      AST 25      ALT 29      eGFR >60      Anion Gap 9     BILIRUBIN, DIRECT - Abnormal    Bilirubin, Direct 0.5 (*)    URINALYSIS, REFLEX TO URINE CULTURE - Abnormal    Specimen UA Urine, Clean Catch      Color, UA Yellow      Appearance, UA Clear      pH, UA 7.0      Specific Gravity, UA 1.015      Protein, UA 1+ (*)     Glucose, UA Negative      Ketones, UA Negative      Bilirubin (UA) Negative      Occult Blood UA 3+ (*)     Nitrite, UA Negative      Urobilinogen, UA 1.0       Leukocytes, UA Trace (*)     Narrative:     Specimen Source->Urine   URINALYSIS MICROSCOPIC - Abnormal    RBC, UA >100 (*)     WBC, UA 7 (*)     Bacteria Occasional      Squam Epithel, UA 1      Hyaline Casts, UA 2 (*)     Ca Oxalate Amina, UA Moderate      Microscopic Comment SEE COMMENT      Narrative:     Specimen Source->Urine   C-REACTIVE PROTEIN    CRP 0.5     FERRITIN    Ferritin 24     LACTATE DEHYDROGENASE         CK    CPK 49     TROPONIN I    Troponin I <0.006     SARS-COV-2 RNA AMPLIFICATION, QUAL    SARS-CoV-2 RNA, Amplification, Qual Negative     COLBY PROFILE I (SCREEN) W/ REFLEX    COLBY Screen Negative <1:80     AMMONIA    Ammonia 49     SARS-COV-2 RNA AMPLIFICATION, QUAL    SARS-CoV-2 RNA, Amplification, Qual Negative       EKG Readings: (Independently Interpreted)   Rhythm: Normal Sinus Rhythm. Heart Rate: 72. Ectopy: No Ectopy. Conduction: Normal.     ECG Results              EKG 12-lead (Final result)        Collection Time Result Time QRS Duration OHS QTC Calculation    08/30/24 17:27:15 09/03/24 07:43:07 72 402                     Final result by Interface, Lab In [facility] (09/03/24 07:43:14)                   Narrative:    Test Reason : U07.1    Vent. Rate : 072 BPM     Atrial Rate : 072 BPM     P-R Int : 138 ms          QRS Dur : 072 ms      QT Int : 368 ms       P-R-T Axes : 070 087 074 degrees     QTc Int : 402 ms    Normal sinus rhythm  Normal ECG  When compared with ECG of 01-APR-2024 14:56,  T wave amplitude has decreased in Anterior leads  Confirmed by Juan RANGEL, Ji PANDYA ([id number]) on 9/3/2024 7:43:01 AM    Referred By: [email or web address]   SELF           Confirmed By:Ji Martinez MD                                  Imaging Results              CT Renal Stone Study ABD Pelvis WO (Final result)  Result time 08/30/24 19:28:40      Final result by Gerard Lakhani MD (08/30/24 19:28:40)                   Impression:      Obstructive uropathy by 3 mm calculus in the distal 3rd of the right  ureter a few cm above the UVJ.    Stables hepatosplenomegaly.    No acute findings elsewhere in the abdomen since July 2023 exam.      Electronically signed by: Gerard Lakhani  Date:    08/30/2024  Time:    19:28               Narrative:    EXAMINATION:  CT RENAL STONE STUDY ABD PELVIS WO    CLINICAL HISTORY:  Flank pain, kidney stone suspected;    TECHNIQUE:  Low dose axial images, sagittal and coronal reformations were obtained from the lung bases to the pubic symphysis.  Contrast was not administered.    COMPARISON:  07/10/2023    FINDINGS:  Heart: Normal in size. No pericardial effusion.    Lung Bases: Well aerated, without consolidation or pleural fluid.    Liver: Stable mild hepatomegaly and heterogeneity of the contour of the liver.  No intrahepatic biliary ductal dilatation.    Gallbladder: No calcified gallstones.    Bile Ducts: No evidence of dilated ducts.    Pancreas: No mass or peripancreatic fat stranding.    Spleen: Stable splenomegaly    Adrenals: Unremarkable.    Kidneys/ Ureters: Nonobstructing calculi in the lower pole of the right kidney 1 x 2 mm.  Large extrarenal pelvis and mild hydronephrosis on the right with an obstructing 3 mm calculus in the distal 3rd of the right ureter few cm from the UVJ.    Bladder: No evidence of wall thickening.    Reproductive organs: Unremarkable.    GI Tract/Mesentery: No evidence of bowel obstruction or inflammation.    Peritoneal Space: No ascites. No free air.    Retroperitoneum: No significant adenopathy.    Abdominal wall: Unremarkable.    Vasculature: No significant atherosclerosis or aneurysm.    Bones: No acute fracture.  Right hip arthroplasty.  Degenerative endplate changes at L3 and congenital deformity of T9 suggested posteriorly.                                       X-Ray Chest AP Portable (Final result)  Result time 08/30/24 18:08:14      Final result by Lidia Willis MD (08/30/24 18:08:14)                   Impression:      No acute  findings.      Electronically signed by: Rtistin-Vick Alba  Date:    08/30/2024  Time:    18:08               Narrative:    EXAMINATION:  XR CHEST AP PORTABLE    CLINICAL HISTORY:  COVID-19;    TECHNIQUE:  Single frontal view of the chest was performed.    COMPARISON:  03/24/2024    FINDINGS:  Lungs are clear. No focal consolidation. No pleural effusion. No pneumothorax. Normal heart size.                                       Medications - No data to display  Medical Decision Making  Patient tested positive for COVID home we will determine if she meets criteria to be admitted    Amount and/or Complexity of Data Reviewed  Labs: ordered.  Radiology: ordered.                                      Clinical Impression:  Final diagnoses:  [U07.1] COVID-19  [N20.0] Kidney stone (Primary)          ED Disposition Condition    Discharge Stable          ED Prescriptions       Medication Sig Dispense Start Date End Date Auth. Provider    oxyCODONE (ROXICODONE) 5 MG immediate release tablet  (Status: Discontinued) Take 1 tablet (5 mg total) by mouth every 4 (four) hours as needed for Pain. 14 tablet 8/30/2024 8/30/2024 Andrea Poe MD    oxyCODONE (ROXICODONE) 5 MG immediate release tablet Take 1 tablet (5 mg total) by mouth every 4 (four) hours as needed for Pain. 14 tablet 8/30/2024 -- Andrea Poe MD          Follow-up Information       Follow up With Specialties Details Why Contact Info    James Samano MD Family Medicine Schedule an appointment as soon as possible for a visit in 2 days  [address]  [facility]  [place] [address]  [phone]      [facility] - Emergency Dept Emergency Medicine  If symptoms worsen [facility] [address]  [phone]             MARI Tidwell III, MD  09/08/24 0603

## 2024-08-30 NOTE — TELEPHONE ENCOUNTER
----- Message from Sheridan Mcgee sent at 8/30/2024  4:36 PM CDT -----  Pt calling in regards to testing Covid , pt going to blood work done now,  please call     Confirmed patient's contact info below:  Contact Name: Tee Aranda  Phone Number: [phone]

## 2024-08-30 NOTE — TELEPHONE ENCOUNTER
Returned call to patient. Patient was asking for any recommendations on what to do or what to take because she is having a covid.

## 2024-08-30 NOTE — ED TRIAGE NOTES
54 y.o. female presents to ER Room/bed info not found   Chief Complaint   Patient presents with    COVID-19 Concerns   .   Pt reports having + COVID test at home, c/o feeling achy and having back pain, reports having end stage liver disease and concerned about taking medications

## 2024-08-31 LAB
CRP SERPL-MCNC: 0.5 MG/L (ref 0–8.2)
FERRITIN SERPL-MCNC: 24 NG/ML (ref 20–300)

## 2024-08-31 NOTE — PROVIDER PROGRESS NOTES - EMERGENCY DEPT.
Encounter Date: 8/30/2024    ED Physician Progress Notes        Physician Note:   Patient was COVID negative (2- test here in the ED), found to have right-sided kidney stone.  No sign of urinary tract infection.  Slight elevation in bilirubin, 1.3, however lab work otherwise unremarkable.  Patient is hepatologist is not on-call this evening, given the patient is stable appearing, does not have COVID, has a 3 mm kidney stone that should be able to pass without intervention, will discharge home with pain medication.  Patient reports she has a urologist that she will follow up with.  Patient states that she does not need a prescription for Flomax as she has some at home already.

## 2024-09-01 ENCOUNTER — PATIENT MESSAGE (OUTPATIENT)
Dept: HEPATOLOGY | Facility: CLINIC | Age: 54
End: 2024-09-01
Payer: COMMERCIAL

## 2024-09-03 ENCOUNTER — OFFICE VISIT (OUTPATIENT)
Dept: UROLOGY | Facility: CLINIC | Age: 54
End: 2024-09-03
Payer: COMMERCIAL

## 2024-09-03 ENCOUNTER — PATIENT MESSAGE (OUTPATIENT)
Dept: HEPATOLOGY | Facility: CLINIC | Age: 54
End: 2024-09-03
Payer: COMMERCIAL

## 2024-09-03 ENCOUNTER — TELEPHONE (OUTPATIENT)
Dept: UROLOGY | Facility: CLINIC | Age: 54
End: 2024-09-03

## 2024-09-03 ENCOUNTER — TELEPHONE (OUTPATIENT)
Dept: HEPATOLOGY | Facility: CLINIC | Age: 54
End: 2024-09-03
Payer: COMMERCIAL

## 2024-09-03 ENCOUNTER — LAB VISIT (OUTPATIENT)
Dept: LAB | Facility: HOSPITAL | Age: 54
End: 2024-09-03
Attending: UROLOGY
Payer: COMMERCIAL

## 2024-09-03 ENCOUNTER — PATIENT MESSAGE (OUTPATIENT)
Dept: UROLOGY | Facility: CLINIC | Age: 54
End: 2024-09-03

## 2024-09-03 DIAGNOSIS — K76.6 PORTAL HYPERTENSIVE GASTROPATHY: ICD-10-CM

## 2024-09-03 DIAGNOSIS — K74.60 LIVER CIRRHOSIS SECONDARY TO NASH: ICD-10-CM

## 2024-09-03 DIAGNOSIS — K75.81 LIVER CIRRHOSIS SECONDARY TO NASH: ICD-10-CM

## 2024-09-03 DIAGNOSIS — N23 RENAL COLIC: Primary | ICD-10-CM

## 2024-09-03 DIAGNOSIS — N13.30 HYDRONEPHROSIS OF RIGHT KIDNEY: ICD-10-CM

## 2024-09-03 DIAGNOSIS — N20.1 URETERAL STONE: ICD-10-CM

## 2024-09-03 DIAGNOSIS — N20.1 RIGHT URETERAL STONE: Primary | ICD-10-CM

## 2024-09-03 DIAGNOSIS — N20.1 RIGHT URETERAL STONE: ICD-10-CM

## 2024-09-03 DIAGNOSIS — N23 RENAL COLIC: ICD-10-CM

## 2024-09-03 DIAGNOSIS — K31.89 PORTAL HYPERTENSIVE GASTROPATHY: ICD-10-CM

## 2024-09-03 DIAGNOSIS — K74.60 LIVER CIRRHOSIS SECONDARY TO NASH: Chronic | ICD-10-CM

## 2024-09-03 DIAGNOSIS — K75.81 LIVER CIRRHOSIS SECONDARY TO NASH: Chronic | ICD-10-CM

## 2024-09-03 LAB
ALBUMIN SERPL BCP-MCNC: 3.9 G/DL (ref 3.5–5.2)
ALP SERPL-CCNC: 107 U/L (ref 55–135)
ALT SERPL W/O P-5'-P-CCNC: 34 U/L (ref 10–44)
ANA SER QL IF: NORMAL
ANION GAP SERPL CALC-SCNC: 10 MMOL/L (ref 8–16)
AST SERPL-CCNC: 30 U/L (ref 10–40)
BILIRUB SERPL-MCNC: 0.6 MG/DL (ref 0.1–1)
BUN SERPL-MCNC: 19 MG/DL (ref 6–20)
CALCIUM SERPL-MCNC: 9.2 MG/DL (ref 8.7–10.5)
CHLORIDE SERPL-SCNC: 106 MMOL/L (ref 95–110)
CO2 SERPL-SCNC: 23 MMOL/L (ref 23–29)
CREAT SERPL-MCNC: 0.8 MG/DL (ref 0.5–1.4)
EST. GFR  (NO RACE VARIABLE): >60 ML/MIN/1.73 M^2
GLUCOSE SERPL-MCNC: 133 MG/DL (ref 70–110)
OHS QRS DURATION: 72 MS
OHS QTC CALCULATION: 402 MS
POTASSIUM SERPL-SCNC: 3.9 MMOL/L (ref 3.5–5.1)
PROT SERPL-MCNC: 6.5 G/DL (ref 6–8.4)
SODIUM SERPL-SCNC: 139 MMOL/L (ref 136–145)

## 2024-09-03 PROCEDURE — 36415 COLL VENOUS BLD VENIPUNCTURE: CPT | Performed by: UROLOGY

## 2024-09-03 PROCEDURE — 80053 COMPREHEN METABOLIC PANEL: CPT | Performed by: UROLOGY

## 2024-09-03 RX ORDER — TAMSULOSIN HYDROCHLORIDE 0.4 MG/1
0.4 CAPSULE ORAL NIGHTLY
Qty: 30 CAPSULE | Refills: 11 | Status: SHIPPED | OUTPATIENT
Start: 2024-09-03

## 2024-09-03 RX ORDER — LEVOFLOXACIN 500 MG/1
500 TABLET, FILM COATED ORAL DAILY
Qty: 10 TABLET | Status: SHIPPED | OUTPATIENT
Start: 2024-09-03 | End: 2024-09-13

## 2024-09-03 NOTE — TELEPHONE ENCOUNTER
You are scheduled for surgery with  on 09-. Your arrival time is 6:30am. You are to report to the [facility] located in the back of the [facility] on the [place] side of the building right behind [facility]. You will need someone to drive you home following your surgery. No [employer]IN Related Products 7 days prior to surgery. No alcohol 24 hours before and after and no smoking a few days prior. NOTHING TO EAT OR DRINK AFTER MIDNIGHT THE NIGHT PRIOR TO THE SURGERY INCLUDING GUM, CANDY AND MINTS. Take a shower the night before and the morning of with Hibiclens Antibacterial soap and no lotion, cologne, deodorant or powder in the morning.

## 2024-09-03 NOTE — TELEPHONE ENCOUNTER
Lavelle stated that the pt was seen in the ED  on Friday with a kidney stone and would like to know if there is anything they need to worry about?

## 2024-09-03 NOTE — PROGRESS NOTES
CC: right renal colic    Tee Aranda is a 54 y.o. woman who is here for the evaluation of No chief complaint on file.    A new pt referred by her PCP, James Samano MD   Patient has a history of nonalcoholic cirrhosis and CARTAGENA disease with esophageal varices.  Went to ER on 8/30/24 with acute right flank pian.  She was tested positive for COVID at home 8/29/24 contacted her liver doctor at [facility] who recommended IV remdesivir as inpatient as that iswhat she did last time she got COVID she will get laboratory tests here in and consult the liver specialist     The history is provided by the patient.   Illness   The current episode started today. The problem occurs rarely. The problem has been unchanged. The pain is at a severity of 10/10. Nothing relieves the symptoms. Nothing aggravates the symptoms. Associated symptoms include cough. Pertinent negatives include no eye itching, no photophobia, no congestion, no ear pain, no mouth sores, no rhinorrhea, no stridor, no swollen glands, no muscle aches and no pain.     She was discharged with pain meds after her ER visit.  She is doing an urgent visit due to right ureteral stone with renal colic  Denies fever but chills.  No dysuria or other change in urination.  C/o fluid retention with worsening ascites since her kidney stone problem.      Past Medical History:   Diagnosis Date    Anemia, unspecified     Anxiety     Arthritis     Ascites 11/23/2020    AVN (avascular necrosis of bone)     Cataract     COVID-19 vaccine administered     04/08/21, 04/30/21    Diarrhea of presumed infectious origin 7/31/2023    Edema 11/23/2020    Epigastric pain 7/31/2023    Esophageal varices     Glaucoma     Hepatic encephalopathy 11/23/2020    History of colon polyps     Hx of cirrhosis     non-alcoholic    Iron deficiency anemia secondary to blood loss (chronic)     Kidney stones     Portal hypertensive gastropathy     Unintentional weight loss 10/14/2023    Unspecified  cirrhosis of liver      Past Surgical History:   Procedure Laterality Date    APPENDECTOMY      COLONOSCOPY N/A 8/3/2023    Procedure: COLONOSCOPY;  Surgeon: Gerard Salinas MD;  Location: [facility] ENDO;  Service: Endoscopy;  Laterality: N/A;    COLONOSCOPY N/A 5/27/2024    Procedure: COLONOSCOPY;  Surgeon: Eric Garcia MD;  Location: [facility] (4TH FLR);  Service: Endoscopy;  Laterality: N/A;  Dr. Douglass, egd/colon, weight loss, variceal surveillance and abdominal pain, standard prep, cirrhosis labs ordered    COLONOSCOPY W/ POLYPECTOMY      CYSTOSCOPY N/A 3/20/2024    Procedure: CYSTOSCOPY;  Surgeon: Chris Carey MD;  Location: [facility] OR 1ST FLR;  Service: Urology;  Laterality: N/A;    CYSTOSCOPY W/ URETERAL STENT REMOVAL Right 3/14/2024    Procedure: CYSTOSCOPY, WITH URETERAL STENT REMOVAL;  Surgeon: Chris Carey MD;  Location: [facility] OR 1ST FLR;  Service: Urology;  Laterality: Right;    DISTAL CLAVICLE EXCISION Right 11/18/2021    Procedure: RIGHT SHOULDER ARTHROSCOPY, EXCISION, CLAVICLE, DISTAL; EXTENSIVE DEBRIDEMENT;  Surgeon: Isaiah Joyner MD;  Location: [facility];  Service: Orthopedics;  Laterality: Right;    ESOPHAGEAL VARICE LIGATION      ESOPHAGOGASTRODUODENOSCOPY N/A 11/10/2020    Procedure: EGD (ESOPHAGOGASTRODUODENOSCOPY);  Surgeon: Gerard Salinas MD;  Location: [facility];  Service: Endoscopy;  Laterality: N/A;    ESOPHAGOGASTRODUODENOSCOPY N/A 12/28/2020    Procedure: EGD (ESOPHAGOGASTRODUODENOSCOPY);  Surgeon: Adryan Park MD;  Location: [facility] (2ND FLR);  Service: Endoscopy;  Laterality: N/A;    ESOPHAGOGASTRODUODENOSCOPY N/A 02/15/2021    Procedure: EGD (ESOPHAGOGASTRODUODENOSCOPY);  Surgeon: Hair Greene MD;  Location: [facility] (4TH FLR);  Service: Endoscopy;  Laterality: N/A;  6 week follow-up variceal banding  Hx varices - Labs - ERW  prep ins. emialed - COVID screening on 2/12/21 [place] - ERW    ESOPHAGOGASTRODUODENOSCOPY Left 08/03/2021    Procedure: EGD  (ESOPHAGOGASTRODUODENOSCOPY);  Surgeon: Fabricio Corado MD;  Location: [facility] (4TH FLR);  Service: Gastroenterology;  Laterality: Left;  recent hematemasis. varices-labs on 7/26    COVID test at [facility] on 7/31-GT  requesting sooner    ESOPHAGOGASTRODUODENOSCOPY N/A 07/27/2022    Procedure: EGD (ESOPHAGOGASTRODUODENOSCOPY);  Surgeon: Eric Garcia MD;  Location: [facility];  Service: Endoscopy;  Laterality: N/A;    ESOPHAGOGASTRODUODENOSCOPY Left 08/29/2022    Procedure: EGD (ESOPHAGOGASTRODUODENOSCOPY);  Surgeon: aKcy Douglass MD;  Location: [facility] (2ND FLR);  Service: Endoscopy;  Laterality: Left;  Fully vaccinated/ clear liquids up to 4 hrs prior-RB  varices labs ordered-RB    ESOPHAGOGASTRODUODENOSCOPY N/A 10/05/2022    Procedure: EGD (ESOPHAGOGASTRODUODENOSCOPY);  Surgeon: Gerard Salinas MD;  Location: [facility];  Service: Endoscopy;  Laterality: N/A;    ESOPHAGOGASTRODUODENOSCOPY N/A 3/30/2023    Procedure: EGD (ESOPHAGOGASTRODUODENOSCOPY);  Surgeon: Gerard Salinas MD;  Location: [facility];  Service: Endoscopy;  Laterality: N/A;    ESOPHAGOGASTRODUODENOSCOPY N/A 8/3/2023    Procedure: EGD (ESOPHAGOGASTRODUODENOSCOPY);  Surgeon: Gerard Salinas MD;  Location: [facility];  Service: Endoscopy;  Laterality: N/A;    ESOPHAGOGASTRODUODENOSCOPY N/A 5/27/2024    Procedure: EGD (ESOPHAGOGASTRODUODENOSCOPY);  Surgeon: Eric Garcia MD;  Location: [facility] (4TH FLR);  Service: Endoscopy;  Laterality: N/A;    EXTRACTION - STONE Right 3/20/2024    Procedure: EXTRACTION - STONE;  Surgeon: Chris Carey MD;  Location: [facility] OR 1ST FLR;  Service: Urology;  Laterality: Right;    HYSTERECTOMY      KNEE SURGERY Bilateral     LASER LITHOTRIPSY Right 3/20/2024    Procedure: LITHOTRIPSY, USING LASER;  Surgeon: Chris Carey MD;  Location: [facility] OR [address] [place] [address];  Service: Urology;  Laterality: Right;    LITHOTRIPSY      REMOVAL-STENT Right 3/20/2024    Procedure: REMOVAL-STENT;  Surgeon: Chris Carey MD;   [facility];  Service: Urology;  Laterality: Right;    RETROGRADE PYELOGRAPHY Right 3/20/2024    Procedure: PYELOGRAM, RETROGRADE;  Surgeon: Chris Carey MD;  [facility];  Service: Urology;  Laterality: Right;    RHINOPLASTY TIP      SHOULDER SURGERY Right     TONSILLECTOMY      TOTAL HIP ARTHROPLASTY Right     URETERAL STENT PLACEMENT Right 3/20/2024    Procedure: INSERTION, STENT, URETER;  Surgeon: Chris Carey MD;  [facility];  Service: Urology;  Laterality: Right;    URETEROSCOPY Right 3/20/2024    Procedure: URETEROSCOPY;  Surgeon: Chris Carey MD;  Location: [facility] OR [address] [place] [address];  Service: Urology;  Laterality: Right;     Social History     Tobacco Use    Smoking status: Former     Current packs/day: 0.00     Types: Cigarettes     Quit date: 7/3/2019     Years since quittin.1    Smokeless tobacco: Never   Substance Use Topics    Alcohol use: Not Currently    Drug use: No     Family History   Problem Relation Name Age of Onset    No Known Problems Mother      Diabetes Mellitus Maternal Grandmother      Amblyopia Neg Hx      Blindness Neg Hx      Cataracts Neg Hx      Glaucoma Neg Hx      Macular degeneration Neg Hx      Retinal detachment Neg Hx      Strabismus Neg Hx      Colon cancer Neg Hx      Esophageal cancer Neg Hx       Allergy:  Review of patient's allergies indicates:   Allergen Reactions    Sulfa (sulfonamide antibiotics) Hives     Outpatient Encounter Medications as of 9/3/2024   Medication Sig Dispense Refill    carvediloL (COREG) 3.125 MG tablet Take 1 tablet (3.125 mg total) by mouth 2 (two) times daily. 180 tablet 3    esomeprazole (NEXIUM) 40 MG capsule TAKE 1 CAPSULE BY MOUTH 2 TIMES DAILY BEFORE MEALS. 180 capsule 3    levoFLOXacin (LEVAQUIN) 500 MG tablet Take 1 tablet (500 mg total) by mouth once daily. for 10 days 10 tablet prn    multivitamin (THERAGRAN) per tablet Take 1 tablet by mouth once daily.      omega-3 fatty acids/fish oil  (FISH OIL-OMEGA-3 FATTY ACIDS) 300-1,000 mg capsule Take 1 capsule by mouth once daily.      oxyCODONE (ROXICODONE) 5 MG immediate release tablet Take 1 tablet (5 mg total) by mouth every 4 (four) hours as needed for Pain. 14 tablet 0    rifAXIMin (XIFAXAN) 550 mg Tab Take 1 tablet (550 mg total) by mouth 2 (two) times daily. 60 tablet 11    tamsulosin (FLOMAX) 0.4 mg Cap Take 1 capsule (0.4 mg total) by mouth once daily. 30 capsule 0    tamsulosin (FLOMAX) 0.4 mg Cap Take 1 capsule (0.4 mg total) by mouth every evening. 30 capsule 11    UNABLE TO FIND 4 (four) times daily as needed. Medible 20 mg blue raspberry 1/4 to 1/2 up to 4 times daily as needed.       Facility-Administered Encounter Medications as of 9/3/2024   Medication Dose Route Frequency Provider Last Rate Last Admin    0.9%  NaCl infusion   Intravenous Continuous Pellegrin, Gerard GUERRA MD   Stopped at 03/30/23 0922     Review of Systems   ROS  Physical Exam   There were no vitals filed for this visit.  Physical Exam      LABS:  Lab Results   Component Value Date    CREATININE 0.9 08/30/2024    CREATININE 0.9 07/08/2024    CREATININE 0.8 06/19/2024     Urine Culture, Routine   Date Value Ref Range Status   03/24/2024 No growth  Final   03/14/2024 No growth  Final     Hemoglobin A1C   Date Value Ref Range Status   07/26/2022 5.5 4.0 - 5.6 % Final     Comment:     ADA Screening Guidelines:  5.7-6.4%  Consistent with prediabetes  >or=6.5%  Consistent with diabetes    High levels of fetal hemoglobin interfere with the HbA1C  assay. Heterozygous hemoglobin variants (HbS, HgC, etc)do  not significantly interfere with this assay.   However, presence of multiple variants may affect accuracy.       Radiology:  CT RSS 8/30/24  Obstructive uropathy by 3 mm calculus in the distal 3rd of the right ureter a few cm above the UVJ.     Stables hepatosplenomegaly.  Assessment and Plan:  Diagnoses and all orders for this visit:    Renal colic  -     Comprehensive Metabolic  Panel; Future  -     levoFLOXacin (LEVAQUIN) 500 MG tablet; Take 1 tablet (500 mg total) by mouth once daily. for 10 days    Liver cirrhosis secondary to CARTAGENA    Right ureteral stone  -     X-Ray Abdomen AP 1 View; Future    Portal hypertensive gastropathy    Ureteral stone  -     tamsulosin (FLOMAX) 0.4 mg Cap; Take 1 capsule (0.4 mg total) by mouth every evening.  -     X-Ray Abdomen AP 1 View; Future    Hydronephrosis of right kidney  -     tamsulosin (FLOMAX) 0.4 mg Cap; Take 1 capsule (0.4 mg total) by mouth every evening.    C/o severe pain with right renal colic, not well controlled with pain meds.  Unable to take NSAIDS due to liver cirrhosis.  As a matter of fact, she seems to retaining fluid as well since her kidney stone problem.  Last stone episode with obstruction also resulted in elevated bilirubin and fluid retention and ascites.  So will schedule her for urgent cysto right ureteral stent placement for obstructive right ureteral stent tomorrow as the first case.    She can start flomax and levaquin daily now.    Follow-up:  Follow up in about 1 day (around 9/4/2024), or cysto right ureteral stent placement.

## 2024-09-03 NOTE — TELEPHONE ENCOUNTER
Right ureteral stone  -     Case Request Operating Room: CYSTOSCOPY, WITH URETERAL STENT INSERTION    Renal colic  -     Case Request Operating Room: CYSTOSCOPY, WITH URETERAL STENT INSERTION    Liver cirrhosis secondary to CARTAGENA  -     Case Request Operating Room: CYSTOSCOPY, WITH URETERAL STENT INSERTION

## 2024-09-03 NOTE — TELEPHONE ENCOUNTER
----- Message from Marvin Frost sent at 9/3/2024  8:47 AM CDT -----  Contact: [phone]  RANDI DÍAZ calling regarding Patient Advice (message) Pt  is return a call from the provider asking for a call back Pt

## 2024-09-03 NOTE — H&P (VIEW-ONLY)
CC: right renal colic    Tee Aranda is a 54 y.o. woman who is here for the evaluation of No chief complaint on file.    A new pt referred by her PCP, James Samano MD   Patient has a history of nonalcoholic cirrhosis and CARTAGENA disease with esophageal varices.  Went to ER on 8/30/24 with acute right flank pian.  She was tested positive for COVID at home 8/29/24 contacted her liver doctor at [facility] who recommended IV remdesivir as inpatient as that iswhat she did last time she got COVID she will get laboratory tests here in and consult the liver specialist     The history is provided by the patient.   Illness   The current episode started today. The problem occurs rarely. The problem has been unchanged. The pain is at a severity of 10/10. Nothing relieves the symptoms. Nothing aggravates the symptoms. Associated symptoms include cough. Pertinent negatives include no eye itching, no photophobia, no congestion, no ear pain, no mouth sores, no rhinorrhea, no stridor, no swollen glands, no muscle aches and no pain.     She was discharged with pain meds after her ER visit.  She is doing an urgent visit due to right ureteral stone with renal colic  Denies fever but chills.  No dysuria or other change in urination.  C/o fluid retention with worsening ascites since her kidney stone problem.      Past Medical History:   Diagnosis Date    Anemia, unspecified     Anxiety     Arthritis     Ascites 11/23/2020    AVN (avascular necrosis of bone)     Cataract     COVID-19 vaccine administered     04/08/21, 04/30/21    Diarrhea of presumed infectious origin 7/31/2023    Edema 11/23/2020    Epigastric pain 7/31/2023    Esophageal varices     Glaucoma     Hepatic encephalopathy 11/23/2020    History of colon polyps     Hx of cirrhosis     non-alcoholic    Iron deficiency anemia secondary to blood loss (chronic)     Kidney stones     Portal hypertensive gastropathy     Unintentional weight loss 10/14/2023    Unspecified  cirrhosis of liver      Past Surgical History:   Procedure Laterality Date    APPENDECTOMY      COLONOSCOPY N/A 8/3/2023    Procedure: COLONOSCOPY;  Surgeon: Gerard Salinas MD;  Location: [facility] ENDO;  Service: Endoscopy;  Laterality: N/A;    COLONOSCOPY N/A 5/27/2024    Procedure: COLONOSCOPY;  Surgeon: Eric Garcia MD;  Location: [facility] (4TH FLR);  Service: Endoscopy;  Laterality: N/A;  Dr. Douglass, egd/colon, weight loss, variceal surveillance and abdominal pain, standard prep, cirrhosis labs ordered    COLONOSCOPY W/ POLYPECTOMY      CYSTOSCOPY N/A 3/20/2024    Procedure: CYSTOSCOPY;  Surgeon: Chris Carey MD;  Location: [facility] OR 1ST FLR;  Service: Urology;  Laterality: N/A;    CYSTOSCOPY W/ URETERAL STENT REMOVAL Right 3/14/2024    Procedure: CYSTOSCOPY, WITH URETERAL STENT REMOVAL;  Surgeon: Chris Carey MD;  Location: [facility] OR 1ST FLR;  Service: Urology;  Laterality: Right;    DISTAL CLAVICLE EXCISION Right 11/18/2021    Procedure: RIGHT SHOULDER ARTHROSCOPY, EXCISION, CLAVICLE, DISTAL; EXTENSIVE DEBRIDEMENT;  Surgeon: Isaiah Joyner MD;  Location: [facility];  Service: Orthopedics;  Laterality: Right;    ESOPHAGEAL VARICE LIGATION      ESOPHAGOGASTRODUODENOSCOPY N/A 11/10/2020    Procedure: EGD (ESOPHAGOGASTRODUODENOSCOPY);  Surgeon: Gerard Salinas MD;  Location: [facility];  Service: Endoscopy;  Laterality: N/A;    ESOPHAGOGASTRODUODENOSCOPY N/A 12/28/2020    Procedure: EGD (ESOPHAGOGASTRODUODENOSCOPY);  Surgeon: Adryan Park MD;  Location: [facility] (2ND FLR);  Service: Endoscopy;  Laterality: N/A;    ESOPHAGOGASTRODUODENOSCOPY N/A 02/15/2021    Procedure: EGD (ESOPHAGOGASTRODUODENOSCOPY);  Surgeon: Hari Greene MD;  Location: [facility] (4TH FLR);  Service: Endoscopy;  Laterality: N/A;  6 week follow-up variceal banding  Hx varices - Labs - ERW  prep ins. emialed - COVID screening on 2/12/21 [place] - ERW    ESOPHAGOGASTRODUODENOSCOPY Left 08/03/2021    Procedure: EGD  (ESOPHAGOGASTRODUODENOSCOPY);  Surgeon: Fabricio Corado MD;  Location: [facility] (4TH FLR);  Service: Gastroenterology;  Laterality: Left;  recent hematemasis. varices-labs on 7/26    COVID test at [facility] on 7/31-GT  requesting sooner    ESOPHAGOGASTRODUODENOSCOPY N/A 07/27/2022    Procedure: EGD (ESOPHAGOGASTRODUODENOSCOPY);  Surgeon: Eric Garcia MD;  Location: [facility];  Service: Endoscopy;  Laterality: N/A;    ESOPHAGOGASTRODUODENOSCOPY Left 08/29/2022    Procedure: EGD (ESOPHAGOGASTRODUODENOSCOPY);  Surgeon: Kacy Douglass MD;  Location: [facility] (2ND FLR);  Service: Endoscopy;  Laterality: Left;  Fully vaccinated/ clear liquids up to 4 hrs prior-RB  varices labs ordered-RB    ESOPHAGOGASTRODUODENOSCOPY N/A 10/05/2022    Procedure: EGD (ESOPHAGOGASTRODUODENOSCOPY);  Surgeon: Gerard Salinas MD;  Location: [facility];  Service: Endoscopy;  Laterality: N/A;    ESOPHAGOGASTRODUODENOSCOPY N/A 3/30/2023    Procedure: EGD (ESOPHAGOGASTRODUODENOSCOPY);  Surgeon: Gerard Salinas MD;  Location: [facility];  Service: Endoscopy;  Laterality: N/A;    ESOPHAGOGASTRODUODENOSCOPY N/A 8/3/2023    Procedure: EGD (ESOPHAGOGASTRODUODENOSCOPY);  Surgeon: Gerard Salinas MD;  Location: [facility];  Service: Endoscopy;  Laterality: N/A;    ESOPHAGOGASTRODUODENOSCOPY N/A 5/27/2024    Procedure: EGD (ESOPHAGOGASTRODUODENOSCOPY);  Surgeon: Eric Garcia MD;  Location: [facility] (4TH FLR);  Service: Endoscopy;  Laterality: N/A;    EXTRACTION - STONE Right 3/20/2024    Procedure: EXTRACTION - STONE;  Surgeon: Chris Carey MD;  Location: [facility] OR 1ST FLR;  Service: Urology;  Laterality: Right;    HYSTERECTOMY      KNEE SURGERY Bilateral     LASER LITHOTRIPSY Right 3/20/2024    Procedure: LITHOTRIPSY, USING LASER;  Surgeon: Chris Carey MD;  Location: [facility] OR [address] [place] [address];  Service: Urology;  Laterality: Right;    LITHOTRIPSY      REMOVAL-STENT Right 3/20/2024    Procedure: REMOVAL-STENT;  Surgeon: Chris Carey MD;   [facility];  Service: Urology;  Laterality: Right;    RETROGRADE PYELOGRAPHY Right 3/20/2024    Procedure: PYELOGRAM, RETROGRADE;  Surgeon: Chris Carey MD;  [facility];  Service: Urology;  Laterality: Right;    RHINOPLASTY TIP      SHOULDER SURGERY Right     TONSILLECTOMY      TOTAL HIP ARTHROPLASTY Right     URETERAL STENT PLACEMENT Right 3/20/2024    Procedure: INSERTION, STENT, URETER;  Surgeon: Chris Carey MD;  [facility];  Service: Urology;  Laterality: Right;    URETEROSCOPY Right 3/20/2024    Procedure: URETEROSCOPY;  Surgeon: Chris Carey MD;  Location: [facility] OR [address] [place] [address];  Service: Urology;  Laterality: Right;     Social History     Tobacco Use    Smoking status: Former     Current packs/day: 0.00     Types: Cigarettes     Quit date: 7/3/2019     Years since quittin.1    Smokeless tobacco: Never   Substance Use Topics    Alcohol use: Not Currently    Drug use: No     Family History   Problem Relation Name Age of Onset    No Known Problems Mother      Diabetes Mellitus Maternal Grandmother      Amblyopia Neg Hx      Blindness Neg Hx      Cataracts Neg Hx      Glaucoma Neg Hx      Macular degeneration Neg Hx      Retinal detachment Neg Hx      Strabismus Neg Hx      Colon cancer Neg Hx      Esophageal cancer Neg Hx       Allergy:  Review of patient's allergies indicates:   Allergen Reactions    Sulfa (sulfonamide antibiotics) Hives     Outpatient Encounter Medications as of 9/3/2024   Medication Sig Dispense Refill    carvediloL (COREG) 3.125 MG tablet Take 1 tablet (3.125 mg total) by mouth 2 (two) times daily. 180 tablet 3    esomeprazole (NEXIUM) 40 MG capsule TAKE 1 CAPSULE BY MOUTH 2 TIMES DAILY BEFORE MEALS. 180 capsule 3    levoFLOXacin (LEVAQUIN) 500 MG tablet Take 1 tablet (500 mg total) by mouth once daily. for 10 days 10 tablet prn    multivitamin (THERAGRAN) per tablet Take 1 tablet by mouth once daily.      omega-3 fatty acids/fish oil  (FISH OIL-OMEGA-3 FATTY ACIDS) 300-1,000 mg capsule Take 1 capsule by mouth once daily.      oxyCODONE (ROXICODONE) 5 MG immediate release tablet Take 1 tablet (5 mg total) by mouth every 4 (four) hours as needed for Pain. 14 tablet 0    rifAXIMin (XIFAXAN) 550 mg Tab Take 1 tablet (550 mg total) by mouth 2 (two) times daily. 60 tablet 11    tamsulosin (FLOMAX) 0.4 mg Cap Take 1 capsule (0.4 mg total) by mouth once daily. 30 capsule 0    tamsulosin (FLOMAX) 0.4 mg Cap Take 1 capsule (0.4 mg total) by mouth every evening. 30 capsule 11    UNABLE TO FIND 4 (four) times daily as needed. Medible 20 mg blue raspberry 1/4 to 1/2 up to 4 times daily as needed.       Facility-Administered Encounter Medications as of 9/3/2024   Medication Dose Route Frequency Provider Last Rate Last Admin    0.9%  NaCl infusion   Intravenous Continuous Pellegrin, Gerard GUERRA MD   Stopped at 03/30/23 0922     Review of Systems   ROS  Physical Exam   There were no vitals filed for this visit.  Physical Exam      LABS:  Lab Results   Component Value Date    CREATININE 0.9 08/30/2024    CREATININE 0.9 07/08/2024    CREATININE 0.8 06/19/2024     Urine Culture, Routine   Date Value Ref Range Status   03/24/2024 No growth  Final   03/14/2024 No growth  Final     Hemoglobin A1C   Date Value Ref Range Status   07/26/2022 5.5 4.0 - 5.6 % Final     Comment:     ADA Screening Guidelines:  5.7-6.4%  Consistent with prediabetes  >or=6.5%  Consistent with diabetes    High levels of fetal hemoglobin interfere with the HbA1C  assay. Heterozygous hemoglobin variants (HbS, HgC, etc)do  not significantly interfere with this assay.   However, presence of multiple variants may affect accuracy.       Radiology:  CT RSS 8/30/24  Obstructive uropathy by 3 mm calculus in the distal 3rd of the right ureter a few cm above the UVJ.     Stables hepatosplenomegaly.  Assessment and Plan:  Diagnoses and all orders for this visit:    Renal colic  -     Comprehensive Metabolic  Panel; Future  -     levoFLOXacin (LEVAQUIN) 500 MG tablet; Take 1 tablet (500 mg total) by mouth once daily. for 10 days    Liver cirrhosis secondary to CARTAGENA    Right ureteral stone  -     X-Ray Abdomen AP 1 View; Future    Portal hypertensive gastropathy    Ureteral stone  -     tamsulosin (FLOMAX) 0.4 mg Cap; Take 1 capsule (0.4 mg total) by mouth every evening.  -     X-Ray Abdomen AP 1 View; Future    Hydronephrosis of right kidney  -     tamsulosin (FLOMAX) 0.4 mg Cap; Take 1 capsule (0.4 mg total) by mouth every evening.    C/o severe pain with right renal colic, not well controlled with pain meds.  Unable to take NSAIDS due to liver cirrhosis.  As a matter of fact, she seems to retaining fluid as well since her kidney stone problem.  Last stone episode with obstruction also resulted in elevated bilirubin and fluid retention and ascites.  So will schedule her for urgent cysto right ureteral stent placement for obstructive right ureteral stent tomorrow as the first case.    She can start flomax and levaquin daily now.    Follow-up:  Follow up in about 1 day (around 9/4/2024), or cysto right ureteral stent placement.

## 2024-09-03 NOTE — TELEPHONE ENCOUNTER
----- Message from Rachelle Donato sent at 9/3/2024 11:51 AM CDT -----  Regarding: Same Day Appt  Contact: Lavelle ( Spouse)  CONSULT/ADVISORY    Name of Caller:  Lavelle ( Spouse)    Contact Preference:  [phone] (home)       Nature of Call:  Requesting pt be seen today or as soon as possible.  Pt was seen at ER/ [facility] for a kidney stone.  No antibiotics were given, only pain meds.  No fever but would like to be seen.  Please call.

## 2024-09-04 ENCOUNTER — ANESTHESIA (OUTPATIENT)
Dept: SURGERY | Facility: HOSPITAL | Age: 54
End: 2024-09-04
Payer: COMMERCIAL

## 2024-09-04 ENCOUNTER — ANESTHESIA EVENT (OUTPATIENT)
Dept: SURGERY | Facility: HOSPITAL | Age: 54
End: 2024-09-04
Payer: COMMERCIAL

## 2024-09-04 ENCOUNTER — HOSPITAL ENCOUNTER (OUTPATIENT)
Facility: HOSPITAL | Age: 54
Discharge: HOME | End: 2024-09-04
Attending: UROLOGY | Admitting: UROLOGY
Payer: COMMERCIAL

## 2024-09-04 VITALS
HEART RATE: 52 BPM | WEIGHT: 138 LBS | BODY MASS INDEX: 20.98 KG/M2 | OXYGEN SATURATION: 98 % | RESPIRATION RATE: 20 BRPM | DIASTOLIC BLOOD PRESSURE: 52 MMHG | SYSTOLIC BLOOD PRESSURE: 101 MMHG | TEMPERATURE: 98 F

## 2024-09-04 DIAGNOSIS — N20.0 NEPHROLITHIASIS: Primary | ICD-10-CM

## 2024-09-04 DIAGNOSIS — T83.84XA PAIN DUE TO URETERAL STENT, INITIAL ENCOUNTER: Primary | ICD-10-CM

## 2024-09-04 PROCEDURE — 63600175 PHARM REV CODE 636 W HCPCS: Performed by: STUDENT IN AN ORGANIZED HEALTH CARE EDUCATION/TRAINING PROGRAM

## 2024-09-04 PROCEDURE — 37000008 HC ANESTHESIA 1ST 15 MINUTES: Performed by: UROLOGY

## 2024-09-04 PROCEDURE — C1758 CATHETER, URETERAL: HCPCS | Performed by: UROLOGY

## 2024-09-04 PROCEDURE — 36000706: Performed by: UROLOGY

## 2024-09-04 PROCEDURE — C2617 STENT, NON-COR, TEM W/O DEL: HCPCS | Performed by: UROLOGY

## 2024-09-04 PROCEDURE — 52332 CYSTOSCOPY AND TREATMENT: CPT | Mod: RT,,, | Performed by: UROLOGY

## 2024-09-04 PROCEDURE — 71000016 HC POSTOP RECOV ADDL HR: Performed by: UROLOGY

## 2024-09-04 PROCEDURE — 25000003 PHARM REV CODE 250

## 2024-09-04 PROCEDURE — 36000707: Performed by: UROLOGY

## 2024-09-04 PROCEDURE — C1769 GUIDE WIRE: HCPCS | Performed by: UROLOGY

## 2024-09-04 PROCEDURE — 71000044 HC DOSC ROUTINE RECOVERY FIRST HOUR: Performed by: UROLOGY

## 2024-09-04 PROCEDURE — 25000003 PHARM REV CODE 250: Performed by: NURSE ANESTHETIST, CERTIFIED REGISTERED

## 2024-09-04 PROCEDURE — 63600175 PHARM REV CODE 636 W HCPCS: Performed by: NURSE ANESTHETIST, CERTIFIED REGISTERED

## 2024-09-04 PROCEDURE — 71000015 HC POSTOP RECOV 1ST HR: Performed by: UROLOGY

## 2024-09-04 PROCEDURE — 37000009 HC ANESTHESIA EA ADD 15 MINS: Performed by: UROLOGY

## 2024-09-04 PROCEDURE — 63600175 PHARM REV CODE 636 W HCPCS

## 2024-09-04 PROCEDURE — 87086 URINE CULTURE/COLONY COUNT: CPT | Performed by: UROLOGY

## 2024-09-04 DEVICE — STENT URETERAL UNIV 6FR 26CM
Type: IMPLANTABLE DEVICE | Site: URETER | Status: NON-FUNCTIONAL
Removed: 2024-09-13

## 2024-09-04 RX ORDER — OXYCODONE AND ACETAMINOPHEN 5; 325 MG/1; MG/1
1 TABLET ORAL EVERY 8 HOURS PRN
Qty: 10 TABLET | Refills: 0 | Status: SHIPPED | OUTPATIENT
Start: 2024-09-04 | End: 2024-09-07

## 2024-09-04 RX ORDER — AMOXICILLIN AND CLAVULANATE POTASSIUM 875; 125 MG/1; MG/1
1 TABLET, FILM COATED ORAL EVERY 12 HOURS
Qty: 14 TABLET | Refills: 0 | Status: ON HOLD | OUTPATIENT
Start: 2024-09-04 | End: 2024-09-13 | Stop reason: HOSPADM

## 2024-09-04 RX ORDER — OXYBUTYNIN CHLORIDE 5 MG/1
5 TABLET ORAL 3 TIMES DAILY PRN
Qty: 21 TABLET | Refills: 0 | Status: ON HOLD | OUTPATIENT
Start: 2024-09-04 | End: 2024-09-13 | Stop reason: HOSPADM

## 2024-09-04 RX ORDER — FENTANYL CITRATE 50 UG/ML
25 INJECTION, SOLUTION INTRAMUSCULAR; INTRAVENOUS EVERY 5 MIN PRN
Status: COMPLETED | OUTPATIENT
Start: 2024-09-04 | End: 2024-09-04

## 2024-09-04 RX ORDER — LIDOCAINE HYDROCHLORIDE 20 MG/ML
INJECTION INTRAVENOUS
Status: DISCONTINUED | OUTPATIENT
Start: 2024-09-04 | End: 2024-09-04

## 2024-09-04 RX ORDER — DEXMEDETOMIDINE HYDROCHLORIDE 100 UG/ML
INJECTION, SOLUTION INTRAVENOUS
Status: DISCONTINUED | OUTPATIENT
Start: 2024-09-04 | End: 2024-09-04

## 2024-09-04 RX ORDER — OXYBUTYNIN CHLORIDE 5 MG/1
5 TABLET ORAL ONCE
Status: COMPLETED | OUTPATIENT
Start: 2024-09-04 | End: 2024-09-04

## 2024-09-04 RX ORDER — MIDAZOLAM HYDROCHLORIDE 5 MG/ML
INJECTION INTRAMUSCULAR; INTRAVENOUS
Status: DISCONTINUED | OUTPATIENT
Start: 2024-09-04 | End: 2024-09-04

## 2024-09-04 RX ORDER — OXYCODONE HYDROCHLORIDE 5 MG/1
5 TABLET ORAL ONCE AS NEEDED
Status: DISCONTINUED | OUTPATIENT
Start: 2024-09-04 | End: 2024-09-04

## 2024-09-04 RX ORDER — HALOPERIDOL 5 MG/ML
0.5 INJECTION INTRAMUSCULAR EVERY 10 MIN PRN
Status: DISCONTINUED | OUTPATIENT
Start: 2024-09-04 | End: 2024-09-04 | Stop reason: HOSPADM

## 2024-09-04 RX ORDER — PHENAZOPYRIDINE HYDROCHLORIDE 200 MG/1
200 TABLET, FILM COATED ORAL 3 TIMES DAILY PRN
Qty: 30 TABLET | Refills: 1 | Status: SHIPPED | OUTPATIENT
Start: 2024-09-04 | End: 2024-09-14

## 2024-09-04 RX ORDER — GLUCAGON 1 MG
1 KIT INJECTION
Status: DISCONTINUED | OUTPATIENT
Start: 2024-09-04 | End: 2024-09-04 | Stop reason: HOSPADM

## 2024-09-04 RX ORDER — OXYCODONE AND ACETAMINOPHEN 10; 325 MG/1; MG/1
1 TABLET ORAL ONCE
Status: COMPLETED | OUTPATIENT
Start: 2024-09-04 | End: 2024-09-04

## 2024-09-04 RX ORDER — KETOROLAC TROMETHAMINE 30 MG/ML
30 INJECTION, SOLUTION INTRAMUSCULAR; INTRAVENOUS ONCE
Status: DISCONTINUED | OUTPATIENT
Start: 2024-09-04 | End: 2024-09-04

## 2024-09-04 RX ORDER — PROPOFOL 10 MG/ML
VIAL (ML) INTRAVENOUS CONTINUOUS PRN
Status: DISCONTINUED | OUTPATIENT
Start: 2024-09-04 | End: 2024-09-04

## 2024-09-04 RX ORDER — FENTANYL CITRATE 50 UG/ML
INJECTION, SOLUTION INTRAMUSCULAR; INTRAVENOUS
Status: DISCONTINUED | OUTPATIENT
Start: 2024-09-04 | End: 2024-09-04

## 2024-09-04 RX ADMIN — FENTANYL CITRATE 25 MCG: 50 INJECTION, SOLUTION INTRAMUSCULAR; INTRAVENOUS at 09:09

## 2024-09-04 RX ADMIN — PROPOFOL 200 MCG/KG/MIN: 10 INJECTION, EMULSION INTRAVENOUS at 07:09

## 2024-09-04 RX ADMIN — SODIUM CHLORIDE: 0.9 INJECTION, SOLUTION INTRAVENOUS at 07:09

## 2024-09-04 RX ADMIN — OXYBUTYNIN CHLORIDE 5 MG: 5 TABLET ORAL at 09:09

## 2024-09-04 RX ADMIN — LIDOCAINE HYDROCHLORIDE 100 MG: 20 INJECTION INTRAVENOUS at 07:09

## 2024-09-04 RX ADMIN — OXYCODONE AND ACETAMINOPHEN 1 TABLET: 10; 325 TABLET ORAL at 09:09

## 2024-09-04 RX ADMIN — DEXMEDETOMIDINE 8 MCG: 100 INJECTION, SOLUTION, CONCENTRATE INTRAVENOUS at 08:09

## 2024-09-04 RX ADMIN — CEFAZOLIN 2 G: 2 INJECTION, POWDER, FOR SOLUTION INTRAMUSCULAR; INTRAVENOUS at 07:09

## 2024-09-04 RX ADMIN — MIDAZOLAM HYDROCHLORIDE 2 MG: 5 INJECTION, SOLUTION INTRAMUSCULAR; INTRAVENOUS at 08:09

## 2024-09-04 RX ADMIN — FENTANYL CITRATE 100 MCG: 50 INJECTION, SOLUTION INTRAMUSCULAR; INTRAVENOUS at 08:09

## 2024-09-04 NOTE — PROGRESS NOTES
Pain due to ureteral stent, initial encounter  -     oxyCODONE-acetaminophen (PERCOCET) 5-325 mg per tablet; Take 1 tablet by mouth every 8 (eight) hours as needed for Pain.  Dispense: 10 tablet; Refill: 0  -     phenazopyridine (PYRIDIUM) 200 MG tablet; Take 1 tablet (200 mg total) by mouth 3 (three) times daily as needed for Pain.  Dispense: 30 tablet; Refill: 1

## 2024-09-04 NOTE — INTERVAL H&P NOTE
The patient has been examined and the H&P has been reviewed:    I concur with the findings and no changes have occurred since H&P was written.    Surgery risks, benefits and alternative options discussed and understood by patient/family.    OR today for R ureteral stent.  Urine dip negative for all components.        There are no hospital problems to display for this patient.

## 2024-09-04 NOTE — ANESTHESIA PREPROCEDURE EVALUATION
Pre-operative evaluation for Procedure(s) (LRB):  CYSTOSCOPY, WITH URETERAL STENT INSERTION (Right)    Tee Aranda is a 54 y.o. female     Cartagena cirrhosis  Varices, mild  No serious cardiopulmonary diagnosis  Appr npo  Tolerated both MACs and GETA well in past      ECHO (if on file):  No results found for this or any previous visit.      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    Portal hypertensive gastropathy    Esophageal varices    Arthralgia of right acromioclavicular joint    Tear of right glenoid labrum    Nontraumatic incomplete tear of right rotator cuff    Diarrhea of presumed infectious origin    Epigastric pain    COVID-19    Thrombocytopenia    Unintentional weight loss    Urolithiasis    Acute cystitis with hematuria    Right ureteral stone    Portal hypertension    Right sided abdominal pain       Review of patient's allergies indicates:   Allergen Reactions    Sulfa (sulfonamide antibiotics) Hives       Current Facility-Administered Medications on File Prior to Encounter   Medication Dose Route Frequency Provider Last Rate Last Admin    0.9%  NaCl infusion   Intravenous Continuous Pellegrin, Gerard GUERRA MD   Stopped at 03/30/23 0922     Current Outpatient Medications on File Prior to Encounter   Medication Sig Dispense Refill    carvediloL (COREG) 3.125 MG tablet Take 1 tablet (3.125 mg total) by mouth 2 (two) times daily. 180 tablet 3    esomeprazole (NEXIUM) 40 MG capsule TAKE 1 CAPSULE BY MOUTH 2 TIMES DAILY BEFORE MEALS. 180 capsule 3    levoFLOXacin (LEVAQUIN) 500 MG tablet Take 1 tablet (500 mg total) by mouth once daily. for 10 days 10 tablet prn    multivitamin (THERAGRAN) per tablet Take 1 tablet by mouth once daily.      omega-3 fatty acids/fish oil (FISH OIL-OMEGA-3 FATTY ACIDS) 300-1,000 mg capsule Take 1 capsule by mouth once daily.      rifAXIMin (XIFAXAN) 550 mg Tab Take 1 tablet (550 mg  total) by mouth 2 (two) times daily. 60 tablet 11    oxyCODONE (ROXICODONE) 5 MG immediate release tablet Take 1 tablet (5 mg total) by mouth every 4 (four) hours as needed for Pain. 14 tablet 0    tamsulosin (FLOMAX) 0.4 mg Cap Take 1 capsule (0.4 mg total) by mouth once daily. 30 capsule 0    tamsulosin (FLOMAX) 0.4 mg Cap Take 1 capsule (0.4 mg total) by mouth every evening. 30 capsule 11    UNABLE TO FIND 4 (four) times daily as needed. Medible 20 mg blue raspberry 1/4 to 1/2 up to 4 times daily as needed.         Past Surgical History:   Procedure Laterality Date    APPENDECTOMY      COLONOSCOPY N/A 8/3/2023    Procedure: COLONOSCOPY;  Surgeon: Gerard Salinas MD;  Location: [facility];  Service: Endoscopy;  Laterality: N/A;    COLONOSCOPY N/A 5/27/2024    Procedure: COLONOSCOPY;  Surgeon: Eric Garcia MD;  [facility]  Service: Endoscopy;  Laterality: N/A;  Dr. Douglass, egd/colon, weight loss, variceal surveillance and abdominal pain, standard prep, cirrhosis labs ordered    COLONOSCOPY W/ POLYPECTOMY      CYSTOSCOPY N/A 3/20/2024    Procedure: CYSTOSCOPY;  Surgeon: Chris Carey MD;  [address];  Service: Urology;  Laterality: N/A;    CYSTOSCOPY W/ URETERAL STENT REMOVAL Right 3/14/2024    Procedure: CYSTOSCOPY, WITH URETERAL STENT REMOVAL;  Surgeon: Chris Carey MD;  [address];  Service: Urology;  Laterality: Right;    DISTAL CLAVICLE EXCISION Right 11/18/2021    Procedure: RIGHT SHOULDER ARTHROSCOPY, EXCISION, CLAVICLE, DISTAL; EXTENSIVE DEBRIDEMENT;  Surgeon: Isaiah Joyner MD;  Location: [facility];  Service: Orthopedics;  Laterality: Right;    ESOPHAGEAL VARICE LIGATION      ESOPHAGOGASTRODUODENOSCOPY N/A 11/10/2020    Procedure: EGD (ESOPHAGOGASTRODUODENOSCOPY);  Surgeon: Gerard Salinas MD;  Location: [facility];  Service: Endoscopy;  Laterality: N/A;    ESOPHAGOGASTRODUODENOSCOPY N/A 12/28/2020    Procedure: EGD  (ESOPHAGOGASTRODUODENOSCOPY);  Surgeon: Adryan Park MD;  Location: [facility] (2ND FLR);  Service: Endoscopy;  Laterality: N/A;    ESOPHAGOGASTRODUODENOSCOPY N/A 02/15/2021    Procedure: EGD (ESOPHAGOGASTRODUODENOSCOPY);  Surgeon: Hari Greene MD;  Location: [facility] (4TH FLR);  Service: Endoscopy;  Laterality: N/A;  6 week follow-up variceal banding  Hx varices - Labs - ERW  prep ins. emialed - COVID screening on 2/12/21 [place] - [facility]    ESOPHAGOGASTRODUODENOSCOPY Left 08/03/2021    Procedure: EGD (ESOPHAGOGASTRODUODENOSCOPY);  Surgeon: Fabricio Corado MD;  Location: [facility] (4TH FLR);  Service: Gastroenterology;  Laterality: Left;  recent hematemasis. varices-labs on 7/26    COVID test at [facility] on 7/31-GT  requesting sooner    ESOPHAGOGASTRODUODENOSCOPY N/A 07/27/2022    Procedure: EGD (ESOPHAGOGASTRODUODENOSCOPY);  Surgeon: Eric Garcia MD;  Location: [facility];  Service: Endoscopy;  Laterality: N/A;    ESOPHAGOGASTRODUODENOSCOPY Left 08/29/2022    Procedure: EGD (ESOPHAGOGASTRODUODENOSCOPY);  Surgeon: Kacy Douglass MD;  Location: [facility] (2ND FLR);  Service: Endoscopy;  Laterality: Left;  Fully vaccinated/ clear liquids up to 4 hrs prior-RB  varices labs ordered-RB    ESOPHAGOGASTRODUODENOSCOPY N/A 10/05/2022    Procedure: EGD (ESOPHAGOGASTRODUODENOSCOPY);  Surgeon: Gerard Salinas MD;  Location: [facility];  Service: Endoscopy;  Laterality: N/A;    ESOPHAGOGASTRODUODENOSCOPY N/A 3/30/2023    Procedure: EGD (ESOPHAGOGASTRODUODENOSCOPY);  Surgeon: Gerard Salinas MD;  Location: [facility];  Service: Endoscopy;  Laterality: N/A;    ESOPHAGOGASTRODUODENOSCOPY N/A 8/3/2023    Procedure: EGD (ESOPHAGOGASTRODUODENOSCOPY);  Surgeon: Gerard Salinas MD;  Location: [facility];  Service: Endoscopy;  Laterality: N/A;    ESOPHAGOGASTRODUODENOSCOPY N/A 5/27/2024    Procedure: EGD (ESOPHAGOGASTRODUODENOSCOPY);  Surgeon: Eric Garcia MD;  Location: [facility] ([address] [place] [address]);  Service: Endoscopy;   Laterality: N/A;    EXTRACTION - STONE Right 3/20/2024    Procedure: EXTRACTION - STONE;  Surgeon: Chris Carey MD;  Location: [facility] OR 1ST FLR;  Service: Urology;  Laterality: Right;    HYSTERECTOMY      KNEE SURGERY Bilateral     LASER LITHOTRIPSY Right 3/20/2024    Procedure: LITHOTRIPSY, USING LASER;  Surgeon: Chris Carey MD;  Location: [facility] OR 1ST FLR;  Service: Urology;  Laterality: Right;    LITHOTRIPSY      REMOVAL-STENT Right 3/20/2024    Procedure: REMOVAL-STENT;  Surgeon: Chris Carey MD;  Location: [facility] OR 1ST FLR;  Service: Urology;  Laterality: Right;    RETROGRADE PYELOGRAPHY Right 3/20/2024    Procedure: PYELOGRAM, RETROGRADE;  Surgeon: Chris Carey MD;  [facility];  Service: Urology;  Laterality: Right;    RHINOPLASTY TIP      SHOULDER SURGERY Right     TONSILLECTOMY      TOTAL HIP ARTHROPLASTY Right     URETERAL STENT PLACEMENT Right 3/20/2024    Procedure: INSERTION, STENT, URETER;  Surgeon: Chris Carey MD;  [facility];  Service: Urology;  Laterality: Right;    URETEROSCOPY Right 3/20/2024    Procedure: URETEROSCOPY;  Surgeon: Chris Carey MD;  [facility];  Service: Urology;  Laterality: Right;       Social History     Socioeconomic History    Marital status:    Tobacco Use    Smoking status: Former     Current packs/day: 0.00     Types: Cigarettes     Quit date: 7/3/2019     Years since quittin.1    Smokeless tobacco: Never   Substance and Sexual Activity    Alcohol use: Not Currently    Drug use: No     Social Determinants of Health     Financial Resource Strain: Patient Declined (2024)    Overall Financial Resource Strain (CARDIA)     Difficulty of Paying Living Expenses: Patient declined   Food Insecurity: Patient Declined (2024)    Hunger Vital Sign     Worried About Running Out of Food in the Last Year: Patient declined     Ran Out of Food in the Last Year: Patient declined   Transportation Needs: No  "Transportation Needs (2024)    PRAPARE - Transportation     Lack of Transportation (Medical): No     Lack of Transportation (Non-Medical): No   Physical Activity: Sufficiently Active (2024)    Exercise Vital Sign     Days of Exercise per Week: 3 days     Minutes of Exercise per Session: 60 min   Stress: Stress Concern Present (2024)    [facility] - Occupational Stress Questionnaire     Feeling of Stress : Very much   Housing Stability: Unknown (2024)    Housing Stability Vital Sign     Unable to Pay for Housing in the Last Year: Patient declined     Homeless in the Last Year: No         CBC: No results for input(s): "WBC", "RBC", "HGB", "HCT", "PLT", "MCV", "MCH", "MCHC" in the last 72 hours.    CMP:   Recent Labs     24  1419      K 3.9      CO2 23   BUN 19   CREATININE 0.8   *   CALCIUM 9.2   ALBUMIN 3.9   PROT 6.5   ALKPHOS 107   ALT 34   AST 30   BILITOT 0.6       INR  No results for input(s): "PT", "INR", "PROTIME", "APTT" in the last 72 hours.        Diagnostic Studies:      EKD Echo:  No results found for this or any previous visit.        Pre-op Assessment    I have reviewed the Patient Summary Reports.     I have reviewed the Nursing Notes. I have reviewed the NPO Status.   I have reviewed the Medications.     Review of Systems  Anesthesia Hx:  No problems with previous Anesthesia   History of prior surgery of interest to airway management or planning:            Denies Personal Hx of Anesthesia complications.                    Hematology/Oncology:       -- Denies Anemia:                                  EENT/Dental:  EENT/Dental Normal           Cardiovascular:     Denies Pacemaker.    Denies MI.  Denies CAD.       Denies Angina.  Denies CHF.  Denies Orthopnea.  denies PVD                              Pulmonary:        Denies Recent URI.  Denies Sleep Apnea.                Renal/:  Chronic Renal Disease        Kidney " Function/Disease             Hepatic/GI:      Liver Disease, Hepatitis Blackman cirrhosis  Hx varices, some banded, mild now       Liver Disease, Hepatitis        Neurological:  Neurology Normal                                      Endocrine:  Endocrine Normal            Psych:  Psychiatric Normal                    Physical Exam  General: Well nourished, Cooperative, Alert and Oriented    Airway:  Mallampati: II / II  Mouth Opening: Normal  TM Distance: Normal  Tongue: Normal  Neck ROM: Normal ROM    Dental:  Intact    Chest/Lungs:  Normal Respiratory Rate    Heart:  Rate: Normal        Anesthesia Plan  Type of Anesthesia, risks & benefits discussed:    Anesthesia Type: Gen ETT, Gen Supraglottic Airway, Gen Natural Airway, MAC  Intra-op Monitoring Plan: Standard ASA Monitors  Post Op Pain Control Plan: multimodal analgesia  Induction:  IV  Airway Plan: Video, Post-Induction  Informed Consent: Informed consent signed with the Patient and all parties understand the risks and agree with anesthesia plan.  All questions answered.   ASA Score: 3  Day of Surgery Review of History & Physical: H&P Update referred to the surgeon/provider.    Ready For Surgery From Anesthesia Perspective.     .

## 2024-09-04 NOTE — TRANSFER OF CARE
Anesthesia Transfer of Care Note    Patient: Tee Aranda    Procedure(s) Performed: Procedure(s) (LRB):  INSERTION, STENT, URETER (Right)  CYSTOSCOPY    Patient location: PACU    Anesthesia Type: general    Transport from OR: Transported from OR on 2-3 L/min O2 by NC with adequate spontaneous ventilation    Post pain: adequate analgesia    Post assessment: no apparent anesthetic complications    Post vital signs: stable    Level of consciousness: sedated    Nausea/Vomiting: no nausea/vomiting    Complications: none    Transfer of care protocol was followed      Last vitals: Visit Vitals  BP (!) 110/58 (BP Location: Left arm, Patient Position: Lying)   Pulse 70   Temp 36.6 °C (97.9 °F) (Temporal)   Resp 16   Wt 62.6 kg (138 lb)   SpO2 97%   Breastfeeding No   BMI 20.98 kg/m²

## 2024-09-04 NOTE — DISCHARGE INSTRUCTIONS
Post Cystoscopy Instructions  Do not strain to have a bowel movement  No strenuous exercise x 7 days    You can expect:  Have pain when you void from your stent if you have a stent in place  See blood in your urine if you have a stent in place      Call the doctor if:  Temperature is greater than 101F  Persistent vomiting and inability to keep food down  Inability to void if you do not have a catheter

## 2024-09-04 NOTE — BRIEF OP NOTE
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    DISPOSITION: Home or Self Care    FINAL DIAGNOSIS:  nephrolithiasis     FOLLOWUP: In clinic    DISCHARGE INSTRUCTIONS:    Discharge Procedure Orders   CT Renal Stone Study ABD Pelvis WO   Standing Status: Future Standing Exp. Date: 09/04/25     Order Specific Question Answer Comments   May the Radiologist modify the order per protocol to meet the clinical needs of the patient? Yes

## 2024-09-04 NOTE — ANESTHESIA POSTPROCEDURE EVALUATION
Anesthesia Post Evaluation    Patient: Tee Aranda    Procedure(s) Performed: Procedure(s) (LRB):  INSERTION, STENT, URETER (Right)  CYSTOSCOPY    Final Anesthesia Type: general      Patient location during evaluation: [facility]  Patient participation: Yes- Able to Participate  Level of consciousness: awake and alert and oriented  Post-procedure vital signs: reviewed and stable  Pain management: adequate  Airway patency: patent  ASTON mitigation strategies: Multimodal analgesia  PONV status at discharge: No PONV  Anesthetic complications: no      Cardiovascular status: blood pressure returned to baseline and hemodynamically stable  Respiratory status: unassisted, spontaneous ventilation and room air  Hydration status: euvolemic  Follow-up not needed.              Vitals Value Taken Time   /52 09/04/24 1001   Temp 36.7 °C (98.1 °F) 09/04/24 1000   Pulse 63 09/04/24 1002   Resp 28 09/04/24 1001   SpO2 100 % 09/04/24 1002   Vitals shown include unfiled device data.      No case tracking events are documented in the log.      Pain/Dio Score: Pain Rating Prior to Med Admin: 7 (9/4/2024  9:17 AM)  Pain Rating Post Med Admin: 1 (9/4/2024 10:00 AM)  Dio Score: 9 (9/4/2024  8:30 AM)

## 2024-09-04 NOTE — OP NOTE
[facility]  Operative Note    Date: 09/04/2024    Pre-Op Diagnosis: Right ureteral stone    Post-Op Diagnosis: same    Procedure(s) Performed:    1. Cystoscopy with right ureteral JJ stent placement  2. Fluoroscopy < 1 h    Specimen(s): urine sent for culture    Staff Surgeon: Chris Carey MD    Assistant Surgeon: Chasidy Man MD    Anesthesia: Monitored Local Anesthesia with Sedation    Indications: Tee Aranda is a 54 y.o. female with right ureteral stone.    Findings:  Mild pyonephrosis on stent placement.  R ureteral stent placed in standard fashion.      Estimated Blood Loss: min    Drains:  6 Guyanese x 26 cm right JJ ureteral stent without strings    Procedure in Detail:  After risks, benefits and possible complications of the procedure were explained, the patient elected to undergo the procedure and informed consent was obtained. All questions were answered in the pankaj-operative area. The patient was transferred to the cystoscopy suite and placed on the fluoroscopy table in the supine position.  SCDs were applied and working. Time out was performed, pankaj-procedural antibiotics were given. Anesthesia was administered.  After adequate anesthesia the patient was placed in dorsal lithotomy position and prepped and draped in the usual sterile fashion.     A rigid cystoscope in a 22 Fr sheath was introduced into the patients bladder per urethra. This passed easily.  The entire urethra was visualized and revealed no strictures or masses.  Cystoscopy was performed which showed the right and left ureteral orifices in the normal anatomic position.  There were no bladder tumors, no  trabeculations, and no stones.      Our attention was turned to the patient's right ureteral orifice.  A motion wire was advanced up the right ureteral orifice to the level of the expected renal pelvis.  This was confirmed using fluoroscopy.     We then passed a 6 Fr x 26 cm JJ ureteral stent without strings over the wire  to the level of the renal pelvis under direct vision as well as flouroscopy. The guide wire was removed.  A 180 degree coil was observed in the renal pelvis as well as the bladder using fluoroscopy.  A 180 degree coil was also seen using direct visualization in the bladder.  Urine from the R UO was taken for culture.     The patient tolerated the procedure well and was transferred to the recovery room in stable condition.      Disposition: The patient will be discharged home and f/u in 1 week for CTRSS.      Chasidy Man MD

## 2024-09-05 ENCOUNTER — TELEPHONE (OUTPATIENT)
Dept: UROLOGY | Facility: CLINIC | Age: 54
End: 2024-09-05
Payer: COMMERCIAL

## 2024-09-05 LAB — BACTERIA UR CULT: NO GROWTH

## 2024-09-05 NOTE — TELEPHONE ENCOUNTER
S/p right ureteral stent placement yesterday on 9/4/24.  No stone seen on x-ray thought CT showed obstructive distal right ureteral stone.    I know that she is scheduled to see me on 9/19.  I would like to see her sooner with CT RSS if possible. ( Dr. Man placed CT RSS)  She had a CT RSS showed 3 mm stone in the right distal ureter.

## 2024-09-12 ENCOUNTER — TELEPHONE (OUTPATIENT)
Dept: UROLOGY | Facility: CLINIC | Age: 54
End: 2024-09-12
Payer: COMMERCIAL

## 2024-09-12 ENCOUNTER — HOSPITAL ENCOUNTER (OUTPATIENT)
Dept: RADIOLOGY | Facility: HOSPITAL | Age: 54
Discharge: HOME | End: 2024-09-12
Attending: UROLOGY
Payer: COMMERCIAL

## 2024-09-12 ENCOUNTER — HOSPITAL ENCOUNTER (INPATIENT)
Facility: HOSPITAL | Age: 54
LOS: 1 days | Discharge: HOME | DRG: 699 | End: 2024-09-13
Attending: STUDENT IN AN ORGANIZED HEALTH CARE EDUCATION/TRAINING PROGRAM | Admitting: STUDENT IN AN ORGANIZED HEALTH CARE EDUCATION/TRAINING PROGRAM
Payer: COMMERCIAL

## 2024-09-12 ENCOUNTER — OFFICE VISIT (OUTPATIENT)
Dept: UROLOGY | Facility: CLINIC | Age: 54
End: 2024-09-12
Payer: COMMERCIAL

## 2024-09-12 ENCOUNTER — TELEPHONE (OUTPATIENT)
Dept: UROLOGY | Facility: CLINIC | Age: 54
End: 2024-09-12

## 2024-09-12 ENCOUNTER — ANESTHESIA EVENT (OUTPATIENT)
Dept: SURGERY | Facility: HOSPITAL | Age: 54
End: 2024-09-12
Payer: COMMERCIAL

## 2024-09-12 ENCOUNTER — HOSPITAL ENCOUNTER (OUTPATIENT)
Dept: RADIOLOGY | Facility: HOSPITAL | Age: 54
Discharge: HOME | End: 2024-09-12
Payer: COMMERCIAL

## 2024-09-12 VITALS
SYSTOLIC BLOOD PRESSURE: 119 MMHG | BODY MASS INDEX: 20.49 KG/M2 | DIASTOLIC BLOOD PRESSURE: 71 MMHG | HEIGHT: 68 IN | WEIGHT: 135.19 LBS | HEART RATE: 72 BPM

## 2024-09-12 DIAGNOSIS — N20.1 RIGHT URETERAL STONE: Primary | ICD-10-CM

## 2024-09-12 DIAGNOSIS — N13.30 HYDRONEPHROSIS, RIGHT: ICD-10-CM

## 2024-09-12 DIAGNOSIS — N20.0 NEPHROLITHIASIS: ICD-10-CM

## 2024-09-12 DIAGNOSIS — N20.1 URETERAL STONE: ICD-10-CM

## 2024-09-12 DIAGNOSIS — N20.1 RIGHT URETERAL STONE: ICD-10-CM

## 2024-09-12 DIAGNOSIS — R19.7 DIARRHEA OF PRESUMED INFECTIOUS ORIGIN: ICD-10-CM

## 2024-09-12 DIAGNOSIS — R07.9 CHEST PAIN: ICD-10-CM

## 2024-09-12 DIAGNOSIS — Z96.0 URETERAL STENT RETAINED: ICD-10-CM

## 2024-09-12 DIAGNOSIS — K75.81 LIVER CIRRHOSIS SECONDARY TO NASH: Chronic | ICD-10-CM

## 2024-09-12 DIAGNOSIS — K74.60 LIVER CIRRHOSIS SECONDARY TO NASH: Chronic | ICD-10-CM

## 2024-09-12 DIAGNOSIS — R19.7 DIARRHEA, UNSPECIFIED TYPE: ICD-10-CM

## 2024-09-12 LAB
ALBUMIN SERPL BCP-MCNC: 4.1 G/DL (ref 3.5–5.2)
ALP SERPL-CCNC: 102 U/L (ref 55–135)
ALT SERPL W/O P-5'-P-CCNC: 28 U/L (ref 10–44)
ANION GAP SERPL CALC-SCNC: 9 MMOL/L (ref 8–16)
AST SERPL-CCNC: 25 U/L (ref 10–40)
BACTERIA #/AREA URNS AUTO: ABNORMAL /HPF
BASOPHILS # BLD AUTO: 0.02 K/UL (ref 0–0.2)
BASOPHILS NFR BLD: 0.4 % (ref 0–1.9)
BILIRUB SERPL-MCNC: 0.8 MG/DL (ref 0.1–1)
BILIRUB UR QL STRIP: NEGATIVE
BUN SERPL-MCNC: 19 MG/DL (ref 6–20)
CALCIUM SERPL-MCNC: 9.8 MG/DL (ref 8.7–10.5)
CHLORIDE SERPL-SCNC: 106 MMOL/L (ref 95–110)
CLARITY UR REFRACT.AUTO: ABNORMAL
CO2 SERPL-SCNC: 24 MMOL/L (ref 23–29)
COLOR UR AUTO: ABNORMAL
CREAT SERPL-MCNC: 0.8 MG/DL (ref 0.5–1.4)
DIFFERENTIAL METHOD BLD: ABNORMAL
EOSINOPHIL # BLD AUTO: 0.1 K/UL (ref 0–0.5)
EOSINOPHIL NFR BLD: 1.4 % (ref 0–8)
ERYTHROCYTE [DISTWIDTH] IN BLOOD BY AUTOMATED COUNT: 13.1 % (ref 11.5–14.5)
EST. GFR  (NO RACE VARIABLE): >60 ML/MIN/1.73 M^2
GLUCOSE SERPL-MCNC: 118 MG/DL (ref 70–110)
GLUCOSE UR QL STRIP: NEGATIVE
HCT VFR BLD AUTO: 38.6 % (ref 37–48.5)
HGB BLD-MCNC: 12.7 G/DL (ref 12–16)
HGB UR QL STRIP: ABNORMAL
HYALINE CASTS UR QL AUTO: 0 /LPF
IMM GRANULOCYTES # BLD AUTO: 0.02 K/UL (ref 0–0.04)
IMM GRANULOCYTES NFR BLD AUTO: 0.4 % (ref 0–0.5)
KETONES UR QL STRIP: NEGATIVE
LACTATE SERPL-SCNC: 1.3 MMOL/L (ref 0.5–2.2)
LEUKOCYTE ESTERASE UR QL STRIP: ABNORMAL
LYMPHOCYTES # BLD AUTO: 0.6 K/UL (ref 1–4.8)
LYMPHOCYTES NFR BLD: 12.6 % (ref 18–48)
MCH RBC QN AUTO: 29 PG (ref 27–31)
MCHC RBC AUTO-ENTMCNC: 32.9 G/DL (ref 32–36)
MCV RBC AUTO: 88 FL (ref 82–98)
MICROSCOPIC COMMENT: ABNORMAL
MONOCYTES # BLD AUTO: 0.3 K/UL (ref 0.3–1)
MONOCYTES NFR BLD: 5.9 % (ref 4–15)
NEUTROPHILS # BLD AUTO: 4 K/UL (ref 1.8–7.7)
NEUTROPHILS NFR BLD: 79.3 % (ref 38–73)
NITRITE UR QL STRIP: NEGATIVE
NRBC BLD-RTO: 0 /100 WBC
PH UR STRIP: 7 [PH] (ref 5–8)
PLATELET # BLD AUTO: 78 K/UL (ref 150–450)
PMV BLD AUTO: 11.1 FL (ref 9.2–12.9)
POTASSIUM SERPL-SCNC: 3.9 MMOL/L (ref 3.5–5.1)
PROT SERPL-MCNC: 7.4 G/DL (ref 6–8.4)
PROT UR QL STRIP: ABNORMAL
RBC # BLD AUTO: 4.38 M/UL (ref 4–5.4)
RBC #/AREA URNS AUTO: >100 /HPF (ref 0–4)
SODIUM SERPL-SCNC: 139 MMOL/L (ref 136–145)
SP GR UR STRIP: 1.02 (ref 1–1.03)
URN SPEC COLLECT METH UR: ABNORMAL
WBC # BLD AUTO: 5.08 K/UL (ref 3.9–12.7)
WBC #/AREA URNS AUTO: 51 /HPF (ref 0–5)

## 2024-09-12 PROCEDURE — 74018 RADEX ABDOMEN 1 VIEW: CPT | Mod: TC

## 2024-09-12 PROCEDURE — 81001 URINALYSIS AUTO W/SCOPE: CPT | Performed by: NURSE PRACTITIONER

## 2024-09-12 PROCEDURE — 87449 NOS EACH ORGANISM AG IA: CPT | Mod: 91 | Performed by: STUDENT IN AN ORGANIZED HEALTH CARE EDUCATION/TRAINING PROGRAM

## 2024-09-12 PROCEDURE — 80053 COMPREHEN METABOLIC PANEL: CPT | Performed by: NURSE PRACTITIONER

## 2024-09-12 PROCEDURE — 87046 STOOL CULTR AEROBIC BACT EA: CPT | Performed by: STUDENT IN AN ORGANIZED HEALTH CARE EDUCATION/TRAINING PROGRAM

## 2024-09-12 PROCEDURE — 74176 CT ABD & PELVIS W/O CONTRAST: CPT | Mod: 26,,, | Performed by: STUDENT IN AN ORGANIZED HEALTH CARE EDUCATION/TRAINING PROGRAM

## 2024-09-12 PROCEDURE — 63600175 PHARM REV CODE 636 W HCPCS: Performed by: STUDENT IN AN ORGANIZED HEALTH CARE EDUCATION/TRAINING PROGRAM

## 2024-09-12 PROCEDURE — 74018 RADEX ABDOMEN 1 VIEW: CPT | Mod: 26,,, | Performed by: RADIOLOGY

## 2024-09-12 PROCEDURE — 96375 TX/PRO/DX INJ NEW DRUG ADDON: CPT

## 2024-09-12 PROCEDURE — 87324 CLOSTRIDIUM AG IA: CPT | Performed by: STUDENT IN AN ORGANIZED HEALTH CARE EDUCATION/TRAINING PROGRAM

## 2024-09-12 PROCEDURE — 87045 FECES CULTURE AEROBIC BACT: CPT | Performed by: STUDENT IN AN ORGANIZED HEALTH CARE EDUCATION/TRAINING PROGRAM

## 2024-09-12 PROCEDURE — 11000001 HC ACUTE MED/SURG PRIVATE ROOM

## 2024-09-12 PROCEDURE — 87449 NOS EACH ORGANISM AG IA: CPT | Performed by: STUDENT IN AN ORGANIZED HEALTH CARE EDUCATION/TRAINING PROGRAM

## 2024-09-12 PROCEDURE — 74176 CT ABD & PELVIS W/O CONTRAST: CPT | Mod: TC

## 2024-09-12 PROCEDURE — 87086 URINE CULTURE/COLONY COUNT: CPT | Performed by: NURSE PRACTITIONER

## 2024-09-12 PROCEDURE — 96365 THER/PROPH/DIAG IV INF INIT: CPT

## 2024-09-12 PROCEDURE — 85025 COMPLETE CBC W/AUTO DIFF WBC: CPT | Performed by: NURSE PRACTITIONER

## 2024-09-12 PROCEDURE — 87427 SHIGA-LIKE TOXIN AG IA: CPT | Performed by: STUDENT IN AN ORGANIZED HEALTH CARE EDUCATION/TRAINING PROGRAM

## 2024-09-12 PROCEDURE — 99285 EMERGENCY DEPT VISIT HI MDM: CPT | Mod: 25

## 2024-09-12 PROCEDURE — 25000003 PHARM REV CODE 250: Performed by: STUDENT IN AN ORGANIZED HEALTH CARE EDUCATION/TRAINING PROGRAM

## 2024-09-12 PROCEDURE — 27000207 HC ISOLATION

## 2024-09-12 PROCEDURE — 83605 ASSAY OF LACTIC ACID: CPT | Performed by: NURSE PRACTITIONER

## 2024-09-12 PROCEDURE — 99999 PR PBB SHADOW E&M-EST. PATIENT-LVL III: CPT | Mod: PBBFAC,,, | Performed by: UROLOGY

## 2024-09-12 PROCEDURE — 25000003 PHARM REV CODE 250: Performed by: INTERNAL MEDICINE

## 2024-09-12 PROCEDURE — 87040 BLOOD CULTURE FOR BACTERIA: CPT | Performed by: STUDENT IN AN ORGANIZED HEALTH CARE EDUCATION/TRAINING PROGRAM

## 2024-09-12 RX ORDER — TAMSULOSIN HYDROCHLORIDE 0.4 MG/1
0.4 CAPSULE ORAL DAILY
Status: DISCONTINUED | OUTPATIENT
Start: 2024-09-13 | End: 2024-09-12

## 2024-09-12 RX ORDER — PANTOPRAZOLE SODIUM 40 MG/1
40 TABLET, DELAYED RELEASE ORAL 2 TIMES DAILY
Status: DISCONTINUED | OUTPATIENT
Start: 2024-09-12 | End: 2024-09-13 | Stop reason: HOSPADM

## 2024-09-12 RX ORDER — CARVEDILOL 3.12 MG/1
3.12 TABLET ORAL 2 TIMES DAILY
Status: DISCONTINUED | OUTPATIENT
Start: 2024-09-12 | End: 2024-09-13

## 2024-09-12 RX ORDER — ONDANSETRON HYDROCHLORIDE 2 MG/ML
4 INJECTION, SOLUTION INTRAVENOUS
Status: COMPLETED | OUTPATIENT
Start: 2024-09-12 | End: 2024-09-12

## 2024-09-12 RX ORDER — SODIUM CHLORIDE 0.9 % (FLUSH) 0.9 %
10 SYRINGE (ML) INJECTION EVERY 12 HOURS PRN
Status: DISCONTINUED | OUTPATIENT
Start: 2024-09-12 | End: 2024-09-13 | Stop reason: HOSPADM

## 2024-09-12 RX ORDER — GLUCAGON 1 MG
1 KIT INJECTION
Status: DISCONTINUED | OUTPATIENT
Start: 2024-09-12 | End: 2024-09-13 | Stop reason: HOSPADM

## 2024-09-12 RX ORDER — PANTOPRAZOLE SODIUM 40 MG/1
40 TABLET, DELAYED RELEASE ORAL DAILY
Status: DISCONTINUED | OUTPATIENT
Start: 2024-09-13 | End: 2024-09-12

## 2024-09-12 RX ORDER — MORPHINE SULFATE 4 MG/ML
4 INJECTION, SOLUTION INTRAMUSCULAR; INTRAVENOUS
Status: COMPLETED | OUTPATIENT
Start: 2024-09-12 | End: 2024-09-12

## 2024-09-12 RX ORDER — IBUPROFEN 200 MG
24 TABLET ORAL
Status: DISCONTINUED | OUTPATIENT
Start: 2024-09-12 | End: 2024-09-13 | Stop reason: HOSPADM

## 2024-09-12 RX ORDER — IBUPROFEN 200 MG
16 TABLET ORAL
Status: DISCONTINUED | OUTPATIENT
Start: 2024-09-12 | End: 2024-09-13 | Stop reason: HOSPADM

## 2024-09-12 RX ORDER — NALOXONE HCL 0.4 MG/ML
0.02 VIAL (ML) INJECTION
Status: DISCONTINUED | OUTPATIENT
Start: 2024-09-12 | End: 2024-09-13 | Stop reason: HOSPADM

## 2024-09-12 RX ORDER — OXYCODONE HYDROCHLORIDE 5 MG/1
5 TABLET ORAL EVERY 4 HOURS PRN
Status: DISCONTINUED | OUTPATIENT
Start: 2024-09-12 | End: 2024-09-13

## 2024-09-12 RX ORDER — HYDROMORPHONE HYDROCHLORIDE 1 MG/ML
0.2 INJECTION, SOLUTION INTRAMUSCULAR; INTRAVENOUS; SUBCUTANEOUS EVERY 6 HOURS PRN
Status: DISCONTINUED | OUTPATIENT
Start: 2024-09-12 | End: 2024-09-13 | Stop reason: HOSPADM

## 2024-09-12 RX ORDER — TAMSULOSIN HYDROCHLORIDE 0.4 MG/1
0.8 CAPSULE ORAL DAILY
Status: DISCONTINUED | OUTPATIENT
Start: 2024-09-13 | End: 2024-09-13 | Stop reason: HOSPADM

## 2024-09-12 RX ADMIN — PANTOPRAZOLE SODIUM 40 MG: 40 TABLET, DELAYED RELEASE ORAL at 11:09

## 2024-09-12 RX ADMIN — RIFAXIMIN 550 MG: 550 TABLET ORAL at 11:09

## 2024-09-12 RX ADMIN — OXYCODONE 5 MG: 5 TABLET ORAL at 09:09

## 2024-09-12 RX ADMIN — ONDANSETRON 4 MG: 2 INJECTION INTRAMUSCULAR; INTRAVENOUS at 02:09

## 2024-09-12 RX ADMIN — SODIUM CHLORIDE 500 ML: 9 INJECTION, SOLUTION INTRAVENOUS at 02:09

## 2024-09-12 RX ADMIN — MORPHINE SULFATE 4 MG: 4 INJECTION INTRAVENOUS at 02:09

## 2024-09-12 RX ADMIN — HYDROMORPHONE HYDROCHLORIDE 0.2 MG: 1 INJECTION, SOLUTION INTRAMUSCULAR; INTRAVENOUS; SUBCUTANEOUS at 05:09

## 2024-09-12 RX ADMIN — CEFTRIAXONE 1 G: 1 INJECTION, POWDER, FOR SOLUTION INTRAMUSCULAR; INTRAVENOUS at 02:09

## 2024-09-12 RX ADMIN — CARVEDILOL 3.12 MG: 3.12 TABLET, FILM COATED ORAL at 09:09

## 2024-09-12 NOTE — ASSESSMENT & PLAN NOTE
CT scan today reveals right-sided hydronephrosis, with a right double-J ureteral stent with a proximal in coil in the proximal ureter and distal coil in the urinary bladder.   S/p stent placement for a 3 mm stone in the right distal ureter with hydronephrosis done by Dr. Carey on 09/04/24     - urology consulted. Appreciate recommendations  - plan for URS for stone management and stent replacement/removal tomorrow 09/13/24  - NPO past midnight   - pain control  - follow up urine cultures  - continue IV Ceftriaxone

## 2024-09-12 NOTE — SUBJECTIVE & OBJECTIVE
Past Medical History:   Diagnosis Date    Anemia, unspecified     Anxiety     Arthritis     Ascites 11/23/2020    AVN (avascular necrosis of bone)     Cataract     COVID-19 vaccine administered     04/08/21, 04/30/21    Diarrhea of presumed infectious origin 7/31/2023    Edema 11/23/2020    Epigastric pain 7/31/2023    Esophageal varices     Glaucoma     Hepatic encephalopathy 11/23/2020    History of colon polyps     Hx of cirrhosis     non-alcoholic    Iron deficiency anemia secondary to blood loss (chronic)     Kidney stones     Portal hypertensive gastropathy     Unintentional weight loss 10/14/2023    Unspecified cirrhosis of liver        Past Surgical History:   Procedure Laterality Date    APPENDECTOMY      COLONOSCOPY N/A 8/3/2023    Procedure: COLONOSCOPY;  Surgeon: Gerard Salinas MD;  Location: [facility];  Service: Endoscopy;  Laterality: N/A;    COLONOSCOPY N/A 5/27/2024    Procedure: COLONOSCOPY;  Surgeon: Eric Garcia MD;  Location: [facility] (4TH FLR);  Service: Endoscopy;  Laterality: N/A;  Dr. Douglass, egd/colon, weight loss, variceal surveillance and abdominal pain, standard prep, cirrhosis labs ordered    COLONOSCOPY W/ POLYPECTOMY      CYSTOSCOPY N/A 3/20/2024    Procedure: CYSTOSCOPY;  Surgeon: Chris Carey MD;  [facility];  Service: Urology;  Laterality: N/A;    CYSTOSCOPY  9/4/2024    Procedure: CYSTOSCOPY;  Surgeon: Chris Craey MD;  [facility];  Service: Urology;;    CYSTOSCOPY W/ URETERAL STENT REMOVAL Right 3/14/2024    Procedure: CYSTOSCOPY, WITH URETERAL STENT REMOVAL;  Surgeon: Chirs Carey MD;  [facility];  Service: Urology;  Laterality: Right;    DISTAL CLAVICLE EXCISION Right 11/18/2021    Procedure: RIGHT SHOULDER ARTHROSCOPY, EXCISION, CLAVICLE, DISTAL; EXTENSIVE DEBRIDEMENT;  Surgeon: Isaiah Joyner MD;  Location: [facility];  Service: Orthopedics;  Laterality: Right;    ESOPHAGEAL VARICE LIGATION       ESOPHAGOGASTRODUODENOSCOPY N/A 11/10/2020    Procedure: EGD (ESOPHAGOGASTRODUODENOSCOPY);  Surgeon: Gerard Salinas MD;  Location: [facility];  Service: Endoscopy;  Laterality: N/A;    ESOPHAGOGASTRODUODENOSCOPY N/A 12/28/2020    Procedure: EGD (ESOPHAGOGASTRODUODENOSCOPY);  Surgeon: Adryan Park MD;  Location: [facility] (2ND FLR);  Service: Endoscopy;  Laterality: N/A;    ESOPHAGOGASTRODUODENOSCOPY N/A 02/15/2021    Procedure: EGD (ESOPHAGOGASTRODUODENOSCOPY);  Surgeon: Hari Greene MD;  Location: [facility] (4TH FLR);  Service: Endoscopy;  Laterality: N/A;  6 week follow-up variceal banding  Hx varices - Labs - ERW  prep ins. emialed - COVID screening on 2/12/21 [place] - ERW    ESOPHAGOGASTRODUODENOSCOPY Left 08/03/2021    Procedure: EGD (ESOPHAGOGASTRODUODENOSCOPY);  Surgeon: Fabricio Corado MD;  Location: [facility] (4TH FLR);  Service: Gastroenterology;  Laterality: Left;  recent hematemasis. varices-labs on 7/26    COVID test at [facility] on 7/31-GT  requesting sooner    ESOPHAGOGASTRODUODENOSCOPY N/A 07/27/2022    Procedure: EGD (ESOPHAGOGASTRODUODENOSCOPY);  Surgeon: Eric Garcia MD;  Location: [facility];  Service: Endoscopy;  Laterality: N/A;    ESOPHAGOGASTRODUODENOSCOPY Left 08/29/2022    Procedure: EGD (ESOPHAGOGASTRODUODENOSCOPY);  Surgeon: Kacy Douglass MD;  Location: [facility] (2ND FLR);  Service: Endoscopy;  Laterality: Left;  Fully vaccinated/ clear liquids up to 4 hrs prior-RB  varices labs ordered-RB    ESOPHAGOGASTRODUODENOSCOPY N/A 10/05/2022    Procedure: EGD (ESOPHAGOGASTRODUODENOSCOPY);  Surgeon: Gerard Salinas MD;  Location: [facility];  Service: Endoscopy;  Laterality: N/A;    ESOPHAGOGASTRODUODENOSCOPY N/A 3/30/2023    Procedure: EGD (ESOPHAGOGASTRODUODENOSCOPY);  Surgeon: Gerard Salinas MD;  Location: [facility];  Service: Endoscopy;  Laterality: N/A;    ESOPHAGOGASTRODUODENOSCOPY N/A 8/3/2023    Procedure: EGD (ESOPHAGOGASTRODUODENOSCOPY);  Surgeon: Gerard Salinas  MD MARI;  [facility]  Service: Endoscopy;  Laterality: N/A;    ESOPHAGOGASTRODUODENOSCOPY N/A 2024    Procedure: EGD (ESOPHAGOGASTRODUODENOSCOPY);  Surgeon: Eric Garcia MD;  Location: [facility] ENDO (4TH FLR);  Service: Endoscopy;  Laterality: N/A;    EXTRACTION - STONE Right 3/20/2024    Procedure: EXTRACTION - STONE;  Surgeon: Chris Carey MD;  Location: [facility] OR 1ST FLR;  Service: Urology;  Laterality: Right;    HYSTERECTOMY      KNEE SURGERY Bilateral     LASER LITHOTRIPSY Right 3/20/2024    Procedure: LITHOTRIPSY, USING LASER;  Surgeon: Chris Carey MD;  [facility];  Service: Urology;  Laterality: Right;    LITHOTRIPSY      REMOVAL-STENT Right 3/20/2024    Procedure: REMOVAL-STENT;  Surgeon: Chris Carey MD;  [facility];  Service: Urology;  Laterality: Right;    RETROGRADE PYELOGRAPHY Right 3/20/2024    Procedure: PYELOGRAM, RETROGRADE;  Surgeon: Chris Caery MD;  [facility];  Service: Urology;  Laterality: Right;    RHINOPLASTY TIP      SHOULDER SURGERY Right     TONSILLECTOMY      TOTAL HIP ARTHROPLASTY Right     URETERAL STENT PLACEMENT Right 3/20/2024    Procedure: INSERTION, STENT, URETER;  Surgeon: Chris Carey MD;  [facility];  Service: Urology;  Laterality: Right;    URETERAL STENT PLACEMENT Right 2024    Procedure: INSERTION, STENT, URETER;  Surgeon: Chris Carey MD;  [facility];  Service: Urology;  Laterality: Right;    URETEROSCOPY Right 3/20/2024    Procedure: URETEROSCOPY;  Surgeon: Chris Carey MD;  [facility];  Service: Urology;  Laterality: Right;       Review of patient's allergies indicates:   Allergen Reactions    Sulfa (sulfonamide antibiotics) Hives       Current Facility-Administered Medications on File Prior to Encounter   Medication    0.9%  NaCl infusion     Current Outpatient Medications on File Prior to Encounter   Medication Sig    []  amoxicillin-clavulanate 875-125mg (AUGMENTIN) 875-125 mg per tablet Take 1 tablet by mouth every 12 (twelve) hours. for 7 days    carvediloL (COREG) 3.125 MG tablet Take 1 tablet (3.125 mg total) by mouth 2 (two) times daily.    esomeprazole (NEXIUM) 40 MG capsule TAKE 1 CAPSULE BY MOUTH 2 TIMES DAILY BEFORE MEALS.    levoFLOXacin (LEVAQUIN) 500 MG tablet Take 1 tablet (500 mg total) by mouth once daily. for 10 days    multivitamin (THERAGRAN) per tablet Take 1 tablet by mouth once daily.    omega-3 fatty acids/fish oil (FISH OIL-OMEGA-3 FATTY ACIDS) 300-1,000 mg capsule Take 1 capsule by mouth once daily.    oxybutynin (DITROPAN) 5 MG Tab Take 1 tablet (5 mg total) by mouth 3 (three) times daily as needed (for bladder spasms and stent pain).    tamsulosin (FLOMAX) 0.4 mg Cap Take 1 capsule (0.4 mg total) by mouth once daily.    tamsulosin (FLOMAX) 0.4 mg Cap Take 1 capsule (0.4 mg total) by mouth every evening.    UNABLE TO FIND 4 (four) times daily as needed. Medible 20 mg blue raspberry 1/4 to 1/2 up to 4 times daily as needed.    [DISCONTINUED] oxyCODONE (ROXICODONE) 5 MG immediate release tablet Take 1 tablet (5 mg total) by mouth every 4 (four) hours as needed for Pain.    [DISCONTINUED] phenazopyridine (PYRIDIUM) 200 MG tablet Take 1 tablet (200 mg total) by mouth 3 (three) times daily as needed for Pain.    [DISCONTINUED] rifAXIMin (XIFAXAN) 550 mg Tab Take 1 tablet (550 mg total) by mouth 2 (two) times daily.     Family History       Problem Relation (Age of Onset)    Diabetes Mellitus Maternal Grandmother    No Known Problems Mother          Tobacco Use    Smoking status: Former     Current packs/day: 0.00     Types: Cigarettes     Quit date: 7/3/2019     Years since quittin.2    Smokeless tobacco: Never   Substance and Sexual Activity    Alcohol use: Not Currently    Drug use: No    Sexual activity: Not on file     Review of Systems   Constitutional:  Positive for appetite change and fatigue. Negative  for chills and fever.   Gastrointestinal:  Positive for abdominal pain, diarrhea and nausea. Negative for blood in stool, constipation and vomiting.   Genitourinary:  Positive for hematuria.   Musculoskeletal: Negative.    Skin: Negative.    Neurological: Negative.      Objective:     Vital Signs (Most Recent):  Temp: 97.7 °F (36.5 °C) (09/12/24 1233)  Pulse: 75 (09/12/24 1500)  Resp: 20 (09/12/24 1500)  BP: 128/67 (09/12/24 1500)  SpO2: 100 % (09/12/24 1500) Vital Signs (24h Range):  Temp:  [97.7 °F (36.5 °C)] 97.7 °F (36.5 °C)  Pulse:  [71-75] 75  Resp:  [20] 20  SpO2:  [100 %] 100 %  BP: (119-135)/(63-71) 128/67     Weight: 61.7 kg (136 lb)  Body mass index is 20.68 kg/m².     Physical Exam  Constitutional:       General: She is not in acute distress.     Appearance: She is not ill-appearing.   Cardiovascular:      Rate and Rhythm: Normal rate.      Pulses: Normal pulses.      Heart sounds: Normal heart sounds. No murmur heard.  Pulmonary:      Effort: Pulmonary effort is normal.      Breath sounds: Normal breath sounds.   Abdominal:      General: Abdomen is flat.      Palpations: Abdomen is soft.      Tenderness: There is abdominal tenderness. There is right CVA tenderness.   Musculoskeletal:      Right lower leg: No edema.      Left lower leg: No edema.   Neurological:      Mental Status: She is alert and oriented to person, place, and time. Mental status is at baseline.      Motor: No weakness.   Psychiatric:         Mood and Affect: Mood normal.                Significant Labs: All pertinent labs within the past 24 hours have been reviewed.    Significant Imaging: I have reviewed all pertinent imaging results/findings within the past 24 hours.

## 2024-09-12 NOTE — TELEPHONE ENCOUNTER
Attempted to call pt with arrival time and pre-op instructions. Every number listed on pt's chart was called multiple times, none answered nor did they have voice message boxes set up. Pt's arrival time and pre-op instructions were sent over patient portal.

## 2024-09-12 NOTE — ANESTHESIA PREPROCEDURE EVALUATION
[facility]  Anesthesia Pre-Operative Evaluation         Patient Name: Tee Aranda  YOB: 1970  MRN: [id number]    SUBJECTIVE:     Pre-operative Evaluation for Procedure(s) (LRB):  CYSTOSCOPY, WITH RETROGRADE PYELOGRAM (Right)  CYSTOSCOPY, WITH CALCULUS REMOVAL (Right)  CYSTOURETEROSCOPY, WITH RETROGRADE PYELOGRAM AND URETERAL STENT INSERTION (Right)     09/12/2024    Tee Aranda is a 54 y.o. female with a PMHx significant for CARTAGENA cirrhosis with hx of esophageal varices, right hydronephrosis, right distal ureteral stone s/p r ureteral stent placement on 9/4.  S/P remdesivir course after getting COVID in late August. Presented to ED with abdominal pain and dysuria, and fluid retention.    The patient now presents for the above procedure(s).    Previous Evnkau26/20/24; Placement Time 1401; Method of Intubation Direct laryngoscopy; Inserted by CRNA; Airway Device Endotracheal Tube; Mask Ventilation Easy; Intubated Postinduction; Blade Nunes #2; Style Cuffed; Cuff Inflation Minimal occlusive pressure; Inflation Amount 6; Placement Verified by Auscultation, Capnometry, ETT Condensation; Grade Grade I; Complicating Factors None; Intubation Findings Positive EtCO2, Bilateral breath sounds, Atraumatic/Condition of teeth unchanged; Depth of Insertion 21; Securement Lips; Complications None; Breath Sounds Equal Bilateral; Insertion Attempts 1; Removal Date 03/20/24; Removal Time 1540     LDA:        Peripheral IV - Single Lumen 09/12/24 1306 20 G Right Antecubital (Active)   Site Assessment Clean;Dry;Intact 09/12/24 1307   Line Status Blood return noted;Flushed;Saline locked 09/12/24 1307   Dressing Status Clean;Dry;Intact 09/12/24 1307   Dressing Intervention First dressing 09/12/24 1307   Number of days: 0       Drips: None documented.      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    Portal  hypertensive gastropathy    Esophageal varices    Arthralgia of right acromioclavicular joint    Tear of right glenoid labrum    Nontraumatic incomplete tear of right rotator cuff    Diarrhea of presumed infectious origin    Epigastric pain    COVID-19    Thrombocytopenia    Unintentional weight loss    Urolithiasis    Acute cystitis with hematuria    Right ureteral stone    Portal hypertension    Right sided abdominal pain    Hydronephrosis, right    Ureteral stent retained       Past Medical History:   Diagnosis Date    Anemia, unspecified     Anxiety     Arthritis     Ascites 11/23/2020    AVN (avascular necrosis of bone)     Cataract     COVID-19 vaccine administered     04/08/21, 04/30/21    Diarrhea of presumed infectious origin 7/31/2023    Edema 11/23/2020    Epigastric pain 7/31/2023    Esophageal varices     Glaucoma     Hepatic encephalopathy 11/23/2020    History of colon polyps     Hx of cirrhosis     non-alcoholic    Iron deficiency anemia secondary to blood loss (chronic)     Kidney stones     Portal hypertensive gastropathy     Unintentional weight loss 10/14/2023    Unspecified cirrhosis of liver        Review of patient's allergies indicates:   Allergen Reactions    Sulfa (sulfonamide antibiotics) Hives       Current Inpatient Medications:      Current Outpatient Medications   Medication Instructions    carvediloL (COREG) 3.125 mg, Oral, 2 times daily    esomeprazole (NEXIUM) 40 MG capsule TAKE 1 CAPSULE BY MOUTH 2 TIMES DAILY BEFORE MEALS.    levoFLOXacin (LEVAQUIN) 500 mg, Oral, Daily    multivitamin (THERAGRAN) per tablet 1 tablet, Oral, Daily    omega-3 fatty acids/fish oil (FISH OIL-OMEGA-3 FATTY ACIDS) 300-1,000 mg capsule 1 capsule, Oral, Daily    oxybutynin (DITROPAN) 5 mg, Oral, 3 times daily PRN    oxyCODONE (ROXICODONE) 5 mg, Oral, Every 4 hours PRN    phenazopyridine (PYRIDIUM) 200 mg, Oral, 3 times daily PRN    tamsulosin (FLOMAX) 0.4 mg, Oral, Daily    tamsulosin (FLOMAX) 0.4 mg,  Oral, Nightly    UNABLE TO FIND 4 times daily PRN, Medible 20 mg blue raspberry 1/4 to 1/2 up to 4 times daily as needed.     XIFAXAN 550 mg, Oral, 2 times daily       Past Surgical History:   Procedure Laterality Date    APPENDECTOMY      COLONOSCOPY N/A 8/3/2023    Procedure: COLONOSCOPY;  Surgeon: Gerard Salinas MD;  Location: [facility];  Service: Endoscopy;  Laterality: N/A;    COLONOSCOPY N/A 5/27/2024    Procedure: COLONOSCOPY;  Surgeon: Eric Garcia MD;  Location: [facility] (4TH FLR);  Service: Endoscopy;  Laterality: N/A;  Dr. Douglass, egd/colon, weight loss, variceal surveillance and abdominal pain, standard prep, cirrhosis labs ordered    COLONOSCOPY W/ POLYPECTOMY      CYSTOSCOPY N/A 3/20/2024    Procedure: CYSTOSCOPY;  Surgeon: Chris Carey MD;  Location: [facility] OR 1ST FLR;  Service: Urology;  Laterality: N/A;    CYSTOSCOPY  9/4/2024    Procedure: CYSTOSCOPY;  Surgeon: Chris Carey MD;  Location: [facility] OR 1ST FLR;  Service: Urology;;    CYSTOSCOPY W/ URETERAL STENT REMOVAL Right 3/14/2024    Procedure: CYSTOSCOPY, WITH URETERAL STENT REMOVAL;  Surgeon: Chris Carey MD;  Location: [facility] OR 1ST FLR;  Service: Urology;  Laterality: Right;    DISTAL CLAVICLE EXCISION Right 11/18/2021    Procedure: RIGHT SHOULDER ARTHROSCOPY, EXCISION, CLAVICLE, DISTAL; EXTENSIVE DEBRIDEMENT;  Surgeon: Isaiah Joyner MD;  Location: [facility];  Service: Orthopedics;  Laterality: Right;    ESOPHAGEAL VARICE LIGATION      ESOPHAGOGASTRODUODENOSCOPY N/A 11/10/2020    Procedure: EGD (ESOPHAGOGASTRODUODENOSCOPY);  Surgeon: Gerard Salinas MD;  Location: [facility];  Service: Endoscopy;  Laterality: N/A;    ESOPHAGOGASTRODUODENOSCOPY N/A 12/28/2020    Procedure: EGD (ESOPHAGOGASTRODUODENOSCOPY);  Surgeon: Adryan Park MD;  Location: [facility] (2ND FLR);  Service: Endoscopy;  Laterality: N/A;    ESOPHAGOGASTRODUODENOSCOPY N/A 02/15/2021    Procedure: EGD (ESOPHAGOGASTRODUODENOSCOPY);  Surgeon: Hari Greene MD;   Location: [facility] (4TH FLR);  Service: Endoscopy;  Laterality: N/A;  6 week follow-up variceal banding  Hx varices - Labs - ERW  prep ins. emialed - COVID screening on 2/12/21 [place] - ERW    ESOPHAGOGASTRODUODENOSCOPY Left 08/03/2021    Procedure: EGD (ESOPHAGOGASTRODUODENOSCOPY);  Surgeon: Fabricio Corado MD;  Location: [facility] (4TH FLR);  Service: Gastroenterology;  Laterality: Left;  recent hematemasis. varices-labs on 7/26    COVID test at [facility] on 7/31-GT  requesting sooner    ESOPHAGOGASTRODUODENOSCOPY N/A 07/27/2022    Procedure: EGD (ESOPHAGOGASTRODUODENOSCOPY);  Surgeon: Eric Garcia MD;  Location: [facility];  Service: Endoscopy;  Laterality: N/A;    ESOPHAGOGASTRODUODENOSCOPY Left 08/29/2022    Procedure: EGD (ESOPHAGOGASTRODUODENOSCOPY);  Surgeon: Kacy Douglass MD;  Location: [facility] (2ND FLR);  Service: Endoscopy;  Laterality: Left;  Fully vaccinated/ clear liquids up to 4 hrs prior-RB  varices labs ordered-RB    ESOPHAGOGASTRODUODENOSCOPY N/A 10/05/2022    Procedure: EGD (ESOPHAGOGASTRODUODENOSCOPY);  Surgeon: Gerard Salinas MD;  Location: [facility];  Service: Endoscopy;  Laterality: N/A;    ESOPHAGOGASTRODUODENOSCOPY N/A 3/30/2023    Procedure: EGD (ESOPHAGOGASTRODUODENOSCOPY);  Surgeon: Gerard Salinas MD;  Location: [facility];  Service: Endoscopy;  Laterality: N/A;    ESOPHAGOGASTRODUODENOSCOPY N/A 8/3/2023    Procedure: EGD (ESOPHAGOGASTRODUODENOSCOPY);  Surgeon: Gerard Salinas MD;  Location: [facility];  Service: Endoscopy;  Laterality: N/A;    ESOPHAGOGASTRODUODENOSCOPY N/A 5/27/2024    Procedure: EGD (ESOPHAGOGASTRODUODENOSCOPY);  Surgeon: Eric Garcia MD;  Location: [facility] (4TH FLR);  Service: Endoscopy;  Laterality: N/A;    EXTRACTION - STONE Right 3/20/2024    Procedure: EXTRACTION - STONE;  Surgeon: Chris Carey MD;  Location: [facility] OR [address] [place] [address];  Service: Urology;  Laterality: Right;    HYSTERECTOMY      KNEE SURGERY Bilateral     LASER LITHOTRIPSY Right  3/20/2024    Procedure: LITHOTRIPSY, USING LASER;  Surgeon: Chris Carey MD;  Location: [facility] OR 1ST FLR;  Service: Urology;  Laterality: Right;    LITHOTRIPSY      REMOVAL-STENT Right 3/20/2024    Procedure: REMOVAL-STENT;  Surgeon: Chris Carey MD;  Location: [facility] OR 1ST FLR;  Service: Urology;  Laterality: Right;    RETROGRADE PYELOGRAPHY Right 3/20/2024    Procedure: PYELOGRAM, RETROGRADE;  Surgeon: Chris Carey MD;  Location: [facility] OR 1ST FLR;  Service: Urology;  Laterality: Right;    RHINOPLASTY TIP      SHOULDER SURGERY Right     TONSILLECTOMY      TOTAL HIP ARTHROPLASTY Right     URETERAL STENT PLACEMENT Right 3/20/2024    Procedure: INSERTION, STENT, URETER;  Surgeon: Chris Carey MD;  Location: [facility] OR 1ST FLR;  Service: Urology;  Laterality: Right;    URETERAL STENT PLACEMENT Right 9/4/2024    Procedure: INSERTION, STENT, URETER;  Surgeon: Chris Carey MD;  Location: [facility] OR [facility] FLR;  Service: Urology;  Laterality: Right;    URETEROSCOPY Right 3/20/2024    Procedure: URETEROSCOPY;  Surgeon: Chris Carey MD;  Location: [facility] OR [facility] FLR;  Service: Urology;  Laterality: Right;       Social History     Substance and Sexual Activity   Drug Use No     Tobacco Use: Medium Risk (9/12/2024)    Patient History     Smoking Tobacco Use: Former     Smokeless Tobacco Use: Never     Passive Exposure: Not on file     Alcohol Use: Not At Risk (5/4/2024)    AUDIT-C     Frequency of Alcohol Consumption: Never     Average Number of Drinks: Patient does not drink     Frequency of Binge Drinking: Never         OBJECTIVE:     Vital Signs Range (Last 24H):  Temp:  [36.5 °C (97.7 °F)]   Pulse:  [71-72]   Resp:  [20]   BP: (119-135)/(63-71)   SpO2:  [100 %]       Significant Labs    Heme Profile  Lab Results   Component Value Date    WBC 3.14 (L) 08/30/2024    HGB 13.7 08/30/2024    HCT 39.7 08/30/2024    PLT 74 (L) 08/30/2024       Coagulation Studies  Lab Results   Component Value Date    LABPROT 12.6 (H)  "07/08/2024    INR 1.2 07/08/2024    APTT 28.5 04/23/2024       BMP  Lab Results   Component Value Date     09/03/2024    K 3.9 09/03/2024     09/03/2024    CO2 23 09/03/2024    BUN 19 09/03/2024    CREATININE 0.8 09/03/2024    MG 1.5 (L) 04/01/2024    PHOS 3.7 03/26/2024       Liver Function Tests  Lab Results   Component Value Date    AST 30 09/03/2024    ALT 34 09/03/2024    ALKPHOS 107 09/03/2024    BILITOT 0.6 09/03/2024    PROT 6.5 09/03/2024    ALBUMIN 3.9 09/03/2024       Lipid Profile  No results found for: "CHOL", "HDL", "LDLDIRECT", "TRIG"    Endocrine Profile  Lab Results   Component Value Date    HGBA1C 5.5 07/26/2022    TSH 1.111 06/14/2024       Cardiac Studies    EKG:   Results for orders placed or performed during the hospital encounter of 08/30/24   EKG 12-lead    Collection Time: 08/30/24  5:27 PM   Result Value Ref Range    QRS Duration 72 ms    [facility] QTC Calculation 402 ms    Narrative    Test Reason : U07.1    Vent. Rate : 072 BPM     Atrial Rate : 072 BPM     P-R Int : 138 ms          QRS Dur : 072 ms      QT Int : 368 ms       P-R-T Axes : 070 087 074 degrees     QTc Int : 402 ms    Normal sinus rhythm  Normal ECG  When compared with ECG of 01-APR-2024 14:56,  T wave amplitude has decreased in Anterior leads  Confirmed by Juan RANGEL, Ji PANDYA ([id number]) on 9/3/2024 7:43:01 AM    Referred By: [email or web address]   SELF           Confirmed By:Ji Martinez MD       LIUDMILA  No results found for this or any previous visit.      TTE  No results found for this or any previous visit.      Nuclear Stress Test   No results found for this or any previous visit.        ASSESSMENT/PLAN:           Pre-op Assessment    I have reviewed the Patient Summary Reports.     I have reviewed the Nursing Notes. I have reviewed the NPO Status.   I have reviewed the Medications.     Review of Systems  Anesthesia Hx:  No problems with previous Anesthesia   History of prior surgery of interest to airway management or planning:  "           Denies Personal Hx of Anesthesia complications.                    Hematology/Oncology:       -- Denies Anemia:                                  EENT/Dental:  EENT/Dental Normal           Cardiovascular:     Denies Pacemaker.    Denies MI.  Denies CAD.       Denies Angina.  Denies CHF.  Denies Orthopnea.  denies PVD                              Pulmonary:        Denies Recent URI.  Denies Sleep Apnea.                Renal/:  Chronic Renal Disease        Kidney Function/Disease             Hepatic/GI:      Liver Disease, Hepatitis Blackman cirrhosis  Hx varices, some banded, mild now       Liver Disease, Hepatitis        Neurological:  Neurology Normal                                      Endocrine:  Endocrine Normal            Psych:  Psychiatric Normal                    Physical Exam  General: Well nourished, Cooperative, Alert and Oriented    Airway:  Mallampati: II / II  Mouth Opening: Normal  TM Distance: Normal  Tongue: Normal  Neck ROM: Normal ROM    Dental:  Intact    Chest/Lungs:  Normal Respiratory Rate    Heart:  Rate: Normal  Rhythm: Regular Rhythm        Anesthesia Plan  Type of Anesthesia, risks & benefits discussed:    Anesthesia Type: Gen ETT  Intra-op Monitoring Plan: Standard ASA Monitors  Post Op Pain Control Plan: multimodal analgesia and IV/PO Opioids PRN  Induction:  IV  Airway Plan: Direct and Video, Post-Induction  Informed Consent: Informed consent signed with the Patient and all parties understand the risks and agree with anesthesia plan.  All questions answered.   ASA Score: 3  Day of Surgery Review of History & Physical: H&P Update referred to the surgeon/provider.    Ready For Surgery From Anesthesia Perspective.     .

## 2024-09-12 NOTE — ED PROVIDER NOTES
Encounter Date: 9/12/2024       History     Chief Complaint   Patient presents with    Post-op Problem     Stent placement in ureter one week ago; now hematuria with diarrhea. Was on recent abx and concern for C Diff. Hx of Cirrhosis      HPI  Patient is a 54-year-old female with history of Blackman cirrhosis with esophageal varices, iron deficiency anemia, right ureteral stone status post cystoscopy with right ureteral JJ stent placement on 09/04/2024 sent here by urology for for worsening right-sided abdominal and flank pain.  Patient states that she was had ongoing hematuria since she was discharged after her stent placement.  She states that she was no longer having passage of clots but it looks wine colored.  She denies any dysuria.  She states that she is currently on Augmentin.  She was reporting worsening pain in her right flank that radiates to her right abdomen.  She states that it was gradual in onset yesterday in his since progressed in his now more severe, persistent.  No associated fever, chills, chest pain, shortness of breath, dysuria.  She was had some nausea but no vomiting.  She also reports loose, henry, nonbloody diarrhea since starting the antibiotics.  She had an outpatient CT scan done today.  See impression below.  She was contacted by Urology to presents to the emergency department.      CT Renal Stone from earlier today, Impression:     Few tiny nonobstructing right renal stones.  Ureteral vesicular junctions are not well evaluated secondary to streak artifact from right hip arthroplasty.  Similar right-sided hydronephrosis and proximal hydroureter with interval placement of a right double-J ureteral stent.  Proximal in coils about the proximal ureter, and distal and coils about the urinary bladder.  Correlation is advised.     Hepatosplenomegaly with cirrhotic liver morphology.  No focal hepatic lesions, noting limitations from noncontrast examination.     Similar nonspecific colonic wall  thickening and surrounding inflammatory change about the ascending colon.  Findings may relate to chronic infectious or non infectious inflammatory process, noting that underlying malignancy cannot be excluded.  Correlation is advised.     Additional stable findings as above.     This report was flagged in Epic as abnormal.      Review of patient's allergies indicates:   Allergen Reactions    Sulfa (sulfonamide antibiotics) Hives     Past Medical History:   Diagnosis Date    Anemia, unspecified     Anxiety     Arthritis     Ascites 11/23/2020    AVN (avascular necrosis of bone)     Cataract     COVID-19 vaccine administered     04/08/21, 04/30/21    Diarrhea of presumed infectious origin 7/31/2023    Edema 11/23/2020    Epigastric pain 7/31/2023    Esophageal varices     Glaucoma     Hepatic encephalopathy 11/23/2020    History of colon polyps     Hx of cirrhosis     non-alcoholic    Iron deficiency anemia secondary to blood loss (chronic)     Kidney stones     Portal hypertensive gastropathy     Unintentional weight loss 10/14/2023    Unspecified cirrhosis of liver      Past Surgical History:   Procedure Laterality Date    APPENDECTOMY      COLONOSCOPY N/A 8/3/2023    Procedure: COLONOSCOPY;  Surgeon: Gerard Salinas MD;  Location: [facility];  Service: Endoscopy;  Laterality: N/A;    COLONOSCOPY N/A 5/27/2024    Procedure: COLONOSCOPY;  Surgeon: Eric Garcia MD;  [facility]  Service: Endoscopy;  Laterality: N/A;  Dr. Douglass, egd/colon, weight loss, variceal surveillance and abdominal pain, standard prep, cirrhosis labs ordered    COLONOSCOPY W/ POLYPECTOMY      CYSTOSCOPY N/A 3/20/2024    Procedure: CYSTOSCOPY;  Surgeon: Chris Carey MD;  Location: [facility] OR 1ST FLR;  Service: Urology;  Laterality: N/A;    CYSTOSCOPY  9/4/2024    Procedure: CYSTOSCOPY;  Surgeon: Chris Carey MD;  [facility];  Service: Urology;;    CYSTOSCOPY W/ URETERAL STENT REMOVAL Right 3/14/2024     Procedure: CYSTOSCOPY, WITH URETERAL STENT REMOVAL;  Surgeon: Chris Carey MD;  Location: [facility] 1ST FLR;  Service: Urology;  Laterality: Right;    DISTAL CLAVICLE EXCISION Right 11/18/2021    Procedure: RIGHT SHOULDER ARTHROSCOPY, EXCISION, CLAVICLE, DISTAL; EXTENSIVE DEBRIDEMENT;  Surgeon: Isaiah Joyner MD;  Location: [facility] OR;  Service: Orthopedics;  Laterality: Right;    ESOPHAGEAL VARICE LIGATION      ESOPHAGOGASTRODUODENOSCOPY N/A 11/10/2020    Procedure: EGD (ESOPHAGOGASTRODUODENOSCOPY);  Surgeon: Gerard Salinas MD;  Location: [facility] ENDO;  Service: Endoscopy;  Laterality: N/A;    ESOPHAGOGASTRODUODENOSCOPY N/A 12/28/2020    Procedure: EGD (ESOPHAGOGASTRODUODENOSCOPY);  Surgeon: Adryan Park MD;  Location: [facility] ENDO (2ND FLR);  Service: Endoscopy;  Laterality: N/A;    ESOPHAGOGASTRODUODENOSCOPY N/A 02/15/2021    Procedure: EGD (ESOPHAGOGASTRODUODENOSCOPY);  Surgeon: Hari Greene MD;  Location: [facility] (4TH FLR);  Service: Endoscopy;  Laterality: N/A;  6 week follow-up variceal banding  Hx varices - Labs - ERW  prep ins. emialed - COVID screening on 2/12/21 [place] - ERW    ESOPHAGOGASTRODUODENOSCOPY Left 08/03/2021    Procedure: EGD (ESOPHAGOGASTRODUODENOSCOPY);  Surgeon: Fabricio Corado MD;  Location: [facility] ENDO (4TH FLR);  Service: Gastroenterology;  Laterality: Left;  recent hematemasis. varices-labs on 7/26    COVID test at [facility] on 7/31-GT  requesting sooner    ESOPHAGOGASTRODUODENOSCOPY N/A 07/27/2022    Procedure: EGD (ESOPHAGOGASTRODUODENOSCOPY);  Surgeon: Eric Garcia MD;  Location: [facility] ENDO;  Service: Endoscopy;  Laterality: N/A;    ESOPHAGOGASTRODUODENOSCOPY Left 08/29/2022    Procedure: EGD (ESOPHAGOGASTRODUODENOSCOPY);  Surgeon: Kacy Douglass MD;  Location: [facility] ENDO (2ND FLR);  Service: Endoscopy;  Laterality: Left;  Fully vaccinated/ clear liquids up to 4 hrs prior-RB  varices labs ordered-RB    ESOPHAGOGASTRODUODENOSCOPY N/A 10/05/2022    Procedure: EGD  (ESOPHAGOGASTRODUODENOSCOPY);  Surgeon: Gerard Salinas MD;  Location: [facility] ENDO;  Service: Endoscopy;  Laterality: N/A;    ESOPHAGOGASTRODUODENOSCOPY N/A 3/30/2023    Procedure: EGD (ESOPHAGOGASTRODUODENOSCOPY);  Surgeon: Gerard Salinas MD;  Location: [facility] ENDO;  Service: Endoscopy;  Laterality: N/A;    ESOPHAGOGASTRODUODENOSCOPY N/A 8/3/2023    Procedure: EGD (ESOPHAGOGASTRODUODENOSCOPY);  Surgeon: Gerard Salinas MD;  Location: [facility];  Service: Endoscopy;  Laterality: N/A;    ESOPHAGOGASTRODUODENOSCOPY N/A 5/27/2024    Procedure: EGD (ESOPHAGOGASTRODUODENOSCOPY);  Surgeon: Eric Garcia MD;  Location: [facility] (4TH FLR);  Service: Endoscopy;  Laterality: N/A;    EXTRACTION - STONE Right 3/20/2024    Procedure: EXTRACTION - STONE;  Surgeon: Chris Carey MD;  Location: [facility] OR 1ST FLR;  Service: Urology;  Laterality: Right;    HYSTERECTOMY      KNEE SURGERY Bilateral     LASER LITHOTRIPSY Right 3/20/2024    Procedure: LITHOTRIPSY, USING LASER;  Surgeon: Chris Carey MD;  Location: [facility] OR 1ST FLR;  Service: Urology;  Laterality: Right;    LITHOTRIPSY      REMOVAL-STENT Right 3/20/2024    Procedure: REMOVAL-STENT;  Surgeon: Chris Carey MD;  Location: [facility] OR 1ST FLR;  Service: Urology;  Laterality: Right;    RETROGRADE PYELOGRAPHY Right 3/20/2024    Procedure: PYELOGRAM, RETROGRADE;  Surgeon: Chris Carey MD;  Location: [facility] OR 1ST FLR;  Service: Urology;  Laterality: Right;    RHINOPLASTY TIP      SHOULDER SURGERY Right     TONSILLECTOMY      TOTAL HIP ARTHROPLASTY Right     URETERAL STENT PLACEMENT Right 3/20/2024    Procedure: INSERTION, STENT, URETER;  Surgeon: Chris Carey MD;  Location: [facility] OR 1ST FLR;  Service: Urology;  Laterality: Right;    URETERAL STENT PLACEMENT Right 9/4/2024    Procedure: INSERTION, STENT, URETER;  Surgeon: Chris Carey MD;  Location: [facility] OR 1ST FLR;  Service: Urology;  Laterality: Right;    URETEROSCOPY Right 3/20/2024    Procedure:  URETEROSCOPY;  Surgeon: Chris Carey MD;  Location: [facility] OR [address] [place] [address];  Service: Urology;  Laterality: Right;     Family History   Problem Relation Name Age of Onset    No Known Problems Mother      Diabetes Mellitus Maternal Grandmother      Amblyopia Neg Hx      Blindness Neg Hx      Cataracts Neg Hx      Glaucoma Neg Hx      Macular degeneration Neg Hx      Retinal detachment Neg Hx      Strabismus Neg Hx      Colon cancer Neg Hx      Esophageal cancer Neg Hx       Social History     Tobacco Use    Smoking status: Former     Current packs/day: 0.00     Types: Cigarettes     Quit date: 7/3/2019     Years since quittin.2    Smokeless tobacco: Never   Substance Use Topics    Alcohol use: Not Currently    Drug use: No     Review of Systems  A full review of systems was obtained, see HPI for pertinent positives.    Physical Exam     Initial Vitals [24 1233]   BP Pulse Resp Temp SpO2   135/63 71 20 97.7 °F (36.5 °C) 100 %      MAP       --         Physical Exam  Constitutional: No acute distress, appears uncomfortable due to pain  Respiratory: Non-labored, lungs clear  Cardiovascular: Well perfused, normal rate, regular rhythm  Gastrointestinal: Soft, nondistended, mildly tender to palpation in the right abdomen  Integumentary: Warm and dry, no rash  Musculoskeletal: No deformity  Neurological: Awake and alert, normal motor  Psychiatric: Cooperative     ED Course   Procedures  Labs Reviewed   CBC W/ AUTO DIFFERENTIAL - Abnormal       Result Value    WBC 5.08      RBC 4.38      Hemoglobin 12.7      Hematocrit 38.6      MCV 88      MCH 29.0      MCHC 32.9      RDW 13.1      Platelets 78 (*)     MPV 11.1      Immature Granulocytes 0.4      Gran # (ANC) 4.0      Immature Grans (Abs) 0.02      Lymph # 0.6 (*)     Mono # 0.3      Eos # 0.1      Baso # 0.02      nRBC 0      Gran % 79.3 (*)     Lymph % 12.6 (*)     Mono % 5.9      Eosinophil % 1.4      Basophil % 0.4      Differential Method Automated      COMPREHENSIVE METABOLIC PANEL - Abnormal    Sodium 139      Potassium 3.9      Chloride 106      CO2 24      Glucose 118 (*)     BUN 19      Creatinine 0.8      Calcium 9.8      Total Protein 7.4      Albumin 4.1      Total Bilirubin 0.8      Alkaline Phosphatase 102      AST 25      ALT 28      eGFR >60.0      Anion Gap 9     URINALYSIS, REFLEX TO URINE CULTURE - Abnormal    Specimen UA Urine, Clean Catch      Color, UA Brown (*)     Appearance, UA Cloudy (*)     pH, UA 7.0      Specific Gravity, UA 1.020      Protein, UA 2+ (*)     Glucose, UA Negative      Ketones, UA Negative      Bilirubin (UA) Negative      Occult Blood UA 3+ (*)     Nitrite, UA Negative      Leukocytes, UA 2+ (*)     Narrative:     Specimen Source->Urine   URINALYSIS MICROSCOPIC - Abnormal    RBC, UA >100 (*)     WBC, UA 51 (*)     Bacteria Occasional      Hyaline Casts, UA 0      Microscopic Comment SEE COMMENT      Narrative:     Specimen Source->Urine   CLOSTRIDIUM DIFFICILE   CULTURE, URINE   CULTURE, BLOOD   CULTURE, BLOOD   LACTIC ACID, PLASMA    Lactate (Lactic Acid) 1.3            Imaging Results    None          Medications   sodium chloride 0.9% bolus 500 mL 500 mL (500 mLs Intravenous New Bag 9/12/24 1430)   morphine injection 4 mg (4 mg Intravenous Given 9/12/24 1431)   ondansetron injection 4 mg (4 mg Intravenous Given 9/12/24 1431)   cefTRIAXone (Rocephin) 1 g in D5W 100 mL IVPB (MB+) (0 g Intravenous Stopped 9/12/24 1516)     Medical Decision Making  Patient is a 54-year-old female with history right ureteral stone status post cystoscopy with right ureteral JJ stent placement on 09/04/2024 sent here by Urology for ongoing hematuria, worsening right flank and abdominal pain.  She had a CT scan performed earlier this morning.  She has persistent hydro.  Stent appears in place.  Patient uncomfortable on exam.  Will treat symptomatically.  I have also ordered blood work.  Patient also reports diarrhea since starting the  antibiotics.  Will check her electrolytes.  She denies history of C diff infection in the past.  Will order C diff testing.  Urology consulted.  Dispo pending lab work, re-evaluation in urology consultation.    Urology evaluated the patient.  Patient has a 3 mm stone in the proximal right ureter with associated hydronephrosis with right ureteral stent in place.  Urology planning for stent replacement/removal tomorrow but would like the patient admitted to  given her ongoing diarrhea and abdominal pain to continue to evaluate for other causes.  Patient discussed with Hospital Medicine.          Amount and/or Complexity of Data Reviewed  Labs: ordered. Decision-making details documented in ED Course.    Risk  Prescription drug management.               ED Course as of 09/12/24 1612   u Sep 12, 2024   1342 WBC: 5.08 [NN]   1400 Platelet Count(!): 78  Evidence of pancytopenia but unchanged from prior labs [NN]   1418 WBC, UA(!): 51 [NN]   1418 RBC, UA(!): >100 [NN]   1418 Leukocyte Esterase, UA(!): 2+ [NN]      ED Course User Index  [NN] Beryl Polanco MD                             Clinical Impression:  Final diagnoses:  [N20.1] Right ureteral stone (Primary)  [R19.7] Diarrhea, unspecified type                 Beryl Polanco MD  09/12/24 1548       Beryl Polanco MD  09/12/24 1600       Beryl Polanco MD  09/12/24 1612

## 2024-09-12 NOTE — TELEPHONE ENCOUNTER
Right ureteral stone  -     Case Request Operating Room: CYSTOSCOPY, WITH RETROGRADE PYELOGRAM, CYSTOSCOPY, WITH CALCULUS REMOVAL, CYSTOURETEROSCOPY, WITH RETROGRADE PYELOGRAM AND URETERAL STENT INSERTION

## 2024-09-12 NOTE — ASSESSMENT & PLAN NOTE
54yF with abdominal pain out of proportion iso CARTAGENA, diarrhea, migrating right ureteral stent, and hydronephrosis    -Recommend admission to hospital medicine  -Urology will be following for stone and stent managemetn  -Plan for URS for stone management and stent replacement/removal tomorrow 09/13/24  -Condition is stable  -Activity as tolerated  -Monitor vitals signs  -NPO at midnight  -multimodal pain control  -Follow up urine, stool cultures     Urology to follow

## 2024-09-12 NOTE — PROGRESS NOTES
CC: right lower abdominal pain, s/p right ureteral stent placement for right hydronephrosis, right distal ureteral stone.    Tee Aranda is a 54 y.o. woman who is here for the evaluation of Post-op Evaluation (Patient is with pain. Hematuria, stent pain. She is out of the medication.)    Her PCP, James Samano MD   Patient has a history of nonalcoholic cirrhosis and CARTAGENA disease with esophageal varices.  Went to ER on 8/30/24 with acute right flank pian.  She was tested positive for COVID at home 8/29/24 contacted her liver doctor at [facility] who recommended IV remdesivir as inpatient as that iswhat she did last time she got COVID she will get laboratory tests here in and consult the liver specialist    Date: 09/04/2024   Pre-Op Diagnosis: Right ureteral stone  Post-Op Diagnosis: same  Procedure(s) Performed:    1. Cystoscopy with right ureteral JJ stent placement  2. Fluoroscopy < 1 h   Specimen(s): urine sent for culture  Staff Surgeon: Chris Carey MD   Assistant Surgeon: Chasidy Man MD  Findings:  Mild pyonephrosis on stent placement.  R ureteral stent placed in standard fashion.    Estimated Blood Loss: min  Drains:  6 Chadian x 26 cm right JJ ureteral stent without strings    Following her surgery, she still experienced diffuse abdominal pain, right lower quadrant abdominal pain, especially with urination.  She finished levaquin given.  C/o watery diarrhea.  No fever or chills.     The history is provided by the patient.   Illness   The current episode started today. The problem occurs rarely. The problem has been unchanged. The pain is at a severity of 10/10. Nothing relieves the symptoms. Nothing aggravates the symptoms. Associated symptoms include cough. Pertinent negatives include no eye itching, no photophobia, no congestion, no ear pain, no mouth sores, no rhinorrhea, no stridor, no swollen glands, no muscle aches and no pain.     She was discharged with pain meds after her ER visit.  She is  doing an urgent visit due to right ureteral stone with renal colic  Denies fever but chills.  No dysuria or other change in urination.  C/o fluid retention with worsening ascites since her kidney stone problem.      Past Medical History:   Diagnosis Date    Anemia, unspecified     Anxiety     Arthritis     Ascites 11/23/2020    AVN (avascular necrosis of bone)     Cataract     COVID-19 vaccine administered     04/08/21, 04/30/21    Diarrhea of presumed infectious origin 7/31/2023    Edema 11/23/2020    Epigastric pain 7/31/2023    Esophageal varices     Glaucoma     Hepatic encephalopathy 11/23/2020    History of colon polyps     Hx of cirrhosis     non-alcoholic    Iron deficiency anemia secondary to blood loss (chronic)     Kidney stones     Portal hypertensive gastropathy     Unintentional weight loss 10/14/2023    Unspecified cirrhosis of liver      Past Surgical History:   Procedure Laterality Date    APPENDECTOMY      COLONOSCOPY N/A 8/3/2023    Procedure: COLONOSCOPY;  Surgeon: Gerard Salinas MD;  Location: [facility];  Service: Endoscopy;  Laterality: N/A;    COLONOSCOPY N/A 5/27/2024    Procedure: COLONOSCOPY;  Surgeon: Eric Garcia MD;  Location: [facility] ([facility]);  Service: Endoscopy;  Laterality: N/A;  Dr. Douglass, egd/colon, weight loss, variceal surveillance and abdominal pain, standard prep, cirrhosis labs ordered    COLONOSCOPY W/ POLYPECTOMY      CYSTOSCOPY N/A 3/20/2024    Procedure: CYSTOSCOPY;  Surgeon: Chris Carey MD;  Location: [facility] OR [address] [place] [address];  Service: Urology;  Laterality: N/A;    CYSTOSCOPY  9/4/2024    Procedure: CYSTOSCOPY;  Surgeon: Chris Carey MD;  Location: [facility] OR [address] [place] [address];  Service: Urology;;    CYSTOSCOPY W/ URETERAL STENT REMOVAL Right 3/14/2024    Procedure: CYSTOSCOPY, WITH URETERAL STENT REMOVAL;  Surgeon: Chris Carey MD;  Location: [facility] OR [address] [place] [address];  Service: Urology;  Laterality: Right;    DISTAL CLAVICLE EXCISION Right 11/18/2021    Procedure: RIGHT  SHOULDER ARTHROSCOPY, EXCISION, CLAVICLE, DISTAL; EXTENSIVE DEBRIDEMENT;  Surgeon: Isaiah Joyner MD;  Location: [facility] OR;  Service: Orthopedics;  Laterality: Right;    ESOPHAGEAL VARICE LIGATION      ESOPHAGOGASTRODUODENOSCOPY N/A 11/10/2020    Procedure: EGD (ESOPHAGOGASTRODUODENOSCOPY);  Surgeon: Gerard Salinas MD;  Location: [facility];  Service: Endoscopy;  Laterality: N/A;    ESOPHAGOGASTRODUODENOSCOPY N/A 12/28/2020    Procedure: EGD (ESOPHAGOGASTRODUODENOSCOPY);  Surgeon: Adryan Park MD;  Location: [facility] (2ND FLR);  Service: Endoscopy;  Laterality: N/A;    ESOPHAGOGASTRODUODENOSCOPY N/A 02/15/2021    Procedure: EGD (ESOPHAGOGASTRODUODENOSCOPY);  Surgeon: Hari Greene MD;  Location: [facility] (4TH FLR);  Service: Endoscopy;  Laterality: N/A;  6 week follow-up variceal banding  Hx varices - Labs - ERW  prep ins. emialed - COVID screening on 2/12/21 [place] - ERW    ESOPHAGOGASTRODUODENOSCOPY Left 08/03/2021    Procedure: EGD (ESOPHAGOGASTRODUODENOSCOPY);  Surgeon: Fabricio Corado MD;  Location: [facility] (4TH FLR);  Service: Gastroenterology;  Laterality: Left;  recent hematemasis. varices-labs on 7/26    COVID test at [facility] on 7/31-GT  requesting sooner    ESOPHAGOGASTRODUODENOSCOPY N/A 07/27/2022    Procedure: EGD (ESOPHAGOGASTRODUODENOSCOPY);  Surgeon: Eric Garcia MD;  Location: [facility] ENDO;  Service: Endoscopy;  Laterality: N/A;    ESOPHAGOGASTRODUODENOSCOPY Left 08/29/2022    Procedure: EGD (ESOPHAGOGASTRODUODENOSCOPY);  Surgeon: Kacy Douglass MD;  Location: [facility] (2ND FLR);  Service: Endoscopy;  Laterality: Left;  Fully vaccinated/ clear liquids up to 4 hrs prior-RB  varices labs ordered-RB    ESOPHAGOGASTRODUODENOSCOPY N/A 10/05/2022    Procedure: EGD (ESOPHAGOGASTRODUODENOSCOPY);  Surgeon: Gerard Salinas MD;  Location: [facility];  Service: Endoscopy;  Laterality: N/A;    ESOPHAGOGASTRODUODENOSCOPY N/A 3/30/2023    Procedure: EGD (ESOPHAGOGASTRODUODENOSCOPY);  Surgeon:  Gerard Salinas MD;  Location: [facility];  Service: Endoscopy;  Laterality: N/A;    ESOPHAGOGASTRODUODENOSCOPY N/A 8/3/2023    Procedure: EGD (ESOPHAGOGASTRODUODENOSCOPY);  Surgeon: Gerard Salinas MD;  Location: [facility] ENDO;  Service: Endoscopy;  Laterality: N/A;    ESOPHAGOGASTRODUODENOSCOPY N/A 5/27/2024    Procedure: EGD (ESOPHAGOGASTRODUODENOSCOPY);  Surgeon: Eric Garcia MD;  Location: [facility] ENDO (4TH FLR);  Service: Endoscopy;  Laterality: N/A;    EXTRACTION - STONE Right 3/20/2024    Procedure: EXTRACTION - STONE;  Surgeon: Chris Carey MD;  Location: [facility] OR 1ST FLR;  Service: Urology;  Laterality: Right;    HYSTERECTOMY      KNEE SURGERY Bilateral     LASER LITHOTRIPSY Right 3/20/2024    Procedure: LITHOTRIPSY, USING LASER;  Surgeon: Chris Carey MD;  Location: [facility] OR 1ST FLR;  Service: Urology;  Laterality: Right;    LITHOTRIPSY      REMOVAL-STENT Right 3/20/2024    Procedure: REMOVAL-STENT;  Surgeon: Chris Carey MD;  Location: [facility] OR 1ST FLR;  Service: Urology;  Laterality: Right;    RETROGRADE PYELOGRAPHY Right 3/20/2024    Procedure: PYELOGRAM, RETROGRADE;  Surgeon: Chris Carey MD;  Location: [facility] OR 1ST FLR;  Service: Urology;  Laterality: Right;    RHINOPLASTY TIP      SHOULDER SURGERY Right     TONSILLECTOMY      TOTAL HIP ARTHROPLASTY Right     URETERAL STENT PLACEMENT Right 3/20/2024    Procedure: INSERTION, STENT, URETER;  Surgeon: Chris Carey MD;  [facility];  Service: Urology;  Laterality: Right;    URETERAL STENT PLACEMENT Right 9/4/2024    Procedure: INSERTION, STENT, URETER;  Surgeon: Chris Carey MD;  [facility];  Service: Urology;  Laterality: Right;    URETEROSCOPY Right 3/20/2024    Procedure: URETEROSCOPY;  Surgeon: Chris Carey MD;  Location: [facility] OR [facility] FLR;  Service: Urology;  Laterality: Right;     Social History     Tobacco Use    Smoking status: Former     Current packs/day: 0.00     Types: Cigarettes     Quit date:  7/3/2019     Years since quittin.2    Smokeless tobacco: Never   Substance Use Topics    Alcohol use: Not Currently    Drug use: No     Family History   Problem Relation Name Age of Onset    No Known Problems Mother      Diabetes Mellitus Maternal Grandmother      Amblyopia Neg Hx      Blindness Neg Hx      Cataracts Neg Hx      Glaucoma Neg Hx      Macular degeneration Neg Hx      Retinal detachment Neg Hx      Strabismus Neg Hx      Colon cancer Neg Hx      Esophageal cancer Neg Hx       Allergy:  Review of patient's allergies indicates:   Allergen Reactions    Sulfa (sulfonamide antibiotics) Hives     Outpatient Encounter Medications as of 2024   Medication Sig Dispense Refill    carvediloL (COREG) 3.125 MG tablet Take 1 tablet (3.125 mg total) by mouth 2 (two) times daily. 180 tablet 3    esomeprazole (NEXIUM) 40 MG capsule TAKE 1 CAPSULE BY MOUTH 2 TIMES DAILY BEFORE MEALS. 180 capsule 3    levoFLOXacin (LEVAQUIN) 500 MG tablet Take 1 tablet (500 mg total) by mouth once daily. for 10 days 10 tablet prn    multivitamin (THERAGRAN) per tablet Take 1 tablet by mouth once daily.      omega-3 fatty acids/fish oil (FISH OIL-OMEGA-3 FATTY ACIDS) 300-1,000 mg capsule Take 1 capsule by mouth once daily.      oxyCODONE (ROXICODONE) 5 MG immediate release tablet Take 1 tablet (5 mg total) by mouth every 4 (four) hours as needed for Pain. 14 tablet 0    phenazopyridine (PYRIDIUM) 200 MG tablet Take 1 tablet (200 mg total) by mouth 3 (three) times daily as needed for Pain. 30 tablet 1    rifAXIMin (XIFAXAN) 550 mg Tab Take 1 tablet (550 mg total) by mouth 2 (two) times daily. 60 tablet 11    tamsulosin (FLOMAX) 0.4 mg Cap Take 1 capsule (0.4 mg total) by mouth once daily. 30 capsule 0    tamsulosin (FLOMAX) 0.4 mg Cap Take 1 capsule (0.4 mg total) by mouth every evening. 30 capsule 11    UNABLE TO FIND 4 (four) times daily as needed. Medible 20 mg blue raspberry 1/4 to 1/2 up to 4 times daily as needed.       [] amoxicillin-clavulanate 875-125mg (AUGMENTIN) 875-125 mg per tablet Take 1 tablet by mouth every 12 (twelve) hours. for 7 days 14 tablet 0    oxybutynin (DITROPAN) 5 MG Tab Take 1 tablet (5 mg total) by mouth 3 (three) times daily as needed (for bladder spasms and stent pain). 21 tablet 0    [] oxyCODONE-acetaminophen (PERCOCET) 5-325 mg per tablet Take 1 tablet by mouth every 8 (eight) hours as needed for Pain. 10 tablet 0     Facility-Administered Encounter Medications as of 2024   Medication Dose Route Frequency Provider Last Rate Last Admin    0.9%  NaCl infusion   Intravenous Continuous P[place]in, Gerard GUERRA MD   Stopped at 23 09     Review of Systems   ROS  Physical Exam     Vitals:    24 1148   BP: 119/71   Pulse: 72     Physical Exam      LABS:  Lab Results   Component Value Date    CREATININE 0.8 2024    CREATININE 0.9 2024    CREATININE 0.9 2024     Urine Culture, Routine   Date Value Ref Range Status   2024 No growth  Final   2024 No growth  Final     Hemoglobin A1C   Date Value Ref Range Status   2022 5.5 4.0 - 5.6 % Final     Comment:     ADA Screening Guidelines:  5.7-6.4%  Consistent with prediabetes  >or=6.5%  Consistent with diabetes    High levels of fetal hemoglobin interfere with the HbA1C  assay. Heterozygous hemoglobin variants (HbS, HgC, etc)do  not significantly interfere with this assay.   However, presence of multiple variants may affect accuracy.       Radiology:  CT RSS 24  Few tiny nonobstructing right renal stones.  Ureteral vesicular junctions are not well evaluated secondary to streak artifact from right hip arthroplasty.  Similar right-sided hydronephrosis and proximal hydroureter with interval placement of a right double-J ureteral stent.  Proximal in coils about the proximal ureter, and distal and coils about the urinary bladder.  Correlation is advised.     Hepatosplenomegaly with cirrhotic liver  morphology.  No focal hepatic lesions, noting limitations from noncontrast examination.     Similar nonspecific colonic wall thickening and surrounding inflammatory change about the ascending colon.  Findings may relate to chronic infectious or non infectious inflammatory process, noting that underlying malignancy cannot be excluded.  Correlation is advised.    CT RSS 8/30/24  Obstructive uropathy by 3 mm calculus in the distal 3rd of the right ureter a few cm above the UVJ.     Stables hepatosplenomegaly.  Assessment and Plan:  Tee was seen today for post-op evaluation.    Diagnoses and all orders for this visit:    Right ureteral stone    Hydronephrosis, right  -     Comprehensive Metabolic Panel; Future  -     Uric Acid; Future    Ureteral stent retained    Diarrhea of presumed infectious origin    Liver cirrhosis secondary to CARTAGENA      C/o severe pain in the right lower quadrant abdomen.  This pain may be related to right ureteral stent.  Will plan right ureteroscopy with stone removal tomorrow, and either removal or replacement of right ureteral stent.  If replaced, will leave it with its string attached, so that pt can remove it in a few days after her surgery.    She was on levaquin following her recent surgery.  Now she has watery diarrhea and diffuse abdominal pain, which she had it before her surgery.  She can go to the ER to be evaluated.  R/o C. Diff. Infection.    I will still plan her surgery tomorrow since she has been in pain and miserable with right lower abdominal pain.  All questions answered.  CMP and Uric acid today.    I spent 40 minutes with the patient of which more than half was spent in direct consultation with the patient in regards to our treatment and plan.   Follow-up:  Follow up in about 1 day (around 9/13/2024), or right ureteroscopy stone remoal, cysto ureteral stent exchange, right RPG.

## 2024-09-12 NOTE — SUBJECTIVE & OBJECTIVE
Past Medical History:   Diagnosis Date    Anemia, unspecified     Anxiety     Arthritis     Ascites 11/23/2020    AVN (avascular necrosis of bone)     Cataract     COVID-19 vaccine administered     04/08/21, 04/30/21    Diarrhea of presumed infectious origin 7/31/2023    Edema 11/23/2020    Epigastric pain 7/31/2023    Esophageal varices     Glaucoma     Hepatic encephalopathy 11/23/2020    History of colon polyps     Hx of cirrhosis     non-alcoholic    Iron deficiency anemia secondary to blood loss (chronic)     Kidney stones     Portal hypertensive gastropathy     Unintentional weight loss 10/14/2023    Unspecified cirrhosis of liver        Past Surgical History:   Procedure Laterality Date    APPENDECTOMY      COLONOSCOPY N/A 8/3/2023    Procedure: COLONOSCOPY;  Surgeon: Gerard Salinas MD;  Location: [facility];  Service: Endoscopy;  Laterality: N/A;    COLONOSCOPY N/A 5/27/2024    Procedure: COLONOSCOPY;  Surgeon: Eric Garcia MD;  Location: [facility] (4TH FLR);  Service: Endoscopy;  Laterality: N/A;  Dr. Douglass, egd/colon, weight loss, variceal surveillance and abdominal pain, standard prep, cirrhosis labs ordered    COLONOSCOPY W/ POLYPECTOMY      CYSTOSCOPY N/A 3/20/2024    Procedure: CYSTOSCOPY;  Surgeon: Chris Carey MD;  [facility];  Service: Urology;  Laterality: N/A;    CYSTOSCOPY  9/4/2024    Procedure: CYSTOSCOPY;  Surgeon: Chris Carey MD;  [facility];  Service: Urology;;    CYSTOSCOPY W/ URETERAL STENT REMOVAL Right 3/14/2024    Procedure: CYSTOSCOPY, WITH URETERAL STENT REMOVAL;  Surgeon: Chris Carey MD;  [facility];  Service: Urology;  Laterality: Right;    DISTAL CLAVICLE EXCISION Right 11/18/2021    Procedure: RIGHT SHOULDER ARTHROSCOPY, EXCISION, CLAVICLE, DISTAL; EXTENSIVE DEBRIDEMENT;  Surgeon: Isaiah Joyner MD;  Location: [facility];  Service: Orthopedics;  Laterality: Right;    ESOPHAGEAL VARICE LIGATION       ESOPHAGOGASTRODUODENOSCOPY N/A 11/10/2020    Procedure: EGD (ESOPHAGOGASTRODUODENOSCOPY);  Surgeon: Gerard Salinas MD;  Location: [facility];  Service: Endoscopy;  Laterality: N/A;    ESOPHAGOGASTRODUODENOSCOPY N/A 12/28/2020    Procedure: EGD (ESOPHAGOGASTRODUODENOSCOPY);  Surgeon: Adryan Park MD;  Location: [facility] (2ND FLR);  Service: Endoscopy;  Laterality: N/A;    ESOPHAGOGASTRODUODENOSCOPY N/A 02/15/2021    Procedure: EGD (ESOPHAGOGASTRODUODENOSCOPY);  Surgeon: Hari Greene MD;  Location: [facility] (4TH FLR);  Service: Endoscopy;  Laterality: N/A;  6 week follow-up variceal banding  Hx varices - Labs - ERW  prep ins. emialed - COVID screening on 2/12/21 [place] - ERW    ESOPHAGOGASTRODUODENOSCOPY Left 08/03/2021    Procedure: EGD (ESOPHAGOGASTRODUODENOSCOPY);  Surgeon: Fabricio Corado MD;  Location: [facility] (4TH FLR);  Service: Gastroenterology;  Laterality: Left;  recent hematemasis. varices-labs on 7/26    COVID test at [facility] on 7/31-GT  requesting sooner    ESOPHAGOGASTRODUODENOSCOPY N/A 07/27/2022    Procedure: EGD (ESOPHAGOGASTRODUODENOSCOPY);  Surgeon: Eric Garcia MD;  Location: [facility];  Service: Endoscopy;  Laterality: N/A;    ESOPHAGOGASTRODUODENOSCOPY Left 08/29/2022    Procedure: EGD (ESOPHAGOGASTRODUODENOSCOPY);  Surgeon: Kacy Douglass MD;  Location: [facility] (2ND FLR);  Service: Endoscopy;  Laterality: Left;  Fully vaccinated/ clear liquids up to 4 hrs prior-RB  varices labs ordered-RB    ESOPHAGOGASTRODUODENOSCOPY N/A 10/05/2022    Procedure: EGD (ESOPHAGOGASTRODUODENOSCOPY);  Surgeon: Gerard Salinas MD;  Location: [facility];  Service: Endoscopy;  Laterality: N/A;    ESOPHAGOGASTRODUODENOSCOPY N/A 3/30/2023    Procedure: EGD (ESOPHAGOGASTRODUODENOSCOPY);  Surgeon: Gerard Salinas MD;  Location: [facility];  Service: Endoscopy;  Laterality: N/A;    ESOPHAGOGASTRODUODENOSCOPY N/A 8/3/2023    Procedure: EGD (ESOPHAGOGASTRODUODENOSCOPY);  Surgeon: Gerard Salinas  MD MARI;  Location: [facility] ENDO;  Service: Endoscopy;  Laterality: N/A;    ESOPHAGOGASTRODUODENOSCOPY N/A 5/27/2024    Procedure: EGD (ESOPHAGOGASTRODUODENOSCOPY);  Surgeon: Eric Garcia MD;  Location: [facility] ENDO (4TH FLR);  Service: Endoscopy;  Laterality: N/A;    EXTRACTION - STONE Right 3/20/2024    Procedure: EXTRACTION - STONE;  Surgeon: Chris Carey MD;  Location: [facility] OR 1ST FLR;  Service: Urology;  Laterality: Right;    HYSTERECTOMY      KNEE SURGERY Bilateral     LASER LITHOTRIPSY Right 3/20/2024    Procedure: LITHOTRIPSY, USING LASER;  Surgeon: Chris Carey MD;  [facility];  Service: Urology;  Laterality: Right;    LITHOTRIPSY      REMOVAL-STENT Right 3/20/2024    Procedure: REMOVAL-STENT;  Surgeon: Chris Carey MD;  [facility];  Service: Urology;  Laterality: Right;    RETROGRADE PYELOGRAPHY Right 3/20/2024    Procedure: PYELOGRAM, RETROGRADE;  Surgeon: Chris Carey MD;  [facility];  Service: Urology;  Laterality: Right;    RHINOPLASTY TIP      SHOULDER SURGERY Right     TONSILLECTOMY      TOTAL HIP ARTHROPLASTY Right     URETERAL STENT PLACEMENT Right 3/20/2024    Procedure: INSERTION, STENT, URETER;  Surgeon: Chris Carey MD;  [facility];  Service: Urology;  Laterality: Right;    URETERAL STENT PLACEMENT Right 9/4/2024    Procedure: INSERTION, STENT, URETER;  Surgeon: Chris Carey MD;  [facility];  Service: Urology;  Laterality: Right;    URETEROSCOPY Right 3/20/2024    Procedure: URETEROSCOPY;  Surgeon: Chris Carey MD;  [facility];  Service: Urology;  Laterality: Right;       Review of patient's allergies indicates:   Allergen Reactions    Sulfa (sulfonamide antibiotics) Hives       Family History       Problem Relation (Age of Onset)    Diabetes Mellitus Maternal Grandmother    No Known Problems Mother            Tobacco Use    Smoking status: Former     Current packs/day: 0.00     Types:  Cigarettes     Quit date: 7/3/2019     Years since quittin.2    Smokeless tobacco: Never   Substance and Sexual Activity    Alcohol use: Not Currently    Drug use: No    Sexual activity: Not on file       Review of Systems    Objective:     Temp:  [97.7 °F (36.5 °C)] 97.7 °F (36.5 °C)  Pulse:  [71-75] 75  Resp:  [20] 20  SpO2:  [100 %] 100 %  BP: (119-135)/(63-71) 128/67  Weight: 61.7 kg (136 lb)  Body mass index is 20.68 kg/m².           Drains       Drain  Duration             Female External Urinary Catheter w/ Suction 24 1440 <1 day                     Physical Exam     Significant Labs:    BMP:  Recent Labs   Lab 24  1306      K 3.9      CO2 24   BUN 19   CREATININE 0.8   CALCIUM 9.8       CBC:  Recent Labs   Lab 24  1306   WBC 5.08   HGB 12.7   HCT 38.6   PLT 78*       All pertinent labs results from the past 24 hours have been reviewed.    Significant Imaging:  All pertinent imaging results/findings from the past 24 hours have been reviewed.    CT RENAL STONE STUDY ABD PELVIS WO     CLINICAL HISTORY:  Nephrolithiasis, uncomplicated;     TECHNIQUE:  Routine axial CT images of the abdomen and pelvis were obtained without the use of IV contrast.   Sagittal and coronal reformatted images were also acquired.     COMPARISON:  CT renal stone study 2024     FINDINGS:  The heart is normal in size with no pericardial effusion.     Lung bases are clear.     Enlarged liver which demonstrates heterogeneous attenuation and subtle nodular contour, suggesting cirrhosis.  No suspicious focal hepatic lesions, noting limitations from noncontrast examination.     Punctate gallstone without evidence for significant gallbladder wall thickening or pericholecystic fluid.     The spleen is enlarged in size measuring 15.7 cm.  No focal splenic lesions.  The adrenal glands and pancreas are unremarkable.     Kidneys are normal in size and location.  Few scattered punctate densities about the right  greater than left kidney, in keeping with multiple renal stones.  Overall, there are approximately 3-4 tiny right renal collecting stones.  Similar right-sided hydronephrosis, with a right double-J ureteral stent.  Proximal in coils about the proximal ureter (series 2, image 79), and distal and coils in the urinary bladder.  Left ureter is unremarkable.  Urinary bladder demonstrates no significant abnormality.  Ureteral vesicular junctions are not well evaluated secondary to streak artifact from right hip arthroplasty.     Uterus is surgically absent.     The stomach and duodenum are unremarkable.  Loops of small bowel demonstrate no evidence for obstructive or inflammatory process.  Appendix is unremarkable.  Mild wall thickening about the ascending colon with focal surrounding inflammatory change about the surrounding mesentery, slightly more conspicuous when comparison CT 08/30/2024.  Remainder of the colon is unremarkable.     No abdominopelvic ascites.  No free intra-abdominal air.  No mesenteric or retroperitoneal lymphadenopathy.     Moderate calcific atherosclerosis of the visualized aorta.     Extra-abdominal soft tissues are unremarkable.     Osseous structures demonstrate no evidence for acute fracture or osseous destructive lesion.  Similar mild loss of height about the superior L3 endplate, as well as scattered degenerative change.  Right hip arthroplasty.  Hardware appears grossly intact.     Impression:     Few tiny nonobstructing right renal stones.  Ureteral vesicular junctions are not well evaluated secondary to streak artifact from right hip arthroplasty.  Similar right-sided hydronephrosis and proximal hydroureter with interval placement of a right double-J ureteral stent.  Proximal in coils about the proximal ureter, and distal and coils about the urinary bladder.  Correlation is advised.     Hepatosplenomegaly with cirrhotic liver morphology.  No focal hepatic lesions, noting limitations from  noncontrast examination.     Similar nonspecific colonic wall thickening and surrounding inflammatory change about the ascending colon.  Findings may relate to chronic infectious or non infectious inflammatory process, noting that underlying malignancy cannot be excluded.  Correlation is advised.
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
## 2024-09-12 NOTE — FIRST PROVIDER EVALUATION
Emergency Department TeleTriage Encounter Note      CHIEF COMPLAINT    Chief Complaint   Patient presents with    Post-op Problem     Stent placement in ureter one week ago; now hematuria with diarrhea. Was on recent abx and concern for C Diff. Hx of Cirrhosis        VITAL SIGNS   Initial Vitals [09/12/24 1233]   BP Pulse Resp Temp SpO2   135/63 71 20 97.7 °F (36.5 °C) 100 %      MAP       --            ALLERGIES    Review of patient's allergies indicates:   Allergen Reactions    Sulfa (sulfonamide antibiotics) Hives       PROVIDER TRIAGE NOTE  Verbal consent for the teletriage evaluation was given by the patient at the start of the evaluation.  All efforts will be made to maintain patient's privacy during the evaluation.      This is a teletriage evaluation of a 54 y.o. female presenting to the ED with c/o ureter stent placed 7 days ago having severe pain.  Sent in to the ED by Urology for abnormal CT scan done today. Limited physical exam via telehealth: The patient is awake, alert, answering questions appropriately and is not in respiratory distress.  As the Teletriage provider, I performed an initial assessment and ordered appropriate labs and imaging studies, if any, to facilitate the patient's care once placed in the ED. Once a room is available, care and a full evaluation will be completed by an alternate ED provider.  Any additional orders and the final disposition will be determined by that provider.  All imaging and labs will not be followed-up by the Teletriage Team, including myself.          ORDERS  Labs Reviewed - No data to display    ED Orders (720h ago, onward)      Start Ordered     Status Ordering Provider    09/12/24 1302 09/12/24 1301  Vital signs  Every 2 hours         Ordered MOOKIE CAMPBELL    09/12/24 1302 09/12/24 1301  Lactic acid, plasma  STAT         Ordered MOOKIE CAMPBELL    09/12/24 1301 09/12/24 1301  Diet NPO  Diet effective now         Ordered MOOKIE CAMPBELL    09/12/24 1301 09/12/24  1301  Insert peripheral IV  Once         Ordered MOOKIE CAMPBELL    09/12/24 1301 09/12/24 1301  CBC W/ AUTO DIFFERENTIAL  STAT         Ordered MOOKIE CAMPBELL    09/12/24 1301 09/12/24 1301  Comp. Metabolic Panel  STAT         Ordered MOOKIE CAMPBELL    09/12/24 1301 09/12/24 1301  Urinalysis, Reflex to Urine Culture Urine, Clean Catch  STAT         Ordered MOOKIE CAMPBELL JONNY              Virtual Visit Note: The provider triage portion of this emergency department evaluation and documentation was performed via [employer], a HIPAA-compliant telemedicine application, in concert with a tele-presenter in the room. A face to face patient evaluation with one of my colleagues will occur once the patient is placed in an emergency department room.      DISCLAIMER: This note was prepared with [employer] voice recognition transcription software. Garbled syntax, mangled pronouns, and other bizarre constructions may be attributed to that software system.
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
## 2024-09-12 NOTE — ASSESSMENT & PLAN NOTE
Patient with known Cirrhosis with Child's class Calculator for Child's score link- https://www.[employer].com/calc/340/child-jenkins-score-cirrhosis-mortality#creator-insights.   MELD-Na score calculated; MELD 3.0: 9 at 7/8/2024  9:22 AM  MELD-Na: 8 at 7/8/2024  9:22 AM  Calculated from:  Serum Creatinine: 0.9 mg/dL (Using min of 1 mg/dL) at 7/8/2024  9:22 AM  Serum Sodium: 139 mmol/L (Using max of 137 mmol/L) at 7/8/2024  9:22 AM  Total Bilirubin: 0.9 mg/dL (Using min of 1 mg/dL) at 7/8/2024  9:22 AM  Serum Albumin: 4.0 g/dL (Using max of 3.5 g/dL) at 7/8/2024  9:22 AM  INR(ratio): 1.2 at 7/8/2024  9:22 AM  Age at listing (hypothetical): 54 years  Sex: Female at 7/8/2024  9:22 AM      Continue chronic meds. Etiology likely NAFLD. Will avoid any hepatotoxic meds, and monitor CBC/CMP/INR for synthetic function.   Compensated cirrhosis     - continue pantoprazole  - off diuretics according to past hospital notes

## 2024-09-12 NOTE — ED TRIAGE NOTES
Tee Aranda, a 54 y.o. female presents to the ED w/ complaint of abd  pain.  Stents placed about 1 week ago. Hematuria and urgency.  Ws told she has possible c. Diff.  Hx nonalcoholic cirrhosis.    Triage note:  Chief Complaint   Patient presents with    Post-op Problem     Stent placement in ureter one week ago; now hematuria with diarrhea. Was on recent abx and concern for C Diff. Hx of Cirrhosis      Review of patient's allergies indicates:   Allergen Reactions    Sulfa (sulfonamide antibiotics) Hives     Past Medical History:   Diagnosis Date    Anemia, unspecified     Anxiety     Arthritis     Ascites 11/23/2020    AVN (avascular necrosis of bone)     Cataract     COVID-19 vaccine administered     04/08/21, 04/30/21    Diarrhea of presumed infectious origin 7/31/2023    Edema 11/23/2020    Epigastric pain 7/31/2023    Esophageal varices     Glaucoma     Hepatic encephalopathy 11/23/2020    History of colon polyps     Hx of cirrhosis     non-alcoholic    Iron deficiency anemia secondary to blood loss (chronic)     Kidney stones     Portal hypertensive gastropathy     Unintentional weight loss 10/14/2023    Unspecified cirrhosis of liver
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
## 2024-09-13 ENCOUNTER — ANESTHESIA (OUTPATIENT)
Dept: SURGERY | Facility: HOSPITAL | Age: 54
End: 2024-09-13
Payer: COMMERCIAL

## 2024-09-13 VITALS
OXYGEN SATURATION: 97 % | BODY MASS INDEX: 20.76 KG/M2 | SYSTOLIC BLOOD PRESSURE: 111 MMHG | RESPIRATION RATE: 18 BRPM | DIASTOLIC BLOOD PRESSURE: 66 MMHG | HEART RATE: 76 BPM | HEIGHT: 68 IN | TEMPERATURE: 99 F | WEIGHT: 137 LBS

## 2024-09-13 LAB
ALBUMIN SERPL BCP-MCNC: 3.2 G/DL (ref 3.5–5.2)
ALP SERPL-CCNC: 76 U/L (ref 55–135)
ALT SERPL W/O P-5'-P-CCNC: 21 U/L (ref 10–44)
ANION GAP SERPL CALC-SCNC: 7 MMOL/L (ref 8–16)
AST SERPL-CCNC: 19 U/L (ref 10–40)
BACTERIA UR CULT: NO GROWTH
BASOPHILS # BLD AUTO: 0.01 K/UL (ref 0–0.2)
BASOPHILS NFR BLD: 0.5 % (ref 0–1.9)
BILIRUB SERPL-MCNC: 0.7 MG/DL (ref 0.1–1)
BUN SERPL-MCNC: 14 MG/DL (ref 6–20)
C DIFF GDH STL QL: NEGATIVE
C DIFF TOX A+B STL QL IA: NEGATIVE
CALCIUM SERPL-MCNC: 8.6 MG/DL (ref 8.7–10.5)
CHLORIDE SERPL-SCNC: 107 MMOL/L (ref 95–110)
CO2 SERPL-SCNC: 26 MMOL/L (ref 23–29)
CREAT SERPL-MCNC: 0.8 MG/DL (ref 0.5–1.4)
DIFFERENTIAL METHOD BLD: ABNORMAL
E COLI SXT1 STL QL IA: NEGATIVE
E COLI SXT2 STL QL IA: NEGATIVE
EOSINOPHIL # BLD AUTO: 0.1 K/UL (ref 0–0.5)
EOSINOPHIL NFR BLD: 2.3 % (ref 0–8)
ERYTHROCYTE [DISTWIDTH] IN BLOOD BY AUTOMATED COUNT: 13.1 % (ref 11.5–14.5)
EST. GFR  (NO RACE VARIABLE): >60 ML/MIN/1.73 M^2
GLUCOSE SERPL-MCNC: 104 MG/DL (ref 70–110)
HCT VFR BLD AUTO: 32.5 % (ref 37–48.5)
HGB BLD-MCNC: 10.7 G/DL (ref 12–16)
IMM GRANULOCYTES # BLD AUTO: 0.01 K/UL (ref 0–0.04)
IMM GRANULOCYTES NFR BLD AUTO: 0.5 % (ref 0–0.5)
LYMPHOCYTES # BLD AUTO: 0.6 K/UL (ref 1–4.8)
LYMPHOCYTES NFR BLD: 28.4 % (ref 18–48)
MCH RBC QN AUTO: 28.9 PG (ref 27–31)
MCHC RBC AUTO-ENTMCNC: 32.9 G/DL (ref 32–36)
MCV RBC AUTO: 88 FL (ref 82–98)
MONOCYTES # BLD AUTO: 0.2 K/UL (ref 0.3–1)
MONOCYTES NFR BLD: 10.6 % (ref 4–15)
NEUTROPHILS # BLD AUTO: 1.3 K/UL (ref 1.8–7.7)
NEUTROPHILS NFR BLD: 57.7 % (ref 38–73)
NRBC BLD-RTO: 0 /100 WBC
PLATELET # BLD AUTO: 63 K/UL (ref 150–450)
PMV BLD AUTO: 11.6 FL (ref 9.2–12.9)
POTASSIUM SERPL-SCNC: 3.5 MMOL/L (ref 3.5–5.1)
PROT SERPL-MCNC: 5.7 G/DL (ref 6–8.4)
RBC # BLD AUTO: 3.7 M/UL (ref 4–5.4)
SODIUM SERPL-SCNC: 140 MMOL/L (ref 136–145)
SPECIMEN SOURCE: NORMAL
WBC # BLD AUTO: 2.18 K/UL (ref 3.9–12.7)

## 2024-09-13 PROCEDURE — 36000706: Performed by: UROLOGY

## 2024-09-13 PROCEDURE — 52353 CYSTOURETERO W/LITHOTRIPSY: CPT | Mod: RT,,, | Performed by: UROLOGY

## 2024-09-13 PROCEDURE — D9220A PRA ANESTHESIA: Mod: CRNA,,, | Performed by: NURSE ANESTHETIST, CERTIFIED REGISTERED

## 2024-09-13 PROCEDURE — 63600175 PHARM REV CODE 636 W HCPCS: Performed by: NURSE ANESTHETIST, CERTIFIED REGISTERED

## 2024-09-13 PROCEDURE — D9220A PRA ANESTHESIA: Mod: ANES,,, | Performed by: STUDENT IN AN ORGANIZED HEALTH CARE EDUCATION/TRAINING PROGRAM

## 2024-09-13 PROCEDURE — 25500020 PHARM REV CODE 255: Performed by: UROLOGY

## 2024-09-13 PROCEDURE — 71000015 HC POSTOP RECOV 1ST HR: Performed by: UROLOGY

## 2024-09-13 PROCEDURE — 71000044 HC DOSC ROUTINE RECOVERY FIRST HOUR: Performed by: UROLOGY

## 2024-09-13 PROCEDURE — 27201423 OPTIME MED/SURG SUP & DEVICES STERILE SUPPLY: Performed by: UROLOGY

## 2024-09-13 PROCEDURE — 25000003 PHARM REV CODE 250: Performed by: NURSE ANESTHETIST, CERTIFIED REGISTERED

## 2024-09-13 PROCEDURE — C1758 CATHETER, URETERAL: HCPCS | Performed by: UROLOGY

## 2024-09-13 PROCEDURE — 36415 COLL VENOUS BLD VENIPUNCTURE: CPT | Performed by: STUDENT IN AN ORGANIZED HEALTH CARE EDUCATION/TRAINING PROGRAM

## 2024-09-13 PROCEDURE — 63600175 PHARM REV CODE 636 W HCPCS: Performed by: STUDENT IN AN ORGANIZED HEALTH CARE EDUCATION/TRAINING PROGRAM

## 2024-09-13 PROCEDURE — 0TC68ZZ EXTIRPATION OF MATTER FROM RIGHT URETER, VIA NATURAL OR ARTIFICIAL OPENING ENDOSCOPIC: ICD-10-PCS | Performed by: UROLOGY

## 2024-09-13 PROCEDURE — BT1D1ZZ FLUOROSCOPY OF RIGHT KIDNEY, URETER AND BLADDER USING LOW OSMOLAR CONTRAST: ICD-10-PCS | Performed by: UROLOGY

## 2024-09-13 PROCEDURE — 0TP98DZ REMOVAL OF INTRALUMINAL DEVICE FROM URETER, VIA NATURAL OR ARTIFICIAL OPENING ENDOSCOPIC: ICD-10-PCS | Performed by: UROLOGY

## 2024-09-13 PROCEDURE — C1769 GUIDE WIRE: HCPCS | Performed by: UROLOGY

## 2024-09-13 PROCEDURE — 85025 COMPLETE CBC W/AUTO DIFF WBC: CPT | Performed by: STUDENT IN AN ORGANIZED HEALTH CARE EDUCATION/TRAINING PROGRAM

## 2024-09-13 PROCEDURE — 36000707: Performed by: UROLOGY

## 2024-09-13 PROCEDURE — 25000003 PHARM REV CODE 250: Performed by: STUDENT IN AN ORGANIZED HEALTH CARE EDUCATION/TRAINING PROGRAM

## 2024-09-13 PROCEDURE — 37000009 HC ANESTHESIA EA ADD 15 MINS: Performed by: UROLOGY

## 2024-09-13 PROCEDURE — 25000003 PHARM REV CODE 250: Performed by: INTERNAL MEDICINE

## 2024-09-13 PROCEDURE — 74420 UROGRAPHY RTRGR +-KUB: CPT | Mod: 26,,, | Performed by: UROLOGY

## 2024-09-13 PROCEDURE — 37000008 HC ANESTHESIA 1ST 15 MINUTES: Performed by: UROLOGY

## 2024-09-13 PROCEDURE — 82365 CALCULUS SPECTROSCOPY: CPT | Performed by: STUDENT IN AN ORGANIZED HEALTH CARE EDUCATION/TRAINING PROGRAM

## 2024-09-13 PROCEDURE — 80053 COMPREHEN METABOLIC PANEL: CPT | Performed by: STUDENT IN AN ORGANIZED HEALTH CARE EDUCATION/TRAINING PROGRAM

## 2024-09-13 RX ORDER — LIDOCAINE HYDROCHLORIDE 20 MG/ML
INJECTION, SOLUTION EPIDURAL; INFILTRATION; INTRACAUDAL; PERINEURAL
Status: DISCONTINUED | OUTPATIENT
Start: 2024-09-13 | End: 2024-09-13

## 2024-09-13 RX ORDER — OXYCODONE HYDROCHLORIDE 5 MG/1
5 TABLET ORAL EVERY 4 HOURS PRN
Status: DISCONTINUED | OUTPATIENT
Start: 2024-09-13 | End: 2024-09-13 | Stop reason: HOSPADM

## 2024-09-13 RX ORDER — ONDANSETRON HYDROCHLORIDE 2 MG/ML
INJECTION, SOLUTION INTRAVENOUS
Status: DISCONTINUED | OUTPATIENT
Start: 2024-09-13 | End: 2024-09-13

## 2024-09-13 RX ORDER — PROPOFOL 10 MG/ML
VIAL (ML) INTRAVENOUS
Status: DISCONTINUED | OUTPATIENT
Start: 2024-09-13 | End: 2024-09-13

## 2024-09-13 RX ORDER — DEXAMETHASONE SODIUM PHOSPHATE 4 MG/ML
INJECTION, SOLUTION INTRA-ARTICULAR; INTRALESIONAL; INTRAMUSCULAR; INTRAVENOUS; SOFT TISSUE
Status: DISCONTINUED | OUTPATIENT
Start: 2024-09-13 | End: 2024-09-13

## 2024-09-13 RX ORDER — MIDAZOLAM HYDROCHLORIDE 1 MG/ML
INJECTION INTRAMUSCULAR; INTRAVENOUS
Status: DISCONTINUED | OUTPATIENT
Start: 2024-09-13 | End: 2024-09-13

## 2024-09-13 RX ORDER — OXYCODONE AND ACETAMINOPHEN 7.5; 325 MG/1; MG/1
1 TABLET ORAL EVERY 8 HOURS PRN
Qty: 15 TABLET | Refills: 0 | Status: SHIPPED | OUTPATIENT
Start: 2024-09-13 | End: 2024-09-18

## 2024-09-13 RX ORDER — FENTANYL CITRATE 50 UG/ML
INJECTION, SOLUTION INTRAMUSCULAR; INTRAVENOUS
Status: DISCONTINUED | OUTPATIENT
Start: 2024-09-13 | End: 2024-09-13

## 2024-09-13 RX ORDER — PHENYLEPHRINE HYDROCHLORIDE 10 MG/ML
INJECTION INTRAVENOUS
Status: DISCONTINUED | OUTPATIENT
Start: 2024-09-13 | End: 2024-09-13

## 2024-09-13 RX ORDER — OXYCODONE HYDROCHLORIDE 5 MG/1
5 TABLET ORAL EVERY 6 HOURS PRN
Qty: 16 TABLET | Refills: 0 | Status: SHIPPED | OUTPATIENT
Start: 2024-09-13 | End: 2024-09-13 | Stop reason: HOSPADM

## 2024-09-13 RX ORDER — FENTANYL CITRATE 50 UG/ML
25 INJECTION, SOLUTION INTRAMUSCULAR; INTRAVENOUS EVERY 5 MIN PRN
Status: COMPLETED | OUTPATIENT
Start: 2024-09-13 | End: 2024-09-13

## 2024-09-13 RX ORDER — ROCURONIUM BROMIDE 10 MG/ML
INJECTION, SOLUTION INTRAVENOUS
Status: DISCONTINUED | OUTPATIENT
Start: 2024-09-13 | End: 2024-09-13

## 2024-09-13 RX ADMIN — ONDANSETRON 8 MG: 2 INJECTION INTRAMUSCULAR; INTRAVENOUS at 11:09

## 2024-09-13 RX ADMIN — LIDOCAINE HYDROCHLORIDE 60 MG: 20 INJECTION, SOLUTION EPIDURAL; INFILTRATION; INTRACAUDAL; PERINEURAL at 11:09

## 2024-09-13 RX ADMIN — HYDROMORPHONE HYDROCHLORIDE 0.2 MG: 1 INJECTION, SOLUTION INTRAMUSCULAR; INTRAVENOUS; SUBCUTANEOUS at 02:09

## 2024-09-13 RX ADMIN — OXYCODONE HYDROCHLORIDE 5 MG: 5 TABLET ORAL at 05:09

## 2024-09-13 RX ADMIN — DEXAMETHASONE SODIUM PHOSPHATE 8 MG: 4 INJECTION, SOLUTION INTRAMUSCULAR; INTRAVENOUS at 11:09

## 2024-09-13 RX ADMIN — HYDROMORPHONE HYDROCHLORIDE 0.2 MG: 1 INJECTION, SOLUTION INTRAMUSCULAR; INTRAVENOUS; SUBCUTANEOUS at 12:09

## 2024-09-13 RX ADMIN — CEFTRIAXONE 1 G: 1 INJECTION, POWDER, FOR SOLUTION INTRAMUSCULAR; INTRAVENOUS at 08:09

## 2024-09-13 RX ADMIN — ROCURONIUM BROMIDE 50 MG: 10 INJECTION, SOLUTION INTRAVENOUS at 11:09

## 2024-09-13 RX ADMIN — FENTANYL CITRATE 25 MCG: 50 INJECTION, SOLUTION INTRAMUSCULAR; INTRAVENOUS at 12:09

## 2024-09-13 RX ADMIN — PROPOFOL 100 MG: 10 INJECTION, EMULSION INTRAVENOUS at 11:09

## 2024-09-13 RX ADMIN — PHENYLEPHRINE HYDROCHLORIDE 200 MCG: 10 INJECTION INTRAVENOUS at 11:09

## 2024-09-13 RX ADMIN — PANTOPRAZOLE SODIUM 40 MG: 40 TABLET, DELAYED RELEASE ORAL at 08:09

## 2024-09-13 RX ADMIN — OXYCODONE 5 MG: 5 TABLET ORAL at 01:09

## 2024-09-13 RX ADMIN — SUGAMMADEX 124 MG: 100 INJECTION, SOLUTION INTRAVENOUS at 11:09

## 2024-09-13 RX ADMIN — MIDAZOLAM HYDROCHLORIDE 2 MG: 2 INJECTION, SOLUTION INTRAMUSCULAR; INTRAVENOUS at 10:09

## 2024-09-13 RX ADMIN — FENTANYL CITRATE 50 MCG: 50 INJECTION, SOLUTION INTRAMUSCULAR; INTRAVENOUS at 11:09

## 2024-09-13 RX ADMIN — PHENYLEPHRINE HYDROCHLORIDE 100 MCG: 10 INJECTION INTRAVENOUS at 11:09

## 2024-09-13 RX ADMIN — OXYCODONE 5 MG: 5 TABLET ORAL at 08:09

## 2024-09-13 RX ADMIN — RIFAXIMIN 550 MG: 550 TABLET ORAL at 08:09

## 2024-09-13 RX ADMIN — OXYCODONE 5 MG: 5 TABLET ORAL at 03:09

## 2024-09-13 RX ADMIN — TAMSULOSIN HYDROCHLORIDE 0.8 MG: 0.4 CAPSULE ORAL at 08:09

## 2024-09-13 NOTE — NURSING
Received pt awake, alert, oriented x4, w/ spontaneous non-labored breathing, w/ spouse at bedside, via stretcher from the ED. Pt on room air. Pt w/ a 20g PIV on the right AC, intact. No skin issues on the sacral area and heels noted. Pt w/ multiple scattered bruises on the bilateral upper arms. Pt says it was from prior peripheral IV insertions and labworks. Purewick replaced. Pt instructed to call for assistance w/ mobility. Bed alarm on, side rails raised, height of bed lowered, adequate lighting provided, and environmental surveillance done. Pt and pt's spouse oriented to room and call light. Pt admits to lower abdominal pain and right posterior flank pain, sharp and stabbing at 7-8/10. Pt denies chest pain, SOB/, n/v, dizziness and/or headache. Pt reminded regarding NPO status to start at midnight. Snacks and drinks given. All needs attended.

## 2024-09-13 NOTE — TRANSFER OF CARE
"Anesthesia Transfer of Care Note    Patient: Tee Aranda    Procedure(s) Performed: Procedure(s) (LRB):  CYSTOSCOPY, WITH RETROGRADE PYELOGRAM (Right)  CYSTOSCOPY, WITH CALCULUS REMOVAL (Right)  LITHOTRIPSY, USING LASER (Right)  URETEROSCOPY (Right)  REMOVAL-STENT (Right)    Patient location: PACU    Anesthesia Type: general    Transport from OR: Transported from OR on 6-10 L/min O2 by face mask with adequate spontaneous ventilation    Post pain: adequate analgesia    Post assessment: no apparent anesthetic complications    Post vital signs: stable    Level of consciousness: awake, alert and oriented    Nausea/Vomiting: no nausea/vomiting    Complications: none    Transfer of care protocol was followed      Last vitals: Visit Vitals  BP (!) 106/54 (BP Location: Left arm, Patient Position: Lying)   Pulse 67   Temp 36.8 °C (98.2 °F) (Temporal)   Resp 18   Ht 5' 8" (1.727 m)   Wt 62.1 kg (137 lb)   SpO2 100%   BMI 20.83 kg/m²     "

## 2024-09-13 NOTE — PLAN OF CARE
CM went to pt room to do initial assessment - pt getting direct pt care, unable to do assessment at this time.    1:14 PM  CM went to pt room to do assessment, pt still not in room.    2:35 PM  Pt back in room.  Pt refuses to do initial assessment, states she's tired from surgery, doesn't want to speak to anyone until tomorrow.    4:28 PM  Per Dr. Diaz, pt to be d/c'd today.  CM spoke with pt and  in room.  Instructed in d/c process.  Pt/CG v/u.  No DME needed.   to give pt a ride home.    TIANA LynN, BS, RN, [facility]

## 2024-09-13 NOTE — ASSESSMENT & PLAN NOTE
54yF with abdominal pain out of proportion iso CARTAGENA, diarrhea, migrating right ureteral stent, and hydronephrosis    -to OR today for URS and stent management  -Rocephin given  -Condition is stable  -Activity as tolerated  -Monitor vitals signs  -NPO at midnight  -multimodal pain control  -Follow up urine culture, stool culture negative for c diff    Urology to follow, rest of care per primary

## 2024-09-13 NOTE — NURSING
Nurses Note -- 4 Eyes      9/12/2024  9:45 PM      Skin assessed during: Admit      [x] No Altered Skin Integrity Present    [x]Prevention Measures Documented      [] Yes- Altered Skin Integrity Present or Discovered   [] LDA Added if Not in Epic (Describe Wound)   [] New Altered Skin Integrity was Present on Admit and Documented in LDA   [] Wound Image Taken    Wound Care Consulted? No    Attending Nurse:  ADELITA Sams    Second RN/Staff Member:  CECILLE Becker
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
## 2024-09-13 NOTE — DISCHARGE INSTRUCTIONS
Please follow up with your PCP after discharge.   Urology will schedule outpatient follow up appointment with you.    Continue home medications. Discontinue antibiotics.       Please return to ED if you develop worsening abdominal pain, nausea/vomiting, diarrhea, shortness of breath, abdominal distension.

## 2024-09-13 NOTE — OP NOTE
[facility]  Operative Note    Date: 09/13/2024    Pre-Op Diagnosis: Right ureteral stone  Patient Active Problem List    Diagnosis Date Noted    Hydronephrosis, right 09/12/2024    Ureteral stent retained 09/12/2024    Diarrhea 09/12/2024    Right sided abdominal pain 04/23/2024    Portal hypertension 03/24/2024    Right ureteral stone 03/20/2024    Urolithiasis 03/14/2024    Acute cystitis with hematuria 03/14/2024    Unintentional weight loss 10/14/2023    Thrombocytopenia 09/18/2023    COVID-19 09/17/2023    Diarrhea of presumed infectious origin 07/31/2023    Epigastric pain 07/31/2023    Arthralgia of right acromioclavicular joint     Tear of right glenoid labrum     Nontraumatic incomplete tear of right rotator cuff     Esophageal varices 02/15/2021    Portal hypertensive gastropathy 12/29/2020    Liver cirrhosis secondary to CARTAGENA 08/31/2020    Combined forms of age-related cataract of right eye 10/19/2018       Post-Op Diagnosis: same    Procedure(s) Performed:   1.  Right ureteroscopy  2.  Cystoscopy  3.  Laser lithotripsy  4.  Stone basket extraction  5.  Fluoro < 1 h  6.  Retrograde pyelogram    Specimen(s): Stone for analysis    Staff Surgeon: Chris Carey MD    Assistant Surgeon: MD Marvin Leonard MD    Anesthesia: General endotracheal anesthesia    Indications: Tee Aranda is a 54 y.o. female with a right ureteral stone, presenting for definitive stone management.  She currently does have a JJ ureteral stent in place.      Findings:   Right mid ureteral stone fragmented and extracted in its entirety  Slight narrowing of the proximal ureter (<1 cm) at L2-L3    1 baskets were used throughout the case.      Laser Fiber - thulium  Fiber Diameter - 272    Estimated Blood Loss: min    Drains: None    Procedure in detail:  After informed consent was obtained, the patient was brought the the cystoscopy suite and placed in the supine position.  SCDs were applied and working.   Anesthesia was administered.  The patient was then placed in the dorsal lithotomy position and prepped and draped in the usual sterile fashion.      A rigid cystoscope in a 22 Fr sheath was introduced into the patient's urethra.  This passed easily.  The entire urethra was visualized which showed no strictures or masses.  Formal cystoscopy was performed which revealed no masses or lesions suspicious for malignancy, no bladder stones, no bladder diverticuli, no trabeculations.  The ureteral orifices were visualized in the normal anatomic position bilaterally and clear efflux was visualized.      The distal coil of the indwelling stent was grasped and brought to the meatus. A wire was passed into the renal pelvis via the stent and the stent was removed intact.  The cystoscope was removed keeping the guidewire in place and the wire was secured to the drape.      An 8 Fr rigid ureteroscope was passed into the patient's bladder alongside the wire under direct vision.  It was then passed through the right ureteral orifice alongside the wire.  A stone was encountered at the level of mid ureter.  A 272 micron laser fiber was passed through the ureteroscope.  The stone was fragmented using the laser.  The laser fiber was removed and a Nitinol tipless basket was introduced through the ureteroscope.  Stone fragments were removed and placed in the bladder.  The ureteroscope was removed keeping the wire in place.  A cystoscope was reinserted and the bladder was irrigated to remove the stone fragments.  The bladder was drained the cystoscope removed keeping the wire in place.  Contrast was instilled which showed a short segment narrowing at the L2-L3 level which drained adequately. The wire was removed.     The patient tolerated the procedure well and was transferred to the recovery room in stable condition.      Disposition:  The patient will follow up with Dr. Carey in 12 weeks.  Patient okay for discharge from urology  perspective.    Dion Monet MD

## 2024-09-13 NOTE — ANESTHESIA PROCEDURE NOTES
Intubation    Date/Time: 9/13/2024 11:07 AM    Performed by: Chance Cagle  Authorized by: Gloria Butler MD    Intubation:     Induction:  Intravenous    Intubated:  Postinduction    Mask Ventilation:  Easy mask    Attempts:  1    Attempted By:  Student    Method of Intubation:  Video laryngoscopy    Blade:  Coronel 3    Laryngeal View Grade: Grade I - full view of cords      Difficult Airway Encountered?: No      Complications:  None    Airway Device:  Oral endotracheal tube    Airway Device Size:  7.0    Style/Cuff Inflation:  Cuffed (inflated to minimal occlusive pressure)    Tube secured:  22    Secured at:  The lips    Placement Verified By:  Capnometry    Complicating Factors:  None    Findings Post-Intubation:  BS equal bilateral and atraumatic/condition of teeth unchanged

## 2024-09-13 NOTE — SUBJECTIVE & OBJECTIVE
Interval History:   AFVSS, slightly hypotensive. Patient resting comfortably in bed. Pain well controlled. Has not had anything since midnight.    Objective:     Temp:  [97.7 °F (36.5 °C)-99.3 °F (37.4 °C)] 98 °F (36.7 °C)  Pulse:  [59-75] 59  Resp:  [18-20] 18  SpO2:  [98 %-100 %] 99 %  BP: (100-135)/(58-71) 100/59     Body mass index is 20.92 kg/m².           Drains       Drain  Duration             Female External Urinary Catheter w/ Suction 09/12/24 1440 <1 day                     Physical Exam  Eyes:      Pupils: Pupils are equal, round, and reactive to light.   Cardiovascular:      Rate and Rhythm: Normal rate.   Pulmonary:      Effort: Pulmonary effort is normal.   Abdominal:      General: Abdomen is flat.   Neurological:      Mental Status: She is alert.   Psychiatric:         Mood and Affect: Mood normal.           Significant Labs:    BMP:  Recent Labs   Lab 09/12/24  1306      K 3.9      CO2 24   BUN 19   CREATININE 0.8   CALCIUM 9.8       CBC:   Recent Labs   Lab 09/12/24  1306   WBC 5.08   HGB 12.7   HCT 38.6   PLT 78*       All pertinent labs results from the past 24 hours have been reviewed.    Significant Imaging:  All pertinent imaging results/findings from the past 24 hours have been reviewed.

## 2024-09-13 NOTE — NURSING
Pt says she is amenable to a central line instead of getting stuck often for labs. Pt claims that lab techs and nurses sometimes find it hard to find a vein on her and she refuses to get stuck so much. Pt says she will mention it to her doctors in AM. Pt agreed to get stuck, only for this morning.

## 2024-09-13 NOTE — PLAN OF CARE
Problem: Adult Inpatient Plan of Care  Goal: Plan of Care Review  Outcome: Progressing  Goal: Optimal Comfort and Wellbeing  Outcome: Progressing     Problem: Infection  Goal: Absence of Infection Signs and Symptoms  Outcome: Progressing     Problem: Pain Acute  Goal: Optimal Pain Control and Function  Outcome: Progressing     Problem: Fall Injury Risk  Goal: Absence of Fall and Fall-Related Injury  Outcome: Progressing

## 2024-09-14 LAB — BACTERIA STL CULT: NORMAL

## 2024-09-14 NOTE — ANESTHESIA POSTPROCEDURE EVALUATION
Anesthesia Post Evaluation    Patient: Tee Aranda    Procedure(s) Performed: Procedure(s) (LRB):  CYSTOSCOPY, WITH RETROGRADE PYELOGRAM (Right)  CYSTOSCOPY, WITH CALCULUS REMOVAL (Right)  LITHOTRIPSY, USING LASER (Right)  URETEROSCOPY (Right)  REMOVAL-STENT (Right)    Final Anesthesia Type: general      Patient location during evaluation: PACU  Patient participation: Yes- Able to Participate  Level of consciousness: awake  Post-procedure vital signs: reviewed and stable  Pain management: adequate  Airway patency: patent    PONV status at discharge: No PONV  Anesthetic complications: no      Cardiovascular status: blood pressure returned to baseline  Respiratory status: unassisted, spontaneous ventilation and room air                Vitals Value Taken Time   /66 09/13/24 1550   Temp 37 °C (98.6 °F) 09/13/24 1550   Pulse 76 09/13/24 1550   Resp 18 09/13/24 1750   SpO2 97 % 09/13/24 1550         No case tracking events are documented in the log.      Pain/Dio Score: Pain Rating Prior to Med Admin: 8 (9/13/2024  5:50 PM)  Pain Rating Post Med Admin: 4 (9/13/2024  9:40 AM)  Dio Score: 10 (9/13/2024 12:30 PM)

## 2024-09-16 ENCOUNTER — TELEPHONE (OUTPATIENT)
Dept: UROLOGY | Facility: CLINIC | Age: 54
End: 2024-09-16
Payer: COMMERCIAL

## 2024-09-16 NOTE — HOSPITAL COURSE
ED course:   Hemodynamically stable. Labs stable. CT scan reveals right-sided hydronephrosis, with a right double-J ureteral stent with a proximal in coil in the proximal ureter and distal coil in the urinary bladder. Left ureter is unremarkable. As well as, Mild wall thickening around the ascending colon with focal surrounding inflammatory change about the surrounding mesentery, slightly more conspicuous when comparison CT 08/30/2024.   Urology consulted - plan for URS for stone management and stent replacement/removal tomorrow 09/13/24. NPO past midnight.   Infectious work up ordered.      Hospital medicine course:  Infectious work up negative. Patient underwent CYSTOSCOPY, WITH RETROGRADE PYELOGRAM (Right), CYSTOSCOPY, WITH CALCULUS REMOVAL (Right), LITHOTRIPSY, USING LASER (Right), URETEROSCOPY (Right), REMOVAL-STENT (Right). Post op period was uneventful. Urology cleared patient for discharge.     Patient feeling symptomatically better and ready for discharge.     Physical Exam  Constitutional:       General: She is not in acute distress.     Appearance: She is not ill-appearing.   Cardiovascular:      Rate and Rhythm: Normal rate.      Pulses: Normal pulses.      Heart sounds: Normal heart sounds. No murmur heard.  Pulmonary:      Effort: Pulmonary effort is normal.      Breath sounds: Normal breath sounds.   Abdominal:      General: Abdomen is flat.      Palpations: Abdomen is soft. Nontender.   Musculoskeletal:      Right lower leg: No edema.      Left lower leg: No edema.   Neurological:      Mental Status: She is alert and oriented to person, place, and time. Mental status is at baseline.      Motor: No weakness.   Psychiatric:         Mood and Affect: Mood normal.

## 2024-09-16 NOTE — TELEPHONE ENCOUNTER
----- Message from Chris Carey MD sent at 9/13/2024  1:51 PM CDT -----  Yes, please.  Instead, will see her in 4 months with CT urogram.  ----- Message -----  From: Gregoria Lutz LPN  Sent: 9/13/2024   1:12 PM CDT  To: Chris Carey MD    Pt is scheduled for this Thursday as a post op --do you want me to cancel her?  ----- Message -----  From: Dion Monet MD  Sent: 9/13/2024  11:45 AM CDT  To: Aurelio CARLOS Staff    Please make follow up appointment for the attached patient.     3 m with KUB, renal u/s    Thank you,  Thomas Monet
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
## 2024-09-16 NOTE — PLAN OF CARE
[facility] - Internal Medicine Telemetry  Discharge Final Note    Primary Care Provider: James Samano MD    Expected Discharge Date: 9/13/2024    Final Discharge Note (most recent)       Final Note - 09/16/24 0804          Final Note    Assessment Type Final Discharge Note (P)      Anticipated Discharge Disposition Home or Self Care (P)         Post-Acute Status    Discharge Delays None known at this time (P)                  Pt d/c'd home with ride from .    Meggan Baca, TIANAN, BS, RN, CCM        Important Message from Medicare

## 2024-09-17 LAB
BACTERIA BLD CULT: NORMAL
BACTERIA BLD CULT: NORMAL

## 2024-10-08 ENCOUNTER — PATIENT MESSAGE (OUTPATIENT)
Dept: HEPATOLOGY | Facility: CLINIC | Age: 54
End: 2024-10-08
Payer: COMMERCIAL

## 2024-10-08 DIAGNOSIS — K74.60 HEPATIC CIRRHOSIS, UNSPECIFIED HEPATIC CIRRHOSIS TYPE, UNSPECIFIED WHETHER ASCITES PRESENT: Primary | ICD-10-CM

## 2024-10-10 ENCOUNTER — LAB VISIT (OUTPATIENT)
Dept: LAB | Facility: HOSPITAL | Age: 54
End: 2024-10-10
Attending: INTERNAL MEDICINE
Payer: COMMERCIAL

## 2024-10-10 DIAGNOSIS — K74.60 HEPATIC CIRRHOSIS, UNSPECIFIED HEPATIC CIRRHOSIS TYPE, UNSPECIFIED WHETHER ASCITES PRESENT: ICD-10-CM

## 2024-10-10 LAB
ALBUMIN SERPL BCP-MCNC: 4.4 G/DL (ref 3.5–5.2)
ALP SERPL-CCNC: 109 U/L (ref 55–135)
ALT SERPL W/O P-5'-P-CCNC: 29 U/L (ref 10–44)
ANION GAP SERPL CALC-SCNC: 9 MMOL/L (ref 8–16)
AST SERPL-CCNC: 26 U/L (ref 10–40)
BASOPHILS # BLD AUTO: 0.02 K/UL (ref 0–0.2)
BASOPHILS NFR BLD: 0.4 % (ref 0–1.9)
BILIRUB DIRECT SERPL-MCNC: 0.3 MG/DL (ref 0.1–0.3)
BILIRUB SERPL-MCNC: 0.7 MG/DL (ref 0.1–1)
BUN SERPL-MCNC: 22 MG/DL (ref 6–20)
CALCIUM SERPL-MCNC: 9.6 MG/DL (ref 8.7–10.5)
CHLORIDE SERPL-SCNC: 105 MMOL/L (ref 95–110)
CO2 SERPL-SCNC: 25 MMOL/L (ref 23–29)
CREAT SERPL-MCNC: 0.8 MG/DL (ref 0.5–1.4)
DIFFERENTIAL METHOD BLD: ABNORMAL
EOSINOPHIL # BLD AUTO: 0.1 K/UL (ref 0–0.5)
EOSINOPHIL NFR BLD: 1.1 % (ref 0–8)
ERYTHROCYTE [DISTWIDTH] IN BLOOD BY AUTOMATED COUNT: 13.4 % (ref 11.5–14.5)
EST. GFR  (NO RACE VARIABLE): >60 ML/MIN/1.73 M^2
GLUCOSE SERPL-MCNC: 121 MG/DL (ref 70–110)
HCT VFR BLD AUTO: 40.8 % (ref 37–48.5)
HGB BLD-MCNC: 13.7 G/DL (ref 12–16)
IMM GRANULOCYTES # BLD AUTO: 0.02 K/UL (ref 0–0.04)
IMM GRANULOCYTES NFR BLD AUTO: 0.4 % (ref 0–0.5)
INR PPP: 1.1 (ref 0.8–1.2)
LYMPHOCYTES # BLD AUTO: 0.6 K/UL (ref 1–4.8)
LYMPHOCYTES NFR BLD: 13.5 % (ref 18–48)
MCH RBC QN AUTO: 28.9 PG (ref 27–31)
MCHC RBC AUTO-ENTMCNC: 33.6 G/DL (ref 32–36)
MCV RBC AUTO: 86 FL (ref 82–98)
MONOCYTES # BLD AUTO: 0.3 K/UL (ref 0.3–1)
MONOCYTES NFR BLD: 6.4 % (ref 4–15)
NEUTROPHILS # BLD AUTO: 3.5 K/UL (ref 1.8–7.7)
NEUTROPHILS NFR BLD: 78.2 % (ref 38–73)
NRBC BLD-RTO: 0 /100 WBC
PLATELET # BLD AUTO: 73 K/UL (ref 150–450)
PMV BLD AUTO: 10.9 FL (ref 9.2–12.9)
POTASSIUM SERPL-SCNC: 4.1 MMOL/L (ref 3.5–5.1)
PROT SERPL-MCNC: 7.3 G/DL (ref 6–8.4)
PROTHROMBIN TIME: 11.7 SEC (ref 9–12.5)
RBC # BLD AUTO: 4.74 M/UL (ref 4–5.4)
SODIUM SERPL-SCNC: 139 MMOL/L (ref 136–145)
WBC # BLD AUTO: 4.53 K/UL (ref 3.9–12.7)

## 2024-10-10 PROCEDURE — 80053 COMPREHEN METABOLIC PANEL: CPT | Performed by: INTERNAL MEDICINE

## 2024-10-10 PROCEDURE — 85025 COMPLETE CBC W/AUTO DIFF WBC: CPT | Performed by: INTERNAL MEDICINE

## 2024-10-10 PROCEDURE — 36415 COLL VENOUS BLD VENIPUNCTURE: CPT | Performed by: INTERNAL MEDICINE

## 2024-10-10 PROCEDURE — 85610 PROTHROMBIN TIME: CPT | Performed by: INTERNAL MEDICINE

## 2024-10-10 PROCEDURE — 82248 BILIRUBIN DIRECT: CPT | Performed by: INTERNAL MEDICINE

## 2024-10-22 ENCOUNTER — PATIENT MESSAGE (OUTPATIENT)
Dept: RESEARCH | Facility: HOSPITAL | Age: 54
End: 2024-10-22
Payer: COMMERCIAL

## 2024-10-24 ENCOUNTER — OFFICE VISIT (OUTPATIENT)
Dept: HEPATOLOGY | Facility: CLINIC | Age: 54
End: 2024-10-24
Payer: COMMERCIAL

## 2024-10-24 VITALS
HEIGHT: 68 IN | BODY MASS INDEX: 20.38 KG/M2 | DIASTOLIC BLOOD PRESSURE: 80 MMHG | HEART RATE: 76 BPM | OXYGEN SATURATION: 100 % | WEIGHT: 134.5 LBS | SYSTOLIC BLOOD PRESSURE: 132 MMHG | TEMPERATURE: 98 F

## 2024-10-24 DIAGNOSIS — K75.81 LIVER CIRRHOSIS SECONDARY TO NASH: Primary | Chronic | ICD-10-CM

## 2024-10-24 DIAGNOSIS — I85.10 SECONDARY ESOPHAGEAL VARICES WITHOUT BLEEDING: ICD-10-CM

## 2024-10-24 DIAGNOSIS — M54.9 BACK PAIN, UNSPECIFIED BACK LOCATION, UNSPECIFIED BACK PAIN LATERALITY, UNSPECIFIED CHRONICITY: ICD-10-CM

## 2024-10-24 DIAGNOSIS — K74.60 LIVER CIRRHOSIS SECONDARY TO NASH: Primary | Chronic | ICD-10-CM

## 2024-10-24 PROCEDURE — 3079F DIAST BP 80-89 MM HG: CPT | Mod: CPTII,S$GLB,, | Performed by: INTERNAL MEDICINE

## 2024-10-24 PROCEDURE — 1160F RVW MEDS BY RX/DR IN RCRD: CPT | Mod: CPTII,S$GLB,, | Performed by: INTERNAL MEDICINE

## 2024-10-24 PROCEDURE — 3075F SYST BP GE 130 - 139MM HG: CPT | Mod: CPTII,S$GLB,, | Performed by: INTERNAL MEDICINE

## 2024-10-24 PROCEDURE — 1159F MED LIST DOCD IN RCRD: CPT | Mod: CPTII,S$GLB,, | Performed by: INTERNAL MEDICINE

## 2024-10-24 PROCEDURE — 3008F BODY MASS INDEX DOCD: CPT | Mod: CPTII,S$GLB,, | Performed by: INTERNAL MEDICINE

## 2024-10-24 PROCEDURE — 99999 PR PBB SHADOW E&M-EST. PATIENT-LVL V: CPT | Mod: PBBFAC,,, | Performed by: INTERNAL MEDICINE

## 2024-10-24 PROCEDURE — 99214 OFFICE O/P EST MOD 30 MIN: CPT | Mod: S$GLB,,, | Performed by: INTERNAL MEDICINE

## 2024-10-24 NOTE — PROGRESS NOTES
HEPATOLOGY FOLLOW UP    Referring Physician: James Samano MD  Current Corresponding Physician: James Samano MD, Gerard Salinas MD    Tee Aranda is here for follow up of decompensated cirrhosis    HPI   Tee Aranda was seen in consultation by my colleague Dr White 08/20. He noted:  This 53-year-old woman was referred for evaluation of cirrhosis.  She noticed easy bruisability.  She was found have a low platelet count.  She was assessed by Hematology.  She had a bone marrow aspirate and biopsy which was normal.  She eventually had an upper endoscopy which showed features of portal hypertension and grade 1 varices.  A FibroScan confirmed significant fatty liver as well as advanced fibrosis and cirrhosis.  Her weight was as high as 199 lb.  She has lost 26 lb after the diagnosis.  There is no family history of liver disease. She drinks alcohol socially.  She has mild ankle edema.  She has no symptoms of hepatic encephalopathy.  She initially had no symptoms of GI bleeding.    Admission 11/9/20: melena and weakenss. Hemoglobin fell to 5.9 and she was transfused 2 units of PRBC. EGD showed PHG and small varices. The finding was similar to an EGD done earlier this year (03/20). Small varices werenoted at that time as well. Coreg and protonix were prescribed but never started.  Admission 12/28/20: gastrintestinal hemorrhage and melena; EGD: esophageal varices banded  EGD 2/15/21: EV not banded; repeat 6 months recommended  ER visit 7/19/21 for hematemasis; hemoglobin stable at 12.5; LEFT AGAINST MEDICAL ADVICE    Interval History  --had been having recurrent issues with nephrolithiasis- currently urinary frequency and has right flank pain    HE: no longer taking lactulose but is on rifaximin  Ascites/edema, resolved- off lasix and aldactone-taking prn when is swollen (twice a week)  HCC screening CT abdo pelivs w contrast 4/23/24: no HCC  EGD 5/27/24: small varices  Colonoscopy 5/27/24: normal    Labs  10/10/24: Tbil 0.3, ALT 29, AST 26, ALKP 109  MELD 3.0: 8 at 10/10/2024  9:50 AM  MELD-Na: 7 at 10/10/2024  9:50 AM  Calculated from:  Serum Creatinine: 0.8 mg/dL (Using min of 1 mg/dL) at 10/10/2024  9:50 AM  Serum Sodium: 139 mmol/L (Using max of 137 mmol/L) at 10/10/2024  9:50 AM  Total Bilirubin: 0.7 mg/dL (Using min of 1 mg/dL) at 10/10/2024  9:50 AM  Serum Albumin: 4.4 g/dL (Using max of 3.5 g/dL) at 10/10/2024  9:50 AM  INR(ratio): 1.1 at 10/10/2024  9:50 AM  Age at listing (hypothetical): 54 years  Sex: Female at 10/10/2024  9:50 AM      Outpatient Encounter Medications as of 10/24/2024   Medication Sig Dispense Refill    carvediloL (COREG) 3.125 MG tablet Take 1 tablet (3.125 mg total) by mouth 2 (two) times daily. 180 tablet 3    esomeprazole (NEXIUM) 40 MG capsule TAKE 1 CAPSULE BY MOUTH 2 TIMES DAILY BEFORE MEALS. 180 capsule 3    multivitamin (THERAGRAN) per tablet Take 1 tablet by mouth once daily.      omega-3 fatty acids/fish oil (FISH OIL-OMEGA-3 FATTY ACIDS) 300-1,000 mg capsule Take 1 capsule by mouth once daily.      UNABLE TO FIND 4 (four) times daily as needed. Medible 20 mg blue raspberry 1/4 to 1/2 up to 4 times daily as needed.      tamsulosin (FLOMAX) 0.4 mg Cap Take 1 capsule (0.4 mg total) by mouth once daily. 30 capsule 0    tamsulosin (FLOMAX) 0.4 mg Cap Take 1 capsule (0.4 mg total) by mouth every evening. 30 capsule 11     Facility-Administered Encounter Medications as of 10/24/2024   Medication Dose Route Frequency Provider Last Rate Last Admin    0.9%  NaCl infusion   Intravenous Continuous Pellegrin, Gerard GUERRA MD   Stopped at 03/30/23 8571     Review of patient's allergies indicates:   Allergen Reactions    Sulfa (sulfonamide antibiotics) Hives     Past Medical History:   Diagnosis Date    Anemia, unspecified     Anxiety     Arthritis     Ascites 11/23/2020    AVN (avascular necrosis of bone)     Cataract     COVID-19 vaccine administered     04/08/21, 04/30/21    Diarrhea of  presumed infectious origin 7/31/2023    Edema 11/23/2020    Epigastric pain 7/31/2023    Esophageal varices     Glaucoma     Hepatic encephalopathy 11/23/2020    History of colon polyps     Hx of cirrhosis     non-alcoholic    Iron deficiency anemia secondary to blood loss (chronic)     Kidney stones     Portal hypertensive gastropathy     Unintentional weight loss 10/14/2023    Unspecified cirrhosis of liver        Review of Systems   Constitutional: Negative.    HENT: Negative.     Eyes: Negative.    Respiratory: Negative.     Cardiovascular: Negative.    Gastrointestinal: Negative.    Genitourinary: Negative.    Musculoskeletal: Negative.    Skin: Negative.    Neurological: Negative.    Psychiatric/Behavioral: Negative.       Vitals:    10/24/24 1003   BP: 132/80   Pulse: 76   Temp: 98.3 °F (36.8 °C)   BMI 20    Physical Exam  Vitals reviewed.   Constitutional:       Appearance: She is well-developed.   HENT:      Head: Normocephalic and atraumatic.   Eyes:      General: No scleral icterus.     Conjunctiva/sclera: Conjunctivae normal.      Pupils: Pupils are equal, round, and reactive to light.   Neck:      Thyroid: No thyromegaly.   Cardiovascular:      Rate and Rhythm: Normal rate and regular rhythm.      Heart sounds: Normal heart sounds.   Pulmonary:      Effort: Pulmonary effort is normal.      Breath sounds: Normal breath sounds. No rales.   Abdominal:      General: Bowel sounds are normal. There is no distension.      Palpations: Abdomen is soft. There is no mass.      Tenderness: There is abdominal tenderness (low abdomen).   Musculoskeletal:         General: Normal range of motion.      Cervical back: Normal range of motion and neck supple.   Skin:     General: Skin is warm and dry.      Findings: No rash.   Neurological:      Mental Status: She is alert and oriented to person, place, and time.         MELD 3.0: 8 at 10/10/2024  9:50 AM  MELD-Na: 7 at 10/10/2024  9:50 AM  Calculated from:  Serum  Creatinine: 0.8 mg/dL (Using min of 1 mg/dL) at 10/10/2024  9:50 AM  Serum Sodium: 139 mmol/L (Using max of 137 mmol/L) at 10/10/2024  9:50 AM  Total Bilirubin: 0.7 mg/dL (Using min of 1 mg/dL) at 10/10/2024  9:50 AM  Serum Albumin: 4.4 g/dL (Using max of 3.5 g/dL) at 10/10/2024  9:50 AM  INR(ratio): 1.1 at 10/10/2024  9:50 AM  Age at listing (hypothetical): 54 years  Sex: Female at 10/10/2024  9:50 AM      Lab Results   Component Value Date     (H) 10/10/2024    BUN 22 (H) 10/10/2024    CREATININE 0.8 10/10/2024    CALCIUM 9.6 10/10/2024     10/10/2024    K 4.1 10/10/2024     10/10/2024    PROT 7.3 10/10/2024    CO2 25 10/10/2024    ANIONGAP 9 10/10/2024    WBC 4.53 10/10/2024    RBC 4.74 10/10/2024    HGB 13.7 10/10/2024    HCT 40.8 10/10/2024    MCV 86 10/10/2024    MCH 28.9 10/10/2024    MCHC 33.6 10/10/2024     Lab Results   Component Value Date    RDW 13.4 10/10/2024    PLT 73 (L) 10/10/2024    MPV 10.9 10/10/2024    GRAN 3.5 10/10/2024    GRAN 78.2 (H) 10/10/2024    LYMPH 0.6 (L) 10/10/2024    LYMPH 13.5 (L) 10/10/2024    MONO 0.3 10/10/2024    MONO 6.4 10/10/2024    EOSINOPHIL 1.1 10/10/2024    BASOPHIL 0.4 10/10/2024    EOS 0.1 10/10/2024    BASO 0.02 10/10/2024    APTT 28.5 04/23/2024    GROUPTRH O POS 05/24/2024    BNP 86 03/24/2024    ALBUMIN 4.4 10/10/2024    BILIDIR 0.3 10/10/2024    AST 26 10/10/2024    ALT 29 10/10/2024    ALKPHOS 109 10/10/2024    MG 1.5 (L) 04/01/2024    LABPROT 11.7 10/10/2024    INR 1.1 10/10/2024       Assessment and Plan:  Tee Aranda is a 54 y.o. female with very mild decompensated cirrhosis (only HE on rifaximin); MELD <15; medically early for liver transplant. Other recommendations:  1. Decompensated cirrhosis- check meld labs every 12 weeks; HCC screening every 6 months (due now 10/24)  2. Hx GI bleeding and EV: repeat EGD 5/25  3. Ascites/edema, resolved: stay off diuretics - if get bloated plan for kaye SWEENEY  4. HE: continue rifaximin; restart  lactulose if have memory problems  5. Flank pain: MRI thoracic spine to r/o back pain as a cause of flank pain; f/u urology for ct urogram    Return 4 months  A total of 35 minutes was spent reviewing the patient's chart, examining the patient, reviewing labs and imaging and coordinating care with the patient's care team.

## 2024-10-24 NOTE — PATIENT INSTRUCTIONS
Amrita US and AFP now  Labs every 3 months  EGD 5/25  F/u with Dr Carey - he will order ct urogram  MRI thoracic spine- back pain may be a spine issue

## 2024-10-28 ENCOUNTER — PATIENT MESSAGE (OUTPATIENT)
Dept: UROLOGY | Facility: CLINIC | Age: 54
End: 2024-10-28
Payer: COMMERCIAL

## 2024-10-30 ENCOUNTER — TELEPHONE (OUTPATIENT)
Dept: UROLOGY | Facility: CLINIC | Age: 54
End: 2024-10-30
Payer: COMMERCIAL

## 2024-10-30 ENCOUNTER — PATIENT MESSAGE (OUTPATIENT)
Dept: UROLOGY | Facility: CLINIC | Age: 54
End: 2024-10-30
Payer: COMMERCIAL

## 2024-11-04 ENCOUNTER — HOSPITAL ENCOUNTER (OUTPATIENT)
Dept: RADIOLOGY | Facility: HOSPITAL | Age: 54
Discharge: HOME | End: 2024-11-04
Attending: FAMILY MEDICINE
Payer: COMMERCIAL

## 2024-11-04 ENCOUNTER — HOSPITAL ENCOUNTER (OUTPATIENT)
Dept: RADIOLOGY | Facility: HOSPITAL | Age: 54
Discharge: HOME | End: 2024-11-04
Payer: COMMERCIAL

## 2024-11-04 VITALS — BODY MASS INDEX: 20.31 KG/M2 | WEIGHT: 134 LBS | HEIGHT: 68 IN

## 2024-11-04 DIAGNOSIS — Z12.31 ENCOUNTER FOR SCREENING MAMMOGRAM FOR BREAST CANCER: ICD-10-CM

## 2024-11-04 DIAGNOSIS — N20.1 RIGHT URETERAL STONE: ICD-10-CM

## 2024-11-04 PROCEDURE — 77067 SCR MAMMO BI INCL CAD: CPT | Mod: 26,,, | Performed by: RADIOLOGY

## 2024-11-04 PROCEDURE — 77067 SCR MAMMO BI INCL CAD: CPT | Mod: TC

## 2024-11-04 PROCEDURE — 77063 BREAST TOMOSYNTHESIS BI: CPT | Mod: 26,,, | Performed by: RADIOLOGY

## 2024-11-08 ENCOUNTER — HOSPITAL ENCOUNTER (OUTPATIENT)
Dept: RADIOLOGY | Facility: HOSPITAL | Age: 54
Discharge: HOME | End: 2024-11-08
Attending: INTERNAL MEDICINE
Payer: COMMERCIAL

## 2024-11-08 DIAGNOSIS — K74.60 LIVER CIRRHOSIS SECONDARY TO NASH: Chronic | ICD-10-CM

## 2024-11-08 DIAGNOSIS — M54.9 BACK PAIN, UNSPECIFIED BACK LOCATION, UNSPECIFIED BACK PAIN LATERALITY, UNSPECIFIED CHRONICITY: ICD-10-CM

## 2024-11-08 DIAGNOSIS — K75.81 LIVER CIRRHOSIS SECONDARY TO NASH: Chronic | ICD-10-CM

## 2024-11-08 PROCEDURE — 72157 MRI CHEST SPINE W/O & W/DYE: CPT | Mod: TC

## 2024-11-08 PROCEDURE — 76700 US EXAM ABDOM COMPLETE: CPT | Mod: TC

## 2024-11-08 PROCEDURE — A9585 GADOBUTROL INJECTION: HCPCS | Performed by: INTERNAL MEDICINE

## 2024-11-08 PROCEDURE — 25500020 PHARM REV CODE 255: Performed by: INTERNAL MEDICINE

## 2024-11-08 RX ORDER — GADOBUTROL 604.72 MG/ML
6 INJECTION INTRAVENOUS
Status: COMPLETED | OUTPATIENT
Start: 2024-11-08 | End: 2024-11-08

## 2024-11-08 RX ADMIN — GADOBUTROL 10 ML: 604.72 INJECTION INTRAVENOUS at 10:11

## 2024-11-11 DIAGNOSIS — R93.7 ABNORMAL MRI, THORACIC SPINE: Primary | ICD-10-CM

## 2024-11-21 ENCOUNTER — PATIENT MESSAGE (OUTPATIENT)
Dept: HEPATOLOGY | Facility: CLINIC | Age: 54
End: 2024-11-21
Payer: COMMERCIAL

## 2024-11-26 ENCOUNTER — HOSPITAL ENCOUNTER (OUTPATIENT)
Dept: RADIOLOGY | Facility: HOSPITAL | Age: 54
Discharge: HOME | End: 2024-11-26
Attending: FAMILY MEDICINE
Payer: COMMERCIAL

## 2024-11-26 DIAGNOSIS — M79.672 PAIN IN LEFT FOOT: ICD-10-CM

## 2024-11-26 DIAGNOSIS — M79.672 PAIN IN LEFT FOOT: Primary | ICD-10-CM

## 2024-11-26 PROCEDURE — 73630 X-RAY EXAM OF FOOT: CPT | Mod: TC,LT

## 2024-11-26 PROCEDURE — 73630 X-RAY EXAM OF FOOT: CPT | Mod: 26,LT,, | Performed by: RADIOLOGY

## 2024-12-12 ENCOUNTER — PATIENT MESSAGE (OUTPATIENT)
Dept: GASTROENTEROLOGY | Facility: CLINIC | Age: 54
End: 2024-12-12
Payer: COMMERCIAL

## 2024-12-12 ENCOUNTER — PATIENT MESSAGE (OUTPATIENT)
Dept: HEPATOLOGY | Facility: CLINIC | Age: 54
End: 2024-12-12
Payer: COMMERCIAL

## 2024-12-12 DIAGNOSIS — K64.9 HEMORRHOIDS, UNSPECIFIED HEMORRHOID TYPE: Primary | ICD-10-CM

## 2024-12-12 DIAGNOSIS — G93.40 ENCEPHALOPATHY, UNSPECIFIED TYPE: Primary | ICD-10-CM

## 2025-01-13 ENCOUNTER — LAB VISIT (OUTPATIENT)
Dept: LAB | Facility: HOSPITAL | Age: 55
End: 2025-01-13
Attending: UROLOGY
Payer: COMMERCIAL

## 2025-01-13 ENCOUNTER — PATIENT MESSAGE (OUTPATIENT)
Dept: HEPATOLOGY | Facility: CLINIC | Age: 55
End: 2025-01-13
Payer: COMMERCIAL

## 2025-01-13 DIAGNOSIS — N13.30 HYDRONEPHROSIS, RIGHT: ICD-10-CM

## 2025-01-13 LAB
ALBUMIN SERPL BCP-MCNC: 4.2 G/DL (ref 3.5–5.2)
ALP SERPL-CCNC: 121 U/L (ref 40–150)
ALT SERPL W/O P-5'-P-CCNC: 26 U/L (ref 10–44)
ANION GAP SERPL CALC-SCNC: 8 MMOL/L (ref 8–16)
AST SERPL-CCNC: 22 U/L (ref 10–40)
BILIRUB SERPL-MCNC: 0.7 MG/DL (ref 0.1–1)
BUN SERPL-MCNC: 21 MG/DL (ref 6–20)
CALCIUM SERPL-MCNC: 9.2 MG/DL (ref 8.7–10.5)
CHLORIDE SERPL-SCNC: 108 MMOL/L (ref 95–110)
CO2 SERPL-SCNC: 24 MMOL/L (ref 23–29)
CREAT SERPL-MCNC: 0.8 MG/DL (ref 0.5–1.4)
EST. GFR  (NO RACE VARIABLE): >60 ML/MIN/1.73 M^2
GLUCOSE SERPL-MCNC: 116 MG/DL (ref 70–110)
POTASSIUM SERPL-SCNC: 4.3 MMOL/L (ref 3.5–5.1)
PROT SERPL-MCNC: 7.3 G/DL (ref 6–8.4)
SODIUM SERPL-SCNC: 140 MMOL/L (ref 136–145)
URATE SERPL-MCNC: 3.4 MG/DL (ref 2.4–5.7)

## 2025-01-13 PROCEDURE — 84550 ASSAY OF BLOOD/URIC ACID: CPT | Performed by: UROLOGY

## 2025-01-13 PROCEDURE — 80053 COMPREHEN METABOLIC PANEL: CPT | Performed by: UROLOGY

## 2025-01-13 PROCEDURE — 36415 COLL VENOUS BLD VENIPUNCTURE: CPT | Performed by: UROLOGY

## 2025-01-14 ENCOUNTER — TELEPHONE (OUTPATIENT)
Dept: SURGERY | Facility: CLINIC | Age: 55
End: 2025-01-14
Payer: COMMERCIAL

## 2025-01-14 ENCOUNTER — LAB VISIT (OUTPATIENT)
Dept: LAB | Facility: HOSPITAL | Age: 55
End: 2025-01-14
Attending: INTERNAL MEDICINE
Payer: COMMERCIAL

## 2025-01-14 DIAGNOSIS — K75.81 LIVER CIRRHOSIS SECONDARY TO NASH: Chronic | ICD-10-CM

## 2025-01-14 DIAGNOSIS — K74.60 LIVER CIRRHOSIS SECONDARY TO NASH: Chronic | ICD-10-CM

## 2025-01-14 LAB
ALBUMIN SERPL BCP-MCNC: 4.3 G/DL (ref 3.5–5.2)
ALP SERPL-CCNC: 100 U/L (ref 40–150)
ALT SERPL W/O P-5'-P-CCNC: 25 U/L (ref 10–44)
ANION GAP SERPL CALC-SCNC: 10 MMOL/L (ref 8–16)
AST SERPL-CCNC: 22 U/L (ref 10–40)
BASOPHILS # BLD AUTO: 0.02 K/UL (ref 0–0.2)
BASOPHILS NFR BLD: 0.5 % (ref 0–1.9)
BILIRUB DIRECT SERPL-MCNC: 0.4 MG/DL (ref 0.1–0.3)
BILIRUB SERPL-MCNC: 1.1 MG/DL (ref 0.1–1)
BUN SERPL-MCNC: 18 MG/DL (ref 6–20)
CALCIUM SERPL-MCNC: 9.4 MG/DL (ref 8.7–10.5)
CHLORIDE SERPL-SCNC: 106 MMOL/L (ref 95–110)
CO2 SERPL-SCNC: 25 MMOL/L (ref 23–29)
CREAT SERPL-MCNC: 0.8 MG/DL (ref 0.5–1.4)
DIFFERENTIAL METHOD BLD: ABNORMAL
EOSINOPHIL # BLD AUTO: 0.1 K/UL (ref 0–0.5)
EOSINOPHIL NFR BLD: 2.1 % (ref 0–8)
ERYTHROCYTE [DISTWIDTH] IN BLOOD BY AUTOMATED COUNT: 13.7 % (ref 11.5–14.5)
EST. GFR  (NO RACE VARIABLE): >60 ML/MIN/1.73 M^2
GLUCOSE SERPL-MCNC: 109 MG/DL (ref 70–110)
HCT VFR BLD AUTO: 39.8 % (ref 37–48.5)
HGB BLD-MCNC: 13 G/DL (ref 12–16)
IMM GRANULOCYTES # BLD AUTO: 0.01 K/UL (ref 0–0.04)
IMM GRANULOCYTES NFR BLD AUTO: 0.3 % (ref 0–0.5)
INR PPP: 1.2 (ref 0.8–1.2)
LYMPHOCYTES # BLD AUTO: 0.8 K/UL (ref 1–4.8)
LYMPHOCYTES NFR BLD: 21.9 % (ref 18–48)
MCH RBC QN AUTO: 28.1 PG (ref 27–31)
MCHC RBC AUTO-ENTMCNC: 32.7 G/DL (ref 32–36)
MCV RBC AUTO: 86 FL (ref 82–98)
MONOCYTES # BLD AUTO: 0.3 K/UL (ref 0.3–1)
MONOCYTES NFR BLD: 8.1 % (ref 4–15)
NEUTROPHILS # BLD AUTO: 2.6 K/UL (ref 1.8–7.7)
NEUTROPHILS NFR BLD: 67.1 % (ref 38–73)
NRBC BLD-RTO: 0 /100 WBC
PLATELET # BLD AUTO: 88 K/UL (ref 150–450)
PMV BLD AUTO: 11.1 FL (ref 9.2–12.9)
POTASSIUM SERPL-SCNC: 4.4 MMOL/L (ref 3.5–5.1)
PROT SERPL-MCNC: 7.6 G/DL (ref 6–8.4)
PROTHROMBIN TIME: 12.6 SEC (ref 9–12.5)
RBC # BLD AUTO: 4.63 M/UL (ref 4–5.4)
SODIUM SERPL-SCNC: 141 MMOL/L (ref 136–145)
WBC # BLD AUTO: 3.83 K/UL (ref 3.9–12.7)

## 2025-01-14 PROCEDURE — 82248 BILIRUBIN DIRECT: CPT | Performed by: INTERNAL MEDICINE

## 2025-01-14 PROCEDURE — 85025 COMPLETE CBC W/AUTO DIFF WBC: CPT | Performed by: INTERNAL MEDICINE

## 2025-01-14 PROCEDURE — 85610 PROTHROMBIN TIME: CPT | Performed by: INTERNAL MEDICINE

## 2025-01-14 PROCEDURE — 80053 COMPREHEN METABOLIC PANEL: CPT | Performed by: INTERNAL MEDICINE

## 2025-01-14 PROCEDURE — 36415 COLL VENOUS BLD VENIPUNCTURE: CPT | Performed by: INTERNAL MEDICINE

## 2025-01-21 ENCOUNTER — PATIENT MESSAGE (OUTPATIENT)
Dept: ORTHOPEDICS | Facility: CLINIC | Age: 55
End: 2025-01-21
Payer: COMMERCIAL

## 2025-01-30 ENCOUNTER — TELEPHONE (OUTPATIENT)
Dept: UROLOGY | Facility: CLINIC | Age: 55
End: 2025-01-30
Payer: COMMERCIAL

## 2025-01-30 DIAGNOSIS — N20.0 RIGHT KIDNEY STONE: Primary | ICD-10-CM

## 2025-01-30 NOTE — TELEPHONE ENCOUNTER
Called pt in regards to scheduling X-ray for Dr Carey on 1/31/25. Informed pt that Dr Carey would like her to have an X-ray done before she sees him in clinic tomorrow. Scheduled X-ray for 7:45 am at the [facility] across from [facility]. Pt verbalized understanding.

## 2025-01-30 NOTE — PROGRESS NOTES
Right kidney stone  -     X-Ray Abdomen AP 1 View; Future; Expected date: 01/30/2025     Please have her do KUB before her clinic visit.

## 2025-01-30 NOTE — PROGRESS NOTES
CC:  s/p right  URS for removal of ureteral stone.    Tee Aranda is a 54 y.o. woman who is here for a follow up, hx of ureteral stone.    Tee Aranda is a 54 y.o. female with a right ureteral stone, presenting for definitive stone management.  She currently does have a JJ ureteral stent in place.    Procedure(s) Performed: 9/13/24  1.  Right ureteroscopy  2.  Cystoscopy  3.  Laser lithotripsy  4.  Stone basket extraction  5.  Fluoro < 1 h  6.  Retrograde pyelogram  Findings:   Right mid ureteral stone fragmented and extracted in its entirety  Slight narrowing of the proximal ureter (<1 cm) at L2-L3    US 11/8/24  1. Mildly coarse hepatic echotexture, suggesting steatosis or hepatocellular disease.  2. Mild splenomegaly.  3. Cholelithiasis.  4. Right nephrolithiasis.  No hydronephrosis.    Her PCP, James Samano MD   Patient has a history of nonalcoholic cirrhosis and CARTAGENA disease with esophageal varices.  Went to ER on 8/30/24 with acute right flank pian.  She was tested positive for COVID at home 8/29/24 contacted her liver doctor at [facility] who recommended IV remdesivir as inpatient as that iswhat she did last time she got COVID she will get laboratory tests here in and consult the liver specialist           Past Medical History:   Diagnosis Date    Anemia, unspecified     Anxiety     Arthritis     Ascites 11/23/2020    AVN (avascular necrosis of bone)     Cataract     COVID-19 vaccine administered     04/08/21, 04/30/21    Diarrhea of presumed infectious origin 7/31/2023    Edema 11/23/2020    Epigastric pain 7/31/2023    Esophageal varices     Glaucoma     Hepatic encephalopathy 11/23/2020    History of colon polyps     Hx of cirrhosis     non-alcoholic    Iron deficiency anemia secondary to blood loss (chronic)     Kidney stones     Portal hypertensive gastropathy     Unintentional weight loss 10/14/2023    Unspecified cirrhosis of liver      Past Surgical History:   Procedure Laterality  Date    APPENDECTOMY      COLONOSCOPY N/A 8/3/2023    Procedure: COLONOSCOPY;  Surgeon: Gerard Salinas MD;  Location: [facility] ENDO;  Service: Endoscopy;  Laterality: N/A;    COLONOSCOPY N/A 5/27/2024    Procedure: COLONOSCOPY;  Surgeon: Eric Garcia MD;  Location: [facility] ENDO (4TH FLR);  Service: Endoscopy;  Laterality: N/A;  Dr. Douglass, egd/colon, weight loss, variceal surveillance and abdominal pain, standard prep, cirrhosis labs ordered    COLONOSCOPY W/ POLYPECTOMY      CYSTOSCOPY N/A 3/20/2024    Procedure: CYSTOSCOPY;  Surgeon: Chris Carey MD;  Location: [facility] OR 1ST FLR;  Service: Urology;  Laterality: N/A;    CYSTOSCOPY  9/4/2024    Procedure: CYSTOSCOPY;  Surgeon: Chris Carey MD;  Location: [facility] OR 1ST FLR;  Service: Urology;;    CYSTOSCOPY W/ RETROGRADES Right 9/13/2024    Procedure: CYSTOSCOPY, WITH RETROGRADE PYELOGRAM;  Surgeon: Chris Carey MD;  Location: [facility] OR 1ST FLR;  Service: Urology;  Laterality: Right;    CYSTOSCOPY W/ URETERAL STENT REMOVAL Right 3/14/2024    Procedure: CYSTOSCOPY, WITH URETERAL STENT REMOVAL;  Surgeon: Chris Carey MD;  Location: [facility] OR 1ST FLR;  Service: Urology;  Laterality: Right;    CYSTOSCOPY WITH CALCULUS EXTRACTION Right 9/13/2024    Procedure: CYSTOSCOPY, WITH CALCULUS REMOVAL;  Surgeon: Chris Carey MD;  Location: [facility] OR 1ST FLR;  Service: Urology;  Laterality: Right;  urtereoscopy    DISTAL CLAVICLE EXCISION Right 11/18/2021    Procedure: RIGHT SHOULDER ARTHROSCOPY, EXCISION, CLAVICLE, DISTAL; EXTENSIVE DEBRIDEMENT;  Surgeon: Isaiah Joyner MD;  Location: [facility] OR;  Service: Orthopedics;  Laterality: Right;    ESOPHAGEAL VARICE LIGATION      ESOPHAGOGASTRODUODENOSCOPY N/A 11/10/2020    Procedure: EGD (ESOPHAGOGASTRODUODENOSCOPY);  Surgeon: Gerard Salinas MD;  Location: [facility] ENDO;  Service: Endoscopy;  Laterality: N/A;    ESOPHAGOGASTRODUODENOSCOPY N/A 12/28/2020    Procedure: EGD (ESOPHAGOGASTRODUODENOSCOPY);  Surgeon: Adryan Park MD;   Location: [facility] (2ND FLR);  Service: Endoscopy;  Laterality: N/A;    ESOPHAGOGASTRODUODENOSCOPY N/A 02/15/2021    Procedure: EGD (ESOPHAGOGASTRODUODENOSCOPY);  Surgeon: Hari Greene MD;  Location: [facility] (4TH FLR);  Service: Endoscopy;  Laterality: N/A;  6 week follow-up variceal banding  Hx varices - Labs - ERW  prep ins. emialed - COVID screening on 2/12/21 [place] - ERW    ESOPHAGOGASTRODUODENOSCOPY Left 08/03/2021    Procedure: EGD (ESOPHAGOGASTRODUODENOSCOPY);  Surgeon: Fabricio Corado MD;  Location: [facility] (4TH FLR);  Service: Gastroenterology;  Laterality: Left;  recent hematemasis. varices-labs on 7/26    COVID test at [facility] on 7/31-GT  requesting sooner    ESOPHAGOGASTRODUODENOSCOPY N/A 07/27/2022    Procedure: EGD (ESOPHAGOGASTRODUODENOSCOPY);  Surgeon: Eric Garcia MD;  Location: [facility];  Service: Endoscopy;  Laterality: N/A;    ESOPHAGOGASTRODUODENOSCOPY Left 08/29/2022    Procedure: EGD (ESOPHAGOGASTRODUODENOSCOPY);  Surgeon: Kacy Douglass MD;  Location: [facility] (2ND FLR);  Service: Endoscopy;  Laterality: Left;  Fully vaccinated/ clear liquids up to 4 hrs prior-RB  varices labs ordered-RB    ESOPHAGOGASTRODUODENOSCOPY N/A 10/05/2022    Procedure: EGD (ESOPHAGOGASTRODUODENOSCOPY);  Surgeon: Gerard Salinas MD;  Location: [facility];  Service: Endoscopy;  Laterality: N/A;    ESOPHAGOGASTRODUODENOSCOPY N/A 3/30/2023    Procedure: EGD (ESOPHAGOGASTRODUODENOSCOPY);  Surgeon: Gerard Salinas MD;  Location: [facility];  Service: Endoscopy;  Laterality: N/A;    ESOPHAGOGASTRODUODENOSCOPY N/A 8/3/2023    Procedure: EGD (ESOPHAGOGASTRODUODENOSCOPY);  Surgeon: Gerard Salinas MD;  Location: [facility];  Service: Endoscopy;  Laterality: N/A;    ESOPHAGOGASTRODUODENOSCOPY N/A 5/27/2024    Procedure: EGD (ESOPHAGOGASTRODUODENOSCOPY);  Surgeon: Eric Garcia MD;  Location: [facility] ([address] [place] [address]);  Service: Endoscopy;  Laterality: N/A;    EXTRACTION - STONE Right 3/20/2024     Procedure: EXTRACTION - STONE;  Surgeon: Chris Carey MD;  Location: [facility] OR 1ST FLR;  Service: Urology;  Laterality: Right;    HYSTERECTOMY      KNEE SURGERY Bilateral     LASER LITHOTRIPSY Right 3/20/2024    Procedure: LITHOTRIPSY, USING LASER;  Surgeon: Chris Carey MD;  Location: [facility] OR 1ST FLR;  Service: Urology;  Laterality: Right;    LASER LITHOTRIPSY Right 9/13/2024    Procedure: LITHOTRIPSY, USING LASER;  Surgeon: Chris Carey MD;  Location: [facility] OR 1ST FLR;  Service: Urology;  Laterality: Right;    LITHOTRIPSY      REMOVAL-STENT Right 3/20/2024    Procedure: REMOVAL-STENT;  Surgeon: Chris Carey MD;  Location: [facility] OR 1ST FLR;  Service: Urology;  Laterality: Right;    REMOVAL-STENT Right 9/13/2024    Procedure: REMOVAL-STENT;  Surgeon: Chris Carey MD;  Location: [facility] OR 1ST FLR;  Service: Urology;  Laterality: Right;    RETROGRADE PYELOGRAPHY Right 3/20/2024    Procedure: PYELOGRAM, RETROGRADE;  Surgeon: Chris Carey MD;  Location: [facility] OR 1ST FLR;  Service: Urology;  Laterality: Right;    RHINOPLASTY TIP      SHOULDER SURGERY Right     TONSILLECTOMY      TOTAL HIP ARTHROPLASTY Right     URETERAL STENT PLACEMENT Right 3/20/2024    Procedure: INSERTION, STENT, URETER;  Surgeon: Chris Carey MD;  Location: [facility] OR 1ST FLR;  Service: Urology;  Laterality: Right;    URETERAL STENT PLACEMENT Right 9/4/2024    Procedure: INSERTION, STENT, URETER;  Surgeon: Chris Carey MD;  Location: [facility] OR 1ST FLR;  Service: Urology;  Laterality: Right;    URETEROSCOPY Right 3/20/2024    Procedure: URETEROSCOPY;  Surgeon: Chris Carey MD;  Location: [facility] OR 1ST FLR;  Service: Urology;  Laterality: Right;    URETEROSCOPY Right 9/13/2024    Procedure: URETEROSCOPY;  Surgeon: Chris Carey MD;  Location: [facility] OR 1ST FLR;  Service: Urology;  Laterality: Right;     Social History     Tobacco Use    Smoking status: Some Days     Current packs/day: 0.00     Types: Cigarettes     Last attempt to quit:  7/3/2019     Years since quittin.5    Smokeless tobacco: Never   Substance Use Topics    Alcohol use: Not Currently    Drug use: No     Family History   Problem Relation Name Age of Onset    No Known Problems Mother      Diabetes Mellitus Maternal Grandmother      Amblyopia Neg Hx      Blindness Neg Hx      Cataracts Neg Hx      Glaucoma Neg Hx      Macular degeneration Neg Hx      Retinal detachment Neg Hx      Strabismus Neg Hx      Colon cancer Neg Hx      Esophageal cancer Neg Hx       Allergy:  Review of patient's allergies indicates:   Allergen Reactions    Sulfa (sulfonamide antibiotics) Hives     Outpatient Encounter Medications as of 2025   Medication Sig Dispense Refill    carvediloL (COREG) 3.125 MG tablet Take 1 tablet (3.125 mg total) by mouth 2 (two) times daily. 180 tablet 3    esomeprazole (NEXIUM) 40 MG capsule TAKE 1 CAPSULE BY MOUTH 2 TIMES DAILY BEFORE MEALS. 180 capsule 3    multivitamin (THERAGRAN) per tablet Take 1 tablet by mouth once daily.      omega-3 fatty acids/fish oil (FISH OIL-OMEGA-3 FATTY ACIDS) 300-1,000 mg capsule Take 1 capsule by mouth once daily.      potassium citrate (UROCIT-K 10) 10 mEq (1,080 mg) TbSR Take 1 tablet (10 mEq total) by mouth 2 (two) times daily with meals. 180 tablet 3    rifAXIMin (XIFAXAN) 550 mg Tab Take 1 tablet (550 mg total) by mouth 2 (two) times daily. 60 tablet 11    tamsulosin (FLOMAX) 0.4 mg Cap Take 1 capsule (0.4 mg total) by mouth once daily. 30 capsule 0    UNABLE TO FIND 4 (four) times daily as needed. Medible 20 mg blue raspberry 1/4 to 1/2 up to 4 times daily as needed.      [DISCONTINUED] tamsulosin (FLOMAX) 0.4 mg Cap Take 1 capsule (0.4 mg total) by mouth every evening. (Patient not taking: Reported on 2025) 30 capsule 11     Facility-Administered Encounter Medications as of 2025   Medication Dose Route Frequency Provider Last Rate Last Admin    0.9%  NaCl infusion   Intravenous Continuous Gerard Salinas MD    Stopped at 03/30/23 0922     Review of Systems   ROS  Physical Exam     Vitals:    01/31/25 0801   BP: 113/61   Pulse: 81       Physical Exam      LABS:  Lab Results   Component Value Date    CREATININE 0.8 01/14/2025    CREATININE 0.8 01/13/2025    CREATININE 0.8 11/08/2024     Urine Culture, Routine   Date Value Ref Range Status   09/12/2024 No growth  Final   09/04/2024 No growth  Final     Hemoglobin A1C   Date Value Ref Range Status   07/26/2022 5.5 4.0 - 5.6 % Final     Comment:     ADA Screening Guidelines:  5.7-6.4%  Consistent with prediabetes  >or=6.5%  Consistent with diabetes    High levels of fetal hemoglobin interfere with the HbA1C  assay. Heterozygous hemoglobin variants (HbS, HgC, etc)do  not significantly interfere with this assay.   However, presence of multiple variants may affect accuracy.       Radiology:  KUB today no obvious calcification overlying the kidney areas seen.    US  11/24/24  1. Mildly coarse hepatic echotexture, suggesting steatosis or hepatocellular disease.  2. Mild splenomegaly.  3. Cholelithiasis.  4. Right nephrolithiasis.  No hydronephrosis.    CT RSS 9/12/24  Few tiny nonobstructing right renal stones.  Ureteral vesicular junctions are not well evaluated secondary to streak artifact from right hip arthroplasty.  Similar right-sided hydronephrosis and proximal hydroureter with interval placement of a right double-J ureteral stent.  Proximal in coils about the proximal ureter, and distal and coils about the urinary bladder.  Correlation is advised.     Hepatosplenomegaly with cirrhotic liver morphology.  No focal hepatic lesions, noting limitations from noncontrast examination.     Similar nonspecific colonic wall thickening and surrounding inflammatory change about the ascending colon.  Findings may relate to chronic infectious or non infectious inflammatory process, noting that underlying malignancy cannot be excluded.  Correlation is advised.    CT RSS  8/30/24  Obstructive uropathy by 3 mm calculus in the distal 3rd of the right ureter a few cm above the UVJ.     Stables hepatosplenomegaly.  Assessment and Plan:  Tee was seen today for nephrolithiasis.    Diagnoses and all orders for this visit:    Kidney stone    Right upper quadrant abdominal pain  -     CT Renal Stone Study ABD Pelvis WO; Future    Calcium oxalate renal stones  -     potassium citrate (UROCIT-K 10) 10 mEq (1,080 mg) TbSR; Take 1 tablet (10 mEq total) by mouth 2 (two) times daily with meals.  -     Comprehensive Metabolic Panel; Future    Calculus of urinary tract in diseases classified elsewhere  -     CT Renal Stone Study ABD Pelvis WO; Future        Stone analysis showed calcium oxalate stone.  Recommend low oxalate diet.  General risk factors for kidney stones and the conservative measures to prevent kidney stones in the future were discussed with the patient in detail.  The patient was encouraged to drink 2-3 liters of water a day, have lots of fruits and veggies, limit oxalate and salt in the diet, limit iced tea and huber, to avoid Tums and Rolaids, to avoid unnecessary supplements and NSAIDS.    Start Urocit K BID.  Will follow up with CT RSS and BMP in 6 months.      Follow-up:  Follow up in about 6 months (around 7/31/2025), or CT RSS, CMP.

## 2025-01-31 ENCOUNTER — OFFICE VISIT (OUTPATIENT)
Dept: UROLOGY | Facility: CLINIC | Age: 55
End: 2025-01-31
Payer: COMMERCIAL

## 2025-01-31 ENCOUNTER — HOSPITAL ENCOUNTER (OUTPATIENT)
Dept: RADIOLOGY | Facility: HOSPITAL | Age: 55
Discharge: HOME | End: 2025-01-31
Attending: UROLOGY
Payer: COMMERCIAL

## 2025-01-31 VITALS
HEIGHT: 68 IN | BODY MASS INDEX: 21.25 KG/M2 | SYSTOLIC BLOOD PRESSURE: 113 MMHG | DIASTOLIC BLOOD PRESSURE: 61 MMHG | WEIGHT: 140.19 LBS | HEART RATE: 81 BPM

## 2025-01-31 DIAGNOSIS — N22 CALCULUS OF URINARY TRACT IN DISEASES CLASSIFIED ELSEWHERE: ICD-10-CM

## 2025-01-31 DIAGNOSIS — N20.0 CALCIUM OXALATE RENAL STONES: ICD-10-CM

## 2025-01-31 DIAGNOSIS — N20.0 KIDNEY STONE: Primary | ICD-10-CM

## 2025-01-31 DIAGNOSIS — N20.0 RIGHT KIDNEY STONE: ICD-10-CM

## 2025-01-31 DIAGNOSIS — R10.11 RIGHT UPPER QUADRANT ABDOMINAL PAIN: ICD-10-CM

## 2025-01-31 PROCEDURE — 99999 PR PBB SHADOW E&M-EST. PATIENT-LVL IV: CPT | Mod: PBBFAC,,, | Performed by: UROLOGY

## 2025-01-31 PROCEDURE — 3078F DIAST BP <80 MM HG: CPT | Mod: CPTII,S$GLB,, | Performed by: UROLOGY

## 2025-01-31 PROCEDURE — 1159F MED LIST DOCD IN RCRD: CPT | Mod: CPTII,S$GLB,, | Performed by: UROLOGY

## 2025-01-31 PROCEDURE — 3074F SYST BP LT 130 MM HG: CPT | Mod: CPTII,S$GLB,, | Performed by: UROLOGY

## 2025-01-31 PROCEDURE — 74018 RADEX ABDOMEN 1 VIEW: CPT | Mod: 26,,, | Performed by: RADIOLOGY

## 2025-01-31 PROCEDURE — 3008F BODY MASS INDEX DOCD: CPT | Mod: CPTII,S$GLB,, | Performed by: UROLOGY

## 2025-01-31 PROCEDURE — 74018 RADEX ABDOMEN 1 VIEW: CPT | Mod: TC

## 2025-01-31 PROCEDURE — 99215 OFFICE O/P EST HI 40 MIN: CPT | Mod: S$GLB,,, | Performed by: UROLOGY

## 2025-01-31 RX ORDER — POTASSIUM CITRATE 10 MEQ/1
10 TABLET, EXTENDED RELEASE ORAL 2 TIMES DAILY WITH MEALS
Qty: 180 TABLET | Refills: 3 | Status: SHIPPED | OUTPATIENT
Start: 2025-01-31 | End: 2026-01-31

## 2025-02-12 NOTE — PROGRESS NOTES
DATE: 2/12/2025  PATIENT: Tee Aranda    Supervising Physician: Kvng Contreras M.D.    CHIEF COMPLAINT: low back and right leg pain    HISTORY:  Tee Aranda is a 54 y.o. female with a pmhx of cirrhosis here for initial evaluation of low back and right groin pain (Back - 8). The pain has been present for years without specific injury. The patient describes the pain as burning in her back and radiating to her right groin.  The pain is worse with activity and improved by nothing. There is mild associated numbness and tingling. There is positive subjective weakness. Prior treatments have included PT with continued home exercises for 8 weeks in the last 3 months, tylenol, and remote hx of ESIs, but no surgery.    The patient denies myelopathic symptoms such as handwriting changes or difficulty with buttons/coins/keys. Denies perineal paresthesias, bowel/bladder dysfunction.    PAST MEDICAL/SURGICAL HISTORY:  Past Medical History:   Diagnosis Date    Anemia, unspecified     Anxiety     Arthritis     Ascites 11/23/2020    AVN (avascular necrosis of bone)     Cataract     COVID-19 vaccine administered     04/08/21, 04/30/21    Diarrhea of presumed infectious origin 7/31/2023    Edema 11/23/2020    Epigastric pain 7/31/2023    Esophageal varices     Glaucoma     Hepatic encephalopathy 11/23/2020    History of colon polyps     Hx of cirrhosis     non-alcoholic    Iron deficiency anemia secondary to blood loss (chronic)     Kidney stones     Portal hypertensive gastropathy     Unintentional weight loss 10/14/2023    Unspecified cirrhosis of liver      Past Surgical History:   Procedure Laterality Date    APPENDECTOMY      COLONOSCOPY N/A 8/3/2023    Procedure: COLONOSCOPY;  Surgeon: Gerard Salinas MD;  Location: [facility];  Service: Endoscopy;  Laterality: N/A;    COLONOSCOPY N/A 5/27/2024    Procedure: COLONOSCOPY;  Surgeon: Eric Garcia MD;  Location: [facility] ([address] [place] [address]);  Service: Endoscopy;  Laterality:  N/A;  Dr. Douglass, egd/colon, weight loss, variceal surveillance and abdominal pain, standard prep, cirrhosis labs ordered    COLONOSCOPY W/ POLYPECTOMY      CYSTOSCOPY N/A 3/20/2024    Procedure: CYSTOSCOPY;  Surgeon: Chris Carey MD;  Location: [facility] OR 1ST FLR;  Service: Urology;  Laterality: N/A;    CYSTOSCOPY  9/4/2024    Procedure: CYSTOSCOPY;  Surgeon: Crhis Carey MD;  Location: [facility] OR 1ST FLR;  Service: Urology;;    CYSTOSCOPY W/ RETROGRADES Right 9/13/2024    Procedure: CYSTOSCOPY, WITH RETROGRADE PYELOGRAM;  Surgeon: Chris Carey MD;  Location: [facility] OR 1ST FLR;  Service: Urology;  Laterality: Right;    CYSTOSCOPY W/ URETERAL STENT REMOVAL Right 3/14/2024    Procedure: CYSTOSCOPY, WITH URETERAL STENT REMOVAL;  Surgeon: Chris Carey MD;  Location: [facility] OR 1ST FLR;  Service: Urology;  Laterality: Right;    CYSTOSCOPY WITH CALCULUS EXTRACTION Right 9/13/2024    Procedure: CYSTOSCOPY, WITH CALCULUS REMOVAL;  Surgeon: Chris Carey MD;  Location: [facility] OR 1ST FLR;  Service: Urology;  Laterality: Right;  urtereoscopy    DISTAL CLAVICLE EXCISION Right 11/18/2021    Procedure: RIGHT SHOULDER ARTHROSCOPY, EXCISION, CLAVICLE, DISTAL; EXTENSIVE DEBRIDEMENT;  Surgeon: Isaiah Joyner MD;  Location: [facility] OR;  Service: Orthopedics;  Laterality: Right;    ESOPHAGEAL VARICE LIGATION      ESOPHAGOGASTRODUODENOSCOPY N/A 11/10/2020    Procedure: EGD (ESOPHAGOGASTRODUODENOSCOPY);  Surgeon: Gerard Salinas MD;  Location: [facility] ENDO;  Service: Endoscopy;  Laterality: N/A;    ESOPHAGOGASTRODUODENOSCOPY N/A 12/28/2020    Procedure: EGD (ESOPHAGOGASTRODUODENOSCOPY);  Surgeon: Adryan Park MD;  Location: [facility] ENDO (2ND FLR);  Service: Endoscopy;  Laterality: N/A;    ESOPHAGOGASTRODUODENOSCOPY N/A 02/15/2021    Procedure: EGD (ESOPHAGOGASTRODUODENOSCOPY);  Surgeon: Hari Greene MD;  Location: [facility] ENDO (4TH FLR);  Service: Endoscopy;  Laterality: N/A;  6 week follow-up variceal banding  Hx varices - Labs -  ERW  prep ins. emialed - COVID screening on 2/12/21 [place] - ERW    ESOPHAGOGASTRODUODENOSCOPY Left 08/03/2021    Procedure: EGD (ESOPHAGOGASTRODUODENOSCOPY);  Surgeon: Fabricio Corado MD;  Location: [facility] (4TH FLR);  Service: Gastroenterology;  Laterality: Left;  recent hematemasis. varices-labs on 7/26    COVID test at [facility] on 7/31-GT  requesting sooner    ESOPHAGOGASTRODUODENOSCOPY N/A 07/27/2022    Procedure: EGD (ESOPHAGOGASTRODUODENOSCOPY);  Surgeon: Eric Garcia MD;  Location: [facility];  Service: Endoscopy;  Laterality: N/A;    ESOPHAGOGASTRODUODENOSCOPY Left 08/29/2022    Procedure: EGD (ESOPHAGOGASTRODUODENOSCOPY);  Surgeon: Kacy Douglass MD;  Location: [facility] (2ND FLR);  Service: Endoscopy;  Laterality: Left;  Fully vaccinated/ clear liquids up to 4 hrs prior-RB  varices labs ordered-RB    ESOPHAGOGASTRODUODENOSCOPY N/A 10/05/2022    Procedure: EGD (ESOPHAGOGASTRODUODENOSCOPY);  Surgeon: Gerard Salinas MD;  Location: [facility];  Service: Endoscopy;  Laterality: N/A;    ESOPHAGOGASTRODUODENOSCOPY N/A 3/30/2023    Procedure: EGD (ESOPHAGOGASTRODUODENOSCOPY);  Surgeon: Gerard Salinas MD;  Location: [facility];  Service: Endoscopy;  Laterality: N/A;    ESOPHAGOGASTRODUODENOSCOPY N/A 8/3/2023    Procedure: EGD (ESOPHAGOGASTRODUODENOSCOPY);  Surgeon: Gerard Salinas MD;  Location: [facility];  Service: Endoscopy;  Laterality: N/A;    ESOPHAGOGASTRODUODENOSCOPY N/A 5/27/2024    Procedure: EGD (ESOPHAGOGASTRODUODENOSCOPY);  Surgeon: Eric Garcia MD;  Location: [facility] (4TH FLR);  Service: Endoscopy;  Laterality: N/A;    EXTRACTION - STONE Right 3/20/2024    Procedure: EXTRACTION - STONE;  Surgeon: Chris Carey MD;  Location: [facility] OR 1ST FLR;  Service: Urology;  Laterality: Right;    HYSTERECTOMY      KNEE SURGERY Bilateral     LASER LITHOTRIPSY Right 3/20/2024    Procedure: LITHOTRIPSY, USING LASER;  Surgeon: Chris Carey MD;  Location: [facility] OR [address] [place] [address];  Service: Urology;   Laterality: Right;    LASER LITHOTRIPSY Right 9/13/2024    Procedure: LITHOTRIPSY, USING LASER;  Surgeon: Chris Carey MD;  Location: [facility] OR 1ST FLR;  Service: Urology;  Laterality: Right;    LITHOTRIPSY      REMOVAL-STENT Right 3/20/2024    Procedure: REMOVAL-STENT;  Surgeon: Chris Carey MD;  Location: [facility] OR 1ST FLR;  Service: Urology;  Laterality: Right;    REMOVAL-STENT Right 9/13/2024    Procedure: REMOVAL-STENT;  Surgeon: Chris Carey MD;  Location: [facility] OR [facility] FLR;  Service: Urology;  Laterality: Right;    RETROGRADE PYELOGRAPHY Right 3/20/2024    Procedure: PYELOGRAM, RETROGRADE;  Surgeon: Chris Carey MD;  [facility];  Service: Urology;  Laterality: Right;    RHINOPLASTY TIP      SHOULDER SURGERY Right     TONSILLECTOMY      TOTAL HIP ARTHROPLASTY Right     URETERAL STENT PLACEMENT Right 3/20/2024    Procedure: INSERTION, STENT, URETER;  Surgeon: Chris Carey MD;  [facility];  Service: Urology;  Laterality: Right;    URETERAL STENT PLACEMENT Right 9/4/2024    Procedure: INSERTION, STENT, URETER;  Surgeon: Chris Carey MD;  [facility];  Service: Urology;  Laterality: Right;    URETEROSCOPY Right 3/20/2024    Procedure: URETEROSCOPY;  Surgeon: Chris Carey MD;  [facility];  Service: Urology;  Laterality: Right;    URETEROSCOPY Right 9/13/2024    Procedure: URETEROSCOPY;  Surgeon: Chris Carey MD;  [facility];  Service: Urology;  Laterality: Right;       Current Medications:   Current Outpatient Medications:     carvediloL (COREG) 3.125 MG tablet, Take 1 tablet (3.125 mg total) by mouth 2 (two) times daily., Disp: 180 tablet, Rfl: 3    esomeprazole (NEXIUM) 40 MG capsule, TAKE 1 CAPSULE BY MOUTH 2 TIMES DAILY BEFORE MEALS., Disp: 180 capsule, Rfl: 3    multivitamin (THERAGRAN) per tablet, Take 1 tablet by mouth once daily., Disp: , Rfl:     omega-3 fatty acids/fish oil (FISH OIL-OMEGA-3 FATTY ACIDS) 300-1,000  mg capsule, Take 1 capsule by mouth once daily., Disp: , Rfl:     potassium citrate (UROCIT-K 10) 10 mEq (1,080 mg) TbSR, Take 1 tablet (10 mEq total) by mouth 2 (two) times daily with meals., Disp: 180 tablet, Rfl: 3    rifAXIMin (XIFAXAN) 550 mg Tab, Take 1 tablet (550 mg total) by mouth 2 (two) times daily., Disp: 60 tablet, Rfl: 11    tamsulosin (FLOMAX) 0.4 mg Cap, Take 1 capsule (0.4 mg total) by mouth once daily., Disp: 30 capsule, Rfl: 0    UNABLE TO FIND, 4 (four) times daily as needed. Medible 20 mg blue raspberry  to  up to 4 times daily as needed., Disp: , Rfl:   No current facility-administered medications for this visit.    Facility-Administered Medications Ordered in Other Visits:     0.9%  NaCl infusion, , Intravenous, Continuous, PellegGerard guardado MD, Stopped at 23 0922    Social History:   Social History     Socioeconomic History    Marital status:    Tobacco Use    Smoking status: Some Days     Current packs/day: 0.00     Types: Cigarettes     Last attempt to quit: 7/3/2019     Years since quittin.6    Smokeless tobacco: Never   Substance and Sexual Activity    Alcohol use: Not Currently    Drug use: No     Social Drivers of Health     Financial Resource Strain: Patient Declined (2024)    Overall Financial Resource Strain (CARDIA)     Difficulty of Paying Living Expenses: Patient declined   Food Insecurity: Patient Declined (2024)    Hunger Vital Sign     Worried About Running Out of Food in the Last Year: Patient declined     Ran Out of Food in the Last Year: Patient declined   Transportation Needs: No Transportation Needs (2024)    PRAPARE - Transportation     Lack of Transportation (Medical): No     Lack of Transportation (Non-Medical): No   Physical Activity: Sufficiently Active (2024)    Exercise Vital Sign     Days of Exercise per Week: 3 days     Minutes of Exercise per Session: 60 min   Stress: Stress Concern Present (2024)    [employer] - Occupational Stress Questionnaire     Feeling of Stress : Very much   Housing Stability: Unknown (5/4/2024)    Housing Stability Vital Sign     Unable to Pay for Housing in the Last Year: Patient declined     Homeless in the Last Year: No       REVIEW OF SYSTEMS:  Constitution: Negative. Negative for chills, fever and night sweats.   Cardiovascular: Negative for chest pain and syncope.   Respiratory: Negative for cough and shortness of breath.   Gastrointestinal: See HPI. Negative for nausea/vomiting. Negative for abdominal pain.  Genitourinary: See HPI. Negative for discoloration or dysuria.  Skin: Negative for dry skin, itching and rash.   Hematologic/Lymphatic: Negative for bleeding problem. Does not bruise/bleed easily.   Musculoskeletal: Negative for falls and muscle weakness.   Neurological: See HPI. No seizures.   Endocrine: Negative for polydipsia, polyphagia and polyuria.   Allergic/Immunologic: Negative for hives and persistent infections.    PHYSICAL EXAMINATION:    There were no vitals taken for this visit.    General: The patient is a very pleasant 54 y.o. female in no apparent distress, the patient is oriented to person, place and time.   Psych: Normal mood and affect  HEENT: Vision grossly intact, hearing intact to the spoken word.  Lungs: Respirations unlabored.  Gait: Normal station and gait, no difficulty with toe or heel walk.   Skin: Dorsal lumbar skin negative for rashes, lesions, hairy patches and surgical scars.  Range of motion: Lumbar range of motion is acceptable. There is mild lumbar tenderness to palpation.  Spinal Balance: Global saggital and coronal spinal balance acceptable, no significant for scoliosis and kyphosis.  Musculoskeletal: No pain with the range of motion of the bilateral hips. No trochanteric tenderness to palpation.  Vascular: Bilateral lower extremities warm and well perfused, Dorsalis pedis pulses 2+ bilaterally.  Neurological: Normal strength  and tone in all major motor groups in the bilateral lower extremities. Normal sensation to light touch in the L2-S1 dermatomes bilaterally.  Deep tendon reflexes symmetric 2+ in the bilateral lower extremities.  Negative Babinski bilaterally.  Straight leg raise negative bilaterally.     IMAGING:   Today I personally reviewed AP, Lat CT A/P that demonstrates mild degenerative changes. Right hip replacement noted     MRI thoracic demonstrates small left paracentral disc protrusion T6-7 with slight narrowing of the left paracentral canal     There is no height or weight on file to calculate BMI.    Hemoglobin A1C   Date Value Ref Range Status   07/26/2022 5.5 4.0 - 5.6 % Final     Comment:     ADA Screening Guidelines:  5.7-6.4%  Consistent with prediabetes  >or=6.5%  Consistent with diabetes    High levels of fetal hemoglobin interfere with the HbA1C  assay. Heterozygous hemoglobin variants (HbS, HgC, etc)do  not significantly interfere with this assay.   However, presence of multiple variants may affect accuracy.           ASSESSMENT/PLAN:    There are no diagnoses linked to this encounter.    Today we discussed at length all of the different treatment options including anti-inflammatories, acetaminophen, rest, ice, heat, physical therapy including strengthening and stretching exercises, home exercises, ROM, aerobic conditioning, aqua therapy, other modalities including ultrasound, massage, and dry needling, epidural steroid injections and finally surgical intervention.      Pt presents with chronic low back and right leg pain. Failure of conservative rx. Will obtain lumbar MRI and right hip CT to further evaluate. Will send gabapentin to pharmacy

## 2025-02-20 ENCOUNTER — LAB VISIT (OUTPATIENT)
Dept: LAB | Facility: HOSPITAL | Age: 55
End: 2025-02-20
Attending: INTERNAL MEDICINE
Payer: COMMERCIAL

## 2025-02-20 DIAGNOSIS — K74.60 LIVER CIRRHOSIS SECONDARY TO NASH: Chronic | ICD-10-CM

## 2025-02-20 DIAGNOSIS — K75.81 LIVER CIRRHOSIS SECONDARY TO NASH: Chronic | ICD-10-CM

## 2025-02-20 DIAGNOSIS — K74.60 HEPATIC CIRRHOSIS, UNSPECIFIED HEPATIC CIRRHOSIS TYPE, UNSPECIFIED WHETHER ASCITES PRESENT: ICD-10-CM

## 2025-02-20 LAB
AFP SERPL-MCNC: 2.5 NG/ML (ref 0–8.4)
ALBUMIN SERPL BCP-MCNC: 4.4 G/DL (ref 3.5–5.2)
ALP SERPL-CCNC: 111 U/L (ref 40–150)
ALT SERPL W/O P-5'-P-CCNC: 23 U/L (ref 10–44)
ANION GAP SERPL CALC-SCNC: 9 MMOL/L (ref 8–16)
AST SERPL-CCNC: 20 U/L (ref 10–40)
BASOPHILS # BLD AUTO: 0.01 K/UL (ref 0–0.2)
BASOPHILS NFR BLD: 0.2 % (ref 0–1.9)
BILIRUB DIRECT SERPL-MCNC: 0.3 MG/DL (ref 0.1–0.3)
BILIRUB DIRECT SERPL-MCNC: 0.3 MG/DL (ref 0.1–0.3)
BILIRUB SERPL-MCNC: 0.8 MG/DL (ref 0.1–1)
BUN SERPL-MCNC: 18 MG/DL (ref 6–20)
CALCIUM SERPL-MCNC: 9.1 MG/DL (ref 8.7–10.5)
CHLORIDE SERPL-SCNC: 106 MMOL/L (ref 95–110)
CO2 SERPL-SCNC: 24 MMOL/L (ref 23–29)
CREAT SERPL-MCNC: 0.8 MG/DL (ref 0.5–1.4)
DIFFERENTIAL METHOD BLD: ABNORMAL
EOSINOPHIL # BLD AUTO: 0.1 K/UL (ref 0–0.5)
EOSINOPHIL NFR BLD: 1.2 % (ref 0–8)
ERYTHROCYTE [DISTWIDTH] IN BLOOD BY AUTOMATED COUNT: 14.2 % (ref 11.5–14.5)
EST. GFR  (NO RACE VARIABLE): >60 ML/MIN/1.73 M^2
FERRITIN SERPL-MCNC: 26 NG/ML (ref 20–300)
GLUCOSE SERPL-MCNC: 115 MG/DL (ref 70–110)
HCT VFR BLD AUTO: 40 % (ref 37–48.5)
HGB BLD-MCNC: 13.1 G/DL (ref 12–16)
IMM GRANULOCYTES # BLD AUTO: 0.02 K/UL (ref 0–0.04)
IMM GRANULOCYTES NFR BLD AUTO: 0.5 % (ref 0–0.5)
INR PPP: 1.1 (ref 0.8–1.2)
INR PPP: 1.1 (ref 0.8–1.2)
IRON SERPL-MCNC: 44 UG/DL (ref 30–160)
LYMPHOCYTES # BLD AUTO: 0.7 K/UL (ref 1–4.8)
LYMPHOCYTES NFR BLD: 15.3 % (ref 18–48)
MCH RBC QN AUTO: 28.4 PG (ref 27–31)
MCHC RBC AUTO-ENTMCNC: 32.8 G/DL (ref 32–36)
MCV RBC AUTO: 87 FL (ref 82–98)
MONOCYTES # BLD AUTO: 0.3 K/UL (ref 0.3–1)
MONOCYTES NFR BLD: 6.3 % (ref 4–15)
NEUTROPHILS # BLD AUTO: 3.3 K/UL (ref 1.8–7.7)
NEUTROPHILS NFR BLD: 76.5 % (ref 38–73)
NRBC BLD-RTO: 0 /100 WBC
PLATELET # BLD AUTO: 78 K/UL (ref 150–450)
PMV BLD AUTO: 11.7 FL (ref 9.2–12.9)
POTASSIUM SERPL-SCNC: 3.8 MMOL/L (ref 3.5–5.1)
PROT SERPL-MCNC: 7.6 G/DL (ref 6–8.4)
PROTHROMBIN TIME: 12 SEC (ref 9–12.5)
PROTHROMBIN TIME: 12 SEC (ref 9–12.5)
RBC # BLD AUTO: 4.62 M/UL (ref 4–5.4)
SATURATED IRON: 8 % (ref 20–50)
SODIUM SERPL-SCNC: 139 MMOL/L (ref 136–145)
TOTAL IRON BINDING CAPACITY: 518 UG/DL (ref 250–450)
TRANSFERRIN SERPL-MCNC: 350 MG/DL (ref 200–375)
WBC # BLD AUTO: 4.31 K/UL (ref 3.9–12.7)

## 2025-02-20 PROCEDURE — 84466 ASSAY OF TRANSFERRIN: CPT | Performed by: INTERNAL MEDICINE

## 2025-02-20 PROCEDURE — 80053 COMPREHEN METABOLIC PANEL: CPT | Performed by: INTERNAL MEDICINE

## 2025-02-20 PROCEDURE — 82728 ASSAY OF FERRITIN: CPT | Performed by: INTERNAL MEDICINE

## 2025-02-20 PROCEDURE — 82248 BILIRUBIN DIRECT: CPT | Performed by: INTERNAL MEDICINE

## 2025-02-20 PROCEDURE — 82105 ALPHA-FETOPROTEIN SERUM: CPT | Performed by: INTERNAL MEDICINE

## 2025-02-20 PROCEDURE — 85025 COMPLETE CBC W/AUTO DIFF WBC: CPT | Performed by: INTERNAL MEDICINE

## 2025-02-20 PROCEDURE — 80321 ALCOHOLS BIOMARKERS 1OR 2: CPT | Performed by: INTERNAL MEDICINE

## 2025-02-20 PROCEDURE — 36415 COLL VENOUS BLD VENIPUNCTURE: CPT | Performed by: INTERNAL MEDICINE

## 2025-02-20 PROCEDURE — 85610 PROTHROMBIN TIME: CPT | Performed by: INTERNAL MEDICINE

## 2025-02-21 ENCOUNTER — RESULTS FOLLOW-UP (OUTPATIENT)
Dept: TRANSPLANT | Facility: CLINIC | Age: 55
End: 2025-02-21

## 2025-02-24 ENCOUNTER — OFFICE VISIT (OUTPATIENT)
Dept: ORTHOPEDICS | Facility: CLINIC | Age: 55
End: 2025-02-24
Payer: COMMERCIAL

## 2025-02-24 ENCOUNTER — PATIENT MESSAGE (OUTPATIENT)
Dept: ORTHOPEDICS | Facility: CLINIC | Age: 55
End: 2025-02-24

## 2025-02-24 ENCOUNTER — OFFICE VISIT (OUTPATIENT)
Dept: HEPATOLOGY | Facility: CLINIC | Age: 55
End: 2025-02-24
Payer: COMMERCIAL

## 2025-02-24 VITALS
OXYGEN SATURATION: 98 % | DIASTOLIC BLOOD PRESSURE: 73 MMHG | BODY MASS INDEX: 21.18 KG/M2 | HEIGHT: 68 IN | SYSTOLIC BLOOD PRESSURE: 164 MMHG | WEIGHT: 139.75 LBS | TEMPERATURE: 98 F | HEART RATE: 82 BPM

## 2025-02-24 VITALS — BODY MASS INDEX: 21.18 KG/M2 | HEIGHT: 68 IN | WEIGHT: 139.75 LBS

## 2025-02-24 DIAGNOSIS — M25.551 PAIN OF RIGHT HIP: ICD-10-CM

## 2025-02-24 DIAGNOSIS — K76.6 PORTAL HYPERTENSION: ICD-10-CM

## 2025-02-24 DIAGNOSIS — K74.60 LIVER CIRRHOSIS SECONDARY TO NASH: Chronic | ICD-10-CM

## 2025-02-24 DIAGNOSIS — E61.1 IRON DEFICIENCY: ICD-10-CM

## 2025-02-24 DIAGNOSIS — M54.9 DORSALGIA, UNSPECIFIED: Primary | ICD-10-CM

## 2025-02-24 DIAGNOSIS — K75.81 LIVER CIRRHOSIS SECONDARY TO NASH: Chronic | ICD-10-CM

## 2025-02-24 DIAGNOSIS — I85.10 SECONDARY ESOPHAGEAL VARICES WITHOUT BLEEDING: Primary | ICD-10-CM

## 2025-02-24 DIAGNOSIS — R93.7 ABNORMAL MRI, THORACIC SPINE: ICD-10-CM

## 2025-02-24 PROCEDURE — 99999 PR PBB SHADOW E&M-EST. PATIENT-LVL III: CPT | Mod: PBBFAC,,, | Performed by: ORTHOPAEDIC SURGERY

## 2025-02-24 PROCEDURE — 99999 PR PBB SHADOW E&M-EST. PATIENT-LVL V: CPT | Mod: PBBFAC,,, | Performed by: INTERNAL MEDICINE

## 2025-02-24 PROCEDURE — 99214 OFFICE O/P EST MOD 30 MIN: CPT | Mod: S$GLB,,, | Performed by: INTERNAL MEDICINE

## 2025-02-24 PROCEDURE — 3078F DIAST BP <80 MM HG: CPT | Mod: CPTII,S$GLB,, | Performed by: INTERNAL MEDICINE

## 2025-02-24 PROCEDURE — 1160F RVW MEDS BY RX/DR IN RCRD: CPT | Mod: CPTII,S$GLB,, | Performed by: INTERNAL MEDICINE

## 2025-02-24 PROCEDURE — 3008F BODY MASS INDEX DOCD: CPT | Mod: CPTII,S$GLB,, | Performed by: INTERNAL MEDICINE

## 2025-02-24 PROCEDURE — 3077F SYST BP >= 140 MM HG: CPT | Mod: CPTII,S$GLB,, | Performed by: INTERNAL MEDICINE

## 2025-02-24 PROCEDURE — 1159F MED LIST DOCD IN RCRD: CPT | Mod: CPTII,S$GLB,, | Performed by: INTERNAL MEDICINE

## 2025-02-24 RX ORDER — GABAPENTIN 100 MG/1
100 CAPSULE ORAL 3 TIMES DAILY
Qty: 90 CAPSULE | Refills: 11 | Status: SHIPPED | OUTPATIENT
Start: 2025-02-24 | End: 2026-02-24

## 2025-02-24 NOTE — PROGRESS NOTES
HEPATOLOGY FOLLOW UP    Referring Physician: James Samano MD  Current Corresponding Physician: James Samano MD, Gerard Salinas MD    Tee Aranda is here for follow up of decompensated cirrhosis    HPI   Tee Aranda was seen in consultation by my colleague Dr White 08/20. He noted:  This 53-year-old woman was referred for evaluation of cirrhosis.  She noticed easy bruisability.  She was found have a low platelet count.  She was assessed by Hematology.  She had a bone marrow aspirate and biopsy which was normal.  She eventually had an upper endoscopy which showed features of portal hypertension and grade 1 varices.  A FibroScan confirmed significant fatty liver as well as advanced fibrosis and cirrhosis.  Her weight was as high as 199 lb.  She has lost 26 lb after the diagnosis.  There is no family history of liver disease. She drinks alcohol socially.  She has mild ankle edema.  She has no symptoms of hepatic encephalopathy.  She initially had no symptoms of GI bleeding.    Admission 11/9/20: melena and weakenss. Hemoglobin fell to 5.9 and she was transfused 2 units of PRBC. EGD showed PHG and small varices. The finding was similar to an EGD done earlier this year (03/20). Small varices werenoted at that time as well. Coreg and protonix were prescribed but never started.  Admission 12/28/20: gastrintestinal hemorrhage and melena; EGD: esophageal varices banded  EGD 2/15/21: EV not banded; repeat 6 months recommended  ER visit 7/19/21 for hematemasis; hemoglobin stable at 12.5; LEFT AGAINST MEDICAL ADVICE    Interval History  --had been having recurrent issues with nephrolithiasis- currently urinary frequency and has right flank pain- due to alkalinize her urine  --thoracic spine MRI- protruding disc T6-7-due to see spine MD today  --developed hemorrhoids- due to see colorectal surgeon    HE: no longer taking lactulose but is on rifaximin  Ascites/edema, resolved- off lasix and aldactone-taking  prn when is swollen (twice a week)  HCC screening:  --Abdo US 11/8/24: The liver demonstrates a mildly coarse echotexture. 9 mm cyst right lobe; splenomegaly; nephrolithiasis but no hydronephrosis  --CT abdo pelivs w contrast 4/23/24: no HCC  EGD 5/27/24: small varices  Colonoscopy 5/27/24: normal    Labs 2/20/25: Tbil 0.8, ALT 29, AST 26, ALKP 109  MELD 3.0: 8 at 2/20/2025  1:00 PM  MELD-Na: 7 at 2/20/2025  1:00 PM  Calculated from:  Serum Creatinine: 0.8 mg/dL (Using min of 1 mg/dL) at 2/20/2025  1:00 PM  Serum Sodium: 139 mmol/L (Using max of 137 mmol/L) at 2/20/2025  1:00 PM  Total Bilirubin: 0.8 mg/dL (Using min of 1 mg/dL) at 2/20/2025  1:00 PM  Serum Albumin: 4.4 g/dL (Using max of 3.5 g/dL) at 2/20/2025  1:00 PM  INR(ratio): 1.1 at 2/20/2025  1:00 PM  Age at listing (hypothetical): 54 years  Sex: Female at 2/20/2025  1:00 PM      Outpatient Encounter Medications as of 2/24/2025   Medication Sig Dispense Refill    carvediloL (COREG) 3.125 MG tablet Take 1 tablet (3.125 mg total) by mouth 2 (two) times daily. 180 tablet 3    esomeprazole (NEXIUM) 40 MG capsule TAKE 1 CAPSULE BY MOUTH 2 TIMES DAILY BEFORE MEALS. 180 capsule 3    multivitamin (THERAGRAN) per tablet Take 1 tablet by mouth once daily.      omega-3 fatty acids/fish oil (FISH OIL-OMEGA-3 FATTY ACIDS) 300-1,000 mg capsule Take 1 capsule by mouth once daily.      potassium citrate (UROCIT-K 10) 10 mEq (1,080 mg) TbSR Take 1 tablet (10 mEq total) by mouth 2 (two) times daily with meals. 180 tablet 3    rifAXIMin (XIFAXAN) 550 mg Tab Take 1 tablet (550 mg total) by mouth 2 (two) times daily. 60 tablet 11    tamsulosin (FLOMAX) 0.4 mg Cap Take 1 capsule (0.4 mg total) by mouth once daily. 30 capsule 0    UNABLE TO FIND 4 (four) times daily as needed. Medible 20 mg blue raspberry 1/4 to 1/2 up to 4 times daily as needed.      [DISCONTINUED] tamsulosin (FLOMAX) 0.4 mg Cap Take 1 capsule (0.4 mg total) by mouth every evening. (Patient not taking: Reported  on 1/31/2025) 30 capsule 11     Facility-Administered Encounter Medications as of 2/24/2025   Medication Dose Route Frequency Provider Last Rate Last Admin    0.9%  NaCl infusion   Intravenous Continuous Gerard Salinas MD   Stopped at 03/30/23 0922     Review of patient's allergies indicates:   Allergen Reactions    Sulfa (sulfonamide antibiotics) Hives     Past Medical History:   Diagnosis Date    Anemia, unspecified     Anxiety     Arthritis     Ascites 11/23/2020    AVN (avascular necrosis of bone)     Cataract     COVID-19 vaccine administered     04/08/21, 04/30/21    Diarrhea of presumed infectious origin 7/31/2023    Edema 11/23/2020    Epigastric pain 7/31/2023    Esophageal varices     Glaucoma     Hepatic encephalopathy 11/23/2020    History of colon polyps     Hx of cirrhosis     non-alcoholic    Iron deficiency anemia secondary to blood loss (chronic)     Kidney stones     Portal hypertensive gastropathy     Unintentional weight loss 10/14/2023    Unspecified cirrhosis of liver        Review of Systems   Constitutional: Negative.    HENT: Negative.     Eyes: Negative.    Respiratory: Negative.     Cardiovascular: Negative.    Gastrointestinal: Negative.    Genitourinary: Negative.    Musculoskeletal: Negative.    Skin: Negative.    Neurological: Negative.    Psychiatric/Behavioral: Negative.       Vitals:    02/24/25 0948   BP: (!) 164/73   Pulse: 82   Temp: 97.9 °F (36.6 °C)   BMI 20    Physical Exam  Vitals reviewed.   Constitutional:       Appearance: She is well-developed.   HENT:      Head: Normocephalic and atraumatic.   Eyes:      General: No scleral icterus.     Conjunctiva/sclera: Conjunctivae normal.      Pupils: Pupils are equal, round, and reactive to light.   Neck:      Thyroid: No thyromegaly.   Cardiovascular:      Rate and Rhythm: Normal rate and regular rhythm.      Heart sounds: Normal heart sounds.   Pulmonary:      Effort: Pulmonary effort is normal.      Breath sounds: Normal  breath sounds. No rales.   Abdominal:      General: Bowel sounds are normal. There is no distension.      Palpations: Abdomen is soft. There is no mass.      Tenderness: There is abdominal tenderness (low abdomen).   Musculoskeletal:         General: Normal range of motion.      Cervical back: Normal range of motion and neck supple.   Skin:     General: Skin is warm and dry.      Findings: No rash.   Neurological:      Mental Status: She is alert and oriented to person, place, and time.         MELD 3.0: 8 at 2/20/2025  1:00 PM  MELD-Na: 7 at 2/20/2025  1:00 PM  Calculated from:  Serum Creatinine: 0.8 mg/dL (Using min of 1 mg/dL) at 2/20/2025  1:00 PM  Serum Sodium: 139 mmol/L (Using max of 137 mmol/L) at 2/20/2025  1:00 PM  Total Bilirubin: 0.8 mg/dL (Using min of 1 mg/dL) at 2/20/2025  1:00 PM  Serum Albumin: 4.4 g/dL (Using max of 3.5 g/dL) at 2/20/2025  1:00 PM  INR(ratio): 1.1 at 2/20/2025  1:00 PM  Age at listing (hypothetical): 54 years  Sex: Female at 2/20/2025  1:00 PM      Lab Results   Component Value Date     (H) 02/20/2025    BUN 18 02/20/2025    CREATININE 0.8 02/20/2025    CALCIUM 9.1 02/20/2025     02/20/2025    K 3.8 02/20/2025     02/20/2025    PROT 7.6 02/20/2025    CO2 24 02/20/2025    ANIONGAP 9 02/20/2025    WBC 4.31 02/20/2025    RBC 4.62 02/20/2025    HGB 13.1 02/20/2025    HCT 40.0 02/20/2025    MCV 87 02/20/2025    MCH 28.4 02/20/2025    MCHC 32.8 02/20/2025     Lab Results   Component Value Date    RDW 14.2 02/20/2025    PLT 78 (L) 02/20/2025    MPV 11.7 02/20/2025    GRAN 3.3 02/20/2025    GRAN 76.5 (H) 02/20/2025    LYMPH 0.7 (L) 02/20/2025    LYMPH 15.3 (L) 02/20/2025    MONO 0.3 02/20/2025    MONO 6.3 02/20/2025    EOSINOPHIL 1.2 02/20/2025    BASOPHIL 0.2 02/20/2025    EOS 0.1 02/20/2025    BASO 0.01 02/20/2025    APTT 28.5 04/23/2024    GROUPTRH O POS 05/24/2024    BNP 86 03/24/2024    ALBUMIN 4.4 02/20/2025    BILIDIR 0.3 02/20/2025    BILIDIR 0.3 02/20/2025     AST 20 02/20/2025    ALT 23 02/20/2025    ALKPHOS 111 02/20/2025    MG 1.5 (L) 04/01/2024    LABPROT 12.0 02/20/2025    LABPROT 12.0 02/20/2025    INR 1.1 02/20/2025    INR 1.1 02/20/2025       Assessment and Plan:  Tee Aranda is a 54 y.o. female with very mild decompensated cirrhosis (only HE on rifaximin); MELD <15; medically early for liver transplant. Other recommendations:  1. Decompensated cirrhosis-liver improved-now compensated- check meld labs every 12 weeks; HCC screening every 6 months (due 5/25)  2. Hx GI bleeding and EV: repeat EGD 5/25  3. Ascites/edema, resolved: stay off diuretics - if get bloated plan for abdo US  4. HE: continue rifaximin; restart lactulose if have memory problems  5. Flank pain: MRI thoracic spine shows protruding disc in the thoracic area- will see spine MD today; f/u urology re flank pain;    Return 6 months  A total of 35 minutes was spent reviewing the patient's chart, examining the patient, reviewing labs and imaging and coordinating care with the patient's care team.

## 2025-02-24 NOTE — PATIENT INSTRUCTIONS
Labs every 3 months-due next 5/25  Abdo US every 6 mnths- due next 5/25  EGD early June 2025  Return 6 months

## 2025-02-25 ENCOUNTER — PATIENT MESSAGE (OUTPATIENT)
Dept: HEPATOLOGY | Facility: CLINIC | Age: 55
End: 2025-02-25
Payer: COMMERCIAL

## 2025-02-25 LAB
CLINICAL BIOCHEMIST REVIEW: NORMAL
PLPETH BLD-MCNC: <10 NG/ML
POPETH BLD-MCNC: <10 NG/ML

## 2025-03-02 ENCOUNTER — HOSPITAL ENCOUNTER (OUTPATIENT)
Dept: RADIOLOGY | Facility: HOSPITAL | Age: 55
Discharge: HOME | End: 2025-03-02
Attending: ORTHOPAEDIC SURGERY
Payer: COMMERCIAL

## 2025-03-02 DIAGNOSIS — M54.9 DORSALGIA, UNSPECIFIED: ICD-10-CM

## 2025-03-02 DIAGNOSIS — M25.551 PAIN OF RIGHT HIP: ICD-10-CM

## 2025-03-02 PROCEDURE — 73700 CT LOWER EXTREMITY W/O DYE: CPT | Mod: TC,RT

## 2025-03-02 PROCEDURE — 72148 MRI LUMBAR SPINE W/O DYE: CPT | Mod: 26,,, | Performed by: RADIOLOGY

## 2025-03-02 PROCEDURE — 73700 CT LOWER EXTREMITY W/O DYE: CPT | Mod: 26,RT,, | Performed by: RADIOLOGY

## 2025-03-02 PROCEDURE — 72148 MRI LUMBAR SPINE W/O DYE: CPT | Mod: TC

## 2025-03-03 ENCOUNTER — PATIENT MESSAGE (OUTPATIENT)
Dept: HEPATOLOGY | Facility: CLINIC | Age: 55
End: 2025-03-03
Payer: COMMERCIAL

## 2025-03-03 ENCOUNTER — PATIENT MESSAGE (OUTPATIENT)
Dept: ORTHOPEDICS | Facility: CLINIC | Age: 55
End: 2025-03-03
Payer: COMMERCIAL

## 2025-03-03 RX ORDER — METHOCARBAMOL 750 MG/1
750 TABLET, FILM COATED ORAL 3 TIMES DAILY
Qty: 90 TABLET | Refills: 0 | Status: SHIPPED | OUTPATIENT
Start: 2025-03-03 | End: 2025-04-02

## 2025-03-06 ENCOUNTER — OFFICE VISIT (OUTPATIENT)
Dept: ORTHOPEDICS | Facility: CLINIC | Age: 55
End: 2025-03-06
Payer: COMMERCIAL

## 2025-03-06 ENCOUNTER — TELEPHONE (OUTPATIENT)
Dept: PAIN MEDICINE | Facility: CLINIC | Age: 55
End: 2025-03-06
Payer: COMMERCIAL

## 2025-03-06 DIAGNOSIS — M79.604 RIGHT LEG PAIN: Primary | ICD-10-CM

## 2025-03-06 PROCEDURE — 99499 UNLISTED E&M SERVICE: CPT | Mod: 95,,, | Performed by: ORTHOPAEDIC SURGERY

## 2025-03-06 RX ORDER — PREGABALIN 100 MG/1
100 CAPSULE ORAL 2 TIMES DAILY
Qty: 60 CAPSULE | Refills: 5 | Status: SHIPPED | OUTPATIENT
Start: 2025-03-06 | End: 2025-09-04

## 2025-03-06 NOTE — TELEPHONE ENCOUNTER
"----- Message from Mainor Thomason MD sent at 3/6/2025 10:06 AM CST -----  Yes, definitely I would like to see her and examine to see if this CT finding is clinically relevant or noMy team will schedule her to be seen next Monday 03/10/2025Thank you so much  ----- Message -----  From: Abbie Avendaño PA-C  Sent: 3/6/2025   9:15 AM CST  To: Mainor Thomason MD    Hey Dr. Thomason,I saw this lady recently for pain in her right groin and down the back of her left leg. She had a hip replacement for AVN in 2005 and says her leg has hurt since then but ramped up in the last few months. Her lumbar MRI shows some mild degenerative changes (no surgical intervention indicated at this time) but the radiologist is reading her hip CT as "Posterior acetabular screw extends beyond the posterior cortical margin with tip closely approximating the adjacent sciatic nerve." I messaged the hip surgeons about the CT but most are out this week, but I was wondering if we could go ahead and get her scheduled in your [facility] to discuss possible injection options?Lmk what you think!Twyla  "

## 2025-03-06 NOTE — PROGRESS NOTES
DATE: 3/6/2025  PATIENT: Tee Aranda    Attending Physician: Kvng Contreras M.D.    HISTORY:  Tee Aranda is a 54 y.o. female who returns to me today for imaging results.  She was last seen by me 2/24/2025.  Today she is doing well but notes she continues to have low back and right groin pain (Back - 8). The pain has been present for years without specific injury. The patient describes the pain as burning in her back and radiating to her right groin.  The pain is worse with activity and improved by nothing. There is mild associated numbness and tingling. There is positive subjective weakness. Prior treatments have included PT with continued home exercises for 8 weeks in the last 3 months, tylenol, and remote hx of ESIs, but no surgery.     The Patient denies myelopathic symptoms such as handwriting changes or difficulty with buttons/coins/keys. Denies perineal paresthesias, bowel/bladder dysfunction.      EXAM:  There were no vitals taken for this visit.    My physical examination was notable for the following findings:     Musculoskeletal and neuro exam stable      IMAGING:    Today I personally re- reviewed AP, Lat and Flex/Ex  upright L-spine that demonstrate mild degenerative changes. Right hip replacement noted      MRI thoracic demonstrates small left paracentral disc protrusion T6-7 with slight narrowing of the left paracentral canal     MRI lumbar demonstrates minimal stenosis, mild bilateral neural foraminal narrowing at L4-5    CT hip demonstrates posterior acetabular screw extends beyond the posterior cortical margin with tip closely approximating the adjacent sciatic nerve.     There is no height or weight on file to calculate BMI.    Hemoglobin A1C   Date Value Ref Range Status   07/26/2022 5.5 4.0 - 5.6 % Final     Comment:     ADA Screening Guidelines:  5.7-6.4%  Consistent with prediabetes  >or=6.5%  Consistent with diabetes    High levels of fetal hemoglobin interfere with the  HbA1C  assay. Heterozygous hemoglobin variants (HbS, HgC, etc)do  not significantly interfere with this assay.   However, presence of multiple variants may affect accuracy.           ASSESSMENT/PLAN:    Diagnoses and all orders for this visit:    Right leg pain  -     pregabalin (LYRICA) 100 MG capsule; Take 1 capsule (100 mg total) by mouth 2 (two) times daily.        Today we discussed at length all of the different treatment options including anti-inflammatories, acetaminophen, rest, ice, heat, physical therapy including strengthening and stretching exercises, home exercises, ROM, aerobic conditioning, aqua therapy, other modalities including ultrasound, massage, and dry needling, epidural steroid injections and finally surgical intervention.      Pt presents with chronic low back pain and radiculopathy. Failure of conservative rx. No lumbar spine surgical intervention indicated at this time. Messaged joints team regarding abnormal hip CT. Will schedule with pain mgmt for possible injections. Will send lyrica to pharmacy    The attending portion of this evaluation, treatment, and documentation was performed per Abbie Avendaño PA-C via Telemedicine AudioVisual using the secure [employer] software platform with two-way audio/video. The provider was located off-site and the patient is located in the hospital. The aforementioned video software was utilized to document the relevant history and physical exam.    Call time 10 min

## 2025-03-10 ENCOUNTER — OFFICE VISIT (OUTPATIENT)
Dept: PAIN MEDICINE | Facility: CLINIC | Age: 55
End: 2025-03-10
Payer: COMMERCIAL

## 2025-03-10 ENCOUNTER — HOSPITAL ENCOUNTER (OUTPATIENT)
Dept: RADIOLOGY | Facility: HOSPITAL | Age: 55
Discharge: HOME | End: 2025-03-10
Attending: ANESTHESIOLOGY
Payer: COMMERCIAL

## 2025-03-10 ENCOUNTER — PATIENT MESSAGE (OUTPATIENT)
Dept: ORTHOPEDICS | Facility: CLINIC | Age: 55
End: 2025-03-10
Payer: COMMERCIAL

## 2025-03-10 ENCOUNTER — TELEPHONE (OUTPATIENT)
Dept: PAIN MEDICINE | Facility: CLINIC | Age: 55
End: 2025-03-10

## 2025-03-10 VITALS
HEIGHT: 68 IN | SYSTOLIC BLOOD PRESSURE: 135 MMHG | BODY MASS INDEX: 20.92 KG/M2 | DIASTOLIC BLOOD PRESSURE: 81 MMHG | HEART RATE: 71 BPM | WEIGHT: 138 LBS | OXYGEN SATURATION: 95 %

## 2025-03-10 DIAGNOSIS — M25.551 RIGHT HIP PAIN: Primary | ICD-10-CM

## 2025-03-10 DIAGNOSIS — M70.71 ISCHIAL BURSITIS OF RIGHT SIDE: ICD-10-CM

## 2025-03-10 DIAGNOSIS — G89.29 CHRONIC SACROILIAC JOINT PAIN: ICD-10-CM

## 2025-03-10 DIAGNOSIS — M53.3 CHRONIC SACROILIAC JOINT PAIN: ICD-10-CM

## 2025-03-10 DIAGNOSIS — M25.551 RIGHT HIP PAIN: ICD-10-CM

## 2025-03-10 DIAGNOSIS — M54.16 LUMBAR RADICULITIS: ICD-10-CM

## 2025-03-10 PROCEDURE — 73521 X-RAY EXAM HIPS BI 2 VIEWS: CPT | Mod: 26,,, | Performed by: RADIOLOGY

## 2025-03-10 PROCEDURE — 1160F RVW MEDS BY RX/DR IN RCRD: CPT | Mod: CPTII,S$GLB,, | Performed by: ANESTHESIOLOGY

## 2025-03-10 PROCEDURE — 3008F BODY MASS INDEX DOCD: CPT | Mod: CPTII,S$GLB,, | Performed by: ANESTHESIOLOGY

## 2025-03-10 PROCEDURE — 73521 X-RAY EXAM HIPS BI 2 VIEWS: CPT | Mod: TC

## 2025-03-10 PROCEDURE — 99999 PR PBB SHADOW E&M-EST. PATIENT-LVL IV: CPT | Mod: PBBFAC,,, | Performed by: ANESTHESIOLOGY

## 2025-03-10 PROCEDURE — 99204 OFFICE O/P NEW MOD 45 MIN: CPT | Mod: S$GLB,,, | Performed by: ANESTHESIOLOGY

## 2025-03-10 PROCEDURE — 3075F SYST BP GE 130 - 139MM HG: CPT | Mod: CPTII,S$GLB,, | Performed by: ANESTHESIOLOGY

## 2025-03-10 PROCEDURE — 3079F DIAST BP 80-89 MM HG: CPT | Mod: CPTII,S$GLB,, | Performed by: ANESTHESIOLOGY

## 2025-03-10 PROCEDURE — 1159F MED LIST DOCD IN RCRD: CPT | Mod: CPTII,S$GLB,, | Performed by: ANESTHESIOLOGY

## 2025-03-10 NOTE — TELEPHONE ENCOUNTER
----- Message from Mainor Thomason MD sent at 3/10/2025 10:31 AM CDT -----  · Interventional Therapy:  Please schedule for Right  Sacroiliac joint injection and Right ischial Bursa Injection · Sedation: Oral Sedation.· Anticoagulation medications: None -Clearance to stop Blood thinner: No

## 2025-03-10 NOTE — PROGRESS NOTES
New Patient Evaluation  [facility]    Tee Aranda  : 1970  Date: 3/10/2025     CHIEF COMPLAINT:  No chief complaint on file.    Referring Physician: Self, Aaareferral  Primary Care Physician: James Samano MD    HPI:  This is a 54 y.o. female with a chief complaint of No chief complaint on file.  . The patient has Past medical history/Past surgical history of ***    Patient was evaluated and referred by  orthopedic provider for right leg pain   CT right hip showed right total hip arthroplasty with posterior acetabular screw extended beyond the posterior  cortical margin with a tip closely approximated to adjacent sciatic nerve.   MRI lumbar spine showed  minimal findings  including lumbar facet arthropathy, mild bilateral neural foramina narrowing  and chronic compression fracture of L3  Diabetic: {GAYes/No/NA:36661}    {Anticoagulation medications:56829}    Allergy To Iodine: {GAYes/No/NA:15913}    Currently on Antibiotic: {GAYes/No/NA:62558}    Current Description of Pain Symptoms:    History of Recent Fall or Trauma: {GAYes/No/NA:85706}   Onset: Chronic, started ***  Pain Location: ***  Radiates/associated symptoms: ***.   Pain is Getting worse over the last *** months    The pain is described as {Desc; pain character:59931}.   Exacerbating factors: {Causes; Pain:43072}.   Mitigating factors ***.   Symptoms interfere with daily activity, sleeping, and ***.   The patient feels like symptoms have been {IUW:61473}.   Patient {Denies / Reports:06118} {RED FLAGS:83448}.    Pain score:   Current: {PAIN 0-10:80979}/10  Best: {PAIN 0-10:55969}/10  Worst: {PAIN 0-10:31419}/10    Current pain medications:  Current Medications[1]    Current Narcotics/Opioid /benzo Medications:  Opioids- {GAopioid:88620}  Benzodiazepines: {GAYes/No/NA:84073}    UDS:  NA    PDMP:  {:21704}     Previous Chronic Pain Treatment History:  Six weeks of conservative therapy include: Physical  Therapy/HEP/Physician Lead Exercise Program:  Over the past 12 months, Patient has done  *** sessions.  PT response: {PT response:70765} Helpful.   Dates of the PT sessions: ***, ***.  Is patient actively participating in home exercise program (HEP)/ physician led exercise program in the last 6 months: {GAYes/No/NA:04552}.    Non-interventional Pain Therapy:  []Chiropractor.   []Acupuncture/Dry needle.  []TENS unit.  []Heat/ICE.  []Back Brace.    Medications previously tried:  NSAIDs: {GANSIAD:54279}  Topical Agent: {GAYes/No/NA:58877}  TCA/SSRI/SNRI: {GATCA/SSRI/SNRI:55375}  Anti-convulsants: {GAAnticonvulsants:04027}  Muscle Relaxants: {GAmuscle Relaxant:72774}  Opioids- {GAopioid:83657}.    Interventional Pain Procedures:  ***    Previous spine/Relevant joint surgery:  ***  Surgical History:   has a past surgical history that includes Total hip arthroplasty (Right); Tonsillectomy; Knee surgery (Bilateral); Rhinoplasty tip; Lithotripsy; Hysterectomy; Esophagogastroduodenoscopy (N/A, 11/10/2020); Esophagogastroduodenoscopy (N/A, 12/28/2020); Esophagogastroduodenoscopy (N/A, 02/15/2021); Esophagogastroduodenoscopy (Left, 08/03/2021); Distal clavicle excision (Right, 11/18/2021); Esophagogastroduodenoscopy (N/A, 07/27/2022); Esophagogastroduodenoscopy (Left, 08/29/2022); Appendectomy; Esophagogastroduodenoscopy (N/A, 10/05/2022); Esophageal varice ligation; Colonoscopy w/ polypectomy; Shoulder surgery (Right); Esophagogastroduodenoscopy (N/A, 3/30/2023); Esophagogastroduodenoscopy (N/A, 8/3/2023); Colonoscopy (N/A, 8/3/2023); Cystoscopy w/ ureteral stent removal (Right, 3/14/2024); Cystoscopy (N/A, 3/20/2024); Ureteroscopy (Right, 3/20/2024); Laser lithotripsy (Right, 3/20/2024); extraction - stone (Right, 3/20/2024); removal-stent (Right, 3/20/2024); Ureteral stent placement (Right, 3/20/2024); Retrograde pyelography (Right, 3/20/2024); Esophagogastroduodenoscopy (N/A, 5/27/2024); Colonoscopy (N/A, 5/27/2024);  Ureteral stent placement (Right, 9/4/2024); Cystoscopy (9/4/2024); Cystoscopy w/ retrogrades (Right, 9/13/2024); Cystoscopy with calculus extraction (Right, 9/13/2024); Laser lithotripsy (Right, 9/13/2024); Ureteroscopy (Right, 9/13/2024); and removal-stent (Right, 9/13/2024).  Medical History:   has a past medical history of Anemia, unspecified, Anxiety, Arthritis, Ascites (11/23/2020), AVN (avascular necrosis of bone), Cataract, COVID-19 vaccine administered, Diarrhea of presumed infectious origin (7/31/2023), Edema (11/23/2020), Epigastric pain (7/31/2023), Esophageal varices, Glaucoma, Hepatic encephalopathy (11/23/2020), History of colon polyps, cirrhosis, Iron deficiency anemia secondary to blood loss (chronic), Kidney stones, Portal hypertensive gastropathy, Unintentional weight loss (10/14/2023), and Unspecified cirrhosis of liver.  Family History:  family history includes Diabetes Mellitus in her maternal grandmother; No Known Problems in her mother.  Allergies:  Sulfa (sulfonamide antibiotics)   Social History/SUBSTANCE ABUSE HISTORY:  Personal history of substance abuse: No   reports that she has been smoking cigarettes. She has never used smokeless tobacco. She reports that she does not currently use alcohol. She reports that she does not use drugs.  LABS:  CBC  Lab Results   Component Value Date    WBC 4.31 02/20/2025    HGB 13.1 02/20/2025    HCT 40.0 02/20/2025     Coagulation Profile   Lab Results   Component Value Date    PLT 78 (L) 02/20/2025       Lab Results   Component Value Date    INR 1.1 02/20/2025    INR 1.1 02/20/2025     CMP:  BMP  Lab Results   Component Value Date     02/20/2025    K 3.8 02/20/2025     02/20/2025    CO2 24 02/20/2025    BUN 18 02/20/2025    CREATININE 0.8 02/20/2025    CALCIUM 9.1 02/20/2025    ANIONGAP 9 02/20/2025    EGFRNORACEVR >60 02/20/2025     Lab Results   Component Value Date    ALT 23 02/20/2025    AST 20 02/20/2025    ALKPHOS 111 02/20/2025     BILITOT 0.8 02/20/2025     HGBA1C:  Lab Results   Component Value Date    HGBA1C 5.5 07/26/2022       ROS:    Review of Systems   GENERAL:  No weight loss, malaise or fevers.  HEENT:   No recent changes in vision or hearing  NECK:  Negative for lumps, no difficulty with swallowing.  RESPIRATORY:  Negative for cough, wheezing or shortness of breath, patient denies any recent URI.  CARDIOVASCULAR:  Negative for chest pain or palpitations.  GI:  Negative for abdominal discomfort, blood in stools or black stools or change in bowel habits.  MUSCULOSKELETAL:  See HPI.  SKIN:  Negative for lesions, rash, and itching.  PSYCH:  No mood disorder or recent psychosocial stressors.   HEMATOLOGY/LYMPHOLOGY:  See the blood thinner sectioned in HPI.  NEURO:  See HPI  All other reviewed and negative other than HPI.    PHYSICAL EXAM:  VITALS: There were no vitals taken for this visit.  There is no height or weight on file to calculate BMI.  GENERAL: Well appearing, in no acute distress, alert and oriented x3, answers questions appropriately.   PSYCH: Flat affect.  SKIN: Skin color, texture, turgor normal, no rashes or lesions.  HEAD/FACE:  Normocephalic, atraumatic. Cranial nerves grossly intact.  CV: Regular rate  PULM: No evidence of respiratory difficulty, symmetric chest rise.  GI:  Soft and non-Distended.  NECK: ({GA+:83676}) pain to palpation over the cervical paraspinous muscles. Spurling:{GA+:95668}. ({GA+:13312}) pain with neck flexion, extension, or lateral flexion, Muscle strength in RT UE ***/5 and Left UE ***/5, Hand  ***/5, left Hand ***/5  BACK/SIJ/HIP:  Lumbar Spine Exam:       Inspection: No erythema, bruising.       Palpation: ({GA+:80106}) TTP of lumbar paraspinals bilaterally      ROM:  Limited in flexion, extension, lateral bending.       ({GA+:66291}) Facet loading {GAHip:54432}      ({GA+:74944}) Straight Leg Raise, {GAHip:01976}      ({GA+:64837}) VICENTE, Tenderness over the PSIS, Yeoman test,  "{GAHip:72675}  Hip Exam:      Inspection: No gross deformity or apparent leg length discrepancy      Palpation:  No TTP to bilateral greater trochanteric bursas.       ROM:  *** limitation Due to pain in internal rotation, external rotation b/l  Neurologic Exam:     Alert. Speech is fluent and appropriate.     Strength: ***/5 in {GAHip:54093} hip flexion and knee extension     Sensation:  Grossly intact to light touch in bilateral lower extremities     Tone: No abnormality appreciated in bilateral lower extremities  Knee exam:  No gross deformity or apparent leg length discrepancy, positive tenderness over the anteromedial aspect of the knee cap, positive limitation due to pain in flexion and extension, sensation  grossly intact to light touch in bilateral lower extremity, no atrophy or tone abnormalities    GAIT: {GAgait:50454}    DIAGNOSTIC STUDIES AND MEDICAL RECORDS REVIEW:  I have personally reviewed and interpreted relevant radiology reports and reviewed relevant records from other services in the EMR.   ***  Clinical Impression:  This is a pleasant 54 y.o. female patient with PMH/PSH of ***, presenting with***.     We discussed the underlying diagnoses and multiple treatment options including non-opioid medications, interventional procedures, physical therapy, home exercise, core muscle enhancement, and weight loss.  The risks and benefits of each treatment option were discussed and all questions were answered.      Encounter Diagnosis:  Tee Aranda is a 54 y.o. female with the following diagnoses based on history, exam, and imaging:  There are no diagnoses linked to this encounter.     Treatment Plan:    Diagnostics/Referrals: {gaimage:98783}    Medications:    NSAIDs: {GANSIAD:65295}  Topical Agent: {GAYes/No/NA:70760}  TCA/SSRI/SNRI: {GATCA/SSRI/SNRI:27328}  Anti-convulsants: {GAAnticonvulsants:09805}  Muscle Relaxants: {GAmuscle Relaxant:77064}  Opioids: {GAopioid:06318::"None"}  Patient was " educated about the risk and benefit of chronic opioid therapy including dependency, addiction, diversion, and opioid hyperalgesia.  Interventional Therapy: {GAProcedure:76500}.  Sedation: {GAsedation:87888}.  {Anticoagulation medications:59750} -Clearance to stop Blood thinner: {GAYes/No/NA:56848}    Regarding the above interventions, the patient has been educated regarding the risks (including bleeding, infection, increased pain, nerve damage, or allergic reaction), benefits, and alternatives. The patient states she understands and is eager to proceed.    Physical Rehabilitation: {GAPT:28219}    Patient Education: Counseled patient regarding the importance of {:51522}, I have stressed the importance of physical activity and a home exercise plan to help with pain and improve health.    Follow-up: RTC ***.    May consider:     I would like to thank Jacquelyn Carter for the opportunity to assist in the care of this patient. We had a very nice visit and I look forward to continuing their care. Please let me know if I can be of further assistance.     Mainor Thomason MD  Anesthesiologist  Interventional Pain Medicine  03/10/2025    Disclaimer:  This note was prepared using voice recognition system and is likely to have sound alike errors that may have been overlooked even after proof reading.  Please call me with any questions.         [1]   Current Outpatient Medications:     carvediloL (COREG) 3.125 MG tablet, Take 1 tablet (3.125 mg total) by mouth 2 (two) times daily., Disp: 180 tablet, Rfl: 3    esomeprazole (NEXIUM) 40 MG capsule, TAKE 1 CAPSULE BY MOUTH 2 TIMES DAILY BEFORE MEALS., Disp: 180 capsule, Rfl: 3    methocarbamoL (ROBAXIN) 750 MG Tab, Take 1 tablet (750 mg total) by mouth 3 (three) times daily., Disp: 90 tablet, Rfl: 0    multivitamin (THERAGRAN) per tablet, Take 1 tablet by mouth once daily., Disp: , Rfl:     omega-3 fatty acids/fish oil (FISH OIL-OMEGA-3 FATTY ACIDS) 300-1,000 mg capsule, Take 1  capsule by mouth once daily., Disp: , Rfl:     potassium citrate (UROCIT-K 10) 10 mEq (1,080 mg) TbSR, Take 1 tablet (10 mEq total) by mouth 2 (two) times daily with meals., Disp: 180 tablet, Rfl: 3    pregabalin (LYRICA) 100 MG capsule, Take 1 capsule (100 mg total) by mouth 2 (two) times daily., Disp: 60 capsule, Rfl: 5    rifAXIMin (XIFAXAN) 550 mg Tab, Take 1 tablet (550 mg total) by mouth 2 (two) times daily., Disp: 60 tablet, Rfl: 11    tamsulosin (FLOMAX) 0.4 mg Cap, Take 1 capsule (0.4 mg total) by mouth once daily., Disp: 30 capsule, Rfl: 0    UNABLE TO FIND, 4 (four) times daily as needed. Medible 20 mg blue raspberry 1/4 to 1/2 up to 4 times daily as needed., Disp: , Rfl:   No current facility-administered medications for this visit.    Facility-Administered Medications Ordered in Other Visits:     0.9%  NaCl infusion, , Intravenous, Continuous, Gerard Salinas MD, Stopped at 03/30/23 [id number]

## 2025-03-10 NOTE — PROGRESS NOTES
New Patient Evaluation  [facility]    Tee Aranda  : 1970  Date: 3/10/2025     CHIEF COMPLAINT:  Low-back Pain and Discuss MRI    Referring Physician: Self, Aaareferral  Primary Care Physician: James Samano MD    HPI:  This is a 54 y.o. female with a chief complaint of Low-back Pain and Discuss MRI  . The patient has Past medical history/Past surgical history of Non acholic liver cirrhosis   Thrombocytopenia, portal hypertensive gastropathy, esophageal  varices, chronic right hip pain  and avascular  necrosis status post right total hip arthroplasty.    Patient was evaluated and referred by  orthopedic provider for right leg pain   CT right hip showed right total hip arthroplasty with posterior acetabular screw extended beyond the posterior  cortical margin with a tip closely approximated to adjacent sciatic nerve.  MRI lumbar spine showed  minimal findings including lumbar facet arthropathy, mild bilateral neural foramina narrowing and chronic compression fracture of L3  Diabetic: No    Anticoagulation medications: None    Allergy To Iodine: No    Currently on Antibiotic: yes     Current Description of Pain Symptoms:    History of Recent Fall or Trauma: No   Onset: Chronic, started on going  Pain Location:  right sacroiliac joint, right ischial bursitis, right groin pain  Radiates/associated symptoms: RT LE  thigh and below-the-knee.  Pain is Getting worse over the last 3 months    The pain is described as aching, numbing, sharp, shooting, stabbing, and tingling.   Exacerbating factors: Sitting, Laying, Bending, Touching, Coughing/Sneezing, Night Time, Morning, and Lifting.   Mitigating factors sleep.   Symptoms interfere with daily activity, sleeping.   The patient feels like symptoms have been worsening.   Patient denies night fever/night sweats, urinary incontinence, bowel incontinence, significant weight loss, significant motor weakness, and loss of sensations.    Pain  score:   Current: 8/10  Best: 5/10  Worst: 10/10    Current pain medications:  Robaxin 750 mg, TID  Lyrica 100 mg, BID  Medical cannabis    Current Narcotics/Opioid /benzo Medications:  Opioids- None  Benzodiazepines: No    UDS:  NA    PDMP:  Reviewed and consistent with medication use as prescribed.     Previous Chronic Pain Treatment History:  Six weeks of conservative therapy include: Physical Therapy/HEP/Physician Lead Exercise Program:  Over the past 12 months, Patient has done  0 sessions.    Is patient actively participating in home exercise program (HEP)/ physician led exercise program in the last 6 months: Yes, 4 days in the gym    Non-interventional Pain Therapy:  []Chiropractor.   [x]Acupuncture/Dry needle.  []TENS unit.  [x]Heat/ICE.  []Back Brace.    Medications previously tried:  NSAIDs: Etodolac (Lodine) and Naproxen (Naprosyn)  Topical Agent: Yes  TCA/SSRI/SNRI: SNRI: Duloxetine (Cymbalta)   Anti-convulsants: Gabapentin  and Lyrica   Muscle Relaxants: Robaxin (methocarbamol )  and Flexeril (Cyclobenzaprine)  Opioids- None.    Interventional Pain Procedures:    1999-ESI with Dr. Valle    Previous spine/Relevant joint surgery:  Knee surgery  Hip replacement RT- >20 years  Surgical History:   has a past surgical history that includes Total hip arthroplasty (Right); Tonsillectomy; Knee surgery (Bilateral); Rhinoplasty tip; Lithotripsy; Hysterectomy; Esophagogastroduodenoscopy (N/A, 11/10/2020); Esophagogastroduodenoscopy (N/A, 12/28/2020); Esophagogastroduodenoscopy (N/A, 02/15/2021); Esophagogastroduodenoscopy (Left, 08/03/2021); Distal clavicle excision (Right, 11/18/2021); Esophagogastroduodenoscopy (N/A, 07/27/2022); Esophagogastroduodenoscopy (Left, 08/29/2022); Appendectomy; Esophagogastroduodenoscopy (N/A, 10/05/2022); Esophageal varice ligation; Colonoscopy w/ polypectomy; Shoulder surgery (Right); Esophagogastroduodenoscopy (N/A, 3/30/2023); Esophagogastroduodenoscopy (N/A, 8/3/2023);  Colonoscopy (N/A, 8/3/2023); Cystoscopy w/ ureteral stent removal (Right, 3/14/2024); Cystoscopy (N/A, 3/20/2024); Ureteroscopy (Right, 3/20/2024); Laser lithotripsy (Right, 3/20/2024); extraction - stone (Right, 3/20/2024); removal-stent (Right, 3/20/2024); Ureteral stent placement (Right, 3/20/2024); Retrograde pyelography (Right, 3/20/2024); Esophagogastroduodenoscopy (N/A, 5/27/2024); Colonoscopy (N/A, 5/27/2024); Ureteral stent placement (Right, 9/4/2024); Cystoscopy (9/4/2024); Cystoscopy w/ retrogrades (Right, 9/13/2024); Cystoscopy with calculus extraction (Right, 9/13/2024); Laser lithotripsy (Right, 9/13/2024); Ureteroscopy (Right, 9/13/2024); and removal-stent (Right, 9/13/2024).  Medical History:   has a past medical history of Anemia, unspecified, Anxiety, Arthritis, Ascites (11/23/2020), AVN (avascular necrosis of bone), Cataract, COVID-19 vaccine administered, Diarrhea of presumed infectious origin (7/31/2023), Edema (11/23/2020), Epigastric pain (7/31/2023), Esophageal varices, Glaucoma, Hepatic encephalopathy (11/23/2020), History of colon polyps, cirrhosis, Iron deficiency anemia secondary to blood loss (chronic), Kidney stones, Portal hypertensive gastropathy, Unintentional weight loss (10/14/2023), and Unspecified cirrhosis of liver.  Family History:  family history includes Diabetes Mellitus in her maternal grandmother; No Known Problems in her mother.  Allergies:  Sulfa (sulfonamide antibiotics)   Social History/SUBSTANCE ABUSE HISTORY:  Personal history of substance abuse: No   reports that she has been smoking cigarettes. She has never used smokeless tobacco. She reports that she does not currently use alcohol. She reports that she does not use drugs.  LABS:  CBC  Lab Results   Component Value Date    WBC 4.31 02/20/2025    HGB 13.1 02/20/2025    HCT 40.0 02/20/2025     Coagulation Profile   Lab Results   Component Value Date    PLT 78 (L) 02/20/2025       Lab Results   Component Value Date  "   INR 1.1 02/20/2025    INR 1.1 02/20/2025     CMP:  BMP  Lab Results   Component Value Date     02/20/2025    K 3.8 02/20/2025     02/20/2025    CO2 24 02/20/2025    BUN 18 02/20/2025    CREATININE 0.8 02/20/2025    CALCIUM 9.1 02/20/2025    ANIONGAP 9 02/20/2025    EGFRNORACEVR >60 02/20/2025     Lab Results   Component Value Date    ALT 23 02/20/2025    AST 20 02/20/2025    ALKPHOS 111 02/20/2025    BILITOT 0.8 02/20/2025     HGBA1C:  Lab Results   Component Value Date    HGBA1C 5.5 07/26/2022     ROS:    Review of Systems   GENERAL:  No weight loss, malaise or fevers.  HEENT:   No recent changes in vision or hearing  NECK:  Negative for lumps, no difficulty with swallowing.  RESPIRATORY:  Negative for cough, wheezing or shortness of breath, patient denies any recent URI.  CARDIOVASCULAR:  Negative for chest pain or palpitations.  GI:  Negative for abdominal discomfort, blood in stools or black stools or change in bowel habits.  MUSCULOSKELETAL:  See HPI.  SKIN:  Negative for lesions, rash, and itching.  PSYCH:  No mood disorder or recent psychosocial stressors.   HEMATOLOGY/LYMPHOLOGY:  See the blood thinner sectioned in HPI.  NEURO:  See HPI  All other reviewed and negative other than HPI.    PHYSICAL EXAM:  VITALS: /81 (BP Location: Right arm, Patient Position: Sitting)   Pulse 71   Ht 5' 8" (1.727 m)   Wt 62.6 kg (138 lb)   SpO2 95%   BMI 20.98 kg/m²   Body mass index is 20.98 kg/m².  GENERAL: Well appearing, in no acute distress, alert and oriented x3, answers questions appropriately.   PSYCH: Flat affect.  SKIN: Skin color, texture, turgor normal, no rashes or lesions.  HEAD/FACE:  Normocephalic, atraumatic. Cranial nerves grossly intact.  CV: Regular rate  PULM: No evidence of respiratory difficulty, symmetric chest rise.  GI:  Soft and non-Distended.  BACK/SIJ/HIP:  Lumbar Spine Exam:       Inspection: No erythema, bruising.       Palpation: (Negative) TTP of lumbar paraspinals " bilaterally      ROM:  Limited in flexion, extension, lateral bending.       (+) Facet loading bilateral      (+) Straight Leg Raise, Right      (+++) VICENTE, Tenderness over the PSIS, Yeoman test, Right  Hip Exam:      Inspection: + apparent leg length discrepancy      Palpation: +TTP to right ischial bursitis      ROM:  + limitation Due to pain in internal rotation, external rotation b/l  Neurologic Exam:     Alert. Speech is fluent and appropriate.     Strength: 4/5 in Right hip flexion and knee extension     Sensation:  Grossly intact to light touch in bilateral lower extremities     Tone: No abnormality appreciated in bilateral lower extremities    GAIT:  antalgic, unsteady    DIAGNOSTIC STUDIES AND MEDICAL RECORDS REVIEW:  I have personally reviewed and interpreted relevant radiology reports and reviewed relevant records from other services in the EMR.   - CT scan lumbar spine 03/02/2025  Right total hip arthroplasty with hardware in gross anatomic position.  Posterior acetabular screw extends beyond the cortical margin with tip closely approximating the adjacent sciatic nerve.  No displaced periprosthetic fractures.  No periprosthetic lucencies to suggest osteolysis or loosening.  Femoral head is well centered within the acetabular cup.  No subluxation or dislocation.     Visualized musculature demonstrates normal bulk and attenuation.     Attenuation of the distal insertional fibers of the gluteus minimus tendon, not well evaluated on this exam.  Remaining visualized regional tendons appear grossly unremarkable.     Right SI joint appears within normal limits.  Pubic symphysis is unremarkable.     Subcutaneous soft tissues appear within normal limits.     Limited evaluation of the lower abdominal and pelvic structures demonstrates no significant abnormalities.  Hysterectomy.     Impression:     1. Right total hip arthroplasty.  Posterior acetabular screw extends beyond the posterior cortical margin with tip  closely approximating the adjacent sciatic nerve.    -  MRI lumbar spine 03/2025  Alignment: Mild levoscoliosis.  No spondylolisthesis.     Vertebrae: Chronic wedge compression fracture of the L3 superior endplate with mild associated height loss, stable when compared to the previous MRI.  No acute fracture.Benign osseous hemangiomas at the L2 vertebral body and right L3 pedicle.  No bone marrow replacing process.     Discs: Degenerative disc desiccation throughout the visualized thoracolumbar spine with mild height loss at L2-L3.  Annular fissures at L2-L3 and L4-L5.  No endplate edema.     Cord: Unremarkable.  Cauda equina is normal.  Conus terminates at T12-L1.     Degenerative findings:     T12-L1: Posterior disc bulge.  No spinal canal stenosis or neural foraminal narrowing.     L1-L2: Circumferential disc bulge.  No spinal canal stenosis or neural foraminal narrowing     L2-L3: Circumferential disc bulge.  No spinal canal stenosis or neural foraminal narrowing.     L3-L4: Circumferential disc bulge.  No spinal canal stenosis or neural foraminal narrowing.     L4-L5: Circumferential disc bulge.  Left facet arthropathy.  No spinal canal stenosis.  Mild bilateral neural foraminal narrowing.     L5-S1: Circumferential disc bulge.  No spinal canal stenosis or neural foraminal narrowing.     Paraspinal muscles & soft tissues: Right renal cyst.  SI joints are symmetric.     Impression:     1. Lumbar spondylosis as detailed above, similar when compared to MRI dated 02/11/2022.  Mild bilateral neural foraminal narrowing at L4-L5.  No spinal canal stenosis.  2. Chronic compression fracture of the L3 superior endplate with mild stable height loss.      Clinical Impression:  This is a pleasant 54 y.o. female patient with PMH/PSH of Non acholic liver cirrhosis   Thrombocytopenia, portal hypertensive gastropathy, esophageal  varices, presenting with multiple pain sources including right sacroiliac joint, right ischial  bursitis, right groin pain status post right total hip arthroplasty, right lower extremity radiculitis possible due to posterior acetabulum screw extending beyond the cortical margin with the tip closely approximating the right sciatic nerve, patient had previous multiple epidural steroid injection in the past that resulted in right hip avascular necrosis, she would like to avoid steroid injection as possible,  she had significant thrombocytopenia that requires platelet transfusion preoperatively   Encounter Diagnosis:  Tee Aranda is a 54 y.o. female with the following diagnoses based on history, exam, and imagin. Right hip pain  - X-Ray Hips Bilateral 2 View Inc AP Pelvis; Future    2. Chronic sacroiliac joint pain    3. Ischial bursitis of right side    4. Lumbar radiculitis     Treatment Plan:    Diagnostics/Referrals: X-ray bilateral hip    Medications:   Continue as prescribed  NSAIDs: OTC  Topical Agent: Yes  TCA/SSRI/SNRI: None  Anti-convulsants: Lyrica   Muscle Relaxants: Robaxin (methocarbamol )   Opioids: None    Interventional Therapy:  Please schedule for Right  Sacroiliac joint injection and Right ischial Bursa Injection -  without steroid  Sedation: Oral Sedation.  Anticoagulation medications: None -Clearance to stop Blood thinner: No    Regarding the above interventions, the patient has been educated regarding the risks (including bleeding, infection, increased pain, nerve damage, or allergic reaction), benefits, and alternatives. The patient states she understands and is eager to proceed.    Physical Rehabilitation: Physician led exercise program and home exercise    Patient Education: Counseled patient regarding the importance of activity modification, I have stressed the importance of physical activity and a home exercise plan to help with pain and improve health.    Follow-up: RTC 4 weeks.    May consider: Rt Hip ablation, PNS on sciatic nerve      Mainor Thomason  MD  Anesthesiologist  Interventional Pain Medicine  03/10/2025    Disclaimer:  This note was prepared using voice recognition system and is likely to have sound alike errors that may have been overlooked even after proof reading.  Please call me with any questions.

## 2025-03-12 ENCOUNTER — TELEPHONE (OUTPATIENT)
Dept: ORTHOPEDICS | Facility: CLINIC | Age: 55
End: 2025-03-12
Payer: COMMERCIAL

## 2025-03-12 NOTE — TELEPHONE ENCOUNTER
Called and spoke to pt regarding an earlier appt time. Advised pt of times available pt understood conversation with no further questions and was thankful for the changes. Pt advised she had to drive to [place] that afternoon.     ----- Message from Alberto sent at 3/12/2025  8:40 AM CDT -----  Regarding: Appt  Contact: pt  [phone]  Pt is scheduled to see provider 3/13 @11, pt would like to come for 8 due to scheduling conflict, please call pt @ [phone]

## 2025-03-13 ENCOUNTER — OFFICE VISIT (OUTPATIENT)
Dept: ORTHOPEDICS | Facility: CLINIC | Age: 55
End: 2025-03-13
Payer: COMMERCIAL

## 2025-03-13 VITALS — HEIGHT: 68 IN | WEIGHT: 136 LBS | BODY MASS INDEX: 20.61 KG/M2

## 2025-03-13 DIAGNOSIS — G89.29 CHRONIC RIGHT SI JOINT PAIN: ICD-10-CM

## 2025-03-13 DIAGNOSIS — M53.3 CHRONIC RIGHT SI JOINT PAIN: ICD-10-CM

## 2025-03-13 DIAGNOSIS — Z96.641 S/P TOTAL RIGHT HIP ARTHROPLASTY: Primary | ICD-10-CM

## 2025-03-13 DIAGNOSIS — M47.816 LUMBAR SPONDYLOSIS: ICD-10-CM

## 2025-03-13 PROCEDURE — 1160F RVW MEDS BY RX/DR IN RCRD: CPT | Mod: CPTII,S$GLB,,

## 2025-03-13 PROCEDURE — 99213 OFFICE O/P EST LOW 20 MIN: CPT | Mod: S$GLB,,,

## 2025-03-13 PROCEDURE — 3008F BODY MASS INDEX DOCD: CPT | Mod: CPTII,S$GLB,,

## 2025-03-13 PROCEDURE — 99999 PR PBB SHADOW E&M-EST. PATIENT-LVL III: CPT | Mod: PBBFAC,,,

## 2025-03-13 PROCEDURE — 1159F MED LIST DOCD IN RCRD: CPT | Mod: CPTII,S$GLB,,

## 2025-03-14 NOTE — PROGRESS NOTES
"  SUBJECTIVE:     History of Present Illness    CHIEF COMPLAINT:  - Left hip pain    HPI:  Ms. Aranda presents with hip pain that has worsened over the past three months. She characterizes the pain as starting in the groin and shooting into her back, which she believes might be related to her SI joint. She rates the pain in her "butt bone" as the worst, followed by groin pain, and then lower back pain. Sitting exacerbates the pain, particularly on her right side, stating she cannot sit on her right side. She also reports weakness secondary to pain in her leg and occasional tingling in her toes, occurring about once a month.    She has been unable to attend the gym for the past two months due to pain. She describes herself as a frequent gym-goer, typically going 4 times per week prior to the onset of severe pain.    For pain management, she is currently taking Lyrica and methocarbamol, which she reports just takes the edge off and reduces the pain to about a 5 out of 10. She also uses cannabis for pain relief but notes its effectiveness has decreased recently. She mentions having oxycodone IR5 for severe pain, which she uses sparingly.    Ms. Aranda has a history of hip replacement surgery in 2005, initially undergoing core decompression which failed after six weeks, leading to a full total hip replacement. She reports having cirrhosis (stage 4 F4, non-alcoholic) diagnosed 4.5 years ago, which prompted significant lifestyle changes including weight loss from 199 lbs. She recently had kidney stones and a stent placed, expressing concern about potential confusion between kidney pain and hip pain. She also reports having portal hypertension with varices and a history of two bleeding episodes.    She denies any recent trauma or injury to the hip area.    Patient is currently treated by Abbie Avendaño, who referred the patient for today's visit.    PREVIOUS TREATMENTS:  - Cannabis use for pain management: Included " "pincher under the tongue, gummies, and smoking, provided some relief initially but is no longer as effective  - Heating pad with vibration for pain relief    MEDICATIONS:  - Lyrica: Provides some relief by "taking the edge off"  - Methocarbamol: For pain management  - Oxycodone IR: 5 mg taken as needed for severe pain    SURGICAL HISTORY:  - Left hip replacement: 2005  - Core decompression: 2005, failed after six weeks          Past Medical History:   Diagnosis Date    Anemia, unspecified     Anxiety     Arthritis     Ascites 11/23/2020    AVN (avascular necrosis of bone)     Cataract     COVID-19 vaccine administered     04/08/21, 04/30/21    Diarrhea of presumed infectious origin 7/31/2023    Edema 11/23/2020    Epigastric pain 7/31/2023    Esophageal varices     Glaucoma     Hepatic encephalopathy 11/23/2020    History of colon polyps     Hx of cirrhosis     non-alcoholic    Iron deficiency anemia secondary to blood loss (chronic)     Kidney stones     Portal hypertensive gastropathy     Unintentional weight loss 10/14/2023    Unspecified cirrhosis of liver        Past Surgical History:   Procedure Laterality Date    APPENDECTOMY      COLONOSCOPY N/A 8/3/2023    Procedure: COLONOSCOPY;  Surgeon: Gerard Salinas MD;  Location: [facility];  Service: Endoscopy;  Laterality: N/A;    COLONOSCOPY N/A 5/27/2024    Procedure: COLONOSCOPY;  Surgeon: Eric Garcia MD;  Location: [facility] (4TH FLR);  Service: Endoscopy;  Laterality: N/A;  Dr. Douglass, egd/colon, weight loss, variceal surveillance and abdominal pain, standard prep, cirrhosis labs ordered    COLONOSCOPY W/ POLYPECTOMY      CYSTOSCOPY N/A 3/20/2024    Procedure: CYSTOSCOPY;  Surgeon: Chris Carey MD;  [facility];  Service: Urology;  Laterality: N/A;    CYSTOSCOPY  9/4/2024    Procedure: CYSTOSCOPY;  Surgeon: Chris Carey MD;  Location: [facility] OR [address] [place] [address];  Service: Urology;;    CYSTOSCOPY W/ RETROGRADES Right 9/13/2024    Procedure: " CYSTOSCOPY, WITH RETROGRADE PYELOGRAM;  Surgeon: Chris Carey MD;  Location: [facility] OR 1ST FLR;  Service: Urology;  Laterality: Right;    CYSTOSCOPY W/ URETERAL STENT REMOVAL Right 3/14/2024    Procedure: CYSTOSCOPY, WITH URETERAL STENT REMOVAL;  Surgeon: Chris Carey MD;  Location: [facility] OR 1ST FLR;  Service: Urology;  Laterality: Right;    CYSTOSCOPY WITH CALCULUS EXTRACTION Right 9/13/2024    Procedure: CYSTOSCOPY, WITH CALCULUS REMOVAL;  Surgeon: Chris Carey MD;  [facility];  Service: Urology;  Laterality: Right;  urtereoscopy    DISTAL CLAVICLE EXCISION Right 11/18/2021    Procedure: RIGHT SHOULDER ARTHROSCOPY, EXCISION, CLAVICLE, DISTAL; EXTENSIVE DEBRIDEMENT;  Surgeon: Isaiah Joyner MD;  Location: [facility];  Service: Orthopedics;  Laterality: Right;    ESOPHAGEAL VARICE LIGATION      ESOPHAGOGASTRODUODENOSCOPY N/A 11/10/2020    Procedure: EGD (ESOPHAGOGASTRODUODENOSCOPY);  Surgeon: Gerard Salinas MD;  Location: [facility];  Service: Endoscopy;  Laterality: N/A;    ESOPHAGOGASTRODUODENOSCOPY N/A 12/28/2020    Procedure: EGD (ESOPHAGOGASTRODUODENOSCOPY);  Surgeon: Adryan Park MD;  Location: [facility] (2ND FLR);  Service: Endoscopy;  Laterality: N/A;    ESOPHAGOGASTRODUODENOSCOPY N/A 02/15/2021    Procedure: EGD (ESOPHAGOGASTRODUODENOSCOPY);  Surgeon: Hari Greene MD;  Location: [facility] (4TH FLR);  Service: Endoscopy;  Laterality: N/A;  6 week follow-up variceal banding  Hx varices - Labs - ERW  prep ins. emialed - COVID screening on 2/12/21 [place] - ERW    ESOPHAGOGASTRODUODENOSCOPY Left 08/03/2021    Procedure: EGD (ESOPHAGOGASTRODUODENOSCOPY);  Surgeon: Fabricio Corado MD;  Location: [facility] (4TH FLR);  Service: Gastroenterology;  Laterality: Left;  recent hematemasis. varices-labs on 7/26    COVID test at [facility] on 7/31-GT  requesting sooner    ESOPHAGOGASTRODUODENOSCOPY N/A 07/27/2022    Procedure: EGD (ESOPHAGOGASTRODUODENOSCOPY);  Surgeon: Eric Garcia MD;   Location: [facility];  Service: Endoscopy;  Laterality: N/A;    ESOPHAGOGASTRODUODENOSCOPY Left 08/29/2022    Procedure: EGD (ESOPHAGOGASTRODUODENOSCOPY);  Surgeon: Kacy Douglass MD;  Location: [facility] (2ND FLR);  Service: Endoscopy;  Laterality: Left;  Fully vaccinated/ clear liquids up to 4 hrs prior-RB  varices labs ordered-RB    ESOPHAGOGASTRODUODENOSCOPY N/A 10/05/2022    Procedure: EGD (ESOPHAGOGASTRODUODENOSCOPY);  Surgeon: Gerard Salinas MD;  Location: [facility];  Service: Endoscopy;  Laterality: N/A;    ESOPHAGOGASTRODUODENOSCOPY N/A 3/30/2023    Procedure: EGD (ESOPHAGOGASTRODUODENOSCOPY);  Surgeon: Gerard Salinas MD;  Location: [facility];  Service: Endoscopy;  Laterality: N/A;    ESOPHAGOGASTRODUODENOSCOPY N/A 8/3/2023    Procedure: EGD (ESOPHAGOGASTRODUODENOSCOPY);  Surgeon: Gerard Salinas MD;  Location: [facility];  Service: Endoscopy;  Laterality: N/A;    ESOPHAGOGASTRODUODENOSCOPY N/A 5/27/2024    Procedure: EGD (ESOPHAGOGASTRODUODENOSCOPY);  Surgeon: Eric Garcia MD;  Location: [facility] (4TH FLR);  Service: Endoscopy;  Laterality: N/A;    EXTRACTION - STONE Right 3/20/2024    Procedure: EXTRACTION - STONE;  Surgeon: Chris Carey MD;  Location: [facility] OR 1ST FLR;  Service: Urology;  Laterality: Right;    HYSTERECTOMY      KNEE SURGERY Bilateral     LASER LITHOTRIPSY Right 3/20/2024    Procedure: LITHOTRIPSY, USING LASER;  Surgeon: Chris Carey MD;  Location: [facility] OR 1ST FLR;  Service: Urology;  Laterality: Right;    LASER LITHOTRIPSY Right 9/13/2024    Procedure: LITHOTRIPSY, USING LASER;  Surgeon: Chris Carey MD;  Location: [facility] OR 1ST FLR;  Service: Urology;  Laterality: Right;    LITHOTRIPSY      REMOVAL-STENT Right 3/20/2024    Procedure: REMOVAL-STENT;  Surgeon: Chris Carey MD;  Location: [facility] OR [address] [place] [address];  Service: Urology;  Laterality: Right;    REMOVAL-STENT Right 9/13/2024    Procedure: REMOVAL-STENT;  Surgeon: Chris Carey MD;  Location: [facility] OR [address] [place] [address];   "Service: Urology;  Laterality: Right;    RETROGRADE PYELOGRAPHY Right 3/20/2024    Procedure: PYELOGRAM, RETROGRADE;  Surgeon: Chris Carey MD;  Location: [facility] OR [address] [place] [address];  Service: Urology;  Laterality: Right;    RHINOPLASTY TIP      SHOULDER SURGERY Right     TONSILLECTOMY      TOTAL HIP ARTHROPLASTY Right     URETERAL STENT PLACEMENT Right 3/20/2024    Procedure: INSERTION, STENT, URETER;  Surgeon: Chris Carey MD;  Location: [facility] OR [address] [place] [address];  Service: Urology;  Laterality: Right;    URETERAL STENT PLACEMENT Right 9/4/2024    Procedure: INSERTION, STENT, URETER;  Surgeon: Chris Carey MD;  [facility];  Service: Urology;  Laterality: Right;    URETEROSCOPY Right 3/20/2024    Procedure: URETEROSCOPY;  Surgeon: Chris Carey MD;  Location: [facility] OR [address] [place] [address];  Service: Urology;  Laterality: Right;    URETEROSCOPY Right 9/13/2024    Procedure: URETEROSCOPY;  Surgeon: Chris Carey MD;  Location: [facility] OR [address] [place] [address];  Service: Urology;  Laterality: Right;       Family History   Problem Relation Name Age of Onset    No Known Problems Mother      Diabetes Mellitus Maternal Grandmother      Amblyopia Neg Hx      Blindness Neg Hx      Cataracts Neg Hx      Glaucoma Neg Hx      Macular degeneration Neg Hx      Retinal detachment Neg Hx      Strabismus Neg Hx      Colon cancer Neg Hx      Esophageal cancer Neg Hx         Review of patient's allergies indicates:   Allergen Reactions    Sulfa (sulfonamide antibiotics) Hives       Current Medications[1]      Review of Systems:  ROS:  The updated medical history is in the chart and has been reviewed. A review of systems is updated and there is no reported vision changes, ear/nose/mouth/throat complaints, chest pain, shortness of breath, abdominal pain, urological complaints, fevers or chills, psychiatric complaints. Musculoskeletal and neurologcial symptoms are as documented. All other systems are negative.      OBJECTIVE:     PHYSICAL EXAM:  Ht 5' 8" " (1.727 m)   Wt 61.7 kg (136 lb)   BMI 20.68 kg/m²   General: Pleasant, cooperative, NAD.  HEENT: NCAT, sclera nonicteric.  Lungs: Respirations are equal and unlabored.   Abdomen: Soft and non-tender.  CV: 2+ bilateral upper and lower extremity pulses.  Neuro: Sensation intact to light touch.  Skin: Intact throughout LE with no rashes, erythema, or lesions.  Extremities: No LE edema, NVI lower extremities.  Mildly antalgic gait.      right Hip Exam:  TTP:  Ischial tuberosity and mild greater trochanter  90 degrees flexion  10 degrees extension   10 degrees internal rotation  30 degrees external rotation  30 degrees abduction  20 degrees adduction   0 flexion contracture   negative VICENTE   positive FADIR  positive [facility]    Back Exam  Tenderness: Right SI joint   Swelling:  None       Range of Motion:      Flexion: 40     Extension: 10     Lateral Bend:   Right 10                              Left 10     Rotation:          Right 5                              Left 5       SLR:                  Right Negative                             Left Negative       Muscle Strength      Hip Flexion 5/5     Hip Extension 5/5     Abductor: 5/5     Adductor: 5/5     Quadriceps: 5/5     Hamstrings: 5/5     Gastrocnemius: 5/5     Anterior tibialis: 5/5       Reflexes     Patellar: Normal    Achilles: Normal       Sensation: Normal         RADIOGRAPHS:  X-rays of the bilateral hips taken on 03/10/2025 personally reviewed. Imaging reveals well-aligned and positioned prosthesis with no acute fractures, dislocations, or subsidence.    CT of the right hip taken today personally reviewed. Imaging reveals right total hip arthroplasty with no acute changes.  Radiologist read posterior acetabular screw extending beyond the posterior cortical margin approximating the adjacent sciatic nerve, which does not correlate with her current pain and presentation.    ASSESSMENT:       ICD-10-CM ICD-9-CM   1. S/P total right hip arthroplasty   Z96.641 V43.64   2. Chronic right SI joint pain  M53.3 724.6    G89.29 338.29   3. Lumbar spondylosis  M47.816 721.3       PLAN:     We discussed with the patient at length all the different treatment options available including anti-inflammatories, acetaminophen, rest, ice, lower extremity strengthening exercise, occasional cortisone injections for temporary relief, and finally surgical intervention.      IMAGING:  - Order MARS MRI Hip to better evaluate hip prosthesis if SI joint injection ineffective.    REFERRALS:  - Refer to Dr. Vergara (total joint surgeon) if SI joint injection ineffective.    PROCEDURES:  - Right SI joint injection scheduled for the 3/21/25 to help diagnose and potentially treat the patient's pain.  - Discussed potential future if SI joint injection is not effective.    FOLLOW UP:  - I will call patient on 31st to follow up on results of SI joint injection.  - Follow up with Dr. Vergara after labs and imaging if SI joint injection ineffective.          This note was generated with the assistance of ambient listening technology. Verbal consent was obtained by the patient and accompanying visitor(s) for the recording of patient appointment to facilitate this note. I attest to having reviewed and edited the generated note for accuracy, though some syntax or spelling errors may persist. Please contact the author of this note for any clarification.      ANTHONY Rivas Jr.C           [1]   Current Outpatient Medications:     carvediloL (COREG) 3.125 MG tablet, Take 1 tablet (3.125 mg total) by mouth 2 (two) times daily., Disp: 180 tablet, Rfl: 3    esomeprazole (NEXIUM) 40 MG capsule, TAKE 1 CAPSULE BY MOUTH 2 TIMES DAILY BEFORE MEALS., Disp: 180 capsule, Rfl: 3    methocarbamoL (ROBAXIN) 750 MG Tab, Take 1 tablet (750 mg total) by mouth 3 (three) times daily., Disp: 90 tablet, Rfl: 0    multivitamin (THERAGRAN) per tablet, Take 1 tablet by mouth once daily., Disp: , Rfl:     omega-3 fatty  acids/fish oil (FISH OIL-OMEGA-3 FATTY ACIDS) 300-1,000 mg capsule, Take 1 capsule by mouth once daily., Disp: , Rfl:     potassium citrate (UROCIT-K 10) 10 mEq (1,080 mg) TbSR, Take 1 tablet (10 mEq total) by mouth 2 (two) times daily with meals., Disp: 180 tablet, Rfl: 3    pregabalin (LYRICA) 100 MG capsule, Take 1 capsule (100 mg total) by mouth 2 (two) times daily., Disp: 60 capsule, Rfl: 5    rifAXIMin (XIFAXAN) 550 mg Tab, Take 1 tablet (550 mg total) by mouth 2 (two) times daily., Disp: 60 tablet, Rfl: 11    UNABLE TO FIND, 4 (four) times daily as needed. Medible 20 mg blue raspberry 1/4 to 1/2 up to 4 times daily as needed., Disp: , Rfl:     tamsulosin (FLOMAX) 0.4 mg Cap, Take 1 capsule (0.4 mg total) by mouth once daily. (Patient not taking: Reported on 3/10/2025), Disp: 30 capsule, Rfl: 0  No current facility-administered medications for this visit.    Facility-Administered Medications Ordered in Other Visits:     0.9%  NaCl infusion, , Intravenous, Continuous, Gerard Salinas MD, Stopped at 03/30/23 0922

## 2025-03-16 ENCOUNTER — PATIENT MESSAGE (OUTPATIENT)
Dept: HEPATOLOGY | Facility: CLINIC | Age: 55
End: 2025-03-16
Payer: COMMERCIAL

## 2025-03-17 ENCOUNTER — TELEPHONE (OUTPATIENT)
Dept: PAIN MEDICINE | Facility: CLINIC | Age: 55
End: 2025-03-17
Payer: COMMERCIAL

## 2025-03-17 ENCOUNTER — TELEPHONE (OUTPATIENT)
Dept: HEPATOLOGY | Facility: CLINIC | Age: 55
End: 2025-03-17
Payer: COMMERCIAL

## 2025-03-17 DIAGNOSIS — R18.8 OTHER ASCITES: Primary | ICD-10-CM

## 2025-03-17 RX ORDER — SPIRONOLACTONE 100 MG/1
100 TABLET, FILM COATED ORAL
Qty: 8 TABLET | Refills: 11 | Status: SHIPPED | OUTPATIENT
Start: 2025-03-17

## 2025-03-17 RX ORDER — FUROSEMIDE 40 MG/1
40 TABLET ORAL
Qty: 8 TABLET | Refills: 11 | Status: SHIPPED | OUTPATIENT
Start: 2025-03-17

## 2025-03-17 NOTE — TELEPHONE ENCOUNTER
----- Message from Farrah sent at 3/17/2025 11:37 AM CDT -----  Contact: Patient  Tee CRUZ Blaine: [id number]: 1970PCP: James Samano.Home Phone      [phone] Phone      Not on file.Mobile          [phone]: Patient needs to speak to someone in regards to her procedure this Friday 03/21 Phone: [phone]

## 2025-03-17 NOTE — TELEPHONE ENCOUNTER
----- Message from Greg sent at 3/17/2025 11:18 AM CDT -----  Regarding: Consult/Advisory  Contact: [phone]  Consult/Advisory Name Of Caller:pt   Contact Preference: [phone] Nature of call: following up on request for early refill on diuretic. Please call to advise [facility] you[id number] [place], [place] - [id number] [place] [address]Wdfmt: [phone] Fax: [phone]

## 2025-03-19 ENCOUNTER — PATIENT MESSAGE (OUTPATIENT)
Dept: ORTHOPEDICS | Facility: CLINIC | Age: 55
End: 2025-03-19
Payer: COMMERCIAL

## 2025-03-21 ENCOUNTER — HOSPITAL ENCOUNTER (OUTPATIENT)
Facility: HOSPITAL | Age: 55
Discharge: HOME | End: 2025-03-21
Attending: ANESTHESIOLOGY | Admitting: ANESTHESIOLOGY
Payer: COMMERCIAL

## 2025-03-21 ENCOUNTER — HOSPITAL ENCOUNTER (OUTPATIENT)
Dept: RADIOLOGY | Facility: HOSPITAL | Age: 55
Discharge: HOME | End: 2025-03-21
Attending: ANESTHESIOLOGY | Admitting: ANESTHESIOLOGY
Payer: COMMERCIAL

## 2025-03-21 VITALS
SYSTOLIC BLOOD PRESSURE: 125 MMHG | HEART RATE: 73 BPM | DIASTOLIC BLOOD PRESSURE: 60 MMHG | TEMPERATURE: 98 F | OXYGEN SATURATION: 100 % | RESPIRATION RATE: 16 BRPM

## 2025-03-21 DIAGNOSIS — M70.71 ISCHIAL BURSITIS OF RIGHT SIDE: ICD-10-CM

## 2025-03-21 DIAGNOSIS — M53.3 CHRONIC SACROILIAC JOINT PAIN: ICD-10-CM

## 2025-03-21 DIAGNOSIS — M46.1 SACROILIITIS: Primary | ICD-10-CM

## 2025-03-21 DIAGNOSIS — G89.29 CHRONIC PAIN: ICD-10-CM

## 2025-03-21 DIAGNOSIS — F41.9 ANXIETY: ICD-10-CM

## 2025-03-21 DIAGNOSIS — M25.551 RIGHT HIP PAIN: ICD-10-CM

## 2025-03-21 DIAGNOSIS — G89.29 CHRONIC SACROILIAC JOINT PAIN: ICD-10-CM

## 2025-03-21 PROCEDURE — 27096 INJECT SACROILIAC JOINT: CPT | Mod: RT | Performed by: ANESTHESIOLOGY

## 2025-03-21 PROCEDURE — 25500020 PHARM REV CODE 255: Performed by: ANESTHESIOLOGY

## 2025-03-21 PROCEDURE — 76000 FLUOROSCOPY <1 HR PHYS/QHP: CPT | Mod: TC

## 2025-03-21 PROCEDURE — 25000003 PHARM REV CODE 250: Performed by: ANESTHESIOLOGY

## 2025-03-21 PROCEDURE — 63600175 PHARM REV CODE 636 W HCPCS: Performed by: ANESTHESIOLOGY

## 2025-03-21 PROCEDURE — 27096 INJECT SACROILIAC JOINT: CPT | Mod: RT,,, | Performed by: ANESTHESIOLOGY

## 2025-03-21 RX ORDER — METHYLPREDNISOLONE ACETATE 40 MG/ML
INJECTION, SUSPENSION INTRA-ARTICULAR; INTRALESIONAL; INTRAMUSCULAR; SOFT TISSUE
Status: DISCONTINUED | OUTPATIENT
Start: 2025-03-21 | End: 2025-03-21 | Stop reason: HOSPADM

## 2025-03-21 RX ORDER — LIDOCAINE HYDROCHLORIDE 20 MG/ML
INJECTION, SOLUTION INFILTRATION; PERINEURAL
Status: DISCONTINUED | OUTPATIENT
Start: 2025-03-21 | End: 2025-03-21 | Stop reason: HOSPADM

## 2025-03-21 RX ORDER — ALPRAZOLAM 0.25 MG/1
0.5 TABLET, ORALLY DISINTEGRATING ORAL
Status: DISCONTINUED | OUTPATIENT
Start: 2025-03-21 | End: 2025-03-21 | Stop reason: HOSPADM

## 2025-03-21 RX ORDER — BUPIVACAINE HYDROCHLORIDE 5 MG/ML
INJECTION, SOLUTION EPIDURAL; INTRACAUDAL; PERINEURAL
Status: DISCONTINUED | OUTPATIENT
Start: 2025-03-21 | End: 2025-03-21 | Stop reason: HOSPADM

## 2025-03-21 RX ADMIN — ALPRAZOLAM 0.5 MG: 0.25 TABLET, ORALLY DISINTEGRATING ORAL at 08:03

## 2025-03-21 NOTE — OP NOTE
Sacroiliac Joint Injection under Fluoroscopic Guidance    The procedure, risks, benefits, and options were discussed with the patient. There are no contraindications to the procedure. The patent expressed understanding and agreed to the procedure. Informed written consent was obtained prior to the start of the procedure and can be found in the patient's chart.    PATIENT NAME: Tee Aranda   MRN: [id number]     DATE OF PROCEDURE: 03/21/2025    PROCEDURE: Right Sacroiliac Joint Injection under Fluoroscopic Guidance    PRE-OP DIAGNOSIS:  Chronic sacroiliac joint pain [M53.3, G89.29]    POST-OP DIAGNOSIS: Same    PHYSICIAN: Mainor Thomason MD    MEDICATIONS INJECTED: Preservative-free Kenalog 40mg with 3cc of Bupivacine 0.25%     LOCAL ANESTHETIC INJECTED: Xylocaine 2%     SEDATION: oral    ESTIMATED BLOOD LOSS: None    COMPLICATIONS: None    TECHNIQUE: Time-out was performed to identify the patient and procedure to be performed. With the patient laying in a prone position, the surgical area was prepped and draped in the usual sterile fashion using ChloraPrep and a fenestrated drape. The sacroiliac joint was determined under fluoroscopy guidance. Skin anesthesia was achieved by injecting Lidocaine 2% over the injection site. The sacroiliac joint was  then approached with a 22 gauge, 3.5 inch spinal quinke needle that was introduced under fluoroscopic guidance in the AP and Lateral views. Once the needle tip was in the area of the joint, and there was no blood, contrast dye Omnipaque (300mg/mL) was injected to confirm placement and there was no vascular runoff. Fluoroscopic imaging in the AP and lateral views revealed a clear outline of the joint space. 3 mL of the medication mixture listed above was injected slowly intraarticular and pankaj-articular. Displacement of the radio opaque contrast after injection of the medication confirmed that the medication went into the area of the joint. The needles were removed and  bleeding was nil.  A sterile dressing was applied. No specimens collected. The patient tolerated the procedure well.       The patient was monitored after the procedure in the recovery area. They were given post-procedure and discharge instructions to follow at home. The patient was discharged in a stable condition.    Mainor Thomason MD

## 2025-03-21 NOTE — DISCHARGE SUMMARY
Discharge Note  Short Stay    Admit Date: 3/21/2025    Attending Physician: Mainor Thomason    Discharge Physician: Mainor Thomason    Discharge Date: 3/21/2025 8:30 AM    Procedure(s) (LRB):  INJECTION,SACROILIAC JOINT (ORAL) (Right)  ISCHIAL BURSA INJECTION (Right)    Final Diagnosis: Right hip pain [M25.551]  Chronic sacroiliac joint pain [M53.3, G89.29]  Ischial bursitis of right side [M70.71]    Disposition: Home or self care    Patient Instructions:   Current Discharge Medication List        CONTINUE these medications which have NOT CHANGED    Details   carvediloL (COREG) 3.125 MG tablet Take 1 tablet (3.125 mg total) by mouth 2 (two) times daily.  Qty: 180 tablet, Refills: 3    Comments: .  Associated Diagnoses: Alcoholic cirrhosis of liver without ascites      esomeprazole (NEXIUM) 40 MG capsule TAKE 1 CAPSULE BY MOUTH 2 TIMES DAILY BEFORE MEALS.  Qty: 180 capsule, Refills: 3    Associated Diagnoses: Epigastric pain      furosemide (LASIX) 40 MG tablet Take 1 tablet (40 mg total) by mouth twice a week.  Qty: 8 tablet, Refills: 11    Associated Diagnoses: Other ascites      methocarbamoL (ROBAXIN) 750 MG Tab Take 1 tablet (750 mg total) by mouth 3 (three) times daily.  Qty: 90 tablet, Refills: 0      pregabalin (LYRICA) 100 MG capsule Take 1 capsule (100 mg total) by mouth 2 (two) times daily.  Qty: 60 capsule, Refills: 5    Associated Diagnoses: Right leg pain      rifAXIMin (XIFAXAN) 550 mg Tab Take 1 tablet (550 mg total) by mouth 2 (two) times daily.  Qty: 60 tablet, Refills: 11    Associated Diagnoses: Encephalopathy, unspecified type      spironolactone (ALDACTONE) 100 MG tablet Take 1 tablet (100 mg total) by mouth twice a week.  Qty: 8 tablet, Refills: 11    Associated Diagnoses: Other ascites      multivitamin (THERAGRAN) per tablet Take 1 tablet by mouth once daily.      omega-3 fatty acids/fish oil (FISH OIL-OMEGA-3 FATTY ACIDS) 300-1,000 mg capsule Take 1 capsule by mouth once daily.      potassium  citrate (UROCIT-K 10) 10 mEq (1,080 mg) TbSR Take 1 tablet (10 mEq total) by mouth 2 (two) times daily with meals.  Qty: 180 tablet, Refills: 3    Associated Diagnoses: Calcium oxalate renal stones      tamsulosin (FLOMAX) 0.4 mg Cap Take 1 capsule (0.4 mg total) by mouth once daily.  Qty: 30 capsule, Refills: 0      UNABLE TO FIND 4 (four) times daily as needed. Medible 20 mg blue raspberry 1/4 to 1/2 up to 4 times daily as needed.             Discharge Diagnosis: Right hip pain [M25.551]  Chronic sacroiliac joint pain [M53.3, G89.29]  Ischial bursitis of right side [M70.71]  Condition on Discharge: Stable with no complications to procedure   Diet on Discharge: Same as before.  Activity: as per instruction sheet.  Discharge to: Home with a responsible adult.  Follow up: 2-4 weeks       Please call my office or pager at [phone] if experienced any weakness or loss of sensation, fever > 101.5, pain uncontrolled with oral medications, persistent nausea/vomiting/or diarrhea, redness or drainage from the incisions, or any other worrisome concerns. If physician on call was not reached or could not communicate with our office for any reason please go to the nearest emergency department.     Mainor Thomason  03/21/2025

## 2025-03-26 ENCOUNTER — HOSPITAL ENCOUNTER (OUTPATIENT)
Dept: RADIOLOGY | Facility: HOSPITAL | Age: 55
Discharge: HOME | End: 2025-03-26
Attending: FAMILY MEDICINE
Payer: COMMERCIAL

## 2025-03-26 DIAGNOSIS — M25.531 PAIN IN RIGHT WRIST: Primary | ICD-10-CM

## 2025-03-26 DIAGNOSIS — M25.531 PAIN IN RIGHT WRIST: ICD-10-CM

## 2025-03-26 PROCEDURE — 73110 X-RAY EXAM OF WRIST: CPT | Mod: 26,RT,, | Performed by: RADIOLOGY

## 2025-03-26 PROCEDURE — 73110 X-RAY EXAM OF WRIST: CPT | Mod: TC,RT

## 2025-03-31 ENCOUNTER — TELEPHONE (OUTPATIENT)
Dept: ORTHOPEDICS | Facility: CLINIC | Age: 55
End: 2025-03-31
Payer: COMMERCIAL

## 2025-03-31 DIAGNOSIS — Z96.641 S/P TOTAL RIGHT HIP ARTHROPLASTY: ICD-10-CM

## 2025-03-31 DIAGNOSIS — Z96.641 PRESENCE OF RIGHT ARTIFICIAL HIP JOINT: Primary | ICD-10-CM

## 2025-03-31 NOTE — TELEPHONE ENCOUNTER
Called patient to follow up after right SI joint corticosteroid injection by pain management.  Patient notes that she got 50% pain relief of her posterior hip pain, but her groin pain was not affected by the injection.    Patient is scheduled to follow up on 04/08/2025.  Right hip MARS MRI ordered to be done on the same day of the visit.  I will also obtain labs following our visit.

## 2025-04-01 ENCOUNTER — PATIENT MESSAGE (OUTPATIENT)
Dept: ORTHOPEDICS | Facility: CLINIC | Age: 55
End: 2025-04-01
Payer: COMMERCIAL

## 2025-04-01 ENCOUNTER — TELEPHONE (OUTPATIENT)
Dept: ORTHOPEDICS | Facility: CLINIC | Age: 55
End: 2025-04-01
Payer: COMMERCIAL

## 2025-04-01 NOTE — TELEPHONE ENCOUNTER
Called and booked patient's MRI and f/u with Gurjit for next week. Told patient to reach out to us if she has any questions. Patient agreed to appt dates and times.

## 2025-04-01 NOTE — TELEPHONE ENCOUNTER
Attempted to call patient to book MRI and f/u appt, but was unable to leave a message as patient does not have VM box set up. Sent message to patient via portal       ----- Message from Gurjit Echavarria PA-C sent at 3/31/2025  5:03 PM CDT -----  Regarding: MRI and follow up  Would you mind scheduling this patient to follow up with me on 04/08/2025 at 11:00 a.m.?I also scheduled an MRI for the right hip.  Would you be able to schedule that MRI on 04/08/2025 as well after our visit?

## 2025-04-07 ENCOUNTER — HOSPITAL ENCOUNTER (OUTPATIENT)
Dept: RADIOLOGY | Facility: HOSPITAL | Age: 55
Discharge: HOME | End: 2025-04-07
Payer: COMMERCIAL

## 2025-04-07 ENCOUNTER — PATIENT MESSAGE (OUTPATIENT)
Dept: ORTHOPEDICS | Facility: CLINIC | Age: 55
End: 2025-04-07
Payer: COMMERCIAL

## 2025-04-07 ENCOUNTER — TELEPHONE (OUTPATIENT)
Dept: ORTHOPEDICS | Facility: CLINIC | Age: 55
End: 2025-04-07
Payer: COMMERCIAL

## 2025-04-07 DIAGNOSIS — Z96.641 S/P TOTAL RIGHT HIP ARTHROPLASTY: ICD-10-CM

## 2025-04-07 DIAGNOSIS — Z96.641 PRESENCE OF RIGHT ARTIFICIAL HIP JOINT: ICD-10-CM

## 2025-04-07 DIAGNOSIS — Z96.641 S/P TOTAL RIGHT HIP ARTHROPLASTY: Primary | ICD-10-CM

## 2025-04-07 NOTE — TELEPHONE ENCOUNTER
Called patient back regarding issue with obtaining right hip MRI today. Patient notes that her MRI was denied due to the inability to remove a fabian earring. Patient was very distraught due to driving a long distance to have the MRI done here.    Patient is scheduled for a visit with me tomorrow for a re-evaluation of pain. I will reach out to the imaging center to solve the issue to obtain an MRI essential for diagnosing her right hip pain following right total hip arthroplasty.

## 2025-04-08 ENCOUNTER — OFFICE VISIT (OUTPATIENT)
Dept: ORTHOPEDICS | Facility: CLINIC | Age: 55
End: 2025-04-08
Payer: COMMERCIAL

## 2025-04-08 ENCOUNTER — LAB VISIT (OUTPATIENT)
Dept: LAB | Facility: HOSPITAL | Age: 55
End: 2025-04-08
Payer: COMMERCIAL

## 2025-04-08 DIAGNOSIS — Z96.641 S/P TOTAL RIGHT HIP ARTHROPLASTY: Primary | ICD-10-CM

## 2025-04-08 DIAGNOSIS — E61.1 IRON DEFICIENCY: ICD-10-CM

## 2025-04-08 DIAGNOSIS — Z96.641 S/P TOTAL RIGHT HIP ARTHROPLASTY: ICD-10-CM

## 2025-04-08 LAB
CRP SERPL-MCNC: 0.4 MG/L
ERYTHROCYTE [SEDIMENTATION RATE] IN BLOOD BY PHOTOMETRIC METHOD: 7 MM/HR
FERRITIN SERPL-MCNC: 23 NG/ML (ref 20–300)

## 2025-04-08 PROCEDURE — 1159F MED LIST DOCD IN RCRD: CPT | Mod: CPTII,S$GLB,,

## 2025-04-08 PROCEDURE — 82495 ASSAY OF CHROMIUM: CPT

## 2025-04-08 PROCEDURE — 99999 PR PBB SHADOW E&M-EST. PATIENT-LVL III: CPT | Mod: PBBFAC,,,

## 2025-04-08 PROCEDURE — 86140 C-REACTIVE PROTEIN: CPT

## 2025-04-08 PROCEDURE — 82728 ASSAY OF FERRITIN: CPT

## 2025-04-08 PROCEDURE — 83018 HEAVY METAL QUAN EACH NES: CPT

## 2025-04-08 PROCEDURE — 36415 COLL VENOUS BLD VENIPUNCTURE: CPT

## 2025-04-08 PROCEDURE — 99213 OFFICE O/P EST LOW 20 MIN: CPT | Mod: S$GLB,,,

## 2025-04-08 PROCEDURE — 85652 RBC SED RATE AUTOMATED: CPT

## 2025-04-08 PROCEDURE — 1160F RVW MEDS BY RX/DR IN RCRD: CPT | Mod: CPTII,S$GLB,,

## 2025-04-09 ENCOUNTER — PATIENT MESSAGE (OUTPATIENT)
Dept: ORTHOPEDICS | Facility: CLINIC | Age: 55
End: 2025-04-09
Payer: COMMERCIAL

## 2025-04-09 LAB
COBALT SERPL-MCNC: <0.2 NG/ML
CR SERPL-MCNC: 0.2 NG/ML

## 2025-04-09 NOTE — PROGRESS NOTES
SUBJECTIVE:     History of Present Illness    CHIEF COMPLAINT:  - Right hip pain follow up    HPI:  Ms. Aranda presents for evaluation of persistent hip pain ongoing for approximately 20 years since her hip replacement. Her pain has significantly worsened over the past 4-5 months, particularly in the groin area. She reports the hip replacement has never stopped causing pain, but notes the groin pain is a new development. Pain is most aggravating when driving, sitting, or using the toilet. She is unable to stand on the affected leg. Pain is rated as severe, with a recent car ride causing extreme discomfort.    She reports difficulty with mobility, inability to  the affected leg, and pain when attempting to stand on it. 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 She also mentions back pain, particularly in the SI joint area.    Symptoms have significantly impacted daily activities. She is unable to exercise as before, including swimming, walking, and weight training. She expresses concern about muscle depletion related to liver disease and inability to exercise.    Recent treatments include an SI joint injection on 03/21/2025, which provided some relief for back pain but did not alleviate hip and groin pain. She uses marijuana for pain management but reports inadequate relief. She expresses reluctance to start taking prescription pain medication but acknowledges needing significant relief.    She denies any recent trauma or injury to the hip area.    PREVIOUS TREATMENTS:  - SI joint injection (03/21/2025): Provided some relief for back pain but did not alleviate hip/groin pain    MEDICATIONS:  - Marijuana: Used for pain management, including smoking joints    SURGICAL HISTORY:  - Hip replacement:  2005    SOCIAL HISTORY:  - Marijuana: Used for pain relief and to reduce  hypertension. Reports it has helped prevent esophageal variceal bleeds for 4.5 years           Past Medical History:   Diagnosis Date    Anemia, unspecified     Anxiety     Arthritis     Ascites 11/23/2020    AVN (avascular necrosis of bone)     Cataract     COVID-19 vaccine administered     04/08/21, 04/30/21    Diarrhea of presumed infectious origin 7/31/2023    Edema 11/23/2020    Epigastric pain 7/31/2023    Esophageal varices     Glaucoma     Hepatic encephalopathy 11/23/2020    History of colon polyps     Hx of cirrhosis     non-alcoholic    Iron deficiency anemia secondary to blood loss (chronic)     Kidney stones     Portal hypertensive gastropathy     Unintentional weight loss 10/14/2023    Unspecified cirrhosis of liver        Past Surgical History:   Procedure Laterality Date    APPENDECTOMY      COLONOSCOPY N/A 8/3/2023    Procedure: COLONOSCOPY;  Surgeon: Gerard Salinas MD;  Location: [facility];  Service: Endoscopy;  Laterality: N/A;    COLONOSCOPY N/A 5/27/2024    Procedure: COLONOSCOPY;  Surgeon: Eric Garcia MD;  Location: [facility] (4TH FLR);  Service: Endoscopy;  Laterality: N/A;  Dr. Douglass, egd/colon, weight loss, variceal surveillance and abdominal pain, standard prep, cirrhosis labs ordered    COLONOSCOPY W/ POLYPECTOMY      CYSTOSCOPY N/A 3/20/2024    Procedure: CYSTOSCOPY;  Surgeon: Chris Carey MD;  [facility];  Service: Urology;  Laterality: N/A;    CYSTOSCOPY  9/4/2024    Procedure: CYSTOSCOPY;  Surgeon: Chris Carey MD;  Location: [facility] OR [address] [place] [address];  Service: Urology;;    CYSTOSCOPY W/ RETROGRADES Right 9/13/2024    Procedure: CYSTOSCOPY, WITH RETROGRADE PYELOGRAM;  Surgeon: Chris Carey MD;  [facility];  Service: Urology;  Laterality: Right;    CYSTOSCOPY W/ URETERAL STENT REMOVAL Right 3/14/2024    Procedure: CYSTOSCOPY, WITH URETERAL STENT REMOVAL;  Surgeon: Chris Carey MD;  [facility];  Service: Urology;  Laterality:  Right;    CYSTOSCOPY WITH CALCULUS EXTRACTION Right 9/13/2024    Procedure: CYSTOSCOPY, WITH CALCULUS REMOVAL;  Surgeon: Chris Carey MD;  Location: [facility] 1ST FLR;  Service: Urology;  Laterality: Right;  urtereoscopy    DISTAL CLAVICLE EXCISION Right 11/18/2021    Procedure: RIGHT SHOULDER ARTHROSCOPY, EXCISION, CLAVICLE, DISTAL; EXTENSIVE DEBRIDEMENT;  Surgeon: Isaiah Joyner MD;  Location: [facility];  Service: Orthopedics;  Laterality: Right;    ESOPHAGEAL VARICE LIGATION      ESOPHAGOGASTRODUODENOSCOPY N/A 11/10/2020    Procedure: EGD (ESOPHAGOGASTRODUODENOSCOPY);  Surgeon: Gerard Salinas MD;  Location: [facility];  Service: Endoscopy;  Laterality: N/A;    ESOPHAGOGASTRODUODENOSCOPY N/A 12/28/2020    Procedure: EGD (ESOPHAGOGASTRODUODENOSCOPY);  Surgeon: Adryan Park MD;  Location: [facility] (2ND FLR);  Service: Endoscopy;  Laterality: N/A;    ESOPHAGOGASTRODUODENOSCOPY N/A 02/15/2021    Procedure: EGD (ESOPHAGOGASTRODUODENOSCOPY);  Surgeon: Hari Greene MD;  Location: [facility] (4TH FLR);  Service: Endoscopy;  Laterality: N/A;  6 week follow-up variceal banding  Hx varices - Labs - ERW  prep ins. emialed - COVID screening on 2/12/21 [place] - ERW    ESOPHAGOGASTRODUODENOSCOPY Left 08/03/2021    Procedure: EGD (ESOPHAGOGASTRODUODENOSCOPY);  Surgeon: Fabricio Corado MD;  Location: [facility] (4TH FLR);  Service: Gastroenterology;  Laterality: Left;  recent hematemasis. varices-labs on 7/26    COVID test at [facility] on 7/31-GT  requesting sooner    ESOPHAGOGASTRODUODENOSCOPY N/A 07/27/2022    Procedure: EGD (ESOPHAGOGASTRODUODENOSCOPY);  Surgeon: Eric Garcia MD;  Location: [facility];  Service: Endoscopy;  Laterality: N/A;    ESOPHAGOGASTRODUODENOSCOPY Left 08/29/2022    Procedure: EGD (ESOPHAGOGASTRODUODENOSCOPY);  Surgeon: Kacy Douglass MD;  Location: [facility] ([address] [place] [address]);  Service: Endoscopy;  Laterality: Left;  Fully vaccinated/ clear liquids up to 4 hrs prior-RB  varices labs ordered-RB     ESOPHAGOGASTRODUODENOSCOPY N/A 10/05/2022    Procedure: EGD (ESOPHAGOGASTRODUODENOSCOPY);  Surgeon: Gerard Salinas MD;  Location: [facility] ENDO;  Service: Endoscopy;  Laterality: N/A;    ESOPHAGOGASTRODUODENOSCOPY N/A 3/30/2023    Procedure: EGD (ESOPHAGOGASTRODUODENOSCOPY);  Surgeon: Gerard Salinas MD;  Location: [facility];  Service: Endoscopy;  Laterality: N/A;    ESOPHAGOGASTRODUODENOSCOPY N/A 8/3/2023    Procedure: EGD (ESOPHAGOGASTRODUODENOSCOPY);  Surgeon: Gerard Salinas MD;  Location: [facility];  Service: Endoscopy;  Laterality: N/A;    ESOPHAGOGASTRODUODENOSCOPY N/A 5/27/2024    Procedure: EGD (ESOPHAGOGASTRODUODENOSCOPY);  Surgeon: Eric Garcia MD;  Location: [facility] ENDO (4TH FLR);  Service: Endoscopy;  Laterality: N/A;    EXTRACTION - STONE Right 3/20/2024    Procedure: EXTRACTION - STONE;  Surgeon: Chris Carey MD;  Location: [facility] OR 1ST FLR;  Service: Urology;  Laterality: Right;    HYSTERECTOMY      INJECTION, SACROILIAC JOINT Right 3/21/2025    Procedure: INJECTION,SACROILIAC JOINT (ORAL);  Surgeon: Mainor Thomason MD;  Location: [facility];  Service: Pain Management;  Laterality: Right;    KNEE SURGERY Bilateral     LASER LITHOTRIPSY Right 3/20/2024    Procedure: LITHOTRIPSY, USING LASER;  Surgeon: Chris Carey MD;  Location: [facility] OR 1ST FLR;  Service: Urology;  Laterality: Right;    LASER LITHOTRIPSY Right 9/13/2024    Procedure: LITHOTRIPSY, USING LASER;  Surgeon: Chris Carey MD;  Location: [facility] OR 1ST FLR;  Service: Urology;  Laterality: Right;    LITHOTRIPSY      REMOVAL-STENT Right 3/20/2024    Procedure: REMOVAL-STENT;  Surgeon: Chris Carey MD;  Location: [facility] OR 1ST FLR;  Service: Urology;  Laterality: Right;    REMOVAL-STENT Right 9/13/2024    Procedure: REMOVAL-STENT;  Surgeon: Chris Carey MD;  Location: [facility] OR [address] [place] [address];  Service: Urology;  Laterality: Right;    RETROGRADE PYELOGRAPHY Right 3/20/2024    Procedure: PYELOGRAM, RETROGRADE;  Surgeon: Chris Carey,  MD;  Location: [facility] OR [address] [place] [address];  Service: Urology;  Laterality: Right;    RHINOPLASTY TIP      SHOULDER SURGERY Right     TONSILLECTOMY      TOTAL HIP ARTHROPLASTY Right     URETERAL STENT PLACEMENT Right 3/20/2024    Procedure: INSERTION, STENT, URETER;  Surgeon: Chris Carey MD;  Location: [address];  Service: Urology;  Laterality: Right;    URETERAL STENT PLACEMENT Right 9/4/2024    Procedure: INSERTION, STENT, URETER;  Surgeon: Chris Carey MD;  [facility];  Service: Urology;  Laterality: Right;    URETEROSCOPY Right 3/20/2024    Procedure: URETEROSCOPY;  Surgeon: Chris Carey MD;  Location: [facility] OR [address] [place] [address];  Service: Urology;  Laterality: Right;    URETEROSCOPY Right 9/13/2024    Procedure: URETEROSCOPY;  Surgeon: Chris Carey MD;  Location: [address];  Service: Urology;  Laterality: Right;       Family History   Problem Relation Name Age of Onset    No Known Problems Mother      Diabetes Mellitus Maternal Grandmother      Amblyopia Neg Hx      Blindness Neg Hx      Cataracts Neg Hx      Glaucoma Neg Hx      Macular degeneration Neg Hx      Retinal detachment Neg Hx      Strabismus Neg Hx      Colon cancer Neg Hx      Esophageal cancer Neg Hx         Review of patient's allergies indicates:   Allergen Reactions    Sulfa (sulfonamide antibiotics) Hives       Current Medications[1]      Review of Systems:  ROS:  The updated medical history is in the chart and has been reviewed. A review of systems is updated and there is no reported vision changes, ear/nose/mouth/throat complaints, chest pain, shortness of breath, abdominal pain, urological complaints, fevers or chills, psychiatric complaints. Musculoskeletal and neurologcial symptoms are as documented. All other systems are negative.      OBJECTIVE:     PHYSICAL EXAM:  There were no vitals taken for this visit.  General: Pleasant, cooperative, NAD.  HEENT: NCAT, sclera nonicteric.  Lungs: Respirations are equal and  unlabored.   Abdomen: Soft and non-tender.  CV: 2+ bilateral upper and lower extremity pulses.  Neuro: Sensation intact to light touch.  Skin: Intact throughout LE with no rashes, erythema, or lesions.  Extremities: No LE edema, NVI lower extremities. antalgic gait.      right Hip Exam:  TTP: None  80 degrees flexion, limited by pain  10 degrees extension   10 degrees internal rotation  30 degrees external rotation  30 degrees abduction  20 degrees adduction   0 flexion contracture   negative VICENTE   positive FADIR  positive [facility]    Back Exam  full range of motion, no tenderness, palpable spasm or pain on motion, normal reflexes and strength bilateral lower extremities, sensory exam intact bilateral lower extremities      RADIOGRAPHS:  No further imaging needed today.    ASSESSMENT:       ICD-10-CM ICD-9-CM   1. S/P total right hip arthroplasty  Z96.641 V43.64       PLAN:     We discussed with the patient at length all the different treatment options available including anti-inflammatories, acetaminophen, rest, ice, lower extremity strengthening exercise, occasional cortisone injections for temporary relief, and finally surgical intervention.      IMAGING:  - Ordered MARS MRI Hip to evaluate for prosthesis failure.    FOLLOW UP:  - Follow up after imaging study is completed.          This note was generated with the assistance of ambient listening technology. Verbal consent was obtained by the patient and accompanying visitor(s) for the recording of patient appointment to facilitate this note. I attest to having reviewed and edited the generated note for accuracy, though some syntax or spelling errors may persist. Please contact the author of this note for any clarification.      ANTHONY Rivas Jr.C           [1]   Current Outpatient Medications:     carvediloL (COREG) 3.125 MG tablet, Take 1 tablet (3.125 mg total) by mouth 2 (two) times daily., Disp: 180 tablet, Rfl: 3    esomeprazole (NEXIUM) 40 MG  capsule, TAKE 1 CAPSULE BY MOUTH 2 TIMES DAILY BEFORE MEALS., Disp: 180 capsule, Rfl: 3    furosemide (LASIX) 40 MG tablet, Take 1 tablet (40 mg total) by mouth twice a week., Disp: 8 tablet, Rfl: 11    multivitamin (THERAGRAN) per tablet, Take 1 tablet by mouth once daily., Disp: , Rfl:     omega-3 fatty acids/fish oil (FISH OIL-OMEGA-3 FATTY ACIDS) 300-1,000 mg capsule, Take 1 capsule by mouth once daily., Disp: , Rfl:     potassium citrate (UROCIT-K 10) 10 mEq (1,080 mg) TbSR, Take 1 tablet (10 mEq total) by mouth 2 (two) times daily with meals., Disp: 180 tablet, Rfl: 3    pregabalin (LYRICA) 100 MG capsule, Take 1 capsule (100 mg total) by mouth 2 (two) times daily., Disp: 60 capsule, Rfl: 5    rifAXIMin (XIFAXAN) 550 mg Tab, Take 1 tablet (550 mg total) by mouth 2 (two) times daily., Disp: 60 tablet, Rfl: 11    spironolactone (ALDACTONE) 100 MG tablet, Take 1 tablet (100 mg total) by mouth twice a week., Disp: 8 tablet, Rfl: 11    UNABLE TO FIND, 4 (four) times daily as needed. Medible 20 mg blue raspberry 1/4 to 1/2 up to 4 times daily as needed., Disp: , Rfl:   No current facility-administered medications for this visit.    Facility-Administered Medications Ordered in Other Visits:     0.9%  NaCl infusion, , Intravenous, Continuous, Gerard Salinas MD, Stopped at 03/30/23 2073

## 2025-04-10 ENCOUNTER — RESULTS FOLLOW-UP (OUTPATIENT)
Dept: TRANSPLANT | Facility: CLINIC | Age: 55
End: 2025-04-10

## 2025-04-10 ENCOUNTER — TELEPHONE (OUTPATIENT)
Dept: ORTHOPEDICS | Facility: CLINIC | Age: 55
End: 2025-04-10
Payer: COMMERCIAL

## 2025-04-10 NOTE — TELEPHONE ENCOUNTER
Call patient with MRI results of the right hip that revealed postoperative changes with very subtle patchy marrow edema adjacent the femoral component with no definitive fluid accumulation adjacent to the femoral component.  Results inconclusive of hardware failure.    I consulted with Dr. Vergara, who agrees that the MRI was not positive for hardware failure.  Dr. Vergara suggested an iliopsoas injection via PCSM along with follow up in 2 weeks following injection.    I reached out to Dr. Sameer Martinez for referral for an ultrasound-guided iliopsoas injection.

## 2025-04-24 ENCOUNTER — TELEPHONE (OUTPATIENT)
Dept: SURGERY | Facility: CLINIC | Age: 55
End: 2025-04-24
Payer: COMMERCIAL

## 2025-04-24 ENCOUNTER — PATIENT MESSAGE (OUTPATIENT)
Dept: ORTHOPEDICS | Facility: CLINIC | Age: 55
End: 2025-04-24
Payer: COMMERCIAL

## 2025-04-24 DIAGNOSIS — Z96.641 S/P TOTAL RIGHT HIP ARTHROPLASTY: Primary | ICD-10-CM

## 2025-04-28 ENCOUNTER — PATIENT MESSAGE (OUTPATIENT)
Dept: ORTHOPEDICS | Facility: CLINIC | Age: 55
End: 2025-04-28
Payer: COMMERCIAL

## 2025-04-29 ENCOUNTER — PATIENT MESSAGE (OUTPATIENT)
Dept: SURGERY | Facility: CLINIC | Age: 55
End: 2025-04-29

## 2025-04-29 ENCOUNTER — OFFICE VISIT (OUTPATIENT)
Dept: SURGERY | Facility: CLINIC | Age: 55
End: 2025-04-29
Payer: COMMERCIAL

## 2025-04-29 VITALS
HEIGHT: 68 IN | DIASTOLIC BLOOD PRESSURE: 72 MMHG | HEART RATE: 83 BPM | BODY MASS INDEX: 20.65 KG/M2 | SYSTOLIC BLOOD PRESSURE: 153 MMHG | WEIGHT: 136.25 LBS

## 2025-04-29 DIAGNOSIS — K62.89 RECTAL PAIN: ICD-10-CM

## 2025-04-29 DIAGNOSIS — R10.31 RLQ ABDOMINAL PAIN: Primary | ICD-10-CM

## 2025-04-29 DIAGNOSIS — K64.9 HEMORRHOIDS, UNSPECIFIED HEMORRHOID TYPE: ICD-10-CM

## 2025-04-29 PROCEDURE — 99999 PR PBB SHADOW E&M-EST. PATIENT-LVL V: CPT | Mod: PBBFAC,,, | Performed by: NURSE PRACTITIONER

## 2025-04-29 NOTE — PATIENT INSTRUCTIONS
Trial dilt cream three times a day, try to coordinate with BMs to help with pain  Consider citrucel fiber daily to hopefully decrease frequency of BMs without causing constipation     minor surgery planned

## 2025-04-29 NOTE — PROGRESS NOTES
CRS Office Visit History and Physical    Referring Md:   Kacy Douglass Md  [address] [place] [address]    SUBJECTIVE:     Chief Complaint: hemorrhoids    History of Present Illness:  The patient is a mew patient to this practice.   Course is as follows:  Patient is a 54 y.o. female presents with hemorrhoids.  Has felt a lump on the outside of rectum, has BM 4-5 x per day. Was on Elavil for this by GI, but stopped bc it caused constipation.   Painful to sit  Very rare bleeding with wiping sometimes    Last Colonoscopy: 5/2024 Preparation of the colon was fair.                          - Hemorrhoids found on perianal exam.                          - The examined portion of the ileum was normal.                          - One 6 mm polyp in the sigmoid colon, removed                          with a cold snare. Resected and retrieved.   Family history of colorectal cancer or IBD: no.    Review of patient's allergies indicates:   Allergen Reactions    Sulfa (sulfonamide antibiotics) Hives       Past Medical History:   Diagnosis Date    Anemia, unspecified     Anxiety     Arthritis     Ascites 11/23/2020    AVN (avascular necrosis of bone)     Cataract     COVID-19 vaccine administered     04/08/21, 04/30/21    Diarrhea of presumed infectious origin 7/31/2023    Edema 11/23/2020    Epigastric pain 7/31/2023    Esophageal varices     Glaucoma     Hepatic encephalopathy 11/23/2020    History of colon polyps     Hx of cirrhosis     non-alcoholic    Iron deficiency anemia secondary to blood loss (chronic)     Kidney stones     Portal hypertensive gastropathy     Unintentional weight loss 10/14/2023    Unspecified cirrhosis of liver      Past Surgical History:   Procedure Laterality Date    APPENDECTOMY      COLONOSCOPY N/A 8/3/2023    Procedure: COLONOSCOPY;  Surgeon: Gerard Salinas MD;  Location: [facility];  Service: Endoscopy;  Laterality: N/A;    COLONOSCOPY N/A 5/27/2024    Procedure: COLONOSCOPY;   Surgeon: Eric Garcia MD;  Location: [facility] ENDO (4TH FLR);  Service: Endoscopy;  Laterality: N/A;  Dr. Douglass, egd/colon, weight loss, variceal surveillance and abdominal pain, standard prep, cirrhosis labs ordered    COLONOSCOPY W/ POLYPECTOMY      CYSTOSCOPY N/A 3/20/2024    Procedure: CYSTOSCOPY;  Surgeon: Chris Carey MD;  Location: [facility] OR 1ST FLR;  Service: Urology;  Laterality: N/A;    CYSTOSCOPY  9/4/2024    Procedure: CYSTOSCOPY;  Surgeon: Chris Carey MD;  Location: [facility] OR 1ST FLR;  Service: Urology;;    CYSTOSCOPY W/ RETROGRADES Right 9/13/2024    Procedure: CYSTOSCOPY, WITH RETROGRADE PYELOGRAM;  Surgeon: Chris Carey MD;  Location: [facility] OR 1ST FLR;  Service: Urology;  Laterality: Right;    CYSTOSCOPY W/ URETERAL STENT REMOVAL Right 3/14/2024    Procedure: CYSTOSCOPY, WITH URETERAL STENT REMOVAL;  Surgeon: Chris Carey MD;  Location: [facility] OR 1ST FLR;  Service: Urology;  Laterality: Right;    CYSTOSCOPY WITH CALCULUS EXTRACTION Right 9/13/2024    Procedure: CYSTOSCOPY, WITH CALCULUS REMOVAL;  Surgeon: Chris Carey MD;  Location: [facility] OR 1ST FLR;  Service: Urology;  Laterality: Right;  urtereoscopy    DISTAL CLAVICLE EXCISION Right 11/18/2021    Procedure: RIGHT SHOULDER ARTHROSCOPY, EXCISION, CLAVICLE, DISTAL; EXTENSIVE DEBRIDEMENT;  Surgeon: Isaiah Joyner MD;  Location: [facility] OR;  Service: Orthopedics;  Laterality: Right;    ESOPHAGEAL VARICE LIGATION      ESOPHAGOGASTRODUODENOSCOPY N/A 11/10/2020    Procedure: EGD (ESOPHAGOGASTRODUODENOSCOPY);  Surgeon: Gerard Salinas MD;  [facility];  Service: Endoscopy;  Laterality: N/A;    ESOPHAGOGASTRODUODENOSCOPY N/A 12/28/2020    Procedure: EGD (ESOPHAGOGASTRODUODENOSCOPY);  Surgeon: Adryan Park MD;  Location: [facility] ENDO (2ND FLR);  Service: Endoscopy;  Laterality: N/A;    ESOPHAGOGASTRODUODENOSCOPY N/A 02/15/2021    Procedure: EGD (ESOPHAGOGASTRODUODENOSCOPY);  Surgeon: Hari Greene MD;  Location: [facility] (4TH FLR);   Service: Endoscopy;  Laterality: N/A;  6 week follow-up variceal banding  Hx varices - Labs - ERW  prep ins. emialed - COVID screening on 2/12/21 [facility] - ERW    ESOPHAGOGASTRODUODENOSCOPY Left 08/03/2021    Procedure: EGD (ESOPHAGOGASTRODUODENOSCOPY);  Surgeon: Fabricio Corado MD;  Location: [facility] (4TH FLR);  Service: Gastroenterology;  Laterality: Left;  recent hematemasis. varices-labs on 7/26    COVID test at [facility] on 7/31-GT  requesting sooner    ESOPHAGOGASTRODUODENOSCOPY N/A 07/27/2022    Procedure: EGD (ESOPHAGOGASTRODUODENOSCOPY);  Surgeon: Eric Garcia MD;  Location: [facility];  Service: Endoscopy;  Laterality: N/A;    ESOPHAGOGASTRODUODENOSCOPY Left 08/29/2022    Procedure: EGD (ESOPHAGOGASTRODUODENOSCOPY);  Surgeon: Kacy Douglass MD;  Location: [facility] (2ND FLR);  Service: Endoscopy;  Laterality: Left;  Fully vaccinated/ clear liquids up to 4 hrs prior-RB  varices labs ordered-RB    ESOPHAGOGASTRODUODENOSCOPY N/A 10/05/2022    Procedure: EGD (ESOPHAGOGASTRODUODENOSCOPY);  Surgeon: Gerard Salinas MD;  Location: [facility];  Service: Endoscopy;  Laterality: N/A;    ESOPHAGOGASTRODUODENOSCOPY N/A 3/30/2023    Procedure: EGD (ESOPHAGOGASTRODUODENOSCOPY);  Surgeon: Gerard Salinas MD;  Location: [facility];  Service: Endoscopy;  Laterality: N/A;    ESOPHAGOGASTRODUODENOSCOPY N/A 8/3/2023    Procedure: EGD (ESOPHAGOGASTRODUODENOSCOPY);  Surgeon: Gerard Salinas MD;  Location: [facility];  Service: Endoscopy;  Laterality: N/A;    ESOPHAGOGASTRODUODENOSCOPY N/A 5/27/2024    Procedure: EGD (ESOPHAGOGASTRODUODENOSCOPY);  Surgeon: Eric Garcia MD;  Location: [facility] (4TH FLR);  Service: Endoscopy;  Laterality: N/A;    EXTRACTION - STONE Right 3/20/2024    Procedure: EXTRACTION - STONE;  Surgeon: Chris Carey MD;  Location: [facility] OR [address] [place] [address];  Service: Urology;  Laterality: Right;    HYSTERECTOMY      INJECTION, SACROILIAC JOINT Right 3/21/2025    Procedure: INJECTION,SACROILIAC  JOINT (ORAL);  Surgeon: Mainor Thomason MD;  [facility];  Service: Pain Management;  Laterality: Right;    KNEE SURGERY Bilateral     LASER LITHOTRIPSY Right 3/20/2024    Procedure: LITHOTRIPSY, USING LASER;  Surgeon: Chris Carey MD;  Location: [facility] OR 1ST FLR;  Service: Urology;  Laterality: Right;    LASER LITHOTRIPSY Right 9/13/2024    Procedure: LITHOTRIPSY, USING LASER;  Surgeon: Chris Carey MD;  Location: [facility] OR 1ST FLR;  Service: Urology;  Laterality: Right;    LITHOTRIPSY      REMOVAL-STENT Right 3/20/2024    Procedure: REMOVAL-STENT;  Surgeon: Chris Carey MD;  Location: [facility] OR 1ST FLR;  Service: Urology;  Laterality: Right;    REMOVAL-STENT Right 9/13/2024    Procedure: REMOVAL-STENT;  Surgeon: Chris Carey MD;  Location: [facility] OR 1ST FLR;  Service: Urology;  Laterality: Right;    RETROGRADE PYELOGRAPHY Right 3/20/2024    Procedure: PYELOGRAM, RETROGRADE;  Surgeon: Chris Carey MD;  Location: [facility] OR 1ST FLR;  Service: Urology;  Laterality: Right;    RHINOPLASTY TIP      SHOULDER SURGERY Right     TONSILLECTOMY      TOTAL HIP ARTHROPLASTY Right     URETERAL STENT PLACEMENT Right 3/20/2024    Procedure: INSERTION, STENT, URETER;  Surgeon: Chris Carey MD;  Location: [facility] OR 1ST FLR;  Service: Urology;  Laterality: Right;    URETERAL STENT PLACEMENT Right 9/4/2024    Procedure: INSERTION, STENT, URETER;  Surgeon: Chris Carey MD;  Location: [facility] OR 1ST FLR;  Service: Urology;  Laterality: Right;    URETEROSCOPY Right 3/20/2024    Procedure: URETEROSCOPY;  Surgeon: Chris Carey MD;  Location: [facility] OR 1ST FLR;  Service: Urology;  Laterality: Right;    URETEROSCOPY Right 9/13/2024    Procedure: URETEROSCOPY;  Surgeon: Chris Carey MD;  Location: [facility] OR 1ST FLR;  Service: Urology;  Laterality: Right;     Family History   Problem Relation Name Age of Onset    No Known Problems Mother      Diabetes Mellitus Maternal Grandmother      Amblyopia Neg Hx      Blindness Neg Hx       "Cataracts Neg Hx      Glaucoma Neg Hx      Macular degeneration Neg Hx      Retinal detachment Neg Hx      Strabismus Neg Hx      Colon cancer Neg Hx      Esophageal cancer Neg Hx       Social History[1]     Review of Systems:  ROS    OBJECTIVE:     Vital Signs (Most Recent)  BP (!) 153/72   Pulse 83   Ht 5' 8" (1.727 m)   Wt 61.8 kg (136 lb 3.9 oz)   BMI 20.72 kg/m²     Physical Exam:  General: White female in no distress   Neuro: Alert and oriented to person, place, and time.  Moves all extremities.     HEENT: No icterus.  Trachea midline  Respiratory: Respirations are even and unlabored, no cough or audible wheezing  Skin: Warm dry and intact, No visible rashes, no jaundice    Labs reviewed today:  Lab Results   Component Value Date    WBC 4.31 02/20/2025    HGB 13.1 02/20/2025    HCT 40.0 02/20/2025    PLT 78 (L) 02/20/2025    ALT 23 02/20/2025    AST 20 02/20/2025     02/20/2025    K 3.8 02/20/2025     02/20/2025    CREATININE 0.8 02/20/2025    BUN 18 02/20/2025    CO2 24 02/20/2025    TSH 1.111 06/14/2024    INR 1.1 02/20/2025    INR 1.1 02/20/2025    HGBA1C 5.5 07/26/2022           Endoscopy reviewed today:  5/2024 - Preparation of the colon was fair.                          - Hemorrhoids found on perianal exam.                          - The examined portion of the ileum was normal.                          - One 6 mm polyp in the sigmoid colon, removed                          with a cold snare. Resected and retrieved.     Anorectal Exam:    Anal Skin: Normal    Digital Rectal Exam:  Resting Tone normal  Mass none  Rectocele  present  Tenderness  present    Anoscopy:  Verbal consent was obtained.   Clear plastic anoscope inserted.    Hemorrhoids small  Stigmata of bleeding  no  Stigmata of prolapsed  none  Distal rectal mucosa normal        ASSESSMENT/PLAN:     Diagnoses and all orders for this visit:    RLQ abdominal pain  -     diltiazem HCl (DILTIAZEM 2% CREAM); Apply topically 3 (three) " times daily. Apply topically to anal area.    Hemorrhoids, unspecified hemorrhoid type  -     Ambulatory referral/consult to Colorectal Surgery    Rectal pain  -     CT Abdomen Pelvis With IV Contrast Routine Oral Contrast; Future      53 yo F w liver cirrhosis here for rectal pain and RLQ abd pain. Her hemorrhoids are very small and I would be surprised if this is causing an issue.  Physical exam unremarkable today. Will get CT scan. Can trial dilt cream three times a day to anus.   F/u with MD Deborah Mcclellan, FNP-C  Colon and Rectal Surgery           [1]   Social History  Tobacco Use    Smoking status: Some Days     Current packs/day: 0.00     Types: Cigarettes     Last attempt to quit: 7/3/2019     Years since quittin.8    Smokeless tobacco: Never   Substance Use Topics    Alcohol use: Not Currently    Drug use: No

## 2025-04-30 ENCOUNTER — HOSPITAL ENCOUNTER (OUTPATIENT)
Dept: RADIOLOGY | Facility: HOSPITAL | Age: 55
Discharge: HOME | End: 2025-04-30
Attending: FAMILY MEDICINE
Payer: COMMERCIAL

## 2025-04-30 DIAGNOSIS — R10.31 ABDOMINAL PAIN, RIGHT LOWER QUADRANT: ICD-10-CM

## 2025-04-30 PROCEDURE — 25500020 PHARM REV CODE 255

## 2025-04-30 PROCEDURE — 74177 CT ABD & PELVIS W/CONTRAST: CPT | Mod: 26,,, | Performed by: RADIOLOGY

## 2025-04-30 PROCEDURE — 74177 CT ABD & PELVIS W/CONTRAST: CPT | Mod: TC

## 2025-04-30 RX ADMIN — IOHEXOL 30 ML: 350 INJECTION, SOLUTION INTRAVENOUS at 02:04

## 2025-04-30 RX ADMIN — IOHEXOL 75 ML: 350 INJECTION, SOLUTION INTRAVENOUS at 02:04

## 2025-05-04 ENCOUNTER — PATIENT MESSAGE (OUTPATIENT)
Dept: HEPATOLOGY | Facility: CLINIC | Age: 55
End: 2025-05-04
Payer: COMMERCIAL

## 2025-05-04 ENCOUNTER — PATIENT MESSAGE (OUTPATIENT)
Dept: ORTHOPEDICS | Facility: CLINIC | Age: 55
End: 2025-05-04
Payer: COMMERCIAL

## 2025-05-04 DIAGNOSIS — R10.84 GENERALIZED ABDOMINAL PAIN: Primary | ICD-10-CM

## 2025-05-05 ENCOUNTER — PATIENT MESSAGE (OUTPATIENT)
Dept: HEPATOLOGY | Facility: CLINIC | Age: 55
End: 2025-05-05
Payer: COMMERCIAL

## 2025-05-05 ENCOUNTER — TELEPHONE (OUTPATIENT)
Dept: ORTHOPEDICS | Facility: CLINIC | Age: 55
End: 2025-05-05
Payer: COMMERCIAL

## 2025-05-05 ENCOUNTER — TELEPHONE (OUTPATIENT)
Dept: HEPATOLOGY | Facility: CLINIC | Age: 55
End: 2025-05-05
Payer: COMMERCIAL

## 2025-05-05 NOTE — TELEPHONE ENCOUNTER
Returned patient call and she said Dr. Martinez's staff had already reached out to patient and rescheduled. Patient said she'd let us know if she needed anything else.    ----- Message from [employer] sent at 5/5/2025  9:13 AM CDT -----  Type:  Patient Returning CallWho Called: ptWho Left Message for Patient: Nevaeh Garcia MADoes the patient know what this is regarding?: Would the patient rather a call back or a response via [facility]? callBest Call Back Number: [phone] Information:  pt said she is having major problems in which she doesn't know if she will make it to her appt on today; says she would like someone to call her back before her appt on today

## 2025-05-05 NOTE — TELEPHONE ENCOUNTER
----- Message from Pitoqiin sent at 5/5/2025 12:09 PM CDT -----  Type : Patient Call  Who Called : Patient   Does the patient know what this is regarding?: Pt is requesting a call back from the provider. Please Advise   Would the patient rather a call back or a response via [facility]? Call   Best Call Back Number: [phone]  Additional Information:

## 2025-05-05 NOTE — TELEPHONE ENCOUNTER
Pt stated that Dr Bowers sent a message in her portal about this. Dr Bowers answered her question in the message via My chart.

## 2025-05-07 ENCOUNTER — PATIENT MESSAGE (OUTPATIENT)
Dept: SURGERY | Facility: CLINIC | Age: 55
End: 2025-05-07
Payer: COMMERCIAL

## 2025-05-08 ENCOUNTER — TELEPHONE (OUTPATIENT)
Dept: ENDOSCOPY | Facility: HOSPITAL | Age: 55
End: 2025-05-08
Payer: COMMERCIAL

## 2025-05-08 VITALS — HEIGHT: 68 IN | WEIGHT: 130 LBS | BODY MASS INDEX: 19.7 KG/M2

## 2025-05-08 DIAGNOSIS — R93.3 ABNORMAL FINDING ON GI TRACT IMAGING: Primary | ICD-10-CM

## 2025-05-08 DIAGNOSIS — K22.89 ESOPHAGEAL BLEED, NON-VARICEAL: Primary | ICD-10-CM

## 2025-05-08 DIAGNOSIS — Z12.11 SCREEN FOR COLON CANCER: Primary | ICD-10-CM

## 2025-05-08 RX ORDER — SODIUM, POTASSIUM,MAG SULFATES 17.5-3.13G
1 SOLUTION, RECONSTITUTED, ORAL ORAL DAILY
Qty: 1 KIT | Refills: 0 | Status: SHIPPED | OUTPATIENT
Start: 2025-05-08 | End: 2025-05-10

## 2025-05-08 NOTE — TELEPHONE ENCOUNTER
"----- Message from Viviana sent at 5/7/2025  9:39 AM CDT -----    ----- Message -----  From: Deborah Mcclellan NP  Sent: 5/6/2025   8:20 AM CDT  To: [facility]    Procedure: ColonoscopyDiagnosis: Abnormal finding on GI tract imagingProcedure Timing: Within 4 weeks (Urgent)#If within 4 weeks selected, please erickson as high priority##If greater than 12 weeks, please select "5-12 weeks" and delay sending until 3 months prior to requested date# Location: Any SiteAdditional Scheduling Information: Liver cirrhosisPrep Specifications:Standard prepIs the patient taking a GLP-1 Agonist:noHave you attached a patient to this message: yes  "

## 2025-05-08 NOTE — TELEPHONE ENCOUNTER
Patient is scheduled for a Colonoscopy on 5/14/25 with Dr. CHRISTY Jain  Referral for procedure from [facility]

## 2025-05-09 ENCOUNTER — TELEPHONE (OUTPATIENT)
Dept: ENDOSCOPY | Facility: HOSPITAL | Age: 55
End: 2025-05-09
Payer: COMMERCIAL

## 2025-05-12 ENCOUNTER — LAB VISIT (OUTPATIENT)
Dept: LAB | Facility: HOSPITAL | Age: 55
End: 2025-05-12
Attending: SURGERY
Payer: COMMERCIAL

## 2025-05-12 DIAGNOSIS — K22.89 ESOPHAGEAL BLEED, NON-VARICEAL: ICD-10-CM

## 2025-05-12 LAB
ABSOLUTE EOSINOPHIL (OHS): 0.04 K/UL
ABSOLUTE MONOCYTE (OHS): 0.22 K/UL (ref 0.3–1)
ABSOLUTE NEUTROPHIL COUNT (OHS): 2.32 K/UL (ref 1.8–7.7)
BASOPHILS # BLD AUTO: 0.01 K/UL
BASOPHILS NFR BLD AUTO: 0.3 %
ERYTHROCYTE [DISTWIDTH] IN BLOOD BY AUTOMATED COUNT: 13.9 % (ref 11.5–14.5)
HCT VFR BLD AUTO: 37.7 % (ref 37–48.5)
HGB BLD-MCNC: 12.4 GM/DL (ref 12–16)
IMM GRANULOCYTES # BLD AUTO: 0.01 K/UL (ref 0–0.04)
IMM GRANULOCYTES NFR BLD AUTO: 0.3 % (ref 0–0.5)
INR PPP: 1.1 (ref 0.8–1.2)
LYMPHOCYTES # BLD AUTO: 0.54 K/UL (ref 1–4.8)
MCH RBC QN AUTO: 29.1 PG (ref 27–31)
MCHC RBC AUTO-ENTMCNC: 32.9 G/DL (ref 32–36)
MCV RBC AUTO: 89 FL (ref 82–98)
NUCLEATED RBC (/100WBC) (OHS): 0 /100 WBC
PLATELET # BLD AUTO: 57 K/UL (ref 150–450)
PMV BLD AUTO: 12.1 FL (ref 9.2–12.9)
PROTHROMBIN TIME: 12 SECONDS (ref 9–12.5)
RBC # BLD AUTO: 4.26 M/UL (ref 4–5.4)
RELATIVE EOSINOPHIL (OHS): 1.3 %
RELATIVE LYMPHOCYTE (OHS): 17.2 % (ref 18–48)
RELATIVE MONOCYTE (OHS): 7 % (ref 4–15)
RELATIVE NEUTROPHIL (OHS): 73.9 % (ref 38–73)
WBC # BLD AUTO: 3.14 K/UL (ref 3.9–12.7)

## 2025-05-12 PROCEDURE — 36415 COLL VENOUS BLD VENIPUNCTURE: CPT

## 2025-05-12 PROCEDURE — 85025 COMPLETE CBC W/AUTO DIFF WBC: CPT

## 2025-05-12 PROCEDURE — 85610 PROTHROMBIN TIME: CPT

## 2025-05-14 ENCOUNTER — ANESTHESIA EVENT (OUTPATIENT)
Dept: ENDOSCOPY | Facility: HOSPITAL | Age: 55
End: 2025-05-14
Payer: COMMERCIAL

## 2025-05-14 ENCOUNTER — HOSPITAL ENCOUNTER (OUTPATIENT)
Facility: HOSPITAL | Age: 55
Discharge: HOME | End: 2025-05-14
Attending: SURGERY | Admitting: STUDENT IN AN ORGANIZED HEALTH CARE EDUCATION/TRAINING PROGRAM
Payer: COMMERCIAL

## 2025-05-14 ENCOUNTER — ANESTHESIA (OUTPATIENT)
Dept: ENDOSCOPY | Facility: HOSPITAL | Age: 55
End: 2025-05-14
Payer: COMMERCIAL

## 2025-05-14 VITALS
TEMPERATURE: 98 F | HEART RATE: 79 BPM | OXYGEN SATURATION: 100 % | DIASTOLIC BLOOD PRESSURE: 66 MMHG | SYSTOLIC BLOOD PRESSURE: 135 MMHG | WEIGHT: 129.88 LBS | HEIGHT: 68 IN | RESPIRATION RATE: 20 BRPM | BODY MASS INDEX: 19.68 KG/M2

## 2025-05-14 DIAGNOSIS — K52.9 COLITIS: Primary | ICD-10-CM

## 2025-05-14 DIAGNOSIS — R93.3 ABNORMAL FINDING ON GI TRACT IMAGING: ICD-10-CM

## 2025-05-14 DIAGNOSIS — Z12.11 ENCOUNTER FOR SCREENING COLONOSCOPY: ICD-10-CM

## 2025-05-14 PROCEDURE — 88305 TISSUE EXAM BY PATHOLOGIST: CPT | Mod: TC,91 | Performed by: STUDENT IN AN ORGANIZED HEALTH CARE EDUCATION/TRAINING PROGRAM

## 2025-05-14 PROCEDURE — 37000008 HC ANESTHESIA 1ST 15 MINUTES: Performed by: STUDENT IN AN ORGANIZED HEALTH CARE EDUCATION/TRAINING PROGRAM

## 2025-05-14 PROCEDURE — 45380 COLONOSCOPY AND BIOPSY: CPT | Mod: ,,, | Performed by: STUDENT IN AN ORGANIZED HEALTH CARE EDUCATION/TRAINING PROGRAM

## 2025-05-14 PROCEDURE — 63600175 PHARM REV CODE 636 W HCPCS: Performed by: NURSE ANESTHETIST, CERTIFIED REGISTERED

## 2025-05-14 PROCEDURE — 27201012 HC FORCEPS, HOT/COLD, DISP: Performed by: STUDENT IN AN ORGANIZED HEALTH CARE EDUCATION/TRAINING PROGRAM

## 2025-05-14 PROCEDURE — 45380 COLONOSCOPY AND BIOPSY: CPT | Performed by: STUDENT IN AN ORGANIZED HEALTH CARE EDUCATION/TRAINING PROGRAM

## 2025-05-14 PROCEDURE — 27200997: Performed by: STUDENT IN AN ORGANIZED HEALTH CARE EDUCATION/TRAINING PROGRAM

## 2025-05-14 PROCEDURE — 37000009 HC ANESTHESIA EA ADD 15 MINS: Performed by: STUDENT IN AN ORGANIZED HEALTH CARE EDUCATION/TRAINING PROGRAM

## 2025-05-14 RX ORDER — LIDOCAINE HYDROCHLORIDE 20 MG/ML
INJECTION, SOLUTION EPIDURAL; INFILTRATION; INTRACAUDAL; PERINEURAL
Status: DISCONTINUED | OUTPATIENT
Start: 2025-05-14 | End: 2025-05-14

## 2025-05-14 RX ORDER — PROCHLORPERAZINE EDISYLATE 5 MG/ML
5 INJECTION INTRAMUSCULAR; INTRAVENOUS EVERY 30 MIN PRN
Status: DISCONTINUED | OUTPATIENT
Start: 2025-05-14 | End: 2025-05-14 | Stop reason: HOSPADM

## 2025-05-14 RX ORDER — SODIUM CHLORIDE 9 MG/ML
INJECTION, SOLUTION INTRAVENOUS CONTINUOUS
Status: DISCONTINUED | OUTPATIENT
Start: 2025-05-14 | End: 2025-05-14 | Stop reason: HOSPADM

## 2025-05-14 RX ORDER — ONDANSETRON HYDROCHLORIDE 2 MG/ML
4 INJECTION, SOLUTION INTRAVENOUS DAILY PRN
Status: DISCONTINUED | OUTPATIENT
Start: 2025-05-14 | End: 2025-05-14 | Stop reason: HOSPADM

## 2025-05-14 RX ORDER — GLUCAGON 1 MG
1 KIT INJECTION
Status: DISCONTINUED | OUTPATIENT
Start: 2025-05-14 | End: 2025-05-14 | Stop reason: HOSPADM

## 2025-05-14 RX ORDER — SUCCINYLCHOLINE CHLORIDE 20 MG/ML
INJECTION INTRAMUSCULAR; INTRAVENOUS
Status: DISCONTINUED | OUTPATIENT
Start: 2025-05-14 | End: 2025-05-14

## 2025-05-14 RX ORDER — PROPOFOL 10 MG/ML
VIAL (ML) INTRAVENOUS
Status: DISCONTINUED | OUTPATIENT
Start: 2025-05-14 | End: 2025-05-14

## 2025-05-14 RX ORDER — FENTANYL CITRATE 50 UG/ML
INJECTION, SOLUTION INTRAMUSCULAR; INTRAVENOUS
Status: DISCONTINUED | OUTPATIENT
Start: 2025-05-14 | End: 2025-05-14

## 2025-05-14 RX ORDER — ONDANSETRON HYDROCHLORIDE 2 MG/ML
INJECTION, SOLUTION INTRAVENOUS
Status: DISCONTINUED | OUTPATIENT
Start: 2025-05-14 | End: 2025-05-14

## 2025-05-14 RX ADMIN — FENTANYL CITRATE 50 MCG: 50 INJECTION, SOLUTION INTRAMUSCULAR; INTRAVENOUS at 02:05

## 2025-05-14 RX ADMIN — FENTANYL CITRATE 50 MCG: 50 INJECTION, SOLUTION INTRAMUSCULAR; INTRAVENOUS at 03:05

## 2025-05-14 RX ADMIN — LIDOCAINE HYDROCHLORIDE 50 MG: 20 INJECTION, SOLUTION EPIDURAL; INFILTRATION; INTRACAUDAL; PERINEURAL at 02:05

## 2025-05-14 RX ADMIN — ONDANSETRON 4 MG: 2 INJECTION INTRAMUSCULAR; INTRAVENOUS at 02:05

## 2025-05-14 RX ADMIN — PROPOFOL 120 MG: 10 INJECTION, EMULSION INTRAVENOUS at 02:05

## 2025-05-14 RX ADMIN — SUCCINYLCHOLINE CHLORIDE 100 MG: 20 INJECTION, SOLUTION INTRAMUSCULAR; INTRAVENOUS at 02:05

## 2025-05-14 RX ADMIN — PROPOFOL 40 MG: 10 INJECTION, EMULSION INTRAVENOUS at 03:05

## 2025-05-14 NOTE — TRANSFER OF CARE
"Anesthesia Transfer of Care Note    Patient: Tee Aranda    Procedure(s) Performed: Procedure(s) (LRB):  COLONOSCOPY (N/A)    Patient location: [facility]    Anesthesia Type: general    Transport from OR: Transported from OR on 2-3 L/min O2 by NC with adequate spontaneous ventilation    Post pain: adequate analgesia    Post assessment: no apparent anesthetic complications    Post vital signs: stable    Level of consciousness: sedated    Nausea/Vomiting: no nausea/vomiting    Complications: none    Transfer of care protocol was followed      Last vitals: Visit Vitals  BP (!) 143/92   Pulse 89   Temp 36.9 °C (98.4 °F) (Tympanic)   Resp 16   Ht 5' 8" (1.727 m)   Wt 58.9 kg (129 lb 13.6 oz)   SpO2 99%   BMI 19.74 kg/m²     "

## 2025-05-14 NOTE — ANESTHESIA PROCEDURE NOTES
Intubation    Date/Time: 5/14/2025 2:37 PM    Performed by: Aris Lindsey CRNA  Authorized by: Andrew Zee MD    Intubation:     Induction:  Intravenous    Intubated:  Postinduction    Mask Ventilation:  Not attempted    Attempts:  1    Attempted By:  CRNA    Method of Intubation:  Video laryngoscopy    Blade:  Coronel 3    Laryngeal View Grade: Grade I - full view of cords      Difficult Airway Encountered?: No      Complications:  None    Airway Device:  Oral endotracheal tube    Airway Device Size:  7.0    Style/Cuff Inflation:  Cuffed (inflated to minimal occlusive pressure)    Tube secured:  21    Secured at:  The teeth    Placement Verified By:  Capnometry    Complicating Factors:  None    Findings Post-Intubation:  BS equal bilateral and atraumatic/condition of teeth unchanged

## 2025-05-14 NOTE — H&P
[facility]  Colon and Rectal Surgery    [address]  [place], [place]  Tel: [phone]  Fax: [phone]  [email or web address]   MD Gerard Smith MD Brian Kann, MD W. Forrest Johnston, MD Matthew Zelhart, MD Jennifer Paruch, MD William Kethman, MD Danielle Kay, MD Steven Schuetz, MD     Patient name: Tee Aranda   YOB: 1970   MRN: [id number]  Date of procedure: 05/14/2025    Procedure: Colonoscopy  Indications: Cirrhosis - reportedly from CARTAGENA/Tylenol use, CT performed due to 2 weeks of abdominal pain    CT AP  maging findings suggestive for an enterocolitis with significant thickening of the walls of nearly the entirety of the right colon, which has progressively worsened over prior examinations. While findings could reflect a chronic inflammatory/infectious process, portal colopathy would be considered in the differential.. The possibility of an underlying lesion of the right colon is not entirely excluded. Consider correlation with colonoscopy.     Last colonoscopy: 5/27/2024 (Complete)    The patient was informed of the availability of a certified  without charge. A certified  was not necessary for this visit.    Sedation plan: MAC  ASA: ASA 2 - Patient with mild systemic disease with no functional limitations    Review of Systems  See above    Past Medical History:   Diagnosis Date    Anemia, unspecified     Anxiety     Arthritis     Ascites 11/23/2020    AVN (avascular necrosis of bone)     Cataract     COVID-19 vaccine administered     04/08/21, 04/30/21    Diarrhea of presumed infectious origin 7/31/2023    Edema 11/23/2020    Epigastric pain 7/31/2023    Esophageal varices     Glaucoma     Hepatic encephalopathy 11/23/2020    History of colon polyps     Hx of cirrhosis     non-alcoholic    Iron deficiency anemia secondary to blood loss (chronic)     Kidney stones     Portal hypertensive  gastropathy     Unintentional weight loss 10/14/2023    Unspecified cirrhosis of liver      Past Surgical History:   Procedure Laterality Date    APPENDECTOMY      COLONOSCOPY N/A 8/3/2023    Procedure: COLONOSCOPY;  Surgeon: Gerard Salinas MD;  Location: [facility];  Service: Endoscopy;  Laterality: N/A;    COLONOSCOPY N/A 5/27/2024    Procedure: COLONOSCOPY;  Surgeon: Eric Garcia MD;  Location: [facility] ENDO (4TH FLR);  Service: Endoscopy;  Laterality: N/A;  Dr. Douglass, egd/colon, weight loss, variceal surveillance and abdominal pain, standard prep, cirrhosis labs ordered    COLONOSCOPY W/ POLYPECTOMY      CYSTOSCOPY N/A 3/20/2024    Procedure: CYSTOSCOPY;  Surgeon: Chris Carey MD;  Location: [facility] OR 1ST FLR;  Service: Urology;  Laterality: N/A;    CYSTOSCOPY  9/4/2024    Procedure: CYSTOSCOPY;  Surgeon: Chris Carey MD;  [facility];  Service: Urology;;    CYSTOSCOPY W/ RETROGRADES Right 9/13/2024    Procedure: CYSTOSCOPY, WITH RETROGRADE PYELOGRAM;  Surgeon: Chris Carey MD;  Location: [facility] OR 1ST FLR;  Service: Urology;  Laterality: Right;    CYSTOSCOPY W/ URETERAL STENT REMOVAL Right 3/14/2024    Procedure: CYSTOSCOPY, WITH URETERAL STENT REMOVAL;  Surgeon: Chris Carey MD;  Location: [facility] OR 1ST FLR;  Service: Urology;  Laterality: Right;    CYSTOSCOPY WITH CALCULUS EXTRACTION Right 9/13/2024    Procedure: CYSTOSCOPY, WITH CALCULUS REMOVAL;  Surgeon: Chris Carey MD;  Location: [facility] OR 1ST FLR;  Service: Urology;  Laterality: Right;  urtereoscopy    DISTAL CLAVICLE EXCISION Right 11/18/2021    Procedure: RIGHT SHOULDER ARTHROSCOPY, EXCISION, CLAVICLE, DISTAL; EXTENSIVE DEBRIDEMENT;  Surgeon: Isaiah Joyner MD;  Location: [facility];  Service: Orthopedics;  Laterality: Right;    ESOPHAGEAL VARICE LIGATION      ESOPHAGOGASTRODUODENOSCOPY N/A 11/10/2020    Procedure: EGD (ESOPHAGOGASTRODUODENOSCOPY);  Surgeon: Gerard Salinas MD;  Location: [facility];  Service: Endoscopy;   Laterality: N/A;    ESOPHAGOGASTRODUODENOSCOPY N/A 12/28/2020    Procedure: EGD (ESOPHAGOGASTRODUODENOSCOPY);  Surgeon: Adryan Park MD;  Location: [facility] (2ND FLR);  Service: Endoscopy;  Laterality: N/A;    ESOPHAGOGASTRODUODENOSCOPY N/A 02/15/2021    Procedure: EGD (ESOPHAGOGASTRODUODENOSCOPY);  Surgeon: Hari Greene MD;  Location: [facility] (4TH FLR);  Service: Endoscopy;  Laterality: N/A;  6 week follow-up variceal banding  Hx varices - Labs - ERW  prep ins. emialed - COVID screening on 2/12/21 [facility] - ERW    ESOPHAGOGASTRODUODENOSCOPY Left 08/03/2021    Procedure: EGD (ESOPHAGOGASTRODUODENOSCOPY);  Surgeon: Fabricio Corado MD;  Location: [facility] (4TH FLR);  Service: Gastroenterology;  Laterality: Left;  recent hematemasis. varices-labs on 7/26    COVID test at [facility] on 7/31-GT  requesting sooner    ESOPHAGOGASTRODUODENOSCOPY N/A 07/27/2022    Procedure: EGD (ESOPHAGOGASTRODUODENOSCOPY);  Surgeon: Eric Garcia MD;  [facility]: [facility];  Service: Endoscopy;  Laterality: N/A;    ESOPHAGOGASTRODUODENOSCOPY Left 08/29/2022    Procedure: EGD (ESOPHAGOGASTRODUODENOSCOPY);  Surgeon: Kcay Douglass MD;  Location: [facility] (2ND FLR);  Service: Endoscopy;  Laterality: Left;  Fully vaccinated/ clear liquids up to 4 hrs prior-RB  varices labs ordered-RB    ESOPHAGOGASTRODUODENOSCOPY N/A 10/05/2022    Procedure: EGD (ESOPHAGOGASTRODUODENOSCOPY);  Surgeon: Gerard Salinas MD;  Location: [facility];  Service: Endoscopy;  Laterality: N/A;    ESOPHAGOGASTRODUODENOSCOPY N/A 3/30/2023    Procedure: EGD (ESOPHAGOGASTRODUODENOSCOPY);  Surgeon: Gerard Salinas MD;  Location: [facility];  Service: Endoscopy;  Laterality: N/A;    ESOPHAGOGASTRODUODENOSCOPY N/A 8/3/2023    Procedure: EGD (ESOPHAGOGASTRODUODENOSCOPY);  Surgeon: Gerard Salinas MD;  Location: [facility];  Service: Endoscopy;  Laterality: N/A;    ESOPHAGOGASTRODUODENOSCOPY N/A 5/27/2024    Procedure: EGD (ESOPHAGOGASTRODUODENOSCOPY);   Surgeon: Eric Garcia MD;  Location: [facility] ENDO (4TH FLR);  Service: Endoscopy;  Laterality: N/A;    EXTRACTION - STONE Right 3/20/2024    Procedure: EXTRACTION - STONE;  Surgeon: Chris Carey MD;  Location: [facility] OR 1ST FLR;  Service: Urology;  Laterality: Right;    HYSTERECTOMY      INJECTION, SACROILIAC JOINT Right 3/21/2025    Procedure: INJECTION,SACROILIAC JOINT (ORAL);  Surgeon: Mainor Thomason MD;  Location: [facility] OR;  Service: Pain Management;  Laterality: Right;    KNEE SURGERY Bilateral     LASER LITHOTRIPSY Right 3/20/2024    Procedure: LITHOTRIPSY, USING LASER;  Surgeon: Chris Carey MD;  Location: [facility] OR 1ST FLR;  Service: Urology;  Laterality: Right;    LASER LITHOTRIPSY Right 9/13/2024    Procedure: LITHOTRIPSY, USING LASER;  Surgeon: Chris Carey MD;  Location: [facility] OR 1ST FLR;  Service: Urology;  Laterality: Right;    LITHOTRIPSY      REMOVAL-STENT Right 3/20/2024    Procedure: REMOVAL-STENT;  Surgeon: Chris Carey MD;  Location: [facility] OR 1ST FLR;  Service: Urology;  Laterality: Right;    REMOVAL-STENT Right 9/13/2024    Procedure: REMOVAL-STENT;  Surgeon: Chris Carey MD;  Location: [facility] OR 1ST FLR;  Service: Urology;  Laterality: Right;    RETROGRADE PYELOGRAPHY Right 3/20/2024    Procedure: PYELOGRAM, RETROGRADE;  Surgeon: Chris Carey MD;  Location: [facility] OR 1ST FLR;  Service: Urology;  Laterality: Right;    RHINOPLASTY TIP      SHOULDER SURGERY Right     TONSILLECTOMY      TOTAL HIP ARTHROPLASTY Right     URETERAL STENT PLACEMENT Right 3/20/2024    Procedure: INSERTION, STENT, URETER;  Surgeon: Chris Carey MD;  Location: [facility] OR 1ST FLR;  Service: Urology;  Laterality: Right;    URETERAL STENT PLACEMENT Right 9/4/2024    Procedure: INSERTION, STENT, URETER;  Surgeon: Chris Carey MD;  Location: [facility] OR 1ST FLR;  Service: Urology;  Laterality: Right;    URETEROSCOPY Right 3/20/2024    Procedure: URETEROSCOPY;  Surgeon: Chris Carey MD;  Location: [facility] OR 1ST FLR;   Service: Urology;  Laterality: Right;    URETEROSCOPY Right 9/13/2024    Procedure: URETEROSCOPY;  Surgeon: Chris Carey MD;  Location: [facility] OR [address] [place] [address];  Service: Urology;  Laterality: Right;     Family History   Problem Relation Name Age of Onset    No Known Problems Mother      Diabetes Mellitus Maternal Grandmother      Amblyopia Neg Hx      Blindness Neg Hx      Cataracts Neg Hx      Glaucoma Neg Hx      Macular degeneration Neg Hx      Retinal detachment Neg Hx      Strabismus Neg Hx      Colon cancer Neg Hx      Esophageal cancer Neg Hx       Social History[1]  Review of patient's allergies indicates:   Allergen Reactions    Sulfa (sulfonamide antibiotics) Hives       Prior to Admission medications    Medication Sig Start Date End Date Taking? Authorizing Provider   carvediloL (COREG) 3.125 MG tablet Take 1 tablet (3.125 mg total) by mouth 2 (two) times daily. 7/31/24  Yes Mehreen Wang MD   esomeprazole (NEXIUM) 40 MG capsule TAKE 1 CAPSULE BY MOUTH 2 TIMES DAILY BEFORE MEALS. 7/26/24  Yes Mehreen Wang MD   furosemide (LASIX) 40 MG tablet Take 1 tablet (40 mg total) by mouth twice a week. 3/17/25  Yes Bonnie Bowers MD   multivitamin (THERAGRAN) per tablet Take 1 tablet by mouth once daily.   Yes Provider, Historical   omega-3 fatty acids/fish oil (FISH OIL-OMEGA-3 FATTY ACIDS) 300-1,000 mg capsule Take 1 capsule by mouth once daily.   Yes Provider, Historical   rifAXIMin (XIFAXAN) 550 mg Tab Take 1 tablet (550 mg total) by mouth 2 (two) times daily. 12/12/24  Yes Bonnie Bowers MD   spironolactone (ALDACTONE) 100 MG tablet Take 1 tablet (100 mg total) by mouth twice a week. 3/17/25  Yes Bonnie Bowers MD   UNABLE TO FIND 4 (four) times daily as needed. Medible 20 mg blue raspberry 1/4 to 1/2 up to 4 times daily as needed.   Yes Provider, Historical   diltiazem HCl (DILTIAZEM 2% CREAM) Apply topically 3 (three) times daily. Apply topically to anal area. 4/29/25   Deborah Mcclellan  "HERRERA, NP   potassium citrate (UROCIT-K 10) 10 mEq (1,080 mg) TbSR Take 1 tablet (10 mEq total) by mouth 2 (two) times daily with meals. 25  Chris Carey MD   pregabalin (LYRICA) 100 MG capsule Take 1 capsule (100 mg total) by mouth 2 (two) times daily. 3/6/25 9/4/25  Abbie Avendaño PA-C       Physical Examination  BP (!) 143/92   Pulse 89   Temp 98.4 °F (36.9 °C) (Tympanic)   Resp 16   Ht 5' 8" (1.727 m)   Wt 58.9 kg (129 lb 13.6 oz)   SpO2 99%   BMI 19.74 kg/m²      Constitutional: well developed, no cough, no dyspnea, alert, and no acute distress    Head: Normocephalic, no lesions, without obvious abnormality  Eye: Normal external eye, conjunctiva, and lids, PERRL  Cardiovascular: regular rate and regular rhythm  Respiratory: normal air entry  Gastrointestinal: soft, non-tender, without masses or organomegaly  Neurologic: alert, oriented, normal speech, no focal findings or movement disorder noted  Psychiatric: appropriate, normal mood    Plan of Care    It was a pleasure meeting Ms. Aranda today - we will plan to perform a colonoscopy with monitored anesthesia care. The details of the procedure, the possible need for biopsy or polypectomy and the potential risks including bleeding, perforation, missed polyps were discussed in detail and they consented to undergo the procedure.      Lawson Byrne MD, FACS, FASCRS  Department of Colon & Rectal Surgery  [facility]         [1]   Social History  Tobacco Use    Smoking status: Some Days     Current packs/day: 0.00     Types: Cigarettes     Last attempt to quit: 7/3/2019     Years since quittin.8    Smokeless tobacco: Never   Substance Use Topics    Alcohol use: Not Currently    Drug use: No     "

## 2025-05-14 NOTE — OR NURSING
"During preop assessment pt reported feeling constantly nauseous and that "I threw up right before we came here today."    Notified Dr. Fuentes anesthesia and Dr. Jain.  Per Dr. Fuentes, pt to be sent to 2nd floor for procedure today, Dr. Fuentes to call attending for anesthesia on 2nd floor.  Dr. Jain aware and agreeable.    Notified Elva Charge RN, 2nd floor endoscopy called to coordinate care.    Garland Kaiser    "

## 2025-05-14 NOTE — ANESTHESIA PREPROCEDURE EVALUATION
[facility]  Anesthesia Pre-Operative Evaluation     Patient Name: Tee Aranda  YOB: 1970  MRN: [id number]  [facility]: [id number]       Admit Date: (Not on file)   Admit Team: Networked reference to record PCT      Date of Procedure: 5/14/2025  Anesthesia: Choice Procedure: Procedure(s) (LRB):  COLONOSCOPY (N/A)  Pre-Operative Diagnosis: Abnormal finding on GI tract imaging [R93.3]  Proceduralist:Surgeons and Role:     * Richa Jain MD - Primary  Code Status: Prior   Advanced Directive: <no information>  Isolation Precautions: No active isolations  Capacity: Full capacity     SUBJECTIVE:   Tee Aranda is a 54 y.o. female who  has a past medical history of Anemia, unspecified, Anxiety, Arthritis, Ascites (11/23/2020), AVN (avascular necrosis of bone), Cataract, COVID-19 vaccine administered, Diarrhea of presumed infectious origin (7/31/2023), Edema (11/23/2020), Epigastric pain (7/31/2023), Esophageal varices, Glaucoma, Hepatic encephalopathy (11/23/2020), History of colon polyps, cirrhosis, Iron deficiency anemia secondary to blood loss (chronic), Kidney stones, Portal hypertensive gastropathy, Unintentional weight loss (10/14/2023), and Unspecified cirrhosis of liver.  No notes on file    Hospital LOS: 0 days  ICU LOS: Patient does not have an ICU stay during this admission.    she has a current medication list which includes the following long-term medication(s): carvedilol, esomeprazole, furosemide, fish oil-omega-3 fatty acids, potassium citrate, pregabalin, and spironolactone.   Current Outpatient Medications   Medication Instructions    carvediloL (COREG) 3.125 mg, Oral, 2 times daily    diltiazem HCl (DILTIAZEM 2% CREAM) Topical (Top), 3 times daily, Apply topically to anal area.    esomeprazole (NEXIUM) 40 MG capsule TAKE 1 CAPSULE BY MOUTH 2 TIMES DAILY BEFORE MEALS.    furosemide (LASIX) 40 mg, Oral, Twice weekly    multivitamin (THERAGRAN) per tablet 1 tablet, Daily     omega-3 fatty acids/fish oil (FISH OIL-OMEGA-3 FATTY ACIDS) 300-1,000 mg capsule 1 capsule, Daily    potassium citrate (UROCIT-K 10) 10 mEq (1,080 mg) TbSR 10 mEq, Oral, 2 times daily with meals    pregabalin (LYRICA) 100 mg, Oral, 2 times daily    spironolactone (ALDACTONE) 100 mg, Oral, Twice weekly    UNABLE TO FIND 4 times daily PRN    XIFAXAN 550 mg, Oral, 2 times daily     ALLERGIES:     Review of patient's allergies indicates:   Allergen Reactions    Sulfa (sulfonamide antibiotics) Hives     LDA:   AIRWAY:         * No LDAs found *      Lines/Drains/Airways       None                  Anesthesia Evaluation      Airway   Mallampati: III  TM distance: Normal  Neck ROM: Normal ROM  Dental    (+) Dentures    Pulmonary    Cardiovascular     Neuro/Psych      GI/Hepatic/Renal    (+) hepatitis, liver disease, chronic renal disease    Endo/Other    Abdominal                    MEDICATIONS:     Current Outpatient Medications on File Prior to Encounter   Medication Sig Dispense Refill Last Dose/Taking    carvediloL (COREG) 3.125 MG tablet Take 1 tablet (3.125 mg total) by mouth 2 (two) times daily. 180 tablet 3     diltiazem HCl (DILTIAZEM 2% CREAM) Apply topically 3 (three) times daily. Apply topically to anal area. 30 g 2     esomeprazole (NEXIUM) 40 MG capsule TAKE 1 CAPSULE BY MOUTH 2 TIMES DAILY BEFORE MEALS. 180 capsule 3     furosemide (LASIX) 40 MG tablet Take 1 tablet (40 mg total) by mouth twice a week. 8 tablet 11     multivitamin (THERAGRAN) per tablet Take 1 tablet by mouth once daily.       omega-3 fatty acids/fish oil (FISH OIL-OMEGA-3 FATTY ACIDS) 300-1,000 mg capsule Take 1 capsule by mouth once daily.       potassium citrate (UROCIT-K 10) 10 mEq (1,080 mg) TbSR Take 1 tablet (10 mEq total) by mouth 2 (two) times daily with meals. 180 tablet 3     pregabalin (LYRICA) 100 MG capsule Take 1 capsule (100 mg total) by mouth 2 (two) times daily. 60 capsule 5     rifAXIMin (XIFAXAN) 550 mg Tab Take 1  tablet (550 mg total) by mouth 2 (two) times daily. 60 tablet 11     spironolactone (ALDACTONE) 100 MG tablet Take 1 tablet (100 mg total) by mouth twice a week. 8 tablet 11     UNABLE TO FIND 4 (four) times daily as needed. Medible 20 mg blue raspberry 1/4 to 1/2 up to 4 times daily as needed.         Inpatient Medications:  Antibiotics (From admission, onward)      None          VTE Risk Mitigation (From admission, onward)      None              Current Medications[1]       History:   There are no hospital problems to display for this patient.    Surgical History:    has a past surgical history that includes Total hip arthroplasty (Right); Tonsillectomy; Knee surgery (Bilateral); Rhinoplasty tip; Lithotripsy; Hysterectomy; Esophagogastroduodenoscopy (N/A, 11/10/2020); Esophagogastroduodenoscopy (N/A, 12/28/2020); Esophagogastroduodenoscopy (N/A, 02/15/2021); Esophagogastroduodenoscopy (Left, 08/03/2021); Distal clavicle excision (Right, 11/18/2021); Esophagogastroduodenoscopy (N/A, 07/27/2022); Esophagogastroduodenoscopy (Left, 08/29/2022); Appendectomy; Esophagogastroduodenoscopy (N/A, 10/05/2022); Esophageal varice ligation; Colonoscopy w/ polypectomy; Shoulder surgery (Right); Esophagogastroduodenoscopy (N/A, 3/30/2023); Esophagogastroduodenoscopy (N/A, 8/3/2023); Colonoscopy (N/A, 8/3/2023); Cystoscopy w/ ureteral stent removal (Right, 3/14/2024); Cystoscopy (N/A, 3/20/2024); Ureteroscopy (Right, 3/20/2024); Laser lithotripsy (Right, 3/20/2024); extraction - stone (Right, 3/20/2024); removal-stent (Right, 3/20/2024); Ureteral stent placement (Right, 3/20/2024); Retrograde pyelography (Right, 3/20/2024); Esophagogastroduodenoscopy (N/A, 5/27/2024); Colonoscopy (N/A, 5/27/2024); Ureteral stent placement (Right, 9/4/2024); Cystoscopy (9/4/2024); Cystoscopy w/ retrogrades (Right, 9/13/2024); Cystoscopy with calculus extraction (Right, 9/13/2024); Laser lithotripsy (Right, 9/13/2024); Ureteroscopy (Right,  9/13/2024); removal-stent (Right, 9/13/2024); and injection, sacroiliac joint (Right, 3/21/2025).   Social History:    has no history on file for sexual activity.  reports that she has been smoking cigarettes. She has never used smokeless tobacco. She reports that she does not currently use alcohol. She reports that she does not use drugs.    There were no vitals filed for this visit.  Vital Signs Range (Last 24H):       There is no height or weight on file to calculate BMI.  Wt Readings from Last 4 Encounters:   05/08/25 59 kg (130 lb)   04/29/25 61.8 kg (136 lb 3.9 oz)   03/13/25 61.7 kg (136 lb)   03/10/25 62.6 kg (138 lb)        Intake/Output - Last 3 Shifts       None          Lab Results   Component Value Date    WBC 3.14 (L) 05/12/2025    HGB 12.4 05/12/2025    HCT 37.7 05/12/2025    PLT 57 (L) 05/12/2025     04/30/2025    K 3.9 04/30/2025     04/30/2025    CREATININE 0.7 04/30/2025    BUN 10 04/30/2025    CO2 23 04/30/2025     04/30/2025    CALCIUM 9.3 04/30/2025    MG 1.5 (L) 04/01/2024    PHOS 3.7 03/26/2024    ALKPHOS 91 04/30/2025    ALT 22 04/30/2025    AST 21 04/30/2025    ALBUMIN 4.3 04/30/2025    INR 1.1 05/12/2025    PROTIME 12.0 05/12/2025    APTT 28.5 04/23/2024    HGBA1C 5.5 07/26/2022    LACTATE 1.3 09/12/2024    CPK 49 08/30/2024    CPKMB 1.4 07/26/2022    TROPONINI <0.006 08/30/2024    MB 1.6 07/26/2022    BNP 86 03/24/2024    NTPROBNP 52.0 11/17/2017     No results found for this or any previous visit (from the past 12 hours).  Recent Labs   Lab 05/12/25  0913   WBC 3.14*   HGB 12.4   HCT 37.7   PLT 57*   INR 1.1     No LMP recorded. Patient has had a hysterectomy.    EKG:   Results for orders placed or performed during the hospital encounter of 08/30/24   EKG 12-lead    Collection Time: 08/30/24  5:27 PM   Result Value Ref Range    QRS Duration 72 ms    [facility] QTC Calculation 402 ms    Narrative    Test Reason : U07.1    Vent. Rate : 072 BPM     Atrial Rate : 072 BPM     P-R  Int : 138 ms          QRS Dur : 072 ms      QT Int : 368 ms       P-R-T Axes : 070 087 074 degrees     QTc Int : 402 ms    Normal sinus rhythm  Normal ECG  When compared with ECG of 01-APR-2024 14:56,  T wave amplitude has decreased in Anterior leads  Confirmed by Juan RANGEL, Ji PANDYA ([id number]) on 9/3/2024 7:43:01 AM    Referred By: [email or web address]   SELF           Confirmed By:Ji Martinez MD     TTE:  No results found for this or any previous visit.    LIUDMILA:  No results found for this or any previous visit.    Stress Test:  No results found for this or any previous visit.    No results found for this or any previous visit.    LHC:  No results found for this or any previous visit.    Cardiac Device Check   No results found for this or any previous visit.    No results found for this or any previous visit.      Pre-op Assessment          Review of Systems  Anesthesia Hx:              Personal Hx of Anesthesia complications (Aspiration)                    Renal/:  Chronic Renal Disease        Kidney Function/Disease             Hepatic/GI:      Liver Disease, Hepatitis           Liver Disease, Hepatitis            Physical Exam  General: Well nourished    Airway:  Mallampati: III / II  Mouth Opening: Normal  TM Distance: Normal  Tongue: Normal  Neck ROM: Normal ROM    Dental:  Dentures  Patient states dentures do not come out      Anesthesia Plan  Type of Anesthesia, risks & benefits discussed:    Anesthesia Type: Gen ETT, Gen Natural Airway, MAC  Intra-op Monitoring Plan: Standard ASA Monitors  Post Op Pain Control Plan: multimodal analgesia and IV/PO Opioids PRN  Induction:  IV and rapid sequence  Airway Plan: Direct and Video, Post-Induction  Informed Consent: Informed consent signed with the Patient and all parties understand the risks and agree with anesthesia plan.  All questions answered.   ASA Score: 3  Day of Surgery Review of History & Physical: H&P Update referred to the surgeon/provider.  Anesthesia Plan Notes:  Chart reviewed, patient interviewed and examined.  The anesthetic plan was explained.  Risks, benefits, and alternatives were discussed. Questions were answered and the consent was signed.          Ready For Surgery From Anesthesia Perspective.     .           [1]   No current facility-administered medications for this encounter.     Current Outpatient Medications   Medication Sig Dispense Refill    carvediloL (COREG) 3.125 MG tablet Take 1 tablet (3.125 mg total) by mouth 2 (two) times daily. 180 tablet 3    diltiazem HCl (DILTIAZEM 2% CREAM) Apply topically 3 (three) times daily. Apply topically to anal area. 30 g 2    esomeprazole (NEXIUM) 40 MG capsule TAKE 1 CAPSULE BY MOUTH 2 TIMES DAILY BEFORE MEALS. 180 capsule 3    furosemide (LASIX) 40 MG tablet Take 1 tablet (40 mg total) by mouth twice a week. 8 tablet 11    multivitamin (THERAGRAN) per tablet Take 1 tablet by mouth once daily.      omega-3 fatty acids/fish oil (FISH OIL-OMEGA-3 FATTY ACIDS) 300-1,000 mg capsule Take 1 capsule by mouth once daily.      potassium citrate (UROCIT-K 10) 10 mEq (1,080 mg) TbSR Take 1 tablet (10 mEq total) by mouth 2 (two) times daily with meals. 180 tablet 3    pregabalin (LYRICA) 100 MG capsule Take 1 capsule (100 mg total) by mouth 2 (two) times daily. 60 capsule 5    rifAXIMin (XIFAXAN) 550 mg Tab Take 1 tablet (550 mg total) by mouth 2 (two) times daily. 60 tablet 11    spironolactone (ALDACTONE) 100 MG tablet Take 1 tablet (100 mg total) by mouth twice a week. 8 tablet 11    UNABLE TO FIND 4 (four) times daily as needed. Medible 20 mg blue raspberry 1/4 to 1/2 up to 4 times daily as needed.       Facility-Administered Medications Ordered in Other Encounters   Medication Dose Route Frequency Provider Last Rate Last Admin    0.9%  NaCl infusion   Intravenous Continuous Gerard Salinas MD   Stopped at 03/30/23 0976

## 2025-05-14 NOTE — DISCHARGE SUMMARY
Brief Operative Note    Date of surgery: 05/14/2025    Pre-operative Diagnosis:  Screening for colon cancer     Post-operative Diagnosis:   Same as above    Surgeon: Lawson Byrne M.D.    Procedure:  Colonoscopy     Anesthesia: Monitored anesthesia care     Findings:  See operative note    Estimated blood loss: Minimal         Specimens:   See operative note    Discharge Note    Outcome:   Patient tolerated treatment/procedure well without complication and is now ready for discharge.    Disposition:   Home or Self Care    Final diagnosis:    Screening for colon cancer    Follow-up:   With primary care provider    Discharge Procedure Orders   Diet Adult Regular     No dressing needed     Notify your health care provider if you experience any of the following:  temperature >100.4     Notify your health care provider if you experience any of the following:  persistent nausea and vomiting or diarrhea     Notify your health care provider if you experience any of the following:  severe uncontrolled pain     Notify your health care provider if you experience any of the following:  redness, tenderness, or signs of infection (pain, swelling, redness, odor or green/yellow discharge around incision site)     Notify your health care provider if you experience any of the following:  difficulty breathing or increased cough     Notify your health care provider if you experience any of the following:  severe persistent headache     Notify your health care provider if you experience any of the following:  worsening rash     Notify your health care provider if you experience any of the following:  persistent dizziness, light-headedness, or visual disturbances     Notify your health care provider if you experience any of the following:  increased confusion or weakness     Activity as tolerated       [id number]     Lawson Byrne MD

## 2025-05-14 NOTE — PR[employer] PATIENT INSTRUCTIONS
Discharge Summary/Instructions after an Endoscopic Procedure  Patient Name: Tee Aranda  Patient MRN: [id number]  Patient YOB: 1970  Wednesday, May 14, 2025  Lawson Byrne MD  Dear patient,  As a result of recent federal legislation (The Federal Cures Act), you may   receive lab or pathology results from your procedure in your [facility]   account before your physician is able to contact you. Your physician or   their representative will relay the results to you with their   recommendations at their soonest availability.  Thank you,  RESTRICTIONS:  During your procedure today, you received medications for sedation.  These   medications may affect your judgment, balance and coordination.  Therefore,   for 24 hours, you have the following restrictions:   - DO NOT drive a car, operate machinery, make legal/financial decisions,   sign important papers or drink alcohol.    ACTIVITY:  Today: no heavy lifting, straining or running due to procedural   sedation/anesthesia.  The following day: return to full activity including work.  DIET:  Eat and drink normally unless instructed otherwise.     TREATMENT FOR COMMON SIDE EFFECTS:  - Mild abdominal pain, nausea, belching, bloating or excessive gas:  rest,   eat lightly and use a heating pad.  - Sore Throat: treat with throat lozenges and/or gargle with warm salt   water.  - Because air was used during the procedure, expelling large amounts of air   from your rectum or belching is normal.  - If a bowel prep was taken, you may not have a bowel movement for 1-3 days.    This is normal.  SYMPTOMS TO WATCH FOR AND REPORT TO YOUR PHYSICIAN:  1. Abdominal pain or bloating, other than gas cramps.  2. Chest pain.  3. Back pain.  4. Signs of infection such as: chills or fever occurring within 24 hours   after the procedure.  5. Rectal bleeding, which would show as bright red, maroon, or black stools.   (A tablespoon of blood from the rectum is not serious, especially  if   hemorrhoids are present.)  6. Vomiting.  7. Weakness or dizziness.  GO DIRECTLY TO THE NEAREST EMERGENCY ROOM IF YOU HAVE ANY OF THE FOLLOWING:      Difficulty breathing              Chills and/or fever over 101 F   Persistent vomiting and/or vomiting blood   Severe abdominal pain   Severe chest pain   Black, tarry stools   Bleeding- more than one tablespoon   Any other symptom or condition that you feel may need urgent attention  Your doctor recommends these additional instructions:  If any biopsies were taken, your doctors clinic will contact you in 1 to 2   weeks with any results.  - Discharge patient to home.   - Resume previous diet.   - Continue present medications.   - Await pathology results.   - Repeat colonoscopy for surveillance based on pathology results.   - Return to referring physician as previously scheduled.  For questions, problems or results please call your physician - Lawson Byrne MD at Work:  [phone].  [facility] EMERGENCY ROOM PHONE NUMBER: [phone]  IF A COMPLICATION OR EMERGENCY SITUATION ARISES AND YOU ARE UNABLE TO REACH   YOUR PHYSICIAN - GO DIRECTLY TO THE EMERGENCY ROOM.  MD Lawson Ortega MD  5/14/2025 3:17:57 PM  This report has been verified and signed electronically.  Dear patient,  As a result of recent federal legislation (The Federal Cures Act), you may   receive lab or pathology results from your procedure in your [facility]   account before your physician is able to contact you. Your physician or   their representative will relay the results to you with their   recommendations at their soonest availability.  Thank you,  [employer]

## 2025-05-15 NOTE — ANESTHESIA POSTPROCEDURE EVALUATION
Anesthesia Post Evaluation    Patient: Tee Aranda    Procedure(s) Performed: Procedure(s) (LRB):  COLONOSCOPY (N/A)    Final Anesthesia Type: general      Patient location during evaluation: [facility]  Patient participation: Yes- Able to Participate  Level of consciousness: awake and alert  Post-procedure vital signs: reviewed and stable  Pain management: adequate  Airway patency: patent  ASTON mitigation strategies: Multimodal analgesia and Extubation while patient is awake  PONV status at discharge: No PONV  Anesthetic complications: no      Cardiovascular status: stable  Respiratory status: unassisted and spontaneous ventilation  Hydration status: euvolemic  Follow-up not needed.              Vitals Value Taken Time   /66 05/14/25 15:45   Temp 36.7 °C (98 °F) 05/14/25 15:45   Pulse 79 05/14/25 15:45   Resp 20 05/14/25 15:45   SpO2 100 % 05/14/25 15:45         No case tracking events are documented in the log.      Pain/Dio Score: Dio Score: 10 (5/14/2025  3:30 PM)

## 2025-05-16 ENCOUNTER — HOSPITAL ENCOUNTER (EMERGENCY)
Facility: HOSPITAL | Age: 55
Discharge: HOME | End: 2025-05-16
Attending: EMERGENCY MEDICINE
Payer: COMMERCIAL

## 2025-05-16 ENCOUNTER — PATIENT MESSAGE (OUTPATIENT)
Dept: UROLOGY | Facility: CLINIC | Age: 55
End: 2025-05-16
Payer: COMMERCIAL

## 2025-05-16 VITALS
HEART RATE: 63 BPM | SYSTOLIC BLOOD PRESSURE: 109 MMHG | TEMPERATURE: 99 F | DIASTOLIC BLOOD PRESSURE: 61 MMHG | BODY MASS INDEX: 19.7 KG/M2 | OXYGEN SATURATION: 100 % | HEIGHT: 68 IN | RESPIRATION RATE: 16 BRPM | WEIGHT: 130 LBS

## 2025-05-16 DIAGNOSIS — R10.9 ABDOMINAL PAIN, UNSPECIFIED ABDOMINAL LOCATION: Primary | ICD-10-CM

## 2025-05-16 DIAGNOSIS — R07.9 CHEST PAIN: ICD-10-CM

## 2025-05-16 DIAGNOSIS — D69.6 THROMBOCYTOPENIA: ICD-10-CM

## 2025-05-16 DIAGNOSIS — R93.5 ABNORMAL CT OF THE ABDOMEN: ICD-10-CM

## 2025-05-16 DIAGNOSIS — K74.60 HEPATIC CIRRHOSIS, UNSPECIFIED HEPATIC CIRRHOSIS TYPE, UNSPECIFIED WHETHER ASCITES PRESENT: ICD-10-CM

## 2025-05-16 DIAGNOSIS — N13.30 HYDROURETERONEPHROSIS: ICD-10-CM

## 2025-05-16 DIAGNOSIS — R18.8 OTHER ASCITES: ICD-10-CM

## 2025-05-16 LAB
ABSOLUTE EOSINOPHIL (OHS): 0.04 K/UL
ABSOLUTE MONOCYTE (OHS): 0.31 K/UL (ref 0.3–1)
ABSOLUTE NEUTROPHIL COUNT (OHS): 3.24 K/UL (ref 1.8–7.7)
ALBUMIN SERPL BCP-MCNC: 3.9 G/DL (ref 3.5–5.2)
ALP SERPL-CCNC: 109 UNIT/L (ref 40–150)
ALT SERPL W/O P-5'-P-CCNC: 25 UNIT/L (ref 10–44)
ANION GAP (OHS): 7 MMOL/L (ref 8–16)
AST SERPL-CCNC: 26 UNIT/L (ref 11–45)
BASOPHILS # BLD AUTO: 0.01 K/UL
BASOPHILS NFR BLD AUTO: 0.2 %
BILIRUB SERPL-MCNC: 0.7 MG/DL (ref 0.1–1)
BILIRUB UR QL STRIP.AUTO: NEGATIVE
BUN SERPL-MCNC: 19 MG/DL (ref 6–20)
BUN SERPL-MCNC: 19 MG/DL (ref 6–30)
CALCIUM SERPL-MCNC: 9.1 MG/DL (ref 8.7–10.5)
CHLORIDE SERPL-SCNC: 105 MMOL/L (ref 95–110)
CHLORIDE SERPL-SCNC: 107 MMOL/L (ref 95–110)
CLARITY UR: CLEAR
CO2 SERPL-SCNC: 24 MMOL/L (ref 23–29)
COLOR UR AUTO: YELLOW
CREAT SERPL-MCNC: 0.7 MG/DL (ref 0.5–1.4)
CREAT SERPL-MCNC: 0.8 MG/DL (ref 0.5–1.4)
ERYTHROCYTE [DISTWIDTH] IN BLOOD BY AUTOMATED COUNT: 13.5 % (ref 11.5–14.5)
GFR SERPLBLD CREATININE-BSD FMLA CKD-EPI: >60 ML/MIN/1.73/M2
GLUCOSE SERPL-MCNC: 108 MG/DL (ref 70–110)
GLUCOSE SERPL-MCNC: 110 MG/DL (ref 70–110)
GLUCOSE UR QL STRIP: NEGATIVE
HCT VFR BLD AUTO: 37.8 % (ref 37–48.5)
HCT VFR BLD CALC: 36 %PCV (ref 36–54)
HGB BLD-MCNC: 12.5 GM/DL (ref 12–16)
HGB UR QL STRIP: NEGATIVE
HOLD SPECIMEN: NORMAL
IMM GRANULOCYTES # BLD AUTO: 0.02 K/UL (ref 0–0.04)
IMM GRANULOCYTES NFR BLD AUTO: 0.5 % (ref 0–0.5)
KETONES UR QL STRIP: NEGATIVE
LEUKOCYTE ESTERASE UR QL STRIP: NEGATIVE
LIPASE SERPL-CCNC: 27 U/L (ref 4–60)
LYMPHOCYTES # BLD AUTO: 0.56 K/UL (ref 1–4.8)
MCH RBC QN AUTO: 28.4 PG (ref 27–31)
MCHC RBC AUTO-ENTMCNC: 33.1 G/DL (ref 32–36)
MCV RBC AUTO: 86 FL (ref 82–98)
NITRITE UR QL STRIP: NEGATIVE
NUCLEATED RBC (/100WBC) (OHS): 0 /100 WBC
OHS QRS DURATION: 74 MS
OHS QTC CALCULATION: 416 MS
PH UR STRIP: 6 [PH]
PLATELET # BLD AUTO: 79 K/UL (ref 150–450)
PLATELET BLD QL SMEAR: ABNORMAL
PMV BLD AUTO: 11 FL (ref 9.2–12.9)
POC IONIZED CALCIUM: 1.15 MMOL/L (ref 1.06–1.42)
POC TCO2 (MEASURED): 22 MMOL/L (ref 23–29)
POTASSIUM BLD-SCNC: 3.8 MMOL/L (ref 3.5–5.1)
POTASSIUM SERPL-SCNC: 3.7 MMOL/L (ref 3.5–5.1)
PROT SERPL-MCNC: 6.9 GM/DL (ref 6–8.4)
PROT UR QL STRIP: NEGATIVE
RBC # BLD AUTO: 4.4 M/UL (ref 4–5.4)
RELATIVE EOSINOPHIL (OHS): 1 %
RELATIVE LYMPHOCYTE (OHS): 13.4 % (ref 18–48)
RELATIVE MONOCYTE (OHS): 7.4 % (ref 4–15)
RELATIVE NEUTROPHIL (OHS): 77.5 % (ref 38–73)
SAMPLE: ABNORMAL
SODIUM BLD-SCNC: 141 MMOL/L (ref 136–145)
SODIUM SERPL-SCNC: 138 MMOL/L (ref 136–145)
SP GR UR STRIP: 1.01
TROPONIN I SERPL HS-MCNC: 6 NG/L
UROBILINOGEN UR STRIP-ACNC: NEGATIVE EU/DL
WBC # BLD AUTO: 4.18 K/UL (ref 3.9–12.7)

## 2025-05-16 PROCEDURE — 84484 ASSAY OF TROPONIN QUANT: CPT | Performed by: PHYSICIAN ASSISTANT

## 2025-05-16 PROCEDURE — 63600175 PHARM REV CODE 636 W HCPCS: Performed by: PHYSICIAN ASSISTANT

## 2025-05-16 PROCEDURE — 93005 ELECTROCARDIOGRAM TRACING: CPT

## 2025-05-16 PROCEDURE — 83690 ASSAY OF LIPASE: CPT | Performed by: PHYSICIAN ASSISTANT

## 2025-05-16 PROCEDURE — 93010 ELECTROCARDIOGRAM REPORT: CPT | Mod: ,,, | Performed by: INTERNAL MEDICINE

## 2025-05-16 PROCEDURE — 81003 URINALYSIS AUTO W/O SCOPE: CPT | Performed by: PHYSICIAN ASSISTANT

## 2025-05-16 PROCEDURE — 82330 ASSAY OF CALCIUM: CPT | Mod: 59

## 2025-05-16 PROCEDURE — 96374 THER/PROPH/DIAG INJ IV PUSH: CPT

## 2025-05-16 PROCEDURE — 96376 TX/PRO/DX INJ SAME DRUG ADON: CPT

## 2025-05-16 PROCEDURE — 85025 COMPLETE CBC W/AUTO DIFF WBC: CPT | Performed by: PHYSICIAN ASSISTANT

## 2025-05-16 PROCEDURE — 96375 TX/PRO/DX INJ NEW DRUG ADDON: CPT

## 2025-05-16 PROCEDURE — 99285 EMERGENCY DEPT VISIT HI MDM: CPT | Mod: 25

## 2025-05-16 PROCEDURE — 80053 COMPREHEN METABOLIC PANEL: CPT | Performed by: PHYSICIAN ASSISTANT

## 2025-05-16 PROCEDURE — 80047 BASIC METABLC PNL IONIZED CA: CPT

## 2025-05-16 PROCEDURE — 25500020 PHARM REV CODE 255: Performed by: EMERGENCY MEDICINE

## 2025-05-16 RX ORDER — OXYCODONE HYDROCHLORIDE 5 MG/1
5 TABLET ORAL EVERY 6 HOURS PRN
Qty: 16 TABLET | Refills: 0 | Status: ON HOLD | OUTPATIENT
Start: 2025-05-16 | End: 2025-05-20 | Stop reason: SDUPTHER

## 2025-05-16 RX ORDER — MORPHINE SULFATE 4 MG/ML
4 INJECTION, SOLUTION INTRAMUSCULAR; INTRAVENOUS
Refills: 0 | Status: COMPLETED | OUTPATIENT
Start: 2025-05-16 | End: 2025-05-16

## 2025-05-16 RX ORDER — ONDANSETRON HYDROCHLORIDE 2 MG/ML
4 INJECTION, SOLUTION INTRAVENOUS
Status: COMPLETED | OUTPATIENT
Start: 2025-05-16 | End: 2025-05-16

## 2025-05-16 RX ADMIN — ONDANSETRON 4 MG: 2 INJECTION INTRAMUSCULAR; INTRAVENOUS at 02:05

## 2025-05-16 RX ADMIN — MORPHINE SULFATE 4 MG: 4 INJECTION INTRAVENOUS at 04:05

## 2025-05-16 RX ADMIN — MORPHINE SULFATE 4 MG: 4 INJECTION INTRAVENOUS at 02:05

## 2025-05-16 RX ADMIN — IOHEXOL 75 ML: 350 INJECTION, SOLUTION INTRAVENOUS at 02:05

## 2025-05-16 NOTE — ED NOTES
I-STAT Chem-8+ Results:   Value Reference Range   Sodium 141 136-145 mmol/L   Potassium  3.8 3.5-5.1 mmol/L   Chloride 105  mmol/L   Ionized Calcium 1.15 1.06-1.42 mmol/L   CO2 (measured) 22 23-29 mmol/L   Glucose 110  mg/dL   BUN 19 6-30 mg/dL   Creatinine 0.8 0.5-1.4 mg/dL   Hematocrit 36 36-54%

## 2025-05-16 NOTE — ED TRIAGE NOTES
Pt arrives via POV with reports of10/10 RLQ pain that radiates to R flank, and nausea. Pt reports having a colonoscopy done Wednesday due to an abnormal cat scan. Pt reports sob and denies chest pain, abnormal urination or blood in urine. Pt denies fever, vomiting, or diarrhea

## 2025-05-16 NOTE — DISCHARGE INSTRUCTIONS
Abnormal findings on your CT as below.  You will need to follow up with urologist.  I also recommend follow up with colorectal however your findings relating to your bowels are stable    CT Abdomen Pelvis With IV Contrast NO Oral Contrast   Final Result      Right-sided hydroureteronephrosis with suggestion of inflammatory changes involving the right ureter and a slightly delayed right nephrogram.  A calcification within the right side of the pelvis either represents a phlebolith adjacent to the ureter or a chronic calculus within the right ureter.      Findings consistent with cirrhosis and portal hypertension including splenomegaly and a small amount of ascites.         Electronically signed by: Troy Lopez   Date:    05/16/2025   Time:    15:21      X-Ray Chest AP Portable   Final Result      No acute process.         Electronically signed by: Jules Hess MD   Date:    05/16/2025   Time:    15:02        You were given a short course of oxycodone.  This medication has a addictive and sedating properties.  Caution while taking this medication.  Do not drive or drink alcohol while taking this medication    Strict ED precautions given to return immediately for new, worsening, or concerning symptoms including but not limited to intractable pain, intractable nausea and vomiting, fever, urinary changes

## 2025-05-16 NOTE — ED PROVIDER NOTES
Encounter Date: 5/16/2025       History     Chief Complaint   Patient presents with    Abdominal Pain     States colonoscopy on Wednesday now having worse RLQ. States has been having this pain for 2wks but now its worse. Last BM this morning, states was normal.      54-year-old female with a PMHx of liver cirrhosis 2/2 CARTAGENA, esophageal varices, nephrolithiasis presents to ED with right lower quadrant abdominal pain and right flank pain.  Her symptoms started yesterday afternoon however she reports intermittent right lower quadrant abdominal pain for the past few months.  She has associated nausea without vomiting.  She also notes shortness of breath and upper chest pain. She had a colonoscopy performed 2 days ago for an abnormal CT scan performed outpatient a few weeks back. She denies vomiting, diarrhea, constipation, bloody stool, hematuria    The history is provided by the patient.     Review of patient's allergies indicates:   Allergen Reactions    Sulfa (sulfonamide antibiotics) Hives     Past Medical History:   Diagnosis Date    Anemia, unspecified     Anxiety     Arthritis     Ascites 11/23/2020    AVN (avascular necrosis of bone)     Cataract     COVID-19 vaccine administered     04/08/21, 04/30/21    Diarrhea of presumed infectious origin 7/31/2023    Edema 11/23/2020    Epigastric pain 7/31/2023    Esophageal varices     Glaucoma     Hepatic encephalopathy 11/23/2020    History of colon polyps     Hx of cirrhosis     non-alcoholic    Iron deficiency anemia secondary to blood loss (chronic)     Kidney stones     Portal hypertensive gastropathy     Unintentional weight loss 10/14/2023    Unspecified cirrhosis of liver      Past Surgical History:   Procedure Laterality Date    APPENDECTOMY      COLONOSCOPY N/A 8/3/2023    Procedure: COLONOSCOPY;  Surgeon: Gerard Salinas MD;  Location: [facility];  Service: Endoscopy;  Laterality: N/A;    COLONOSCOPY N/A 5/27/2024    Procedure: COLONOSCOPY;  Surgeon:  Eric Garcia MD;  Location: [facility] (4TH FLR);  Service: Endoscopy;  Laterality: N/A;  Dr. Douglass, egd/colon, weight loss, variceal surveillance and abdominal pain, standard prep, cirrhosis labs ordered    COLONOSCOPY N/A 5/14/2025    Procedure: COLONOSCOPY;  Surgeon: Lawson Byrne MD;  Location: [facility] (2ND FLR);  Service: Colon and Rectal;  Laterality: N/A;  je/shagufta/suprep  5/9-labs re-scheduled to [facility] for 5/12/25-tb  5/9 unable to [facility] for albertoll-MD  5/12 precall complete, JL    COLONOSCOPY W/ POLYPECTOMY      CYSTOSCOPY N/A 3/20/2024    Procedure: CYSTOSCOPY;  Surgeon: Chris Carey MD;  Location: [facility] OR 1ST FLR;  Service: Urology;  Laterality: N/A;    CYSTOSCOPY  9/4/2024    Procedure: CYSTOSCOPY;  Surgeon: Chris Carey MD;  [facility];  Service: Urology;;    CYSTOSCOPY W/ RETROGRADES Right 9/13/2024    Procedure: CYSTOSCOPY, WITH RETROGRADE PYELOGRAM;  Surgeon: Chris Carey MD;  [facility];  Service: Urology;  Laterality: Right;    CYSTOSCOPY W/ URETERAL STENT REMOVAL Right 3/14/2024    Procedure: CYSTOSCOPY, WITH URETERAL STENT REMOVAL;  Surgeon: Chris Carey MD;  [facility];  Service: Urology;  Laterality: Right;    CYSTOSCOPY WITH CALCULUS EXTRACTION Right 9/13/2024    Procedure: CYSTOSCOPY, WITH CALCULUS REMOVAL;  Surgeon: Chris Carey MD;  [facility];  Service: Urology;  Laterality: Right;  urtereoscopy    DISTAL CLAVICLE EXCISION Right 11/18/2021    Procedure: RIGHT SHOULDER ARTHROSCOPY, EXCISION, CLAVICLE, DISTAL; EXTENSIVE DEBRIDEMENT;  Surgeon: Isaiah Joyner MD;  Location: [facility];  Service: Orthopedics;  Laterality: Right;    ESOPHAGEAL VARICE LIGATION      ESOPHAGOGASTRODUODENOSCOPY N/A 11/10/2020    Procedure: EGD (ESOPHAGOGASTRODUODENOSCOPY);  Surgeon: Gerard Salinas MD;  Location: [facility];  Service: Endoscopy;  Laterality: N/A;    ESOPHAGOGASTRODUODENOSCOPY N/A 12/28/2020    Procedure:  EGD (ESOPHAGOGASTRODUODENOSCOPY);  Surgeon: Adryan Park MD;  Location: [facility] (2ND FLR);  Service: Endoscopy;  Laterality: N/A;    ESOPHAGOGASTRODUODENOSCOPY N/A 02/15/2021    Procedure: EGD (ESOPHAGOGASTRODUODENOSCOPY);  Surgeon: Hari Greene MD;  Location: [facility] (4TH FLR);  Service: Endoscopy;  Laterality: N/A;  6 week follow-up variceal banding  Hx varices - Labs - ERW  prep ins. emialed - COVID screening on 2/12/21 [facility] - ERW    ESOPHAGOGASTRODUODENOSCOPY Left 08/03/2021    Procedure: EGD (ESOPHAGOGASTRODUODENOSCOPY);  Surgeon: Fabricio Corado MD;  Location: [facility] (4TH FLR);  Service: Gastroenterology;  Laterality: Left;  recent hematemasis. varices-labs on 7/26    COVID test at [facility] on 7/31-GT  requesting sooner    ESOPHAGOGASTRODUODENOSCOPY N/A 07/27/2022    Procedure: EGD (ESOPHAGOGASTRODUODENOSCOPY);  Surgeon: Eric Garcia MD;  Location: [facility];  Service: Endoscopy;  Laterality: N/A;    ESOPHAGOGASTRODUODENOSCOPY Left 08/29/2022    Procedure: EGD (ESOPHAGOGASTRODUODENOSCOPY);  Surgeon: Kacy Douglass MD;  Location: [facility] (2ND FLR);  Service: Endoscopy;  Laterality: Left;  Fully vaccinated/ clear liquids up to 4 hrs prior-RB  varices labs ordered-RB    ESOPHAGOGASTRODUODENOSCOPY N/A 10/05/2022    Procedure: EGD (ESOPHAGOGASTRODUODENOSCOPY);  Surgeon: Gerard Salinas MD;  Location: [facility];  Service: Endoscopy;  Laterality: N/A;    ESOPHAGOGASTRODUODENOSCOPY N/A 3/30/2023    Procedure: EGD (ESOPHAGOGASTRODUODENOSCOPY);  Surgeon: Gerard Salinas MD;  Location: [facility];  Service: Endoscopy;  Laterality: N/A;    ESOPHAGOGASTRODUODENOSCOPY N/A 8/3/2023    Procedure: EGD (ESOPHAGOGASTRODUODENOSCOPY);  Surgeon: Gerard Salinas MD;  Location: [facility];  Service: Endoscopy;  Laterality: N/A;    ESOPHAGOGASTRODUODENOSCOPY N/A 5/27/2024    Procedure: EGD (ESOPHAGOGASTRODUODENOSCOPY);  Surgeon: Eric Garcia MD;  Location: [facility] ([address] [place] [address]);  Service: Endoscopy;   Laterality: N/A;    EXTRACTION - STONE Right 3/20/2024    Procedure: EXTRACTION - STONE;  Surgeon: Chris Carey MD;  Location: [facility] OR 1ST FLR;  Service: Urology;  Laterality: Right;    HYSTERECTOMY      INJECTION, SACROILIAC JOINT Right 3/21/2025    Procedure: INJECTION,SACROILIAC JOINT (ORAL);  Surgeon: Mainor Thomason MD;  Location: [facility];  Service: Pain Management;  Laterality: Right;    KNEE SURGERY Bilateral     LASER LITHOTRIPSY Right 3/20/2024    Procedure: LITHOTRIPSY, USING LASER;  Surgeon: Chris Carey MD;  Location: [facility] OR 1ST FLR;  Service: Urology;  Laterality: Right;    LASER LITHOTRIPSY Right 9/13/2024    Procedure: LITHOTRIPSY, USING LASER;  Surgeon: Chris Carey MD;  Location: [facility] OR 1ST FLR;  Service: Urology;  Laterality: Right;    LITHOTRIPSY      REMOVAL-STENT Right 3/20/2024    Procedure: REMOVAL-STENT;  Surgeon: Chris Carey MD;  Location: [facility] OR 1ST FLR;  Service: Urology;  Laterality: Right;    REMOVAL-STENT Right 9/13/2024    Procedure: REMOVAL-STENT;  Surgeon: Chris Carey MD;  [facility] 1ST FLR;  Service: Urology;  Laterality: Right;    RETROGRADE PYELOGRAPHY Right 3/20/2024    Procedure: PYELOGRAM, RETROGRADE;  Surgeon: Chris Carey MD;  Location: [facility] OR 1ST FLR;  Service: Urology;  Laterality: Right;    RHINOPLASTY TIP      SHOULDER SURGERY Right     TONSILLECTOMY      TOTAL HIP ARTHROPLASTY Right     URETERAL STENT PLACEMENT Right 3/20/2024    Procedure: INSERTION, STENT, URETER;  Surgeon: Chris Carey MD;  Location: [facility] OR 1ST FLR;  Service: Urology;  Laterality: Right;    URETERAL STENT PLACEMENT Right 9/4/2024    Procedure: INSERTION, STENT, URETER;  Surgeon: Chris Carey MD;  Location: [facility] OR 1ST FLR;  Service: Urology;  Laterality: Right;    URETEROSCOPY Right 3/20/2024    Procedure: URETEROSCOPY;  Surgeon: Chris Carey MD;  [facility] 1ST FLR;  Service: Urology;  Laterality: Right;    URETEROSCOPY Right 9/13/2024    Procedure:  URETEROSCOPY;  Surgeon: Chris Carey MD;  Location: [facility] OR [address] [place] [address];  Service: Urology;  Laterality: Right;     Family History   Problem Relation Name Age of Onset    No Known Problems Mother      Diabetes Mellitus Maternal Grandmother      Amblyopia Neg Hx      Blindness Neg Hx      Cataracts Neg Hx      Glaucoma Neg Hx      Macular degeneration Neg Hx      Retinal detachment Neg Hx      Strabismus Neg Hx      Colon cancer Neg Hx      Esophageal cancer Neg Hx       Social History[1]  Review of Systems    Physical Exam     Initial Vitals [05/16/25 1228]   BP Pulse Resp Temp SpO2   (!) 145/70 85 20 98.1 °F (36.7 °C) 100 %      MAP       --         Physical Exam    Nursing note and vitals reviewed.  Constitutional: She appears well-developed and well-nourished. She is not diaphoretic. No distress.   HENT:   Head: Normocephalic and atraumatic.   Nose: Nose normal.   Eyes: Conjunctivae and EOM are normal.   Neck: Neck supple.   Cardiovascular:  Normal rate, regular rhythm, normal heart sounds and intact distal pulses.           Pulmonary/Chest: Breath sounds normal. No respiratory distress.   Abdominal: Abdomen is soft. She exhibits no distension. There is abdominal tenderness in the right lower quadrant.   There is right CVA tenderness.There is no guarding.   Musculoskeletal:      Cervical back: Neck supple.     Neurological: She is alert and oriented to person, place, and time. Gait normal.   Skin: No rash noted.   Psychiatric: She has a normal mood and affect. Thought content normal.         ED Course   Procedures  Labs Reviewed   COMPREHENSIVE METABOLIC PANEL - Abnormal       Result Value    Sodium 138      Potassium 3.7      Chloride 107      CO2 24      Glucose 108      BUN 19      Creatinine 0.7      Calcium 9.1      Protein Total 6.9      Albumin 3.9      Bilirubin Total 0.7            AST 26      ALT 25      Anion Gap 7 (*)     eGFR >60     CBC WITH DIFFERENTIAL - Abnormal    WBC 4.18      RBC 4.40       HGB 12.5      HCT 37.8      MCV 86      MCH 28.4      MCHC 33.1      RDW 13.5      Platelet Count 79 (*)     MPV 11.0      Nucleated RBC 0      Neut % 77.5 (*)     Lymph % 13.4 (*)     Mono % 7.4      Eos % 1.0      Basophil % 0.2      Imm Grans % 0.5      Neut # 3.24      Lymph # 0.56 (*)     Mono # 0.31      Eos # 0.04      Baso # 0.01      Imm Grans # 0.02     PLATELET REVIEW - Abnormal    Platelet Estimate Decreased (*)    ISTAT PROCEDURE - Abnormal    POC Glucose 110      POC BUN 19      POC Creatinine 0.8      POC Sodium 141      POC Potassium 3.8      POC Chloride 105      POC TCO2 (MEASURED) 22 (*)     POC Ionized Calcium 1.15      POC Hematocrit 36      Sample MAURIZIO     LIPASE - Normal    Lipase Level 27     URINALYSIS, REFLEX TO URINE CULTURE - Normal    Color, UA Yellow      Appearance, UA Clear      pH, UA 6.0      Spec Grav UA 1.015      Protein, UA Negative      Glucose, UA Negative      Ketones, UA Negative      Bilirubin, UA Negative      Blood, UA Negative      Nitrites, UA Negative      Urobilinogen, UA Negative      Leukocyte Esterase, UA Negative     TROPONIN I HIGH SENSITIVITY - Normal    Troponin High Sensitive 6     CBC W/ AUTO DIFFERENTIAL    Narrative:     The following orders were created for panel order CBC W/ AUTO DIFFERENTIAL.  Procedure                               Abnormality         Status                     ---------                               -----------         ------                     CBC with Differential[[id number]]       Abnormal            Final result                 Please view results for these tests on the individual orders.   GREY TOP URINE HOLD    Extra Tube Hold for add-ons.       EKG Readings: (Independently Interpreted)   Initial Reading: No STEMI. Rhythm: Normal Sinus Rhythm. Heart Rate: 73. Axis: Normal. Clinical Impression: Normal Sinus Rhythm     ECG Results              EKG 12-lead (Final result)        Collection Time Result Time QRS Duration OHS QTC  Calculation    05/16/25 13:56:16 05/16/25 14:16:24 74 416                     Final result by Interface, Lab In [facility] (05/16/25 14:17:01)                   Narrative:    Test Reason : R07.9,    Vent. Rate :  73 BPM     Atrial Rate :  73 BPM     P-R Int : 124 ms          QRS Dur :  74 ms      QT Int : 378 ms       P-R-T Axes :  66  88  72 degrees    QTcB Int : 416 ms    Normal sinus rhythm  Normal ECG  When compared with ECG of 30-Aug-2024 17:27,  No significant change was found  Confirmed by Praveena Ring ([id number]) on 5/16/2025 2:16:00 PM    Referred By: AAAREFERRAL SELF           Confirmed By: Praveena Ring                                  Imaging Results              CT Abdomen Pelvis With IV Contrast NO Oral Contrast (Final result)  Result time 05/16/25 15:21:24      Final result by Troy Lopez MD (05/16/25 15:21:24)                   Impression:      Right-sided hydroureteronephrosis with suggestion of inflammatory changes involving the right ureter and a slightly delayed right nephrogram.  A calcification within the right side of the pelvis either represents a phlebolith adjacent to the ureter or a chronic calculus within the right ureter.    Findings consistent with cirrhosis and portal hypertension including splenomegaly and a small amount of ascites.      Electronically signed by: Troy Lopez  Date:    05/16/2025  Time:    15:21               Narrative:    EXAMINATION:  CT ABDOMEN PELVIS WITH IV CONTRAST    CLINICAL HISTORY:  RLQ abdominal pain (Age >= 14y);recent colonoscopy with RLQ pain. She is also having R flank pain and has a hx of stones;    TECHNIQUE:  Low dose axial images, sagittal and coronal reformations were obtained from the lung bases to the pubic symphysis following the IV administration of 75 mL of Omnipaque 350 .  Oral contrast was not administered.    COMPARISON:  04/30/2025    FINDINGS:  Abdomen:    - Lower thorax:Stable appearance of the base of the heart.    - Lung bases: No  infiltrates and no nodules.    - Liver: Similar findings of hepatic cirrhosis with nodular liver surface contour.  Stable right hepatic lobe cyst.  Stable patency of the portal vein.    - Gallbladder: No calcified gallstones.    - Bile Ducts: No evidence of intra or extra hepatic biliary ductal dilation.    - Spleen: Enlarged measuring between 15 and 16 cm in craniocaudad dimension.    - Kidneys:    Again demonstrated is mild to moderate right hydroureteronephrosis with a slightly delayed right renal nephrogram.  There is mild stranding of the fat surrounding the right ureter as well as mild hyperemia of the right ureteral urothelium.  There is a chronic calcification at the level of the superior margin of the right acetabulum measuring approximately 3-4 mm on image 147 of series 2 which either represents a phlebolith adjacent to the right ureter or a chronically stable right ureteral calculus.  This calcification is not well seen on the study from 09/12/2024 at which time there was a right-sided double-J stent within the right ureter.    There is a 2 mm right midpole intrarenal calculus.    - Adrenals: Unremarkable.    - Pancreas: No mass or peripancreatic fat stranding.    - Retroperitoneum:  No significant adenopathy.    - Vascular: Moderate atherosclerotic calcification of the abdominal aorta.  No aneurysm.    - Abdominal wall:  Unremarkable.    Pelvis:    No acute findings.    Bowel/Mesentery:    No acute findings.  Stable stranding of the fat surrounding the cecum and ascending colon with a small amount of right pelvic ascites.    Bones:  No acute osseous abnormality and no suspicious lytic or blastic lesion.                                       X-Ray Chest AP Portable (Final result)  Result time 05/16/25 15:02:46      Final result by Jules Hess MD (05/16/25 15:02:46)                   Impression:      No acute process.      Electronically signed by: Jules Hess MD  Date:    05/16/2025  Time:    15:02                Narrative:    EXAMINATION:  XR CHEST AP PORTABLE    CLINICAL HISTORY:  Chest pain, unspecified    TECHNIQUE:  Single frontal view of the chest was performed.    COMPARISON:  08/30/2024.    FINDINGS:  There are postop changes in the left upper abdominal quadrant.  The trachea is unremarkable.  The cardiomediastinal silhouette is within normal limits.  There are no pleural effusions.  There is no evidence of a pneumothorax.  There is no evidence of pneumomediastinum.  No airspace opacity is present.    There is no evidence of free air beneath the hemidiaphragms.  The osseous structures are unremarkable.                                       Medications   morphine injection 4 mg (4 mg Intravenous Given 5/16/25 1420)   ondansetron injection 4 mg (4 mg Intravenous Given 5/16/25 1420)   iohexoL (OMNIPAQUE 350) injection 75 mL (75 mLs Intravenous Given 5/16/25 1424)   morphine injection 4 mg (4 mg Intravenous Given 5/16/25 1631)     Medical Decision Making  54-year-old female with a PMHx of liver cirrhosis 2/2 CARTAGENA, esophageal varices, nephrolithiasis presents to ED with right lower quadrant abdominal pain and right flank pain.  Nontoxic appearing. Hemodynamically stable. Afebrile. Exam as above. I will initiate workup and reassess.    Ddx:  Acute appendicitis, nephrolithiasis, colitis, diverticulitis, ACS, pleural effusion    Labs without leukocytosis. H&H stable. Thrombocytopenia however chronic.  Chemistry is well appearing, no JANAK. No significant electrolyte derangements. Urine negative for signs of infection including nitrites, leukocytes, blood.    Non-specific findings noted CT today. Including hydroureteronephrosis and a delay right nephrogram per radiology. This is also a possible chronic calculi within the R ureter that patient states she is aware of. No acute findings related to bowels.     Patient reassessed and pain initially improved however is worsening. Give significant pain, I offered admission  for pain control. She would prefer to be discharged home with strict ED return precautions. I believe this is reasonable given reassuring laboratory findings and CT without acute abdomen. Urgent referral placed with urology as I suspect her symptoms are urologic in nature given CT findings and physical exam findings. Strict ED precautions given to return immediately for new, worsening, or concerning symptoms       Amount and/or Complexity of Data Reviewed  Labs: ordered. Decision-making details documented in ED Course.  Radiology: ordered.    Risk  Prescription drug management.               ED Course as of 05/17/25 2153   Fri May 16, 2025   1414 Platelet Count(!): 79  Thrombocytopenia, chronic [HM]   1414 WBC: 4.18 [HM]   1414 Hemoglobin: 12.5 [HM]   1414 Hematocrit: 37.8 [HM]   1425 Urinalysis, Reflex to Urine Culture Urine, Clean Catch  Noninfectious [HM]   Sat May 17, 2025   2153 Troponin I High Sensitivity: 6 [HM]      ED Course User Index  [HM] Chelsi Colon PA-C                           Clinical Impression:  Final diagnoses:  [R07.9] Chest pain  [R10.9] Abdominal pain, unspecified abdominal location (Primary)  [N13.30] Hydroureteronephrosis  [R93.5] Abnormal CT of the abdomen  [D69.6] Thrombocytopenia  [K74.60] Hepatic cirrhosis, unspecified hepatic cirrhosis type, unspecified whether ascites present  [R18.8] Other ascites          ED Disposition Condition    Discharge Stable          ED Prescriptions       Medication Sig Dispense Start Date End Date Auth. Provider    oxyCODONE (ROXICODONE) 5 MG immediate release tablet Take 1 tablet (5 mg total) by mouth every 6 (six) hours as needed for Pain. 16 tablet 5/16/2025 -- Chelsi Colon PA-C          Follow-up Information       Follow up With Specialties Details Why Contact Info Additional Information    Chris Carey MD Urology Schedule an appointment as soon as possible for a visit   [facility] [facility] [id number]  [phone]       [facility] Colon and Rectal Surgery Schedule an appointment as soon as possible for a visit   [address]  [facility] [phone] [facility] 4th Floor Please park in [facility] and use Atrium elevator    [facility] - Emergency Dept Emergency Medicine  If symptoms worsen [address]  [facility] [phone]                  [1]   Social History  Tobacco Use    Smoking status: Some Days     Current packs/day: 0.00     Types: Cigarettes     Last attempt to quit: 7/3/2019     Years since quittin.8    Smokeless tobacco: Never   Substance Use Topics    Alcohol use: Not Currently    Drug use: No        Chelsi Colon PA-C  [id number]

## 2025-05-19 ENCOUNTER — PATIENT MESSAGE (OUTPATIENT)
Dept: UROLOGY | Facility: CLINIC | Age: 55
End: 2025-05-19
Payer: COMMERCIAL

## 2025-05-19 ENCOUNTER — TELEPHONE (OUTPATIENT)
Dept: UROLOGY | Facility: CLINIC | Age: 55
End: 2025-05-19
Payer: COMMERCIAL

## 2025-05-19 ENCOUNTER — OFFICE VISIT (OUTPATIENT)
Dept: UROLOGY | Facility: CLINIC | Age: 55
End: 2025-05-19
Payer: COMMERCIAL

## 2025-05-19 ENCOUNTER — HOSPITAL ENCOUNTER (OUTPATIENT)
Dept: RADIOLOGY | Facility: HOSPITAL | Age: 55
Discharge: HOME | End: 2025-05-19
Attending: UROLOGY
Payer: COMMERCIAL

## 2025-05-19 VITALS
HEART RATE: 76 BPM | WEIGHT: 130.06 LBS | DIASTOLIC BLOOD PRESSURE: 83 MMHG | BODY MASS INDEX: 19.71 KG/M2 | HEIGHT: 68 IN | SYSTOLIC BLOOD PRESSURE: 138 MMHG

## 2025-05-19 DIAGNOSIS — N13.30 HYDRONEPHROSIS OF RIGHT KIDNEY: ICD-10-CM

## 2025-05-19 DIAGNOSIS — R93.5 ABNORMAL CT OF THE ABDOMEN: ICD-10-CM

## 2025-05-19 DIAGNOSIS — N20.1 RIGHT URETERAL STONE: Primary | ICD-10-CM

## 2025-05-19 DIAGNOSIS — N13.30 HYDROURETERONEPHROSIS: ICD-10-CM

## 2025-05-19 DIAGNOSIS — N13.30 HYDRONEPHROSIS OF RIGHT KIDNEY: Primary | ICD-10-CM

## 2025-05-19 LAB
ESTROGEN SERPL-MCNC: NORMAL PG/ML
INSULIN SERPL-ACNC: NORMAL U[IU]/ML
LAB AP CLINICAL INFORMATION: NORMAL
LAB AP GROSS DESCRIPTION: NORMAL
LAB AP PERFORMING LOCATION(S): NORMAL
LAB AP REPORT FOOTNOTES: NORMAL

## 2025-05-19 PROCEDURE — 99999 PR PBB SHADOW E&M-EST. PATIENT-LVL IV: CPT | Mod: PBBFAC,,, | Performed by: UROLOGY

## 2025-05-19 PROCEDURE — 1159F MED LIST DOCD IN RCRD: CPT | Mod: CPTII,S$GLB,, | Performed by: UROLOGY

## 2025-05-19 PROCEDURE — 3079F DIAST BP 80-89 MM HG: CPT | Mod: CPTII,S$GLB,, | Performed by: UROLOGY

## 2025-05-19 PROCEDURE — 3075F SYST BP GE 130 - 139MM HG: CPT | Mod: CPTII,S$GLB,, | Performed by: UROLOGY

## 2025-05-19 PROCEDURE — 3008F BODY MASS INDEX DOCD: CPT | Mod: CPTII,S$GLB,, | Performed by: UROLOGY

## 2025-05-19 PROCEDURE — 99214 OFFICE O/P EST MOD 30 MIN: CPT | Mod: S$GLB,,, | Performed by: UROLOGY

## 2025-05-19 PROCEDURE — 74018 RADEX ABDOMEN 1 VIEW: CPT | Mod: 26,,, | Performed by: RADIOLOGY

## 2025-05-19 PROCEDURE — 74018 RADEX ABDOMEN 1 VIEW: CPT | Mod: TC

## 2025-05-19 RX ORDER — CIPROFLOXACIN 500 MG/1
500 TABLET, FILM COATED ORAL 2 TIMES DAILY
Qty: 6 TABLET | Refills: 0 | Status: SHIPPED | OUTPATIENT
Start: 2025-05-19 | End: 2025-05-22

## 2025-05-19 NOTE — PROGRESS NOTES
Right ureteral stone  -     Case Request Operating Room: URETEROSCOPY, WITH LASER LITHOTRIPSY, CYSTOSCOPY, WITH URETERAL STENT INSERTION, CYSTOSCOPY, WITH RETROGRADE PYELOGRAM    Hydronephrosis of right kidney  -     Case Request Operating Room: URETEROSCOPY, WITH LASER LITHOTRIPSY, CYSTOSCOPY, WITH URETERAL STENT INSERTION, CYSTOSCOPY, WITH RETROGRADE PYELOGRAM

## 2025-05-19 NOTE — PROGRESS NOTES
CC:  recent colonoscopy with RLQ pain. She is also having R flank pain and has a hx of stones;     Tee Aranda is a 54 y.o. woman who is here for a follow up, hx of ureteral stone.      CT abd/pevlis with IV contrast done on 5/16/25  Right-sided hydroureteronephrosis with suggestion of inflammatory changes involving the right ureter and a slightly delayed right nephrogram.  A calcification within the right side of the pelvis either represents a phlebolith adjacent to the ureter or a chronic calculus within the right ureter.     Findings consistent with cirrhosis and portal hypertension including splenomegaly and a small amount of ascites.    Because of the above abnormality seen on CT, she is referred to me for a follow up.    CT 4/30/25  Imaging findings suggestive for an enterocolitis with significant thickening of the walls of nearly the entirety of the right colon, which has progressively worsened over prior examinations.  While findings could reflect a chronic inflammatory/infectious process, portal colopathy would be considered in the differential..  The possibility of an underlying lesion of the right colon is not entirely excluded.  Consider correlation with colonoscopy.     Mild thickening of the walls of the stomach which could suggest a gastritis or possibly portal gastropathy.     Normal appendix.     Imaging stigmata of cirrhosis with portal hypertension.  There is a right hepatic lobe cyst.  The portal vein is patent.     Cholelithiasis.  The gallbladder walls do appear slightly thickened.  Consider correlation with right upper quadrant ultrasound as clinically warranted.     Nonobstructing nephrolithiasis on the right.      Tee Aranda is a 54 y.o. female with a right ureteral stone, presenting for definitive stone management.  She currently does have a JJ ureteral stent in place.    Procedure(s) Performed: 9/13/24  1.  Right ureteroscopy  2.  Cystoscopy  3.  Laser lithotripsy  4.  Stone  basket extraction  5.  Fluoro < 1 h  6.  Retrograde pyelogram  Findings:   Right mid ureteral stone fragmented and extracted in its entirety  Slight narrowing of the proximal ureter (<1 cm) at L2-L3    US 11/8/24  1. Mildly coarse hepatic echotexture, suggesting steatosis or hepatocellular disease.  2. Mild splenomegaly.  3. Cholelithiasis.  4. Right nephrolithiasis.  No hydronephrosis.    Her PCP, James Samano MD   Patient has a history of nonalcoholic cirrhosis and CARTAGENA disease with esophageal varices.  Went to ER on 8/30/24 with acute right flank pian.  She was tested positive for COVID at home 8/29/24 contacted her liver doctor at [facility] who recommended IV remdesivir as inpatient as that iswhat she did last time she got COVID she will get laboratory tests here in and consult the liver specialist           Past Medical History:   Diagnosis Date    Anemia, unspecified     Anxiety     Arthritis     Ascites 11/23/2020    AVN (avascular necrosis of bone)     Cataract     COVID-19 vaccine administered     04/08/21, 04/30/21    Diarrhea of presumed infectious origin 7/31/2023    Edema 11/23/2020    Epigastric pain 7/31/2023    Esophageal varices     Glaucoma     Hepatic encephalopathy 11/23/2020    History of colon polyps     Hx of cirrhosis     non-alcoholic    Iron deficiency anemia secondary to blood loss (chronic)     Kidney stones     Portal hypertensive gastropathy     Unintentional weight loss 10/14/2023    Unspecified cirrhosis of liver      Past Surgical History:   Procedure Laterality Date    APPENDECTOMY      COLONOSCOPY N/A 8/3/2023    Procedure: COLONOSCOPY;  Surgeon: Gerard Salinas MD;  Location: [facility];  Service: Endoscopy;  Laterality: N/A;    COLONOSCOPY N/A 5/27/2024    Procedure: COLONOSCOPY;  Surgeon: Eric Garcia MD;  Location: [facility] ([address] [place] [address]);  Service: Endoscopy;  Laterality: N/A;  Dr. Douglass, egd/colon, weight loss, variceal surveillance and abdominal pain, standard prep,  cirrhosis labs ordered    COLONOSCOPY N/A 5/14/2025    Procedure: COLONOSCOPY;  Surgeon: Lawson Byrne MD;  Location: [facility] ENDO (2ND FLR);  Service: Colon and Rectal;  Laterality: N/A;  je/shagufta/suprep  5/9-labs re-scheduled to [facility] for 5/12/25-tb  5/9 unable to lvm for precall-MD  5/12 precall complete, JL    COLONOSCOPY W/ POLYPECTOMY      CYSTOSCOPY N/A 3/20/2024    Procedure: CYSTOSCOPY;  Surgeon: Chris Carey MD;  Location: [facility] OR 1ST FLR;  Service: Urology;  Laterality: N/A;    CYSTOSCOPY  9/4/2024    Procedure: CYSTOSCOPY;  Surgeon: Chris Carey MD;  [facility];  Service: Urology;;    CYSTOSCOPY W/ RETROGRADES Right 9/13/2024    Procedure: CYSTOSCOPY, WITH RETROGRADE PYELOGRAM;  Surgeon: Chris Carey MD;  Location: [facility] OR 1ST FLR;  Service: Urology;  Laterality: Right;    CYSTOSCOPY W/ URETERAL STENT REMOVAL Right 3/14/2024    Procedure: CYSTOSCOPY, WITH URETERAL STENT REMOVAL;  Surgeon: Chris Carey MD;  [facility];  Service: Urology;  Laterality: Right;    CYSTOSCOPY WITH CALCULUS EXTRACTION Right 9/13/2024    Procedure: CYSTOSCOPY, WITH CALCULUS REMOVAL;  Surgeon: Chris Carey MD;  [facility];  Service: Urology;  Laterality: Right;  urtereoscopy    DISTAL CLAVICLE EXCISION Right 11/18/2021    Procedure: RIGHT SHOULDER ARTHROSCOPY, EXCISION, CLAVICLE, DISTAL; EXTENSIVE DEBRIDEMENT;  Surgeon: Isaiah Joyner MD;  Location: [facility] OR;  Service: Orthopedics;  Laterality: Right;    ESOPHAGEAL VARICE LIGATION      ESOPHAGOGASTRODUODENOSCOPY N/A 11/10/2020    Procedure: EGD (ESOPHAGOGASTRODUODENOSCOPY);  Surgeon: Gerard Salinas MD;  Location: [facility] ENDO;  Service: Endoscopy;  Laterality: N/A;    ESOPHAGOGASTRODUODENOSCOPY N/A 12/28/2020    Procedure: EGD (ESOPHAGOGASTRODUODENOSCOPY);  Surgeon: Adryan Park MD;  Location: [facility] (2ND FLR);  Service: Endoscopy;  Laterality: N/A;    ESOPHAGOGASTRODUODENOSCOPY N/A 02/15/2021     Procedure: EGD (ESOPHAGOGASTRODUODENOSCOPY);  Surgeon: aHri Greene MD;  Location: [facility] (4TH FLR);  Service: Endoscopy;  Laterality: N/A;  6 week follow-up variceal banding  Hx varices - Labs - ERW  prep ins. emialed - COVID screening on 2/12/21 [place] - ERW    ESOPHAGOGASTRODUODENOSCOPY Left 08/03/2021    Procedure: EGD (ESOPHAGOGASTRODUODENOSCOPY);  Surgeon: Fabricio Corado MD;  Location: [facility] (4TH FLR);  Service: Gastroenterology;  Laterality: Left;  recent hematemasis. varices-labs on 7/26    COVID test at [facility] on 7/31-GT  requesting sooner    ESOPHAGOGASTRODUODENOSCOPY N/A 07/27/2022    Procedure: EGD (ESOPHAGOGASTRODUODENOSCOPY);  Surgeon: Eric Garcia MD;  Location: [facility];  Service: Endoscopy;  Laterality: N/A;    ESOPHAGOGASTRODUODENOSCOPY Left 08/29/2022    Procedure: EGD (ESOPHAGOGASTRODUODENOSCOPY);  Surgeon: Kacy Douglass MD;  Location: [facility] (2ND FLR);  Service: Endoscopy;  Laterality: Left;  Fully vaccinated/ clear liquids up to 4 hrs prior-RB  varices labs ordered-RB    ESOPHAGOGASTRODUODENOSCOPY N/A 10/05/2022    Procedure: EGD (ESOPHAGOGASTRODUODENOSCOPY);  Surgeon: Gerard Salinas MD;  Location: [facility];  Service: Endoscopy;  Laterality: N/A;    ESOPHAGOGASTRODUODENOSCOPY N/A 3/30/2023    Procedure: EGD (ESOPHAGOGASTRODUODENOSCOPY);  Surgeon: Gerard Salinas MD;  Location: [facility];  Service: Endoscopy;  Laterality: N/A;    ESOPHAGOGASTRODUODENOSCOPY N/A 8/3/2023    Procedure: EGD (ESOPHAGOGASTRODUODENOSCOPY);  Surgeon: Gerard Salinas MD;  Location: [facility];  Service: Endoscopy;  Laterality: N/A;    ESOPHAGOGASTRODUODENOSCOPY N/A 5/27/2024    Procedure: EGD (ESOPHAGOGASTRODUODENOSCOPY);  Surgeon: Erci Garcia MD;  Location: [facility] (4TH FLR);  Service: Endoscopy;  Laterality: N/A;    EXTRACTION - STONE Right 3/20/2024    Procedure: EXTRACTION - STONE;  Surgeon: Chris Carey MD;  Location: [facility] OR 1ST FLR;  Service: Urology;  Laterality:  Right;    HYSTERECTOMY      INJECTION, SACROILIAC JOINT Right 3/21/2025    Procedure: INJECTION,SACROILIAC JOINT (ORAL);  Surgeon: Mainor Thomason MD;  Location: [facility] OR;  Service: Pain Management;  Laterality: Right;    KNEE SURGERY Bilateral     LASER LITHOTRIPSY Right 3/20/2024    Procedure: LITHOTRIPSY, USING LASER;  Surgeon: Chris Carey MD;  Location: [facility] OR 1ST FLR;  Service: Urology;  Laterality: Right;    LASER LITHOTRIPSY Right 9/13/2024    Procedure: LITHOTRIPSY, USING LASER;  Surgeon: Chris Carey MD;  Location: [facility] OR 1ST FLR;  Service: Urology;  Laterality: Right;    LITHOTRIPSY      REMOVAL-STENT Right 3/20/2024    Procedure: REMOVAL-STENT;  Surgeon: Chris Carey MD;  Location: [facility] OR 1ST FLR;  Service: Urology;  Laterality: Right;    REMOVAL-STENT Right 9/13/2024    Procedure: REMOVAL-STENT;  Surgeon: Chris Carey MD;  [facility]: [facility] OR 1ST FLR;  Service: Urology;  Laterality: Right;    RETROGRADE PYELOGRAPHY Right 3/20/2024    Procedure: PYELOGRAM, RETROGRADE;  Surgeon: Chris Carey MD;  Location: [facility] OR 1ST FLR;  Service: Urology;  Laterality: Right;    RHINOPLASTY TIP      SHOULDER SURGERY Right     TONSILLECTOMY      TOTAL HIP ARTHROPLASTY Right     URETERAL STENT PLACEMENT Right 3/20/2024    Procedure: INSERTION, STENT, URETER;  Surgeon: Chris Carey MD;  Location: [facility] OR 1ST FLR;  Service: Urology;  Laterality: Right;    URETERAL STENT PLACEMENT Right 9/4/2024    Procedure: INSERTION, STENT, URETER;  Surgeon: Chris Carey MD;  Location: [facility] OR 1ST FLR;  Service: Urology;  Laterality: Right;    URETEROSCOPY Right 3/20/2024    Procedure: URETEROSCOPY;  Surgeon: Chris Carey MD;  Location: [facility] OR 1ST FLR;  Service: Urology;  Laterality: Right;    URETEROSCOPY Right 9/13/2024    Procedure: URETEROSCOPY;  Surgeon: Chris Carey MD;  Location: [facility] OR 1ST FLR;  Service: Urology;  Laterality: Right;     Social History     Tobacco Use    Smoking status: Some Days      Current packs/day: 0.00     Types: Cigarettes     Last attempt to quit: 7/3/2019     Years since quittin.8    Smokeless tobacco: Never   Substance Use Topics    Alcohol use: Not Currently    Drug use: No     Family History   Problem Relation Name Age of Onset    No Known Problems Mother      Diabetes Mellitus Maternal Grandmother      Amblyopia Neg Hx      Blindness Neg Hx      Cataracts Neg Hx      Glaucoma Neg Hx      Macular degeneration Neg Hx      Retinal detachment Neg Hx      Strabismus Neg Hx      Colon cancer Neg Hx      Esophageal cancer Neg Hx       Allergy:  Review of patient's allergies indicates:   Allergen Reactions    Sulfa (sulfonamide antibiotics) Hives     Outpatient Encounter Medications as of 2025   Medication Sig Dispense Refill    carvediloL (COREG) 3.125 MG tablet Take 1 tablet (3.125 mg total) by mouth 2 (two) times daily. 180 tablet 3    ciprofloxacin HCl (CIPRO) 500 MG tablet Take 1 tablet (500 mg total) by mouth 2 (two) times daily. for 3 days 6 tablet 0    diltiazem HCl (DILTIAZEM 2% CREAM) Apply topically 3 (three) times daily. Apply topically to anal area. 30 g 2    esomeprazole (NEXIUM) 40 MG capsule TAKE 1 CAPSULE BY MOUTH 2 TIMES DAILY BEFORE MEALS. 180 capsule 3    furosemide (LASIX) 40 MG tablet Take 1 tablet (40 mg total) by mouth twice a week. 8 tablet 11    multivitamin (THERAGRAN) per tablet Take 1 tablet by mouth once daily.      omega-3 fatty acids/fish oil (FISH OIL-OMEGA-3 FATTY ACIDS) 300-1,000 mg capsule Take 1 capsule by mouth once daily.      oxyCODONE (ROXICODONE) 5 MG immediate release tablet Take 1 tablet (5 mg total) by mouth every 6 (six) hours as needed for Pain. 16 tablet 0    potassium citrate (UROCIT-K 10) 10 mEq (1,080 mg) TbSR Take 1 tablet (10 mEq total) by mouth 2 (two) times daily with meals. 180 tablet 3    pregabalin (LYRICA) 100 MG capsule Take 1 capsule (100 mg total) by mouth 2 (two) times daily. 60 capsule 5    rifAXIMin (XIFAXAN) 550 mg  Tab Take 1 tablet (550 mg total) by mouth 2 (two) times daily. 60 tablet 11    spironolactone (ALDACTONE) 100 MG tablet Take 1 tablet (100 mg total) by mouth twice a week. 8 tablet 11    UNABLE TO FIND 4 (four) times daily as needed. Medible 20 mg blue raspberry 1/4 to 1/2 up to 4 times daily as needed.       Facility-Administered Encounter Medications as of 5/19/2025   Medication Dose Route Frequency Provider Last Rate Last Admin    0.9%  NaCl infusion   Intravenous Continuous Gerard Salinas MD   Stopped at 03/30/23 0922     Review of Systems   ROS  Physical Exam     Vitals:    05/19/25 1301   BP: 138/83   Pulse: 76         Physical Exam      LABS:  Lab Results   Component Value Date    CREATININE 0.7 05/16/2025    CREATININE 0.7 04/30/2025    CREATININE 0.8 02/20/2025     Urine Culture, Routine   Date Value Ref Range Status   09/12/2024 No growth  Final   09/04/2024 No growth  Final     Hemoglobin A1C   Date Value Ref Range Status   07/26/2022 5.5 4.0 - 5.6 % Final     Comment:     ADA Screening Guidelines:  5.7-6.4%  Consistent with prediabetes  >or=6.5%  Consistent with diabetes    High levels of fetal hemoglobin interfere with the HbA1C  assay. Heterozygous hemoglobin variants (HbS, HgC, etc)do  not significantly interfere with this assay.   However, presence of multiple variants may affect accuracy.        Latest Reference Range & Units 05/16/25 13:57   Color, UA Straw, Katerin, Yellow, Light-Orange  Yellow   Appearance, UA Clear  Clear   Spec Grav UA 1.005 - 1.030  1.015   pH, UA 5.0 - 8.0  6.0   Protein, UA Negative  Negative   Glucose, UA Negative  Negative   Ketones, UA Negative  Negative   Blood, UA Negative  Negative   NITRITE UA Negative  Negative   Bilirubin (UA) Negative  Negative   Urobilinogen, UA <2.0 EU/dL Negative   Leukocyte Esterase, UA Negative  Negative     Radiology:  KUB today no obvious calcification overlying the kidney areas seen.    US  11/24/24  1. Mildly coarse hepatic  echotexture, suggesting steatosis or hepatocellular disease.  2. Mild splenomegaly.  3. Cholelithiasis.  4. Right nephrolithiasis.  No hydronephrosis.    CT RSS 9/12/24  Few tiny nonobstructing right renal stones.  Ureteral vesicular junctions are not well evaluated secondary to streak artifact from right hip arthroplasty.  Similar right-sided hydronephrosis and proximal hydroureter with interval placement of a right double-J ureteral stent.  Proximal in coils about the proximal ureter, and distal and coils about the urinary bladder.  Correlation is advised.     Hepatosplenomegaly with cirrhotic liver morphology.  No focal hepatic lesions, noting limitations from noncontrast examination.     Similar nonspecific colonic wall thickening and surrounding inflammatory change about the ascending colon.  Findings may relate to chronic infectious or non infectious inflammatory process, noting that underlying malignancy cannot be excluded.  Correlation is advised.    CT RSS 8/30/24  Obstructive uropathy by 3 mm calculus in the distal 3rd of the right ureter a few cm above the UVJ.     Stables hepatosplenomegaly.  Assessment and Plan:  Tee was seen today for flank pain.    Diagnoses and all orders for this visit:    Hydronephrosis of right kidney  -     X-Ray Abdomen AP 1 View; Future  -     ciprofloxacin HCl (CIPRO) 500 MG tablet; Take 1 tablet (500 mg total) by mouth 2 (two) times daily. for 3 days        I reviewed her CT.  A newly diagnosed right hydronephrosis and hydroureter.  She appears to have a small obstructive midureteral stone in the right pelvis.  Will scheduled her for right ureteroscopy with stone remoal, cysto right RPG and ureteral stent placement  tomorrow.  She can start cipro as prophylactic abx.  Continue urocit K  Add magnesium citrate supplement.  Nature and risks of operation explained.  Will leave the right ureteral stent with strings attached, since she does not tolerate the ureteral stent  well.  All questions answered.      Follow-up:  Follow up in about 1 day (around 5/20/2025), or right ureteroscopy with stone remoal, cysto right RPG and ureteral stent placement.

## 2025-05-19 NOTE — H&P (VIEW-ONLY)
CC:  recent colonoscopy with RLQ pain. She is also having R flank pain and has a hx of stones;     Tee Aranda is a 54 y.o. woman who is here for a follow up, hx of ureteral stone.      CT abd/pevlis with IV contrast done on 5/16/25  Right-sided hydroureteronephrosis with suggestion of inflammatory changes involving the right ureter and a slightly delayed right nephrogram.  A calcification within the right side of the pelvis either represents a phlebolith adjacent to the ureter or a chronic calculus within the right ureter.     Findings consistent with cirrhosis and portal hypertension including splenomegaly and a small amount of ascites.    Because of the above abnormality seen on CT, she is referred to me for a follow up.    CT 4/30/25  Imaging findings suggestive for an enterocolitis with significant thickening of the walls of nearly the entirety of the right colon, which has progressively worsened over prior examinations.  While findings could reflect a chronic inflammatory/infectious process, portal colopathy would be considered in the differential..  The possibility of an underlying lesion of the right colon is not entirely excluded.  Consider correlation with colonoscopy.     Mild thickening of the walls of the stomach which could suggest a gastritis or possibly portal gastropathy.     Normal appendix.     Imaging stigmata of cirrhosis with portal hypertension.  There is a right hepatic lobe cyst.  The portal vein is patent.     Cholelithiasis.  The gallbladder walls do appear slightly thickened.  Consider correlation with right upper quadrant ultrasound as clinically warranted.     Nonobstructing nephrolithiasis on the right.      Tee Aranda is a 54 y.o. female with a right ureteral stone, presenting for definitive stone management.  She currently does have a JJ ureteral stent in place.    Procedure(s) Performed: 9/13/24  1.  Right ureteroscopy  2.  Cystoscopy  3.  Laser lithotripsy  4.  Stone  basket extraction  5.  Fluoro < 1 h  6.  Retrograde pyelogram  Findings:   Right mid ureteral stone fragmented and extracted in its entirety  Slight narrowing of the proximal ureter (<1 cm) at L2-L3    US 11/8/24  1. Mildly coarse hepatic echotexture, suggesting steatosis or hepatocellular disease.  2. Mild splenomegaly.  3. Cholelithiasis.  4. Right nephrolithiasis.  No hydronephrosis.    Her PCP, James Samano MD   Patient has a history of nonalcoholic cirrhosis and CARTAGENA disease with esophageal varices.  Went to ER on 8/30/24 with acute right flank pian.  She was tested positive for COVID at home 8/29/24 contacted her liver doctor at [facility] who recommended IV remdesivir as inpatient as that iswhat she did last time she got COVID she will get laboratory tests here in and consult the liver specialist           Past Medical History:   Diagnosis Date    Anemia, unspecified     Anxiety     Arthritis     Ascites 11/23/2020    AVN (avascular necrosis of bone)     Cataract     COVID-19 vaccine administered     04/08/21, 04/30/21    Diarrhea of presumed infectious origin 7/31/2023    Edema 11/23/2020    Epigastric pain 7/31/2023    Esophageal varices     Glaucoma     Hepatic encephalopathy 11/23/2020    History of colon polyps     Hx of cirrhosis     non-alcoholic    Iron deficiency anemia secondary to blood loss (chronic)     Kidney stones     Portal hypertensive gastropathy     Unintentional weight loss 10/14/2023    Unspecified cirrhosis of liver      Past Surgical History:   Procedure Laterality Date    APPENDECTOMY      COLONOSCOPY N/A 8/3/2023    Procedure: COLONOSCOPY;  Surgeon: Gerard Salinas MD;  Location: [facility];  Service: Endoscopy;  Laterality: N/A;    COLONOSCOPY N/A 5/27/2024    Procedure: COLONOSCOPY;  Surgeon: Eric Garcia MD;  Location: [facility] ([address] [place] [address]);  Service: Endoscopy;  Laterality: N/A;  Dr. Douglass, egd/colon, weight loss, variceal surveillance and abdominal pain, standard prep,  cirrhosis labs ordered    COLONOSCOPY N/A 5/14/2025    Procedure: COLONOSCOPY;  Surgeon: Lawson Byrne MD;  Location: [facility] ENDO (2ND FLR);  Service: Colon and Rectal;  Laterality: N/A;  je/shagufta/suprep  5/9-labs re-scheduled to [facility] for 5/12/25-tb  5/9 unable to lvm for precall-MD  5/12 precall complete, JL    COLONOSCOPY W/ POLYPECTOMY      CYSTOSCOPY N/A 3/20/2024    Procedure: CYSTOSCOPY;  Surgeon: Chris Carey MD;  Location: [facility] OR 1ST FLR;  Service: Urology;  Laterality: N/A;    CYSTOSCOPY  9/4/2024    Procedure: CYSTOSCOPY;  Surgeon: Chris Carey MD;  [facility];  Service: Urology;;    CYSTOSCOPY W/ RETROGRADES Right 9/13/2024    Procedure: CYSTOSCOPY, WITH RETROGRADE PYELOGRAM;  Surgeon: Chris Carey MD;  Location: [facility] OR 1ST FLR;  Service: Urology;  Laterality: Right;    CYSTOSCOPY W/ URETERAL STENT REMOVAL Right 3/14/2024    Procedure: CYSTOSCOPY, WITH URETERAL STENT REMOVAL;  Surgeon: Chris Carey MD;  [facility];  Service: Urology;  Laterality: Right;    CYSTOSCOPY WITH CALCULUS EXTRACTION Right 9/13/2024    Procedure: CYSTOSCOPY, WITH CALCULUS REMOVAL;  Surgeon: Chris Carey MD;  [facility];  Service: Urology;  Laterality: Right;  urtereoscopy    DISTAL CLAVICLE EXCISION Right 11/18/2021    Procedure: RIGHT SHOULDER ARTHROSCOPY, EXCISION, CLAVICLE, DISTAL; EXTENSIVE DEBRIDEMENT;  Surgeon: Isaiah Joyner MD;  Location: [facility] OR;  Service: Orthopedics;  Laterality: Right;    ESOPHAGEAL VARICE LIGATION      ESOPHAGOGASTRODUODENOSCOPY N/A 11/10/2020    Procedure: EGD (ESOPHAGOGASTRODUODENOSCOPY);  Surgeon: Gerard Salinas MD;  Location: [facility] ENDO;  Service: Endoscopy;  Laterality: N/A;    ESOPHAGOGASTRODUODENOSCOPY N/A 12/28/2020    Procedure: EGD (ESOPHAGOGASTRODUODENOSCOPY);  Surgeon: Adryan Park MD;  Location: [facility] (2ND FLR);  Service: Endoscopy;  Laterality: N/A;    ESOPHAGOGASTRODUODENOSCOPY N/A 02/15/2021     Procedure: EGD (ESOPHAGOGASTRODUODENOSCOPY);  Surgeon: Hari Greene MD;  Location: [facility] (4TH FLR);  Service: Endoscopy;  Laterality: N/A;  6 week follow-up variceal banding  Hx varices - Labs - ERW  prep ins. emialed - COVID screening on 2/12/21 [place] - ERW    ESOPHAGOGASTRODUODENOSCOPY Left 08/03/2021    Procedure: EGD (ESOPHAGOGASTRODUODENOSCOPY);  Surgeon: Fabricio Corado MD;  Location: [facility] (4TH FLR);  Service: Gastroenterology;  Laterality: Left;  recent hematemasis. varices-labs on 7/26    COVID test at [facility] on 7/31-GT  requesting sooner    ESOPHAGOGASTRODUODENOSCOPY N/A 07/27/2022    Procedure: EGD (ESOPHAGOGASTRODUODENOSCOPY);  Surgeon: Eric Garcia MD;  Location: [facility];  Service: Endoscopy;  Laterality: N/A;    ESOPHAGOGASTRODUODENOSCOPY Left 08/29/2022    Procedure: EGD (ESOPHAGOGASTRODUODENOSCOPY);  Surgeon: Kacy Douglass MD;  Location: [facility] (2ND FLR);  Service: Endoscopy;  Laterality: Left;  Fully vaccinated/ clear liquids up to 4 hrs prior-RB  varices labs ordered-RB    ESOPHAGOGASTRODUODENOSCOPY N/A 10/05/2022    Procedure: EGD (ESOPHAGOGASTRODUODENOSCOPY);  Surgeon: Gerard Salinas MD;  Location: [facility];  Service: Endoscopy;  Laterality: N/A;    ESOPHAGOGASTRODUODENOSCOPY N/A 3/30/2023    Procedure: EGD (ESOPHAGOGASTRODUODENOSCOPY);  Surgeon: Gerard Salinas MD;  Location: [facility];  Service: Endoscopy;  Laterality: N/A;    ESOPHAGOGASTRODUODENOSCOPY N/A 8/3/2023    Procedure: EGD (ESOPHAGOGASTRODUODENOSCOPY);  Surgeon: Gerard Salinas MD;  Location: [facility];  Service: Endoscopy;  Laterality: N/A;    ESOPHAGOGASTRODUODENOSCOPY N/A 5/27/2024    Procedure: EGD (ESOPHAGOGASTRODUODENOSCOPY);  Surgeon: Eric Garcia MD;  Location: [facility] (4TH FLR);  Service: Endoscopy;  Laterality: N/A;    EXTRACTION - STONE Right 3/20/2024    Procedure: EXTRACTION - STONE;  Surgeon: Chris Carey MD;  Location: [facility] OR 1ST FLR;  Service: Urology;  Laterality:  Right;    HYSTERECTOMY      INJECTION, SACROILIAC JOINT Right 3/21/2025    Procedure: INJECTION,SACROILIAC JOINT (ORAL);  Surgeon: Mainor Thomason MD;  Location: [facility] OR;  Service: Pain Management;  Laterality: Right;    KNEE SURGERY Bilateral     LASER LITHOTRIPSY Right 3/20/2024    Procedure: LITHOTRIPSY, USING LASER;  Surgeon: Chris Carey MD;  Location: [facility] OR 1ST FLR;  Service: Urology;  Laterality: Right;    LASER LITHOTRIPSY Right 9/13/2024    Procedure: LITHOTRIPSY, USING LASER;  Surgeon: Chris Carey MD;  Location: [facility] OR 1ST FLR;  Service: Urology;  Laterality: Right;    LITHOTRIPSY      REMOVAL-STENT Right 3/20/2024    Procedure: REMOVAL-STENT;  Surgeon: Chris Carey MD;  Location: [facility] OR 1ST FLR;  Service: Urology;  Laterality: Right;    REMOVAL-STENT Right 9/13/2024    Procedure: REMOVAL-STENT;  Surgeon: Chris Carey MD;  [facility] 1ST FLR;  Service: Urology;  Laterality: Right;    RETROGRADE PYELOGRAPHY Right 3/20/2024    Procedure: PYELOGRAM, RETROGRADE;  Surgeon: Chris Carey MD;  Location: [facility] OR 1ST FLR;  Service: Urology;  Laterality: Right;    RHINOPLASTY TIP      SHOULDER SURGERY Right     TONSILLECTOMY      TOTAL HIP ARTHROPLASTY Right     URETERAL STENT PLACEMENT Right 3/20/2024    Procedure: INSERTION, STENT, URETER;  Surgeon: Chris Carey MD;  Location: [facility] OR 1ST FLR;  Service: Urology;  Laterality: Right;    URETERAL STENT PLACEMENT Right 9/4/2024    Procedure: INSERTION, STENT, URETER;  Surgeon: Chris Carey MD;  Location: [facility] OR 1ST FLR;  Service: Urology;  Laterality: Right;    URETEROSCOPY Right 3/20/2024    Procedure: URETEROSCOPY;  Surgeon: Chris Carey MD;  [facility] 1ST FLR;  Service: Urology;  Laterality: Right;    URETEROSCOPY Right 9/13/2024    Procedure: URETEROSCOPY;  Surgeon: Chris Carey MD;  [facility]: [facility] OR 1ST FLR;  Service: Urology;  Laterality: Right;     Social History     Tobacco Use    Smoking status: Some Days      Current packs/day: 0.00     Types: Cigarettes     Last attempt to quit: 7/3/2019     Years since quittin.8    Smokeless tobacco: Never   Substance Use Topics    Alcohol use: Not Currently    Drug use: No     Family History   Problem Relation Name Age of Onset    No Known Problems Mother      Diabetes Mellitus Maternal Grandmother      Amblyopia Neg Hx      Blindness Neg Hx      Cataracts Neg Hx      Glaucoma Neg Hx      Macular degeneration Neg Hx      Retinal detachment Neg Hx      Strabismus Neg Hx      Colon cancer Neg Hx      Esophageal cancer Neg Hx       Allergy:  Review of patient's allergies indicates:   Allergen Reactions    Sulfa (sulfonamide antibiotics) Hives     Outpatient Encounter Medications as of 2025   Medication Sig Dispense Refill    carvediloL (COREG) 3.125 MG tablet Take 1 tablet (3.125 mg total) by mouth 2 (two) times daily. 180 tablet 3    ciprofloxacin HCl (CIPRO) 500 MG tablet Take 1 tablet (500 mg total) by mouth 2 (two) times daily. for 3 days 6 tablet 0    diltiazem HCl (DILTIAZEM 2% CREAM) Apply topically 3 (three) times daily. Apply topically to anal area. 30 g 2    esomeprazole (NEXIUM) 40 MG capsule TAKE 1 CAPSULE BY MOUTH 2 TIMES DAILY BEFORE MEALS. 180 capsule 3    furosemide (LASIX) 40 MG tablet Take 1 tablet (40 mg total) by mouth twice a week. 8 tablet 11    multivitamin (THERAGRAN) per tablet Take 1 tablet by mouth once daily.      omega-3 fatty acids/fish oil (FISH OIL-OMEGA-3 FATTY ACIDS) 300-1,000 mg capsule Take 1 capsule by mouth once daily.      oxyCODONE (ROXICODONE) 5 MG immediate release tablet Take 1 tablet (5 mg total) by mouth every 6 (six) hours as needed for Pain. 16 tablet 0    potassium citrate (UROCIT-K 10) 10 mEq (1,080 mg) TbSR Take 1 tablet (10 mEq total) by mouth 2 (two) times daily with meals. 180 tablet 3    pregabalin (LYRICA) 100 MG capsule Take 1 capsule (100 mg total) by mouth 2 (two) times daily. 60 capsule 5    rifAXIMin (XIFAXAN) 550 mg  Tab Take 1 tablet (550 mg total) by mouth 2 (two) times daily. 60 tablet 11    spironolactone (ALDACTONE) 100 MG tablet Take 1 tablet (100 mg total) by mouth twice a week. 8 tablet 11    UNABLE TO FIND 4 (four) times daily as needed. Medible 20 mg blue raspberry 1/4 to 1/2 up to 4 times daily as needed.       Facility-Administered Encounter Medications as of 5/19/2025   Medication Dose Route Frequency Provider Last Rate Last Admin    0.9%  NaCl infusion   Intravenous Continuous Gerard Salinas MD   Stopped at 03/30/23 0922     Review of Systems   ROS  Physical Exam     Vitals:    05/19/25 1301   BP: 138/83   Pulse: 76         Physical Exam      LABS:  Lab Results   Component Value Date    CREATININE 0.7 05/16/2025    CREATININE 0.7 04/30/2025    CREATININE 0.8 02/20/2025     Urine Culture, Routine   Date Value Ref Range Status   09/12/2024 No growth  Final   09/04/2024 No growth  Final     Hemoglobin A1C   Date Value Ref Range Status   07/26/2022 5.5 4.0 - 5.6 % Final     Comment:     ADA Screening Guidelines:  5.7-6.4%  Consistent with prediabetes  >or=6.5%  Consistent with diabetes    High levels of fetal hemoglobin interfere with the HbA1C  assay. Heterozygous hemoglobin variants (HbS, HgC, etc)do  not significantly interfere with this assay.   However, presence of multiple variants may affect accuracy.        Latest Reference Range & Units 05/16/25 13:57   Color, UA Straw, Katerin, Yellow, Light-Orange  Yellow   Appearance, UA Clear  Clear   Spec Grav UA 1.005 - 1.030  1.015   pH, UA 5.0 - 8.0  6.0   Protein, UA Negative  Negative   Glucose, UA Negative  Negative   Ketones, UA Negative  Negative   Blood, UA Negative  Negative   NITRITE UA Negative  Negative   Bilirubin (UA) Negative  Negative   Urobilinogen, UA <2.0 EU/dL Negative   Leukocyte Esterase, UA Negative  Negative     Radiology:  KUB today no obvious calcification overlying the kidney areas seen.    US  11/24/24  1. Mildly coarse hepatic  echotexture, suggesting steatosis or hepatocellular disease.  2. Mild splenomegaly.  3. Cholelithiasis.  4. Right nephrolithiasis.  No hydronephrosis.    CT RSS 9/12/24  Few tiny nonobstructing right renal stones.  Ureteral vesicular junctions are not well evaluated secondary to streak artifact from right hip arthroplasty.  Similar right-sided hydronephrosis and proximal hydroureter with interval placement of a right double-J ureteral stent.  Proximal in coils about the proximal ureter, and distal and coils about the urinary bladder.  Correlation is advised.     Hepatosplenomegaly with cirrhotic liver morphology.  No focal hepatic lesions, noting limitations from noncontrast examination.     Similar nonspecific colonic wall thickening and surrounding inflammatory change about the ascending colon.  Findings may relate to chronic infectious or non infectious inflammatory process, noting that underlying malignancy cannot be excluded.  Correlation is advised.    CT RSS 8/30/24  Obstructive uropathy by 3 mm calculus in the distal 3rd of the right ureter a few cm above the UVJ.     Stables hepatosplenomegaly.  Assessment and Plan:  Tee was seen today for flank pain.    Diagnoses and all orders for this visit:    Hydronephrosis of right kidney  -     X-Ray Abdomen AP 1 View; Future  -     ciprofloxacin HCl (CIPRO) 500 MG tablet; Take 1 tablet (500 mg total) by mouth 2 (two) times daily. for 3 days        I reviewed her CT.  A newly diagnosed right hydronephrosis and hydroureter.  She appears to have a small obstructive midureteral stone in the right pelvis.  Will scheduled her for right ureteroscopy with stone remoal, cysto right RPG and ureteral stent placement  tomorrow.  She can start cipro as prophylactic abx.  Continue urocit K  Add magnesium citrate supplement.  Nature and risks of operation explained.  Will leave the right ureteral stent with strings attached, since she does not tolerate the ureteral stent  well.  All questions answered.      Follow-up:  Follow up in about 1 day (around 5/20/2025), or right ureteroscopy with stone remoal, cysto right RPG and ureteral stent placement.

## 2025-05-20 ENCOUNTER — RESULTS FOLLOW-UP (OUTPATIENT)
Dept: SURGERY | Facility: CLINIC | Age: 55
End: 2025-05-20

## 2025-05-20 ENCOUNTER — ANESTHESIA (OUTPATIENT)
Dept: SURGERY | Facility: HOSPITAL | Age: 55
End: 2025-05-20
Payer: COMMERCIAL

## 2025-05-20 ENCOUNTER — ANESTHESIA EVENT (OUTPATIENT)
Dept: SURGERY | Facility: HOSPITAL | Age: 55
End: 2025-05-20
Payer: COMMERCIAL

## 2025-05-20 ENCOUNTER — HOSPITAL ENCOUNTER (OUTPATIENT)
Facility: HOSPITAL | Age: 55
Discharge: HOME | End: 2025-05-20
Attending: UROLOGY | Admitting: UROLOGY
Payer: COMMERCIAL

## 2025-05-20 DIAGNOSIS — N20.0 NEPHROLITHIASIS: ICD-10-CM

## 2025-05-20 DIAGNOSIS — N20.1 RIGHT URETERAL STONE: Primary | ICD-10-CM

## 2025-05-20 DIAGNOSIS — N13.30 HYDRONEPHROSIS OF RIGHT KIDNEY: ICD-10-CM

## 2025-05-20 PROCEDURE — 37000009 HC ANESTHESIA EA ADD 15 MINS: Performed by: UROLOGY

## 2025-05-20 PROCEDURE — 27201423 OPTIME MED/SURG SUP & DEVICES STERILE SUPPLY: Performed by: UROLOGY

## 2025-05-20 PROCEDURE — 71000015 HC POSTOP RECOV 1ST HR: Performed by: UROLOGY

## 2025-05-20 PROCEDURE — 82365 CALCULUS SPECTROSCOPY: CPT | Performed by: UROLOGY

## 2025-05-20 PROCEDURE — 36000709 HC OR TIME LEV III EA ADD 15 MIN: Performed by: UROLOGY

## 2025-05-20 PROCEDURE — 25500020 PHARM REV CODE 255: Performed by: UROLOGY

## 2025-05-20 PROCEDURE — 63600175 PHARM REV CODE 636 W HCPCS

## 2025-05-20 PROCEDURE — 71000044 HC DOSC ROUTINE RECOVERY FIRST HOUR: Performed by: UROLOGY

## 2025-05-20 PROCEDURE — 52332 CYSTOSCOPY AND TREATMENT: CPT | Mod: 51,RT,, | Performed by: UROLOGY

## 2025-05-20 PROCEDURE — 74420 UROGRAPHY RTRGR +-KUB: CPT | Mod: 26,,, | Performed by: UROLOGY

## 2025-05-20 PROCEDURE — 25000003 PHARM REV CODE 250

## 2025-05-20 PROCEDURE — 37000008 HC ANESTHESIA 1ST 15 MINUTES: Performed by: UROLOGY

## 2025-05-20 PROCEDURE — C2617 STENT, NON-COR, TEM W/O DEL: HCPCS | Performed by: UROLOGY

## 2025-05-20 PROCEDURE — 52352 CYSTOURETERO W/STONE REMOVE: CPT | Mod: RT,,, | Performed by: UROLOGY

## 2025-05-20 PROCEDURE — 36000708 HC OR TIME LEV III 1ST 15 MIN: Performed by: UROLOGY

## 2025-05-20 PROCEDURE — C1769 GUIDE WIRE: HCPCS | Performed by: UROLOGY

## 2025-05-20 PROCEDURE — C1758 CATHETER, URETERAL: HCPCS | Performed by: UROLOGY

## 2025-05-20 DEVICE — STENT URETERAL UNIV 6FR 24CM: Type: IMPLANTABLE DEVICE | Site: URETER | Status: FUNCTIONAL

## 2025-05-20 RX ORDER — FENTANYL CITRATE 50 UG/ML
INJECTION, SOLUTION INTRAMUSCULAR; INTRAVENOUS
Status: DISCONTINUED | OUTPATIENT
Start: 2025-05-20 | End: 2025-05-20

## 2025-05-20 RX ORDER — DEXAMETHASONE SODIUM PHOSPHATE 4 MG/ML
INJECTION, SOLUTION INTRA-ARTICULAR; INTRALESIONAL; INTRAMUSCULAR; INTRAVENOUS; SOFT TISSUE
Status: DISCONTINUED | OUTPATIENT
Start: 2025-05-20 | End: 2025-05-20

## 2025-05-20 RX ORDER — SODIUM CHLORIDE 0.9 % (FLUSH) 0.9 %
3 SYRINGE (ML) INJECTION
Status: DISCONTINUED | OUTPATIENT
Start: 2025-05-20 | End: 2025-05-20 | Stop reason: HOSPADM

## 2025-05-20 RX ORDER — ONDANSETRON HYDROCHLORIDE 2 MG/ML
INJECTION, SOLUTION INTRAVENOUS
Status: DISCONTINUED | OUTPATIENT
Start: 2025-05-20 | End: 2025-05-20

## 2025-05-20 RX ORDER — GLUCAGON 1 MG
1 KIT INJECTION
Status: DISCONTINUED | OUTPATIENT
Start: 2025-05-20 | End: 2025-05-20 | Stop reason: HOSPADM

## 2025-05-20 RX ORDER — OXYCODONE HYDROCHLORIDE 5 MG/1
5 TABLET ORAL ONCE
Refills: 0 | Status: COMPLETED | OUTPATIENT
Start: 2025-05-20 | End: 2025-05-20

## 2025-05-20 RX ORDER — PROPOFOL 10 MG/ML
VIAL (ML) INTRAVENOUS
Status: DISCONTINUED | OUTPATIENT
Start: 2025-05-20 | End: 2025-05-20

## 2025-05-20 RX ORDER — LIDOCAINE HYDROCHLORIDE 20 MG/ML
INJECTION INTRAVENOUS
Status: DISCONTINUED | OUTPATIENT
Start: 2025-05-20 | End: 2025-05-20

## 2025-05-20 RX ORDER — CEFAZOLIN 2 G/1
2 INJECTION, POWDER, FOR SOLUTION INTRAMUSCULAR; INTRAVENOUS
Status: COMPLETED | OUTPATIENT
Start: 2025-05-20 | End: 2025-05-20

## 2025-05-20 RX ORDER — PHENAZOPYRIDINE HYDROCHLORIDE 100 MG/1
100 TABLET, FILM COATED ORAL ONCE
Status: COMPLETED | OUTPATIENT
Start: 2025-05-20 | End: 2025-05-20

## 2025-05-20 RX ORDER — TAMSULOSIN HYDROCHLORIDE 0.4 MG/1
0.4 CAPSULE ORAL DAILY
Qty: 14 CAPSULE | Refills: 0 | Status: SHIPPED | OUTPATIENT
Start: 2025-05-20 | End: 2025-06-03

## 2025-05-20 RX ORDER — OXYBUTYNIN CHLORIDE 5 MG/1
5 TABLET ORAL 3 TIMES DAILY PRN
Qty: 20 TABLET | Refills: 0 | Status: SHIPPED | OUTPATIENT
Start: 2025-05-20 | End: 2025-05-30

## 2025-05-20 RX ORDER — OXYBUTYNIN CHLORIDE 5 MG/1
5 TABLET ORAL ONCE
Status: COMPLETED | OUTPATIENT
Start: 2025-05-20 | End: 2025-05-20

## 2025-05-20 RX ORDER — IBUPROFEN 800 MG/1
800 TABLET, FILM COATED ORAL 3 TIMES DAILY PRN
Qty: 30 TABLET | Refills: 0 | Status: SHIPPED | OUTPATIENT
Start: 2025-05-20 | End: 2025-05-20 | Stop reason: HOSPADM

## 2025-05-20 RX ORDER — ROCURONIUM BROMIDE 10 MG/ML
INJECTION, SOLUTION INTRAVENOUS
Status: DISCONTINUED | OUTPATIENT
Start: 2025-05-20 | End: 2025-05-20

## 2025-05-20 RX ORDER — OXYCODONE HYDROCHLORIDE 5 MG/1
5 TABLET ORAL EVERY 4 HOURS PRN
Qty: 10 TABLET | Refills: 0 | Status: SHIPPED | OUTPATIENT
Start: 2025-05-20

## 2025-05-20 RX ORDER — PHENAZOPYRIDINE HYDROCHLORIDE 100 MG/1
200 TABLET, FILM COATED ORAL 3 TIMES DAILY PRN
Qty: 30 TABLET | Refills: 0 | Status: SHIPPED | OUTPATIENT
Start: 2025-05-20 | End: 2025-05-30

## 2025-05-20 RX ORDER — MIDAZOLAM HYDROCHLORIDE 1 MG/ML
INJECTION INTRAMUSCULAR; INTRAVENOUS
Status: DISCONTINUED | OUTPATIENT
Start: 2025-05-20 | End: 2025-05-20

## 2025-05-20 RX ORDER — SUCCINYLCHOLINE CHLORIDE 20 MG/ML
INJECTION INTRAMUSCULAR; INTRAVENOUS
Status: DISCONTINUED | OUTPATIENT
Start: 2025-05-20 | End: 2025-05-20

## 2025-05-20 RX ADMIN — SUCCINYLCHOLINE 110 MG: 20 INJECTION, SOLUTION INTRAMUSCULAR; INTRAVENOUS at 01:05

## 2025-05-20 RX ADMIN — SODIUM CHLORIDE: 0.9 INJECTION, SOLUTION INTRAVENOUS at 01:05

## 2025-05-20 RX ADMIN — FENTANYL CITRATE 25 MCG: 50 INJECTION, SOLUTION INTRAMUSCULAR; INTRAVENOUS at 01:05

## 2025-05-20 RX ADMIN — DEXAMETHASONE SODIUM PHOSPHATE 4 MG: 4 INJECTION, SOLUTION INTRAMUSCULAR; INTRAVENOUS at 01:05

## 2025-05-20 RX ADMIN — ROCURONIUM BROMIDE 10 MG: 10 INJECTION, SOLUTION INTRAVENOUS at 01:05

## 2025-05-20 RX ADMIN — SUGAMMADEX 200 MG: 100 INJECTION, SOLUTION INTRAVENOUS at 02:05

## 2025-05-20 RX ADMIN — OXYBUTYNIN CHLORIDE 5 MG: 5 TABLET ORAL at 03:05

## 2025-05-20 RX ADMIN — MIDAZOLAM HYDROCHLORIDE 2 MG: 2 INJECTION, SOLUTION INTRAMUSCULAR; INTRAVENOUS at 01:05

## 2025-05-20 RX ADMIN — LIDOCAINE HYDROCHLORIDE 80 MG: 20 INJECTION INTRAVENOUS at 01:05

## 2025-05-20 RX ADMIN — FENTANYL CITRATE 50 MCG: 50 INJECTION, SOLUTION INTRAMUSCULAR; INTRAVENOUS at 01:05

## 2025-05-20 RX ADMIN — ONDANSETRON 4 MG: 2 INJECTION INTRAMUSCULAR; INTRAVENOUS at 01:05

## 2025-05-20 RX ADMIN — PHENAZOPYRIDINE 100 MG: 100 TABLET ORAL at 03:05

## 2025-05-20 RX ADMIN — OXYCODONE 5 MG: 5 TABLET ORAL at 03:05

## 2025-05-20 RX ADMIN — PROPOFOL 180 MG: 10 INJECTION, EMULSION INTRAVENOUS at 01:05

## 2025-05-20 RX ADMIN — ROCURONIUM BROMIDE 40 MG: 10 INJECTION, SOLUTION INTRAVENOUS at 01:05

## 2025-05-20 RX ADMIN — FENTANYL CITRATE 25 MCG: 50 INJECTION, SOLUTION INTRAMUSCULAR; INTRAVENOUS at 02:05

## 2025-05-20 RX ADMIN — CEFAZOLIN 2 G: 2 INJECTION, POWDER, FOR SOLUTION INTRAMUSCULAR; INTRAVENOUS at 01:05

## 2025-05-20 NOTE — ANESTHESIA PROCEDURE NOTES
Intubation    Date/Time: 5/20/2025 1:33 PM    Performed by: Lona Tran CRNA  Authorized by: Joseph Hogue MD    Intubation:     Induction:  Rapid sequence induction    Intubated:  Postinduction    Mask Ventilation:  Not attempted    Attempts:  1    Attempted By:  CRNA    Method of Intubation:  Video laryngoscopy    Blade:  Coronel 3    Laryngeal View Grade: Grade I - full view of cords      Difficult Airway Encountered?: No      Complications:  None    Airway Device:  Oral endotracheal tube    Airway Device Size:  7.0    Style/Cuff Inflation:  Cuffed (inflated to minimal occlusive pressure)    Inflation Amount (mL):  10    Tube secured:  21    Secured at:  The lips    Placement Verified By:  Capnometry    Complicating Factors:  None    Findings Post-Intubation:  BS equal bilateral and atraumatic/condition of teeth unchanged

## 2025-05-20 NOTE — BRIEF OP NOTE
[facility] - Surgery ([facility])  Brief Operative Note    Surgery Date: 5/20/2025     Surgeons and Role:     * Chris Carey MD - Primary     * Jeremías Hines MD - Resident - Assisting        Pre-op Diagnosis:  Right ureteral stone [N20.1]  Hydronephrosis of right kidney [N13.30]    Post-op Diagnosis:  Post-Op Diagnosis Codes:     * Right ureteral stone [N20.1]     * Hydronephrosis of right kidney [N13.30]    Procedure(s) (LRB):  URETEROSCOPY (Right)  CYSTOSCOPY, WITH URETERAL STENT INSERTION (Right)  CYSTOSCOPY, WITH RETROGRADE PYELOGRAM (Right)  REMOVAL, CALCULUS, URETER (Right)  NEPHROSCOPY (Right)    Anesthesia: General    Operative Findings: 1. Uncomplicated ureteroscopy    Estimated Blood Loss: * No values recorded between 5/20/2025  1:26 PM and 5/20/2025  2:22 PM *         Specimens:   Specimen (24h ago, onward)      None            ID Type Source Tests Collected by Time Destination   A : right kidney stone Stone Kidney, Right URINARY STONE ANALYSIS Jeremías Hines MD 5/20/2025 1418            Discharge Note    OUTCOME: Patient tolerated treatment/procedure well without complication and is now ready for discharge.    DISPOSITION: Home or Self Care    FINAL DIAGNOSIS:  Urolithiasis    FOLLOWUP: In clinic    DISCHARGE INSTRUCTIONS:  No discharge procedures on file.

## 2025-05-20 NOTE — OP NOTE
[facility]  Operative Note    Date: 05/20/2025    Pre-Op Diagnosis: Right ureteral stone    Post-Op Diagnosis: same    Procedure(s) Performed:   1.  Right ureteroscopy  2.  Cystoscopy  3.  Right pyeloscopy  4.  Right retrograde pyelogram  5.  Stone basket  extraction  6.  Ureteral stent  placement    Specimen(s):  Stone for analysis    Staff Surgeon: Chris Carey MD    Assistant Surgeon: Jeremías Hines MD    Anesthesia: General endotracheal anesthesia    Indications: Tee Aranda is a 54 y.o. female with a right ureteral stone, presenting for definitive stone management.  She currently does not have a JJ ureteral stent in place.      Findings:   No ureteral stones identified  2 small stones basket extracted from kidney    1 baskets were used throughout the case.      Laser Fiber - NA  Fiber Diameter - NA    Estimated Blood Loss: min    Drains: 6 Fr x 24 cm JJ ureteral stent with strings    Procedure in detail:  After informed consent was obtained, the patient was brought the the cystoscopy suite and placed in the supine position.  SCDs were applied and working.  Anesthesia was administered.  The patient was then placed in the dorsal lithotomy position and prepped and draped in the usual sterile fashion.      A rigid cystoscope in a 22 Fr sheath was introduced into the patient's urethra.  This passed easily.  The entire urethra was visualized which showed no strictures or masses.  Formal cystoscopy was performed which revealed no masses or lesions suspicious for malignancy, no bladder stones, no bladder diverticuli, no trabeculations.  The ureteral orifices were visualized in the normal anatomic position bilaterally and clear efflux was visualized.      A motion wire was passed up the right ureteral orifice and up into the kidney.  This passed easily and placement was confirmed using fluoro.  The cystoscope was removed keeping the guidewire in place and the wire was secured to the drape.       An 8 Fr rigid ureteroscope was passed into the patient's bladder alongside the wire under direct vision.  It was then passed through the right ureteral orifice alongside the wire.  No stones were encountered. A stiff  glide wire was passed through the scope and the scope was removed  keeping both wires in place.     A flexible ureteroscope was assembled and passed over the stiff glidewire up to the kidney under fluoroscopy.  This passed easily.  Systematic pyeloscopy was performed which revealed 2 small stones in the midpole.  A Nitinol tipless basket was passed through the scope and the stones were basket extracted and placed in the bladder.  The ureteroscope was removed in the ureter was cleared.  The cystoscope was reassembled and passed into the bladder and the stones were irrigated and passed off the field for analysis.    Before right ureteral stent was placed, retrograde pyelogram was done via ureteral catheter.  No hydroureter, hydronephrosis, not a filling defect was noted.    A 6 Fr x 24 cm JJ ureteral stent with strings was passed over the wire and up into the renal pelvis using fluoro.  When the coil appeared to be in good position in the kidney and the radio-opaque marker of the pusher was at the inferior pubis, the wire was removed under continuous fluoro.  Good coils were seen in the kidney and the bladder using fluoro.      The patient tolerated the procedure well and was transferred to the recovery room in stable condition.      Disposition:  The patient will follow up with Dr. Carey in 3 months.      Jeremías Hines MD

## 2025-05-20 NOTE — ANESTHESIA PREPROCEDURE EVALUATION
[facility]  Anesthesia Pre-Operative Evaluation     Patient Name: Tee Aranda  YOB: 1970  MRN: [id number]  [facility]: [id number]       Admit Date: (Not on file)   Admit Team: Networked reference to record PCT      Date of Procedure: 5/20/2025  Anesthesia: General Procedure: Procedure(s) (LRB):  URETEROSCOPY, WITH LASER LITHOTRIPSY (Right)  CYSTOSCOPY, WITH URETERAL STENT INSERTION (Right)  CYSTOSCOPY, WITH RETROGRADE PYELOGRAM (Right)  Pre-Operative Diagnosis: Right ureteral stone [N20.1]  Hydronephrosis of right kidney [N13.30]  Proceduralist:Surgeons and Role:     * Chris Carey MD - Primary  Code Status: Prior   Advanced Directive: <no information>  Isolation Precautions: No active isolations  Capacity: Full capacity     SUBJECTIVE:   Tee Aranda is a 54 y.o. female who  has a past medical history of Anemia, unspecified, Anxiety, Arthritis, Ascites (11/23/2020), AVN (avascular necrosis of bone), Cataract, COVID-19 vaccine administered, Diarrhea of presumed infectious origin (7/31/2023), Edema (11/23/2020), Epigastric pain (7/31/2023), Esophageal varices, Glaucoma, Hepatic encephalopathy (11/23/2020), History of colon polyps, cirrhosis, Iron deficiency anemia secondary to blood loss (chronic), Kidney stones, Portal hypertensive gastropathy, Unintentional weight loss (10/14/2023), and Unspecified cirrhosis of liver.  No notes on file    Hospital LOS: 0 days  ICU LOS: Patient does not have an ICU stay during this admission.    she has a current medication list which includes the following long-term medication(s): carvedilol, esomeprazole, furosemide, fish oil-omega-3 fatty acids, potassium citrate, pregabalin, and spironolactone.   Current Outpatient Medications   Medication Instructions    carvediloL (COREG) 3.125 mg, Oral, 2 times daily    ciprofloxacin HCl (CIPRO) 500 mg, Oral, 2 times daily    diltiazem HCl (DILTIAZEM 2% CREAM) Topical (Top), 3 times daily, Apply topically  to anal area.    esomeprazole (NEXIUM) 40 MG capsule TAKE 1 CAPSULE BY MOUTH 2 TIMES DAILY BEFORE MEALS.    furosemide (LASIX) 40 mg, Oral, Twice weekly    multivitamin (THERAGRAN) per tablet 1 tablet, Daily    omega-3 fatty acids/fish oil (FISH OIL-OMEGA-3 FATTY ACIDS) 300-1,000 mg capsule 1 capsule, Daily    oxyCODONE (ROXICODONE) 5 mg, Oral, Every 6 hours PRN    potassium citrate (UROCIT-K 10) 10 mEq (1,080 mg) TbSR 10 mEq, Oral, 2 times daily with meals    pregabalin (LYRICA) 100 mg, Oral, 2 times daily    spironolactone (ALDACTONE) 100 mg, Oral, Twice weekly    UNABLE TO FIND 4 times daily PRN    XIFAXAN 550 mg, Oral, 2 times daily     ALLERGIES:     Review of patient's allergies indicates:   Allergen Reactions    Sulfa (sulfonamide antibiotics) Hives     LDA:   AIRWAY:         [unfilled]     Lines/Drains/Airways       None                  Anesthesia Evaluation      Airway   Mallampati: II  TM distance: Normal  Neck ROM: Normal ROM  Dental    (+) Intact    Pulmonary    Cardiovascular   Exercise tolerance: poor    Neuro/Psych      GI/Hepatic/Renal    (+) hepatitis, liver disease, chronic renal disease    Endo/Other    Abdominal                      Current Outpatient Medications on File Prior to Encounter   Medication Sig Dispense Refill Last Dose/Taking    carvediloL (COREG) 3.125 MG tablet Take 1 tablet (3.125 mg total) by mouth 2 (two) times daily. 180 tablet 3     ciprofloxacin HCl (CIPRO) 500 MG tablet Take 1 tablet (500 mg total) by mouth 2 (two) times daily. for 3 days 6 tablet 0     diltiazem HCl (DILTIAZEM 2% CREAM) Apply topically 3 (three) times daily. Apply topically to anal area. 30 g 2     esomeprazole (NEXIUM) 40 MG capsule TAKE 1 CAPSULE BY MOUTH 2 TIMES DAILY BEFORE MEALS. 180 capsule 3     furosemide (LASIX) 40 MG tablet Take 1 tablet (40 mg total) by mouth twice a week. 8 tablet 11     multivitamin (THERAGRAN) per tablet Take 1 tablet by mouth once daily.       omega-3 fatty acids/fish oil  (FISH OIL-OMEGA-3 FATTY ACIDS) 300-1,000 mg capsule Take 1 capsule by mouth once daily.       oxyCODONE (ROXICODONE) 5 MG immediate release tablet Take 1 tablet (5 mg total) by mouth every 6 (six) hours as needed for Pain. 16 tablet 0     potassium citrate (UROCIT-K 10) 10 mEq (1,080 mg) TbSR Take 1 tablet (10 mEq total) by mouth 2 (two) times daily with meals. 180 tablet 3     pregabalin (LYRICA) 100 MG capsule Take 1 capsule (100 mg total) by mouth 2 (two) times daily. 60 capsule 5     rifAXIMin (XIFAXAN) 550 mg Tab Take 1 tablet (550 mg total) by mouth 2 (two) times daily. 60 tablet 11     spironolactone (ALDACTONE) 100 MG tablet Take 1 tablet (100 mg total) by mouth twice a week. 8 tablet 11     UNABLE TO FIND 4 (four) times daily as needed. Medible 20 mg blue raspberry 1/4 to 1/2 up to 4 times daily as needed.         Inpatient Medications:  Antibiotics (From admission, onward)      None          VTE Risk Mitigation (From admission, onward)      None              Current Medications[1]       History:   There are no hospital problems to display for this patient.    Surgical History:    has a past surgical history that includes Total hip arthroplasty (Right); Tonsillectomy; Knee surgery (Bilateral); Rhinoplasty tip; Lithotripsy; Hysterectomy; Esophagogastroduodenoscopy (N/A, 11/10/2020); Esophagogastroduodenoscopy (N/A, 12/28/2020); Esophagogastroduodenoscopy (N/A, 02/15/2021); Esophagogastroduodenoscopy (Left, 08/03/2021); Distal clavicle excision (Right, 11/18/2021); Esophagogastroduodenoscopy (N/A, 07/27/2022); Esophagogastroduodenoscopy (Left, 08/29/2022); Appendectomy; Esophagogastroduodenoscopy (N/A, 10/05/2022); Esophageal varice ligation; Colonoscopy w/ polypectomy; Shoulder surgery (Right); Esophagogastroduodenoscopy (N/A, 3/30/2023); Esophagogastroduodenoscopy (N/A, 8/3/2023); Colonoscopy (N/A, 8/3/2023); Cystoscopy w/ ureteral stent removal (Right, 3/14/2024); Cystoscopy (N/A, 3/20/2024); Ureteroscopy  (Right, 3/20/2024); Laser lithotripsy (Right, 3/20/2024); extraction - stone (Right, 3/20/2024); removal-stent (Right, 3/20/2024); Ureteral stent placement (Right, 3/20/2024); Retrograde pyelography (Right, 3/20/2024); Esophagogastroduodenoscopy (N/A, 5/27/2024); Colonoscopy (N/A, 5/27/2024); Ureteral stent placement (Right, 9/4/2024); Cystoscopy (9/4/2024); Cystoscopy w/ retrogrades (Right, 9/13/2024); Cystoscopy with calculus extraction (Right, 9/13/2024); Laser lithotripsy (Right, 9/13/2024); Ureteroscopy (Right, 9/13/2024); removal-stent (Right, 9/13/2024); injection, sacroiliac joint (Right, 3/21/2025); and Colonoscopy (N/A, 5/14/2025).   Social History:    reports being sexually active and has had partner(s) who are male.  reports that she has been smoking cigarettes. She has never used smokeless tobacco. She reports that she does not currently use alcohol. She reports that she does not use drugs.    There were no vitals filed for this visit.  Vital Signs Range (Last 24H):  Pulse:  [76]   BP: (138)/(83)     There is no height or weight on file to calculate BMI.  Wt Readings from Last 4 Encounters:   05/19/25 59 kg (130 lb 1.1 oz)   05/16/25 59 kg (130 lb)   05/14/25 58.9 kg (129 lb 13.6 oz)   05/08/25 59 kg (130 lb)        Intake/Output - Last 3 Shifts       None          Lab Results   Component Value Date    WBC 4.18 05/16/2025    HGB 12.5 05/16/2025    HCT 36 05/16/2025    PLT 79 (L) 05/16/2025     05/16/2025    K 3.7 05/16/2025     05/16/2025    CREATININE 0.7 05/16/2025    BUN 19 05/16/2025    CO2 24 05/16/2025     05/16/2025    CALCIUM 9.1 05/16/2025    MG 1.5 (L) 04/01/2024    PHOS 3.7 03/26/2024    ALKPHOS 109 05/16/2025    ALT 25 05/16/2025    AST 26 05/16/2025    ALBUMIN 3.9 05/16/2025    INR 1.1 05/12/2025    PROTIME 12.0 05/12/2025    APTT 28.5 04/23/2024    HGBA1C 5.5 07/26/2022    LACTATE 1.3 09/12/2024    CPK 49 08/30/2024    CPKMB 1.4 07/26/2022    TROPONINI <0.006 08/30/2024     MB 1.6 07/26/2022    BNP 86 03/24/2024    NTPROBNP 52.0 11/17/2017     No results found for this or any previous visit (from the past 12 hours).  Recent Labs   Lab 05/16/25  1340 05/16/25  1349   WBC 4.18  --    HGB 12.5  --    HCT 37.8 36   PLT 79*  --      --    K 3.7  --    CREATININE 0.7  --      --      No LMP recorded. Patient has had a hysterectomy.    EKG:   Results for orders placed or performed during the hospital encounter of 05/16/25   EKG 12-lead    Collection Time: 05/16/25  1:56 PM   Result Value Ref Range    QRS Duration 74 ms    [facility] QTC Calculation 416 ms    Narrative    Test Reason : R07.9,    Vent. Rate :  73 BPM     Atrial Rate :  73 BPM     P-R Int : 124 ms          QRS Dur :  74 ms      QT Int : 378 ms       P-R-T Axes :  66  88  72 degrees    QTcB Int : 416 ms    Normal sinus rhythm  Normal ECG  When compared with ECG of 30-Aug-2024 17:27,  No significant change was found  Confirmed by Praveena Ring ([id number]) on 5/16/2025 2:16:00 PM    Referred By: AAAREFERRAL SELF           Confirmed By: Praveena Ring     TTE:  No results found for this or any previous visit.    LIUDMILA:  No results found for this or any previous visit.    Stress Test:  No results found for this or any previous visit.    No results found for this or any previous visit.    LHC:  No results found for this or any previous visit.    Cardiac Device Check   No results found for this or any previous visit.    No results found for this or any previous visit.                                                                                                               05/20/2025  Tee Aranda is a 54 y.o., female.      Pre-op Assessment    I have reviewed the Patient Summary Reports.       I have reviewed the Medications.     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, varices                 Physical Exam  General: Well nourished, Cooperative, Alert and Oriented    Airway:  Mallampati: II   Mouth Opening: Normal  TM Distance: Normal  Neck ROM: Normal ROM    Dental:  Intact        Anesthesia Plan  Type of Anesthesia, risks & benefits discussed:    Anesthesia Type: Gen ETT  Intra-op Monitoring Plan: Standard ASA Monitors  Post Op Pain Control Plan: multimodal analgesia and IV/PO Opioids PRN  Induction:  IV  Airway Plan: Video, Post-Induction  Informed Consent: Informed consent signed with the Patient and all parties understand the risks and agree with anesthesia plan.  All questions answered.   ASA Score: 3    Ready For Surgery From Anesthesia Perspective.     .           [1]   No current facility-administered medications for this encounter.     Current Outpatient Medications   Medication Sig Dispense Refill    carvediloL (COREG) 3.125 MG tablet Take 1 tablet (3.125 mg total) by mouth 2 (two) times daily. 180 tablet 3    ciprofloxacin HCl (CIPRO) 500 MG tablet Take 1 tablet (500 mg total) by mouth 2 (two) times daily. for 3 days 6 tablet 0    diltiazem HCl (DILTIAZEM 2% CREAM) Apply topically 3 (three) times daily. Apply topically to anal area. 30 g 2    esomeprazole (NEXIUM) 40 MG capsule TAKE 1 CAPSULE BY MOUTH 2 TIMES DAILY BEFORE MEALS. 180 capsule 3    furosemide (LASIX) 40 MG tablet Take 1 tablet (40 mg total) by mouth twice a week. 8 tablet 11    multivitamin (THERAGRAN) per tablet Take 1 tablet by mouth once daily.      omega-3 fatty acids/fish oil (FISH OIL-OMEGA-3 FATTY ACIDS) 300-1,000 mg capsule Take 1 capsule by mouth once daily.      oxyCODONE (ROXICODONE) 5 MG immediate release tablet Take 1 tablet (5 mg total) by mouth every 6 (six) hours as needed  for Pain. 16 tablet 0    potassium citrate (UROCIT-K 10) 10 mEq (1,080 mg) TbSR Take 1 tablet (10 mEq total) by mouth 2 (two) times daily with meals. 180 tablet 3    pregabalin (LYRICA) 100 MG capsule Take 1 capsule (100 mg total) by mouth 2 (two) times daily. 60 capsule 5    rifAXIMin (XIFAXAN) 550 mg Tab Take 1 tablet (550 mg total) by mouth 2 (two) times daily. 60 tablet 11    spironolactone (ALDACTONE) 100 MG tablet Take 1 tablet (100 mg total) by mouth twice a week. 8 tablet 11    UNABLE TO FIND 4 (four) times daily as needed. Medible 20 mg blue raspberry 1/4 to 1/2 up to 4 times daily as needed.       Facility-Administered Medications Ordered in Other Encounters   Medication Dose Route Frequency Provider Last Rate Last Admin    0.9%  NaCl infusion   Intravenous Continuous Gerard Salinas MD   Stopped at 03/30/23 0975

## 2025-05-20 NOTE — TRANSFER OF CARE
"Anesthesia Transfer of Care Note    Patient: Tee Aranda    Procedure(s) Performed: Procedure(s) (LRB):  URETEROSCOPY (Right)  CYSTOSCOPY, WITH URETERAL STENT INSERTION (Right)  CYSTOSCOPY, WITH RETROGRADE PYELOGRAM (Right)  REMOVAL, CALCULUS, URETER (Right)  NEPHROSCOPY (Right)    Patient location: PACU    Anesthesia Type: general    Transport from OR: Transported from OR on 6-10 L/min O2 by face mask with adequate spontaneous ventilation    Post pain: adequate analgesia    Post assessment: no apparent anesthetic complications    Post vital signs: stable    Level of consciousness: awake, alert and oriented    Nausea/Vomiting: no nausea/vomiting    Complications: none    Transfer of care protocol was followed      Last vitals: Visit Vitals  /76 (BP Location: Right arm, Patient Position: Lying)   Pulse 64   Temp 36.7 °C (98.1 °F) (Temporal)   Resp 18   Ht 5' 8" (1.727 m)   Wt 59 kg (130 lb 1.1 oz)   SpO2 100%   Breastfeeding No   BMI 19.78 kg/m²     "

## 2025-05-20 NOTE — DISCHARGE INSTRUCTIONS
Postoperative Instructions for Stone Surgery    1. Ureteral Stent:     - You have a ureteral stent in place with strings. You may remove your stent on 5/23/2025.     - To remove your stent, it is recommended that you do so in the shower or while urinating. To remove the stent, gently grasp the strings hanging outside of your urethra and slowly and gently pull downward until the stent is completely removed. The ureteral stent is approximately 12 inches in length.    2. Hydration and Fluid Intake:     - It is essential to drink plenty of fluids following the ureteroscopy procedure to promote flushing of the urinary system and prevent dehydration.     - Aim to drink at least 8 to 10 glasses of water or other non-caffeinated, non-alcoholic beverages daily, unless instructed otherwise by your healthcare provider.    3. Pain Management:     - You may experience mild to moderate discomfort or pain after the ureteroscopy procedure. This is usually manageable with over-the-counter pain relievers such as acetaminophen or nonsteroidal anti-inflammatory drugs (NSAIDs).     - You may experience mild bladder and flank discomfort especially when voiding due to your ureteral stent. This may be alleviated with medications that were prescribed to you such as oxybutynin and/or pyridium. You may take these as needed for any urinary discomfort while a stent. Please note this discomfort is temporary and should subside once the stent is removed.     - If prescribed pain medication, take it as instructed, following the prescribed dosage and frequency.    4. Urinary Symptoms:     - It is common to experience some urinary symptoms after the procedure, such as urgency, frequency, burning sensation, or blood in the urine (hematuria). These symptoms should gradually improve within a few days.     - If the symptoms worsen or if you notice significant blood clots or inability to urinate, contact your healthcare provider.    5. Activity and  Rest:     - It is advisable to take it easy and get plenty of rest for the first few days following the procedure. Avoid strenuous activities, heavy lifting, or vigorous exercise for 1-2 days.     - Gradually resume normal activities as you feel comfortable, but listen to your body and avoid overexertion.    6. Diet and Nutrition:     - Follow any dietary recommendations provided by your healthcare provider. In general, maintain a balanced diet with adequate fiber intake to prevent constipation.     - Avoid excessive consumption of salt, spicy foods, and caffeinated or carbonated beverages, as these may irritate the urinary system.    7. Follow-up Appointments:     - Attend all scheduled follow-up appointments with your healthcare provider to monitor your recovery and assess the success of the stone removal.     - You will be contacted via phone or the patient portal about any follow up appointments and tests/imaging in about 1-2 weeks.     - Additional imaging or laboratory tests may be ordered to evaluate the effectiveness of the procedure.    8. Signs of Complications:     - Be aware of potential complications and contact your healthcare provider if you experience any of the following: persistent or worsening pain, fever, chills, excessive fatigue, severe bleeding, inability to pass urine, or signs of infection.    9. Medication Compliance:     - If prescribed any medications, such as antibiotics or medications to prevent stone recurrence, take them as instructed by your healthcare provider. Follow the recommended dosage and complete the full course of medication.    If you have any additional questions, call [phone] and ask for your provider. If after hours (night or weekends) ask for urology on call and you will be directed to the appropriate provider.

## 2025-05-20 NOTE — PLAN OF CARE
..Patient alert and oriented x4. Vital signs stable. No signs of distress. Patient educated and given discharge instructions. IV removed. All questions answers. Patient ready for discharge.

## 2025-05-21 ENCOUNTER — PATIENT MESSAGE (OUTPATIENT)
Dept: UROLOGY | Facility: CLINIC | Age: 55
End: 2025-05-21
Payer: COMMERCIAL

## 2025-05-21 ENCOUNTER — PATIENT MESSAGE (OUTPATIENT)
Dept: HEPATOLOGY | Facility: CLINIC | Age: 55
End: 2025-05-21
Payer: COMMERCIAL

## 2025-05-21 NOTE — ANESTHESIA POSTPROCEDURE EVALUATION
Anesthesia Post Evaluation    Patient: Tee Aranda    Procedure(s) Performed: Procedure(s) (LRB):  URETEROSCOPY (Right)  CYSTOSCOPY, WITH URETERAL STENT INSERTION (Right)  CYSTOSCOPY, WITH RETROGRADE PYELOGRAM (Right)  REMOVAL, CALCULUS, URETER (Right)  NEPHROSCOPY (Right)    Final Anesthesia Type: general      Patient location during evaluation: PACU  Patient participation: Yes- Able to Participate  Level of consciousness: awake and alert  Post-procedure vital signs: reviewed and stable  Pain management: adequate  Airway patency: patent    PONV status at discharge: No PONV  Anesthetic complications: no      Cardiovascular status: blood pressure returned to baseline  Respiratory status: unassisted  Hydration status: euvolemic  Follow-up not needed.              Vitals Value Taken Time   /73 05/20/25 15:45   Temp 36.7 °C (98.1 °F) 05/20/25 15:45   Pulse 70 05/20/25 15:45   Resp 18 05/20/25 15:45   SpO2 96 % 05/20/25 15:45         No case tracking events are documented in the log.      Pain/Dio Score: Pain Rating Prior to Med Admin: 10 (5/20/2025  3:09 PM)  Pain Rating Post Med Admin: 5 (5/20/2025  3:45 PM)  Dio Score: 10 (5/20/2025  3:45 PM)

## 2025-05-22 VITALS
RESPIRATION RATE: 18 BRPM | HEIGHT: 68 IN | BODY MASS INDEX: 19.71 KG/M2 | SYSTOLIC BLOOD PRESSURE: 137 MMHG | DIASTOLIC BLOOD PRESSURE: 73 MMHG | HEART RATE: 70 BPM | WEIGHT: 130.06 LBS | OXYGEN SATURATION: 96 % | TEMPERATURE: 98 F

## 2025-05-23 ENCOUNTER — TELEPHONE (OUTPATIENT)
Dept: UROLOGY | Facility: CLINIC | Age: 55
End: 2025-05-23
Payer: COMMERCIAL

## 2025-05-28 LAB
CELL MATERIAL STONE EST-MCNT: NORMAL %
LABORATORY COMMENT REPORT: NORMAL
SPECIMEN SOURCE: NORMAL

## 2025-06-09 ENCOUNTER — RESULTS FOLLOW-UP (OUTPATIENT)
Dept: TRANSPLANT | Facility: CLINIC | Age: 55
End: 2025-06-09

## 2025-06-09 ENCOUNTER — HOSPITAL ENCOUNTER (OUTPATIENT)
Dept: RADIOLOGY | Facility: HOSPITAL | Age: 55
Discharge: HOME | End: 2025-06-09
Attending: INTERNAL MEDICINE
Payer: COMMERCIAL

## 2025-06-09 ENCOUNTER — TELEPHONE (OUTPATIENT)
Dept: SPORTS MEDICINE | Facility: CLINIC | Age: 55
End: 2025-06-09
Payer: COMMERCIAL

## 2025-06-09 ENCOUNTER — PATIENT MESSAGE (OUTPATIENT)
Dept: SURGERY | Facility: CLINIC | Age: 55
End: 2025-06-09
Payer: COMMERCIAL

## 2025-06-09 DIAGNOSIS — K74.60 LIVER CIRRHOSIS SECONDARY TO NASH: Chronic | ICD-10-CM

## 2025-06-09 DIAGNOSIS — K75.81 LIVER CIRRHOSIS SECONDARY TO NASH: Chronic | ICD-10-CM

## 2025-06-09 PROCEDURE — 76700 US EXAM ABDOM COMPLETE: CPT | Mod: TC

## 2025-06-09 PROCEDURE — 76700 US EXAM ABDOM COMPLETE: CPT | Mod: 26,,, | Performed by: RADIOLOGY

## 2025-06-09 NOTE — TELEPHONE ENCOUNTER
Spoke to the Pt about her appt and the need to move her back a little due to the Dr being at a meeting.  She understood and confirmed the new time.

## 2025-06-11 ENCOUNTER — TELEPHONE (OUTPATIENT)
Dept: HEPATOLOGY | Facility: CLINIC | Age: 55
End: 2025-06-11
Payer: COMMERCIAL

## 2025-06-11 ENCOUNTER — PATIENT MESSAGE (OUTPATIENT)
Dept: HEPATOLOGY | Facility: CLINIC | Age: 55
End: 2025-06-11
Payer: COMMERCIAL

## 2025-06-11 NOTE — TELEPHONE ENCOUNTER
"Message form pt " wants to speak with doctor concerning pt last colonoscopy, asking for call back"   "

## 2025-06-12 ENCOUNTER — TELEPHONE (OUTPATIENT)
Dept: TRANSPLANT | Facility: HOSPITAL | Age: 55
End: 2025-06-12
Payer: COMMERCIAL

## 2025-06-12 NOTE — TELEPHONE ENCOUNTER
--returned pt's call  --not anemic and no transfusions/iron infusions  --no fluid refractory fluid retention  --no need for tips  --ct shows issue with right kidney/ureter including hydronephrosis and stranding  --colonoscopy- pictures not overly remarkable and biopsies of colon normal  --need f/u with GI re diarrhea

## 2025-06-13 ENCOUNTER — TELEPHONE (OUTPATIENT)
Dept: UROLOGY | Facility: CLINIC | Age: 55
End: 2025-06-13
Payer: COMMERCIAL

## 2025-06-13 DIAGNOSIS — R10.30 LOWER ABDOMINAL PAIN: Primary | ICD-10-CM

## 2025-06-13 NOTE — TELEPHONE ENCOUNTER
Lower abdominal pain  -     CT Renal Stone Study ABD Pelvis WO; Future; Expected date: 06/13/2025     Please have her do CT RSS and follow up with me.

## 2025-06-16 ENCOUNTER — OFFICE VISIT (OUTPATIENT)
Dept: GASTROENTEROLOGY | Facility: CLINIC | Age: 55
End: 2025-06-16
Payer: COMMERCIAL

## 2025-06-16 VITALS
BODY MASS INDEX: 19.84 KG/M2 | WEIGHT: 130.94 LBS | SYSTOLIC BLOOD PRESSURE: 146 MMHG | HEART RATE: 63 BPM | HEIGHT: 68 IN | DIASTOLIC BLOOD PRESSURE: 81 MMHG

## 2025-06-16 DIAGNOSIS — K52.9 CHRONIC DIARRHEA: Primary | ICD-10-CM

## 2025-06-16 PROCEDURE — 1159F MED LIST DOCD IN RCRD: CPT | Mod: CPTII,GC,S$GLB, | Performed by: INTERNAL MEDICINE

## 2025-06-16 PROCEDURE — 3079F DIAST BP 80-89 MM HG: CPT | Mod: CPTII,GC,S$GLB, | Performed by: INTERNAL MEDICINE

## 2025-06-16 PROCEDURE — 3077F SYST BP >= 140 MM HG: CPT | Mod: CPTII,GC,S$GLB, | Performed by: INTERNAL MEDICINE

## 2025-06-16 PROCEDURE — 3008F BODY MASS INDEX DOCD: CPT | Mod: CPTII,GC,S$GLB, | Performed by: INTERNAL MEDICINE

## 2025-06-16 PROCEDURE — 99999 PR PBB SHADOW E&M-EST. PATIENT-LVL IV: CPT | Mod: PBBFAC,,,

## 2025-06-16 PROCEDURE — 99214 OFFICE O/P EST MOD 30 MIN: CPT | Mod: GC,S$GLB,, | Performed by: INTERNAL MEDICINE

## 2025-06-16 NOTE — PROGRESS NOTES
[facility] NOTE    Name: Tee Aranda  : 1970  Date of Service: 2025   PCP: James Samano MD    Reason for visit:   Chief Complaint   Patient presents with    Abdominal Pain       HPI:     The patient is a 54 year old female who presents to GI clinic for chronic diarrhea. She reports 15 year history of diarrhea, previously seen by 5/15/24 Dr Douglass for same issue along with abdominal pain. She has decompensated MASH cirrhosis, follows with Dr. Bowers. She has extensive diarrhea eval in the past, Dr. Douglass had started her on amitriptyline but patient has since stopped this due to constipation. At that time her prior EGD and cscope form  were unrevealing but TI was not intubated and no small bowel biopsies obtained so repeat EGD and cscope ordered as well as GES. EGD and colon were repeated 2024, unrevealing. GES also normal. Next plan with Dr. Douglass was for SIBO breath testing. Patient reports receiving the kit in the mail but then her diarrhea improved and she did not do the testing. No further eval for diarrhea has happened since that time that I can see.     She was seen in [facility] in  for hemorrhoids and pain, CT scan done revealed enterocolitis with significant thickening of the walls of nearly the entirety of the right colon, which has progressively worsened over prior examinations. Colonoscopy done on 25 revealed hemorrhoids, congested mucosa in the terminal ileum (biopsied- normal) and congested mucosa in the entire examined colon (biopsied- normal), results favor portal colopathy.     In regards to her RLQ pain that radiates to her right flank, suspect urologic in nature, this is currently under management of Dr. Carey. In regards to diarrhea, this seems to be her main complaint at todays visit. For the last 2 months she has had 10-12 BM daily, endorses eating less and weight loss due to going to the bathroom immediately after eating. She is still on  "Rifaximin for HE, does not take lactulose due to this increasing her baseline diarrhea.     Most Recent Upper Endoscopy:   EGD 5/27/24   Impression:            - Grade I esophageal varices.                          - Portal hypertensive gastropathy.                          - Gastritis. Biopsied- PHG                         - Normal examined duodenum. Biopsied- normal, negative for celiac     Most Recent Colonoscopy:   Colonoscopy 5/14/25  Impression:            - Hemorrhoids found on perianal exam.                          - Congested mucosa in the terminal ileum. Biopsied- normal                          - Congested mucosa in the entire examined colon.                          Biopsied- normal. Clips were placed. Clip :                          [employer].     Review of Systems:   ROS - negative except for otherwise stated in HPI      Medications: Current Medications[1]     Past medical history, past surgical history, family history, allergies, and social history reviewed and updated in EMR.     Vitals:   Vitals:    06/16/25 1510   BP: (!) 146/81   Pulse: 63   Weight: 59.4 kg (130 lb 15.3 oz)   Height: 5' 8" (1.727 m)     Body mass index is 19.91 kg/m².    Physical Exam  Constitutional:  mild distress and tearful, frustrated   HENT: Head: Normal, normocephalic.  Eyes: conjunctiva clear and sclera nonicteric  GI: soft, non-tender, non-distended   Skin: normal color on visualized skin  Neurological: alert, oriented    Assessment:   1. Chronic diarrhea          Extensive discussion with her regarding chronic diarrhea. Given her lengthy workup and recent colonoscopy, will try to start with scheduled antidiarrheals since she has never tried this before. She is already on rifaximin so feel that SIBO testing is low yield.     Plan:   - Schedule imodium twice daily   - Contact our office if no improvement in 1-2 weeks, will add on colestipol or questran next   - Follow up closely with urology "     Discussed with staff attending. Attestation to follow.     Jaylin Han MD  Gastroenterology Fellow PGY-IV  [facility]         [1]   Current Outpatient Medications:     carvediloL (COREG) 3.125 MG tablet, Take 1 tablet (3.125 mg total) by mouth 2 (two) times daily., Disp: 180 tablet, Rfl: 3    esomeprazole (NEXIUM) 40 MG capsule, TAKE 1 CAPSULE BY MOUTH 2 TIMES DAILY BEFORE MEALS., Disp: 180 capsule, Rfl: 3    furosemide (LASIX) 40 MG tablet, Take 1 tablet (40 mg total) by mouth twice a week., Disp: 8 tablet, Rfl: 11    multivitamin (THERAGRAN) per tablet, Take 1 tablet by mouth once daily., Disp: , Rfl:     omega-3 fatty acids/fish oil (FISH OIL-OMEGA-3 FATTY ACIDS) 300-1,000 mg capsule, Take 1 capsule by mouth once daily., Disp: , Rfl:     potassium citrate (UROCIT-K 10) 10 mEq (1,080 mg) TbSR, Take 1 tablet (10 mEq total) by mouth 2 (two) times daily with meals., Disp: 180 tablet, Rfl: 3    pregabalin (LYRICA) 100 MG capsule, Take 1 capsule (100 mg total) by mouth 2 (two) times daily., Disp: 60 capsule, Rfl: 5    rifAXIMin (XIFAXAN) 550 mg Tab, Take 1 tablet (550 mg total) by mouth 2 (two) times daily., Disp: 60 tablet, Rfl: 11    spironolactone (ALDACTONE) 100 MG tablet, Take 1 tablet (100 mg total) by mouth twice a week., Disp: 8 tablet, Rfl: 11    UNABLE TO FIND, 4 (four) times daily as needed. Medible 20 mg blue raspberry 1/4 to 1/2 up to 4 times daily as needed., Disp: , Rfl:     diltiazem HCl (DILTIAZEM 2% CREAM), Apply topically 3 (three) times daily. Apply topically to anal area., Disp: 30 g, Rfl: 2    oxybutynin (DITROPAN) 5 MG Tab, Take 1 tablet (5 mg total) by mouth 3 (three) times daily as needed (Bladder spasms)., Disp: 20 tablet, Rfl: 0    oxyCODONE (ROXICODONE) 5 MG immediate release tablet, Take 1 tablet (5 mg total) by mouth every 4 (four) hours as needed for Pain. (Patient not taking: Reported on 6/16/2025), Disp: 10 tablet, Rfl: 0    tamsulosin (FLOMAX) 0.4 mg Cap, Take  1 capsule (0.4 mg total) by mouth once daily. for 14 days, Disp: 14 capsule, Rfl: 0  No current facility-administered medications for this visit.    Facility-Administered Medications Ordered in Other Visits:     0.9%  NaCl infusion, , Intravenous, Continuous, Gerard Salinas MD, Stopped at 03/30/23 0968

## 2025-06-17 ENCOUNTER — TELEPHONE (OUTPATIENT)
Dept: UROLOGY | Facility: CLINIC | Age: 55
End: 2025-06-17
Payer: COMMERCIAL

## 2025-06-17 NOTE — TELEPHONE ENCOUNTER
Copied from CRM #[id number]. Topic: Appointments - Appointment Rescheduling  >> Jun 17, 2025 10:27 AM Greg wrote:  Scheduling Request           Appt Type:  Ep      Date/Time Preference: next available     Caller Name:pt       Contact Preference: [phone]    Comment: would like to reschedule Ct and follow up. Patient  requested to schedule CT at the [place] location however that location was not an option to schedule from just [facility]. Please call to advise thank you

## 2025-06-18 ENCOUNTER — TELEPHONE (OUTPATIENT)
Dept: PODIATRY | Facility: CLINIC | Age: 55
End: 2025-06-18
Payer: COMMERCIAL

## 2025-06-18 NOTE — TELEPHONE ENCOUNTER
----- Message from Bonnie Bowers MD sent at 6/18/2025 12:45 PM CDT -----  Regarding: RE: dirrhissac  My impression also was that the pain was probably urological.  ----- Message -----  From: Lawson Byrne MD  Sent: 6/18/2025   8:26 AM CDT  To: Chris Carey MD; Bonnie Bowers MD; As#  Subject: RE: gladis                                      Good morning - I am sorry for late response, [place]    I am happy to see her but I am not sure what I would be adding at this time - I don't see a role for surgical intervention from a colonic perspective. Just wanting to get everyone on the same page. Dr. Han, I know you saw her recently but I don't know who you saw her with - the note has not been attested from her visit.    Just reviewing her colonoscopy with me on 5/2025 - the colon appeared grossly normal, however, with a subtle edematous and congested appearance - biopsies were reassuring and so would suspect this is most consistent with portal hypertensive colopathy related to her cirrhosis.     I suspect that any acute abdominal pain is more Urologic in etiology - this is currently under management of Dr. Carey. For now, I think it is probably most appropriate for her to continue workup with GI - they can loop me in if they believe surgery would be of utility but I suspect that for now it won't be helpful.    5/16/2025 - CT AP  Right-sided hydroureteronephrosis with suggestion of inflammatory changes involving the right ureter and a slightly delayed right nephrogram.  A calcification within the right side of the pelvis either represents a phlebolith adjacent to the ureter or a chronic calculus within the right ureter.     Findings consistent with cirrhosis and portal hypertension including splenomegaly and a small amount of ascites.    I hope this helps,     Lawson OTERO  ----- Message -----  From: Bonnie Bowers MD  Sent: 6/12/2025   5:42 PM CDT  To: Lawson Byrne MD  Subject:  gladis                                          --can you see her in the office soon  -- she is having abdo pain (may be related to hydronephrosis and kidney issues) and diarrhea  --can you see her now? Rather than sept

## 2025-06-19 ENCOUNTER — HOSPITAL ENCOUNTER (OUTPATIENT)
Dept: RADIOLOGY | Facility: HOSPITAL | Age: 55
Discharge: HOME | End: 2025-06-19
Attending: UROLOGY
Payer: COMMERCIAL

## 2025-06-19 ENCOUNTER — OFFICE VISIT (OUTPATIENT)
Dept: UROLOGY | Facility: CLINIC | Age: 55
End: 2025-06-19
Payer: COMMERCIAL

## 2025-06-19 ENCOUNTER — TELEPHONE (OUTPATIENT)
Dept: HEPATOLOGY | Facility: CLINIC | Age: 55
End: 2025-06-19
Payer: COMMERCIAL

## 2025-06-19 DIAGNOSIS — R10.30 LOWER ABDOMINAL PAIN: ICD-10-CM

## 2025-06-19 DIAGNOSIS — R10.31 RLQ ABDOMINAL PAIN: ICD-10-CM

## 2025-06-19 DIAGNOSIS — K70.11 ASCITES DUE TO ALCOHOLIC HEPATITIS: Primary | ICD-10-CM

## 2025-06-19 DIAGNOSIS — R18.8 OTHER ASCITES: Primary | ICD-10-CM

## 2025-06-19 PROBLEM — Z96.0 URETERAL STENT RETAINED: Status: RESOLVED | Noted: 2024-09-12 | Resolved: 2025-06-19

## 2025-06-19 PROCEDURE — 74176 CT ABD & PELVIS W/O CONTRAST: CPT | Mod: 26,,, | Performed by: RADIOLOGY

## 2025-06-19 PROCEDURE — 74176 CT ABD & PELVIS W/O CONTRAST: CPT | Mod: TC

## 2025-06-19 RX ORDER — CIPROFLOXACIN 500 MG/1
500 TABLET, FILM COATED ORAL EVERY 12 HOURS
Qty: 20 TABLET | Refills: 0 | Status: SHIPPED | OUTPATIENT
Start: 2025-06-19 | End: 2025-06-29

## 2025-06-19 NOTE — TELEPHONE ENCOUNTER
--abdo pain continues  --urology- not due to urologic issue  --GI- colonoscopy- neg biopsies- placed on imodium  --increase in ascites- pt deferring paracentesis-ordered cipro x 10 days  --restart diuretics lasix 40 mg and spironolactone 100 mg daily  --pt to call me next week to see if she is better

## 2025-06-19 NOTE — PROGRESS NOTES
The patient location is: [place]  The chief complaint leading to consultation is: right lower quadrant abdominal pain    Visit type: audiovisual    Face to Face time with patient: 15 mins  30 minutes of total time spent on the encounter, which includes face to face time and non-face to face time preparing to see the patient (eg, review of tests), Obtaining and/or reviewing separately obtained history, Documenting clinical information in the electronic or other health record, Independently interpreting results (not separately reported) and communicating results to the patient/family/caregiver, or Care coordination (not separately reported).         Each patient to whom he or she provides medical services by telemedicine is:  (1) informed of the relationship between the physician and patient and the respective role of any other health care provider with respect to management of the patient; and (2) notified that he or she may decline to receive medical services by telemedicine and may withdraw from such care at any time.    Notes:     CC:  recent colonoscopy with RLQ pain. She is also having R flank pain after her right ureteral stone surgery on 5/20/25.  She is doing a virtual visit after CT RSS today.    Tee Aranda is a 54 y.o. woman who is here for a follow up, hx of ureteral stone.      CT abd/pevlis with IV contrast done on 5/16/25  Right-sided hydroureteronephrosis with suggestion of inflammatory changes involving the right ureter and a slightly delayed right nephrogram.  A calcification within the right side of the pelvis either represents a phlebolith adjacent to the ureter or a chronic calculus within the right ureter.     Findings consistent with cirrhosis and portal hypertension including splenomegaly and a small amount of ascites.    Because of the above abnormality seen on CT, she is referred to me for a follow up.    Date: 05/20/2025   Pre-Op Diagnosis: Right ureteral stone   Post-Op Diagnosis:  same   Procedure(s) Performed:   1.  Right ureteroscopy  2.  Cystoscopy  3.  Right pyeloscopy  4.  Right retrograde pyelogram  5.  Stone basket  extraction  6.  Ureteral stent  placement  Specimen(s):  Stone for analysis   Stone analysis:  100% Calcium oxalate monohydrate    Findings:   No ureteral stones identified  2 small stones basket extracted from kidney    Following her surgery, she did well and was able to remove the right ureteral stent with its string.  However, pt continues to have right lower abdominal pain even after her right ureteral stones removal.  She is here today after CT RSS.  C/o RLQ abdominal pain, persistent diarrhea.  No fever or chills.  Denies right flank pain or hematuria.        Her PCP, James Samano MD   Patient has a history of nonalcoholic cirrhosis and CARTAGENA disease with esophageal varices.  Went to ER on 8/30/24 with acute right flank pian.  She was tested positive for COVID at home 8/29/24 contacted her liver doctor at [facility] who recommended IV remdesivir as inpatient as that iswhat she did last time she got COVID she will get laboratory tests here in and consult the liver specialist           Past Medical History:   Diagnosis Date    Anemia, unspecified     Anxiety     Arthritis     Ascites 11/23/2020    AVN (avascular necrosis of bone)     Cataract     COVID-19 vaccine administered     04/08/21, 04/30/21    Diarrhea of presumed infectious origin 7/31/2023    Edema 11/23/2020    Epigastric pain 7/31/2023    Esophageal varices     Glaucoma     Hepatic encephalopathy 11/23/2020    History of colon polyps     Hx of cirrhosis     non-alcoholic    Iron deficiency anemia secondary to blood loss (chronic)     Kidney stones     Portal hypertensive gastropathy     Unintentional weight loss 10/14/2023    Unspecified cirrhosis of liver      Past Surgical History:   Procedure Laterality Date    APPENDECTOMY      COLONOSCOPY N/A 8/3/2023    Procedure: COLONOSCOPY;  Surgeon: Rita  Gerard GUERRA MD;  Location: [facility] ENDO;  Service: Endoscopy;  Laterality: N/A;    COLONOSCOPY N/A 5/27/2024    Procedure: COLONOSCOPY;  Surgeon: Eric Garcia MD;  Location: [facility] (4TH FLR);  Service: Endoscopy;  Laterality: N/A;  Dr. Douglass, egd/colon, weight loss, variceal surveillance and abdominal pain, standard prep, cirrhosis labs ordered    COLONOSCOPY N/A 5/14/2025    Procedure: COLONOSCOPY;  Surgeon: Lawson Byrne MD;  Location: [facility] (2ND FLR);  Service: Colon and Rectal;  Laterality: N/A;  jm/shagufta/suprep  5/9-labs re-scheduled to [facility] for 5/12/25-tb  5/9 unable to [facility] for albertoll-MD  5/12 precall complete, JL    COLONOSCOPY W/ POLYPECTOMY      CYSTOSCOPY N/A 3/20/2024    Procedure: CYSTOSCOPY;  Surgeon: Chris Carey MD;  Location: [facility] OR [address] [place] [address];  Service: Urology;  Laterality: N/A;    CYSTOSCOPY  9/4/2024    Procedure: CYSTOSCOPY;  Surgeon: Chris Carey MD;  Location: [facility] OR [address] [place] [address];  Service: Urology;;    CYSTOSCOPY W/ RETROGRADES Right 9/13/2024    Procedure: CYSTOSCOPY, WITH RETROGRADE PYELOGRAM;  Surgeon: Chris Carey MD;  [facility];  Service: Urology;  Laterality: Right;    CYSTOSCOPY W/ RETROGRADES Right 5/20/2025    Procedure: CYSTOSCOPY, WITH RETROGRADE PYELOGRAM;  Surgeon: Chris Carey MD;  [facility];  Service: Urology;  Laterality: Right;    CYSTOSCOPY W/ URETERAL STENT PLACEMENT Right 5/20/2025    Procedure: CYSTOSCOPY, WITH URETERAL STENT INSERTION;  Surgeon: Chris Carey MD;  [facility];  Service: Urology;  Laterality: Right;    CYSTOSCOPY W/ URETERAL STENT REMOVAL Right 3/14/2024    Procedure: CYSTOSCOPY, WITH URETERAL STENT REMOVAL;  Surgeon: Chris Carey MD;  [facility];  Service: Urology;  Laterality: Right;    CYSTOSCOPY WITH CALCULUS EXTRACTION Right 9/13/2024    Procedure: CYSTOSCOPY, WITH CALCULUS REMOVAL;  Surgeon: Chris Carey MD;  Location: [facility] OR [address] [place] [address];  Service: Urology;   Laterality: Right;  urtereoscopy    DISTAL CLAVICLE EXCISION Right 11/18/2021    Procedure: RIGHT SHOULDER ARTHROSCOPY, EXCISION, CLAVICLE, DISTAL; EXTENSIVE DEBRIDEMENT;  Surgeon: Isaiah Joyner MD;  Location: [facility] OR;  Service: Orthopedics;  Laterality: Right;    ESOPHAGEAL VARICE LIGATION      ESOPHAGOGASTRODUODENOSCOPY N/A 11/10/2020    Procedure: EGD (ESOPHAGOGASTRODUODENOSCOPY);  Surgeon: Gerard Salinas MD;  Location: [facility] ENDO;  Service: Endoscopy;  Laterality: N/A;    ESOPHAGOGASTRODUODENOSCOPY N/A 12/28/2020    Procedure: EGD (ESOPHAGOGASTRODUODENOSCOPY);  Surgeon: Adryan Park MD;  Location: [facility] (2ND FLR);  Service: Endoscopy;  Laterality: N/A;    ESOPHAGOGASTRODUODENOSCOPY N/A 02/15/2021    Procedure: EGD (ESOPHAGOGASTRODUODENOSCOPY);  Surgeon: Hari Greene MD;  Location: [facility] (4TH FLR);  Service: Endoscopy;  Laterality: N/A;  6 week follow-up variceal banding  Hx varices - Labs - ERW  prep ins. emialed - COVID screening on 2/12/21 [place] - ERW    ESOPHAGOGASTRODUODENOSCOPY Left 08/03/2021    Procedure: EGD (ESOPHAGOGASTRODUODENOSCOPY);  Surgeon: Fabricio Corado MD;  Location: [facility] (4TH FLR);  Service: Gastroenterology;  Laterality: Left;  recent hematemasis. varices-labs on 7/26    COVID test at [facility] on 7/31-GT  requesting sooner    ESOPHAGOGASTRODUODENOSCOPY N/A 07/27/2022    Procedure: EGD (ESOPHAGOGASTRODUODENOSCOPY);  Surgeon: Eric Garcia MD;  Location: [facility] ENDO;  Service: Endoscopy;  Laterality: N/A;    ESOPHAGOGASTRODUODENOSCOPY Left 08/29/2022    Procedure: EGD (ESOPHAGOGASTRODUODENOSCOPY);  Surgeon: Kacy Douglass MD;  Location: [facility] (2ND FLR);  Service: Endoscopy;  Laterality: Left;  Fully vaccinated/ clear liquids up to 4 hrs prior-RB  varices labs ordered-RB    ESOPHAGOGASTRODUODENOSCOPY N/A 10/05/2022    Procedure: EGD (ESOPHAGOGASTRODUODENOSCOPY);  Surgeon: Gerard Salinas MD;  Location: [facility];  Service: Endoscopy;  Laterality: N/A;     ESOPHAGOGASTRODUODENOSCOPY N/A 3/30/2023    Procedure: EGD (ESOPHAGOGASTRODUODENOSCOPY);  Surgeon: Gerard Salinas MD;  Location: [facility] ENDO;  Service: Endoscopy;  Laterality: N/A;    ESOPHAGOGASTRODUODENOSCOPY N/A 8/3/2023    Procedure: EGD (ESOPHAGOGASTRODUODENOSCOPY);  Surgeon: Gerard Salinas MD;  Location: [facility];  Service: Endoscopy;  Laterality: N/A;    ESOPHAGOGASTRODUODENOSCOPY N/A 5/27/2024    Procedure: EGD (ESOPHAGOGASTRODUODENOSCOPY);  Surgeon: Eric Garcia MD;  Location: [facility] ENDO (4TH FLR);  Service: Endoscopy;  Laterality: N/A;    EXTRACTION - STONE Right 3/20/2024    Procedure: EXTRACTION - STONE;  Surgeon: Chris Carey MD;  Location: [facility] OR 1ST FLR;  Service: Urology;  Laterality: Right;    HYSTERECTOMY      INJECTION, SACROILIAC JOINT Right 3/21/2025    Procedure: INJECTION,SACROILIAC JOINT (ORAL);  Surgeon: Mainor Thomason MD;  Location: [facility] OR;  Service: Pain Management;  Laterality: Right;    KNEE SURGERY Bilateral     LASER LITHOTRIPSY Right 3/20/2024    Procedure: LITHOTRIPSY, USING LASER;  Surgeon: Chris Carey MD;  Location: [facility] OR 1ST FLR;  Service: Urology;  Laterality: Right;    LASER LITHOTRIPSY Right 9/13/2024    Procedure: LITHOTRIPSY, USING LASER;  Surgeon: Chris Carey MD;  Location: [facility] OR 1ST FLR;  Service: Urology;  Laterality: Right;    LITHOTRIPSY      NEPHROSCOPY Right 5/20/2025    Procedure: NEPHROSCOPY;  Surgeon: Chris Carey MD;  Location: [facility] OR 1ST FLR;  Service: Urology;  Laterality: Right;    REMOVAL OF URETERAL CALCULUS Right 5/20/2025    Procedure: REMOVAL, CALCULUS, URETER;  Surgeon: Chris Carey MD;  Location: [facility] OR 1ST FLR;  Service: Urology;  Laterality: Right;    REMOVAL-STENT Right 3/20/2024    Procedure: REMOVAL-STENT;  Surgeon: Chris Carey MD;  Location: [facility] OR 1ST FLR;  Service: Urology;  Laterality: Right;    REMOVAL-STENT Right 9/13/2024    Procedure: REMOVAL-STENT;  Surgeon: Chris Carey MD;  Location: [facility] OR  1ST FLR;  Service: Urology;  Laterality: Right;    RETROGRADE PYELOGRAPHY Right 3/20/2024    Procedure: PYELOGRAM, RETROGRADE;  Surgeon: Chris Carey MD;  Location: [facility] FLR;  Service: Urology;  Laterality: Right;    RHINOPLASTY TIP      SHOULDER SURGERY Right     TONSILLECTOMY      TOTAL HIP ARTHROPLASTY Right     URETERAL STENT PLACEMENT Right 3/20/2024    Procedure: INSERTION, STENT, URETER;  Surgeon: Chris Carey MD;  [facility];  Service: Urology;  Laterality: Right;    URETERAL STENT PLACEMENT Right 2024    Procedure: INSERTION, STENT, URETER;  Surgeon: Chris Carey MD;  Location: [facility] OR [facility] FLR;  Service: Urology;  Laterality: Right;    URETEROSCOPY Right 3/20/2024    Procedure: URETEROSCOPY;  Surgeon: Chris Carey MD;  [facility];  Service: Urology;  Laterality: Right;    URETEROSCOPY Right 2024    Procedure: URETEROSCOPY;  Surgeon: Chris Carey MD;  [facility];  Service: Urology;  Laterality: Right;    URETEROSCOPY Right 2025    Procedure: URETEROSCOPY;  Surgeon: Chris Carey MD;  [facility];  Service: Urology;  Laterality: Right;     Social History     Tobacco Use    Smoking status: Some Days     Current packs/day: 0.00     Types: Cigarettes     Last attempt to quit: 7/3/2019     Years since quittin.9    Smokeless tobacco: Never   Substance Use Topics    Alcohol use: Not Currently    Drug use: No     Family History   Problem Relation Name Age of Onset    No Known Problems Mother      Diabetes Mellitus Maternal Grandmother      Amblyopia Neg Hx      Blindness Neg Hx      Cataracts Neg Hx      Glaucoma Neg Hx      Macular degeneration Neg Hx      Retinal detachment Neg Hx      Strabismus Neg Hx      Colon cancer Neg Hx      Esophageal cancer Neg Hx       Allergy:  Review of patient's allergies indicates:   Allergen Reactions    Sulfa (sulfonamide antibiotics) Hives     Outpatient Encounter Medications as  of 6/19/2025   Medication Sig Dispense Refill    carvediloL (COREG) 3.125 MG tablet Take 1 tablet (3.125 mg total) by mouth 2 (two) times daily. 180 tablet 3    diltiazem HCl (DILTIAZEM 2% CREAM) Apply topically 3 (three) times daily. Apply topically to anal area. 30 g 2    esomeprazole (NEXIUM) 40 MG capsule TAKE 1 CAPSULE BY MOUTH 2 TIMES DAILY BEFORE MEALS. 180 capsule 3    furosemide (LASIX) 40 MG tablet Take 1 tablet (40 mg total) by mouth twice a week. 8 tablet 11    multivitamin (THERAGRAN) per tablet Take 1 tablet by mouth once daily.      omega-3 fatty acids/fish oil (FISH OIL-OMEGA-3 FATTY ACIDS) 300-1,000 mg capsule Take 1 capsule by mouth once daily.      oxybutynin (DITROPAN) 5 MG Tab Take 1 tablet (5 mg total) by mouth 3 (three) times daily as needed (Bladder spasms). 20 tablet 0    oxyCODONE (ROXICODONE) 5 MG immediate release tablet Take 1 tablet (5 mg total) by mouth every 4 (four) hours as needed for Pain. (Patient not taking: Reported on 6/16/2025) 10 tablet 0    potassium citrate (UROCIT-K 10) 10 mEq (1,080 mg) TbSR Take 1 tablet (10 mEq total) by mouth 2 (two) times daily with meals. 180 tablet 3    pregabalin (LYRICA) 100 MG capsule Take 1 capsule (100 mg total) by mouth 2 (two) times daily. 60 capsule 5    rifAXIMin (XIFAXAN) 550 mg Tab Take 1 tablet (550 mg total) by mouth 2 (two) times daily. 60 tablet 11    spironolactone (ALDACTONE) 100 MG tablet Take 1 tablet (100 mg total) by mouth twice a week. 8 tablet 11    tamsulosin (FLOMAX) 0.4 mg Cap Take 1 capsule (0.4 mg total) by mouth once daily. for 14 days 14 capsule 0    UNABLE TO FIND 4 (four) times daily as needed. Medible 20 mg blue raspberry 1/4 to 1/2 up to 4 times daily as needed.       Facility-Administered Encounter Medications as of 6/19/2025   Medication Dose Route Frequency Provider Last Rate Last Admin    0.9%  NaCl infusion   Intravenous Continuous Pellegrin, Gerard C., MD   Stopped at 03/30/23 0922     Review of Systems    ROS  Physical Exam     There were no vitals filed for this visit.        Physical Exam      LABS:  Lab Results   Component Value Date    CREATININE 0.8 06/09/2025    CREATININE 0.7 05/16/2025    CREATININE 0.7 04/30/2025     Urine Culture, Routine   Date Value Ref Range Status   09/12/2024 No growth  Final   09/04/2024 No growth  Final     Hemoglobin A1C   Date Value Ref Range Status   07/26/2022 5.5 4.0 - 5.6 % Final     Comment:     ADA Screening Guidelines:  5.7-6.4%  Consistent with prediabetes  >or=6.5%  Consistent with diabetes    High levels of fetal hemoglobin interfere with the HbA1C  assay. Heterozygous hemoglobin variants (HbS, HgC, etc)do  not significantly interfere with this assay.   However, presence of multiple variants may affect accuracy.        Latest Reference Range & Units 05/16/25 13:57   Color, UA Straw, Katerin, Yellow, Light-Orange  Yellow   Appearance, UA Clear  Clear   Spec Grav UA 1.005 - 1.030  1.015   pH, UA 5.0 - 8.0  6.0   Protein, UA Negative  Negative   Glucose, UA Negative  Negative   Ketones, UA Negative  Negative   Blood, UA Negative  Negative   NITRITE UA Negative  Negative   Bilirubin (UA) Negative  Negative   Urobilinogen, UA <2.0 EU/dL Negative   Leukocyte Esterase, UA Negative  Negative     Radiology:  CT RSS 6/19/25  No obstructive uropathy is seen.  Punctate nonobstructing nephrolithiasis on the right.     Imaging stigmata in keeping with cirrhosis with portal hypertension.  There is increased ascites as compared to the previous study of 05/16/2025.     Persistent mild thickening of the walls of the right colon, difficult to evaluate given the lack of intravenous and oral contrast material.     Cholelithiasis without CT evidence for cholecystitis.    CT 5/16/25  Right-sided hydroureteronephrosis with suggestion of inflammatory changes involving the right ureter and a slightly delayed right nephrogram.  A calcification within the right side of the pelvis either represents a  phlebolith adjacent to the ureter or a chronic calculus within the right ureter.     Findings consistent with cirrhosis and portal hypertension including splenomegaly and a small amount of ascites.    CT RSS 9/12/24  Few tiny nonobstructing right renal stones.  Ureteral vesicular junctions are not well evaluated secondary to streak artifact from right hip arthroplasty.  Similar right-sided hydronephrosis and proximal hydroureter with interval placement of a right double-J ureteral stent.  Proximal in coils about the proximal ureter, and distal and coils about the urinary bladder.  Correlation is advised.     Hepatosplenomegaly with cirrhotic liver morphology.  No focal hepatic lesions, noting limitations from noncontrast examination.     Similar nonspecific colonic wall thickening and surrounding inflammatory change about the ascending colon.  Findings may relate to chronic infectious or non infectious inflammatory process, noting that underlying malignancy cannot be excluded.  Correlation is advised.    Assessment and Plan:  Diagnoses and all orders for this visit:    Ascites due to alcoholic hepatitis    RLQ abdominal pain        I reviewed her CT RSS.  All the obstructive stones gone and no right hydroneprhosis noted.  Does show increased ascites and right colon wall thickening on today's CT.  I think that her abdominal pain is not related to urologic nature.  Refer her back to GI and colerectal surgery.    [id number]  SYNCHRONOUS AUDIO-VIDEO VISIT, EST, MOD MDM 30+ MIN    Follow-up:  Follow up if symptoms worsen or fail to improve.

## 2025-06-20 ENCOUNTER — PATIENT MESSAGE (OUTPATIENT)
Dept: HEPATOLOGY | Facility: CLINIC | Age: 55
End: 2025-06-20
Payer: COMMERCIAL

## 2025-06-20 ENCOUNTER — TELEPHONE (OUTPATIENT)
Dept: HEPATOLOGY | Facility: CLINIC | Age: 55
End: 2025-06-20
Payer: COMMERCIAL

## 2025-06-20 DIAGNOSIS — R10.9 RIGHT SIDED ABDOMINAL PAIN: Primary | ICD-10-CM

## 2025-06-20 NOTE — TELEPHONE ENCOUNTER
Pt was scheduled for MRI Thoracic spine w/wo contrast and MRI Lumbar w/wo contrast [facility]

## 2025-06-26 ENCOUNTER — PATIENT MESSAGE (OUTPATIENT)
Dept: HEPATOLOGY | Facility: CLINIC | Age: 55
End: 2025-06-26
Payer: COMMERCIAL

## 2025-07-05 ENCOUNTER — HOSPITAL ENCOUNTER (OUTPATIENT)
Dept: RADIOLOGY | Facility: HOSPITAL | Age: 55
Discharge: HOME | End: 2025-07-05
Attending: INTERNAL MEDICINE
Payer: COMMERCIAL

## 2025-07-05 DIAGNOSIS — R10.9 RIGHT SIDED ABDOMINAL PAIN: ICD-10-CM

## 2025-07-05 PROCEDURE — 72158 MRI LUMBAR SPINE W/O & W/DYE: CPT | Mod: 26,,, | Performed by: RADIOLOGY

## 2025-07-05 PROCEDURE — 72157 MRI CHEST SPINE W/O & W/DYE: CPT | Mod: TC

## 2025-07-05 PROCEDURE — 25500020 PHARM REV CODE 255: Performed by: INTERNAL MEDICINE

## 2025-07-05 PROCEDURE — 72158 MRI LUMBAR SPINE W/O & W/DYE: CPT | Mod: TC

## 2025-07-05 PROCEDURE — 72157 MRI CHEST SPINE W/O & W/DYE: CPT | Mod: 26,,, | Performed by: RADIOLOGY

## 2025-07-05 PROCEDURE — A9585 GADOBUTROL INJECTION: HCPCS | Performed by: INTERNAL MEDICINE

## 2025-07-05 RX ORDER — GADOBUTROL 604.72 MG/ML
6 INJECTION INTRAVENOUS
Status: COMPLETED | OUTPATIENT
Start: 2025-07-05 | End: 2025-07-05

## 2025-07-05 RX ADMIN — GADOBUTROL 6 ML: 604.72 INJECTION INTRAVENOUS at 02:07

## 2025-07-07 ENCOUNTER — RESULTS FOLLOW-UP (OUTPATIENT)
Dept: TRANSPLANT | Facility: CLINIC | Age: 55
End: 2025-07-07

## 2025-07-07 DIAGNOSIS — M54.9 BACK PAIN, UNSPECIFIED BACK LOCATION, UNSPECIFIED BACK PAIN LATERALITY, UNSPECIFIED CHRONICITY: Primary | ICD-10-CM

## 2025-07-22 NOTE — PROGRESS NOTES
"CC: RIGHT HIP PAIN    HISTORY OF PRESENT ILLNESS  Tee Aranda, a 55 y.o. female, presents today for evaluation of her right HIP.  Patient was seen and referred by Gurjit Echavarria Jr., PA-C for a possible ultrasound-guided iliopsoas injection. Pt has hx of core decompression that failed and lead to R SIN in 2005. PT has also received R SIJ CSI under fluoroscopy 3/21/25. Some relief with SIJ injection, but during the procedure patient had to stop and not receive the second injection. Pt reports anterior R hip and groin pain. Most pain is R glute down R hamstring, possible sciatic nerve. Pt reports doing HEP and does not help. Pt notes pain is sharp, like "lightning bolt," and does not take pain medication. Pt has not done fPT. Pt notes she cannot get up out of seat using R leg. Pt is looking to get an injection but not today. Pt notes she needs to prepare for the injection and have her  with her due to anxiety. Pt notes pain is 7/10 at its worst. Patient has a lift in her L shoe, after he SIN. PT notes the pain is affecting her ADLs. Patient is unable to sit for long periods of time and is unable to workout.     Review of systems (ROS):  A 10+ review of systems was performed with pertinent positives and negatives noted above in the history of present illness. Other systems were negative unless otherwise specified.    PHYSICAL EXAMINATION  General:  The patient is alert and oriented x 3.  Mood is pleasant.  Observation of ears, eyes and nose reveal no gross abnormalities.  HEENT: NCAT, sclera nonicteric  Lungs: Respirations are equal and unlabored.   Gait is coordinated. Patient can toe walk and heel walk without difficulty.    HIP/PELVIS EXAMINATION    Observation/Inspection  Gait:   Antalgic   Alignment:  Neutral   Scars:   None   Muscle atrophy: None   Effusion:  None   Warmth:  None   Discoloration:   None   Leg lengths:   Equal   Pelvis:    Level     Tenderness/Crepitus (T/C):      T / C  Lateral " Gluteal region  + / -  Trochanteric bursa   + / -  Piriformis    - / -  SI joint    - / -  Psoas tendon   - / -  Rectus insertion  - / -  Adductor insertion  - / -  Pubic symphysis  - / -    ROM: (* = pain)    Flexion:      120 degrees  External rotation:   40 degrees  Internal rotation with axial load:  30 degrees  Internal rotation without axial load:  40 degrees  Abduction:    45 degrees  Adduction:     20 degrees    Special Tests:  Pain w/ forced internal rotation (FADIR):  +  Pain w/ forced external rotation (VICENTE):  +   Circumduction test:     -  Stinchfield test:     -   Log roll:       -   Snapping hip (internal):    -   Sit-up pain:      NT  Resisted sit-up pain:     NT  Resisted sit-up with adductor contraction pain:  NT  Step-down test:     NT  Trendelenburg test:     NT  Bridge test      NT    Extremity Neuro-vascular Examination:   Sensation:  Grossly intact to light touch all dermatomal regions.   Motor Function:  Fully intact motor function at hip, knee, foot and ankle    DTRs;  quadriceps and  achilles 2+.  No clonus and downgoing Babinski.    Vascular status:  DP and PT pulses 2+, brisk capillary refill, symmetric.    Skin:  intact, compartments soft.    Other Findings:    ASSESSMENT & PLAN  Assessment  #1 s/p SIN, remote, right  W/ tendinosis of iliopsoas tendon  W/ tendinosis of lat glut tens  W/ suspected tendinosis of proximal conjoined hamstring tendon    #2 liver failure    No evidence of neurologic pathology  No evidence of vascular pathology    Imaging studies reviewed:   X-ray pelvis and hip, not performed  MRI hip    Plan  We discussed the importance of appropriate diet, weight, and regular exercise    We discussed options including    Watchful waiting / relative rest x   Physical therapy    Injection therapy Csi iliopsoas tend right   Consultation    The patient chooses As above   x = prescribed  CSI = corticosteroid injection  VSI = viscosupplement injection  PRPI = platelet rich  plasma injection  ia = intra articular  R = right  L = left  B = bilateral   nfSx = surgical consultation was recommended, but patient is not interested in consultation at this time    Physical Therapy        Formal (fPT), @ [facility] facility    Formal (fPT), @ [facility] facility        Homegoing (hgPT), per concurrent fPT recommendations    Homegoing (hgPT), per prior fPT recommendations    Homegoing (hgPT), handout provided        w/  (atPT)    [blank] = not prescribed  x = prescribed  b = prescribed, and begin as indicated  t = continue as indicated  r = prescribed, and restart as indicated  p = completed prior as indicated  hs = prescribed, and with high school   col = prescribed, and with college or university   nfPT = physical therapy was recommended, but patient is not interested in PT at this time    Activity (e.g. sports, work) restrictions    [blank] = as tolerated  pt = per physical therapist  at = per   NWB = non weight bearing on affected lower extremity, with crutches assistance for ambulation    Bracing    [blank] = not prescribed  r = recommended, but not fit with at todays visit  f = prescribed and fit with at todays visit  t = continue as indicated  d = d/c  p = as needed  rare = use on rare, as-needed basis; advised against chronic use    Pain management    [blank] = No prescription necessary. A handout detailing dosing of appropriate   over-the-counter musculoskeletal analgesics was made available to the patient.   m = meloxicam x 14 days  mp = 14 day course of meloxicam prescribed prior    Follow up 10 days via [facility]  1) did it work  2) did she get GI sick (there was some question of this after a csi si joint she had in the past)  Obtain x-ray hip bilat   [blank] = as needed  [number] = in [number] weeks  CSI = for corticosteroid injection  VSI = for viscosupplement injection or injection series  PRP = for platelet rich plasma  injection or injection series  MRI = after MRI imaging  ns = should surgical options be deferred (no surgery)  o = appointment offered, deferred by patient    Should symptoms worsen or fail to resolve, consider    Revisiting the above options and / or CSI lat glut tend  CSI prox hamstr tend     Vocation:

## 2025-07-28 ENCOUNTER — OFFICE VISIT (OUTPATIENT)
Dept: SPORTS MEDICINE | Facility: CLINIC | Age: 55
End: 2025-07-28
Payer: COMMERCIAL

## 2025-07-28 ENCOUNTER — PATIENT MESSAGE (OUTPATIENT)
Dept: ORTHOPEDICS | Facility: CLINIC | Age: 55
End: 2025-07-28
Payer: COMMERCIAL

## 2025-07-28 ENCOUNTER — TELEPHONE (OUTPATIENT)
Dept: SPORTS MEDICINE | Facility: CLINIC | Age: 55
End: 2025-07-28
Payer: COMMERCIAL

## 2025-07-28 VITALS
WEIGHT: 130.94 LBS | BODY MASS INDEX: 19.91 KG/M2 | DIASTOLIC BLOOD PRESSURE: 73 MMHG | HEART RATE: 94 BPM | SYSTOLIC BLOOD PRESSURE: 107 MMHG

## 2025-07-28 DIAGNOSIS — G89.29 CHRONIC RIGHT HIP PAIN: Primary | ICD-10-CM

## 2025-07-28 DIAGNOSIS — Z96.641 S/P TOTAL RIGHT HIP ARTHROPLASTY: ICD-10-CM

## 2025-07-28 DIAGNOSIS — M67.80 TENDINOSIS: ICD-10-CM

## 2025-07-28 DIAGNOSIS — M76.11 PSOAS TENDINITIS OF RIGHT SIDE: ICD-10-CM

## 2025-07-28 DIAGNOSIS — M25.551 CHRONIC RIGHT HIP PAIN: Primary | ICD-10-CM

## 2025-07-28 PROCEDURE — 99999 PR PBB SHADOW E&M-EST. PATIENT-LVL IV: CPT | Mod: PBBFAC,,, | Performed by: FAMILY MEDICINE

## 2025-07-28 PROCEDURE — 3074F SYST BP LT 130 MM HG: CPT | Mod: CPTII,S$GLB,, | Performed by: FAMILY MEDICINE

## 2025-07-28 PROCEDURE — 76942 ECHO GUIDE FOR BIOPSY: CPT | Mod: S$GLB,,, | Performed by: FAMILY MEDICINE

## 2025-07-28 PROCEDURE — 1160F RVW MEDS BY RX/DR IN RCRD: CPT | Mod: CPTII,S$GLB,, | Performed by: FAMILY MEDICINE

## 2025-07-28 PROCEDURE — 1159F MED LIST DOCD IN RCRD: CPT | Mod: CPTII,S$GLB,, | Performed by: FAMILY MEDICINE

## 2025-07-28 PROCEDURE — 3078F DIAST BP <80 MM HG: CPT | Mod: CPTII,S$GLB,, | Performed by: FAMILY MEDICINE

## 2025-07-28 PROCEDURE — 99204 OFFICE O/P NEW MOD 45 MIN: CPT | Mod: 25,S$GLB,, | Performed by: FAMILY MEDICINE

## 2025-07-28 PROCEDURE — 3008F BODY MASS INDEX DOCD: CPT | Mod: CPTII,S$GLB,, | Performed by: FAMILY MEDICINE

## 2025-07-28 PROCEDURE — 20551 NJX 1 TENDON ORIGIN/INSJ: CPT | Mod: S$GLB,,, | Performed by: FAMILY MEDICINE

## 2025-07-28 RX ORDER — TRIAMCINOLONE ACETONIDE 40 MG/ML
40 INJECTION, SUSPENSION INTRA-ARTICULAR; INTRAMUSCULAR
Status: DISCONTINUED | OUTPATIENT
Start: 2025-07-28 | End: 2025-07-28 | Stop reason: HOSPADM

## 2025-07-28 RX ADMIN — TRIAMCINOLONE ACETONIDE 40 MG: 40 INJECTION, SUSPENSION INTRA-ARTICULAR; INTRAMUSCULAR at 02:07

## 2025-07-28 NOTE — TELEPHONE ENCOUNTER
Attempted to reach patient regarding her appt today with Dr. Martinez. Was going to let her know she can come in earlier if she was available. Was unable to leave a voicemail.     Maribel Dumont MS, OTC  Clinical Assistant to Dr. Sameer Martinez MD  [facility]

## 2025-07-28 NOTE — PROCEDURES
"Tendon Origin: R hip joint    Date/Time: 7/28/2025 2:45 PM    Performed by: Sameer Martinez MD  Authorized by: Sameer Martinez MD    Timeout: prior to procedure the correct patient, procedure, and site was verified    Indications:  Pain  Site marked: the procedure site was marked    Timeout: prior to procedure the correct patient, procedure, and site was verified    Location:  Hip  Site:  R hip joint  Prep: patient was prepped and draped in usual sterile fashion    Ultrasonic Guidance for Needle Placement?: Yes    Needle size:  22 G  Medications:  40 mg triamcinolone acetonide 40 mg/mL  Patient tolerance:  Patient tolerated the procedure well with no immediate complications   Approach:   Anterior lateral oblique  Needle insertion just medial to ASIS  Following a course deep to the distal inguinal ligament     Description of ultrasound utilization for needle guidance:   Ultrasound guidance was used for needle localization. Images were saved and stored for documentation. The iliopsoas muscle, tendon, and bursa were visualized. Dynamic visualization of the 22g x 3.5" needle was continuous throughout the procedure.    "

## 2025-08-11 ENCOUNTER — PATIENT MESSAGE (OUTPATIENT)
Dept: HEPATOLOGY | Facility: CLINIC | Age: 55
End: 2025-08-11
Payer: COMMERCIAL

## 2025-08-11 ENCOUNTER — PATIENT MESSAGE (OUTPATIENT)
Dept: SPORTS MEDICINE | Facility: CLINIC | Age: 55
End: 2025-08-11
Payer: COMMERCIAL

## 2025-08-13 ENCOUNTER — PATIENT MESSAGE (OUTPATIENT)
Dept: HEPATOLOGY | Facility: CLINIC | Age: 55
End: 2025-08-13
Payer: COMMERCIAL

## 2025-08-13 DIAGNOSIS — K74.60 HEPATIC CIRRHOSIS, UNSPECIFIED HEPATIC CIRRHOSIS TYPE, UNSPECIFIED WHETHER ASCITES PRESENT: Primary | ICD-10-CM

## 2025-08-14 ENCOUNTER — TELEPHONE (OUTPATIENT)
Dept: ENDOSCOPY | Facility: HOSPITAL | Age: 55
End: 2025-08-14

## 2025-08-14 ENCOUNTER — CLINICAL SUPPORT (OUTPATIENT)
Dept: ENDOSCOPY | Facility: HOSPITAL | Age: 55
End: 2025-08-14
Attending: INTERNAL MEDICINE
Payer: COMMERCIAL

## 2025-08-14 DIAGNOSIS — K74.60 HEPATIC CIRRHOSIS, UNSPECIFIED HEPATIC CIRRHOSIS TYPE, UNSPECIFIED WHETHER ASCITES PRESENT: Primary | ICD-10-CM

## 2025-08-15 ENCOUNTER — HOSPITAL ENCOUNTER (OUTPATIENT)
Facility: HOSPITAL | Age: 55
Discharge: HOME | End: 2025-08-15
Attending: STUDENT IN AN ORGANIZED HEALTH CARE EDUCATION/TRAINING PROGRAM | Admitting: STUDENT IN AN ORGANIZED HEALTH CARE EDUCATION/TRAINING PROGRAM
Payer: COMMERCIAL

## 2025-08-15 ENCOUNTER — ANESTHESIA EVENT (OUTPATIENT)
Dept: ENDOSCOPY | Facility: HOSPITAL | Age: 55
End: 2025-08-15
Payer: COMMERCIAL

## 2025-08-15 ENCOUNTER — ANESTHESIA (OUTPATIENT)
Dept: ENDOSCOPY | Facility: HOSPITAL | Age: 55
End: 2025-08-15
Payer: COMMERCIAL

## 2025-08-15 VITALS
BODY MASS INDEX: 20.21 KG/M2 | DIASTOLIC BLOOD PRESSURE: 67 MMHG | SYSTOLIC BLOOD PRESSURE: 144 MMHG | HEIGHT: 68 IN | WEIGHT: 133.38 LBS | TEMPERATURE: 97 F | OXYGEN SATURATION: 100 % | HEART RATE: 72 BPM | RESPIRATION RATE: 25 BRPM

## 2025-08-15 DIAGNOSIS — K74.60 LIVER CIRRHOSIS SECONDARY TO NASH: Primary | Chronic | ICD-10-CM

## 2025-08-15 DIAGNOSIS — K75.81 LIVER CIRRHOSIS SECONDARY TO NASH: Primary | Chronic | ICD-10-CM

## 2025-08-15 DIAGNOSIS — I85.10 SECONDARY ESOPHAGEAL VARICES WITHOUT BLEEDING: Primary | ICD-10-CM

## 2025-08-15 DIAGNOSIS — K74.60 CIRRHOSIS OF LIVER: ICD-10-CM

## 2025-08-15 PROCEDURE — 37000009 HC ANESTHESIA EA ADD 15 MINS: Performed by: STUDENT IN AN ORGANIZED HEALTH CARE EDUCATION/TRAINING PROGRAM

## 2025-08-15 PROCEDURE — 25000003 PHARM REV CODE 250: Performed by: NURSE ANESTHETIST, CERTIFIED REGISTERED

## 2025-08-15 PROCEDURE — 37000008 HC ANESTHESIA 1ST 15 MINUTES: Performed by: STUDENT IN AN ORGANIZED HEALTH CARE EDUCATION/TRAINING PROGRAM

## 2025-08-15 PROCEDURE — 43244 EGD VARICES LIGATION: CPT | Performed by: STUDENT IN AN ORGANIZED HEALTH CARE EDUCATION/TRAINING PROGRAM

## 2025-08-15 PROCEDURE — 27201022: Performed by: STUDENT IN AN ORGANIZED HEALTH CARE EDUCATION/TRAINING PROGRAM

## 2025-08-15 PROCEDURE — 25000003 PHARM REV CODE 250: Performed by: INTERNAL MEDICINE

## 2025-08-15 PROCEDURE — 63600175 PHARM REV CODE 636 W HCPCS: Performed by: NURSE ANESTHETIST, CERTIFIED REGISTERED

## 2025-08-15 PROCEDURE — 43244 EGD VARICES LIGATION: CPT | Mod: ,,, | Performed by: STUDENT IN AN ORGANIZED HEALTH CARE EDUCATION/TRAINING PROGRAM

## 2025-08-15 RX ORDER — PANTOPRAZOLE SODIUM 40 MG/1
40 TABLET, DELAYED RELEASE ORAL 2 TIMES DAILY
Qty: 28 TABLET | Refills: 0 | Status: SHIPPED | OUTPATIENT
Start: 2025-08-15 | End: 2025-08-29

## 2025-08-15 RX ORDER — ETOMIDATE 2 MG/ML
INJECTION INTRAVENOUS
Status: DISCONTINUED | OUTPATIENT
Start: 2025-08-15 | End: 2025-08-15

## 2025-08-15 RX ORDER — PROPOFOL 10 MG/ML
VIAL (ML) INTRAVENOUS
Status: DISCONTINUED | OUTPATIENT
Start: 2025-08-15 | End: 2025-08-15

## 2025-08-15 RX ORDER — LIDOCAINE HYDROCHLORIDE 20 MG/ML
INJECTION INTRAVENOUS
Status: DISCONTINUED | OUTPATIENT
Start: 2025-08-15 | End: 2025-08-15

## 2025-08-15 RX ORDER — SODIUM CHLORIDE 9 MG/ML
INJECTION, SOLUTION INTRAVENOUS CONTINUOUS
Status: DISCONTINUED | OUTPATIENT
Start: 2025-08-15 | End: 2025-08-15 | Stop reason: HOSPADM

## 2025-08-15 RX ADMIN — ETOMIDATE 10 MG: 2 INJECTION INTRAVENOUS at 08:08

## 2025-08-15 RX ADMIN — SODIUM CHLORIDE: 9 INJECTION, SOLUTION INTRAVENOUS at 08:08

## 2025-08-15 RX ADMIN — PROPOFOL 70 MG: 10 INJECTION, EMULSION INTRAVENOUS at 08:08

## 2025-08-15 RX ADMIN — PROPOFOL 140 MCG/KG/MIN: 10 INJECTION, EMULSION INTRAVENOUS at 08:08

## 2025-08-15 RX ADMIN — LIDOCAINE HYDROCHLORIDE 50 MG: 20 INJECTION INTRAVENOUS at 08:08

## 2025-08-18 ENCOUNTER — PATIENT MESSAGE (OUTPATIENT)
Dept: ENDOSCOPY | Facility: HOSPITAL | Age: 55
End: 2025-08-18
Payer: COMMERCIAL

## 2025-08-18 ENCOUNTER — TELEPHONE (OUTPATIENT)
Dept: ENDOSCOPY | Facility: HOSPITAL | Age: 55
End: 2025-08-18
Payer: COMMERCIAL

## 2025-08-18 DIAGNOSIS — K75.81 LIVER CIRRHOSIS SECONDARY TO NASH: Primary | ICD-10-CM

## 2025-08-18 DIAGNOSIS — K74.60 LIVER CIRRHOSIS SECONDARY TO NASH: Primary | ICD-10-CM

## 2025-08-20 ENCOUNTER — PATIENT MESSAGE (OUTPATIENT)
Dept: HEPATOLOGY | Facility: CLINIC | Age: 55
End: 2025-08-20
Payer: COMMERCIAL

## 2025-08-25 ENCOUNTER — PATIENT MESSAGE (OUTPATIENT)
Dept: SPORTS MEDICINE | Facility: CLINIC | Age: 55
End: 2025-08-25
Payer: COMMERCIAL

## 2025-09-02 ENCOUNTER — ANESTHESIA EVENT (OUTPATIENT)
Dept: ENDOSCOPY | Facility: HOSPITAL | Age: 55
End: 2025-09-02
Payer: COMMERCIAL

## 2025-09-02 ENCOUNTER — HOSPITAL ENCOUNTER (OUTPATIENT)
Facility: HOSPITAL | Age: 55
Discharge: HOME | End: 2025-09-02
Attending: INTERNAL MEDICINE | Admitting: INTERNAL MEDICINE
Payer: COMMERCIAL

## 2025-09-02 ENCOUNTER — DOCUMENTATION ONLY (OUTPATIENT)
Dept: ENDOSCOPY | Facility: HOSPITAL | Age: 55
End: 2025-09-02

## 2025-09-02 ENCOUNTER — ANESTHESIA (OUTPATIENT)
Dept: ENDOSCOPY | Facility: HOSPITAL | Age: 55
End: 2025-09-02
Payer: COMMERCIAL

## 2025-09-02 VITALS
DIASTOLIC BLOOD PRESSURE: 65 MMHG | OXYGEN SATURATION: 97 % | TEMPERATURE: 98 F | HEIGHT: 68 IN | SYSTOLIC BLOOD PRESSURE: 102 MMHG | BODY MASS INDEX: 19.91 KG/M2 | HEART RATE: 73 BPM | WEIGHT: 131.38 LBS | RESPIRATION RATE: 18 BRPM

## 2025-09-02 DIAGNOSIS — K74.60 CIRRHOSIS OF LIVER: ICD-10-CM

## 2025-09-02 PROCEDURE — 37000009 HC ANESTHESIA EA ADD 15 MINS: Performed by: INTERNAL MEDICINE

## 2025-09-02 PROCEDURE — 25000003 PHARM REV CODE 250: Performed by: NURSE ANESTHETIST, CERTIFIED REGISTERED

## 2025-09-02 PROCEDURE — 43235 EGD DIAGNOSTIC BRUSH WASH: CPT | Mod: ,,, | Performed by: INTERNAL MEDICINE

## 2025-09-02 PROCEDURE — 63600175 PHARM REV CODE 636 W HCPCS: Performed by: NURSE ANESTHETIST, CERTIFIED REGISTERED

## 2025-09-02 PROCEDURE — 37000008 HC ANESTHESIA 1ST 15 MINUTES: Performed by: INTERNAL MEDICINE

## 2025-09-02 PROCEDURE — 43235 EGD DIAGNOSTIC BRUSH WASH: CPT | Performed by: INTERNAL MEDICINE

## 2025-09-02 RX ORDER — SODIUM CHLORIDE 9 MG/ML
INJECTION, SOLUTION INTRAVENOUS CONTINUOUS
Status: DISCONTINUED | OUTPATIENT
Start: 2025-09-02 | End: 2025-09-02 | Stop reason: HOSPADM

## 2025-09-02 RX ORDER — PROPOFOL 10 MG/ML
VIAL (ML) INTRAVENOUS
Status: DISCONTINUED | OUTPATIENT
Start: 2025-09-02 | End: 2025-09-02

## 2025-09-02 RX ORDER — LIDOCAINE HYDROCHLORIDE 20 MG/ML
INJECTION, SOLUTION EPIDURAL; INFILTRATION; INTRACAUDAL; PERINEURAL
Status: DISCONTINUED | OUTPATIENT
Start: 2025-09-02 | End: 2025-09-02

## 2025-09-02 RX ORDER — MIDAZOLAM HYDROCHLORIDE 1 MG/ML
INJECTION INTRAMUSCULAR; INTRAVENOUS
Status: DISCONTINUED | OUTPATIENT
Start: 2025-09-02 | End: 2025-09-02

## 2025-09-02 RX ADMIN — GLYCOPYRROLATE 0.2 MG: 0.2 INJECTION, SOLUTION INTRAMUSCULAR; INTRAVENOUS at 01:09

## 2025-09-02 RX ADMIN — LIDOCAINE HYDROCHLORIDE 60 MG: 20 INJECTION, SOLUTION EPIDURAL; INFILTRATION; INTRACAUDAL; PERINEURAL at 01:09

## 2025-09-02 RX ADMIN — PROPOFOL 30 MG: 10 INJECTION, EMULSION INTRAVENOUS at 02:09

## 2025-09-02 RX ADMIN — MIDAZOLAM HYDROCHLORIDE 2 MG: 2 INJECTION, SOLUTION INTRAMUSCULAR; INTRAVENOUS at 01:09

## 2025-09-02 RX ADMIN — SODIUM CHLORIDE: 0.9 INJECTION, SOLUTION INTRAVENOUS at 01:09

## 2025-09-02 RX ADMIN — PROPOFOL 70 MG: 10 INJECTION, EMULSION INTRAVENOUS at 01:09

## 2025-09-02 RX ADMIN — PROPOFOL 150 MCG/KG/MIN: 10 INJECTION, EMULSION INTRAVENOUS at 01:09

## (undated) DEVICE — SOL NORMAL USPCA 0.9%

## (undated) DEVICE — SEE MEDLINE ITEM 157125

## (undated) DEVICE — UNDERGLOVES BIOGEL PI SIZE 7.5

## (undated) DEVICE — CATH POLLACK OPEN-END FLEXI-TI

## (undated) DEVICE — SUT CTD VICRYL 2.0

## (undated) DEVICE — DRAPE STERI INSTRUMENT 1018

## (undated) DEVICE — SHAVER ULTRAFFR 4.2MM

## (undated) DEVICE — ADAPTER HOSE 10FT 8MM

## (undated) DEVICE — TAPE MEDIPORE 4IN X 2YDS

## (undated) DEVICE — SOL NACL IRR 3000ML

## (undated) DEVICE — GUIDE WIRE MOTION .035 X 150CM

## (undated) DEVICE — EXTRACTOR TIPLESS 2.4FRX1115CM

## (undated) DEVICE — APPLICATOR CHLORAPREP ORN 26ML

## (undated) DEVICE — MARKER SKIN RULER AND LABEL

## (undated) DEVICE — PACK CYSTOSCOPY III SIRUS

## (undated) DEVICE — PAD ABDOMINAL 5X9 STERILE

## (undated) DEVICE — SYR 10CC LUER LOCK

## (undated) DEVICE — DRAPE STERI-DRAPE 1000 17X11IN

## (undated) DEVICE — TRAY CYSTO BASIN OMC

## (undated) DEVICE — BLADE SAGITTA 5/BX

## (undated) DEVICE — CHLORAPREP 10.5 ML APPLICATOR

## (undated) DEVICE — DRESSING XEROFORM FOIL PK 1X8

## (undated) DEVICE — BLADE SURG #15 CARBON STEEL

## (undated) DEVICE — BLADE SURG CARBON STEEL #10

## (undated) DEVICE — GLOVE BIOGEL PI MICRO INDIC 8

## (undated) DEVICE — DRAPE INCISE IOBAN 2 23X23IN

## (undated) DEVICE — GAUZE SPONGE 4X4 12PLY

## (undated) DEVICE — PACK OPTIMA GEN ORTHO

## (undated) DEVICE — ELECTRODE REM PLYHSV RETURN 9

## (undated) DEVICE — PROBE ARTHSCP EDGE  90 SUCTION

## (undated) DEVICE — DRAPE TIBURON ORTHOPEDIC SPLIT

## (undated) DEVICE — FIBER QUANTA OPT SDT 272UM

## (undated) DEVICE — BLADE OSCIL 9MM X .4MM X 31MM

## (undated) DEVICE — BLADE SHAVER 4.5 6/BX

## (undated) DEVICE — BNDG COFLEX FOAM LF2 ST 4X5YD

## (undated) DEVICE — SLING ARM COMFT NAVY BLU MED

## (undated) DEVICE — SEE MEDLINE ITEM 146292

## (undated) DEVICE — GOWN SURGICAL XX LARGE X LONG

## (undated) DEVICE — GLOVE BIOGEL ORTHOPEDIC 8

## (undated) DEVICE — DRAPE PLASTIC U 60X72

## (undated) DEVICE — SEE MEDLINE ITEM 157117

## (undated) DEVICE — COVER OVERHEAD SURG LT BLUE

## (undated) DEVICE — SEE MEDLINE ITEM 157116

## (undated) DEVICE — SPONGE LAP 18X18 PREWASHED

## (undated) DEVICE — TOWEL OR DISP STRL BLUE 4/PK

## (undated) DEVICE — SEE MEDLINE ITEM 156955

## (undated) DEVICE — INSTRUMENT SUCTION FRAZIER 12F

## (undated) DEVICE — KIT NERVE BLOCK PREP BAPTIST

## (undated) DEVICE — PAD PREP 50/CA

## (undated) DEVICE — GUIDEWIRE STR TIP HIWIRE 150CM

## (undated) DEVICE — MANIFOLD 4 PORT